# Patient Record
Sex: MALE | Race: WHITE | Employment: OTHER | URBAN - METROPOLITAN AREA
[De-identification: names, ages, dates, MRNs, and addresses within clinical notes are randomized per-mention and may not be internally consistent; named-entity substitution may affect disease eponyms.]

---

## 2017-01-12 PROBLEM — T14.8XXA BLOOD BLISTER: Status: ACTIVE | Noted: 2017-01-12

## 2017-01-18 PROBLEM — G62.9 NEUROPATHY: Status: ACTIVE | Noted: 2017-01-18

## 2017-03-07 PROBLEM — I10 HYPERTENSION COMPLICATING DIABETES (HCC): Status: ACTIVE | Noted: 2017-03-07

## 2017-03-07 PROBLEM — I15.2 HYPERTENSION COMPLICATING DIABETES (HCC): Status: ACTIVE | Noted: 2017-03-07

## 2017-03-07 PROBLEM — E78.5 HYPERLIPIDEMIA ASSOCIATED WITH TYPE 2 DIABETES MELLITUS  (HCC): Status: ACTIVE | Noted: 2017-03-07

## 2017-03-07 PROBLEM — I15.2 HYPERTENSION COMPLICATING DIABETES  (HCC): Status: ACTIVE | Noted: 2017-03-07

## 2017-03-07 PROBLEM — E11.69 HYPERLIPIDEMIA ASSOCIATED WITH TYPE 2 DIABETES MELLITUS (HCC): Status: ACTIVE | Noted: 2017-03-07

## 2017-03-07 PROBLEM — E11.59 HYPERTENSION COMPLICATING DIABETES  (HCC): Status: ACTIVE | Noted: 2017-03-07

## 2017-03-07 PROBLEM — E78.5 HYPERLIPIDEMIA ASSOCIATED WITH TYPE 2 DIABETES MELLITUS: Status: ACTIVE | Noted: 2017-03-07

## 2017-03-07 PROBLEM — E11.59 HYPERTENSION COMPLICATING DIABETES (HCC): Status: ACTIVE | Noted: 2017-03-07

## 2017-03-07 PROBLEM — E78.5 HYPERLIPIDEMIA ASSOCIATED WITH TYPE 2 DIABETES MELLITUS (HCC): Status: ACTIVE | Noted: 2017-03-07

## 2017-03-07 PROBLEM — E11.69 HYPERLIPIDEMIA ASSOCIATED WITH TYPE 2 DIABETES MELLITUS: Status: ACTIVE | Noted: 2017-03-07

## 2017-03-07 PROBLEM — E11.59 HYPERTENSION COMPLICATING DIABETES: Status: ACTIVE | Noted: 2017-03-07

## 2017-03-07 PROBLEM — I15.2 HYPERTENSION COMPLICATING DIABETES: Status: ACTIVE | Noted: 2017-03-07

## 2017-03-07 PROBLEM — E11.69 HYPERLIPIDEMIA ASSOCIATED WITH TYPE 2 DIABETES MELLITUS  (HCC): Status: ACTIVE | Noted: 2017-03-07

## 2017-03-07 PROBLEM — E11.9 HYPERTENSION COMPLICATING DIABETES (HCC): Status: ACTIVE | Noted: 2017-03-07

## 2017-03-07 PROCEDURE — 85025 COMPLETE CBC W/AUTO DIFF WBC: CPT | Performed by: INTERNAL MEDICINE

## 2017-03-07 PROCEDURE — 36415 COLL VENOUS BLD VENIPUNCTURE: CPT | Performed by: INTERNAL MEDICINE

## 2017-03-07 PROCEDURE — 83550 IRON BINDING TEST: CPT | Performed by: INTERNAL MEDICINE

## 2017-03-07 PROCEDURE — 84443 ASSAY THYROID STIM HORMONE: CPT | Performed by: INTERNAL MEDICINE

## 2017-03-07 PROCEDURE — 82607 VITAMIN B-12: CPT | Performed by: INTERNAL MEDICINE

## 2017-03-07 PROCEDURE — 80053 COMPREHEN METABOLIC PANEL: CPT | Performed by: INTERNAL MEDICINE

## 2017-03-07 PROCEDURE — 82728 ASSAY OF FERRITIN: CPT | Performed by: INTERNAL MEDICINE

## 2017-03-07 PROCEDURE — 83540 ASSAY OF IRON: CPT | Performed by: INTERNAL MEDICINE

## 2017-03-07 PROCEDURE — 82306 VITAMIN D 25 HYDROXY: CPT | Performed by: INTERNAL MEDICINE

## 2017-03-29 PROBLEM — E11.69 HYPERLIPIDEMIA ASSOCIATED WITH TYPE 2 DIABETES MELLITUS  (HCC): Status: RESOLVED | Noted: 2017-03-07 | Resolved: 2017-03-29

## 2017-03-29 PROBLEM — E78.5 HYPERLIPIDEMIA LDL GOAL <100: Status: ACTIVE | Noted: 2017-03-29

## 2017-03-29 PROBLEM — E78.5 HYPERLIPIDEMIA ASSOCIATED WITH TYPE 2 DIABETES MELLITUS (HCC): Status: RESOLVED | Noted: 2017-03-07 | Resolved: 2017-03-29

## 2017-03-29 PROBLEM — E78.5 HYPERLIPIDEMIA ASSOCIATED WITH TYPE 2 DIABETES MELLITUS: Status: RESOLVED | Noted: 2017-03-07 | Resolved: 2017-03-29

## 2017-03-29 PROBLEM — IMO0002 UNCONTROLLED TYPE 1 DIABETES MELLITUS WITH NEPHROPATHY: Status: ACTIVE | Noted: 2017-03-29

## 2017-03-29 PROBLEM — E11.59 HYPERTENSION COMPLICATING DIABETES: Status: RESOLVED | Noted: 2017-03-07 | Resolved: 2017-03-29

## 2017-03-29 PROBLEM — E11.69 HYPERLIPIDEMIA ASSOCIATED WITH TYPE 2 DIABETES MELLITUS (HCC): Status: RESOLVED | Noted: 2017-03-07 | Resolved: 2017-03-29

## 2017-03-29 PROBLEM — E11.9 HYPERTENSION COMPLICATING DIABETES (HCC): Status: RESOLVED | Noted: 2017-03-07 | Resolved: 2017-03-29

## 2017-03-29 PROBLEM — IMO0002: Status: ACTIVE | Noted: 2017-03-29

## 2017-03-29 PROBLEM — I10 HYPERTENSION, ESSENTIAL, BENIGN: Status: ACTIVE | Noted: 2017-03-29

## 2017-03-29 PROBLEM — E11.59 HYPERTENSION COMPLICATING DIABETES (HCC): Status: RESOLVED | Noted: 2017-03-07 | Resolved: 2017-03-29

## 2017-03-29 PROBLEM — I15.2 HYPERTENSION COMPLICATING DIABETES (HCC): Status: RESOLVED | Noted: 2017-03-07 | Resolved: 2017-03-29

## 2017-03-29 PROBLEM — E10.65: Status: ACTIVE | Noted: 2017-03-29

## 2017-03-29 PROBLEM — E10.3599: Status: ACTIVE | Noted: 2017-03-29

## 2017-03-29 PROBLEM — E78.5 HYPERLIPIDEMIA ASSOCIATED WITH TYPE 2 DIABETES MELLITUS  (HCC): Status: RESOLVED | Noted: 2017-03-07 | Resolved: 2017-03-29

## 2017-03-29 PROBLEM — E11.69 HYPERLIPIDEMIA ASSOCIATED WITH TYPE 2 DIABETES MELLITUS: Status: RESOLVED | Noted: 2017-03-07 | Resolved: 2017-03-29

## 2017-03-29 PROBLEM — I10 HYPERTENSION COMPLICATING DIABETES (HCC): Status: RESOLVED | Noted: 2017-03-07 | Resolved: 2017-03-29

## 2017-03-29 PROBLEM — E11.59 HYPERTENSION COMPLICATING DIABETES  (HCC): Status: RESOLVED | Noted: 2017-03-07 | Resolved: 2017-03-29

## 2017-03-29 PROBLEM — E10.29 UNCONTROLLED TYPE 1 DIABETES MELLITUS WITH NEPHROPATHY (HCC): Status: ACTIVE | Noted: 2017-03-29

## 2017-03-29 PROBLEM — E10.65 UNCONTROLLED TYPE 1 DIABETES MELLITUS WITH NEPHROPATHY (HCC): Status: ACTIVE | Noted: 2017-03-29

## 2017-03-29 PROBLEM — I15.2 HYPERTENSION COMPLICATING DIABETES: Status: RESOLVED | Noted: 2017-03-07 | Resolved: 2017-03-29

## 2017-03-29 PROBLEM — I15.2 HYPERTENSION COMPLICATING DIABETES  (HCC): Status: RESOLVED | Noted: 2017-03-07 | Resolved: 2017-03-29

## 2017-04-03 RX ORDER — FOLIC ACID/VIT B COMPLEX AND C 0.8 MG
TABLET ORAL
Qty: 90 TABLET | Refills: 1 | Status: SHIPPED | OUTPATIENT
Start: 2017-04-03 | End: 2017-10-07

## 2017-04-18 ENCOUNTER — LAB ENCOUNTER (OUTPATIENT)
Dept: LAB | Facility: HOSPITAL | Age: 67
End: 2017-04-18
Attending: INTERNAL MEDICINE
Payer: MEDICARE

## 2017-04-18 DIAGNOSIS — J43.9 PULMONARY EMPHYSEMA, UNSPECIFIED EMPHYSEMA TYPE (HCC): ICD-10-CM

## 2017-04-18 PROCEDURE — 85018 HEMOGLOBIN: CPT

## 2017-04-18 PROCEDURE — 82803 BLOOD GASES ANY COMBINATION: CPT

## 2017-04-18 PROCEDURE — 36600 WITHDRAWAL OF ARTERIAL BLOOD: CPT

## 2017-04-18 PROCEDURE — 83050 HGB METHEMOGLOBIN QUAN: CPT

## 2017-04-18 PROCEDURE — 36415 COLL VENOUS BLD VENIPUNCTURE: CPT

## 2017-04-18 PROCEDURE — 82375 ASSAY CARBOXYHB QUANT: CPT

## 2017-04-19 NOTE — PROGRESS NOTES
Quick Note:    Telephone Information:  Home Phone [phone]  Work Phone Not on file. Mobile [phone] Spoke with patient and let him know results/comments.  Patient verbalized understanding  Home Phone [phone]      ______

## 2017-04-19 NOTE — PROGRESS NOTES
Quick Note:    Let him know this showed only a slightly elevated carbon dioxide related to his emphysema, nothing to do  ______

## 2017-07-14 PROBLEM — E10.649 HYPOGLYCEMIA UNAWARENESS IN TYPE 1 DIABETES MELLITUS (HCC): Status: ACTIVE | Noted: 2017-07-14

## 2017-09-26 PROBLEM — I10 HYPERTENSION, ESSENTIAL, BENIGN: Status: RESOLVED | Noted: 2017-03-29 | Resolved: 2017-09-26

## 2017-09-26 PROBLEM — T14.8XXA BLOOD BLISTER: Status: RESOLVED | Noted: 2017-01-12 | Resolved: 2017-09-26

## 2017-09-26 PROBLEM — E11.59 HYPERTENSION ASSOCIATED WITH DIABETES (HCC): Status: ACTIVE | Noted: 2017-03-07

## 2017-09-26 PROBLEM — I15.2 HYPERTENSION ASSOCIATED WITH DIABETES (HCC): Status: ACTIVE | Noted: 2017-03-07

## 2017-09-26 PROBLEM — I15.2 HYPERTENSION ASSOCIATED WITH DIABETES  (HCC): Status: ACTIVE | Noted: 2017-03-07

## 2017-09-26 PROBLEM — E11.59 HYPERTENSION ASSOCIATED WITH DIABETES: Status: ACTIVE | Noted: 2017-03-07

## 2017-09-26 PROBLEM — I15.2 HYPERTENSION ASSOCIATED WITH DIABETES: Status: ACTIVE | Noted: 2017-03-07

## 2017-09-26 PROBLEM — E11.59 HYPERTENSION ASSOCIATED WITH DIABETES  (HCC): Status: ACTIVE | Noted: 2017-03-07

## 2017-09-26 PROBLEM — E78.5 HYPERLIPIDEMIA LDL GOAL <100: Status: RESOLVED | Noted: 2017-03-29 | Resolved: 2017-09-26

## 2017-10-09 RX ORDER — FOLIC ACID/VIT B COMPLEX AND C 0.8 MG
TABLET ORAL
Qty: 90 TABLET | Refills: 0 | Status: SHIPPED | OUTPATIENT
Start: 2017-10-09 | End: 2018-01-02

## 2017-11-04 PROBLEM — E10.65 UNCONTROLLED TYPE 1 DIABETES MELLITUS WITH NEPHROPATHY (HCC): Status: RESOLVED | Noted: 2017-03-29 | Resolved: 2017-11-04

## 2017-11-04 PROBLEM — I15.2 HYPERTENSION ASSOCIATED WITH DIABETES  (HCC): Status: RESOLVED | Noted: 2017-03-07 | Resolved: 2017-11-04

## 2017-11-04 PROBLEM — IMO0002 UNCONTROLLED TYPE 1 DIABETES MELLITUS WITH NEPHROPATHY: Status: RESOLVED | Noted: 2017-03-29 | Resolved: 2017-11-04

## 2017-11-04 PROBLEM — I15.2 HYPERTENSION ASSOCIATED WITH DIABETES (HCC): Status: RESOLVED | Noted: 2017-03-07 | Resolved: 2017-11-04

## 2017-11-04 PROBLEM — E11.59 HYPERTENSION ASSOCIATED WITH DIABETES (HCC): Status: RESOLVED | Noted: 2017-03-07 | Resolved: 2017-11-04

## 2017-11-04 PROBLEM — E10.65: Status: RESOLVED | Noted: 2017-03-29 | Resolved: 2017-11-04

## 2017-11-04 PROBLEM — E11.59 HYPERTENSION ASSOCIATED WITH DIABETES: Status: RESOLVED | Noted: 2017-03-07 | Resolved: 2017-11-04

## 2017-11-04 PROBLEM — IMO0002: Status: RESOLVED | Noted: 2017-03-29 | Resolved: 2017-11-04

## 2017-11-04 PROBLEM — E11.59 HYPERTENSION ASSOCIATED WITH DIABETES  (HCC): Status: RESOLVED | Noted: 2017-03-07 | Resolved: 2017-11-04

## 2017-11-04 PROBLEM — E10.3599: Status: RESOLVED | Noted: 2017-03-29 | Resolved: 2017-11-04

## 2017-11-04 PROBLEM — I15.2 HYPERTENSION ASSOCIATED WITH DIABETES: Status: RESOLVED | Noted: 2017-03-07 | Resolved: 2017-11-04

## 2017-11-04 PROBLEM — E10.29 UNCONTROLLED TYPE 1 DIABETES MELLITUS WITH NEPHROPATHY (HCC): Status: RESOLVED | Noted: 2017-03-29 | Resolved: 2017-11-04

## 2017-11-14 ENCOUNTER — OFFICE VISIT (OUTPATIENT)
Dept: NEPHROLOGY | Facility: CLINIC | Age: 67
End: 2017-11-14

## 2017-11-14 VITALS — SYSTOLIC BLOOD PRESSURE: 120 MMHG | DIASTOLIC BLOOD PRESSURE: 64 MMHG | WEIGHT: 199 LBS | BODY MASS INDEX: 33 KG/M2

## 2017-11-14 DIAGNOSIS — N18.4 CKD (CHRONIC KIDNEY DISEASE) STAGE 4, GFR 15-29 ML/MIN (HCC): Primary | ICD-10-CM

## 2017-11-14 DIAGNOSIS — E10.8 TYPE 1 DIABETES MELLITUS WITH COMPLICATION (HCC): ICD-10-CM

## 2017-11-14 DIAGNOSIS — R60.9 EDEMA, UNSPECIFIED TYPE: ICD-10-CM

## 2017-11-14 DIAGNOSIS — I10 ESSENTIAL HYPERTENSION: ICD-10-CM

## 2017-11-14 PROBLEM — S90.822A BLISTER OF LEFT FOOT, INITIAL ENCOUNTER: Status: ACTIVE | Noted: 2017-11-14

## 2017-11-14 PROCEDURE — 99214 OFFICE O/P EST MOD 30 MIN: CPT | Performed by: INTERNAL MEDICINE

## 2017-11-14 NOTE — PROGRESS NOTES
Nephrology Progress Note      ASSESSMENT/PLAN:        1) CKD 3- relatively stable creatinine less than 2 mg/dL is due to long-standing type 1 diabetes since age 14 with sub-nephrotic range proteinuria; has been doing well due to reasonable BP and DM manage age 14    Type  1 DM   • Diabetic retinopathy     laser surgery   • Esophageal reflux    • HEMORRHOIDS    • HIGH BLOOD PRESSURE    • Hospitalization or health care facility admission within last 6 months     this was in 2013   • Lucian Gilbert Molly De La Cruz MD;  Location: [address] [place] [address]                MANAGEMENT  6/8/2016: INJECTION, W/WO CONTRAST, DX/THERAPEUTIC SUBST* N/A      Comment: Procedure: LUMBAR EPIDURAL;  Surgeon: Maximilian Stiles MD;  Location: [address] [place] [address] [facility]                MANAGEMENT  5/25/2016: PATIENT DOCUMENTED NOT TO HAVE EXPERIENCED ANY* N/A      Comment: Procedure: LUMBAR EPIDURAL;  Surgeon: Vinod Jeffery MD;  Location: [address] [place] [address]  6/8/2 Diabetes Brother      Type 2 DM   • Hypertension Brother    • Lipids Brother    • Thyroid Disorder Neg       Social History: Smoking status: Former Smoker                                                              Packs/day: 3.50      Years: 20.00 pump use - max 50 Units per day Disp: 5 vial Rfl: 3   GABAPENTIN 300 MG Oral Cap TAKE 1 CAPSULE BY MOUTH TWO TIMES A DAY AND 3 CAPSULES BY MOUTH EVERY NIGHT AT BEDTIME Disp: 450 capsule Rfl: 1   amoxicillin 500 MG Oral Cap Dentist  Disp:  Rfl:    Glucose B

## 2017-11-27 PROBLEM — H90.3 SENSORY HEARING LOSS, BILATERAL: Status: ACTIVE | Noted: 2017-11-27

## 2017-11-27 PROBLEM — H93.13 BILATERAL TINNITUS: Status: ACTIVE | Noted: 2017-11-27

## 2018-01-04 RX ORDER — B-COMPLEX W/ C & FOLIC ACID TAB 1 MG 1 MG
TAB ORAL
Qty: 90 TABLET | Refills: 1 | Status: SHIPPED | OUTPATIENT
Start: 2018-01-04 | End: 2018-07-08

## 2018-01-13 PROCEDURE — 82570 ASSAY OF URINE CREATININE: CPT | Performed by: INTERNAL MEDICINE

## 2018-01-13 PROCEDURE — 82043 UR ALBUMIN QUANTITATIVE: CPT | Performed by: INTERNAL MEDICINE

## 2018-03-22 PROBLEM — F33.1 MODERATE EPISODE OF RECURRENT MAJOR DEPRESSIVE DISORDER (HCC): Status: ACTIVE | Noted: 2018-03-22

## 2018-03-31 PROCEDURE — 81001 URINALYSIS AUTO W/SCOPE: CPT | Performed by: INTERNAL MEDICINE

## 2018-04-13 PROCEDURE — 86618 LYME DISEASE ANTIBODY: CPT | Performed by: OTHER

## 2018-04-13 PROCEDURE — 82746 ASSAY OF FOLIC ACID SERUM: CPT | Performed by: OTHER

## 2018-04-13 PROCEDURE — 83921 ORGANIC ACID SINGLE QUANT: CPT | Performed by: OTHER

## 2018-04-13 PROCEDURE — 84165 PROTEIN E-PHORESIS SERUM: CPT | Performed by: OTHER

## 2018-04-13 PROCEDURE — 82607 VITAMIN B-12: CPT | Performed by: OTHER

## 2018-04-13 PROCEDURE — 86334 IMMUNOFIX E-PHORESIS SERUM: CPT | Performed by: OTHER

## 2018-04-13 PROCEDURE — 83883 ASSAY NEPHELOMETRY NOT SPEC: CPT | Performed by: OTHER

## 2018-04-13 PROCEDURE — 84425 ASSAY OF VITAMIN B-1: CPT | Performed by: OTHER

## 2018-06-12 ENCOUNTER — HOSPITAL ENCOUNTER (OUTPATIENT)
Dept: GENERAL RADIOLOGY | Facility: HOSPITAL | Age: 68
Discharge: HOME | End: 2018-06-12
Attending: ORTHOPAEDIC SURGERY
Payer: MEDICARE

## 2018-06-12 ENCOUNTER — APPOINTMENT (OUTPATIENT)
Dept: LAB | Facility: HOSPITAL | Age: 68
End: 2018-06-12
Payer: MEDICARE

## 2018-06-12 ENCOUNTER — LABORATORY ENCOUNTER (OUTPATIENT)
Dept: LAB | Facility: HOSPITAL | Age: 68
End: 2018-06-12
Attending: ORTHOPAEDIC SURGERY
Payer: MEDICARE

## 2018-06-12 DIAGNOSIS — M48.00 SPINAL STENOSIS: ICD-10-CM

## 2018-06-12 DIAGNOSIS — Z01.818 PRE-OP EXAM: Primary | ICD-10-CM

## 2018-06-12 DIAGNOSIS — Z01.818 PRE-OP EXAM: ICD-10-CM

## 2018-06-12 PROCEDURE — 87081 CULTURE SCREEN ONLY: CPT

## 2018-06-12 PROCEDURE — 81001 URINALYSIS AUTO W/SCOPE: CPT

## 2018-06-12 PROCEDURE — 93010 ELECTROCARDIOGRAM REPORT: CPT | Performed by: INTERNAL MEDICINE

## 2018-06-12 PROCEDURE — 71046 X-RAY EXAM CHEST 2 VIEWS: CPT | Performed by: ORTHOPAEDIC SURGERY

## 2018-06-12 PROCEDURE — 85025 COMPLETE CBC W/AUTO DIFF WBC: CPT

## 2018-06-12 PROCEDURE — 80053 COMPREHEN METABOLIC PANEL: CPT

## 2018-06-12 PROCEDURE — 93005 ELECTROCARDIOGRAM TRACING: CPT

## 2018-06-26 ENCOUNTER — LAB ENCOUNTER (OUTPATIENT)
Dept: LAB | Facility: HOSPITAL | Age: 68
End: 2018-06-26
Attending: ORTHOPAEDIC SURGERY
Payer: MEDICARE

## 2018-06-26 DIAGNOSIS — Z01.818 PRE-OP EXAM: ICD-10-CM

## 2018-06-26 PROCEDURE — 36415 COLL VENOUS BLD VENIPUNCTURE: CPT

## 2018-06-26 PROCEDURE — 85610 PROTHROMBIN TIME: CPT

## 2018-06-26 PROCEDURE — 85730 THROMBOPLASTIN TIME PARTIAL: CPT

## 2018-06-29 PROCEDURE — 36415 COLL VENOUS BLD VENIPUNCTURE: CPT | Performed by: INTERNAL MEDICINE

## 2018-06-29 PROCEDURE — 85613 RUSSELL VIPER VENOM DILUTED: CPT | Performed by: INTERNAL MEDICINE

## 2018-06-29 PROCEDURE — 85280 CLOT FACTOR XII HAGEMAN: CPT | Performed by: INTERNAL MEDICINE

## 2018-06-29 PROCEDURE — 85245 CLOT FACTOR VIII VW RISTOCTN: CPT | Performed by: INTERNAL MEDICINE

## 2018-06-29 PROCEDURE — 85250 CLOT FACTOR IX PTC/CHRSTMAS: CPT | Performed by: INTERNAL MEDICINE

## 2018-06-29 PROCEDURE — 85705 THROMBOPLASTIN INHIBITION: CPT | Performed by: INTERNAL MEDICINE

## 2018-06-29 PROCEDURE — 85240 CLOT FACTOR VIII AHG 1 STAGE: CPT | Performed by: INTERNAL MEDICINE

## 2018-06-29 PROCEDURE — 85247 CLOT FACTOR VIII MULTIMETRIC: CPT | Performed by: INTERNAL MEDICINE

## 2018-06-29 PROCEDURE — 85246 CLOT FACTOR VIII VW ANTIGEN: CPT | Performed by: INTERNAL MEDICINE

## 2018-06-29 PROCEDURE — 85732 THROMBOPLASTIN TIME PARTIAL: CPT | Performed by: INTERNAL MEDICINE

## 2018-06-29 PROCEDURE — 85610 PROTHROMBIN TIME: CPT | Performed by: INTERNAL MEDICINE

## 2018-07-06 PROBLEM — R79.1 ELEVATED PARTIAL THROMBOPLASTIN TIME (PTT): Status: ACTIVE | Noted: 2018-07-06

## 2018-07-06 PROCEDURE — 36415 COLL VENOUS BLD VENIPUNCTURE: CPT | Performed by: PHYSICIAN ASSISTANT

## 2018-07-06 PROCEDURE — 85280 CLOT FACTOR XII HAGEMAN: CPT | Performed by: PHYSICIAN ASSISTANT

## 2018-07-09 RX ORDER — ASCORBIC ACID, THIAMINE, RIBOFLAVIN, NIACINAMIDE, PYRIDOXINE, FOLIC ACID, COBALAMIN, BIOTIN, PANTOTHENIC ACID 100; 1.5; 1.7; 20; 10; 1; 6; 300; 1 MG/1; MG/1; MG/1; MG/1; MG/1; MG/1; UG/1; UG/1; MG/1
TABLET, COATED ORAL
Qty: 90 TABLET | Refills: 0 | Status: ON HOLD | OUTPATIENT
Start: 2018-07-09 | End: 2018-09-10

## 2018-08-31 PROCEDURE — 81001 URINALYSIS AUTO W/SCOPE: CPT | Performed by: INTERNAL MEDICINE

## 2018-08-31 PROCEDURE — 87081 CULTURE SCREEN ONLY: CPT | Performed by: INTERNAL MEDICINE

## 2018-09-07 ENCOUNTER — ANESTHESIA EVENT (OUTPATIENT)
Dept: SURGERY | Facility: HOSPITAL | Age: 68
End: 2018-09-07

## 2018-09-10 ENCOUNTER — APPOINTMENT (OUTPATIENT)
Dept: GENERAL RADIOLOGY | Facility: HOSPITAL | Age: 68
DRG: 472 | End: 2018-09-10
Attending: ORTHOPAEDIC SURGERY
Payer: MEDICARE

## 2018-09-10 ENCOUNTER — HOSPITAL ENCOUNTER (INPATIENT)
Facility: HOSPITAL | Age: 68
LOS: 2 days | Discharge: HOME HEALTH | DRG: 472 | End: 2018-09-12
Attending: ORTHOPAEDIC SURGERY | Admitting: ORTHOPAEDIC SURGERY
Payer: MEDICARE

## 2018-09-10 ENCOUNTER — ANESTHESIA (OUTPATIENT)
Dept: SURGERY | Facility: HOSPITAL | Age: 68
End: 2018-09-10

## 2018-09-10 DIAGNOSIS — G95.9 CERVICAL MYELOPATHY (HCC): ICD-10-CM

## 2018-09-10 DIAGNOSIS — M50.20 HNP (HERNIATED NUCLEUS PULPOSUS), CERVICAL: ICD-10-CM

## 2018-09-10 DIAGNOSIS — Z98.1 S/P CERVICAL SPINAL FUSION: ICD-10-CM

## 2018-09-10 PROBLEM — E10.8: Chronic | Status: ACTIVE | Noted: 2018-09-10

## 2018-09-10 PROBLEM — Z96.41 INSULIN PUMP IN PLACE: Chronic | Status: ACTIVE | Noted: 2018-09-10

## 2018-09-10 LAB
ERYTHROCYTE [DISTWIDTH] IN BLOOD BY AUTOMATED COUNT: 13 % (ref 11.5–16)
EST. AVERAGE GLUCOSE BLD GHB EST-MCNC: 151 MG/DL (ref 68–126)
GLUCOSE BLD-MCNC: 112 MG/DL (ref 65–99)
GLUCOSE BLD-MCNC: 116 MG/DL (ref 65–99)
GLUCOSE BLD-MCNC: 123 MG/DL (ref 65–99)
GLUCOSE BLD-MCNC: 298 MG/DL (ref 65–99)
HBA1C MFR BLD HPLC: 6.9 % (ref ?–5.7)
HCT VFR BLD AUTO: 37.1 % (ref 37–53)
HGB BLD-MCNC: 12.2 G/DL (ref 13–17)
MCH RBC QN AUTO: 29.7 PG (ref 27–33.2)
MCHC RBC AUTO-ENTMCNC: 32.9 G/DL (ref 31–37)
MCV RBC AUTO: 90.3 FL (ref 80–99)
PLATELET # BLD AUTO: 132 10(3)UL (ref 150–450)
RBC # BLD AUTO: 4.11 X10(6)UL (ref 3.8–5.8)
RED CELL DISTRIBUTION WIDTH-SD: 42.2 FL (ref 35.1–46.3)
WBC # BLD AUTO: 9.9 X10(3) UL (ref 4–13)

## 2018-09-10 PROCEDURE — 5A09357 ASSISTANCE WITH RESPIRATORY VENTILATION, LESS THAN 24 CONSECUTIVE HOURS, CONTINUOUS POSITIVE AIRWAY PRESSURE: ICD-10-PCS | Performed by: ORTHOPAEDIC SURGERY

## 2018-09-10 PROCEDURE — 76001 XR FLUOROSCOPE EXAM >1 HR EXTENSIVE (CPT=76001): CPT | Performed by: ORTHOPAEDIC SURGERY

## 2018-09-10 PROCEDURE — 99233 SBSQ HOSP IP/OBS HIGH 50: CPT | Performed by: CLINICAL NURSE SPECIALIST

## 2018-09-10 PROCEDURE — 0RG20A0 FUSION OF 2 OR MORE CERVICAL VERTEBRAL JOINTS WITH INTERBODY FUSION DEVICE, ANTERIOR APPROACH, ANTERIOR COLUMN, OPEN APPROACH: ICD-10-PCS | Performed by: ORTHOPAEDIC SURGERY

## 2018-09-10 PROCEDURE — 0RG20K0 FUSION OF 2 OR MORE CERVICAL VERTEBRAL JOINTS WITH NONAUTOLOGOUS TISSUE SUBSTITUTE, ANTERIOR APPROACH, ANTERIOR COLUMN, OPEN APPROACH: ICD-10-PCS | Performed by: ORTHOPAEDIC SURGERY

## 2018-09-10 PROCEDURE — 0RT30ZZ RESECTION OF CERVICAL VERTEBRAL DISC, OPEN APPROACH: ICD-10-PCS | Performed by: ORTHOPAEDIC SURGERY

## 2018-09-10 PROCEDURE — 00NW0ZZ RELEASE CERVICAL SPINAL CORD, OPEN APPROACH: ICD-10-PCS | Performed by: ORTHOPAEDIC SURGERY

## 2018-09-10 PROCEDURE — 4A11X4G MONITORING OF PERIPHERAL NERVOUS ELECTRICAL ACTIVITY, INTRAOPERATIVE, EXTERNAL APPROACH: ICD-10-PCS | Performed by: ORTHOPAEDIC SURGERY

## 2018-09-10 PROCEDURE — 00BT0ZZ EXCISION OF SPINAL MENINGES, OPEN APPROACH: ICD-10-PCS | Performed by: ORTHOPAEDIC SURGERY

## 2018-09-10 PROCEDURE — 0RP104Z REMOVAL OF INTERNAL FIXATION DEVICE FROM CERVICAL VERTEBRAL JOINT, OPEN APPROACH: ICD-10-PCS | Performed by: ORTHOPAEDIC SURGERY

## 2018-09-10 DEVICE — ARCHON SCRW 4.0X15 SLF-TP VAR: Type: IMPLANTABLE DEVICE | Site: NECK | Status: FUNCTIONAL

## 2018-09-10 DEVICE — ARCHON SCRW 4.5X15 SLF-TP VAR: Type: IMPLANTABLE DEVICE | Site: NECK | Status: FUNCTIONAL

## 2018-09-10 DEVICE — OSTEOCEL PRO BONE MATRIX SM: Type: IMPLANTABLE DEVICE | Site: NECK | Status: FUNCTIONAL

## 2018-09-10 DEVICE — IMPLANTABLE DEVICE: Type: IMPLANTABLE DEVICE | Site: NECK | Status: FUNCTIONAL

## 2018-09-10 DEVICE — COROENT ACR MRKR 7X17X14 10DEG: Type: IMPLANTABLE DEVICE | Site: NECK | Status: FUNCTIONAL

## 2018-09-10 RX ORDER — GABAPENTIN 300 MG/1
600 CAPSULE ORAL NIGHTLY
Status: DISCONTINUED | OUTPATIENT
Start: 2018-09-10 | End: 2018-09-12

## 2018-09-10 RX ORDER — METOCLOPRAMIDE HYDROCHLORIDE 5 MG/ML
5 INJECTION INTRAMUSCULAR; INTRAVENOUS EVERY 6 HOURS PRN
Status: DISCONTINUED | OUTPATIENT
Start: 2018-09-10 | End: 2018-09-12

## 2018-09-10 RX ORDER — AMLODIPINE BESYLATE 10 MG/1
10 TABLET ORAL DAILY
COMMUNITY
End: 2019-05-20

## 2018-09-10 RX ORDER — DOCUSATE SODIUM 100 MG/1
100 CAPSULE, LIQUID FILLED ORAL 2 TIMES DAILY
Status: DISCONTINUED | OUTPATIENT
Start: 2018-09-10 | End: 2018-09-12

## 2018-09-10 RX ORDER — HYDROCODONE BITARTRATE AND ACETAMINOPHEN 5; 325 MG/1; MG/1
2 TABLET ORAL AS NEEDED
Status: DISCONTINUED | OUTPATIENT
Start: 2018-09-10 | End: 2018-09-10 | Stop reason: HOSPADM

## 2018-09-10 RX ORDER — MORPHINE SULFATE 4 MG/ML
2 INJECTION, SOLUTION INTRAMUSCULAR; INTRAVENOUS EVERY 5 MIN PRN
Status: DISCONTINUED | OUTPATIENT
Start: 2018-09-10 | End: 2018-09-10 | Stop reason: HOSPADM

## 2018-09-10 RX ORDER — GABAPENTIN 300 MG/1
300 CAPSULE ORAL 2 TIMES DAILY
COMMUNITY
End: 2018-12-05

## 2018-09-10 RX ORDER — HYDROCODONE BITARTRATE AND ACETAMINOPHEN 10; 325 MG/1; MG/1
1 TABLET ORAL EVERY 4 HOURS PRN
Status: DISCONTINUED | OUTPATIENT
Start: 2018-09-10 | End: 2018-09-12

## 2018-09-10 RX ORDER — SODIUM CHLORIDE, SODIUM LACTATE, POTASSIUM CHLORIDE, CALCIUM CHLORIDE 600; 310; 30; 20 MG/100ML; MG/100ML; MG/100ML; MG/100ML
INJECTION, SOLUTION INTRAVENOUS CONTINUOUS
Status: DISCONTINUED | OUTPATIENT
Start: 2018-09-10 | End: 2018-09-10 | Stop reason: HOSPADM

## 2018-09-10 RX ORDER — HYDROCODONE BITARTRATE AND ACETAMINOPHEN 10; 325 MG/1; MG/1
2 TABLET ORAL EVERY 4 HOURS PRN
Status: DISCONTINUED | OUTPATIENT
Start: 2018-09-10 | End: 2018-09-12

## 2018-09-10 RX ORDER — NALBUPHINE HCL 10 MG/ML
2.5 AMPUL (ML) INJECTION EVERY 4 HOURS PRN
Status: DISCONTINUED | OUTPATIENT
Start: 2018-09-10 | End: 2018-09-12

## 2018-09-10 RX ORDER — FUROSEMIDE 40 MG/1
20 TABLET ORAL NIGHTLY
COMMUNITY
End: 2018-12-31

## 2018-09-10 RX ORDER — TAMSULOSIN HYDROCHLORIDE 0.4 MG/1
0.4 CAPSULE ORAL DAILY
COMMUNITY
End: 2018-12-05

## 2018-09-10 RX ORDER — DEXAMETHASONE SODIUM PHOSPHATE 10 MG/ML
10 INJECTION, SOLUTION INTRAMUSCULAR; INTRAVENOUS ONCE
Status: COMPLETED | OUTPATIENT
Start: 2018-09-10 | End: 2018-09-10

## 2018-09-10 RX ORDER — DEXTROSE MONOHYDRATE 25 G/50ML
50 INJECTION, SOLUTION INTRAVENOUS
Status: DISCONTINUED | OUTPATIENT
Start: 2018-09-10 | End: 2018-09-12

## 2018-09-10 RX ORDER — CELECOXIB 200 MG/1
200 CAPSULE ORAL ONCE
Status: COMPLETED | OUTPATIENT
Start: 2018-09-10 | End: 2018-09-10

## 2018-09-10 RX ORDER — DIAZEPAM 5 MG/1
5 TABLET ORAL EVERY 6 HOURS PRN
Status: DISCONTINUED | OUTPATIENT
Start: 2018-09-10 | End: 2018-09-12

## 2018-09-10 RX ORDER — FUROSEMIDE 40 MG/1
TABLET ORAL
COMMUNITY
End: 2019-12-05

## 2018-09-10 RX ORDER — GABAPENTIN 600 MG/1
600 TABLET ORAL ONCE
Status: COMPLETED | OUTPATIENT
Start: 2018-09-10 | End: 2018-09-10

## 2018-09-10 RX ORDER — DULOXETIN HYDROCHLORIDE 60 MG/1
120 CAPSULE, DELAYED RELEASE ORAL DAILY
COMMUNITY
End: 2019-05-20

## 2018-09-10 RX ORDER — DIPHENHYDRAMINE HYDROCHLORIDE 50 MG/ML
25 INJECTION INTRAMUSCULAR; INTRAVENOUS EVERY 4 HOURS PRN
Status: DISCONTINUED | OUTPATIENT
Start: 2018-09-10 | End: 2018-09-12

## 2018-09-10 RX ORDER — GABAPENTIN 300 MG/1
900 CAPSULE ORAL NIGHTLY
COMMUNITY
End: 2018-12-05

## 2018-09-10 RX ORDER — AMLODIPINE BESYLATE 5 MG/1
10 TABLET ORAL DAILY
Status: DISCONTINUED | OUTPATIENT
Start: 2018-09-11 | End: 2018-09-12

## 2018-09-10 RX ORDER — ACETAMINOPHEN 325 MG/1
650 TABLET ORAL EVERY 4 HOURS PRN
Status: DISCONTINUED | OUTPATIENT
Start: 2018-09-10 | End: 2018-09-12

## 2018-09-10 RX ORDER — CEFAZOLIN SODIUM/WATER 2 G/20 ML
2 SYRINGE (ML) INTRAVENOUS EVERY 8 HOURS
Status: COMPLETED | OUTPATIENT
Start: 2018-09-10 | End: 2018-09-11

## 2018-09-10 RX ORDER — SENNOSIDES 8.6 MG
17.2 TABLET ORAL NIGHTLY
Status: DISCONTINUED | OUTPATIENT
Start: 2018-09-10 | End: 2018-09-12

## 2018-09-10 RX ORDER — NALOXONE HYDROCHLORIDE 0.4 MG/ML
0.08 INJECTION, SOLUTION INTRAMUSCULAR; INTRAVENOUS; SUBCUTANEOUS
Status: DISCONTINUED | OUTPATIENT
Start: 2018-09-10 | End: 2018-09-12

## 2018-09-10 RX ORDER — ACETAMINOPHEN 500 MG
1000 TABLET ORAL ONCE
Status: DISCONTINUED | OUTPATIENT
Start: 2018-09-10 | End: 2018-09-10 | Stop reason: HOSPADM

## 2018-09-10 RX ORDER — LEVOTHYROXINE SODIUM 112 UG/1
112 TABLET ORAL
Status: DISCONTINUED | OUTPATIENT
Start: 2018-09-11 | End: 2018-09-11

## 2018-09-10 RX ORDER — ROSUVASTATIN CALCIUM 5 MG/1
10 TABLET, COATED ORAL DAILY
Status: DISCONTINUED | OUTPATIENT
Start: 2018-09-11 | End: 2018-09-12

## 2018-09-10 RX ORDER — DULOXETIN HYDROCHLORIDE 30 MG/1
120 CAPSULE, DELAYED RELEASE ORAL DAILY
Status: DISCONTINUED | OUTPATIENT
Start: 2018-09-11 | End: 2018-09-12

## 2018-09-10 RX ORDER — DIPHENHYDRAMINE HYDROCHLORIDE 50 MG/ML
12.5 INJECTION INTRAMUSCULAR; INTRAVENOUS EVERY 4 HOURS PRN
Status: DISCONTINUED | OUTPATIENT
Start: 2018-09-10 | End: 2018-09-12

## 2018-09-10 RX ORDER — FUROSEMIDE 40 MG/1
40 TABLET ORAL
Status: DISCONTINUED | OUTPATIENT
Start: 2018-09-11 | End: 2018-09-12

## 2018-09-10 RX ORDER — ALFUZOSIN HYDROCHLORIDE 10 MG/1
10 TABLET, EXTENDED RELEASE ORAL
Status: DISCONTINUED | OUTPATIENT
Start: 2018-09-11 | End: 2018-09-12

## 2018-09-10 RX ORDER — ONDANSETRON 2 MG/ML
4 INJECTION INTRAMUSCULAR; INTRAVENOUS EVERY 4 HOURS PRN
Status: ACTIVE | OUTPATIENT
Start: 2018-09-10 | End: 2018-09-11

## 2018-09-10 RX ORDER — POLYETHYLENE GLYCOL 3350 17 G/17G
17 POWDER, FOR SOLUTION ORAL DAILY PRN
Status: DISCONTINUED | OUTPATIENT
Start: 2018-09-10 | End: 2018-09-12

## 2018-09-10 RX ORDER — ONDANSETRON 2 MG/ML
4 INJECTION INTRAMUSCULAR; INTRAVENOUS AS NEEDED
Status: DISCONTINUED | OUTPATIENT
Start: 2018-09-10 | End: 2018-09-10 | Stop reason: HOSPADM

## 2018-09-10 RX ORDER — CEFAZOLIN SODIUM/WATER 2 G/20 ML
2 SYRINGE (ML) INTRAVENOUS ONCE
Status: COMPLETED | OUTPATIENT
Start: 2018-09-10 | End: 2018-09-10

## 2018-09-10 RX ORDER — HYDROCODONE BITARTRATE AND ACETAMINOPHEN 5; 325 MG/1; MG/1
1 TABLET ORAL AS NEEDED
Status: DISCONTINUED | OUTPATIENT
Start: 2018-09-10 | End: 2018-09-10 | Stop reason: HOSPADM

## 2018-09-10 RX ORDER — CYCLOBENZAPRINE HCL 5 MG
5 TABLET ORAL 3 TIMES DAILY PRN
Status: DISCONTINUED | OUTPATIENT
Start: 2018-09-10 | End: 2018-09-12

## 2018-09-10 RX ORDER — SODIUM PHOSPHATE, DIBASIC AND SODIUM PHOSPHATE, MONOBASIC 7; 19 G/133ML; G/133ML
1 ENEMA RECTAL ONCE AS NEEDED
Status: DISCONTINUED | OUTPATIENT
Start: 2018-09-10 | End: 2018-09-12

## 2018-09-10 RX ORDER — NALOXONE HYDROCHLORIDE 0.4 MG/ML
80 INJECTION, SOLUTION INTRAMUSCULAR; INTRAVENOUS; SUBCUTANEOUS AS NEEDED
Status: DISCONTINUED | OUTPATIENT
Start: 2018-09-10 | End: 2018-09-10 | Stop reason: HOSPADM

## 2018-09-10 RX ORDER — EZETIMIBE 10 MG/1
10 TABLET ORAL DAILY
Status: DISCONTINUED | OUTPATIENT
Start: 2018-09-11 | End: 2018-09-12

## 2018-09-10 RX ORDER — ONDANSETRON 2 MG/ML
4 INJECTION INTRAMUSCULAR; INTRAVENOUS EVERY 6 HOURS PRN
Status: DISCONTINUED | OUTPATIENT
Start: 2018-09-11 | End: 2018-09-12

## 2018-09-10 RX ORDER — DIPHENHYDRAMINE HCL 25 MG
25 CAPSULE ORAL EVERY 4 HOURS PRN
Status: DISCONTINUED | OUTPATIENT
Start: 2018-09-10 | End: 2018-09-12

## 2018-09-10 RX ORDER — ONDANSETRON 2 MG/ML
4 INJECTION INTRAMUSCULAR; INTRAVENOUS ONCE
Status: COMPLETED | OUTPATIENT
Start: 2018-09-10 | End: 2018-09-10

## 2018-09-10 RX ORDER — DEXTROSE MONOHYDRATE 25 G/50ML
50 INJECTION, SOLUTION INTRAVENOUS
Status: DISCONTINUED | OUTPATIENT
Start: 2018-09-10 | End: 2018-09-10 | Stop reason: HOSPADM

## 2018-09-10 RX ORDER — BISACODYL 10 MG
10 SUPPOSITORY, RECTAL RECTAL
Status: DISCONTINUED | OUTPATIENT
Start: 2018-09-10 | End: 2018-09-12

## 2018-09-10 RX ORDER — SODIUM CHLORIDE 9 MG/ML
INJECTION, SOLUTION INTRAVENOUS CONTINUOUS
Status: DISCONTINUED | OUTPATIENT
Start: 2018-09-10 | End: 2018-09-12

## 2018-09-10 RX ORDER — GABAPENTIN 300 MG/1
300 CAPSULE ORAL 3 TIMES DAILY
Status: DISCONTINUED | OUTPATIENT
Start: 2018-09-10 | End: 2018-09-12

## 2018-09-10 RX ORDER — BACITRACIN 50000 [USP'U]/1
INJECTION, POWDER, LYOPHILIZED, FOR SOLUTION INTRAMUSCULAR AS NEEDED
Status: DISCONTINUED | OUTPATIENT
Start: 2018-09-10 | End: 2018-09-10 | Stop reason: HOSPADM

## 2018-09-10 RX ORDER — SODIUM CHLORIDE, SODIUM LACTATE, POTASSIUM CHLORIDE, CALCIUM CHLORIDE 600; 310; 30; 20 MG/100ML; MG/100ML; MG/100ML; MG/100ML
INJECTION, SOLUTION INTRAVENOUS CONTINUOUS
Status: DISCONTINUED | OUTPATIENT
Start: 2018-09-10 | End: 2018-09-11

## 2018-09-10 NOTE — H&P
Progress Notes     Expand All Collapse All    Tay [facility]. is a 75year old male who presents for a pre-operative physical exam. Patient is to have c3-c7 anterior cervical fusion, to be done by Dr. Loco Styles at [facility] on 9/10/18. Quyen STALLWORTH before breakfast. Disp: 90 tablet Rfl: 1   AMLODIPINE BESYLATE 10 MG Oral Tab TAKE ONE TABLET BY MOUTH ONE TIME DAILY  Disp: 90 tablet Rfl: 3   Nortriptyline HCl 75 MG Oral Cap Take 1 capsule (75 mg total) by mouth nightly.  Disp: 90 capsule Rfl: 3   ezetim HYPOTHYROIDISM     • IMPOTENCE     • Kidney disease       ARF-2013   • Liver disease       2013 - pt states all resolved after liver and kidneys shut down after issue with pneumonia    • Neuropathy     • OSTEOARTHRITIS     • Osteoarthrosis, unspecified whe DX/THERAPEUTIC SUBST* N/A      Comment: Procedure: LUMBAR EPIDURAL;  Surgeon: Chan Solorzano MD;  Location: [facility]                MANAGEMENT  2/14/13: KNEE REPLACEMENT SURGERY      Comment: Left TKR by Dr. Mando Pham  5/4/2016:  Gina Lara Comment: Procedure: LUMBAR EPIDURAL;  Surgeon: Edith Palencia MD;  Location: [address] [place] [address]                MANAGEMENT  6/8/2016: PATIENT DOCUMENTED NOT TO HAVE EXPERIENCED ANY* N/A      Comment: Procedure: LUMBAR EPIDURAL;  Surgeon:   Disorder Neg         Social History:   Smoking status: Former Smoker                                                              Packs/day: 3.50      Years: 20.00        Types: Cigarettes     Quit date: 1/1/1986  Smokeless tobacco: Never Used 7\" (1.702 m)   Wt 202 lb (91.6 kg)   BMI 31.64 kg/m²   GENERAL: well developed, well nourished,in no apparent distress  SKIN: no rashes,no suspicious lesions  HEENT: atraumatic, normocephalic,ears and throat are clear  EYES:PERRLA, EOMI, normal optic disk performed. Risks and benefits discussed in detail. Risks including but not limited to bleeding infection, spinal leaks, neurologic injury, paralysis and worsening of symptoms. No guarantees made.  If fusion recommended risks of pseaudarthosis, esdras

## 2018-09-10 NOTE — BRIEF OP NOTE
Pre-Operative Diagnosis: Cervical myelopathy (HCC) [G95.9]  S/P cervical spinal fusion [Z98.1]  HNP (herniated nucleus pulposus), cervical [M50.20]     Post-Operative Diagnosis: Cervical myelopathy (HCC) [G95.9]S/P cervical spinal fusion [F95. 1]HNP (hernia

## 2018-09-10 NOTE — PROGRESS NOTES
Pharmacy Note: Renal dose adjustment for Metoclopramide (Reglan)  Roel Sánchez. has been prescribed Metoclopramide (Reglan) 10 mg every 6 hours as needed.     CrCl estimated at 26.7 ml/min (based on labs from 8/31/18, Scr =2.48)    His calculated c

## 2018-09-10 NOTE — CONSULTS
[facility]  CNS Consult Note    New Donna.  Patient Status:  Inpatient    1950 MRN [id number]   [facility] 3SW-A Attending Rashad Monique MD   Hosp Day # 0 PCP Sylvia Tian MD     Reason for Consult:     Sonya Allison Mayo Flores. is a 75year old male with type 1 diabetes admitted 9/10/2018 for anterior cervical discectomy and fusion.       Assessment/Recommendations:    Consulted by Dr. Marizol Murrieta for insulin pump interrogation, counseling and coordination of car PCP  · Endocrinologist    A total of 37 minutes were spent with the patient, 100% was spent counseling and coordinating care for insulin pump management, infusion site care, insulin pump interrogation, blood glucose monitoring, insulin administration, foll

## 2018-09-10 NOTE — PROGRESS NOTES
S:   Denies difficulty breathing or swallowing  He denies numbness or extremity pain    Inspection: Awake Alert  No acute distress. Blood pressure 157/70, pulse 56, temperature 98.4 °F (36.9 °C), temperature source Oral, resp.  rate 12, height 5' 7\" (1

## 2018-09-10 NOTE — ANESTHESIA PREPROCEDURE EVALUATION
PRE-OP EVALUATION    Patient Name: Catarina Parkinson.    Pre-op Diagnosis: Cervical myelopathy ([facility] [address] [G95.9]  S/P cervical spinal fusion [Z98.1]  HNP (herniated nucleus pulposus), cervical [M50.20]    Procedure(s):  Cervical 3- Cervical 4, Cervical 4- Levothyroxine Sodium 112 MCG Oral Tab Take 1 tablet (112 mcg total) by mouth every morning before breakfast. Disp: 90 tablet Rfl: 1   AMLODIPINE BESYLATE 10 MG Oral Tab TAKE ONE TABLET BY MOUTH ONE TIME DAILY  Disp: 90 tablet Rfl: 3   Nortriptyline HCl 7 depression  (+) anxiety                            Past Surgical History:  2009: BACK SURGERY      Comment:  laminectomy  L1 -4  2/09  Dr. Kev Carlos  8-8-13: BACK SURGERY      Comment:  C4-C6 ACDF   9/8/16: BACK SURGERY      Comment:  L2-S1 Revisio MCHC 33.3 06/12/2018    RDW 12.8 06/12/2018    .0 06/12/2018     Lab Results   Component Value Date     (H) 08/31/2018    K 3.5 (L) 08/31/2018     08/31/2018    CO2 34.3 (H) 08/31/2018    BUN 30 (H) 08/31/2018    CREATSERUM 2.48 (H) 08/3

## 2018-09-10 NOTE — CONSULTS
General Medicine Consult      Reason for consult: post-op medical management     Consulted by: Dr. Adela Tomlinson    PCP: Robbin Brown MD      History of Present Illness:   Patient is a 75year old male with PMH sig for PRANEETH (on CPAP), DM type 1, hypothyroidis Unspecified disorder of thyroid    • Unspecified essential hypertension    • Unspecified sleep apnea    • Visual impairment     wears glasses        PSH:  Past Surgical History:  2009: BACK SURGERY      Comment:  laminectomy  L1 -4  2/09  Dr. Elham Bloand nightly. Disp:  Rfl:    gabapentin 300 MG Oral Cap Take 300 mg by mouth 2 (two) times daily. Disp:  Rfl:    gabapentin 300 MG Oral Cap Take 900 mg by mouth nightly. Disp:  Rfl:    metoprolol Tartrate 25 MG Oral Tab Take 25 mg by mouth 2 (two) times daily. C, sodium chloride 0.9%, HYDROmorphone (DILAUDID) PCA bolus from bag, PEG 3350, magnesium hydroxide, bisacodyl, FLEET ENEMA, [START ON 9/11/2018] ondansetron HCl, ondansetron HCl, Metoclopramide HCl, diphenhydrAMINE **OR** DiphenhydrAMINE HCl, Naloxone HCl normocephalic, MMM, no oropharyngeal lesions, aspen collar in place  Lungs: CTA, good effort  CV: nl S1/S2  GI: soft/NT/ND +BS  Ext: nonedematous b/l LE, no clubbing or cyanosis  Neuro: moving all extremities  Psych: normal mood, normal affect  Skin: no ra monitor for acute blood loss anemia. Outpatient records or previous hospital records reviewed.      Shruti Morel  Internal Medicine  [place] Hospitalist

## 2018-09-10 NOTE — ANESTHESIA POSTPROCEDURE EVALUATION
[facility]  Patient Status:  Surgery Admit   Age/Gender 75year old male MRN [id number]   [facility] SURGERY Attending Sal Lovell MD   Hosp Day # 0 PCP Gloria Paula MD       Anesthesia Post-op Note

## 2018-09-11 LAB
ANION GAP SERPL CALC-SCNC: 5 MMOL/L (ref 0–18)
BUN BLD-MCNC: 27 MG/DL (ref 8–20)
BUN/CREAT SERPL: 12.4 (ref 10–20)
CALCIUM BLD-MCNC: 8.2 MG/DL (ref 8.3–10.3)
CHLORIDE SERPL-SCNC: 105 MMOL/L (ref 101–111)
CO2 SERPL-SCNC: 31 MMOL/L (ref 22–32)
CREAT BLD-MCNC: 2.18 MG/DL (ref 0.7–1.3)
ERYTHROCYTE [DISTWIDTH] IN BLOOD BY AUTOMATED COUNT: 12.7 % (ref 11.5–16)
GLUCOSE BLD-MCNC: 124 MG/DL (ref 65–99)
GLUCOSE BLD-MCNC: 125 MG/DL (ref 65–99)
GLUCOSE BLD-MCNC: 129 MG/DL (ref 70–99)
GLUCOSE BLD-MCNC: 138 MG/DL (ref 65–99)
GLUCOSE BLD-MCNC: 161 MG/DL (ref 65–99)
GLUCOSE BLD-MCNC: 168 MG/DL (ref 65–99)
GLUCOSE BLD-MCNC: 274 MG/DL (ref 65–99)
HCT VFR BLD AUTO: 37.2 % (ref 37–53)
HGB BLD-MCNC: 12 G/DL (ref 13–17)
MCH RBC QN AUTO: 29.5 PG (ref 27–33.2)
MCHC RBC AUTO-ENTMCNC: 32.3 G/DL (ref 31–37)
MCV RBC AUTO: 91.4 FL (ref 80–99)
OSMOLALITY SERPL CALC.SUM OF ELEC: 299 MOSM/KG (ref 275–295)
PLATELET # BLD AUTO: 161 10(3)UL (ref 150–450)
POTASSIUM SERPL-SCNC: 3.5 MMOL/L (ref 3.6–5.1)
RBC # BLD AUTO: 4.07 X10(6)UL (ref 3.8–5.8)
RED CELL DISTRIBUTION WIDTH-SD: 42.5 FL (ref 35.1–46.3)
SODIUM SERPL-SCNC: 141 MMOL/L (ref 136–144)
T4 FREE SERPL-MCNC: 1.2 NG/DL (ref 0.9–1.8)
TSI SER-ACNC: 0.26 MIU/ML (ref 0.35–5.5)
WBC # BLD AUTO: 9.7 X10(3) UL (ref 4–13)

## 2018-09-11 RX ORDER — LISINOPRIL 20 MG/1
20 TABLET ORAL DAILY
Status: DISCONTINUED | OUTPATIENT
Start: 2018-09-11 | End: 2018-09-12

## 2018-09-11 RX ORDER — LEVOTHYROXINE SODIUM 0.1 MG/1
100 TABLET ORAL
Status: DISCONTINUED | OUTPATIENT
Start: 2018-09-12 | End: 2018-09-12

## 2018-09-11 NOTE — PROGRESS NOTES
Patient and   attended discharge spine education/snack class. Printed discharge education sheet provided and reviewed. Teach back done. Questions solicited and answered. Tolerated activity well.

## 2018-09-11 NOTE — PLAN OF CARE
Blood pressure elevated 179/78 ,denies any chest pain ,no headache ,paged DR. Rowe ,no order's yet will continue to monitor blood pressure endorsed to oncoming RN

## 2018-09-11 NOTE — OCCUPATIONAL THERAPY NOTE
OCCUPATIONAL THERAPY QUICK EVALUATION - INPATIENT    Room Number: 382/382-A  Evaluation Date: 9/11/2018     Type of Evaluation: Quick Eval  Presenting Problem: Myelopathy s/p revision C3-C7 discectomy fusion 9/10/18    Physician Order: Leda Jensen Consult to GUILHERME Godinez in 2013-pt had pneumonia, sepsis, dialysis, peg tube, vent, now resolved.    • Polyneuropathy in diabetes(357.2)    • SLEEP APNEA     Uses his CPAP machine consistently   • Sleep apnea, obstructive SPLIT NIGHT 5-12-17    AHI 54 SaO2 lizeth 74 % CPAP 12 HME// level(1-step in)  Lives With: Spouse(works full time)    Toilet and Equipment: Standard height toilet;Comfort height toilet;Grab bar  Shower/Tub and Equipment: Walk-in shower;Grab bar; Shower chair  Other Equipment: (has 2 walker)    Occupation/Status: reti Scale): 44.27  CMS Modifier (G-Code): CJ    FUNCTIONAL TRANSFER ASSESSMENT  Supine to Sit : Modified independent  Sit to Stand: Modified independent    Skilled Therapy Provided: Patient educated on OT role, goals, recommendations, safety and precautions an performance deficits impacting engagement in ADL/IADL  LOW  1 - 3 performance deficits    Client Assessment/Performance Deficits  LOW - No comorbidities nor modifications of tasks    Clinical Decision Making  LOW - Analysis of occupational profile, problem

## 2018-09-11 NOTE — PROGRESS NOTES
POD #1 spine newsletter and swallowing precautions provided to patient. Reviewed indications, side effects of pain medication/narcotics and constipation prevention.  Stressed importance of increased fluids/roughage in diet, continued use stool softeners christina

## 2018-09-11 NOTE — PROGRESS NOTES
[facility] Hospitalist Progress Note     [facility]      SUBJECTIVE:  No acute events overnight  Pain better controlled today  No SOB or chest pain    OBJECTIVE:  Temp:  [98 °F (36.7 °C)-98.5 °F (36.9 °C)] 98.5 °F (36.9 °C)  Pulse:  [59-72] 70  Resp:  [16- cage is noted. Dictated by: Belgica Edwards MD on 9/10/2018 at 13:40     Approved by: Belgica Edwards MD               Meds:     No current outpatient medications on file.       Current Facility-Administered Medications:  [START ON 9/12/2018] Levo 0.9%  NaCl infusion  Intravenous Continuous   acetaminophen (TYLENOL) tab 650 mg 650 mg Oral Q4H PRN   Or      HYDROcodone-acetaminophen (NORCO)  MG per tab 1 tablet 1 tablet Oral Q4H PRN   Or      HYDROcodone-acetaminophen (NORCO)  MG per ta

## 2018-09-11 NOTE — PLAN OF CARE
Pt received from PACU with neck brace, Marcelino drain, ontiveros, PCA pump infusing. Pt very sleepy. Neuro intact, passed dysphagia screen. Has insulin pump. Consult to endocrinology, and hospitalist notified. Caitlyn HENAO In to see re pump. Up in chair.  Wife at [employer]

## 2018-09-11 NOTE — PLAN OF CARE
PAIN - ADULT    • Verbalizes/displays adequate comfort level or patient's stated pain goal Progressing        SAFETY ADULT - FALL    • Free from fall injury Progressing        Patient verbalized having pain level of 3 this morning on assessment, informed R

## 2018-09-11 NOTE — CONSULTS
[facility]  Report of Consultation    Black Nunez.  Patient Status:  Inpatient    1950 MRN [id number]   [facility] 3SW-A Attending Isa Urias MD   Hosp Day # 1 PCP Jean Marie Cruz MD     Reason for Consultation: retinopathy     laser surgery   • Esophageal reflux    • Hearing impairment     hearing aids   • HEMORRHOIDS    • HIGH BLOOD PRESSURE    • History of blood transfusion 2013   • Hospitalization or health care facility admission within last 6 months     this repair fracture left hand 5th digit  No date: OTHER SURGICAL HISTORY      Comment:  Surgery left heel ligament repair  No date: OTHER SURGICAL HISTORY      Comment:  Laser surgery for bilateral retinopathy  No date: OTHER SURGICAL HISTORY      Comment:  Brand mg, Intravenous, Q30 Min PRN  •  Senna (SENOKOT) tab TABS 17.2 mg, 17.2 mg, Oral, Nightly  •  docusate sodium (COLACE) cap 100 mg, 100 mg, Oral, BID  •  PEG 3350 (MIRALAX) powder packet 17 g, 17 g, Oral, Daily PRN  •  magnesium hydroxide (MILK OF MAGNESIA) AC  •  metoprolol Tartrate (LOPRESSOR) tab 25 mg, 25 mg, Oral, BID  •  Nortriptyline HCl (PAMELOR) cap 75 mg, 75 mg, Oral, Nightly  •  Rosuvastatin Calcium (CRESTOR) tab 10 mg, 10 mg, Oral, Daily  •  Alfuzosin HCl ER (UROXATRAL) 24 hr tab 10 mg, 10 mg, Ora Active Problem List:     Obstructive sleep apnea     Restless legs syndrome (RLS)     Secondary hyperparathyroidism (HCC)     Peripheral autonomic neuropathy in disorders classified elsewhere(337.1)     S/P arthroscopic knee surgery     S/P total knee arth give his insulin doses via pump - confirmed yesterday that he is able to do this and has enough insulin   - continue to monitor with pre-meal and bedtime blood sugars    2. Hyperlipidemia - on statin, zetia  3.  Hypothyroid - on LT4 112  - TSH 0.258 9/11/18

## 2018-09-11 NOTE — PROGRESS NOTES
Patient unable to urinate post catheter removal.  Complaint of lower abdomen pressure. Bladder scan done with 680 ml result. Straight cath done with 800 ml clear yellow urine. Will continue to monitor urine output, patient due to void.

## 2018-09-11 NOTE — PROGRESS NOTES
S:   Denies difficulty breathing or swallowing  He denies numbness or extremity pain. He has \"stiffness\" in his hands    Inspection: Awake Alert  No acute distress.      Blood pressure (!) 162/74, pulse 59, temperature 98.2 °F (36.8 °C), temperature sourc

## 2018-09-11 NOTE — RESPIRATORY THERAPY NOTE
PRANEETH - Equipment Use Daily Summary:  · Set Mode :AFLEX  · Usage in hours: 8.38  · 90% Pressure (EPAP) level: 11.5  · 90% Insp Pressure (IPAP):   · AHI: 4.7  · Supplemental Oxygen:   · Comments:

## 2018-09-11 NOTE — PHYSICAL THERAPY NOTE
PHYSICAL THERAPY QUICK EVALUATION - INPATIENT    Room Number: 382/382-A  Evaluation Date: 9/11/2018  Presenting Problem: S/p Revision ACDF C3-C4,C4-C5,C5-C6,C6-C7 on 09/10/18  Physician Order: PT Eval and Treat    Problem List  Active Problems:    S/P ce History:  2009: BACK SURGERY      Comment:  laminectomy  L1 -4  2/09  Dr. Kierra Lovell  8-8-13: BACK SURGERY      Comment:  C4-C6 ACDF   9/8/16: BACK SURGERY      Comment:  L2-S1 Revision Decomp possible uninstrumented fusion                9/8/16  do own.    Patrica Barksdale  \"I can do this all,I am not on time table. \" Patient was participatory & motivated,Wife was present.     OBJECTIVE  Precautions: Cervical brace;Spine;Drain(s)(Insulin pump,Aspen collar)  Fall Risk: High fall risk    WEIGHT BEARING RESTR RW)  Stoop/Curb Assistance: Supervision  Comment : Above score is based on FIM definations    Skilled Therapy Provided: Evaluation completed,Patient was educated & instructed in post surgical precautions & proper body mechanics,Issued handouts for same. Jimbo Levy

## 2018-09-12 VITALS
HEART RATE: 61 BPM | DIASTOLIC BLOOD PRESSURE: 69 MMHG | TEMPERATURE: 99 F | RESPIRATION RATE: 18 BRPM | SYSTOLIC BLOOD PRESSURE: 139 MMHG | BODY MASS INDEX: 31.94 KG/M2 | WEIGHT: 203.5 LBS | OXYGEN SATURATION: 93 % | HEIGHT: 67 IN

## 2018-09-12 LAB
ANION GAP SERPL CALC-SCNC: 8 MMOL/L (ref 0–18)
BUN BLD-MCNC: 32 MG/DL (ref 8–20)
BUN/CREAT SERPL: 13.6 (ref 10–20)
CALCIUM BLD-MCNC: 8.8 MG/DL (ref 8.3–10.3)
CHLORIDE SERPL-SCNC: 105 MMOL/L (ref 101–111)
CO2 SERPL-SCNC: 28 MMOL/L (ref 22–32)
CREAT BLD-MCNC: 2.36 MG/DL (ref 0.7–1.3)
GLUCOSE BLD-MCNC: 111 MG/DL (ref 70–99)
GLUCOSE BLD-MCNC: 148 MG/DL (ref 65–99)
GLUCOSE BLD-MCNC: 149 MG/DL (ref 65–99)
OSMOLALITY SERPL CALC.SUM OF ELEC: 300 MOSM/KG (ref 275–295)
POTASSIUM SERPL-SCNC: 3.6 MMOL/L (ref 3.6–5.1)
SODIUM SERPL-SCNC: 141 MMOL/L (ref 136–144)

## 2018-09-12 RX ORDER — HYDROCODONE BITARTRATE AND ACETAMINOPHEN 10; 325 MG/1; MG/1
1 TABLET ORAL EVERY 4 HOURS PRN
Qty: 60 TABLET | Refills: 0 | Status: SHIPPED | OUTPATIENT
Start: 2018-09-12 | End: 2018-09-21

## 2018-09-12 RX ORDER — POLYETHYLENE GLYCOL 3350 17 G/17G
17 POWDER, FOR SOLUTION ORAL DAILY PRN
Qty: 10 EACH | Refills: 0 | Status: SHIPPED | COMMUNITY
Start: 2018-09-12 | End: 2020-09-11

## 2018-09-12 RX ORDER — PSEUDOEPHEDRINE HCL 30 MG
TABLET ORAL
Qty: 60 CAPSULE | Refills: 0 | Status: SHIPPED | COMMUNITY
Start: 2018-09-12 | End: 2019-01-15

## 2018-09-12 NOTE — CM/SW NOTE
09/12/18 1300   Discharge disposition   Expected discharge disposition Home-Health   Name of Facillity/Home Care/Hospice Residential   Discharge transportation Private car

## 2018-09-12 NOTE — PLAN OF CARE
PAIN - ADULT    • Verbalizes/displays adequate comfort level or patient's stated pain goal Progressing        SAFETY ADULT - FALL    • Free from fall injury Progressing        Pt ambulates with SBA and walker.   Pain minimal.  Takes Norco prn with adequate

## 2018-09-12 NOTE — CM/SW NOTE
09/12/18 1300   CM/SW Screening   Referral Source Other  (PT, [facility])   [address] staff; Chart review;Nursing rounds   Patient's Mental Status Alert;Oriented   Patient's [address]   Patient lives with Spouse   Informed by PT that h

## 2018-09-12 NOTE — PROGRESS NOTES
S:   Denies difficulty breathing or swallowing  He denies numbness and no extremity pain. He feels his hand stiffness has improved    Inspection: Awake Alert  No acute distress.      Blood pressure 148/67, pulse 57, temperature 98.5 °F (36.9 °C), temperatu

## 2018-09-12 NOTE — HOME CARE LIAISON
MET WITH PTNT TO DISCUSS HOME HEALTH SERVICES AND COVERAGE CRITERIA. PTNT AGREEABLE TO Eugenio Benson. PTNT GIVEN RESIDENTIAL BROCHURE. RESIDENTIAL WITH PROVIDE PT ON DISCHARGE.     Thank you for this referral,   Sergio Sharpe

## 2018-09-12 NOTE — PROGRESS NOTES
[facility] hospitalist daily note  Patient was seen/examined on 9./12/18    S; no chest pain, no SOB, no bd pain, no  Nausea/emesis  He is passing gas and able to urinate    Medications in Epic    PE    09/12/18  1156   BP: 139/69   Pulse: 61   Resp: 18   Temp: 9

## 2018-09-12 NOTE — RESPIRATORY THERAPY NOTE
PRANEETH - Equipment Use Daily Summary:                  . Set Mode: AUTO CPAP WITH C-FLEX                . Usage in hours: 7:29                . 90% Pressure (EPAP) level: 13.5                . 90% Insp. Pressure (IPAP): Bruce Van  AHI: 7.6

## 2018-09-12 NOTE — PLAN OF CARE
Impaired Functional Mobility    • Achieve highest/safest level of mobility/gait Adequate for Discharge        PAIN - ADULT    • Verbalizes/displays adequate comfort level or patient's stated pain goal Adequate for Discharge        Patient/Family Goals    • No

## 2018-09-12 NOTE — PROGRESS NOTES
[facility]  Progress Note    New Donna.  Patient Status:  Inpatient    1950 MRN [id number]   [facility] 3SW-A Attending Fan Urias MD   [facility] Day # 2 PCP Molly Duvla MD       SUBJECTIVE:  No acute events ov Intravenous Q15 Min PRN   Or      glucose (DEX4) oral liquid 30 g 30 g Oral Q15 Min PRN   Or      Glucose-Vitamin C (DEX-4) 4-0.006 g chewable tab 8 tablet 8 tablet Oral Q15 Min PRN   HYDROmorphone (DILAUDID) PCA bolus from bag 0.4 mg Intravenous Q30 Min P tab 25 mg 25 mg Oral BID   Nortriptyline HCl (PAMELOR) cap 75 mg 75 mg Oral Nightly   Rosuvastatin Calcium (CRESTOR) tab 10 mg 10 mg Oral Daily   Alfuzosin HCl ER (UROXATRAL) 24 hr tab 10 mg 10 mg Oral Daily with breakfast   INSULIN VIA INSULIN PUMP  Subcu

## 2018-09-14 ENCOUNTER — LAB REQUISITION (OUTPATIENT)
Dept: LAB | Facility: HOSPITAL | Age: 68
End: 2018-09-14
Payer: MEDICARE

## 2018-09-14 DIAGNOSIS — R69 ILLNESS: ICD-10-CM

## 2018-09-14 LAB
ANION GAP SERPL CALC-SCNC: 10 MMOL/L (ref 0–18)
BASOPHILS # BLD AUTO: 0.03 X10(3) UL (ref 0–0.1)
BASOPHILS NFR BLD AUTO: 0.4 %
BUN BLD-MCNC: 30 MG/DL (ref 8–20)
BUN/CREAT SERPL: 14.9 (ref 10–20)
CALCIUM BLD-MCNC: 8.6 MG/DL (ref 8.3–10.3)
CHLORIDE SERPL-SCNC: 99 MMOL/L (ref 101–111)
CO2 SERPL-SCNC: 30 MMOL/L (ref 22–32)
CREAT BLD-MCNC: 2.02 MG/DL (ref 0.7–1.3)
EOSINOPHIL # BLD AUTO: 0.36 X10(3) UL (ref 0–0.3)
EOSINOPHIL NFR BLD AUTO: 4.5 %
ERYTHROCYTE [DISTWIDTH] IN BLOOD BY AUTOMATED COUNT: 13.1 % (ref 11.5–16)
GLUCOSE BLD-MCNC: 127 MG/DL (ref 70–99)
HCT VFR BLD AUTO: 37.6 % (ref 37–53)
HGB BLD-MCNC: 12 G/DL (ref 13–17)
IMMATURE GRANULOCYTE COUNT: 0.03 X10(3) UL (ref 0–1)
IMMATURE GRANULOCYTE RATIO %: 0.4 %
LYMPHOCYTES # BLD AUTO: 1.26 X10(3) UL (ref 0.9–4)
LYMPHOCYTES NFR BLD AUTO: 15.9 %
MCH RBC QN AUTO: 29.9 PG (ref 27–33.2)
MCHC RBC AUTO-ENTMCNC: 31.9 G/DL (ref 31–37)
MCV RBC AUTO: 93.5 FL (ref 80–99)
MONOCYTES # BLD AUTO: 0.57 X10(3) UL (ref 0.1–1)
MONOCYTES NFR BLD AUTO: 7.2 %
NEUTROPHIL ABS PRELIM: 5.68 X10 (3) UL (ref 1.3–6.7)
NEUTROPHILS # BLD AUTO: 5.68 X10(3) UL (ref 1.3–6.7)
NEUTROPHILS NFR BLD AUTO: 71.6 %
OSMOLALITY SERPL CALC.SUM OF ELEC: 296 MOSM/KG (ref 275–295)
PLATELET # BLD AUTO: 145 10(3)UL (ref 150–450)
POTASSIUM SERPL-SCNC: 3.1 MMOL/L (ref 3.6–5.1)
RBC # BLD AUTO: 4.02 X10(6)UL (ref 3.8–5.8)
RED CELL DISTRIBUTION WIDTH-SD: 45 FL (ref 35.1–46.3)
SODIUM SERPL-SCNC: 139 MMOL/L (ref 136–144)
WBC # BLD AUTO: 7.9 X10(3) UL (ref 4–13)

## 2018-09-14 PROCEDURE — 85025 COMPLETE CBC W/AUTO DIFF WBC: CPT | Performed by: INTERNAL MEDICINE

## 2018-09-14 PROCEDURE — 80048 BASIC METABOLIC PNL TOTAL CA: CPT | Performed by: INTERNAL MEDICINE

## 2018-09-26 NOTE — OPERATIVE REPORT
[facility]    PATIENT'S NAME: Loni Gamboa   ATTENDING PHYSICIAN: Elke Bowles M.D. OPERATING PHYSICIAN: Elke Bowles M.D.    PATIENT ACCOUNT#:   [de-identified]    LOCATION:  [facility]  MEDICAL RECORD #:   [id number]       DATE OF BARRY in the [place] headholder in neutral position. Fluoroscopy was wheeled in to keri an oblique incision at the medial border of the right sternocleidomastoid muscle. The neck was sterilely prepped and draped. An incision was made with a #15 blade.   Bov cystic mass adherent to the dura was identified and removed with sharp curettes, Kerrisons, and a pituitary. It was sent to Pathology for analysis. The foramen were cleared bilaterally. A thorough decompressive discectomy was undertaken.   Undercutting t Curettes and pituitaries were used to remove a highly degenerative disc. Distraction was undertaken. We undercut and performed undercutting technique after removing uncovertebral joints and hypertrophy with Midas Darrion bur.   Undercutting curettes and 1 and

## 2018-10-24 NOTE — DISCHARGE SUMMARY
[facility]  Discharge Summary    Nils Cramer.  Patient Status:  Inpatient    1950 MRN [id number]   [facility] 3SW-A Attending No att. providers found   Hosp Day # 2 PCP Jorge Luis Meza MD     Date of Admission: 9/10/ cervical spinal fusion     Controlled type 1 diabetes mellitus with complication, with long-term current use of insulin (HCC)     Insulin pump in place      Hospital course:   The patient was admitted on above date to undergo elective surgical intervention CHANGED    AmLODIPine Besylate 10 MG Oral Tab  Take 10 mg by mouth daily. , Historical    DULoxetine HCl 60 MG Oral Cap DR Particles  Take 120 mg by mouth daily. , Historical    !! furosemide 40 MG Oral Tab  Take 40 mg by mouth every morning., Historical Oral Tab  Take 1 tablet (4 mg total) by mouth every 8 (eight) hours as needed., Normal, Disp-30 tablet, R-1    !! HYDROcodone-acetaminophen (NORCO)  MG Oral Tab  Take 1 tablet every 4 hours or 2 tablets every 8 hours PRN for post op pain.  Not to exce

## 2018-12-31 ENCOUNTER — OFFICE VISIT (OUTPATIENT)
Dept: NEPHROLOGY | Facility: CLINIC | Age: 68
End: 2018-12-31
Payer: MEDICARE

## 2018-12-31 VITALS — WEIGHT: 198 LBS | DIASTOLIC BLOOD PRESSURE: 78 MMHG | SYSTOLIC BLOOD PRESSURE: 142 MMHG | BODY MASS INDEX: 31 KG/M2

## 2018-12-31 DIAGNOSIS — R80.9 PROTEINURIA, UNSPECIFIED TYPE: ICD-10-CM

## 2018-12-31 DIAGNOSIS — I10 ESSENTIAL HYPERTENSION: ICD-10-CM

## 2018-12-31 DIAGNOSIS — N18.30 CKD (CHRONIC KIDNEY DISEASE) STAGE 3, GFR 30-59 ML/MIN (HCC): Primary | ICD-10-CM

## 2018-12-31 PROCEDURE — 99214 OFFICE O/P EST MOD 30 MIN: CPT | Performed by: INTERNAL MEDICINE

## 2018-12-31 NOTE — PROGRESS NOTES
Nephrology Progress Note      ASSESSMENT/PLAN:        1) CKD 3/4- relatively stable creatinine approx 2 mg/dL is due to long-standing type 1 diabetes since age 14 with sub-nephrotic range proteinuria; has been doing well due to reasonable BP and DM managem Esophageal reflux    • Hearing impairment     hearing aids   • HEMORRHOIDS    • HIGH BLOOD PRESSURE    • History of blood transfusion 2013   • Hospitalization or health care facility admission within last 6 months     this was in 2013   • [address] POSSIBLE POLYPECTOMY [address] N/A 2/20/2009    Performed by Romana Pagoda, MD at [address] [facility]   • ESOPHAGOGASTRODUODENOSCOPY (EGD) N/A 4/14/2016    Performed by Kimo Stewart MD at [facility] ENDOSCOPY   • ESOPHAGOGASTRODUODENOSCOPY, POSSIBLE BIOPS contents; Alex Gómez      Family History   Problem Relation Age of Onset   • Hypertension Father    • Lipids Father    • Cancer Father    • Hypertension Mother    • Psychiatric Maternal Grandmother    • Diabetes Paternal Grandfather         Type 2 DM   • Heart Levothyroxine Sodium 112 MCG Oral Tab Take 1 tablet (112 mcg total) by mouth every morning before breakfast. Disp: 90 tablet Rfl: 1   docusate sodium 100 MG Oral Cap This is colace, a stool softener. Over-the-counter medication.  Take twice daily while yo rub  Lungs: Clear without wheezes, rales, rhonchi. Abdomen: Soft, non-tender. + bowel sounds, no palpable organomegaly  Extremities: Without clubbing, cyanosis or edema.   Neurologic: Alert and oriented, normal affect, cranial nerves grossly intact, movi

## 2019-01-15 ENCOUNTER — APPOINTMENT (OUTPATIENT)
Dept: GENERAL RADIOLOGY | Age: 69
End: 2019-01-15
Attending: EMERGENCY MEDICINE
Payer: MEDICARE

## 2019-01-15 ENCOUNTER — HOSPITAL ENCOUNTER (EMERGENCY)
Age: 69
Discharge: HOME | End: 2019-01-16
Attending: EMERGENCY MEDICINE
Payer: MEDICARE

## 2019-01-15 DIAGNOSIS — S62.102A LEFT WRIST FRACTURE, CLOSED, INITIAL ENCOUNTER: Primary | ICD-10-CM

## 2019-01-15 PROCEDURE — 99284 EMERGENCY DEPT VISIT MOD MDM: CPT

## 2019-01-15 PROCEDURE — 90471 IMMUNIZATION ADMIN: CPT

## 2019-01-15 PROCEDURE — 29125 APPL SHORT ARM SPLINT STATIC: CPT

## 2019-01-15 PROCEDURE — 73110 X-RAY EXAM OF WRIST: CPT | Performed by: EMERGENCY MEDICINE

## 2019-01-15 RX ORDER — HYDROCODONE BITARTRATE AND ACETAMINOPHEN 5; 325 MG/1; MG/1
1-2 TABLET ORAL EVERY 4 HOURS PRN
Qty: 10 TABLET | Refills: 0 | Status: SHIPPED | OUTPATIENT
Start: 2019-01-15 | End: 2019-01-22

## 2019-01-15 RX ORDER — HYDROCODONE BITARTRATE AND ACETAMINOPHEN 5; 325 MG/1; MG/1
1 TABLET ORAL ONCE
Status: COMPLETED | OUTPATIENT
Start: 2019-01-15 | End: 2019-01-15

## 2019-01-15 RX ORDER — ACETAMINOPHEN 500 MG
500 TABLET ORAL ONCE
Status: COMPLETED | OUTPATIENT
Start: 2019-01-15 | End: 2019-01-15

## 2019-01-16 VITALS
WEIGHT: 195 LBS | HEART RATE: 62 BPM | TEMPERATURE: 98 F | HEIGHT: 66 IN | BODY MASS INDEX: 31.34 KG/M2 | SYSTOLIC BLOOD PRESSURE: 184 MMHG | RESPIRATION RATE: 18 BRPM | OXYGEN SATURATION: 98 % | DIASTOLIC BLOOD PRESSURE: 82 MMHG

## 2019-01-16 NOTE — ED PROVIDER NOTES
Patient Seen in: [facility] In [place]    History   Patient presents with:  Upper Extremity Injury (musculoskeletal)  Fall (musculoskeletal, neurologic)    Stated Complaint: Left wrist injury; Pt reported falling on driveway, [place] LOC [address] [address] [place] [address] [address] follows   • Unspecified disorder of thyroid    • Unspecified essential hypertension    • Unspecified sleep apnea    • Visual impairment     wears glasses       Past Surgical History:   Procedure Laterality Date   • ANTERIOR CERVICAL FUSION HISTORY      CTS release bilateral 2000   • OTHER SURGICAL HISTORY      Surgical repair fracture left hand 5th digit   • OTHER SURGICAL HISTORY      Surgery left heel ligament repair   • OTHER SURGICAL HISTORY      Laser surgery for bilateral retinopathy Physical Exam    GENERAL: Awake, alert oriented x3, nontoxic appearing. SKIN: Normal, warm, and dry. HEENT: Atraumatic. Pupils equally round and reactive to light. Conjuctiva clear.   Oropharynx is clear and moist.   Lungs: Clear to auscultation

## 2019-01-21 PROBLEM — S52.572A OTHER CLOSED INTRA-ARTICULAR FRACTURE OF DISTAL END OF LEFT RADIUS, INITIAL ENCOUNTER: Status: ACTIVE | Noted: 2019-01-21

## 2019-01-21 PROBLEM — S52.571A OTHER CLOSED INTRA-ARTICULAR FRACTURE OF DISTAL END OF RIGHT RADIUS, INITIAL ENCOUNTER: Status: ACTIVE | Noted: 2019-01-21

## 2019-01-21 RX ORDER — ACETAMINOPHEN 500 MG
1000 TABLET ORAL ONCE
Status: CANCELLED | OUTPATIENT
Start: 2019-01-21 | End: 2019-01-21

## 2019-01-21 RX ORDER — SODIUM CHLORIDE, SODIUM LACTATE, POTASSIUM CHLORIDE, CALCIUM CHLORIDE 600; 310; 30; 20 MG/100ML; MG/100ML; MG/100ML; MG/100ML
INJECTION, SOLUTION INTRAVENOUS CONTINUOUS
Status: CANCELLED | OUTPATIENT
Start: 2019-01-21

## 2019-01-22 ENCOUNTER — APPOINTMENT (OUTPATIENT)
Dept: LAB | Facility: HOSPITAL | Age: 69
End: 2019-01-22
Payer: MEDICARE

## 2019-01-22 DIAGNOSIS — S52.92XA FRACTURE OF LEFT RADIUS: ICD-10-CM

## 2019-01-22 LAB
ANION GAP SERPL CALC-SCNC: 8 MMOL/L (ref 0–18)
BUN BLD-MCNC: 26 MG/DL (ref 8–20)
BUN/CREAT SERPL: 11.2 (ref 10–20)
CALCIUM BLD-MCNC: 9.2 MG/DL (ref 8.3–10.3)
CHLORIDE SERPL-SCNC: 102 MMOL/L (ref 101–111)
CO2 SERPL-SCNC: 30 MMOL/L (ref 22–32)
CREAT BLD-MCNC: 2.32 MG/DL (ref 0.7–1.3)
GLUCOSE BLD-MCNC: 178 MG/DL (ref 70–99)
OSMOLALITY SERPL CALC.SUM OF ELEC: 299 MOSM/KG (ref 275–295)
POTASSIUM SERPL-SCNC: 3.7 MMOL/L (ref 3.6–5.1)
SODIUM SERPL-SCNC: 140 MMOL/L (ref 136–144)

## 2019-01-22 PROCEDURE — 93005 ELECTROCARDIOGRAM TRACING: CPT

## 2019-01-22 PROCEDURE — 36415 COLL VENOUS BLD VENIPUNCTURE: CPT

## 2019-01-22 PROCEDURE — 80048 BASIC METABOLIC PNL TOTAL CA: CPT

## 2019-01-22 PROCEDURE — 93010 ELECTROCARDIOGRAM REPORT: CPT | Performed by: INTERNAL MEDICINE

## 2019-01-23 LAB
ATRIAL RATE: 70 BPM
P AXIS: 46 DEGREES
P-R INTERVAL: 192 MS
Q-T INTERVAL: 418 MS
QRS DURATION: 96 MS
QTC CALCULATION (BEZET): 451 MS
R AXIS: -25 DEGREES
T AXIS: 39 DEGREES
VENTRICULAR RATE: 70 BPM

## 2019-01-24 ENCOUNTER — ANESTHESIA EVENT (OUTPATIENT)
Dept: SURGERY | Facility: HOSPITAL | Age: 69
End: 2019-01-24

## 2019-01-26 ENCOUNTER — ANESTHESIA (OUTPATIENT)
Dept: SURGERY | Facility: HOSPITAL | Age: 69
End: 2019-01-26

## 2019-01-26 ENCOUNTER — APPOINTMENT (OUTPATIENT)
Dept: GENERAL RADIOLOGY | Facility: HOSPITAL | Age: 69
End: 2019-01-26
Attending: ORTHOPAEDIC SURGERY
Payer: MEDICARE

## 2019-01-26 ENCOUNTER — HOSPITAL ENCOUNTER (OUTPATIENT)
Facility: HOSPITAL | Age: 69
Setting detail: HOSPITAL OUTPATIENT SURGERY
Discharge: HOME | End: 2019-01-26
Attending: ORTHOPAEDIC SURGERY | Admitting: ORTHOPAEDIC SURGERY
Payer: MEDICARE

## 2019-01-26 VITALS
TEMPERATURE: 99 F | OXYGEN SATURATION: 100 % | HEART RATE: 69 BPM | HEIGHT: 67 IN | SYSTOLIC BLOOD PRESSURE: 163 MMHG | WEIGHT: 197.31 LBS | RESPIRATION RATE: 14 BRPM | BODY MASS INDEX: 30.97 KG/M2 | DIASTOLIC BLOOD PRESSURE: 81 MMHG

## 2019-01-26 DIAGNOSIS — S52.92XA FRACTURE OF LEFT RADIUS: Primary | ICD-10-CM

## 2019-01-26 DIAGNOSIS — S52.572A OTHER CLOSED INTRA-ARTICULAR FRACTURE OF DISTAL END OF LEFT RADIUS, INITIAL ENCOUNTER: ICD-10-CM

## 2019-01-26 LAB
GLUCOSE BLD-MCNC: 112 MG/DL (ref 65–99)
GLUCOSE BLD-MCNC: 160 MG/DL (ref 65–99)

## 2019-01-26 PROCEDURE — 0PSJ04Z REPOSITION LEFT RADIUS WITH INTERNAL FIXATION DEVICE, OPEN APPROACH: ICD-10-PCS | Performed by: ORTHOPAEDIC SURGERY

## 2019-01-26 PROCEDURE — 76000 FLUOROSCOPY <1 HR PHYS/QHP: CPT | Performed by: ORTHOPAEDIC SURGERY

## 2019-01-26 PROCEDURE — 76942 ECHO GUIDE FOR BIOPSY: CPT | Performed by: ORTHOPAEDIC SURGERY

## 2019-01-26 PROCEDURE — 82962 GLUCOSE BLOOD TEST: CPT

## 2019-01-26 DEVICE — 2.3MM X 20MM LOCKING CORTICAL PEG
Type: IMPLANTABLE DEVICE | Site: WRIST | Status: FUNCTIONAL
Brand: ACUMED

## 2019-01-26 DEVICE — 2.3MM X 18MM LOCKING CORTICAL PEG
Type: IMPLANTABLE DEVICE | Site: WRIST | Status: FUNCTIONAL
Brand: ACUMED

## 2019-01-26 DEVICE — 3.5MM X 14.0MM LOCKING CORTICAL SCREW
Type: IMPLANTABLE DEVICE | Site: WRIST | Status: FUNCTIONAL
Brand: ACUMED

## 2019-01-26 DEVICE — 2.3MM X 22MM LOCKING CORTICAL PEG
Type: IMPLANTABLE DEVICE | Site: WRIST | Status: FUNCTIONAL
Brand: ACUMED

## 2019-01-26 DEVICE — 3.5MM X 14.0MM CORTICAL SCREW
Type: IMPLANTABLE DEVICE | Site: WRIST | Status: FUNCTIONAL
Brand: ACUMED

## 2019-01-26 DEVICE — 3.5MM X 12.0MM LOCKING CORTICAL SCREW
Type: IMPLANTABLE DEVICE | Site: WRIST | Status: FUNCTIONAL
Brand: ACUMED

## 2019-01-26 RX ORDER — ACETAMINOPHEN 500 MG
1000 TABLET ORAL ONCE
Status: DISCONTINUED | OUTPATIENT
Start: 2019-01-26 | End: 2019-01-26

## 2019-01-26 RX ORDER — NALOXONE HYDROCHLORIDE 0.4 MG/ML
80 INJECTION, SOLUTION INTRAMUSCULAR; INTRAVENOUS; SUBCUTANEOUS AS NEEDED
Status: DISCONTINUED | OUTPATIENT
Start: 2019-01-26 | End: 2019-01-26

## 2019-01-26 RX ORDER — CEFAZOLIN SODIUM/WATER 2 G/20 ML
SYRINGE (ML) INTRAVENOUS
Status: DISCONTINUED
Start: 2019-01-26 | End: 2019-01-26

## 2019-01-26 RX ORDER — ONDANSETRON 2 MG/ML
4 INJECTION INTRAMUSCULAR; INTRAVENOUS AS NEEDED
Status: DISCONTINUED | OUTPATIENT
Start: 2019-01-26 | End: 2019-01-26

## 2019-01-26 RX ORDER — METOCLOPRAMIDE HYDROCHLORIDE 5 MG/ML
10 INJECTION INTRAMUSCULAR; INTRAVENOUS AS NEEDED
Status: DISCONTINUED | OUTPATIENT
Start: 2019-01-26 | End: 2019-01-26

## 2019-01-26 RX ORDER — ACETAMINOPHEN 500 MG
1000 TABLET ORAL ONCE
COMMUNITY
End: 2019-02-12

## 2019-01-26 RX ORDER — HYDROMORPHONE HYDROCHLORIDE 1 MG/ML
0.4 INJECTION, SOLUTION INTRAMUSCULAR; INTRAVENOUS; SUBCUTANEOUS EVERY 5 MIN PRN
Status: DISCONTINUED | OUTPATIENT
Start: 2019-01-26 | End: 2019-01-26

## 2019-01-26 RX ORDER — HYDROCODONE BITARTRATE AND ACETAMINOPHEN 5; 325 MG/1; MG/1
2 TABLET ORAL AS NEEDED
Status: COMPLETED | OUTPATIENT
Start: 2019-01-26 | End: 2019-01-26

## 2019-01-26 RX ORDER — HYDROCODONE BITARTRATE AND ACETAMINOPHEN 5; 325 MG/1; MG/1
1 TABLET ORAL AS NEEDED
Status: COMPLETED | OUTPATIENT
Start: 2019-01-26 | End: 2019-01-26

## 2019-01-26 RX ORDER — CEFAZOLIN SODIUM/WATER 2 G/20 ML
2 SYRINGE (ML) INTRAVENOUS ONCE
Status: COMPLETED | OUTPATIENT
Start: 2019-01-26 | End: 2019-01-26

## 2019-01-26 RX ORDER — HYDROCODONE BITARTRATE AND ACETAMINOPHEN 5; 325 MG/1; MG/1
1 TABLET ORAL EVERY 6 HOURS PRN
Qty: 15 TABLET | Refills: 0 | Status: SHIPPED | OUTPATIENT
Start: 2019-01-26 | End: 2019-02-12

## 2019-01-26 RX ORDER — DEXTROSE MONOHYDRATE 25 G/50ML
50 INJECTION, SOLUTION INTRAVENOUS
Status: DISCONTINUED | OUTPATIENT
Start: 2019-01-26 | End: 2019-01-26 | Stop reason: HOSPADM

## 2019-01-26 RX ORDER — SODIUM CHLORIDE, SODIUM LACTATE, POTASSIUM CHLORIDE, CALCIUM CHLORIDE 600; 310; 30; 20 MG/100ML; MG/100ML; MG/100ML; MG/100ML
INJECTION, SOLUTION INTRAVENOUS CONTINUOUS
Status: DISCONTINUED | OUTPATIENT
Start: 2019-01-26 | End: 2019-01-26

## 2019-01-26 RX ORDER — DEXTROSE MONOHYDRATE 25 G/50ML
50 INJECTION, SOLUTION INTRAVENOUS
Status: DISCONTINUED | OUTPATIENT
Start: 2019-01-26 | End: 2019-01-26

## 2019-01-26 NOTE — ANESTHESIA PREPROCEDURE EVALUATION
PRE-OP EVALUATION    Patient Name: Loki Peralta    Pre-op Diagnosis: Other closed intra-articular fracture of distal end of left radius, initial encounter [Z22.044R]    Procedure(s):  OPEN REDUCTION INTERNAL FIXATION LEFT DISTAL RADIUS    Surgeon(s) PTA Insulin via Pump Inject into the skin continuous. humalog insulin 19.85 units per day Disp:  Rfl:    Cholecalciferol (VITAMIN D3) 2000 UNITS Oral Tab Take 1 capsule by mouth daily.    Disp:  Rfl:        Allergies: No Known Allergies      Anesthesia Ev POSSIBLE POLYPECTOMY 90262 N/A 2/14/2014    Performed by Radha Cade MD at [address] [place] [address] N/A 9/10/2018    Performed by Jeison Sanchez MD at [facility] MAIN OR   • KNEE REPLACEMENT SURGERY  2/14/13    Left TKR by tobacco: Never Used      Tobacco comment: Quit 25 years ago    Alcohol use: Yes      Alcohol/week: 1.2 - 1.8 oz      Types: 2 - 3 Cans of beer per week      Drug use: No     Available pre-op labs reviewed.      Lab Results   Component Value Date     0

## 2019-01-26 NOTE — H&P
Office Visit     1/16/2019  [address]   [place], [place]   Physician Assistant   Other closed intra-articular fracture of distal end of right radius, initial encounter +1 more   Dx   Wrist Pain     Reason for Visit QUINAPRIL HCL 20 MG Oral Tab TAKE ONE TABLET BY MOUTH TWICE DAILY  Disp: 180 tablet Rfl: 0   TAMSULOSIN HCL 0.4 MG Oral Cap Take 1 capsule by mouth once daily Disp: 90 capsule Rfl: 0   Levothyroxine Sodium 112 MCG Oral Tab Take 1 tablet (112 mcg total) by • Hearing impairment       hearing aids   • HEMORRHOIDS     • HIGH BLOOD PRESSURE     • History of blood transfusion 2013   • Hospitalization or health care facility admission within last 6 months       this was in 2013   • Bryan Root GENERAL: oriented to time/place and person. normocehpalic  well developed, well nourished, and in no apparent distress  SKIN: no rashes,no suspicious lesions  MOUTH, NOSE: nasal mucosa pink w/o discharge. Oropharynx is pink with no exudate or erythema.  No 2) The risks, benefits, and alternatives of surgical versus non-surgical intervention were discussed in detail and include but are not limited to infection, bleeding, neurovascular injury, need for further surgery, development of deep vein thrombosis, recu

## 2019-01-26 NOTE — BRIEF OP NOTE
Pre-Operative Diagnosis: Other closed intra-articular fracture of distal end of left radius, initial encounter [S52.421C]     Post-Operative Diagnosis: Other closed intra-articular fracture of distal end of left radius, initial encounter [S52.190S]      Pr

## 2019-01-26 NOTE — OPERATIVE REPORT
[facility]    PATIENT'S NAME: Nori West   ATTENDING PHYSICIAN: Codey Veliz M.D. OPERATING PHYSICIAN: Codey Veliz M.D.    PATIENT ACCOUNT#:   [de-identified]    [facility]  MEDICAL RECORD #:   [id number]       DA Tourniquet was placed about the left biceps. The left upper extremity was prepped and draped in standard surgical fashion. A surgical time-out was taken in which the proper patient, surgical site, and procedure were verified.   The limb was exsanguinated fluoroscopic images were taken to ensure anatomic reduction as well as good placement of the hardware. The brachioradialis was repaired with 2-0 Vicryl suture. The pronator quadratus was repaired back to its origin with 2-0 Vicryl suture as well.   Skin f

## 2019-01-26 NOTE — ANESTHESIA POSTPROCEDURE EVALUATION
[facility] Patient Status:  Hospital Outpatient Surgery   Age/Gender 70year old male MRN [id number]   Location [facility] Attending Ashutosh Waters MD   Hosp Day # 0 PCP Robbin Brown MD

## 2019-05-20 PROBLEM — E10.8: Chronic | Status: RESOLVED | Noted: 2018-09-10 | Resolved: 2019-05-20

## 2019-05-20 PROBLEM — S90.822A BLISTER OF LEFT FOOT, INITIAL ENCOUNTER: Status: RESOLVED | Noted: 2017-11-14 | Resolved: 2019-05-20

## 2019-05-20 PROBLEM — R79.1 ELEVATED PARTIAL THROMBOPLASTIN TIME (PTT): Status: RESOLVED | Noted: 2018-07-06 | Resolved: 2019-05-20

## 2019-07-15 PROCEDURE — 83921 ORGANIC ACID SINGLE QUANT: CPT | Performed by: OTHER

## 2019-07-15 PROCEDURE — 80335 ANTIDEPRESSANT TRICYCLIC 1/2: CPT | Performed by: OTHER

## 2019-07-15 PROCEDURE — 83520 IMMUNOASSAY QUANT NOS NONAB: CPT | Performed by: OTHER

## 2019-08-16 ENCOUNTER — TELEPHONE (OUTPATIENT)
Dept: NEPHROLOGY | Facility: CLINIC | Age: 69
End: 2019-08-16

## 2019-08-16 NOTE — TELEPHONE ENCOUNTER
Patient has a colonoscopy scheduled for 8/23. He wants to know if it is okay for him to take the oral solution given his renal function.

## 2019-08-23 PROCEDURE — 88305 TISSUE EXAM BY PATHOLOGIST: CPT | Performed by: INTERNAL MEDICINE

## 2019-09-06 PROBLEM — M79.18 MYOFASCIAL PAIN: Status: ACTIVE | Noted: 2019-09-06

## 2019-09-06 PROBLEM — M62.838 MUSCLE SPASM: Status: ACTIVE | Noted: 2019-09-06

## 2019-11-01 ENCOUNTER — HOSPITAL (OUTPATIENT)
Dept: OTHER | Age: 69
End: 2019-11-01

## 2019-11-01 ENCOUNTER — HOSPITAL (OUTPATIENT)
Dept: OTHER | Age: 69
End: 2019-11-01
Attending: FAMILY MEDICINE

## 2019-11-12 DIAGNOSIS — N18.4 CHRONIC KIDNEY DISEASE, STAGE IV (SEVERE) (HCC): Primary | ICD-10-CM

## 2019-11-18 ENCOUNTER — OFFICE VISIT (OUTPATIENT)
Dept: NEPHROLOGY | Facility: CLINIC | Age: 69
End: 2019-11-18
Payer: MEDICARE

## 2019-11-18 VITALS — WEIGHT: 199 LBS | DIASTOLIC BLOOD PRESSURE: 68 MMHG | BODY MASS INDEX: 31 KG/M2 | SYSTOLIC BLOOD PRESSURE: 122 MMHG

## 2019-11-18 DIAGNOSIS — N18.4 CKD (CHRONIC KIDNEY DISEASE) STAGE 4, GFR 15-29 ML/MIN (HCC): Primary | ICD-10-CM

## 2019-11-18 DIAGNOSIS — E10.21 DIABETIC NEPHROPATHY ASSOCIATED WITH TYPE 1 DIABETES MELLITUS (HCC): ICD-10-CM

## 2019-11-18 PROCEDURE — 99214 OFFICE O/P EST MOD 30 MIN: CPT | Performed by: INTERNAL MEDICINE

## 2019-11-18 NOTE — PROGRESS NOTES
Nephrology Progress Note      ASSESSMENT/PLAN:        1) CKD 4- relatively stable creatinine approx 2 mg/dL is due to long-standing type 1 diabetes since age 14 with sub-nephrotic range proteinuria; has been doing well due to reasonable BP and DM managemen reflux     occassion   • Hearing impairment     hearing aids   • HEMORRHOIDS    • HIGH BLOOD PRESSURE    • History of blood transfusion 2013   • Hospitalization or health care facility admission within last 6 months     this was in 2013   • [address] POSSIBLE POLYPECTOMY [address]7780 N/A 8/23/2019    Performed by Hannah Joseph MD at [facility]   • COLONOSCOPY, POSSIBLE BIOPSY, POSSIBLE POLYPECTOMY 06949 N/A 2/20/2009    Performed by Ivy Ngo MD at [address] [place] [address] [facility] MAIN OR   • TRACHEOSTOMY N/A 11/26/2013    Performed by Octavio Morrison MD at [address] [place] [address]   • UPPER GI ENDOSCOPY,DIAGNOSIS  4/14/16    retained gastric contents; Edward   • WRIST OPEN REDUCTION INTERNAL FIXATION Left 1/26/2019    Performed by Haskell Phalen Oral Tab Take 40mg am and 20mg pm      • Rosuvastatin Calcium (CRESTOR) 20 MG Oral Tab Take 0.5 tablets (10 mg total) by mouth daily. 45 tablet 0   • diazepam (VALIUM) 5 MG Oral Tab Take 2 tabs with sips water 1 hr prior to MRI.   May take additional tablet (VITAMIN D3) 2000 UNITS Oral Tab Take 1 capsule by mouth daily. Allergies:     Wellbutrin [Bupropi*    RASH    ROS:     Denies fever/chills  Denies wt loss/gain  Denies HA or visual changes  Denies CP or palpitations  Denies SOB/cough/hemoptysis

## 2019-11-29 PROBLEM — E11.610 CHARCOT FOOT DUE TO DIABETES MELLITUS (HCC): Status: ACTIVE | Noted: 2019-11-29

## 2019-12-01 ENCOUNTER — HOSPITAL (OUTPATIENT)
Dept: OTHER | Age: 69
End: 2019-12-01
Attending: FAMILY MEDICINE

## 2019-12-10 PROBLEM — G89.29 OTHER CHRONIC PAIN: Status: ACTIVE | Noted: 2019-12-10

## 2019-12-12 ENCOUNTER — APPOINTMENT (OUTPATIENT)
Dept: MRI IMAGING | Age: 69
End: 2019-12-12
Attending: EMERGENCY MEDICINE
Payer: MEDICARE

## 2019-12-12 ENCOUNTER — APPOINTMENT (OUTPATIENT)
Dept: GENERAL RADIOLOGY | Age: 69
End: 2019-12-12
Attending: EMERGENCY MEDICINE
Payer: MEDICARE

## 2019-12-12 ENCOUNTER — HOSPITAL ENCOUNTER (EMERGENCY)
Age: 69
Discharge: HOME | End: 2019-12-13
Attending: EMERGENCY MEDICINE
Payer: MEDICARE

## 2019-12-12 ENCOUNTER — APPOINTMENT (OUTPATIENT)
Dept: CT IMAGING | Age: 69
End: 2019-12-12
Attending: EMERGENCY MEDICINE
Payer: MEDICARE

## 2019-12-12 VITALS
DIASTOLIC BLOOD PRESSURE: 82 MMHG | HEART RATE: 58 BPM | BODY MASS INDEX: 31 KG/M2 | WEIGHT: 199 LBS | RESPIRATION RATE: 14 BRPM | OXYGEN SATURATION: 98 % | SYSTOLIC BLOOD PRESSURE: 201 MMHG | TEMPERATURE: 97 F

## 2019-12-12 DIAGNOSIS — R26.81 UNSTEADY GAIT: Primary | ICD-10-CM

## 2019-12-12 DIAGNOSIS — H70.91 MASTOIDITIS OF RIGHT SIDE: ICD-10-CM

## 2019-12-12 DIAGNOSIS — E16.2 HYPOGLYCEMIA: ICD-10-CM

## 2019-12-12 DIAGNOSIS — R42 DIZZINESS: ICD-10-CM

## 2019-12-12 PROCEDURE — 70450 CT HEAD/BRAIN W/O DYE: CPT | Performed by: EMERGENCY MEDICINE

## 2019-12-12 PROCEDURE — 93005 ELECTROCARDIOGRAM TRACING: CPT

## 2019-12-12 PROCEDURE — 96374 THER/PROPH/DIAG INJ IV PUSH: CPT

## 2019-12-12 PROCEDURE — 85730 THROMBOPLASTIN TIME PARTIAL: CPT | Performed by: EMERGENCY MEDICINE

## 2019-12-12 PROCEDURE — 82962 GLUCOSE BLOOD TEST: CPT

## 2019-12-12 PROCEDURE — 80053 COMPREHEN METABOLIC PANEL: CPT | Performed by: EMERGENCY MEDICINE

## 2019-12-12 PROCEDURE — 99285 EMERGENCY DEPT VISIT HI MDM: CPT

## 2019-12-12 PROCEDURE — 71045 X-RAY EXAM CHEST 1 VIEW: CPT | Performed by: EMERGENCY MEDICINE

## 2019-12-12 PROCEDURE — 70551 MRI BRAIN STEM W/O DYE: CPT | Performed by: EMERGENCY MEDICINE

## 2019-12-12 PROCEDURE — 85025 COMPLETE CBC W/AUTO DIFF WBC: CPT | Performed by: EMERGENCY MEDICINE

## 2019-12-12 PROCEDURE — 84484 ASSAY OF TROPONIN QUANT: CPT | Performed by: EMERGENCY MEDICINE

## 2019-12-12 PROCEDURE — 93010 ELECTROCARDIOGRAM REPORT: CPT

## 2019-12-12 PROCEDURE — 85610 PROTHROMBIN TIME: CPT | Performed by: EMERGENCY MEDICINE

## 2019-12-12 RX ORDER — DEXTROSE MONOHYDRATE 25 G/50ML
INJECTION, SOLUTION INTRAVENOUS
Status: COMPLETED
Start: 2019-12-12 | End: 2019-12-12

## 2019-12-12 RX ORDER — MECLIZINE HYDROCHLORIDE 25 MG/1
25 TABLET ORAL 3 TIMES DAILY PRN
Qty: 20 TABLET | Refills: 0 | Status: SHIPPED | OUTPATIENT
Start: 2019-12-12 | End: 2020-02-13

## 2019-12-12 RX ORDER — MECLIZINE HYDROCHLORIDE 25 MG/1
25 TABLET ORAL ONCE
Status: COMPLETED | OUTPATIENT
Start: 2019-12-12 | End: 2019-12-12

## 2019-12-12 RX ORDER — DEXTROSE MONOHYDRATE 25 G/50ML
50 INJECTION, SOLUTION INTRAVENOUS ONCE
Status: COMPLETED | OUTPATIENT
Start: 2019-12-12 | End: 2019-12-12

## 2019-12-12 RX ORDER — DIAZEPAM 5 MG/1
5 TABLET ORAL ONCE
Status: COMPLETED | OUTPATIENT
Start: 2019-12-12 | End: 2019-12-12

## 2019-12-13 PROCEDURE — 82962 GLUCOSE BLOOD TEST: CPT

## 2019-12-13 NOTE — ED PROVIDER NOTES
Patient Seen in: THE [facility] Emergency Department In [place]      History   Patient presents with:  Dizziness    Stated Complaint: Pt states that he woke up dizzy this morning and is having trouble walking    HPI    Patient presents with dizziness.   The pa pneumonia, sepsis, dialysis, peg tube, vent, now resolved.    • Polyneuropathy in diabetes(357.2)    • SLEEP APNEA     Uses his CPAP machine consistently   • Sleep apnea, obstructive SPLIT NIGHT 5-12-17    AHI 54 SaO2 lizeth 74 % CPAP 12 HME//    • Type I (j LUMBAR EPIDURAL N/A 6/8/2016    Performed by Griselda Alvarado MD at [address] [facility] 5/25/2016    Performed by Griselda Alvarado MD at [id number] [place] [address] N/A 5/4/2016    Performed by Sinan Andersen - 3 Cans of beer per week    Drug use: No             Review of Systems    Positive for stated complaint: Pt states that he woke up dizzy this morning and is having trouble walking  Other systems are as noted in HPI.   Constitutional and vital signs reviewe TROPONIN I - Normal   PROTHROMBIN TIME (PT) - Normal   CBC WITH DIFFERENTIAL WITH PLATELET    Narrative: The following orders were created for panel order CBC WITH DIFFERENTIAL WITH PLATELET.   Procedure                               Abnormality again noted in the left maxillary sinus. No air-fluid levels. Mild mucosal thickening seen in the bilateral ethmoid sinuses.  MASTOIDS:          There has been some interval improvement in the bilateral mastoid sinus opacification with near complete resol 21: 15     Approved by:  Chemo Garcia DO on 12/12/2019 at 21:17          Medications   Meclizine HCl (ANTIVERT) tab 25 mg (25 mg Oral Given 12/12/19 2208)   diazepam (VALIUM) tab 5 mg (5 mg Oral Given 12/12/19 2218)     The patient was given meclizine and s

## 2019-12-13 NOTE — ED PROVIDER NOTES
Ct Brain Or Head (18004)    Result Date: 12/12/2019  PROCEDURE:  CT BRAIN OR HEAD (47698)  COMPARISON:  SHARIF CT, CT BRAIN OR HEAD (12657), 12/02/2013, 22:27.   INDICATIONS:  head pain or injury  TECHNIQUE:  Noncontrast CT scanning is performed through t improved compared to the previous CT from 12/2/2013. Recurrent acute mastoiditis is not excluded. 4. Mild stable chronic sinusitis.     Dictated by: Roselee Canavan, MD on 12/12/2019 at 21:50     Approved by: Roselee Canavan, MD on 12/12/2019 at 21:55 off.  Patient reports he did not eat dinner tonight. His pump automatically shut off his blood sugar drops too low and alert him. He was given an amp of D50 here sugar 1-2 43. Recommend he full meal when he gets home and recheck his blood sugar.   Should

## 2019-12-13 NOTE — ED INITIAL ASSESSMENT (HPI)
Pt states that he started to feel dizzy today. Pt denies any CP or EREN. Pt denies any headache, n/v. Pt's wife states that she felt dizzy yesterday and it went away, and there son has been feeling dizzy lately as well.

## 2019-12-13 NOTE — ED NOTES
Continuous glucose monitor and insulin pump removed by patient at the instruction of MRI. Wife sent home to get supplies so that PT is able to reconnect upon returning from MRI. Estimated time off pump, 45 minutes. Ed physician aware.

## 2019-12-19 PROBLEM — H70.11 CHRONIC MASTOIDITIS OF RIGHT SIDE: Status: ACTIVE | Noted: 2019-12-19

## 2019-12-19 PROBLEM — R42 DIZZINESS: Status: ACTIVE | Noted: 2019-12-19

## 2020-01-01 ENCOUNTER — HOSPITAL (OUTPATIENT)
Dept: OTHER | Age: 70
End: 2020-01-01
Attending: FAMILY MEDICINE

## 2020-01-27 PROBLEM — G95.9 CERVICAL MYELOPATHY (HCC): Status: ACTIVE | Noted: 2020-01-27

## 2020-01-27 PROBLEM — J43.9 PULMONARY EMPHYSEMA, UNSPECIFIED EMPHYSEMA TYPE (HCC): Status: ACTIVE | Noted: 2020-01-27

## 2020-02-06 PROBLEM — R26.2 DIFFICULTY WALKING: Status: ACTIVE | Noted: 2020-02-06

## 2020-02-21 PROBLEM — Z96.89 S/P INSERTION OF SPINAL CORD STIMULATOR: Status: ACTIVE | Noted: 2020-02-21

## 2020-07-14 PROBLEM — M62.838 MUSCLE SPASM: Status: RESOLVED | Noted: 2019-09-06 | Resolved: 2020-07-14

## 2020-07-14 PROBLEM — S52.572A OTHER CLOSED INTRA-ARTICULAR FRACTURE OF DISTAL END OF LEFT RADIUS, INITIAL ENCOUNTER: Status: RESOLVED | Noted: 2019-01-21 | Resolved: 2020-07-14

## 2020-07-14 PROBLEM — Z96.41 INSULIN PUMP IN PLACE: Chronic | Status: RESOLVED | Noted: 2018-09-10 | Resolved: 2020-07-14

## 2020-07-14 PROBLEM — S52.571A OTHER CLOSED INTRA-ARTICULAR FRACTURE OF DISTAL END OF RIGHT RADIUS, INITIAL ENCOUNTER: Status: RESOLVED | Noted: 2019-01-21 | Resolved: 2020-07-14

## 2020-09-08 ENCOUNTER — TELEPHONE (OUTPATIENT)
Dept: NEPHROLOGY | Facility: CLINIC | Age: 70
End: 2020-09-08

## 2020-09-08 NOTE — TELEPHONE ENCOUNTER
Patient calling stating that he was asked to call  by his cardiologist.     Patient states his b/p has been elevated 202/107.  He is asymptomatic and was not aware but is having surgery on Thursday for Left Foot Drop and has been having his blood pre

## 2020-09-11 ENCOUNTER — APPOINTMENT (OUTPATIENT)
Dept: GENERAL RADIOLOGY | Facility: HOSPITAL | Age: 70
End: 2020-09-11
Attending: HOSPITALIST
Payer: MEDICARE

## 2020-09-11 ENCOUNTER — HOSPITAL ENCOUNTER (OUTPATIENT)
Facility: HOSPITAL | Age: 70
Setting detail: OBSERVATION
Discharge: HOME | End: 2020-09-14
Attending: EMERGENCY MEDICINE | Admitting: HOSPITALIST
Payer: MEDICARE

## 2020-09-11 DIAGNOSIS — Z86.39 HISTORY OF HYPOGLYCEMIA: ICD-10-CM

## 2020-09-11 DIAGNOSIS — R73.9 HYPERGLYCEMIA: Primary | ICD-10-CM

## 2020-09-11 LAB
ALBUMIN SERPL-MCNC: 3 G/DL (ref 3.4–5)
ALBUMIN/GLOB SERPL: 0.8 {RATIO} (ref 1–2)
ALP LIVER SERPL-CCNC: 112 U/L (ref 45–117)
ALT SERPL-CCNC: 32 U/L (ref 16–61)
ANION GAP SERPL CALC-SCNC: 4 MMOL/L (ref 0–18)
AST SERPL-CCNC: 32 U/L (ref 15–37)
ATRIAL RATE: 77 BPM
BASOPHILS # BLD AUTO: 0.03 X10(3) UL (ref 0–0.2)
BASOPHILS NFR BLD AUTO: 0.3 %
BILIRUB SERPL-MCNC: 0.4 MG/DL (ref 0.1–2)
BILIRUB UR QL STRIP.AUTO: NEGATIVE
BUN BLD-MCNC: 47 MG/DL (ref 7–18)
BUN/CREAT SERPL: 11.3 (ref 10–20)
CALCIUM BLD-MCNC: 8.8 MG/DL (ref 8.5–10.1)
CHLORIDE SERPL-SCNC: 103 MMOL/L (ref 98–112)
CLARITY UR REFRACT.AUTO: CLEAR
CO2 SERPL-SCNC: 31 MMOL/L (ref 21–32)
COLOR UR AUTO: YELLOW
CREAT BLD-MCNC: 4.17 MG/DL (ref 0.7–1.3)
CREAT UR-SCNC: 72.6 MG/DL
DEPRECATED RDW RBC AUTO: 44.6 FL (ref 35.1–46.3)
EOSINOPHIL # BLD AUTO: 0.03 X10(3) UL (ref 0–0.7)
EOSINOPHIL NFR BLD AUTO: 0.3 %
ERYTHROCYTE [DISTWIDTH] IN BLOOD BY AUTOMATED COUNT: 13.2 % (ref 11–15)
GLOBULIN PLAS-MCNC: 3.9 G/DL (ref 2.8–4.4)
GLUCOSE BLD-MCNC: 128 MG/DL (ref 70–99)
GLUCOSE BLD-MCNC: 147 MG/DL (ref 70–99)
GLUCOSE BLD-MCNC: 179 MG/DL (ref 70–99)
GLUCOSE BLD-MCNC: 192 MG/DL (ref 70–99)
GLUCOSE BLD-MCNC: 215 MG/DL (ref 70–99)
GLUCOSE BLD-MCNC: 246 MG/DL (ref 70–99)
GLUCOSE BLD-MCNC: 248 MG/DL (ref 70–99)
GLUCOSE BLD-MCNC: 278 MG/DL (ref 70–99)
GLUCOSE UR STRIP.AUTO-MCNC: 50 MG/DL
HCT VFR BLD AUTO: 38.3 % (ref 39–53)
HGB BLD-MCNC: 12 G/DL (ref 13–17.5)
IMM GRANULOCYTES # BLD AUTO: 0.05 X10(3) UL (ref 0–1)
IMM GRANULOCYTES NFR BLD: 0.4 %
KETONES UR STRIP.AUTO-MCNC: NEGATIVE MG/DL
LEUKOCYTE ESTERASE UR QL STRIP.AUTO: NEGATIVE
LYMPHOCYTES # BLD AUTO: 0.85 X10(3) UL (ref 1–4)
LYMPHOCYTES NFR BLD AUTO: 7.3 %
M PROTEIN MFR SERPL ELPH: 6.9 G/DL (ref 6.4–8.2)
MCH RBC QN AUTO: 28.8 PG (ref 26–34)
MCHC RBC AUTO-ENTMCNC: 31.3 G/DL (ref 31–37)
MCV RBC AUTO: 91.8 FL (ref 80–100)
MONOCYTES # BLD AUTO: 0.59 X10(3) UL (ref 0.1–1)
MONOCYTES NFR BLD AUTO: 5.1 %
NEUTROPHILS # BLD AUTO: 10.08 X10 (3) UL (ref 1.5–7.7)
NEUTROPHILS # BLD AUTO: 10.08 X10(3) UL (ref 1.5–7.7)
NEUTROPHILS NFR BLD AUTO: 86.6 %
NITRITE UR QL STRIP.AUTO: NEGATIVE
OSMOLALITY SERPL CALC.SUM OF ELEC: 307 MOSM/KG (ref 275–295)
P AXIS: 53 DEGREES
P-R INTERVAL: 224 MS
PH UR STRIP.AUTO: 5 [PH] (ref 4.5–8)
PLATELET # BLD AUTO: 147 10(3)UL (ref 150–450)
POTASSIUM SERPL-SCNC: 3.8 MMOL/L (ref 3.5–5.1)
PROT UR STRIP.AUTO-MCNC: 100 MG/DL
Q-T INTERVAL: 416 MS
QRS DURATION: 110 MS
QTC CALCULATION (BEZET): 470 MS
R AXIS: -7 DEGREES
RBC # BLD AUTO: 4.17 X10(6)UL (ref 3.8–5.8)
RBC UR QL AUTO: NEGATIVE
SODIUM SERPL-SCNC: 138 MMOL/L (ref 136–145)
SODIUM SERPL-SCNC: 21 MMOL/L
SP GR UR STRIP.AUTO: 1.01 (ref 1–1.03)
T AXIS: 17 DEGREES
URATE SERPL-MCNC: 7.9 MG/DL (ref 3.5–7.2)
UROBILINOGEN UR STRIP.AUTO-MCNC: <2 MG/DL
VENTRICULAR RATE: 77 BPM
WBC # BLD AUTO: 11.6 X10(3) UL (ref 4–11)

## 2020-09-11 PROCEDURE — 71045 X-RAY EXAM CHEST 1 VIEW: CPT | Performed by: HOSPITALIST

## 2020-09-11 PROCEDURE — 99214 OFFICE O/P EST MOD 30 MIN: CPT | Performed by: INTERNAL MEDICINE

## 2020-09-11 RX ORDER — ACETAMINOPHEN 325 MG/1
650 TABLET ORAL EVERY 6 HOURS PRN
Status: DISCONTINUED | OUTPATIENT
Start: 2020-09-11 | End: 2020-09-14

## 2020-09-11 RX ORDER — HEPARIN SODIUM 5000 [USP'U]/ML
5000 INJECTION, SOLUTION INTRAVENOUS; SUBCUTANEOUS EVERY 8 HOURS SCHEDULED
Status: DISCONTINUED | OUTPATIENT
Start: 2020-09-11 | End: 2020-09-14

## 2020-09-11 RX ORDER — SODIUM CHLORIDE 9 MG/ML
INJECTION, SOLUTION INTRAVENOUS CONTINUOUS
Status: DISCONTINUED | OUTPATIENT
Start: 2020-09-11 | End: 2020-09-12

## 2020-09-11 RX ORDER — ASPIRIN 81 MG/1
81 TABLET ORAL DAILY
Status: DISCONTINUED | OUTPATIENT
Start: 2020-09-11 | End: 2020-09-14

## 2020-09-11 RX ORDER — LEVOTHYROXINE SODIUM 112 UG/1
112 TABLET ORAL
Status: DISCONTINUED | OUTPATIENT
Start: 2020-09-11 | End: 2020-09-14

## 2020-09-11 RX ORDER — ARIPIPRAZOLE 2 MG/1
2 TABLET ORAL DAILY
Status: DISCONTINUED | OUTPATIENT
Start: 2020-09-12 | End: 2020-09-14

## 2020-09-11 RX ORDER — NIFEDIPINE 60 MG/1
60 TABLET, EXTENDED RELEASE ORAL DAILY
Status: DISCONTINUED | OUTPATIENT
Start: 2020-09-12 | End: 2020-09-12

## 2020-09-11 RX ORDER — ROSUVASTATIN CALCIUM 20 MG/1
20 TABLET, COATED ORAL NIGHTLY
Status: DISCONTINUED | OUTPATIENT
Start: 2020-09-11 | End: 2020-09-14

## 2020-09-11 RX ORDER — EZETIMIBE 10 MG/1
10 TABLET ORAL DAILY
Status: DISCONTINUED | OUTPATIENT
Start: 2020-09-11 | End: 2020-09-14

## 2020-09-11 RX ORDER — DULOXETIN HYDROCHLORIDE 30 MG/1
120 CAPSULE, DELAYED RELEASE ORAL NIGHTLY
Status: DISCONTINUED | OUTPATIENT
Start: 2020-09-11 | End: 2020-09-14

## 2020-09-11 RX ORDER — DEXTROSE MONOHYDRATE 25 G/50ML
50 INJECTION, SOLUTION INTRAVENOUS
Status: DISCONTINUED | OUTPATIENT
Start: 2020-09-11 | End: 2020-09-14

## 2020-09-11 RX ORDER — INSULIN ASPART 100 [IU]/ML
0.15 INJECTION, SOLUTION INTRAVENOUS; SUBCUTANEOUS ONCE
Status: COMPLETED | OUTPATIENT
Start: 2020-09-11 | End: 2020-09-11

## 2020-09-11 NOTE — CONSULTS
[facility]  Report of Consultation    [address] Patient Status:  Emergency    1950 MRN [id number]   Location [address] Attending Zeenat Tarango MD   Hosp Day # 0 PCP Robbin Brown MD     Reason for every 15 gms of carbs                                                  Lunch: 1 Units for every 15 gms of carbs                                           Dinner: 1 Units for every 14 gms of carbs      Correction factor: 1 Units for every 60 mg/dl above a t Past Surgical History:   Procedure Laterality Date   • ANTERIOR CERVICAL FUSION BG & INST 2 LEVEL N/A 8/8/2013    Performed by Jaxson Curtis MD at [facility]   • ANTERIOR CERVICAL FUSION BG & INST 4 LEVEL N/A 9/10/2018    Performed by Jaxson Curtis, OTHER SURGICAL HISTORY      Surgical repair fracture left hand 5th digit   • OTHER SURGICAL HISTORY      Surgery left heel ligament repair   • OTHER SURGICAL HISTORY      Laser surgery for bilateral retinopathy   • OTHER SURGICAL HISTORY      Surgery to le he does not use drugs. Allergies: Wellbutrin [Bupropi*    RASH    Medications:  No current facility-administered medications for this encounter.      Physical Exam:  Wt Readings from Last 6 Encounters:  09/11/20 : 200 lb (90.7 kg)  08/25/20 : 203 lb ( weakness/fatigue. Uses an insulin pump at home. - now off his insulin pump.    -START on Levemir 18 units daily  -START NL: 1:15 CR 1:40>140  -accuchecks QID    Proliferative diabetic retinopathy - stable, optimize glycemic control  Left diabetic foot ulce

## 2020-09-11 NOTE — ED NOTES
Pt's wife, Penelope Broussard, provided orange band to be designated as care partner. Updated on wait for inpatient bed. Patient is resting at this time w/ no distress noted.  Will continue to monitor

## 2020-09-11 NOTE — CONSULTS
[facility]  Report of Consultation    [address] Patient Status:  Observation    1950 MRN [id number]   [facility] 5NW-A Attending Rey, [facility] [address] Day # 0 PCP Shani He MD     Reason for [facility] consistently   • Sleep apnea, obstructive SPLIT NIGHT 5-12-17    AHI 54 SaO2 lizeth 74 % CPAP 12 HME//    • Type I (juvenile type) diabetes mellitus without mention of complication, not stated as uncontrolled     since [address] follows   • Unspecifie Megan Pringle MD at [facility] 5/4/2016    Performed by Megan Pringle MD at [facility] 4 LEVEL N/A 9/8/2016    Performed by Vimal Mario MD at [place] [address] Brother    • Diabetes Brother         Type 2 DM   • Hypertension Brother    • Lipids Brother    • Thyroid Disorder Neg       reports that he quit smoking about 34 years ago. His smoking use included cigarettes. He has a 70.00 pack-year smoking history.  He of 12 systems reviewed and otherwise unremarkable. Physical Exam:  Vital signs: Blood pressure 123/51, pulse 60, temperature 98.3 °F (36.8 °C), temperature source Oral, resp. rate 16, height 66\", weight 205 lb (93 kg), SpO2 97 %.   General: No acute dis 2.82 (H)   12/12/2019 2.66 (H)   01/22/2019 2.32 (H)   08/31/2018 2.48 (H)         Imaging:  Reviewed    Impression:  1. ZEUS/CKD- baseline Cr ~ 2.5-3; related to DM/HTN; sub-nephrotic range proteinuria  ZEUS related to pre-renal etiology based on history  -

## 2020-09-11 NOTE — ED PROVIDER NOTES
Patient Seen in: [facility] Emergency Department      History   Patient presents with:  Syncope    Stated Complaint: syncopal - post op foot surgery 9/10/2020    HPI    Patient is a 79-year-old male with multiple past medical problems including insu resting the stretcher, but is very tired. He denies any lightheadedness or near syncopal symptoms currently. No focal numbness or weakness. He denies any pain. No chest pain. No headache. No shortness of breath. No abdominal pain.   No fevers or chil INST 4 LEVEL N/A 9/10/2018    Performed by Favian Person MD at [facility] MAIN OR   • [address]  2009    laminectomy  L1 -4  2/09  Dr. Coty Bradshaw   • [address]  8-8-13    C4-C6 ACDF    • BACK SURGERY  9/8/16    L2-S1 Revision Decomp possible uninstr retinopathy   • OTHER SURGICAL HISTORY      Surgery to left eye for \"weak muscle. \" 2007   • OTHER SURGICAL HISTORY      bilat knee repair 9/23/10   • OTHER SURGICAL HISTORY  11/2013    lung resection to remove abcess   • PERCUTANEOUS ENDOSCOPIC GASTROSTO Temporal   SpO2 95 %   O2 Device None (Room air)       Current:/72   Pulse 65   Temp 98.6 °F (37 °C) (Temporal)   Resp 18   Ht 167.6 cm (5' 6\")   Wt 90.7 kg   SpO2 100%   BMI 32.28 kg/m²         Physical Exam    General: Pleasant elderly male who is POC Glucose 215 (*)     All other components within normal limits   POCT GLUCOSE - Abnormal; Notable for the following components:    POC Glucose 246 (*)     All other components within normal limits   CBC W/ DIFFERENTIAL - Abnormal; Notable for the fol symptoms are due to his erratic glucose, and him having difficulty controlling his glucose likely from coming out of his sedation, possibly overcorrection. I believe he would benefit from admission for observation for glucose control.     I discussed the p

## 2020-09-11 NOTE — ED INITIAL ASSESSMENT (HPI)
A/O x 4. Patient presents with syncopal event and diarrhea. Had foot surgery yesterday. Per spouse, at about 2pm yesterday, patient was shaky getting out of the car and fell. Wife helped him to the ground.  Ambulance was called at that time, Venessa Camargo was

## 2020-09-11 NOTE — H&P
[facility] Hospitalist History and Physical      CC: Weakness, poorly controlled BS    PCP: David Cosby MD    History of Present Illness: Patient is a 78year old male with PMH sig for DM on insulin pump, CKD 4, HTN here due to feeling poorly since his fo [place] follows   • Unspecified disorder of thyroid    • Unspecified essential hypertension    • Unspecified sleep apnea    • Visual impairment     wears glasses        Medications:  Outpatient Meds:  No current facility-administered medications on file dinah mouth daily.   , Disp: , Rfl:           Current Meds:  Scheduled:   • insulin detemir  18 Units Subcutaneous Daily   • Insulin Aspart Pen  1-68 Units Subcutaneous TID CC   • Insulin Aspart Pen  1-10 Units Subcutaneous TID AC and HS   • [START ON 9/12/2020] statin.  No digit cyanosis  Psych: AAO x 3, with appropriate affect  Neuro: no new focal neuro deficits noted on my exam      Data Review:    Labs:   Lab Results   Component Value Date    WBC 11.6 09/11/2020    HGB 12.0 09/11/2020    HCT 38.3 09/11/2020

## 2020-09-12 LAB
ANION GAP SERPL CALC-SCNC: 5 MMOL/L (ref 0–18)
BASOPHILS # BLD AUTO: 0.03 X10(3) UL (ref 0–0.2)
BASOPHILS NFR BLD AUTO: 0.4 %
BUN BLD-MCNC: 43 MG/DL (ref 7–18)
BUN/CREAT SERPL: 12.8 (ref 10–20)
CALCIUM BLD-MCNC: 8.6 MG/DL (ref 8.5–10.1)
CHLORIDE SERPL-SCNC: 106 MMOL/L (ref 98–112)
CO2 SERPL-SCNC: 30 MMOL/L (ref 21–32)
CREAT BLD-MCNC: 3.37 MG/DL (ref 0.7–1.3)
DEPRECATED RDW RBC AUTO: 46.3 FL (ref 35.1–46.3)
EOSINOPHIL # BLD AUTO: 0.35 X10(3) UL (ref 0–0.7)
EOSINOPHIL NFR BLD AUTO: 4.7 %
ERYTHROCYTE [DISTWIDTH] IN BLOOD BY AUTOMATED COUNT: 13.4 % (ref 11–15)
GLUCOSE BLD-MCNC: 137 MG/DL (ref 70–99)
GLUCOSE BLD-MCNC: 139 MG/DL (ref 70–99)
GLUCOSE BLD-MCNC: 152 MG/DL (ref 70–99)
GLUCOSE BLD-MCNC: 204 MG/DL (ref 70–99)
GLUCOSE BLD-MCNC: 281 MG/DL (ref 70–99)
GLUCOSE BLD-MCNC: 285 MG/DL (ref 70–99)
GLUCOSE BLD-MCNC: 307 MG/DL (ref 70–99)
HAV IGM SER QL: 2.4 MG/DL (ref 1.6–2.6)
HCT VFR BLD AUTO: 36.9 % (ref 39–53)
HGB BLD-MCNC: 11.6 G/DL (ref 13–17.5)
IMM GRANULOCYTES # BLD AUTO: 0.04 X10(3) UL (ref 0–1)
IMM GRANULOCYTES NFR BLD: 0.5 %
LYMPHOCYTES # BLD AUTO: 1.12 X10(3) UL (ref 1–4)
LYMPHOCYTES NFR BLD AUTO: 15.1 %
MCH RBC QN AUTO: 29.1 PG (ref 26–34)
MCHC RBC AUTO-ENTMCNC: 31.4 G/DL (ref 31–37)
MCV RBC AUTO: 92.5 FL (ref 80–100)
MONOCYTES # BLD AUTO: 0.48 X10(3) UL (ref 0.1–1)
MONOCYTES NFR BLD AUTO: 6.5 %
NEUTROPHILS # BLD AUTO: 5.42 X10 (3) UL (ref 1.5–7.7)
NEUTROPHILS # BLD AUTO: 5.42 X10(3) UL (ref 1.5–7.7)
NEUTROPHILS NFR BLD AUTO: 72.8 %
OSMOLALITY SERPL CALC.SUM OF ELEC: 305 MOSM/KG (ref 275–295)
PLATELET # BLD AUTO: 115 10(3)UL (ref 150–450)
POTASSIUM SERPL-SCNC: 3.4 MMOL/L (ref 3.5–5.1)
RBC # BLD AUTO: 3.99 X10(6)UL (ref 3.8–5.8)
SARS-COV-2 RNA RESP QL NAA+PROBE: NOT DETECTED
SODIUM SERPL-SCNC: 141 MMOL/L (ref 136–145)
WBC # BLD AUTO: 7.4 X10(3) UL (ref 4–11)

## 2020-09-12 PROCEDURE — 99213 OFFICE O/P EST LOW 20 MIN: CPT | Performed by: INTERNAL MEDICINE

## 2020-09-12 RX ORDER — TAMSULOSIN HYDROCHLORIDE 0.4 MG/1
0.4 CAPSULE ORAL DAILY
Status: DISCONTINUED | OUTPATIENT
Start: 2020-09-12 | End: 2020-09-13

## 2020-09-12 RX ORDER — LISINOPRIL 20 MG/1
20 TABLET ORAL 2 TIMES DAILY
Status: DISCONTINUED | OUTPATIENT
Start: 2020-09-12 | End: 2020-09-14

## 2020-09-12 RX ORDER — FUROSEMIDE 40 MG/1
40 TABLET ORAL DAILY
Status: DISCONTINUED | OUTPATIENT
Start: 2020-09-12 | End: 2020-09-14

## 2020-09-12 RX ORDER — HYDRALAZINE HYDROCHLORIDE 50 MG/1
50 TABLET, FILM COATED ORAL EVERY 8 HOURS SCHEDULED
Status: DISCONTINUED | OUTPATIENT
Start: 2020-09-12 | End: 2020-09-12

## 2020-09-12 RX ORDER — MAGNESIUM OXIDE 400 MG (241.3 MG MAGNESIUM) TABLET
1 TABLET NIGHTLY
Status: DISCONTINUED | OUTPATIENT
Start: 2020-09-12 | End: 2020-09-14

## 2020-09-12 RX ORDER — HYDRALAZINE HYDROCHLORIDE 20 MG/ML
10 INJECTION INTRAMUSCULAR; INTRAVENOUS EVERY 4 HOURS PRN
Status: DISCONTINUED | OUTPATIENT
Start: 2020-09-12 | End: 2020-09-14

## 2020-09-12 RX ORDER — HYDRALAZINE HYDROCHLORIDE 20 MG/ML
10 INJECTION INTRAMUSCULAR; INTRAVENOUS EVERY 6 HOURS PRN
Status: DISCONTINUED | OUTPATIENT
Start: 2020-09-12 | End: 2020-09-12 | Stop reason: DRUGHIGH

## 2020-09-12 NOTE — PROGRESS NOTES
[facility] Hospitalist Progress Note                                                                   [address]  12/27/1950    CC: FU weakness    Interval History:  - Feels much better, w 09/12/20  0557   RBC 4.17 3.99   HGB 12.0* 11.6*   HCT 38.3* 36.9*   MCV 91.8 92.5   MCH 28.8 29.1   MCHC 31.3 31.4   RDW 13.2 13.4   NEPRELIM 10.08* 5.42   WBC 11.6* 7.4   .0* 115.0*       Recent Labs   Lab 09/11/20  0613 09/12/20  0557   *

## 2020-09-12 NOTE — PROGRESS NOTES
Patient with ongoing urinary retention. Required straight cath again this afternoon. Already on flomax. RYANNE RN if requires another catheterization would place ontiveros. BP also remained high this AM- patient had one dose of hydralazine.  Better this afternoo

## 2020-09-12 NOTE — PROGRESS NOTES
[facility]  Progress Note    [address] Patient Status:  Observation    1950 MRN [id number]   [facility] 5NW-A Attending Rey, [facility] [address] Day # 0 PCP Aida Gilbert MD     Feels well; no complains  [facility] Violet Melara is a 65 year old female presenting with yearly pap..    Medication verified, no changes  Denies known Latex allergy or symptoms of Latex sensitivity  Patient would like communication of their results via:     Cell Phone:   Telephone Information:   Mobile [phone]   Mobile [phone]     Okay to leave a message containing results? Yes     °C), temperature source Oral, resp. rate 18, height 66\", weight 205 lb (93 kg), SpO2 93 %. General: No acute distress. Alert and oriented x 3. HEENT: Moist mucous membranes. EOM-I. PERRL  Neck: No lymphadenopathy. No JVD. No carotid bruits.   Respirator

## 2020-09-12 NOTE — PROGRESS NOTES
[facility]  Progress Note    [address] Patient Status:  Observation    1950 MRN [id number]   [facility] 5NW-A Attending Clois Party   Hosp Day # 0 PCP Alessandro Cadena MD       SUBJECTIVE:  No acute activity:        Days per week: Not on file        Minutes per session: Not on file      Stress: Not on file    Relationships      Social connections:        Talks on phone: Not on file        Gets together: Not on file        Attends Mandae[employer] service: No 09/12/2020    HGB 11.6 09/12/2020    HCT 36.9 09/12/2020    .0 09/12/2020    CREATSERUM 3.37 09/12/2020    BUN 43 09/12/2020     09/12/2020    K 3.4 09/12/2020     09/12/2020    CO2 30.0 09/12/2020     09/12/2020    CA 8.6 09/12/2 mg, 650 mg, Oral, Q6H PRN  metoprolol Tartrate (LOPRESSOR) tab 25 mg, 25 mg, Oral, BID  NIFEdipine ER osmotic release (PROCARDIA XL) 24 hr tab 60 mg, 60 mg, Oral, Daily        Assessment and Plan:   78year old male here for     Uncontrolled type 1 diabete

## 2020-09-12 NOTE — PROGRESS NOTES
09/12/20 6032   Clinical Encounter Type   Visited With Patient and family together  (Wife, Juan Cian, at bedside)   Routine Visit Introduction   Continue Visiting No   Patient's Supportive Strategies/Resources  provided emotional support   [employer]

## 2020-09-12 NOTE — PLAN OF CARE
Assumed care of patient at 1100  A/Ox4, RA, NSR/SB on tele  Patient admitted for hypoglycemia, when arriving to the unit patient was slurring his words and his BUE were shaking. BG showed 147. Patient started on IV fluids.   Endocrine and Nephrology are fol

## 2020-09-13 LAB
ANION GAP SERPL CALC-SCNC: 3 MMOL/L (ref 0–18)
BUN BLD-MCNC: 47 MG/DL (ref 7–18)
BUN/CREAT SERPL: 13.1 (ref 10–20)
CALCIUM BLD-MCNC: 8.8 MG/DL (ref 8.5–10.1)
CHLORIDE SERPL-SCNC: 103 MMOL/L (ref 98–112)
CO2 SERPL-SCNC: 30 MMOL/L (ref 21–32)
CREAT BLD-MCNC: 3.6 MG/DL (ref 0.7–1.3)
GLUCOSE BLD-MCNC: 132 MG/DL (ref 70–99)
GLUCOSE BLD-MCNC: 147 MG/DL (ref 70–99)
GLUCOSE BLD-MCNC: 203 MG/DL (ref 70–99)
GLUCOSE BLD-MCNC: 228 MG/DL (ref 70–99)
GLUCOSE BLD-MCNC: 263 MG/DL (ref 70–99)
GLUCOSE BLD-MCNC: 332 MG/DL (ref 70–99)
GLUCOSE BLD-MCNC: 335 MG/DL (ref 70–99)
OSMOLALITY SERPL CALC.SUM OF ELEC: 307 MOSM/KG (ref 275–295)
PLATELET # BLD AUTO: 130 10(3)UL (ref 150–450)
POTASSIUM SERPL-SCNC: 3.3 MMOL/L (ref 3.5–5.1)
SODIUM SERPL-SCNC: 136 MMOL/L (ref 136–145)

## 2020-09-13 PROCEDURE — 99213 OFFICE O/P EST LOW 20 MIN: CPT | Performed by: INTERNAL MEDICINE

## 2020-09-13 RX ORDER — GABAPENTIN 300 MG/1
300 CAPSULE ORAL DAILY
Status: COMPLETED | OUTPATIENT
Start: 2020-09-14 | End: 2020-09-14

## 2020-09-13 RX ORDER — GABAPENTIN 300 MG/1
300 CAPSULE ORAL 3 TIMES DAILY
COMMUNITY
End: 2020-12-16

## 2020-09-13 RX ORDER — GABAPENTIN 300 MG/1
300 CAPSULE ORAL 3 TIMES DAILY
Status: DISCONTINUED | OUTPATIENT
Start: 2020-09-13 | End: 2020-09-13 | Stop reason: SDUPTHER

## 2020-09-13 RX ORDER — TAMSULOSIN HYDROCHLORIDE 0.4 MG/1
0.8 CAPSULE ORAL DAILY
Status: DISCONTINUED | OUTPATIENT
Start: 2020-09-14 | End: 2020-09-13

## 2020-09-13 RX ORDER — TAMSULOSIN HYDROCHLORIDE 0.4 MG/1
0.8 CAPSULE ORAL DAILY
Status: DISCONTINUED | OUTPATIENT
Start: 2020-09-14 | End: 2020-09-14

## 2020-09-13 RX ORDER — GABAPENTIN 400 MG/1
1200 CAPSULE ORAL NIGHTLY
Status: DISCONTINUED | OUTPATIENT
Start: 2020-09-13 | End: 2020-09-14

## 2020-09-13 NOTE — PLAN OF CARE
Pt is A/O x4, delayed speech. Tremors to bilateral upper extremities. Glasses. Bilateral hearing aids. Maintaining o2 saturations >95% on room air. . PRANEETH/CPAP. IS-1500. Tele- NSR. Heparin given. Scds to right leg only. Home Bp meds resumed.  Elevated Bp,

## 2020-09-13 NOTE — CONSULTS
[facility]  Report of Consultation    [address] Patient Status:  Observation    1950 MRN [id number]   [facility] 5NW-A Attending Elli Kulkarni, DO   Hosp Day # 0 PCP Robbin Brown MD     Reason for Consultation:  U essential hypertension    • Unspecified sleep apnea    • Visual impairment     wears glasses     Past Surgical History:   Procedure Laterality Date   • ANTERIOR CERVICAL FUSION BG & INST 2 LEVEL N/A 8/8/2013    Performed by Eddy Busby MD at [address] [place] [address] nerve release at elbow; 2000   • OTHER SURGICAL HISTORY      CTS release bilateral 2000   • OTHER SURGICAL HISTORY      Surgical repair fracture left hand 5th digit   • OTHER SURGICAL HISTORY      Surgery left heel ligament repair   • OTHER SURGICAL HISTOR current alcohol use of about 2.0 - 3.0 standard drinks of alcohol per week. He reports that he does not use drugs. Allergies:     Wellbutrin [Bupropi*    RASH    Medications:    Current Facility-Administered Medications:   •  Insulin Aspart Pen (NOVOLOG) items are noted in HPI.     Physical Exam:  GENERAL APPEARANCE: a well-developed, well-nourished, in no apparent distress  ABDOMEN: soft, good BS's, no masses/HSM, no tenderness  CVA: no CVA tenderness  INGUINAL CANALS: no hernias  PENILE MEATUS: open and i diabetes mellitus (HCC)     Bilateral tinnitus     Sensory hearing loss, bilateral     Moderate episode of recurrent major depressive disorder (HCC)     S/P cervical spinal fusion     Controlled type 1 diabetes mellitus with complication, with long-term cu

## 2020-09-13 NOTE — OCCUPATIONAL THERAPY NOTE
OCCUPATIONAL THERAPY QUICK EVALUATION - INPATIENT    Room Number: 489/739-S  Evaluation Date: 9/13/2020     Type of Evaluation: Quick Eval  Presenting Problem: hyperglycemia, weakness    Physician Order: IP Consult to Occupational Therapy  Reason for KEN MERRITT [facility] had pneumonia, sepsis, dialysis, peg tube, vent, now resolved.    • Polyneuropathy in diabetes(357.2)    • SLEEP APNEA     Uses his CPAP machine consistently   • Sleep apnea, obstructive SPLIT NIGHT 5-12-17    AHI 54 SaO2 lizeth 74 % CPAP 12 HME//    • Type by Dr. Renetta Rodriguez   • [address] N/A 6/8/2016    Performed by Carlo Griffith MD at [address] [place] [address] 5/25/2016    Performed by Carlo Griffith MD at [address] [place] [address] N/A 5/4/2016 height toilet                Drives: No       Prior Level of Function: lives with his wife. Independent with ADL. His wife assists with household tasks. No AD in the house, cane in community. OBJECTIVE  Precautions:  Other (Comment)(recent L foot yolanda within reach; All patient questions and concerns addressed    ASSESSMENT     Patient is a 78year old male admitted on 9/11/2020 from home on 9/11 with hyperglycemia and urinary retention. At home, hypoglycemia. Terrial Shan when getting out of the car.   Recent L

## 2020-09-13 NOTE — RESPIRATORY THERAPY NOTE
PRANEETH : EQUIPMENT USE: DAILY SUMMARY                                            SET MODE: CPAP WITH CFLEX                                          USAGE IN HOURS:9:17                                          90% EXP.

## 2020-09-13 NOTE — PROGRESS NOTES
[facility]  Progress Note    [address] Patient Status:  Observation    1950 MRN [id number]   [facility] 5NW-A Attending Janeth Camargo   Hosp Day # 0 PCP Molly Duval MD       SUBJECTIVE:  No acute Days per week: Not on file        Minutes per session: Not on file      Stress: Not on file    Relationships      Social connections:        Talks on phone: Not on file        Gets together: Not on file        Attends [employer] service: Not on file 09/13/2020    PGLU 332 09/13/2020       Lab Results   Component Value Date    A1C 6.4 (A) 07/14/2020       Recent Labs     09/11/20  0606 09/11/20  3548 09/11/20  0829 09/11/20  1112 09/11/20  1313 09/11/20  1728 09/11/20  2043 09/12/20  0021 09/12/20  074 tab 650 mg, 650 mg, Oral, Q6H PRN  metoprolol Tartrate (LOPRESSOR) tab 25 mg, 25 mg, Oral, BID        Assessment and Plan:   78year old male here for     Uncontrolled type 1 diabetes complication by hyperglycemia with long term insulin use - A1c 7.0%.  Sin

## 2020-09-13 NOTE — PROGRESS NOTES
[facility]  Progress Note    Via Taras Meyers Patient Status:  Observation    1950 MRN [id number]   [facility] 5NW-A Attending [facility]   Hosp Day # 0 PCP Jean Marie Cruz MD     Feels well; no complains  Fo Exam:  Vital signs: Blood pressure 152/61, pulse 64, temperature 98.8 °F (37.1 °C), temperature source Oral, resp. rate 16, height 66\", weight 208 lb 11.2 oz (94.7 kg), SpO2 93 %. General: No acute distress. Alert and oriented x 3.   HEENT: Moist mucous m

## 2020-09-13 NOTE — PLAN OF CARE
Pt is A/O x4, delayed speech. Tremors to bilateral upper extremities. Glasses. Bilateral hearing aids. Maintaining o2 saturations >95% on room air. . PRANEETH/CPAP. Tele- NSR. Heparin given. Scds to right leg only. No BM this shift. No c/o pain.  Jazmine bui highest/safest level of mobility/gait  Description  Interventions:  - Assess patient's functional ability and stability  - Promote increasing activity/tolerance for mobility and gait  - Educate and engage patient/family in tolerated activity level and prec

## 2020-09-13 NOTE — PHYSICAL THERAPY NOTE
PHYSICAL THERAPY EVALUATION - INPATIENT     Room Number: 320/219-H  Evaluation Date: 9/13/2020  Type of Evaluation: Initial  Physician Order: PT Eval and Treat    Presenting Problem: hypoglycemia/weakness  Reason for Therapy: Mobility Dysfunction and diabetes(357.2)    • SLEEP APNEA     Uses his CPAP machine consistently   • Sleep apnea, obstructive SPLIT NIGHT 5-12-17    AHI 54 SaO2 lizeth 74 % CPAP 12 HME//    • Type I (juvenile type) diabetes mellitus without mention of complication, not stated as un [facility]   • LUMBAR EPIDURAL N/A 5/25/2016    Performed by Glendy Villalobos MD at [address] [facility] 5/4/2016    Performed by Glendy Villalobos MD at [address] [facility] mobilities with use of cane    SUBJECTIVE  Not at my best yet    Patient self-stated goal is to get stronger    OBJECTIVE     Fall Risk: Standard fall risk    WEIGHT BEARING RESTRICTION  Weight Bearing Restriction: None      PAIN ASSESSMENT  Ratin modification for balance and stability. Pt was able to 250' with SBA with steady gt and encourage to walk several [facility]  Left on the recliner and addressed all issues and concern       Exercise/Education Provided:  Functional activi assist device: cane - straight at assistance level: modified independent     Goal #4 Ind in HEP for B LE while seated and supine   Goal #5    Goal #6    Goal Comments: Goals established on 9/13/2020

## 2020-09-13 NOTE — PLAN OF CARE
Problem: Hypoglycemia  Data: Ax04. On telemetry NSR. , on room air. Cpap overnight. Denied pain. Afebrile. No n/v/d. Lucero catheter secured and draining. Diabetic , carb-controlled diet. Accu checks. Saline lock.  [facility]  Patient requested sleep medwilliam

## 2020-09-14 VITALS
WEIGHT: 213.31 LBS | TEMPERATURE: 99 F | DIASTOLIC BLOOD PRESSURE: 56 MMHG | OXYGEN SATURATION: 100 % | HEART RATE: 60 BPM | HEIGHT: 66 IN | BODY MASS INDEX: 34.28 KG/M2 | SYSTOLIC BLOOD PRESSURE: 119 MMHG | RESPIRATION RATE: 20 BRPM

## 2020-09-14 LAB
ANION GAP SERPL CALC-SCNC: 4 MMOL/L (ref 0–18)
BUN BLD-MCNC: 49 MG/DL (ref 7–18)
BUN/CREAT SERPL: 13.5 (ref 10–20)
CALCIUM BLD-MCNC: 9 MG/DL (ref 8.5–10.1)
CHLORIDE SERPL-SCNC: 106 MMOL/L (ref 98–112)
CO2 SERPL-SCNC: 30 MMOL/L (ref 21–32)
CREAT BLD-MCNC: 3.62 MG/DL (ref 0.7–1.3)
GLUCOSE BLD-MCNC: 133 MG/DL (ref 70–99)
GLUCOSE BLD-MCNC: 192 MG/DL (ref 70–99)
GLUCOSE BLD-MCNC: 254 MG/DL (ref 70–99)
HAV IGM SER QL: 2.5 MG/DL (ref 1.6–2.6)
OSMOLALITY SERPL CALC.SUM OF ELEC: 305 MOSM/KG (ref 275–295)
POTASSIUM SERPL-SCNC: 3.4 MMOL/L (ref 3.5–5.1)
SODIUM SERPL-SCNC: 140 MMOL/L (ref 136–145)

## 2020-09-14 PROCEDURE — 99213 OFFICE O/P EST LOW 20 MIN: CPT | Performed by: INTERNAL MEDICINE

## 2020-09-14 RX ORDER — POTASSIUM CHLORIDE 20 MEQ/1
40 TABLET, EXTENDED RELEASE ORAL ONCE
Status: COMPLETED | OUTPATIENT
Start: 2020-09-14 | End: 2020-09-14

## 2020-09-14 RX ORDER — FUROSEMIDE 40 MG/1
40 TABLET ORAL DAILY
Qty: 90 TABLET | Refills: 3 | Status: SHIPPED | OUTPATIENT
Start: 2020-09-15 | End: 2021-04-06

## 2020-09-14 RX ORDER — TAMSULOSIN HYDROCHLORIDE 0.4 MG/1
0.8 CAPSULE ORAL DAILY
Qty: 60 CAPSULE | Refills: 5 | Status: SHIPPED | OUTPATIENT
Start: 2020-09-15 | End: 2020-10-26

## 2020-09-14 NOTE — PROGRESS NOTES
[facility]  Progress Note    Via Taras Meyers Patient Status:  Observation    1950 MRN [id number]   [facility] 5NW-A Attending Nicolasa Pop   Hosp Day # 0 PCP Yudith De Luna MD       SUBJECTIVE:  No acute Physical activity:        Days per week: Not on file        Minutes per session: Not on file      Stress: Not on file    Relationships      Social connections:        Talks on phone: Not on file        Gets together: Not on file        Attends Caodaism se Value Date    CREATSERUM 3.60 09/13/2020    BUN 47 09/13/2020     09/13/2020    K 3.3 09/13/2020     09/13/2020    CO2 30.0 09/13/2020     09/13/2020    CA 8.8 09/13/2020    PGLU 132 09/13/2020       Lab Results   Component Value Date Nightly  Rosuvastatin Calcium (CRESTOR) tab 20 mg, 20 mg, Oral, Nightly  Heparin Sodium (Porcine) 5000 UNIT/ML injection 5,000 Units, 5,000 Units, Subcutaneous, Q8H RIKKI  acetaminophen (TYLENOL) tab 650 mg, 650 mg, Oral, Q6H PRN  metoprolol Tartrate (Beverlyn Finical

## 2020-09-14 NOTE — RESPIRATORY THERAPY NOTE
PRANEETH Equipment Usage Summary :            Set Mode :CPAP W FLEX          Usage in Hours:7;35          90% Pressure (EPAP) : 12           AHI : 3.8

## 2020-09-14 NOTE — PROGRESS NOTES
[facility]  Nephrology Progress Note    Via Taras Meyers Attending:  Castro Jimenez,        Assessment and Plan:    1) CKD 3/4- baseline Cr approx 2 mg/dl due to longstanding DM / HTN- previous eval for other etiologies unrevealing- no further w/u. 100 UNIT/ML flexpen 1-10 Units, 1-10 Units, Subcutaneous, TID AC and HS  insulin detemir (LEVEMIR) 100 UNIT/ML flextouch 23 Units, 23 Units, Subcutaneous, Daily  tamsulosin HCl (FLOMAX) cap 0.8 mg, 0.8 mg, Oral, Daily  gabapentin (NEURONTIN) cap 1,200 mg,

## 2020-09-14 NOTE — CM/SW NOTE
HOME SITUATION  Type of Home: House   Home Layout: One level  Stairs to Enter : 2  Stairs to Bedroom: 0     Lives With: Spouse  Drives: No  Patient Owned Equipment: Cane     Prior Level of Treasure:  Mod I in his mobilities with use of cane    PT recomm

## 2020-09-14 NOTE — PROGRESS NOTES
[facility]  Urology Progress Note    Dolly Boyer Patient Status:  Observation    1950 MRN [id number]   [facility] 5NW-A Attending Kim Alberto, [address] [facility] Day # 0 PCP Terrence Quach MD     Subjective:  Lucas Solis

## 2020-09-14 NOTE — DISCHARGE SUMMARY
General Medicine Discharge Summary     Patient ID:  Joy Boyer  78year old  12/27/1950    Admit date: 9/11/2020    Discharge date and time: 9/14/20    Attending Physician: DO Martín Varela ACEI/diuretic held due to ZEUS on admit  - Renal OK with restarting home meds today  - Monitor this morning on current meds. Titrate further as needed     # Urinary retention: required straight cath overnight.  Has not urinated yet this AM.  - UA looks OK  - List    START taking these medications    tamsulosin (FLOMAX) cap  Take 2 capsules (0.8 mg total) by mouth daily. CONTINUE these medications which have CHANGED    furosemide 40 MG Oral Tab  Take 1 tablet (40 mg total) by mouth daily.       CONTINUE the

## 2020-09-14 NOTE — CM/SW NOTE
09/14/20 1200   Discharge disposition   Expected discharge disposition Home-Health   Name of Facillity/Home Care/Hospice Residential     Accepted by [facility].     Imtiaz Lal RN,   Phone [phone]

## 2020-09-14 NOTE — PROGRESS NOTES
[facility] Hospitalist Progress Note     PCP: Kiya Escobar MD    SUBJECTIVE:  No CP, SOB, or N/V.    OBJECTIVE:  Temp:  [97.7 °F (36.5 °C)-99.1 °F (37.3 °C)] 97.7 °F (36.5 °C)  Pulse:  [54-81] 64  Resp:  [16-18] 16  BP: (118-167)/(50-64) 167/64    Intake mg Oral Daily   • furosemide  40 mg Oral Daily   • lisinopril  20 mg Oral BID   • melatonin  1 mg Oral Nightly   • Insulin Aspart Pen  1-68 Units Subcutaneous TID CC   • ARIPiprazole  2 mg Oral Daily   • aspirin  81 mg Oral Daily   • DULoxetine HCl  120 mg subQ heparin    Dispo - anticipate can dc later today after voiding trial is complete. Questions/concerns were discussed with patient and/or family by bedside.     Total Time spent with patient and coordinating care:  25 minutes    Thank You,  Giancarlo Ferreira

## 2020-09-14 NOTE — PROGRESS NOTES
NURSING DISCHARGE NOTE    Discharged Home via Wheelchair. Accompanied by Family member  Belongings Taken by patient/family     Pt d/c home with Carlie Fang via wheel chair accompanied by wife. Given paperwork and prescriptions were called in.  All questions an

## 2020-09-14 NOTE — PLAN OF CARE
Problem: Diabetes/Glucose Control  Goal: Glucose maintained within prescribed range  Description  INTERVENTIONS:  - Monitor Blood Glucose as ordered  - Assess for signs and symptoms of hyperglycemia and hypoglycemia  - Administer ordered medications to m sitting position while performing ADLs such as feeding, grooming, and bathing  - Educate and encourage patient/family in tolerated functional activity level and precautions during self-care     Outcome: Progressing    A0X4. Glasses, hearing aids x2.  Israel Cordero

## 2020-09-14 NOTE — PROGRESS NOTES
[facility] Hospitalist Progress Note     PCP: Molly Duval MD    SUBJECTIVE:  No CP, SOB, or N/V.  + urinary retention.     OBJECTIVE:  Temp:  [97.7 °F (36.5 °C)-99.1 °F (37.3 °C)] 97.7 °F (36.5 °C)  Pulse:  [54-81] 64  Resp:  [16-18] 16  BP: (118-167)/(5 Nightly   • NIFEdipine ER  90 mg Oral Daily   • furosemide  40 mg Oral Daily   • lisinopril  20 mg Oral BID   • melatonin  1 mg Oral Nightly   • Insulin Aspart Pen  1-68 Units Subcutaneous TID CC   • ARIPiprazole  2 mg Oral Daily   • aspirin  81 mg Oral Da and/or family by bedside.     Total Time spent with patient and coordinating care:  25 minutes    Thank You,  Jeannine Lim DO  [facility] Hospitalist

## 2020-09-14 NOTE — PROGRESS NOTES
09/14/20 1415 09/14/20 1426   Urine Output/Assessment   Urine  --  625 mL   Bladder Scan Volume (mL) 850 mL 130 mL       Bladder scanned patient 5 hours post ontiveros removal. Bladder scan before voiding was >850. Pt voided in urinal 625.  Post void residua

## 2020-09-14 NOTE — PLAN OF CARE
Pt is aox4, VSS, afebrile. CPAP NOC. Tele NSR. SCD's. Heparin. Removed ontiveros this AM for voiding trial. PVR was 130 5 hours post cath removal. Up SB with cane. QID accucheck. Saline locked. Carb controlled diet tolerating well.  Pt ok to d/c per consults an

## 2020-09-14 NOTE — HOME CARE LIAISON
MET WITH PTNT AND OFFERED CHOICE  OF AGENCIES. PTNT AGREEABLE TO [facility]. MET WITH PTNT TO DISCUSS HOME HEALTH SERVICES AND COVERAGE CRITERIA. PTNT AGREEABLE TO [facility]. PTNT GIVEN RESIDENTIAL BROCHURE.  RESIDENTIAL WITH PROVIDE SN/PT ON DISC

## 2020-09-15 ENCOUNTER — TELEPHONE (OUTPATIENT)
Dept: NEPHROLOGY | Facility: CLINIC | Age: 70
End: 2020-09-15

## 2020-09-15 NOTE — TELEPHONE ENCOUNTER
's [facility] d/c instructions say to follow up with you in two weeks. He has an appt 11-19-20. Do you want to see him sooner?

## 2020-09-16 NOTE — TELEPHONE ENCOUNTER
Refugio Dickson will come in on Thursday am at 11:00. Katelyn Banerjee has no office that morning. Do you want to add more patients?

## 2020-09-24 ENCOUNTER — OFFICE VISIT (OUTPATIENT)
Dept: NEPHROLOGY | Facility: CLINIC | Age: 70
End: 2020-09-24
Payer: MEDICARE

## 2020-09-24 ENCOUNTER — TELEPHONE (OUTPATIENT)
Dept: NEPHROLOGY | Facility: CLINIC | Age: 70
End: 2020-09-24

## 2020-09-24 VITALS — SYSTOLIC BLOOD PRESSURE: 124 MMHG | DIASTOLIC BLOOD PRESSURE: 72 MMHG | WEIGHT: 196 LBS | BODY MASS INDEX: 36 KG/M2

## 2020-09-24 DIAGNOSIS — R80.9 PROTEINURIA, UNSPECIFIED TYPE: ICD-10-CM

## 2020-09-24 DIAGNOSIS — N18.4 CKD (CHRONIC KIDNEY DISEASE) STAGE 4, GFR 15-29 ML/MIN (HCC): ICD-10-CM

## 2020-09-24 DIAGNOSIS — I10 ESSENTIAL HYPERTENSION: ICD-10-CM

## 2020-09-24 DIAGNOSIS — N17.9 AKI (ACUTE KIDNEY INJURY) (HCC): Primary | ICD-10-CM

## 2020-09-24 PROCEDURE — 99215 OFFICE O/P EST HI 40 MIN: CPT | Performed by: INTERNAL MEDICINE

## 2020-09-24 PROCEDURE — 1111F DSCHRG MED/CURRENT MED MERGE: CPT | Performed by: INTERNAL MEDICINE

## 2020-09-24 NOTE — TELEPHONE ENCOUNTER
Patient calling per 's request    Stating the following-    143/79 with patient owned cuff (left arm)  148/72 with urologist cuff (right arm)    152/79 with patient owned cuff(right arm)  150/74 with urologist cuff(left arm)    KENNA

## 2020-09-24 NOTE — PROGRESS NOTES
Nephrology Progress Note      ASSESSMENT/PLAN:        1) CKD 4- serum creatinine approx 2.5 mg/dL is due to long-standing type 1 diabetes since age 14 with sub-nephrotic range proteinuria; has been doing well due to reasonable BP and DM management.   Previo DM   • Diabetic retinopathy     laser surgery   • Esophageal reflux     occassion   • Hearing impairment     hearing aids   • HEMORRHOIDS    • HIGH BLOOD PRESSURE    • History of blood transfusion 2013   • Hospitalization or health care facility admission COLONOSCOPY  2/2009    normal   • COLONOSCOPY, POSSIBLE BIOPSY, POSSIBLE POLYPECTOMY [address]1005 N/A 8/23/2019    Performed by Jenny Nunes MD at [address] [place] [address], POSSIBLE BIOPSY, POSSIBLE POLYPECTOMY [address] N/A 2/20/2009    Performed by Esau Barnhart TRIAL EPIDURAL LEAD N/A 1/9/2020    Performed by Faisal Frias MD at [facility] [place] Left 11/6/2013    Performed by Sonal Jimenes MD at [facility] Left 2013   • TRACHEOSTOMY N/A 11/30/2013 mg total) by mouth daily.  30 tablet 0   • METOPROLOL TARTRATE 25 MG Oral Tab TAKE ONE TABLET BY MOUTH TWICE DAILY  180 tablet 3   • QUINAPRIL HCL 20 MG Oral Tab TAKE ONE TABLET BY MOUTH TWICE PER DAY  180 tablet 3   • omega-3 fatty acids 1000 MG Oral Cap T kg)    General: Alert and oriented in no apparent distress. HEENT: No scleral icterus, MMM  Neck: Supple, no KALPANA or thyromegaly  Cardiac: Regular rate and rhythm, S1, S2 normal, no murmur or rub  Lungs: Clear without wheezes, rales, rhonchi.     Abdomen: S

## 2020-09-29 RX ORDER — NIFEDIPINE 60 MG/1
60 TABLET, FILM COATED, EXTENDED RELEASE ORAL 2 TIMES DAILY
Qty: 60 TABLET | Refills: 11 | Status: SHIPPED | OUTPATIENT
Start: 2020-09-29 | End: 2020-10-30 | Stop reason: DRUGHIGH

## 2020-10-14 ENCOUNTER — TELEPHONE (OUTPATIENT)
Dept: NEPHROLOGY | Facility: CLINIC | Age: 70
End: 2020-10-14

## 2020-10-14 RX ORDER — NIFEDIPINE 90 MG/1
90 TABLET, FILM COATED, EXTENDED RELEASE ORAL 2 TIMES DAILY
Qty: 60 TABLET | Refills: 11 | Status: SHIPPED | OUTPATIENT
Start: 2020-10-14 | End: 2021-08-30

## 2020-10-22 ENCOUNTER — VIRTUAL PHONE E/M (OUTPATIENT)
Dept: NEPHROLOGY | Facility: CLINIC | Age: 70
End: 2020-10-22
Payer: MEDICARE

## 2020-10-22 ENCOUNTER — TELEPHONE (OUTPATIENT)
Dept: NEPHROLOGY | Facility: CLINIC | Age: 70
End: 2020-10-22

## 2020-10-22 DIAGNOSIS — I15.2 HYPERTENSION DUE TO ENDOCRINE DISORDER: Primary | ICD-10-CM

## 2020-10-22 PROCEDURE — 99213 OFFICE O/P EST LOW 20 MIN: CPT | Performed by: INTERNAL MEDICINE

## 2020-10-22 RX ORDER — IBUPROFEN 600 MG/1
TABLET ORAL
COMMUNITY
Start: 2020-10-07 | End: 2021-11-29

## 2020-10-22 NOTE — TELEPHONE ENCOUNTER
D/w pt- will d/w results of brian / renin albs with pt-    Latoya Sotelo / Miley Max- please put pt on schedule today at 430 for televisit- vera acosta

## 2020-10-23 NOTE — PROGRESS NOTES
Virtual Telephone Check-In    [facility] verbally consents to a Virtual/Telephone Check-In visit on 10/22/20. Patient has been referred to the [facility] website at [email or web address] to review the yearly Consent to Treat document.     Patient bertae

## 2020-10-26 ENCOUNTER — LAB ENCOUNTER (OUTPATIENT)
Dept: LAB | Age: 70
End: 2020-10-26
Attending: INTERNAL MEDICINE
Payer: MEDICARE

## 2020-10-26 DIAGNOSIS — I15.2 HYPERTENSION DUE TO ENDOCRINE DISORDER: ICD-10-CM

## 2020-10-26 PROCEDURE — 83835 ASSAY OF METANEPHRINES: CPT

## 2020-10-29 ENCOUNTER — TELEPHONE (OUTPATIENT)
Dept: NEPHROLOGY | Facility: CLINIC | Age: 70
End: 2020-10-29

## 2020-11-11 ENCOUNTER — TELEPHONE (OUTPATIENT)
Dept: NEPHROLOGY | Facility: CLINIC | Age: 70
End: 2020-11-11

## 2020-11-11 NOTE — TELEPHONE ENCOUNTER
D/W pt- increase lasix to 40 mg bid and call next with update for BP / edema- 1st wk off abilify completely- vera acosta

## 2020-12-08 ENCOUNTER — PATIENT MESSAGE (OUTPATIENT)
Dept: NEPHROLOGY | Facility: CLINIC | Age: 70
End: 2020-12-08

## 2020-12-09 RX ORDER — HYDRALAZINE HYDROCHLORIDE 25 MG/1
25 TABLET, FILM COATED ORAL 3 TIMES DAILY
Qty: 60 TABLET | Refills: 11 | Status: SHIPPED | OUTPATIENT
Start: 2020-12-09 | End: 2021-02-11 | Stop reason: ALTCHOICE

## 2020-12-09 NOTE — TELEPHONE ENCOUNTER
From: Evan Stacy  To: Eula Mathew MD  Sent: 12/8/2020 6:06 PM CST  Subject: Other    Hi Dr. Caren Sanchez,    Although the dosage of Nifedipine has been increased and I am taking it twice a day, my blood pressure is still elevated.  Please advise what can

## 2021-01-13 ENCOUNTER — TELEPHONE (OUTPATIENT)
Dept: NEPHROLOGY | Facility: CLINIC | Age: 71
End: 2021-01-13

## 2021-01-13 RX ORDER — HYDRALAZINE HYDROCHLORIDE 50 MG/1
50 TABLET, FILM COATED ORAL 3 TIMES DAILY
Qty: 90 TABLET | Refills: 11 | Status: SHIPPED | OUTPATIENT
Start: 2021-01-13 | End: 2021-02-11 | Stop reason: ALTCHOICE

## 2021-01-26 DIAGNOSIS — Z23 NEED FOR VACCINATION: ICD-10-CM

## 2021-01-31 ENCOUNTER — IMMUNIZATION (OUTPATIENT)
Dept: LAB | Age: 71
End: 2021-01-31
Attending: HOSPITALIST
Payer: MEDICARE

## 2021-01-31 DIAGNOSIS — Z23 NEED FOR VACCINATION: Primary | ICD-10-CM

## 2021-01-31 PROCEDURE — 0001A SARSCOV2 VAC 30MCG/0.3ML IM: CPT

## 2021-02-09 ENCOUNTER — TELEPHONE (OUTPATIENT)
Dept: NEPHROLOGY | Facility: CLINIC | Age: 71
End: 2021-02-09

## 2021-02-09 RX ORDER — HYDRALAZINE HYDROCHLORIDE 100 MG/1
100 TABLET, FILM COATED ORAL 2 TIMES DAILY
Qty: 60 TABLET | Refills: 11 | Status: SHIPPED | OUTPATIENT
Start: 2021-02-09 | End: 2021-02-11

## 2021-02-11 ENCOUNTER — TELEPHONE (OUTPATIENT)
Dept: NEPHROLOGY | Facility: CLINIC | Age: 71
End: 2021-02-11

## 2021-02-11 RX ORDER — HYDRALAZINE HYDROCHLORIDE 100 MG/1
100 TABLET, FILM COATED ORAL 3 TIMES DAILY
Qty: 90 TABLET | Refills: 1 | Status: SHIPPED | OUTPATIENT
Start: 2021-02-11 | End: 2021-02-11

## 2021-02-11 RX ORDER — HYDRALAZINE HYDROCHLORIDE 100 MG/1
100 TABLET, FILM COATED ORAL 3 TIMES DAILY
Qty: 270 TABLET | Refills: 1 | Status: SHIPPED | OUTPATIENT
Start: 2021-02-11 | End: 2021-04-06

## 2021-02-21 ENCOUNTER — IMMUNIZATION (OUTPATIENT)
Dept: LAB | Age: 71
End: 2021-02-21
Attending: HOSPITALIST
Payer: MEDICARE

## 2021-02-21 DIAGNOSIS — Z23 NEED FOR VACCINATION: Primary | ICD-10-CM

## 2021-02-21 PROCEDURE — 0002A SARSCOV2 VAC 30MCG/0.3ML IM: CPT

## 2021-02-22 ENCOUNTER — TELEPHONE (OUTPATIENT)
Dept: NEPHROLOGY | Facility: CLINIC | Age: 71
End: 2021-02-22

## 2021-02-22 NOTE — TELEPHONE ENCOUNTER
I spoke with pt this afternoon, since incr hydralazine he has had issues with diarrhea, muscle pain and pruritus. His blood pressure has been excellent, however. I advised that he decrease the hydralazine back to 50 mg 3 times daily.   He will track his b

## 2021-02-22 NOTE — TELEPHONE ENCOUNTER
Pt calling regarding his medication for hydrALAZINE HCl 100 MG Oral Tab. Pt states his dosage was recently doubled and feels like he is having a reaction.  PT states it is making him feel itchy, causing him to have bad diarrhea, and is making it difficult f

## 2021-03-03 ENCOUNTER — TELEPHONE (OUTPATIENT)
Dept: NEPHROLOGY | Facility: CLINIC | Age: 71
End: 2021-03-03

## 2021-03-03 NOTE — TELEPHONE ENCOUNTER
Jessie Candelaria with [facility] agency called stating the pt has had some elevated BP readings the last few days. 03/01/21 - 175/93 03/02/21 - 173/86    She states the pt and his wife adv they are following his sodium closely.  Jessie Candelaria is also inquiring if you would like any

## 2021-03-04 ENCOUNTER — MED REC SCAN ONLY (OUTPATIENT)
Dept: NEPHROLOGY | Facility: CLINIC | Age: 71
End: 2021-03-04

## 2021-03-09 ENCOUNTER — LAB REQUISITION (OUTPATIENT)
Dept: LAB | Facility: HOSPITAL | Age: 71
End: 2021-03-09
Payer: MEDICARE

## 2021-03-09 DIAGNOSIS — I12.9 HYPERTENSIVE CHRONIC KIDNEY DISEASE WITH STAGE 1 THROUGH STAGE 4 CHRONIC KIDNEY DISEASE, OR UNSPECIFIED CHRONIC KIDNEY DISEASE: ICD-10-CM

## 2021-03-09 DIAGNOSIS — D63.1 ANEMIA IN CHRONIC KIDNEY DISEASE (CODE): ICD-10-CM

## 2021-03-09 PROBLEM — M47.816 ARTHRITIS OF FACET JOINT OF LUMBAR SPINE: Status: ACTIVE | Noted: 2021-03-09

## 2021-03-09 LAB
ANION GAP SERPL CALC-SCNC: 4 MMOL/L (ref 0–18)
BUN BLD-MCNC: 44 MG/DL (ref 7–18)
BUN/CREAT SERPL: 11.1 (ref 10–20)
CALCIUM BLD-MCNC: 9.2 MG/DL (ref 8.5–10.1)
CHLORIDE SERPL-SCNC: 106 MMOL/L (ref 98–112)
CO2 SERPL-SCNC: 32 MMOL/L (ref 21–32)
CREAT BLD-MCNC: 3.96 MG/DL
GLUCOSE BLD-MCNC: 97 MG/DL (ref 70–99)
OSMOLALITY SERPL CALC.SUM OF ELEC: 305 MOSM/KG (ref 275–295)
POTASSIUM SERPL-SCNC: 4 MMOL/L (ref 3.5–5.1)
SODIUM SERPL-SCNC: 142 MMOL/L (ref 136–145)
VIT B12 SERPL-MCNC: 1173 PG/ML (ref 193–986)

## 2021-03-09 PROCEDURE — 80048 BASIC METABOLIC PNL TOTAL CA: CPT | Performed by: INTERNAL MEDICINE

## 2021-03-09 PROCEDURE — 82607 VITAMIN B-12: CPT | Performed by: INTERNAL MEDICINE

## 2021-03-22 ENCOUNTER — OFFICE VISIT (OUTPATIENT)
Dept: NEPHROLOGY | Facility: CLINIC | Age: 71
End: 2021-03-22
Payer: MEDICARE

## 2021-03-22 VITALS — BODY MASS INDEX: 30 KG/M2 | SYSTOLIC BLOOD PRESSURE: 110 MMHG | WEIGHT: 184.81 LBS | DIASTOLIC BLOOD PRESSURE: 50 MMHG

## 2021-03-22 DIAGNOSIS — I10 ESSENTIAL HYPERTENSION: ICD-10-CM

## 2021-03-22 DIAGNOSIS — N18.4 CKD (CHRONIC KIDNEY DISEASE) STAGE 4, GFR 15-29 ML/MIN (HCC): Primary | ICD-10-CM

## 2021-03-22 DIAGNOSIS — E10.21 DIABETIC NEPHROPATHY ASSOCIATED WITH TYPE 1 DIABETES MELLITUS (HCC): ICD-10-CM

## 2021-03-22 PROCEDURE — 99215 OFFICE O/P EST HI 40 MIN: CPT | Performed by: INTERNAL MEDICINE

## 2021-03-22 NOTE — PROGRESS NOTES
Nephrology Progress Note      ASSESSMENT/PLAN:      1) CKD 4- serum creatinine approaching 4 is due to long-standing type 1 diabetes since age 14 with sub-nephrotic range proteinuria; has been doing well due to reasonable BP and DM management.   Previous ev prostatic hyperplasia)    • DEPRESSION    • Depression    • DIABETES dx at age 14    Type  1 DM   • Diabetic retinopathy     laser surgery   • Esophageal reflux     occassion   • Hearing impairment     hearing aids   • HEMORRHOIDS    • HIGH BLOOD PRESSURE at [address] [facility] ENDOSCOPY   • CATARACT Bilateral done in 2004    IOL's   • COLONOSCOPY      2006   • COLONOSCOPY  2/2009    normal   • COLONOSCOPY, POSSIBLE BIOPSY, POSSIBLE POLYPECTOMY [address] N/A 8/23/2019    Performed by Kate Paulino MD at [address] [place] [address]   • SURGICAL HISTORY  10/26/2020    Cystoscopy (Dr. Graham Carrillo)   • PERCUTANEOUS ENDOSCOPIC GASTROSTOMY PLACEMENT/JEJUNOSTOMY TUBE PLACEMENT N/A 11/26/2013    Performed by Slim Gar MD at [address] [place] [address] N/A 2/17/2020    Performed by Kalpesh Castillo Tablet 24 Hr Take 1 tablet (30 mg total) by mouth daily.  90 tablet 3   • GABAPENTIN 300 MG Oral Cap TAKE ONE CAPSULE BY MOUTH TWICE A DAY AND THEN THREE CAPSULES AT BEDTIME 450 capsule 0   • LEVOTHYROXINE SODIUM 112 MCG Oral Tab Take 1 tablet (112 mcg tota mouth daily. • Cholecalciferol (VITAMIN D3) 2000 UNITS Oral Tab Take 1 capsule by mouth daily. • cyanocobalamin 1000 MCG/ML Injection Solution Inject 1 mL (1,000 mcg total) into the muscle every 30 (thirty) days.  (Patient not taking: Reported on

## 2021-04-08 ENCOUNTER — TELEPHONE (OUTPATIENT)
Dept: NEPHROLOGY | Facility: CLINIC | Age: 71
End: 2021-04-08

## 2021-04-08 NOTE — TELEPHONE ENCOUNTER
Nurse Lee Lawson from [facility] called in to report that patient had a pulse of 57 today. Patient also reported \"itching\" which may be a side effect of taking Hydralazine  MG. Lee Lawson would like to know if it would be ok for patient to take Vitamin B6 which may assist with side effects from Hydralazine. Please call back [phone].

## 2021-04-27 PROBLEM — R42 DIZZINESS: Status: RESOLVED | Noted: 2019-12-19 | Resolved: 2021-04-27

## 2021-04-29 ENCOUNTER — PATIENT MESSAGE (OUTPATIENT)
Dept: NEPHROLOGY | Facility: CLINIC | Age: 71
End: 2021-04-29

## 2021-04-30 NOTE — TELEPHONE ENCOUNTER
To Whom It May Concern-    I am writing on behalf of Mr. Billie Mcginnis. As his nephrologist, have helped care for him over the last several years.   He has multiple medical issues including a 60-year history of type 1 diabetes, stage IV kidney disease, refr

## 2021-04-30 NOTE — TELEPHONE ENCOUNTER
From: Zhanna Watkins  To: Dinorah Dawn MD  Sent: 4/29/2021 2:28 PM CDT  Subject: Other    Hi Dr. Maged Sena,    My wife is still working.  Because of Covid, she has been working remotely for the past year; however, they are now bringing everyone back into t

## 2021-05-02 ENCOUNTER — PATIENT MESSAGE (OUTPATIENT)
Dept: NEPHROLOGY | Facility: CLINIC | Age: 71
End: 2021-05-02

## 2021-05-02 DIAGNOSIS — E10.21 DIABETIC NEPHROPATHY ASSOCIATED WITH TYPE 1 DIABETES MELLITUS (HCC): ICD-10-CM

## 2021-05-02 DIAGNOSIS — N18.4 CKD (CHRONIC KIDNEY DISEASE) STAGE 4, GFR 15-29 ML/MIN (HCC): Primary | ICD-10-CM

## 2021-05-05 NOTE — TELEPHONE ENCOUNTER
From: Elza Lizarraga  To: Rosendo Dumont MD  Sent: 5/2/2021 12:02 PM CDT  Subject: Prescription Question    Hi Dr. Bosch ,    I have another question. I have been having severe heartburn and nausea for the last couple weeks.  Only in the evening or at nig

## 2021-05-10 ENCOUNTER — LAB ENCOUNTER (OUTPATIENT)
Dept: LAB | Age: 71
End: 2021-05-10
Attending: INTERNAL MEDICINE
Payer: MEDICARE

## 2021-05-10 ENCOUNTER — TELEPHONE (OUTPATIENT)
Dept: NEPHROLOGY | Facility: CLINIC | Age: 71
End: 2021-05-10

## 2021-05-10 DIAGNOSIS — N18.4 CKD (CHRONIC KIDNEY DISEASE) STAGE 4, GFR 15-29 ML/MIN (HCC): ICD-10-CM

## 2021-05-10 DIAGNOSIS — E10.21 DIABETIC NEPHROPATHY ASSOCIATED WITH TYPE 1 DIABETES MELLITUS (HCC): ICD-10-CM

## 2021-05-10 PROCEDURE — 84100 ASSAY OF PHOSPHORUS: CPT

## 2021-05-10 PROCEDURE — 80048 BASIC METABOLIC PNL TOTAL CA: CPT

## 2021-05-10 PROCEDURE — 36415 COLL VENOUS BLD VENIPUNCTURE: CPT

## 2021-05-10 NOTE — TELEPHONE ENCOUNTER
Left V for pt- progressive diabetic nephropathy- repeat labs in July before office appt- roel acosta

## 2021-06-03 ENCOUNTER — LAB ENCOUNTER (OUTPATIENT)
Dept: LAB | Age: 71
End: 2021-06-03
Attending: INTERNAL MEDICINE
Payer: MEDICARE

## 2021-06-03 DIAGNOSIS — N18.30 STAGE 3 CHRONIC KIDNEY DISEASE, UNSPECIFIED WHETHER STAGE 3A OR 3B CKD (HCC): ICD-10-CM

## 2021-06-03 DIAGNOSIS — N18.30 STAGE 3 CHRONIC KIDNEY DISEASE, UNSPECIFIED WHETHER STAGE 3A OR 3B CKD (HCC): Primary | ICD-10-CM

## 2021-06-03 PROCEDURE — 80048 BASIC METABOLIC PNL TOTAL CA: CPT

## 2021-06-03 PROCEDURE — 36415 COLL VENOUS BLD VENIPUNCTURE: CPT

## 2021-06-07 ENCOUNTER — OFFICE VISIT (OUTPATIENT)
Dept: NEPHROLOGY | Facility: CLINIC | Age: 71
End: 2021-06-07
Payer: MEDICARE

## 2021-06-07 VITALS — WEIGHT: 181 LBS | BODY MASS INDEX: 29 KG/M2 | DIASTOLIC BLOOD PRESSURE: 50 MMHG | SYSTOLIC BLOOD PRESSURE: 102 MMHG

## 2021-06-07 DIAGNOSIS — I10 ESSENTIAL HYPERTENSION: ICD-10-CM

## 2021-06-07 DIAGNOSIS — E11.21 DIABETIC NEPHROPATHY ASSOCIATED WITH TYPE 2 DIABETES MELLITUS (HCC): ICD-10-CM

## 2021-06-07 DIAGNOSIS — N18.4 CKD (CHRONIC KIDNEY DISEASE) STAGE 4, GFR 15-29 ML/MIN (HCC): Primary | ICD-10-CM

## 2021-06-07 PROCEDURE — 99215 OFFICE O/P EST HI 40 MIN: CPT | Performed by: INTERNAL MEDICINE

## 2021-06-07 NOTE — PROGRESS NOTES
Nephrology Progress Note      ASSESSMENT/PLAN:      1) CKD 4- serum creatinine > 4 is due to long-standing type 1 diabetes since age 14 with sub-nephrotic range proteinuria; has been doing well due to reasonable BP and DM management.   Previous evaluation f within last 6 months     this was in 2013   • HYPERLIPIDEMIA    • HYPERTENSION    • HYPOTHYROIDISM    • IMPOTENCE    • Kidney disease     ARF-2013   • Liver disease     2013 - pt states all resolved after liver and kidneys shut down after issue with pneumo Reynaldo Montgomery MD;  Location: [facility] [address] PAIN MANAGEMENT   • INJECTION, W/WO CONTRAST, DX/THERAPEUTIC SUBSTANCE, EPIDURAL/SUBARACHNOID; LUMBAR/SACRAL N/A 6/8/2016    Procedure: LUMBAR EPIDURAL;  Surgeon: Carlo Griffith MD;  Location: [id number] [place] [address] [place] FOR PAIN MANAGEMENT   • PATIENT DOCUMENTED NOT TO HAVE EXPERIENCED ANY OF THE FOLLOWING EVENTS N/A 6/8/2016    Procedure: LUMBAR EPIDURAL;  Surgeon: Aicha Beard MD;  Location: [address] [place] [address]   • PATIENT [address] Types: Cigarettes        Quit date: 1986        Years since quittin.4      Smokeless tobacco: Never Used      Tobacco comment: Quit 25 years ago    Vaping Use      Vaping Use: Never used    Alcohol use:  Yes      Alcohol/week: 2.0 - 3.0 standard dr Tab Take 1 tablet (112 mcg total) by mouth every morning before breakfast.    90 tablet 1   • Glucose Blood (CONTOUR NEXT TEST) In Vitro Strip To test once daily. Type 1 dm, insulin dependant.  100 each 1   • EZETIMIBE 10 MG Oral Tab TAKE ONE TABLET BY MOUT all extremities  Skin: Warm and dry, no rashes      Rojelio Sena MD  6/7/2021  452 PM

## 2021-06-24 RX ORDER — HYDRALAZINE HYDROCHLORIDE 100 MG/1
50 TABLET, FILM COATED ORAL 3 TIMES DAILY
Qty: 135 TABLET | Refills: 1 | Status: SHIPPED | OUTPATIENT
Start: 2021-06-24

## 2021-07-23 PROCEDURE — 88305 TISSUE EXAM BY PATHOLOGIST: CPT

## 2021-07-24 ENCOUNTER — LAB ENCOUNTER (OUTPATIENT)
Dept: LAB | Age: 71
End: 2021-07-24
Attending: INTERNAL MEDICINE
Payer: MEDICARE

## 2021-07-24 DIAGNOSIS — R13.19 ESOPHAGEAL DYSPHAGIA: Primary | ICD-10-CM

## 2021-07-24 DIAGNOSIS — K63.5 COLON POLYPS: ICD-10-CM

## 2021-07-24 PROCEDURE — 88108 CYTOPATH CONCENTRATE TECH: CPT

## 2021-07-27 LAB — NON GYNE INTERPRETATION: NEGATIVE

## 2021-07-28 ENCOUNTER — TELEPHONE (OUTPATIENT)
Dept: NEPHROLOGY | Facility: CLINIC | Age: 71
End: 2021-07-28

## 2021-07-28 NOTE — TELEPHONE ENCOUNTER
D/W pt- fluconazole should not affect BP or current meds- asked pt to call GI to discuss- vera acosta

## 2021-07-29 ENCOUNTER — APPOINTMENT (OUTPATIENT)
Dept: CT IMAGING | Age: 71
DRG: 683 | End: 2021-07-29
Attending: STUDENT IN AN ORGANIZED HEALTH CARE EDUCATION/TRAINING PROGRAM
Payer: MEDICARE

## 2021-07-29 ENCOUNTER — HOSPITAL ENCOUNTER (INPATIENT)
Facility: HOSPITAL | Age: 71
LOS: 2 days | Discharge: HOME | DRG: 683 | End: 2021-07-31
Attending: STUDENT IN AN ORGANIZED HEALTH CARE EDUCATION/TRAINING PROGRAM | Admitting: INTERNAL MEDICINE
Payer: MEDICARE

## 2021-07-29 DIAGNOSIS — R47.81 SLURRED SPEECH: ICD-10-CM

## 2021-07-29 DIAGNOSIS — D64.9 ANEMIA, UNSPECIFIED TYPE: ICD-10-CM

## 2021-07-29 DIAGNOSIS — N17.9 ACUTE RENAL FAILURE SUPERIMPOSED ON CHRONIC KIDNEY DISEASE, UNSPECIFIED CKD STAGE, UNSPECIFIED ACUTE RENAL FAILURE TYPE (HCC): Primary | ICD-10-CM

## 2021-07-29 DIAGNOSIS — N18.9 ACUTE RENAL FAILURE SUPERIMPOSED ON CHRONIC KIDNEY DISEASE, UNSPECIFIED CKD STAGE, UNSPECIFIED ACUTE RENAL FAILURE TYPE (HCC): Primary | ICD-10-CM

## 2021-07-29 DIAGNOSIS — R53.1 WEAKNESS: ICD-10-CM

## 2021-07-29 LAB
ALBUMIN SERPL-MCNC: 3.2 G/DL (ref 3.4–5)
ALBUMIN/GLOB SERPL: 0.9 {RATIO} (ref 1–2)
ALP LIVER SERPL-CCNC: 57 U/L
ALT SERPL-CCNC: 36 U/L
ANION GAP SERPL CALC-SCNC: 6 MMOL/L (ref 0–18)
AST SERPL-CCNC: 40 U/L (ref 15–37)
ATRIAL RATE: 62 BPM
BASOPHILS # BLD AUTO: 0.05 X10(3) UL (ref 0–0.2)
BASOPHILS NFR BLD AUTO: 0.8 %
BILIRUB SERPL-MCNC: 0.4 MG/DL (ref 0.1–2)
BUN BLD-MCNC: 57 MG/DL (ref 7–18)
CALCIUM BLD-MCNC: 8.6 MG/DL (ref 8.5–10.1)
CHLORIDE SERPL-SCNC: 102 MMOL/L (ref 98–112)
CO2 SERPL-SCNC: 30 MMOL/L (ref 21–32)
CREAT BLD-MCNC: 6.12 MG/DL
EOSINOPHIL # BLD AUTO: 0.31 X10(3) UL (ref 0–0.7)
EOSINOPHIL NFR BLD AUTO: 4.8 %
ERYTHROCYTE [DISTWIDTH] IN BLOOD BY AUTOMATED COUNT: 13.6 %
GLOBULIN PLAS-MCNC: 3.5 G/DL (ref 2.8–4.4)
GLUCOSE BLD-MCNC: 148 MG/DL (ref 70–99)
GLUCOSE BLD-MCNC: 155 MG/DL (ref 70–99)
GLUCOSE BLD-MCNC: 204 MG/DL (ref 70–99)
HCT VFR BLD AUTO: 33.4 %
HGB BLD-MCNC: 10.4 G/DL
IMM GRANULOCYTES # BLD AUTO: 0.01 X10(3) UL (ref 0–1)
IMM GRANULOCYTES NFR BLD: 0.2 %
LYMPHOCYTES # BLD AUTO: 1.63 X10(3) UL (ref 1–4)
LYMPHOCYTES NFR BLD AUTO: 25.2 %
M PROTEIN MFR SERPL ELPH: 6.7 G/DL (ref 6.4–8.2)
MCH RBC QN AUTO: 29.3 PG (ref 26–34)
MCHC RBC AUTO-ENTMCNC: 31.1 G/DL (ref 31–37)
MCV RBC AUTO: 94.1 FL
MONOCYTES # BLD AUTO: 0.67 X10(3) UL (ref 0.1–1)
MONOCYTES NFR BLD AUTO: 10.4 %
NEUTROPHILS # BLD AUTO: 3.8 X10 (3) UL (ref 1.5–7.7)
NEUTROPHILS # BLD AUTO: 3.8 X10(3) UL (ref 1.5–7.7)
NEUTROPHILS NFR BLD AUTO: 58.6 %
OSMOLALITY SERPL CALC.SUM OF ELEC: 305 MOSM/KG (ref 275–295)
P AXIS: 21 DEGREES
P-R INTERVAL: 204 MS
PLATELET # BLD AUTO: 167 10(3)UL (ref 150–450)
POTASSIUM SERPL-SCNC: 4.4 MMOL/L (ref 3.5–5.1)
Q-T INTERVAL: 418 MS
QRS DURATION: 106 MS
QTC CALCULATION (BEZET): 424 MS
R AXIS: 31 DEGREES
RBC # BLD AUTO: 3.55 X10(6)UL
SARS-COV-2 RNA RESP QL NAA+PROBE: NOT DETECTED
SODIUM SERPL-SCNC: 138 MMOL/L (ref 136–145)
T AXIS: 48 DEGREES
TROPONIN I SERPL-MCNC: <0.045 NG/ML (ref ?–0.04)
VENTRICULAR RATE: 62 BPM
WBC # BLD AUTO: 6.5 X10(3) UL (ref 4–11)

## 2021-07-29 PROCEDURE — 70450 CT HEAD/BRAIN W/O DYE: CPT | Performed by: STUDENT IN AN ORGANIZED HEALTH CARE EDUCATION/TRAINING PROGRAM

## 2021-07-29 PROCEDURE — 5A09357 ASSISTANCE WITH RESPIRATORY VENTILATION, LESS THAN 24 CONSECUTIVE HOURS, CONTINUOUS POSITIVE AIRWAY PRESSURE: ICD-10-PCS | Performed by: HOSPITALIST

## 2021-07-29 RX ORDER — ACETAMINOPHEN 325 MG/1
650 TABLET ORAL EVERY 6 HOURS PRN
Status: DISCONTINUED | OUTPATIENT
Start: 2021-07-29 | End: 2021-07-31

## 2021-07-29 RX ORDER — SODIUM CHLORIDE 9 MG/ML
INJECTION, SOLUTION INTRAVENOUS CONTINUOUS
Status: DISCONTINUED | OUTPATIENT
Start: 2021-07-29 | End: 2021-07-29

## 2021-07-29 RX ORDER — HEPARIN SODIUM 5000 [USP'U]/ML
5000 INJECTION, SOLUTION INTRAVENOUS; SUBCUTANEOUS EVERY 8 HOURS SCHEDULED
Status: DISCONTINUED | OUTPATIENT
Start: 2021-07-29 | End: 2021-07-31

## 2021-07-29 RX ORDER — ROSUVASTATIN CALCIUM 20 MG/1
20 TABLET, COATED ORAL DAILY
Status: DISCONTINUED | OUTPATIENT
Start: 2021-07-30 | End: 2021-07-31

## 2021-07-29 RX ORDER — LEVOTHYROXINE SODIUM 0.05 MG/1
100 TABLET ORAL
Status: DISCONTINUED | OUTPATIENT
Start: 2021-07-30 | End: 2021-07-31

## 2021-07-29 RX ORDER — SODIUM CHLORIDE 9 MG/ML
INJECTION, SOLUTION INTRAVENOUS CONTINUOUS
Status: DISCONTINUED | OUTPATIENT
Start: 2021-07-29 | End: 2021-07-30

## 2021-07-29 RX ORDER — DULOXETIN HYDROCHLORIDE 60 MG/1
120 CAPSULE, DELAYED RELEASE ORAL NIGHTLY
Status: DISCONTINUED | OUTPATIENT
Start: 2021-07-29 | End: 2021-07-31

## 2021-07-29 RX ORDER — EZETIMIBE 10 MG/1
10 TABLET ORAL DAILY
Status: DISCONTINUED | OUTPATIENT
Start: 2021-07-30 | End: 2021-07-31

## 2021-07-29 RX ORDER — ASPIRIN 81 MG/1
81 TABLET ORAL DAILY
Status: DISCONTINUED | OUTPATIENT
Start: 2021-07-30 | End: 2021-07-31

## 2021-07-29 RX ORDER — 0.9 % SODIUM CHLORIDE 0.9 %
INTRAVENOUS SOLUTION INTRAVENOUS ONCE
Status: DISCONTINUED | OUTPATIENT
Start: 2021-07-29 | End: 2021-07-29

## 2021-07-29 RX ORDER — DEXTROSE MONOHYDRATE 25 G/50ML
50 INJECTION, SOLUTION INTRAVENOUS
Status: DISCONTINUED | OUTPATIENT
Start: 2021-07-29 | End: 2021-07-30

## 2021-07-29 RX ORDER — FAMOTIDINE 20 MG/1
20 TABLET ORAL DAILY
Status: DISCONTINUED | OUTPATIENT
Start: 2021-07-30 | End: 2021-07-31

## 2021-07-29 NOTE — ED INITIAL ASSESSMENT (HPI)
Hands and arms shaking and diff. Speaking for the past two wks, dropping items. Symptoms worse today. Spoke to pcp today and was advised come to ed.

## 2021-07-29 NOTE — ED PROVIDER NOTES
Patient Seen in: [facility] Emergency Department In [place]      History   Patient presents with:  Neurologic Problem    Stated Complaint: shaking hands and arms dropping things, diffs.  speaking for the past two wks    HPI/Subjective:   HPI    Patient is in 2013-pt had pneumonia, sepsis, dialysis, peg tube, vent, now resolved.    • Polyneuropathy in diabetes(357.2)    • SLEEP APNEA     Uses his CPAP machine consistently   • Sleep apnea, obstructive SPLIT NIGHT 5-12-17    AHI 54 SaO2 lizeth 74 % CPAP 12 HME// [facility]   • M-SEDAJ BY  PHYS [address] [place] [facility]  N SVC 5+ YR N/A 5/25/2016    Procedure: LUMBAR EPIDURAL;  Surgeon: Nolan Sandhu MD;  Location: [facility] FOR PAIN MANAGEMENT   • M-SEDAJ BY  PHYS [address] [place] [facility]  N SVC 5+ YR N/A 6/8/2016    Procedure: LUM ANTIBIOTIC SURGICAL SITE INFECTION PROPHYLAXIS.  N/A 5/4/2016    Procedure: LUMBAR EPIDURAL;  Surgeon: Andry Butler MD;  Location: [facility] FOR PAIN MANAGEMENT   • PATIENT [address] FOR IV ANTIBIOTIC SURGICAL SITE INFECTION PROPHYLAXI kg   SpO2 95%   BMI 28.08 kg/m²         Physical Exam    Constitutional: The patient is oriented to person, place, and time, appears well-developed and well-nourished. Mild slurring of the speech is appreciated but patient's comments are easily understood. components within normal limits   TROPONIN I - Normal   CBC WITH DIFFERENTIAL WITH PLATELET    Narrative: The following orders were created for panel order CBC With Differential With Platelet.   Procedure                               Abnormality encounter diagnosis)  Weakness  Slurred speech  Anemia, unspecified type     Disposition:  Admit  7/29/2021  2:16 pm    Follow-up:  No follow-up provider specified.         Medications Prescribed:  Current Discharge Medication List

## 2021-07-30 PROBLEM — E10.8 TYPE 1 DIABETES MELLITUS WITH COMPLICATION, WITH LONG TERM CURRENT USE OF INSULIN PUMP (HCC): Chronic | Status: ACTIVE | Noted: 2021-07-30

## 2021-07-30 PROBLEM — Z96.41 TYPE 1 DIABETES MELLITUS WITH COMPLICATION, WITH LONG TERM CURRENT USE OF INSULIN PUMP (HCC): Chronic | Status: ACTIVE | Noted: 2021-07-30

## 2021-07-30 PROBLEM — E10.8 TYPE 1 DIABETES MELLITUS WITH COMPLICATION, WITH LONG TERM CURRENT USE OF INSULIN PUMP: Chronic | Status: ACTIVE | Noted: 2021-07-30

## 2021-07-30 PROBLEM — E10.8 TYPE 1 DIABETES MELLITUS WITH COMPLICATION, WITH LONG TERM CURRENT USE OF INSULIN PUMP  (HCC): Chronic | Status: ACTIVE | Noted: 2021-07-30

## 2021-07-30 PROBLEM — Z96.41 TYPE 1 DIABETES MELLITUS WITH COMPLICATION, WITH LONG TERM CURRENT USE OF INSULIN PUMP: Chronic | Status: ACTIVE | Noted: 2021-07-30

## 2021-07-30 PROBLEM — Z96.41 TYPE 1 DIABETES MELLITUS WITH COMPLICATION, WITH LONG TERM CURRENT USE OF INSULIN PUMP  (HCC): Chronic | Status: ACTIVE | Noted: 2021-07-30

## 2021-07-30 LAB
ALBUMIN SERPL-MCNC: 3.1 G/DL (ref 3.4–5)
ANION GAP SERPL CALC-SCNC: 6 MMOL/L (ref 0–18)
BASOPHILS # BLD AUTO: 0.04 X10(3) UL (ref 0–0.2)
BASOPHILS NFR BLD AUTO: 0.5 %
BILIRUB UR QL STRIP.AUTO: NEGATIVE
BUN BLD-MCNC: 53 MG/DL (ref 7–18)
CALCIUM BLD-MCNC: 8.4 MG/DL (ref 8.5–10.1)
CHLORIDE SERPL-SCNC: 108 MMOL/L (ref 98–112)
CLARITY UR REFRACT.AUTO: CLEAR
CO2 SERPL-SCNC: 28 MMOL/L (ref 21–32)
COLOR UR AUTO: YELLOW
CREAT BLD-MCNC: 5.41 MG/DL
EOSINOPHIL # BLD AUTO: 0.32 X10(3) UL (ref 0–0.7)
EOSINOPHIL NFR BLD AUTO: 3.8 %
ERYTHROCYTE [DISTWIDTH] IN BLOOD BY AUTOMATED COUNT: 13.6 %
GLUCOSE BLD-MCNC: 109 MG/DL (ref 70–99)
GLUCOSE BLD-MCNC: 125 MG/DL (ref 70–99)
GLUCOSE BLD-MCNC: 132 MG/DL (ref 70–99)
GLUCOSE BLD-MCNC: 147 MG/DL (ref 70–99)
GLUCOSE BLD-MCNC: 162 MG/DL (ref 70–99)
GLUCOSE UR STRIP.AUTO-MCNC: NEGATIVE MG/DL
HCT VFR BLD AUTO: 35.1 %
HGB BLD-MCNC: 11.2 G/DL
IMM GRANULOCYTES # BLD AUTO: 0.02 X10(3) UL (ref 0–1)
IMM GRANULOCYTES NFR BLD: 0.2 %
KETONES UR STRIP.AUTO-MCNC: NEGATIVE MG/DL
LEUKOCYTE ESTERASE UR QL STRIP.AUTO: NEGATIVE
LYMPHOCYTES # BLD AUTO: 1.12 X10(3) UL (ref 1–4)
LYMPHOCYTES NFR BLD AUTO: 13.5 %
MCH RBC QN AUTO: 29.8 PG (ref 26–34)
MCHC RBC AUTO-ENTMCNC: 31.9 G/DL (ref 31–37)
MCV RBC AUTO: 93.4 FL
MONOCYTES # BLD AUTO: 0.63 X10(3) UL (ref 0.1–1)
MONOCYTES NFR BLD AUTO: 7.6 %
NEUTROPHILS # BLD AUTO: 6.19 X10 (3) UL (ref 1.5–7.7)
NEUTROPHILS # BLD AUTO: 6.19 X10(3) UL (ref 1.5–7.7)
NEUTROPHILS NFR BLD AUTO: 74.4 %
NITRITE UR QL STRIP.AUTO: NEGATIVE
OSMOLALITY SERPL CALC.SUM OF ELEC: 312 MOSM/KG (ref 275–295)
PH UR STRIP.AUTO: 6 [PH] (ref 5–8)
PHOSPHATE SERPL-MCNC: 4.7 MG/DL (ref 2.5–4.9)
PLATELET # BLD AUTO: 143 10(3)UL (ref 150–450)
POTASSIUM SERPL-SCNC: 3.8 MMOL/L (ref 3.5–5.1)
PROT UR STRIP.AUTO-MCNC: 100 MG/DL
RBC # BLD AUTO: 3.76 X10(6)UL
RBC UR QL AUTO: NEGATIVE
SODIUM SERPL-SCNC: 142 MMOL/L (ref 136–145)
SP GR UR STRIP.AUTO: 1.01 (ref 1–1.03)
UROBILINOGEN UR STRIP.AUTO-MCNC: <2 MG/DL
WBC # BLD AUTO: 8.3 X10(3) UL (ref 4–11)

## 2021-07-30 PROCEDURE — 99232 SBSQ HOSP IP/OBS MODERATE 35: CPT | Performed by: CLINICAL NURSE SPECIALIST

## 2021-07-30 PROCEDURE — 99223 1ST HOSP IP/OBS HIGH 75: CPT | Performed by: OTHER

## 2021-07-30 PROCEDURE — 99223 1ST HOSP IP/OBS HIGH 75: CPT | Performed by: INTERNAL MEDICINE

## 2021-07-30 RX ORDER — DEXTROSE MONOHYDRATE 25 G/50ML
50 INJECTION, SOLUTION INTRAVENOUS
Status: DISCONTINUED | OUTPATIENT
Start: 2021-07-30 | End: 2021-07-31

## 2021-07-30 RX ORDER — HYDRALAZINE HYDROCHLORIDE 50 MG/1
50 TABLET, FILM COATED ORAL EVERY 8 HOURS SCHEDULED
Status: DISCONTINUED | OUTPATIENT
Start: 2021-07-30 | End: 2021-07-31

## 2021-07-30 RX ORDER — ISOSORBIDE MONONITRATE 30 MG/1
30 TABLET, EXTENDED RELEASE ORAL DAILY
Status: DISCONTINUED | OUTPATIENT
Start: 2021-07-31 | End: 2021-07-31

## 2021-07-30 RX ORDER — DOCUSATE SODIUM 100 MG/1
100 CAPSULE, LIQUID FILLED ORAL 2 TIMES DAILY
Status: DISCONTINUED | OUTPATIENT
Start: 2021-07-30 | End: 2021-07-31

## 2021-07-30 NOTE — CONSULTS
[facility]  CNS Consult Note    Sydnie King Patient Status:  Inpatient    1950 MRN [id number]   [facility] 3NE-A Attending Renae Jo MD   [facility] Day # 1 PCP Grant Armstrong MD     Reason for Consult:     Insulin Pu hypoglycemia     ZEUS (acute kidney injury) ([facility]     Stage 4 chronic kidney disease ([facility]     Essential hypertension     Hypoglycemia     Urinary retention     Anemia in stage 4 chronic kidney disease (HCC)     Arthritis of facet joint of lumbar spine ([facility] settings without incident. Insulin Pump Settings:     Total Daily Dose:  40.88 units - 7 day average    Basal: 12am=0.95units, 3:30am=0.85 units, 0800am=0.95units 1000=0.55units, 1100am= 0.5units, 1300pm=0.35units, 1500pm=0.75units, 1700=0.7 units, 180

## 2021-07-30 NOTE — PROGRESS NOTES
NURSING ADMISSION NOTE      Patient admitted via Ambulance  Oriented to room. Safety precautions initiated. Bed in low position. Call light in reach.     Notified hospoitlist and neph about the patient arriving and the new consult just waiting on a respo

## 2021-07-30 NOTE — CONSULTS
.  Christiano 1827   Neurology; INITIAL Consultation VISIT  2021, 2:15 PM     [address] Patient Status:  Inpatient    1950 MRN [id number]   [facility] 3NE-A Attending Marcy May MD     PCP [facility] CORINNE HAWKINS • OSTEOARTHRITIS    • Osteoarthrosis, unspecified whether generalized or localized, unspecified site    • OSTEOPOROSIS    • Other and unspecified hyperlipidemia    • OTHER DISEASES     in 2013-pt had pneumonia, sepsis, dialysis, peg tube, vent, now resol [facility]   • KNEE REPLACEMENT SURGERY  2/14/13    Left TKR by Dr. Cynthia Neves   • M-SEDAJ BY  PHYS [id number] .S. [facility] 59  N [facility] 5+ YR N/A 5/4/2016    Procedure: LUMBAR EPIDURAL;  Surgeon: Glendy Villalobos MD;  Location: [address] [place] [address]   • YOANA- PREOPERATIVE ORDER FOR IV ANTIBIOTIC SURGICAL SITE INFECTION PROPHYLAXIS.   2/14/2014    Procedure: ;  Surgeon: Elzbieta Montes MD;  Location: [address] [place] [address]   • PATIENT [facility] IV ANTIBIOTIC SURGICAL SITE INFECTION PROPHY first day and then 1 tab daily for 20 days  Levothyroxine Sodium 100 MCG Oral Tab, Take 1 tablet (100 mcg total) by mouth every morning before breakfast.  FAMOTIDINE 20 MG Oral Tab, TAKE ONE TABLET BY MOUTH TWICE PER DAY   hydrALAZINE HCl 100 MG Oral Tab, Vitro Strip, To test once daily. Type 1 dm, insulin dependant.   Syringe/Needle, Disp, (BD LUER-JACLYN SYRINGE) 25G X 1\" 3 ML Does not apply Misc, USE AS DIRECTED FOR B12 INJECTION  GLUCAGON EMERGENCY 1 MG Injection Kit,    omega-3 fatty acids 1000 MG Oral Ca Test:       DIAGNOSTIC DATA:   Recent Results (from the past 24 hour(s))   Rapid SARS-CoV-2 by PCR    Collection Time: 07/29/21  2:35 PM    Specimen: Nares;  Other   Result Value Ref Range    Rapid SARS-CoV-2 by PCR Not Detected Not Detected   POCT Glucose 35.1 (L) 39.0 - 53.0 %    .0 (L) 150.0 - 450.0 10(3)uL    MCV 93.4 80.0 - 100.0 fL    MCH 29.8 26.0 - 34.0 pg    MCHC 31.9 31.0 - 37.0 g/dL    RDW 13.6 %    Neutrophil Absolute Prelim 6.19 1.50 - 7.70 x10 (3) uL    Neutrophil Absolute 6.19 1.50 - 7.

## 2021-07-30 NOTE — CONSULTS
[facility]  Report of Consultation    [address] Patient Status:  Inpatient    1950 MRN [id number]   [facility] 3NE-A Attending Rosa Barajas MD   [facility] Day # 1 PCP Clara Encarnacion MD       Assessment / Plan:    1) A • BPH (benign prostatic hyperplasia)    • DEPRESSION    • Depression    • DIABETES dx at age 14    Type  1 DM   • Diabetic retinopathy     laser surgery   • Esophageal reflux     occassion   • Hearing impairment     hearing aids   • HEMORRHOIDS    • HIGH INJECTION, W/WO CONTRAST, DX/THERAPEUTIC SUBSTANCE, EPIDURAL/SUBARACHNOID; LUMBAR/SACRAL N/A 5/4/2016    Procedure: LUMBAR EPIDURAL;  Surgeon: Padmini Moore MD;  Location: [facility]   • INJECTION, W/WO CONTRAST, DX/THERAPE[employer]IC [employer]   • PATIENT DOCUMENTED NOT TO HAVE EXPERIENCED ANY OF THE FOLLOWING EVENTS N/A 5/4/2016    Procedure: LUMBAR EPIDURAL;  Surgeon: Javier Liriano MD;  Location: [facility]   • PATIENT DOCUMENTED NOT TO HAVE EXPERIENCED ANY OF THE FOL • Heart Disorder Brother         CAD with MI at age 48 - passed away   • Obesity Brother    • Diabetes Brother         Type 2 DM   • Hypertension Brother    • Lipids Brother    • Thyroid Disorder Neg      Denies family history of kidney disease.     repor lb 6.1 oz (79.1 kg)   SpO2 92%   BMI 28.15 kg/m²   Temp (24hrs), Av.8 °F (36.6 °C), Min:97.3 °F (36.3 °C), Max:98.2 °F (36.8 °C)     No intake or output data in the 24 hours ending 21 0915  Wt Readings from Last 3 Encounters:  21 : 174 lb 6

## 2021-07-30 NOTE — PROGRESS NOTES
[facility] Pharmacy Note:  Renal Dose Adjustment    Zenaida Perez has been prescribed famotidine (PEPCID) 20 mg orally every 12 hours. Estimated Creatinine Clearance: 10 mL/min (A) (based on SCr of 6.12 mg/dL (H)).     Calculated creatinine clearance is <

## 2021-07-30 NOTE — CONSULTS
GASTROENTEROLOGY CONSULTATION  Steffany Ryan MD    Department of Gastroenterology  [address] [facility] Patient Status:  Inpatient    1950 MRN [id number]   [facility] 3NE-A Attending Carolyn Hugo MD   [facility] Day sepsis, dialysis, peg tube, vent, now resolved.    • Polyneuropathy in diabetes(357.2)    • SLEEP APNEA     Uses his CPAP machine consistently   • Sleep apnea, obstructive SPLIT NIGHT 5-12-17    AHI 54 SaO2 lizeth 74 % CPAP 12 HME//    • Type I (juvenile typ M-SEDAJ BY  PHYS [facility]+ YR N/A 5/25/2016    Procedure: LUMBAR EPIDURAL;  Surgeon: Glendy Villalobos MD;  Location: [address] [place] [address] M-SEDAJ BY  PHYS [id number] [facility] 59  N [facility] 5+ YR N/A 6/8/2016    Procedure: LUMBAR EPIDURAL;  Surgeon: Esau Valle PROPHYLAXIS. N/A 5/4/2016    Procedure: LUMBAR EPIDURAL;  Surgeon: Cong Puga MD;  Location: [facility]   • PATIENT [address] FOR IV ANTIBIOTIC SURGICAL SITE INFECTION PROPHYLAXIS.  N/A 5/25/2016    Procedure: Krystal Blackwell chewable tab 4 tablet, 4 tablet, Oral, Q15 Min PRN **OR** dextrose 50 % injection 50 mL, 50 mL, Intravenous, Q15 Min PRN **OR** glucose (DEX4) oral liquid 30 g, 30 g, Oral, Q15 Min PRN **OR** glucose-vitamin C (DEX-4) chewable tab 8 tablet, 8 tablet, Oral, anesthesia, food allergy. Eyes: Denies blurred/double vision, eye disease, glasses or contacts, glaucoma. ENT: Denies nose or gums bleeding, mouth sores, bad breath or bad taste in mouth, hearing loss.   Physical Exam:    Blood pressure 146/57, pulse 78,

## 2021-07-30 NOTE — PLAN OF CARE
Patient alert and oriented x4. On Ra, . NSR on tele. HR 70s. VSS. Denies pain.  CGM. Insulin pump in place. Order set in place. Contract signed. 3.47units per pt for breakfast.  IVF dc'd. GI consulted. Neuro to see. Constipated. BID colace.

## 2021-07-30 NOTE — PROGRESS NOTES
Nephrology Note    Pt well known to me- spoke to him yesterday about hypotension after starting fluconazole for candida esophagitis. Cr now 6.1.  Will start IVF and hold BP meds and eval in AM.    Rojelio Carter  7/29/2021  826 PM

## 2021-07-30 NOTE — PLAN OF CARE
Assumed care at [address] [place] [address]. Pt is A&Ox4, presented with intermittent BUE weakness and difficulty speaking over the past two weeks. Consulted neuro, neuro to see tomorrow morning.  Bun and creatinine elevated, per nephrology fluid order of NS infusing at 83 ml/hr

## 2021-07-30 NOTE — H&P
[facility]    History and Physical     [address] Patient Status:  Inpatient    1950 MRN [id number]   [facility] 3NE-A Attending Joey Tamez MD   [facility] Day # 1 PCP Chito Balbuena MD     Chief Complaint: Brionna Pert unspecified site    • OSTEOPOROSIS    • Other and unspecified hyperlipidemia    • OTHER DISEASES     in 2013-pt had pneumonia, sepsis, dialysis, peg tube, vent, now resolved.    • Polyneuropathy in diabetes(357.2)    • SLEEP APNEA     Uses his CPAP machine BY  PHYS [facility] 5+ YR N/A 5/4/2016    Procedure: LUMBAR EPIDURAL;  Surgeon: Anastacio Ramos MD;  Location: [address] [place] [address] BY [facility] [address] [facility] 59  N [facility] 5+ YR N/A 5/25/2016    Procedure: LUMBAR EPIDURAL;  Surgeon: Anastacio Ramos, Surgeon: Kobi Sawyer MD;  Location: [address] [place] [address]   • PATIENT [facility] IV ANTIBIOTIC SURGICAL SITE INFECTION PROPHYLAXIS.  N/A 5/4/2016    Procedure: LUMBAR EPIDURAL;  Surgeon: Jose Daniel Ruiz MD;  Location: [address] [place] [address] RASH    Medications:  No current facility-administered medications on file prior to encounter. fluconazole 100 MG Oral Tab, Take 1 tablet (100 mg total) by mouth daily.  2 tabs first day and then 1 tab daily for 20 days, Disp: 22 tablet, Rfl: 0  Levothyrox Cholecalciferol (VITAMIN D3) 2000 UNITS Oral Tab, Take 1 capsule by mouth daily.   , Disp: , Rfl:   HARIKA-ADDIE Oral Tab, TAKE ONE TABLET BY MOUTH ONE TIME DAILY , Disp: 90 tablet, Rfl: 0  CYANOCOBALAMIN 1000 MCG/ML Injection Solution, INJECT 1 ML INTO THE and patient with minimal chronic tremor  Musculoskeletal: Moves all extremities. Extremities: No significant edema or tenderness of the LE  Integument: No rashes or lesions. Psychiatric: Appropriate mood and affect.       Diagnostic Data:      Labs:  Rec creat    PRANEETH  -cpap    Hypothyroidism  -levothyroxine    Anxiety  Depression  -duloxetine    HLD  -rosuvastatin   -zetia    Type 1 DM  -continue insulin pump    Candida Esophagitis  -pt with interaction on fluconazole causing hypotension  -GI consulted for

## 2021-07-31 VITALS
TEMPERATURE: 98 F | WEIGHT: 174.81 LBS | DIASTOLIC BLOOD PRESSURE: 89 MMHG | RESPIRATION RATE: 18 BRPM | SYSTOLIC BLOOD PRESSURE: 170 MMHG | BODY MASS INDEX: 28.09 KG/M2 | HEIGHT: 66 IN | OXYGEN SATURATION: 98 % | HEART RATE: 64 BPM

## 2021-07-31 LAB
ALBUMIN SERPL-MCNC: 3 G/DL (ref 3.4–5)
ALBUMIN SERPL-MCNC: 3 G/DL (ref 3.4–5)
ALBUMIN/GLOB SERPL: 0.8 {RATIO} (ref 1–2)
ALP LIVER SERPL-CCNC: 64 U/L
ALT SERPL-CCNC: 33 U/L
ANION GAP SERPL CALC-SCNC: 4 MMOL/L (ref 0–18)
ANION GAP SERPL CALC-SCNC: 4 MMOL/L (ref 0–18)
AST SERPL-CCNC: 31 U/L (ref 15–37)
BASOPHILS # BLD AUTO: 0.03 X10(3) UL (ref 0–0.2)
BASOPHILS NFR BLD AUTO: 0.5 %
BILIRUB SERPL-MCNC: 0.4 MG/DL (ref 0.1–2)
BUN BLD-MCNC: 51 MG/DL (ref 7–18)
BUN BLD-MCNC: 51 MG/DL (ref 7–18)
CALCIUM BLD-MCNC: 8.7 MG/DL (ref 8.5–10.1)
CALCIUM BLD-MCNC: 8.7 MG/DL (ref 8.5–10.1)
CHLORIDE SERPL-SCNC: 109 MMOL/L (ref 98–112)
CHLORIDE SERPL-SCNC: 109 MMOL/L (ref 98–112)
CO2 SERPL-SCNC: 29 MMOL/L (ref 21–32)
CO2 SERPL-SCNC: 29 MMOL/L (ref 21–32)
CREAT BLD-MCNC: 4.9 MG/DL
CREAT BLD-MCNC: 4.9 MG/DL
EOSINOPHIL # BLD AUTO: 0.33 X10(3) UL (ref 0–0.7)
EOSINOPHIL NFR BLD AUTO: 5.4 %
ERYTHROCYTE [DISTWIDTH] IN BLOOD BY AUTOMATED COUNT: 13.3 %
GLOBULIN PLAS-MCNC: 3.6 G/DL (ref 2.8–4.4)
GLUCOSE BLD-MCNC: 108 MG/DL (ref 70–99)
GLUCOSE BLD-MCNC: 129 MG/DL (ref 70–99)
GLUCOSE BLD-MCNC: 142 MG/DL (ref 70–99)
GLUCOSE BLD-MCNC: 142 MG/DL (ref 70–99)
GLUCOSE BLD-MCNC: 151 MG/DL (ref 70–99)
HAV IGM SER QL: 2.7 MG/DL (ref 1.6–2.6)
HCT VFR BLD AUTO: 35.6 %
HGB BLD-MCNC: 11 G/DL
IMM GRANULOCYTES # BLD AUTO: 0.01 X10(3) UL (ref 0–1)
IMM GRANULOCYTES NFR BLD: 0.2 %
LYMPHOCYTES # BLD AUTO: 1.02 X10(3) UL (ref 1–4)
LYMPHOCYTES NFR BLD AUTO: 16.6 %
M PROTEIN MFR SERPL ELPH: 6.6 G/DL (ref 6.4–8.2)
MCH RBC QN AUTO: 28.5 PG (ref 26–34)
MCHC RBC AUTO-ENTMCNC: 30.9 G/DL (ref 31–37)
MCV RBC AUTO: 92.2 FL
MONOCYTES # BLD AUTO: 0.45 X10(3) UL (ref 0.1–1)
MONOCYTES NFR BLD AUTO: 7.3 %
NEUTROPHILS # BLD AUTO: 4.31 X10 (3) UL (ref 1.5–7.7)
NEUTROPHILS # BLD AUTO: 4.31 X10(3) UL (ref 1.5–7.7)
NEUTROPHILS NFR BLD AUTO: 70 %
OSMOLALITY SERPL CALC.SUM OF ELEC: 310 MOSM/KG (ref 275–295)
OSMOLALITY SERPL CALC.SUM OF ELEC: 310 MOSM/KG (ref 275–295)
PHOSPHATE SERPL-MCNC: 4.8 MG/DL (ref 2.5–4.9)
PLATELET # BLD AUTO: 145 10(3)UL (ref 150–450)
POTASSIUM SERPL-SCNC: 3.8 MMOL/L (ref 3.5–5.1)
POTASSIUM SERPL-SCNC: 3.8 MMOL/L (ref 3.5–5.1)
RBC # BLD AUTO: 3.86 X10(6)UL
SODIUM SERPL-SCNC: 142 MMOL/L (ref 136–145)
SODIUM SERPL-SCNC: 142 MMOL/L (ref 136–145)
WBC # BLD AUTO: 6.2 X10(3) UL (ref 4–11)

## 2021-07-31 PROCEDURE — 99233 SBSQ HOSP IP/OBS HIGH 50: CPT | Performed by: OTHER

## 2021-07-31 PROCEDURE — 99232 SBSQ HOSP IP/OBS MODERATE 35: CPT | Performed by: INTERNAL MEDICINE

## 2021-07-31 RX ORDER — HYDRALAZINE HYDROCHLORIDE 20 MG/ML
10 INJECTION INTRAMUSCULAR; INTRAVENOUS EVERY 6 HOURS PRN
Status: DISCONTINUED | OUTPATIENT
Start: 2021-07-31 | End: 2021-07-31

## 2021-07-31 NOTE — PLAN OF CARE
Pt is aox4. States no pain. Insulin pump. Accuchecks completed, no insulin required. 2100 reading was 132, pt states this is low for him so provided him with francisca puga. BP at 2000 191/80 so gave scheduled hydralazine early.  Hour later BP down to sodium.  Initiate appropriate interventions as ordered  Outcome: Progressing

## 2021-07-31 NOTE — PLAN OF CARE
Assumed pt care at 0730. Daily weight. Insulin pump. Carb control diet, 1800. Hard of hearing, bilateral hearing aides. Pt drowsy and irritated, able to arouse. On RA. PRANEETH, CPAP at night. NSR tele. SBP elevated, meds given per MAR. Refused SCD.  Up with wal INTERVENTIONS  - Assess for and report changes in neurological status  - Initiate measures to prevent increased intracranial pressure  - Maintain blood pressure and fluid volume within ordered parameters to optimize cerebral perfusion and minimize risk of

## 2021-07-31 NOTE — PLAN OF CARE
NURSING DISCHARGE NOTE    Discharged Home via Wheelchair. Accompanied by Support staff  Belongings Taken by patient/family. Pt given discharge paperwork. Verbalized understanding. All questions answered. Taken by transport to [facility] entrance.

## 2021-07-31 NOTE — PHYSICAL THERAPY NOTE
PHYSICAL THERAPY QUICK EVALUATION - INPATIENT    Room Number: 2948/7520-R  Evaluation Date: 7/31/2021  Presenting Problem: Hypotension,dysphagia  Physician Order: PT Eval and Treat     Admitted with Dysphagia with Candida esophagitis diagnosed last week. in 2013-pt had pneumonia, sepsis, dialysis, peg tube, vent, now resolved.    • Polyneuropathy in diabetes(357.2)    • SLEEP APNEA     Uses his CPAP machine consistently   • Sleep apnea, obstructive SPLIT NIGHT 5-12-17    AHI 54 SaO2 lizeth 74 % CPAP 12 HME// [facility]   • M-SEDAJ BY [employer] PHYS [id number] .S. [facility] 59  N S[facility] 5+ YR N/A 5/25/2016    Procedure: LUMBAR EPIDURAL;  Surgeon: Sophie Morgan MD;  Location: [facility] [address]   • M-SEDAJ BY [employer] PHYS [id number] .[facility] 59  N SVC 5+ YR N/A 6/8/2016 FOR IV ANTIBIOTIC SURGICAL SITE INFECTION PROPHYLAXIS.  N/A 5/4/2016    Procedure: LUMBAR EPIDURAL;  Surgeon: Judy Wisdom MD;  Location: [facility]   • PATIENT [address] FOR IV ANTIBIOTIC SURGICAL SITE INFECTION PRO Turning over in bed (including adjusting bedclothes, sheets and blankets)?: None   -   Sitting down on and standing up from a chair with arms (e.g., wheelchair, bedside commode, etc.): None   -   Moving from lying on back to sitting on the side of the bed? evaluation complexity is considered low. PT Discharge Recommendations: Home    PLAN  Patient has been evaluated and presents with no skilled Physical Therapy needs at this time. Patient discharged from Physical Therapy services.   Please re-order if a new

## 2021-07-31 NOTE — PROGRESS NOTES
[facility]  Progress Note    [address] Patient Status:  Inpatient    1950 MRN [id number]   [facility] 3NE-A Attending Caleb Baca MD   [facility] Day # 2 PCP Myra Cooper MD       Feels well; no complains  Denies an kg/m²   Temp (24hrs), Av °F (36.7 °C), Min:97.8 °F (36.6 °C), Max:98.3 °F (36.8 °C)       Intake/Output Summary (Last 24 hours) at 2021 1446  Last data filed at 2021 1003  Gross per 24 hour   Intake 380 ml   Output —   Net 380 ml     Wt Readi

## 2021-07-31 NOTE — PROGRESS NOTES
[address]  7/31/2021   1:28 PM    Patient seen and examined. Problems:   Followed for myoclonic jerking that has resolved    Developments the past 24 hours:  Had BP elevation and was briefly confused  BP lowering done and 11*   CA 8.6 8.4* 8.7  8.7   ALB 3.2* 3.1* 3.0*  3.0*    142 142  142   K 4.4 3.8 3.8  3.8    108 109  109   CO2 30.0 28.0 29.0  29.0   ALKPHO 57  --  64   AST 40*  --  31   ALT 36  --  33   BILT 0.4  --  0.4   TP 6.7  --  6.6         Discussio

## 2021-07-31 NOTE — PROGRESS NOTES
[facility] Hospitalist Progress Note                                                                   [address]  12/27/1950    SUBJECTIVE: No chest pain, palpitations, shortness of joe QID   • Isosorbide Mononitrate ER  30 mg Oral Daily   • Heparin Sodium (Porcine)  5,000 Units Subcutaneous Q8H [address]   • aspirin  81 mg Oral Daily   • DULoxetine  120 mg Oral Nightly   • ezetimibe  10 mg Oral Daily   • famoTIDine  20 mg Oral Daily   • Levoth 1 DM  -continue insulin pump     Candida Esophagitis  -pt with interaction on fluconazole causing hypotension  -fluconazole currently stopped per renal rec   -GI consulted for rec on another treatment plan if possible--> trial of nystatin s/s 500,000 units

## 2021-08-03 ENCOUNTER — TELEPHONE (OUTPATIENT)
Dept: NEPHROLOGY | Facility: CLINIC | Age: 71
End: 2021-08-03

## 2021-08-03 NOTE — TELEPHONE ENCOUNTER
Pt called inquiring when you would like him to do his labs next? He states he was supposed to have labs done this week per his normal monthly schedule but due to the hospitalization he had them done last on 07/31/21.     C/b#   Telephone Information:   Vic

## 2021-08-04 NOTE — TELEPHONE ENCOUNTER
D/W pt- recheck labs later this week; BPs fine now 130s per pt off fluconazole; GI decided to use nystatin x 14 days for candida esophagitis- vera acosta

## 2021-08-05 PROBLEM — D69.6 PLATELETS DECREASED (HCC): Status: ACTIVE | Noted: 2021-08-05

## 2021-08-05 PROBLEM — D69.6 PLATELETS DECREASED: Status: ACTIVE | Noted: 2021-08-05

## 2021-08-05 NOTE — DISCHARGE SUMMARY
[address] Patient Status:  Inpatient    1950 MRN [id number]   [facility] 3NE-A Attending No att. providers found   Hosp Day # 2 PCP Pricilla Rowe MD     Date of Admission:  [id number] UNIT/ML Susp  Commonly known as: MYCOSTATIN      Take 5 mL (500,000 Units total) by mouth 4 (four) times daily for 13 days.    Stop taking on: August 13, 2021  Quantity: 260 mL  Refills: 0        CONTINUE taking these medications      Instructions Pr hypoglycemia   Quantity: 1 each  Refills: 1     HumaLOG 100 UNIT/ML Soln  Generic drug: Insulin Lispro      INJECT 50 UNITS PER PUMP ONCE A DAY. E10.9, type 1 DM on insulin pump.  LOV 09/18/2020   Quantity: 40 mL  Refills: 0     hydrALAZINE HCl 100 MG Tabs [facility] [address] [place] [address] [phone], [phone] [address], [address] [facility]    Phone: [phone]   · nystatin [id number] UNIT/ML Susp         Follow-up appointment:   Kiara Moran, [address]  [address] [facility] and comes out at the entrance to [employer]. 8/31/2021  3:00 PM  FOLLOW UP [16] 20 min Gastroenterology - [address], [place] Hannah Joseph MD    Patient Instructions:      For the safety of our patients, discharge today      Slurred Speech  Grasp difficulties   -resolved on admission  -CT brain neg for acute pathology   -neurology --> TME, no further eval as resolved  -likely secondary to low blood pressure and now that blood pressure has improved the keren

## 2021-08-11 ENCOUNTER — LAB ENCOUNTER (OUTPATIENT)
Dept: LAB | Age: 71
End: 2021-08-11
Attending: INTERNAL MEDICINE
Payer: MEDICARE

## 2021-08-11 DIAGNOSIS — N18.4 CKD (CHRONIC KIDNEY DISEASE) STAGE 4, GFR 15-29 ML/MIN (HCC): ICD-10-CM

## 2021-08-11 DIAGNOSIS — E11.21 DIABETIC NEPHROPATHY ASSOCIATED WITH TYPE 2 DIABETES MELLITUS (HCC): ICD-10-CM

## 2021-08-11 DIAGNOSIS — I10 ESSENTIAL HYPERTENSION: ICD-10-CM

## 2021-08-11 LAB
ANION GAP SERPL CALC-SCNC: 4 MMOL/L (ref 0–18)
BASOPHILS # BLD AUTO: 0.08 X10(3) UL (ref 0–0.2)
BASOPHILS NFR BLD AUTO: 1.1 %
BUN BLD-MCNC: 57 MG/DL (ref 7–18)
CALCIUM BLD-MCNC: 8.8 MG/DL (ref 8.5–10.1)
CHLORIDE SERPL-SCNC: 103 MMOL/L (ref 98–112)
CO2 SERPL-SCNC: 30 MMOL/L (ref 21–32)
CREAT BLD-MCNC: 5.35 MG/DL
EOSINOPHIL # BLD AUTO: 0.32 X10(3) UL (ref 0–0.7)
EOSINOPHIL NFR BLD AUTO: 4.5 %
ERYTHROCYTE [DISTWIDTH] IN BLOOD BY AUTOMATED COUNT: 13.3 %
GLUCOSE BLD-MCNC: 162 MG/DL (ref 70–99)
HCT VFR BLD AUTO: 34.4 %
HGB BLD-MCNC: 10.7 G/DL
IMM GRANULOCYTES # BLD AUTO: 0.03 X10(3) UL (ref 0–1)
IMM GRANULOCYTES NFR BLD: 0.4 %
LYMPHOCYTES # BLD AUTO: 1.56 X10(3) UL (ref 1–4)
LYMPHOCYTES NFR BLD AUTO: 22.1 %
MCH RBC QN AUTO: 28.8 PG (ref 26–34)
MCHC RBC AUTO-ENTMCNC: 31.1 G/DL (ref 31–37)
MCV RBC AUTO: 92.5 FL
MONOCYTES # BLD AUTO: 0.55 X10(3) UL (ref 0.1–1)
MONOCYTES NFR BLD AUTO: 7.8 %
NEUTROPHILS # BLD AUTO: 4.53 X10 (3) UL (ref 1.5–7.7)
NEUTROPHILS # BLD AUTO: 4.53 X10(3) UL (ref 1.5–7.7)
NEUTROPHILS NFR BLD AUTO: 64.1 %
OSMOLALITY SERPL CALC.SUM OF ELEC: 303 MOSM/KG (ref 275–295)
PATIENT FASTING Y/N/NP: NO
PHOSPHATE SERPL-MCNC: 5.5 MG/DL (ref 2.5–4.9)
PLATELET # BLD AUTO: 205 10(3)UL (ref 150–450)
POTASSIUM SERPL-SCNC: 4.4 MMOL/L (ref 3.5–5.1)
RBC # BLD AUTO: 3.72 X10(6)UL
SODIUM SERPL-SCNC: 137 MMOL/L (ref 136–145)
WBC # BLD AUTO: 7.1 X10(3) UL (ref 4–11)

## 2021-08-11 PROCEDURE — 85025 COMPLETE CBC W/AUTO DIFF WBC: CPT

## 2021-08-11 PROCEDURE — 84100 ASSAY OF PHOSPHORUS: CPT

## 2021-08-11 PROCEDURE — 80048 BASIC METABOLIC PNL TOTAL CA: CPT

## 2021-08-11 PROCEDURE — 36415 COLL VENOUS BLD VENIPUNCTURE: CPT

## 2021-08-12 ENCOUNTER — TELEPHONE (OUTPATIENT)
Dept: NEPHROLOGY | Facility: CLINIC | Age: 71
End: 2021-08-12

## 2021-08-12 DIAGNOSIS — R80.9 PROTEINURIA, UNSPECIFIED TYPE: ICD-10-CM

## 2021-08-12 DIAGNOSIS — N18.4 CKD (CHRONIC KIDNEY DISEASE) STAGE 4, GFR 15-29 ML/MIN (HCC): Primary | ICD-10-CM

## 2021-08-12 DIAGNOSIS — D63.1 ANEMIA DUE TO STAGE 4 CHRONIC KIDNEY DISEASE (HCC): ICD-10-CM

## 2021-08-12 DIAGNOSIS — N18.4 ANEMIA DUE TO STAGE 4 CHRONIC KIDNEY DISEASE (HCC): ICD-10-CM

## 2021-08-18 ENCOUNTER — TELEPHONE (OUTPATIENT)
Dept: NEPHROLOGY | Facility: CLINIC | Age: 71
End: 2021-08-18

## 2021-08-18 RX ORDER — FUROSEMIDE 40 MG/1
40 TABLET ORAL 2 TIMES DAILY
Qty: 180 TABLET | Refills: 3 | Status: SHIPPED | OUTPATIENT
Start: 2021-08-18

## 2021-08-30 RX ORDER — NIFEDIPINE 90 MG/1
90 TABLET, FILM COATED, EXTENDED RELEASE ORAL 2 TIMES DAILY
Qty: 180 TABLET | Refills: 1 | Status: SHIPPED | OUTPATIENT
Start: 2021-08-30

## 2021-09-07 ENCOUNTER — TELEPHONE (OUTPATIENT)
Dept: NEPHROLOGY | Facility: CLINIC | Age: 71
End: 2021-09-07

## 2021-09-07 NOTE — TELEPHONE ENCOUNTER
MA spoke with Wayne Botello RN from [employer] regarding patients lab work. SHANKAR Lyman called to confirm if labs needed to be done this week. Per , pt is to have labs done 1 week before scheduled appointment which is 12/13/21.  SHANKAR Lyman also men

## 2021-09-14 ENCOUNTER — OFFICE VISIT (OUTPATIENT)
Dept: NEUROLOGY | Facility: CLINIC | Age: 71
End: 2021-09-14
Payer: MEDICARE

## 2021-09-14 VITALS — SYSTOLIC BLOOD PRESSURE: 122 MMHG | RESPIRATION RATE: 16 BRPM | DIASTOLIC BLOOD PRESSURE: 76 MMHG | HEART RATE: 70 BPM

## 2021-09-14 DIAGNOSIS — R25.2 JERKING MOVEMENTS OF EXTREMITIES: Primary | ICD-10-CM

## 2021-09-14 PROCEDURE — 99215 OFFICE O/P EST HI 40 MIN: CPT | Performed by: OTHER

## 2021-09-14 RX ORDER — DIVALPROEX SODIUM 250 MG/1
500 TABLET, EXTENDED RELEASE ORAL DAILY
Qty: 60 TABLET | Refills: 3 | Status: SHIPPED | OUTPATIENT
Start: 2021-09-14 | End: 2021-09-27

## 2021-09-21 ENCOUNTER — PATIENT MESSAGE (OUTPATIENT)
Dept: NEUROLOGY | Facility: CLINIC | Age: 71
End: 2021-09-21

## 2021-09-27 NOTE — TELEPHONE ENCOUNTER
From: Judith Boyer  To: Lisa Garza MD  Sent: 9/21/2021 10:18 AM CDT  Subject: [employer]    Dr. Matthew Kim,    Thank you for responding. I didn’t feel as bad yesterday after taking the medication so will give it a few more days to see how it works out.

## 2021-09-27 NOTE — TELEPHONE ENCOUNTER
To: MAHOGANY AGUDELO NURSE      From: Caroline Parada      Created: 9/26/2021 9:37 PM          Dr. Iraida Herzog,     I stopped taking Divalproex several days ago because of feeling worse. Today is the first day that I feel better. I am not stuttering.   My hands/a

## 2021-10-01 ENCOUNTER — PATIENT MESSAGE (OUTPATIENT)
Dept: NEUROLOGY | Facility: CLINIC | Age: 71
End: 2021-10-01

## 2021-10-01 NOTE — TELEPHONE ENCOUNTER
From: Inderjit Shah  Sent: 10/1/2021 1:12 PM CDT  To: Reyes Soliman Nurse  Subject: Argentina Neal, can the doctor prescribe something for me since I can not have the open MRI because of my Nevro pain stimulator implant in my back.  I am very   [place]

## 2021-10-04 RX ORDER — DIAZEPAM 5 MG/1
5 TABLET ORAL DAILY PRN
Qty: 2 TABLET | Refills: 0 | Status: SHIPPED
Start: 2021-10-04 | End: 2021-11-10

## 2021-10-05 NOTE — TELEPHONE ENCOUNTER
patient indicates that pharmacy never received script. It looks as thought faxed failed. Can this medication be refaxed or called in to pharmacy?

## 2021-10-11 ENCOUNTER — HOSPITAL ENCOUNTER (OUTPATIENT)
Dept: MRI IMAGING | Facility: HOSPITAL | Age: 71
Discharge: HOME | End: 2021-10-11
Attending: Other
Payer: MEDICARE

## 2021-10-11 DIAGNOSIS — R25.2 JERKING MOVEMENTS OF EXTREMITIES: ICD-10-CM

## 2021-10-11 PROCEDURE — 70551 MRI BRAIN STEM W/O DYE: CPT | Performed by: OTHER

## 2021-10-18 ENCOUNTER — NURSE ONLY (OUTPATIENT)
Dept: ELECTROPHYSIOLOGY | Facility: HOSPITAL | Age: 71
End: 2021-10-18
Attending: Other
Payer: MEDICARE

## 2021-10-18 DIAGNOSIS — R25.2 JERKING MOVEMENTS OF EXTREMITIES: ICD-10-CM

## 2021-10-18 PROCEDURE — 95816 EEG AWAKE AND DROWSY: CPT | Performed by: OTHER

## 2021-10-18 NOTE — PROCEDURES
Date of Procedure: 10/18/2021    Procedure: EEG (ELECTROENCEPHALOGRAM)     DX:JERKING MVMT OF EXTREMITIES  HX: A 70YR OLD MALE PRESENTS WITH C/O TWITCHING/JERKING OF BOTH HANDS PREDOMINATELY ON R HAND.  PER PT WIFE WHEN TWITCHING/JERKING OCCURS PT STUTTERS, 80.7

## 2021-10-29 PROCEDURE — 88342 IMHCHEM/IMCYTCHM 1ST ANTB: CPT | Performed by: INTERNAL MEDICINE

## 2021-10-29 PROCEDURE — 88312 SPECIAL STAINS GROUP 1: CPT | Performed by: INTERNAL MEDICINE

## 2021-10-29 PROCEDURE — 88321 CONSLTJ&REPRT SLD PREP ELSWR: CPT | Performed by: INTERNAL MEDICINE

## 2021-10-29 PROCEDURE — 88305 TISSUE EXAM BY PATHOLOGIST: CPT | Performed by: INTERNAL MEDICINE

## 2021-11-10 ENCOUNTER — OFFICE VISIT (OUTPATIENT)
Dept: NEUROLOGY | Facility: CLINIC | Age: 71
End: 2021-11-10
Payer: MEDICARE

## 2021-11-10 VITALS
SYSTOLIC BLOOD PRESSURE: 90 MMHG | HEIGHT: 66 IN | DIASTOLIC BLOOD PRESSURE: 40 MMHG | WEIGHT: 175 LBS | RESPIRATION RATE: 18 BRPM | BODY MASS INDEX: 28.13 KG/M2 | HEART RATE: 58 BPM

## 2021-11-10 DIAGNOSIS — R25.2 JERKING MOVEMENTS OF EXTREMITIES: Primary | ICD-10-CM

## 2021-11-10 PROCEDURE — 99215 OFFICE O/P EST HI 40 MIN: CPT | Performed by: OTHER

## 2021-11-10 RX ORDER — CLONAZEPAM 0.25 MG/1
0.25 TABLET, ORALLY DISINTEGRATING ORAL 2 TIMES DAILY PRN
Qty: 20 TABLET | Refills: 0 | Status: SHIPPED | OUTPATIENT
Start: 2021-11-10

## 2021-11-10 NOTE — PROGRESS NOTES
LOV 9/14/21 Jerking movements of extremities f/u- Patient states he has good days & bad days with the jerking of extremities. Patient states the stuttering is less severe.

## 2021-11-10 NOTE — PROGRESS NOTES
[facility] Neurology Outpatient Progress Note  Date of service: 11/10/2021    Patient here for a follow-up visit for speech difficulties, dropping items and jerking in arms, his memory is poor. Wife is in the room with pt.   Pt states arms jerking most bothersome tablet, Rfl: 1  •  NORTRIPTYLINE HCL 75 MG Oral Cap, Take 1 capsule (75 mg total) by mouth nightly. , Disp: 90 capsule, Rfl: 0  •  furosemide 40 MG Oral Tab, Take 1 tablet (40 mg total) by mouth 2 (two) times daily. , Disp: 180 tablet, Rfl: 3  •  DULOXETINE DIZZINESS  Wellbutrin [Bupropi*    RASH  Fluconazole             OTHER (SEE COMMENTS)    Comment:Kidney labs abnormal  Past Medical History:   Diagnosis Date   • ANXIETY    • BACK PAIN    • Back problem    • Blastomycosis 1984   • BPH (benign prostatic hyp possible uninstrumented fusion 9/8/16   • BACK SURGERY  09/10/2018    Rev C3-C7 ACDF    • CATARACT Bilateral done in 2004    IOL's   • COLONOSCOPY      2006   • COLONOSCOPY  2/2009    normal   • COLONOSCOPY N/A 8/23/2019    adenoma- repeat 1 yr (2 dya prep repair   • OTHER SURGICAL HISTORY      Laser surgery for bilateral retinopathy   • OTHER SURGICAL HISTORY      Surgery to left eye for \"weak muscle. \" 2007   • OTHER SURGICAL HISTORY      bilat knee repair 9/23/10   • OTHER SURGICAL HISTORY  11/2013    nilay 2013   • UPPER GI ENDOSCOPY,DIAGNOSIS  4/14/16    retained gastric contents; Edward   • UPPER GI ENDOSCOPY,REMOV F.B.  N/A 2/14/2014    Procedure: ESOPHAGOGASTRODUODENOSCOPY, POSSIBLE BIOPSY, POSSIBLE POLYPECTOMY 59884;  Surgeon: Jay Wallace MD;  Acacia entrapment neuropathy; severe     Plan:  EEG, MRI brain reviewed with pt  No depakote   Clonazepam trial prn ordered, discussed in detail re; benefits/side effects, limit use if possible  Ok to cont neurontin  Nephro, PCP follow up  Fall precaution advised

## 2021-12-02 PROBLEM — E11.8 DIABETIC COMPLICATION (HCC): Status: ACTIVE | Noted: 2021-12-02

## 2021-12-09 ENCOUNTER — LAB REQUISITION (OUTPATIENT)
Dept: LAB | Facility: HOSPITAL | Age: 71
End: 2021-12-09
Payer: MEDICARE

## 2021-12-09 DIAGNOSIS — N18.4 CHRONIC KIDNEY DISEASE, STAGE 4 (SEVERE) (HCC): ICD-10-CM

## 2021-12-09 PROCEDURE — 80048 BASIC METABOLIC PNL TOTAL CA: CPT | Performed by: INTERNAL MEDICINE

## 2021-12-09 PROCEDURE — 85025 COMPLETE CBC W/AUTO DIFF WBC: CPT | Performed by: INTERNAL MEDICINE

## 2021-12-10 ENCOUNTER — TELEPHONE (OUTPATIENT)
Dept: NEUROLOGY | Facility: CLINIC | Age: 71
End: 2021-12-10

## 2021-12-10 NOTE — TELEPHONE ENCOUNTER
rec'd req for DME from [facility] dated 12/9/21; faxed to [address] for provider signature

## 2021-12-13 ENCOUNTER — OFFICE VISIT (OUTPATIENT)
Dept: NEPHROLOGY | Facility: CLINIC | Age: 71
End: 2021-12-13
Payer: MEDICARE

## 2021-12-13 DIAGNOSIS — E10.21 DIABETIC NEPHROPATHY ASSOCIATED WITH TYPE 1 DIABETES MELLITUS (HCC): ICD-10-CM

## 2021-12-13 DIAGNOSIS — D63.1 ANEMIA DUE TO STAGE 4 CHRONIC KIDNEY DISEASE (HCC): ICD-10-CM

## 2021-12-13 DIAGNOSIS — N18.4 CKD (CHRONIC KIDNEY DISEASE) STAGE 4, GFR 15-29 ML/MIN (HCC): Primary | ICD-10-CM

## 2021-12-13 DIAGNOSIS — N18.4 ANEMIA DUE TO STAGE 4 CHRONIC KIDNEY DISEASE (HCC): ICD-10-CM

## 2021-12-13 DIAGNOSIS — I10 PRIMARY HYPERTENSION: ICD-10-CM

## 2021-12-13 PROCEDURE — 99215 OFFICE O/P EST HI 40 MIN: CPT | Performed by: INTERNAL MEDICINE

## 2021-12-13 NOTE — PROGRESS NOTES
Nephrology Progress Note      ASSESSMENT/PLAN:      1) CKD 4- serum creatinine approx 4.5 mg/dl  is due to long-standing type 1 diabetes since age 14 with sub-nephrotic range proteinuria; has been doing well due to reasonable BP and DM management.   Previou occassion   • Hearing impairment     hearing aids   • HEMORRHOIDS    • HIGH BLOOD PRESSURE    • High cholesterol    • History of blood transfusion 2013   • Hospitalization or health care facility admission within last 6 months     this was in 2013   • HYPE INJECTION, W/WO CONTRAST, DX/THERAPEUTIC SUBSTANCE, EPIDURAL/SUBARACHNOID; LUMBAR/SACRAL N/A 5/4/2016    Procedure: LUMBAR EPIDURAL;  Surgeon: Nickie Gonzalez MD;  [facility]: [facility]   • INJECTION, W/WO CONTRAST, DX/THERAPE[employer]IC [employer]   • PATIENT DOCUMENTED NOT TO HAVE EXPERIENCED ANY OF THE FOLLOWING EVENTS N/A 5/4/2016    Procedure: LUMBAR EPIDURAL;  Surgeon: Kenneth Tovar MD;  Location: [facility]   • PATIENT DOCUMENTED NOT TO HAVE EXPERIENCED ANY OF THE FOL • Heart Disorder Brother         CAD with MI at age 48 - passed away   • Obesity Brother    • Diabetes Brother         Type 2 DM   • Hypertension Brother    • Lipids Brother    • Thyroid Disorder Neg       Social History: Social History    Tobacco Use TABLET BY MOUTH ONE TIME DAILY  90 tablet 1   • furosemide 40 MG Oral Tab Take 1 tablet (40 mg total) by mouth 2 (two) times daily. 180 tablet 3   • DULOXETINE 60 MG Oral Cap DR Particles Take 2 capsules (120 mg total) by mouth nightly.   180 capsule 3   • gross hematuria  Denies LE edema  Denies skin rashes/myalgias/arthralgias      PHYSICAL EXAM:   There were no vitals taken for this visit.   Wt Readings from Last 3 Encounters:  11/15/21 : 176 lb 8 oz (80.1 kg)  11/10/21 : 175 lb (79.4 kg)  10/22/21 : 175 l

## 2021-12-28 PROBLEM — N17.9 ACUTE RENAL FAILURE SUPERIMPOSED ON CHRONIC KIDNEY DISEASE, UNSPECIFIED CKD STAGE, UNSPECIFIED ACUTE RENAL FAILURE TYPE (HCC): Status: RESOLVED | Noted: 2021-07-29 | Resolved: 2021-12-28

## 2021-12-28 PROBLEM — Z86.39 HISTORY OF HYPOGLYCEMIA: Status: RESOLVED | Noted: 2020-09-11 | Resolved: 2021-12-28

## 2021-12-28 PROBLEM — N17.9 ACUTE RENAL FAILURE SUPERIMPOSED ON CHRONIC KIDNEY DISEASE: Status: RESOLVED | Noted: 2021-07-29 | Resolved: 2021-12-28

## 2021-12-28 PROBLEM — R53.1 WEAKNESS: Status: RESOLVED | Noted: 2021-07-29 | Resolved: 2021-12-28

## 2021-12-28 PROBLEM — D64.9 ANEMIA: Status: RESOLVED | Noted: 2021-07-29 | Resolved: 2021-12-28

## 2021-12-28 PROBLEM — N18.9 ACUTE RENAL FAILURE SUPERIMPOSED ON CHRONIC KIDNEY DISEASE, UNSPECIFIED CKD STAGE, UNSPECIFIED ACUTE RENAL FAILURE TYPE (HCC): Status: RESOLVED | Noted: 2021-07-29 | Resolved: 2021-12-28

## 2021-12-28 PROBLEM — H93.13 BILATERAL TINNITUS: Status: RESOLVED | Noted: 2017-11-27 | Resolved: 2021-12-28

## 2021-12-28 PROBLEM — N18.9 ACUTE RENAL FAILURE SUPERIMPOSED ON CHRONIC KIDNEY DISEASE (HCC): Status: RESOLVED | Noted: 2021-07-29 | Resolved: 2021-12-28

## 2021-12-28 PROBLEM — N18.9 ACUTE RENAL FAILURE SUPERIMPOSED ON CHRONIC KIDNEY DISEASE  (HCC): Status: RESOLVED | Noted: 2021-07-29 | Resolved: 2021-12-28

## 2021-12-28 PROBLEM — N17.9 ACUTE RENAL FAILURE SUPERIMPOSED ON CHRONIC KIDNEY DISEASE (HCC): Status: RESOLVED | Noted: 2021-07-29 | Resolved: 2021-12-28

## 2021-12-28 PROBLEM — D64.9 ANEMIA, UNSPECIFIED TYPE: Status: RESOLVED | Noted: 2021-07-29 | Resolved: 2021-12-28

## 2021-12-28 PROBLEM — N17.9 ACUTE RENAL FAILURE SUPERIMPOSED ON CHRONIC KIDNEY DISEASE, UNSPECIFIED CKD STAGE, UNSPECIFIED ACUTE RENAL FAILURE TYPE: Status: RESOLVED | Noted: 2021-07-29 | Resolved: 2021-12-28

## 2021-12-28 PROBLEM — N18.9 ACUTE RENAL FAILURE SUPERIMPOSED ON CHRONIC KIDNEY DISEASE, UNSPECIFIED CKD STAGE, UNSPECIFIED ACUTE RENAL FAILURE TYPE: Status: RESOLVED | Noted: 2021-07-29 | Resolved: 2021-12-28

## 2021-12-28 PROBLEM — R47.81 SLURRED SPEECH: Status: RESOLVED | Noted: 2021-07-29 | Resolved: 2021-12-28

## 2021-12-28 PROBLEM — N18.9 ACUTE RENAL FAILURE SUPERIMPOSED ON CHRONIC KIDNEY DISEASE: Status: RESOLVED | Noted: 2021-07-29 | Resolved: 2021-12-28

## 2021-12-28 PROBLEM — N17.9 ACUTE RENAL FAILURE SUPERIMPOSED ON CHRONIC KIDNEY DISEASE  (HCC): Status: RESOLVED | Noted: 2021-07-29 | Resolved: 2021-12-28

## 2022-01-10 ENCOUNTER — LAB REQUISITION (OUTPATIENT)
Dept: LAB | Age: 72
End: 2022-01-10
Payer: MEDICARE

## 2022-01-10 DIAGNOSIS — I12.9 HYPERTENSIVE CHRONIC KIDNEY DISEASE WITH STAGE 1 THROUGH STAGE 4 CHRONIC KIDNEY DISEASE, OR UNSPECIFIED CHRONIC KIDNEY DISEASE: ICD-10-CM

## 2022-01-10 LAB
ALBUMIN SERPL-MCNC: 3.1 G/DL (ref 3.4–5)
ALBUMIN/GLOB SERPL: 1 {RATIO} (ref 1–2)
ALP LIVER SERPL-CCNC: 98 U/L
ALT SERPL-CCNC: 84 U/L
ANION GAP SERPL CALC-SCNC: 8 MMOL/L (ref 0–18)
AST SERPL-CCNC: 64 U/L (ref 15–37)
BILIRUB SERPL-MCNC: 0.4 MG/DL (ref 0.1–2)
BUN BLD-MCNC: 51 MG/DL (ref 7–18)
CALCIUM BLD-MCNC: 8.7 MG/DL (ref 8.5–10.1)
CHLORIDE SERPL-SCNC: 105 MMOL/L (ref 98–112)
CHOLEST SERPL-MCNC: 136 MG/DL (ref ?–200)
CO2 SERPL-SCNC: 30 MMOL/L (ref 21–32)
CREAT BLD-MCNC: 4.61 MG/DL
ERYTHROCYTE [DISTWIDTH] IN BLOOD BY AUTOMATED COUNT: 12.6 %
GLOBULIN PLAS-MCNC: 3.1 G/DL (ref 2.8–4.4)
GLUCOSE BLD-MCNC: 141 MG/DL (ref 70–99)
HCT VFR BLD AUTO: 35.6 %
HDLC SERPL-MCNC: 59 MG/DL (ref 40–59)
HGB BLD-MCNC: 10.6 G/DL
LDLC SERPL CALC-MCNC: 62 MG/DL (ref ?–100)
MCH RBC QN AUTO: 29.2 PG (ref 26–34)
MCHC RBC AUTO-ENTMCNC: 29.8 G/DL (ref 31–37)
MCV RBC AUTO: 98.1 FL
NONHDLC SERPL-MCNC: 77 MG/DL (ref ?–130)
OSMOLALITY SERPL CALC.SUM OF ELEC: 312 MOSM/KG (ref 275–295)
PLATELET # BLD AUTO: 179 10(3)UL (ref 150–450)
POTASSIUM SERPL-SCNC: 3.7 MMOL/L (ref 3.5–5.1)
PROT SERPL-MCNC: 6.2 G/DL (ref 6.4–8.2)
RBC # BLD AUTO: 3.63 X10(6)UL
SODIUM SERPL-SCNC: 143 MMOL/L (ref 136–145)
TRIGL SERPL-MCNC: 79 MG/DL (ref 30–149)
VLDLC SERPL CALC-MCNC: 12 MG/DL (ref 0–30)
WBC # BLD AUTO: 6.2 X10(3) UL (ref 4–11)

## 2022-01-10 PROCEDURE — 80053 COMPREHEN METABOLIC PANEL: CPT | Performed by: INTERNAL MEDICINE

## 2022-01-10 PROCEDURE — 80061 LIPID PANEL: CPT | Performed by: INTERNAL MEDICINE

## 2022-01-10 PROCEDURE — 85027 COMPLETE CBC AUTOMATED: CPT | Performed by: INTERNAL MEDICINE

## 2022-01-14 ENCOUNTER — TELEPHONE (OUTPATIENT)
Dept: NEPHROLOGY | Facility: CLINIC | Age: 72
End: 2022-01-14

## 2022-02-28 RX ORDER — NIFEDIPINE 90 MG/1
90 TABLET, FILM COATED, EXTENDED RELEASE ORAL 2 TIMES DAILY
Qty: 180 TABLET | Refills: 1 | Status: SHIPPED | OUTPATIENT
Start: 2022-02-28

## 2022-03-31 ENCOUNTER — LAB REQUISITION (OUTPATIENT)
Dept: LAB | Age: 72
End: 2022-03-31
Payer: MEDICARE

## 2022-03-31 LAB
ANION GAP SERPL CALC-SCNC: 6 MMOL/L (ref 0–18)
BASOPHILS # BLD AUTO: 0.07 X10(3) UL (ref 0–0.2)
BASOPHILS NFR BLD AUTO: 1.2 %
BUN BLD-MCNC: 54 MG/DL (ref 7–18)
CALCIUM BLD-MCNC: 8.6 MG/DL (ref 8.5–10.1)
CHLORIDE SERPL-SCNC: 105 MMOL/L (ref 98–112)
CO2 SERPL-SCNC: 30 MMOL/L (ref 21–32)
CREAT BLD-MCNC: 5.05 MG/DL
EOSINOPHIL # BLD AUTO: 0.35 X10(3) UL (ref 0–0.7)
EOSINOPHIL NFR BLD AUTO: 5.9 %
ERYTHROCYTE [DISTWIDTH] IN BLOOD BY AUTOMATED COUNT: 13.9 %
GLUCOSE BLD-MCNC: 111 MG/DL (ref 70–99)
HCT VFR BLD AUTO: 32.3 %
HGB BLD-MCNC: 9.5 G/DL
IMM GRANULOCYTES # BLD AUTO: 0.04 X10(3) UL (ref 0–1)
IMM GRANULOCYTES NFR BLD: 0.7 %
LYMPHOCYTES # BLD AUTO: 1.15 X10(3) UL (ref 1–4)
LYMPHOCYTES NFR BLD AUTO: 19.4 %
MCH RBC QN AUTO: 28.7 PG (ref 26–34)
MCHC RBC AUTO-ENTMCNC: 29.4 G/DL (ref 31–37)
MCV RBC AUTO: 97.6 FL
MONOCYTES # BLD AUTO: 0.58 X10(3) UL (ref 0.1–1)
MONOCYTES NFR BLD AUTO: 9.8 %
NEUTROPHILS # BLD AUTO: 3.73 X10 (3) UL (ref 1.5–7.7)
NEUTROPHILS # BLD AUTO: 3.73 X10(3) UL (ref 1.5–7.7)
NEUTROPHILS NFR BLD AUTO: 63 %
OSMOLALITY SERPL CALC.SUM OF ELEC: 307 MOSM/KG (ref 275–295)
PLATELET # BLD AUTO: 163 10(3)UL (ref 150–450)
PLATELET # BLD AUTO: 163 10(3)UL (ref 150–450)
POTASSIUM SERPL-SCNC: 3.7 MMOL/L (ref 3.5–5.1)
RBC # BLD AUTO: 3.31 X10(6)UL
SODIUM SERPL-SCNC: 141 MMOL/L (ref 136–145)
WBC # BLD AUTO: 5.9 X10(3) UL (ref 4–11)

## 2022-03-31 PROCEDURE — 85025 COMPLETE CBC W/AUTO DIFF WBC: CPT | Performed by: INTERNAL MEDICINE

## 2022-03-31 PROCEDURE — 80048 BASIC METABOLIC PNL TOTAL CA: CPT | Performed by: INTERNAL MEDICINE

## 2022-03-31 PROCEDURE — 85049 AUTOMATED PLATELET COUNT: CPT | Performed by: INTERNAL MEDICINE

## 2022-04-25 ENCOUNTER — OFFICE VISIT (OUTPATIENT)
Dept: NEPHROLOGY | Facility: CLINIC | Age: 72
End: 2022-04-25
Payer: MEDICARE

## 2022-04-25 VITALS — SYSTOLIC BLOOD PRESSURE: 80 MMHG | WEIGHT: 173 LBS | BODY MASS INDEX: 27 KG/M2 | DIASTOLIC BLOOD PRESSURE: 34 MMHG

## 2022-04-25 DIAGNOSIS — I10 PRIMARY HYPERTENSION: ICD-10-CM

## 2022-04-25 DIAGNOSIS — N18.5 ANEMIA DUE TO STAGE 5 CHRONIC KIDNEY DISEASE, NOT ON CHRONIC DIALYSIS (HCC): ICD-10-CM

## 2022-04-25 DIAGNOSIS — N18.5 CKD (CHRONIC KIDNEY DISEASE) STAGE 5, GFR LESS THAN 15 ML/MIN (HCC): Primary | ICD-10-CM

## 2022-04-25 DIAGNOSIS — E11.21 DIABETIC NEPHROPATHY ASSOCIATED WITH TYPE 2 DIABETES MELLITUS (HCC): ICD-10-CM

## 2022-04-25 DIAGNOSIS — D63.1 ANEMIA DUE TO STAGE 5 CHRONIC KIDNEY DISEASE, NOT ON CHRONIC DIALYSIS (HCC): ICD-10-CM

## 2022-05-12 ENCOUNTER — LAB REQUISITION (OUTPATIENT)
Dept: LAB | Age: 72
End: 2022-05-12
Payer: MEDICARE

## 2022-05-12 ENCOUNTER — TELEPHONE (OUTPATIENT)
Dept: NEPHROLOGY | Facility: CLINIC | Age: 72
End: 2022-05-12

## 2022-05-12 LAB
HBV SURFACE AB SER QL: NONREACTIVE
HBV SURFACE AB SERPL IA-ACNC: <3.1 MIU/ML
HBV SURFACE AG SER-ACNC: <0.1 [IU]/L
HBV SURFACE AG SERPL QL IA: NONREACTIVE

## 2022-05-12 PROCEDURE — 86706 HEP B SURFACE ANTIBODY: CPT | Performed by: INTERNAL MEDICINE

## 2022-05-12 PROCEDURE — 87340 HEPATITIS B SURFACE AG IA: CPT | Performed by: INTERNAL MEDICINE

## 2022-05-12 NOTE — TELEPHONE ENCOUNTER
Hong Arredondo from [employer] patient access stated that they need the following paperwork in order to start treating patient.      - Negative covid test results   - Hepatitis B lab work results   - Access port information     Hong Arredondo is also looking for chest xray or PPD testing within 1 year     Hong Arredondo [phone] ext [phone]    Fax - [phone] attn Hong Arredondo

## 2022-06-01 ENCOUNTER — TELEPHONE (OUTPATIENT)
Dept: NEPHROLOGY | Facility: CLINIC | Age: 72
End: 2022-06-01

## 2022-06-01 DIAGNOSIS — D63.1 ANEMIA DUE TO STAGE 5 CHRONIC KIDNEY DISEASE, NOT ON CHRONIC DIALYSIS (HCC): ICD-10-CM

## 2022-06-01 DIAGNOSIS — N18.5 ANEMIA DUE TO STAGE 5 CHRONIC KIDNEY DISEASE, NOT ON CHRONIC DIALYSIS (HCC): ICD-10-CM

## 2022-06-01 DIAGNOSIS — N18.5 CKD (CHRONIC KIDNEY DISEASE) STAGE 5, GFR LESS THAN 15 ML/MIN (HCC): Primary | ICD-10-CM

## 2022-06-02 NOTE — TELEPHONE ENCOUNTER
D/w pt- he would like to stop HD for now after 3 week trial; will have DAC remove permcath and dc HD- to check labs weekly for now- vera acosta

## 2022-06-09 ENCOUNTER — LAB REQUISITION (OUTPATIENT)
Dept: LAB | Facility: HOSPITAL | Age: 72
End: 2022-06-09
Payer: MEDICARE

## 2022-06-09 DIAGNOSIS — I12.9 HYPERTENSIVE CHRONIC KIDNEY DISEASE WITH STAGE 1 THROUGH STAGE 4 CHRONIC KIDNEY DISEASE, OR UNSPECIFIED CHRONIC KIDNEY DISEASE: ICD-10-CM

## 2022-06-09 DIAGNOSIS — N18.4 CHRONIC KIDNEY DISEASE, STAGE 4 (SEVERE) (HCC): ICD-10-CM

## 2022-06-09 LAB
ANION GAP SERPL CALC-SCNC: 7 MMOL/L (ref 0–18)
BASOPHILS # BLD AUTO: 0.06 X10(3) UL (ref 0–0.2)
BASOPHILS NFR BLD AUTO: 1.3 %
BUN BLD-MCNC: 59 MG/DL (ref 7–18)
CALCIUM BLD-MCNC: 8.9 MG/DL (ref 8.5–10.1)
CHLORIDE SERPL-SCNC: 101 MMOL/L (ref 98–112)
CO2 SERPL-SCNC: 31 MMOL/L (ref 21–32)
CREAT BLD-MCNC: 5.76 MG/DL
EOSINOPHIL # BLD AUTO: 0.29 X10(3) UL (ref 0–0.7)
EOSINOPHIL NFR BLD AUTO: 6.3 %
ERYTHROCYTE [DISTWIDTH] IN BLOOD BY AUTOMATED COUNT: 14.3 %
GLUCOSE BLD-MCNC: 102 MG/DL (ref 70–99)
HCT VFR BLD AUTO: 37.1 %
HGB BLD-MCNC: 11.2 G/DL
IMM GRANULOCYTES # BLD AUTO: 0.01 X10(3) UL (ref 0–1)
IMM GRANULOCYTES NFR BLD: 0.2 %
LYMPHOCYTES # BLD AUTO: 1 X10(3) UL (ref 1–4)
LYMPHOCYTES NFR BLD AUTO: 21.8 %
MCH RBC QN AUTO: 29.6 PG (ref 26–34)
MCHC RBC AUTO-ENTMCNC: 30.2 G/DL (ref 31–37)
MCV RBC AUTO: 97.9 FL
MONOCYTES # BLD AUTO: 0.39 X10(3) UL (ref 0.1–1)
MONOCYTES NFR BLD AUTO: 8.5 %
NEUTROPHILS # BLD AUTO: 2.84 X10 (3) UL (ref 1.5–7.7)
NEUTROPHILS # BLD AUTO: 2.84 X10(3) UL (ref 1.5–7.7)
NEUTROPHILS NFR BLD AUTO: 61.9 %
OSMOLALITY SERPL CALC.SUM OF ELEC: 305 MOSM/KG (ref 275–295)
PLATELET # BLD AUTO: 163 10(3)UL (ref 150–450)
POTASSIUM SERPL-SCNC: 4.3 MMOL/L (ref 3.5–5.1)
RBC # BLD AUTO: 3.79 X10(6)UL
SODIUM SERPL-SCNC: 139 MMOL/L (ref 136–145)
WBC # BLD AUTO: 4.6 X10(3) UL (ref 4–11)

## 2022-06-09 PROCEDURE — 80048 BASIC METABOLIC PNL TOTAL CA: CPT | Performed by: INTERNAL MEDICINE

## 2022-06-09 PROCEDURE — 85025 COMPLETE CBC W/AUTO DIFF WBC: CPT | Performed by: INTERNAL MEDICINE

## 2022-06-14 ENCOUNTER — LAB REQUISITION (OUTPATIENT)
Dept: LAB | Facility: HOSPITAL | Age: 72
End: 2022-06-14
Payer: MEDICARE

## 2022-06-14 DIAGNOSIS — N18.4 CHRONIC KIDNEY DISEASE, STAGE 4 (SEVERE) (HCC): ICD-10-CM

## 2022-06-14 LAB
ANION GAP SERPL CALC-SCNC: 10 MMOL/L (ref 0–18)
BASOPHILS # BLD AUTO: 0.05 X10(3) UL (ref 0–0.2)
BASOPHILS NFR BLD AUTO: 1 %
BUN BLD-MCNC: 61 MG/DL (ref 7–18)
CALCIUM BLD-MCNC: 8.7 MG/DL (ref 8.5–10.1)
CHLORIDE SERPL-SCNC: 104 MMOL/L (ref 98–112)
CO2 SERPL-SCNC: 26 MMOL/L (ref 21–32)
CREAT BLD-MCNC: 5.65 MG/DL
EOSINOPHIL # BLD AUTO: 0.27 X10(3) UL (ref 0–0.7)
EOSINOPHIL NFR BLD AUTO: 5.3 %
ERYTHROCYTE [DISTWIDTH] IN BLOOD BY AUTOMATED COUNT: 13.9 %
GLUCOSE BLD-MCNC: 117 MG/DL (ref 70–99)
HCT VFR BLD AUTO: 34 %
HGB BLD-MCNC: 10.4 G/DL
IMM GRANULOCYTES # BLD AUTO: 0.01 X10(3) UL (ref 0–1)
IMM GRANULOCYTES NFR BLD: 0.2 %
LYMPHOCYTES # BLD AUTO: 1.03 X10(3) UL (ref 1–4)
LYMPHOCYTES NFR BLD AUTO: 20.2 %
MCH RBC QN AUTO: 29.5 PG (ref 26–34)
MCHC RBC AUTO-ENTMCNC: 30.6 G/DL (ref 31–37)
MCV RBC AUTO: 96.3 FL
MONOCYTES # BLD AUTO: 0.47 X10(3) UL (ref 0.1–1)
MONOCYTES NFR BLD AUTO: 9.2 %
NEUTROPHILS # BLD AUTO: 3.27 X10 (3) UL (ref 1.5–7.7)
NEUTROPHILS # BLD AUTO: 3.27 X10(3) UL (ref 1.5–7.7)
NEUTROPHILS NFR BLD AUTO: 64.1 %
OSMOLALITY SERPL CALC.SUM OF ELEC: 308 MOSM/KG (ref 275–295)
PLATELET # BLD AUTO: 153 10(3)UL (ref 150–450)
POTASSIUM SERPL-SCNC: 3.7 MMOL/L (ref 3.5–5.1)
RBC # BLD AUTO: 3.53 X10(6)UL
SODIUM SERPL-SCNC: 140 MMOL/L (ref 136–145)
WBC # BLD AUTO: 5.1 X10(3) UL (ref 4–11)

## 2022-06-14 PROCEDURE — 80048 BASIC METABOLIC PNL TOTAL CA: CPT | Performed by: INTERNAL MEDICINE

## 2022-06-14 PROCEDURE — 85025 COMPLETE CBC W/AUTO DIFF WBC: CPT | Performed by: INTERNAL MEDICINE

## 2022-06-15 ENCOUNTER — TELEPHONE (OUTPATIENT)
Dept: NEPHROLOGY | Facility: CLINIC | Age: 72
End: 2022-06-15

## 2022-06-20 ENCOUNTER — OFFICE VISIT (OUTPATIENT)
Dept: NEPHROLOGY | Facility: CLINIC | Age: 72
End: 2022-06-20
Payer: MEDICARE

## 2022-06-20 VITALS — DIASTOLIC BLOOD PRESSURE: 60 MMHG | BODY MASS INDEX: 29 KG/M2 | SYSTOLIC BLOOD PRESSURE: 122 MMHG | WEIGHT: 182 LBS

## 2022-06-20 DIAGNOSIS — I10 PRIMARY HYPERTENSION: ICD-10-CM

## 2022-06-20 DIAGNOSIS — N18.5 ANEMIA DUE TO STAGE 5 CHRONIC KIDNEY DISEASE, NOT ON CHRONIC DIALYSIS (HCC): ICD-10-CM

## 2022-06-20 DIAGNOSIS — E10.21 DIABETIC NEPHROPATHY ASSOCIATED WITH TYPE 1 DIABETES MELLITUS (HCC): ICD-10-CM

## 2022-06-20 DIAGNOSIS — D63.1 ANEMIA DUE TO STAGE 5 CHRONIC KIDNEY DISEASE, NOT ON CHRONIC DIALYSIS (HCC): ICD-10-CM

## 2022-06-20 DIAGNOSIS — N18.5 CKD (CHRONIC KIDNEY DISEASE) STAGE 5, GFR LESS THAN 15 ML/MIN (HCC): Primary | ICD-10-CM

## 2022-06-20 RX ORDER — FAMOTIDINE 20 MG/1
20 TABLET, FILM COATED ORAL NIGHTLY PRN
COMMUNITY

## 2022-06-20 RX ORDER — GABAPENTIN 300 MG/1
300 CAPSULE ORAL 2 TIMES DAILY
COMMUNITY

## 2022-08-01 ENCOUNTER — LAB ENCOUNTER (OUTPATIENT)
Dept: LAB | Age: 72
End: 2022-08-01
Attending: INTERNAL MEDICINE
Payer: MEDICARE

## 2022-08-01 DIAGNOSIS — N18.5 CKD (CHRONIC KIDNEY DISEASE) STAGE 5, GFR LESS THAN 15 ML/MIN (HCC): ICD-10-CM

## 2022-08-01 DIAGNOSIS — N18.5 ANEMIA DUE TO STAGE 5 CHRONIC KIDNEY DISEASE, NOT ON CHRONIC DIALYSIS (HCC): ICD-10-CM

## 2022-08-01 DIAGNOSIS — D63.1 ANEMIA DUE TO STAGE 5 CHRONIC KIDNEY DISEASE, NOT ON CHRONIC DIALYSIS (HCC): ICD-10-CM

## 2022-08-01 LAB
ANION GAP SERPL CALC-SCNC: 10 MMOL/L (ref 0–18)
BASOPHILS # BLD AUTO: 0.05 X10(3) UL (ref 0–0.2)
BASOPHILS NFR BLD AUTO: 0.8 %
BUN BLD-MCNC: 71 MG/DL (ref 7–18)
CALCIUM BLD-MCNC: 8.9 MG/DL (ref 8.5–10.1)
CHLORIDE SERPL-SCNC: 102 MMOL/L (ref 98–112)
CO2 SERPL-SCNC: 28 MMOL/L (ref 21–32)
CREAT BLD-MCNC: 6.04 MG/DL
EOSINOPHIL # BLD AUTO: 0.26 X10(3) UL (ref 0–0.7)
EOSINOPHIL NFR BLD AUTO: 4.3 %
ERYTHROCYTE [DISTWIDTH] IN BLOOD BY AUTOMATED COUNT: 13.8 %
FASTING STATUS PATIENT QL REPORTED: NO
GLUCOSE BLD-MCNC: 198 MG/DL (ref 70–99)
HCT VFR BLD AUTO: 38.2 %
HGB BLD-MCNC: 11.7 G/DL
IMM GRANULOCYTES # BLD AUTO: 0 X10(3) UL (ref 0–1)
IMM GRANULOCYTES NFR BLD: 0 %
LYMPHOCYTES # BLD AUTO: 0.95 X10(3) UL (ref 1–4)
LYMPHOCYTES NFR BLD AUTO: 15.7 %
MCH RBC QN AUTO: 29.3 PG (ref 26–34)
MCHC RBC AUTO-ENTMCNC: 30.6 G/DL (ref 31–37)
MCV RBC AUTO: 95.7 FL
MONOCYTES # BLD AUTO: 0.5 X10(3) UL (ref 0.1–1)
MONOCYTES NFR BLD AUTO: 8.3 %
NEUTROPHILS # BLD AUTO: 4.29 X10 (3) UL (ref 1.5–7.7)
NEUTROPHILS # BLD AUTO: 4.29 X10(3) UL (ref 1.5–7.7)
NEUTROPHILS NFR BLD AUTO: 70.9 %
OSMOLALITY SERPL CALC.SUM OF ELEC: 316 MOSM/KG (ref 275–295)
PLATELET # BLD AUTO: 134 10(3)UL (ref 150–450)
POTASSIUM SERPL-SCNC: 3.5 MMOL/L (ref 3.5–5.1)
RBC # BLD AUTO: 3.99 X10(6)UL
SODIUM SERPL-SCNC: 140 MMOL/L (ref 136–145)
WBC # BLD AUTO: 6.1 X10(3) UL (ref 4–11)

## 2022-08-01 PROCEDURE — 80048 BASIC METABOLIC PNL TOTAL CA: CPT

## 2022-08-01 PROCEDURE — 36415 COLL VENOUS BLD VENIPUNCTURE: CPT

## 2022-08-01 PROCEDURE — 85025 COMPLETE CBC W/AUTO DIFF WBC: CPT

## 2022-08-26 RX ORDER — NIFEDIPINE 90 MG/1
90 TABLET, FILM COATED, EXTENDED RELEASE ORAL 2 TIMES DAILY
Qty: 180 TABLET | Refills: 1 | Status: SHIPPED | OUTPATIENT
Start: 2022-08-26

## 2022-08-26 RX ORDER — FUROSEMIDE 40 MG/1
40 TABLET ORAL 2 TIMES DAILY
Qty: 180 TABLET | Refills: 1 | Status: SHIPPED | OUTPATIENT
Start: 2022-08-26

## 2022-09-05 ENCOUNTER — PATIENT MESSAGE (OUTPATIENT)
Dept: NEPHROLOGY | Facility: CLINIC | Age: 72
End: 2022-09-05

## 2022-09-05 DIAGNOSIS — N18.5 ANEMIA DUE TO STAGE 5 CHRONIC KIDNEY DISEASE, NOT ON CHRONIC DIALYSIS (HCC): ICD-10-CM

## 2022-09-05 DIAGNOSIS — D63.1 ANEMIA DUE TO STAGE 5 CHRONIC KIDNEY DISEASE, NOT ON CHRONIC DIALYSIS (HCC): ICD-10-CM

## 2022-09-05 DIAGNOSIS — N18.5 CKD (CHRONIC KIDNEY DISEASE) STAGE 5, GFR LESS THAN 15 ML/MIN (HCC): Primary | ICD-10-CM

## 2022-09-06 ENCOUNTER — APPOINTMENT (OUTPATIENT)
Dept: CT IMAGING | Facility: HOSPITAL | Age: 72
End: 2022-09-06
Attending: EMERGENCY MEDICINE

## 2022-09-06 ENCOUNTER — APPOINTMENT (OUTPATIENT)
Dept: GENERAL RADIOLOGY | Facility: HOSPITAL | Age: 72
End: 2022-09-06

## 2022-09-06 ENCOUNTER — APPOINTMENT (OUTPATIENT)
Dept: CT IMAGING | Facility: HOSPITAL | Age: 72
DRG: 673 | End: 2022-09-06
Attending: EMERGENCY MEDICINE

## 2022-09-06 ENCOUNTER — APPOINTMENT (OUTPATIENT)
Dept: GENERAL RADIOLOGY | Facility: HOSPITAL | Age: 72
DRG: 673 | End: 2022-09-06

## 2022-09-06 ENCOUNTER — HOSPITAL ENCOUNTER (INPATIENT)
Facility: HOSPITAL | Age: 72
LOS: 2 days | Discharge: NURSING, REHAB OR LONG-TERM CARE | DRG: 673 | End: 2022-09-09
Attending: EMERGENCY MEDICINE | Admitting: INTERNAL MEDICINE

## 2022-09-06 ENCOUNTER — HOSPITAL ENCOUNTER (INPATIENT)
Facility: HOSPITAL | Age: 72
LOS: 2 days | Discharge: NURSING, REHAB OR LONG-TERM CARE | End: 2022-09-09
Attending: EMERGENCY MEDICINE | Admitting: INTERNAL MEDICINE

## 2022-09-06 DIAGNOSIS — N17.9 ACUTE RENAL FAILURE, UNSPECIFIED ACUTE RENAL FAILURE TYPE (HCC): Primary | ICD-10-CM

## 2022-09-06 DIAGNOSIS — D64.9 ANEMIA, UNSPECIFIED TYPE: ICD-10-CM

## 2022-09-06 DIAGNOSIS — E87.1 HYPONATREMIA: ICD-10-CM

## 2022-09-06 LAB
ALBUMIN SERPL-MCNC: 2.9 G/DL (ref 3.4–5)
ALBUMIN/GLOB SERPL: 0.8 {RATIO} (ref 1–2)
ALP LIVER SERPL-CCNC: 202 U/L
ALT SERPL-CCNC: 96 U/L
ANION GAP SERPL CALC-SCNC: 9 MMOL/L (ref 0–18)
AST SERPL-CCNC: 86 U/L (ref 15–37)
BASOPHILS # BLD AUTO: 0.05 X10(3) UL (ref 0–0.2)
BASOPHILS NFR BLD AUTO: 1 %
BILIRUB SERPL-MCNC: 0.5 MG/DL (ref 0.1–2)
BUN BLD-MCNC: 87 MG/DL (ref 7–18)
CALCIUM BLD-MCNC: 8.4 MG/DL (ref 8.5–10.1)
CHLORIDE SERPL-SCNC: 102 MMOL/L (ref 98–112)
CO2 SERPL-SCNC: 24 MMOL/L (ref 21–32)
CREAT BLD-MCNC: 7.35 MG/DL
EOSINOPHIL # BLD AUTO: 0.12 X10(3) UL (ref 0–0.7)
EOSINOPHIL NFR BLD AUTO: 2.3 %
ERYTHROCYTE [DISTWIDTH] IN BLOOD BY AUTOMATED COUNT: 15.1 %
GFR SERPLBLD BASED ON 1.73 SQ M-ARVRAT: 7 ML/MIN/1.73M2 (ref 60–?)
GLOBULIN PLAS-MCNC: 3.8 G/DL (ref 2.8–4.4)
GLUCOSE BLD-MCNC: 123 MG/DL (ref 70–99)
GLUCOSE BLD-MCNC: 167 MG/DL (ref 70–99)
HCT VFR BLD AUTO: 30.2 %
HGB BLD-MCNC: 9.4 G/DL
IMM GRANULOCYTES # BLD AUTO: 0.02 X10(3) UL (ref 0–1)
IMM GRANULOCYTES NFR BLD: 0.4 %
LYMPHOCYTES # BLD AUTO: 1.15 X10(3) UL (ref 1–4)
LYMPHOCYTES NFR BLD AUTO: 22.5 %
MCH RBC QN AUTO: 28.8 PG (ref 26–34)
MCHC RBC AUTO-ENTMCNC: 31.1 G/DL (ref 31–37)
MCV RBC AUTO: 92.6 FL
MONOCYTES # BLD AUTO: 0.61 X10(3) UL (ref 0.1–1)
MONOCYTES NFR BLD AUTO: 11.9 %
NEUTROPHILS # BLD AUTO: 3.17 X10 (3) UL (ref 1.5–7.7)
NEUTROPHILS # BLD AUTO: 3.17 X10(3) UL (ref 1.5–7.7)
NEUTROPHILS NFR BLD AUTO: 61.9 %
OSMOLALITY SERPL CALC.SUM OF ELEC: 310 MOSM/KG (ref 275–295)
PLATELET # BLD AUTO: 173 10(3)UL (ref 150–450)
POTASSIUM SERPL-SCNC: 4 MMOL/L (ref 3.5–5.1)
PROT SERPL-MCNC: 6.7 G/DL (ref 6.4–8.2)
RBC # BLD AUTO: 3.26 X10(6)UL
SARS-COV-2 RNA RESP QL NAA+PROBE: NOT DETECTED
SODIUM SERPL-SCNC: 135 MMOL/L (ref 136–145)
TROPONIN I HIGH SENSITIVITY: 6 NG/L
WBC # BLD AUTO: 5.1 X10(3) UL (ref 4–11)

## 2022-09-06 PROCEDURE — 70450 CT HEAD/BRAIN W/O DYE: CPT | Performed by: EMERGENCY MEDICINE

## 2022-09-06 PROCEDURE — 71045 X-RAY EXAM CHEST 1 VIEW: CPT

## 2022-09-06 NOTE — ED INITIAL ASSESSMENT (HPI)
Wife states pt has new weakness, non steady gait. Pt had syncopal event yesterday and struck his floor. Unknown if LOC. Wife states pt is currently being worked up for kidney failure. Wife states swelling from leg to abd.

## 2022-09-07 ENCOUNTER — APPOINTMENT (OUTPATIENT)
Dept: INTERVENTIONAL RADIOLOGY/VASCULAR | Facility: HOSPITAL | Age: 72
End: 2022-09-07
Attending: INTERNAL MEDICINE

## 2022-09-07 ENCOUNTER — APPOINTMENT (OUTPATIENT)
Dept: INTERVENTIONAL RADIOLOGY/VASCULAR | Facility: HOSPITAL | Age: 72
DRG: 673 | End: 2022-09-07
Attending: INTERNAL MEDICINE

## 2022-09-07 PROBLEM — D64.9 ANEMIA, UNSPECIFIED TYPE: Status: ACTIVE | Noted: 2022-09-07

## 2022-09-07 PROBLEM — N17.9 ACUTE RENAL FAILURE, UNSPECIFIED ACUTE RENAL FAILURE TYPE (HCC): Status: ACTIVE | Noted: 2022-09-07

## 2022-09-07 PROBLEM — E87.1 HYPONATREMIA: Status: ACTIVE | Noted: 2022-09-07

## 2022-09-07 PROBLEM — N17.9 ACUTE RENAL FAILURE, UNSPECIFIED ACUTE RENAL FAILURE TYPE: Status: ACTIVE | Noted: 2022-09-07

## 2022-09-07 LAB
ATRIAL RATE: 63 BPM
DEPRECATED HBV CORE AB SER IA-ACNC: 142.3 NG/ML
EST. AVERAGE GLUCOSE BLD GHB EST-MCNC: 163 MG/DL (ref 68–126)
GLUCOSE BLD-MCNC: 117 MG/DL (ref 70–99)
GLUCOSE BLD-MCNC: 122 MG/DL (ref 70–99)
GLUCOSE BLD-MCNC: 127 MG/DL (ref 70–99)
GLUCOSE BLD-MCNC: 162 MG/DL (ref 70–99)
HBA1C MFR BLD: 7.3 % (ref ?–5.7)
HBV CORE AB SERPL QL IA: NONREACTIVE
HBV SURFACE AB SER QL: NONREACTIVE
HBV SURFACE AB SERPL IA-ACNC: <3.1 MIU/ML
HBV SURFACE AG SER-ACNC: <0.1 [IU]/L
HBV SURFACE AG SER-ACNC: <0.1 [IU]/L
HBV SURFACE AG SERPL QL IA: NONREACTIVE
HBV SURFACE AG SERPL QL IA: NONREACTIVE
INR BLD: 1.17 (ref 0.85–1.16)
IRON SATN MFR SERPL: 10 %
IRON SERPL-MCNC: 20 UG/DL
P AXIS: 38 DEGREES
P-R INTERVAL: 214 MS
PHOSPHATE SERPL-MCNC: 5.2 MG/DL (ref 2.5–4.9)
PROTHROMBIN TIME: 14.9 SECONDS (ref 11.6–14.8)
PTH-INTACT SERPL-MCNC: 475.1 PG/ML (ref 18.5–88)
Q-T INTERVAL: 432 MS
QRS DURATION: 106 MS
QTC CALCULATION (BEZET): 442 MS
R AXIS: 12 DEGREES
T AXIS: 32 DEGREES
TIBC SERPL-MCNC: 197 UG/DL (ref 240–450)
TRANSFERRIN SERPL-MCNC: 132 MG/DL (ref 200–360)
VENTRICULAR RATE: 63 BPM

## 2022-09-07 PROCEDURE — 99223 1ST HOSP IP/OBS HIGH 75: CPT | Performed by: INTERNAL MEDICINE

## 2022-09-07 PROCEDURE — 5A09457 ASSISTANCE WITH RESPIRATORY VENTILATION, 24-96 CONSECUTIVE HOURS, CONTINUOUS POSITIVE AIRWAY PRESSURE: ICD-10-PCS | Performed by: INTERNAL MEDICINE

## 2022-09-07 PROCEDURE — 90935 HEMODIALYSIS ONE EVALUATION: CPT | Performed by: INTERNAL MEDICINE

## 2022-09-07 PROCEDURE — 02H633Z INSERTION OF INFUSION DEVICE INTO RIGHT ATRIUM, PERCUTANEOUS APPROACH: ICD-10-PCS | Performed by: RADIOLOGY

## 2022-09-07 PROCEDURE — 0JH63XZ INSERTION OF TUNNELED VASCULAR ACCESS DEVICE INTO CHEST SUBCUTANEOUS TISSUE AND FASCIA, PERCUTANEOUS APPROACH: ICD-10-PCS | Performed by: RADIOLOGY

## 2022-09-07 PROCEDURE — B518ZZA FLUOROSCOPY OF SUPERIOR VENA CAVA, GUIDANCE: ICD-10-PCS | Performed by: RADIOLOGY

## 2022-09-07 RX ORDER — HEPARIN SODIUM 5000 [USP'U]/ML
INJECTION, SOLUTION INTRAVENOUS; SUBCUTANEOUS
Status: COMPLETED
Start: 2022-09-07 | End: 2022-09-07

## 2022-09-07 RX ORDER — PANTOPRAZOLE SODIUM 20 MG/1
20 TABLET, DELAYED RELEASE ORAL
Refills: 3 | Status: DISCONTINUED | OUTPATIENT
Start: 2022-09-07 | End: 2022-09-09

## 2022-09-07 RX ORDER — EZETIMIBE 10 MG/1
10 TABLET ORAL DAILY
Status: DISCONTINUED | OUTPATIENT
Start: 2022-09-07 | End: 2022-09-09

## 2022-09-07 RX ORDER — CEFAZOLIN SODIUM/WATER 2 G/20 ML
SYRINGE (ML) INTRAVENOUS
Status: COMPLETED
Start: 2022-09-07 | End: 2022-09-07

## 2022-09-07 RX ORDER — LIDOCAINE HYDROCHLORIDE AND EPINEPHRINE 10; 10 MG/ML; UG/ML
INJECTION, SOLUTION INFILTRATION; PERINEURAL
Status: COMPLETED
Start: 2022-09-07 | End: 2022-09-07

## 2022-09-07 RX ORDER — LIDOCAINE HYDROCHLORIDE 10 MG/ML
INJECTION, SOLUTION INFILTRATION; PERINEURAL
Status: COMPLETED
Start: 2022-09-07 | End: 2022-09-07

## 2022-09-07 RX ORDER — FAMOTIDINE 10 MG
10 TABLET ORAL NIGHTLY PRN
Status: DISCONTINUED | OUTPATIENT
Start: 2022-09-07 | End: 2022-09-09

## 2022-09-07 RX ORDER — MIDAZOLAM HYDROCHLORIDE 1 MG/ML
INJECTION INTRAMUSCULAR; INTRAVENOUS
Status: COMPLETED
Start: 2022-09-07 | End: 2022-09-07

## 2022-09-07 RX ORDER — HYDRALAZINE HYDROCHLORIDE 50 MG/1
50 TABLET, FILM COATED ORAL 3 TIMES DAILY
Status: DISCONTINUED | OUTPATIENT
Start: 2022-09-07 | End: 2022-09-07

## 2022-09-07 RX ORDER — GABAPENTIN 300 MG/1
300 CAPSULE ORAL 2 TIMES DAILY
Status: DISCONTINUED | OUTPATIENT
Start: 2022-09-07 | End: 2022-09-09

## 2022-09-07 RX ORDER — DEXTROSE MONOHYDRATE 25 G/50ML
50 INJECTION, SOLUTION INTRAVENOUS
Status: DISCONTINUED | OUTPATIENT
Start: 2022-09-07 | End: 2022-09-09

## 2022-09-07 RX ORDER — INSULIN ASPART 100 [IU]/ML
INJECTION, SOLUTION INTRAVENOUS; SUBCUTANEOUS
Status: DISCONTINUED | OUTPATIENT
Start: 2022-09-08 | End: 2022-09-08

## 2022-09-07 RX ORDER — DULOXETIN HYDROCHLORIDE 30 MG/1
120 CAPSULE, DELAYED RELEASE ORAL NIGHTLY
Status: DISCONTINUED | OUTPATIENT
Start: 2022-09-07 | End: 2022-09-09

## 2022-09-07 RX ORDER — NIFEDIPINE 30 MG/1
90 TABLET, EXTENDED RELEASE ORAL 2 TIMES DAILY
Status: DISCONTINUED | OUTPATIENT
Start: 2022-09-07 | End: 2022-09-09

## 2022-09-07 RX ORDER — LEVOTHYROXINE SODIUM 0.1 MG/1
100 TABLET ORAL
Status: DISCONTINUED | OUTPATIENT
Start: 2022-09-07 | End: 2022-09-09

## 2022-09-07 RX ORDER — ISOSORBIDE MONONITRATE 30 MG/1
30 TABLET, EXTENDED RELEASE ORAL DAILY
Status: DISCONTINUED | OUTPATIENT
Start: 2022-09-07 | End: 2022-09-09

## 2022-09-07 RX ORDER — NICOTINE POLACRILEX 4 MG
15 LOZENGE BUCCAL
Status: DISCONTINUED | OUTPATIENT
Start: 2022-09-07 | End: 2022-09-09

## 2022-09-07 RX ORDER — ASPIRIN 81 MG/1
81 TABLET, CHEWABLE ORAL DAILY
Status: DISCONTINUED | OUTPATIENT
Start: 2022-09-07 | End: 2022-09-09

## 2022-09-07 RX ORDER — NICOTINE POLACRILEX 4 MG
30 LOZENGE BUCCAL
Status: DISCONTINUED | OUTPATIENT
Start: 2022-09-07 | End: 2022-09-09

## 2022-09-07 NOTE — CM/SW NOTE
09/07/22 1000   CM/ Referral Data   Referral Source Physician   Reason for Referral Discharge planning   Patient [address]   Patient lives with Spouse/Significant other   Patient Status Prior to Admission   Independent with ADLs and Mobility Yes     CM met with 67 y/o male admitted for ESRD to discuss discharge planning needs. Pt states that he lives in the [address] [place] [address] Road with his wife. He is independent with ADLs, but does not drive. He does not currently use any assistive devices, but he does have a RW and cane available at home. He reports falling at home due to his knees buckling, so he began to use a RW. PT's evaluation is currently pending. Also discussed outpatient HD with pt. He states Dr. Giovani Bravo explained the need for outpatient HD with him and that he had previously trialed outpt HD at the [facility] in [place] for three weeks. He understands that he will be having a permacath placed today and will begin HD outpatient at discharge. He prefers to have treatment at the [facility] in [facility] MWF in the afternoon (from [address]). He is not able to drive himself and his wife works during the day, so this time will work better for their schedules. Referral sent to [facility] via fax ([phone]). 1200: PT evaluated pt and recommends [facility] at discharge. Spoke with pt at bedside and wife, Marlo Dumont, via phone. Discussed PT's recommendations for [facility]. Explained that some [facility] facilities offer on-site dialysis which allows him to better participate in therapy. Pt's wife states that she believes he was at [facility] in [facility] in 2014 when he was on dialysis and they liked the facility. Referrals placed in Aidin for [facility] facilities with on-site dialysis. Pt's wife expressed that she will be by this afternoon to discuss options further. Pt/wife agreeable with plan. 1430: Spoke to [place] in admissions at [facility].  Abhay Guevara confirmed that she received all documentation besides hepatitis B surface antigen. Hepatitis B surface antigen results faxed to [facility] admissions. Reviewed clinical information via phone with Lico Castro. Confirmed pt's preferred clinic is the [facility] location. Lico Castro to contact the [facility] clinic to determine scheduling needs. Updated Lico Castro that pt may discharge to [facility] prior to going home.      DEBBY CastanedaN, RN-BC    [id number]

## 2022-09-07 NOTE — CM/SW NOTE
CM contacted pt's wife, Afshin Huerta, via phone to discuss Choice list of [facility] facilities. Afshin Huerta stated that she will be coming in to visit the patient soon and will discuss the options with him at that time. She asked if she has the option not to send the pt to [facility] if they choose he can safely manage at home. CM explained that PT is recommending [facility], but it is ultimately their choice. Pt/wife to discuss and choose [facility] facility or to discharge home with [facility] services and outpt HD. Afshin Huerta expressed that she is also interested in ordering him a wheelchair, as he is not able to walk long distances when they are out in the community. DME referral placed in Aidin. CM/SW to remain available for discharge planning.     DEBBY MejiaN, RN-BC    [id number]

## 2022-09-07 NOTE — ED QUICK NOTES
Orders for admission, patient is aware of plan and ready to go upstairs. Any questions, please call ED RN Tamy Chance at extension [phone]. Patient Covid vaccination status: Fully vaccinated     COVID Test Ordered in ED: Rapid SARS-CoV-2 by PCR    COVID Suspicion at Admission: N/A    Running Infusions:  None    Mental Status/LOC at time of transport: ALERT X 3    Other pertinent information: UNSTEADY ON AMBULATION.    CIWA score: N/A   NIH score:  N/A

## 2022-09-07 NOTE — PLAN OF CARE
Patient A&O x4, no c/o pain, lungs clear diminished. Patient c/o increased trouble walking and being able to make it from his chair to the bathroom like he normally could. Patient stated that he also had a fall that caused bruising to and around his left eye, and that is when his wife made him come to ER. Patient's BUN and cre labs have been trending up over his past lab results. Patient has tremors in both arms and bad arthritis in both hands making then appear contracted. He has edema in both legs that he states is a recent occurrence. Patient is PRANEETH and on Cipap, RT has been in to see patient and set him up.

## 2022-09-07 NOTE — PROGRESS NOTES
[facility] Pharmacy Note:  Renal Dose Adjustment    [address] has been prescribed famotidine (PEPCID) 20 mg orally every 24 hours. Estimated Creatinine Clearance: 8.2 mL/min (A) (based on SCr of 7.35 mg/dL (H)). Calculated creatinine clearance is < 50 ml/min, therefore the dose of famotidine (Pepcid) has been changed to 10 mg orally every 24 hours per P&T approved protocol. Pharmacy will continue to follow, and if renal function improves, will resume the original order.     Thank you,  Eloisa Mosher, [facility]  9/7/2022 2:38 AM

## 2022-09-07 NOTE — ED QUICK NOTES
Assumed care for this pt, per wife increasing weakness to both legs with notab;le tremors, & losing strength. Aidee Mitts yesterday due to weakness. Increasing EREN with BLE edema.

## 2022-09-08 ENCOUNTER — TELEPHONE (OUTPATIENT)
Dept: NEPHROLOGY | Facility: CLINIC | Age: 72
End: 2022-09-08

## 2022-09-08 PROBLEM — I10 PRIMARY HYPERTENSION: Status: ACTIVE | Noted: 2017-03-29

## 2022-09-08 LAB
ANION GAP SERPL CALC-SCNC: 9 MMOL/L (ref 0–18)
BUN BLD-MCNC: 51 MG/DL (ref 7–18)
CALCIUM BLD-MCNC: 9 MG/DL (ref 8.5–10.1)
CHLORIDE SERPL-SCNC: 103 MMOL/L (ref 98–112)
CO2 SERPL-SCNC: 26 MMOL/L (ref 21–32)
CREAT BLD-MCNC: 4.64 MG/DL
GFR SERPLBLD BASED ON 1.73 SQ M-ARVRAT: 13 ML/MIN/1.73M2 (ref 60–?)
GLUCOSE BLD-MCNC: 117 MG/DL (ref 70–99)
GLUCOSE BLD-MCNC: 132 MG/DL (ref 70–99)
GLUCOSE BLD-MCNC: 146 MG/DL (ref 70–99)
GLUCOSE BLD-MCNC: 170 MG/DL (ref 70–99)
GLUCOSE BLD-MCNC: 176 MG/DL (ref 70–99)
GLUCOSE BLD-MCNC: 190 MG/DL (ref 70–99)
OSMOLALITY SERPL CALC.SUM OF ELEC: 304 MOSM/KG (ref 275–295)
POTASSIUM SERPL-SCNC: 4.1 MMOL/L (ref 3.5–5.1)
SODIUM SERPL-SCNC: 138 MMOL/L (ref 136–145)

## 2022-09-08 PROCEDURE — 5A1D70Z PERFORMANCE OF URINARY FILTRATION, INTERMITTENT, LESS THAN 6 HOURS PER DAY: ICD-10-PCS | Performed by: INTERNAL MEDICINE

## 2022-09-08 PROCEDURE — 99233 SBSQ HOSP IP/OBS HIGH 50: CPT | Performed by: INTERNAL MEDICINE

## 2022-09-08 RX ORDER — LISINOPRIL 20 MG/1
20 TABLET ORAL DAILY
Status: DISCONTINUED | OUTPATIENT
Start: 2022-09-08 | End: 2022-09-09

## 2022-09-08 RX ORDER — HEPARIN SODIUM 1000 [USP'U]/ML
500 INJECTION, SOLUTION INTRAVENOUS; SUBCUTANEOUS
Status: DISCONTINUED | OUTPATIENT
Start: 2022-09-08 | End: 2022-09-09

## 2022-09-08 RX ORDER — FUROSEMIDE 40 MG/1
40 TABLET ORAL DAILY
Qty: 90 TABLET | Refills: 1 | Status: SHIPPED | OUTPATIENT
Start: 2022-09-08

## 2022-09-08 RX ORDER — ACETAMINOPHEN 500 MG
500 TABLET ORAL EVERY 6 HOURS PRN
Status: DISCONTINUED | OUTPATIENT
Start: 2022-09-08 | End: 2022-09-09

## 2022-09-08 NOTE — PLAN OF CARE
Patient alert and oriented x4. On room air. Using Cpap at night. Renal diet tolerated. Pt has a CGM and insulin pump. MD aware and contract signed and placed in chart. Received Dialysis, 1L removed. +1 BLE edema noted. Tremors in arms. Pt resting in bed with call light within reach.        Problem: Diabetes/Glucose Control  Goal: Glucose maintained within prescribed range  Description: INTERVENTIONS:  - Monitor Blood Glucose as ordered  - Assess for signs and symptoms of hyperglycemia and hypoglycemia  - Administer ordered medications to maintain glucose within target range  - Assess barriers to adequate nutritional intake and initiate nutrition consult as needed  - Instruct patient on self management of diabetes  Outcome: Progressing

## 2022-09-08 NOTE — PLAN OF CARE
Problem: Diabetes/Glucose Control  Goal: Glucose maintained within prescribed range  Description: INTERVENTIONS:  - Monitor Blood Glucose as ordered  - Assess for signs and symptoms of hyperglycemia and hypoglycemia  - Administer ordered medications to maintain glucose within target range  - Assess barriers to adequate nutritional intake and initiate nutrition consult as needed  - Instruct patient on self management of diabetes  Outcome: Progressing     Problem: Patient/Family Goals  Goal: Patient/Family Long Term Goal  Description: Patient's Long Term Goal:     Interventions:  -   - See additional Care Plan goals for specific interventions  Outcome: Progressing  Goal: Patient/Family Short Term Goal  Description: Patient's Short Term Goal:     Interventions:   -   - See additional Care Plan goals for specific interventions  Outcome: Progressing     Problem: METABOLIC/FLUID AND ELECTROLYTES - ADULT  Goal: Glucose maintained within prescribed range  Description: INTERVENTIONS:  - Monitor Blood Glucose as ordered  - Assess for signs and symptoms of hyperglycemia and hypoglycemia  - Administer ordered medications to maintain glucose within target range  - Assess barriers to adequate nutritional intake and initiate nutrition consult as needed  - Instruct patient on self management of diabetes  Outcome: Progressing  Goal: Electrolytes maintained within normal limits  Description: INTERVENTIONS:  - Monitor labs and rhythm and assess patient for signs and symptoms of electrolyte imbalances  - Administer electrolyte replacement as ordered  - Monitor response to electrolyte replacements, including rhythm and repeat lab results as appropriate  - Fluid restriction as ordered  - Instruct patient on fluid and nutrition restrictions as appropriate  Outcome: Progressing  Goal: Hemodynamic stability and optimal renal function maintained  Description: INTERVENTIONS:  - Monitor labs and assess for signs and symptoms of volume excess or deficit  - Monitor intake, output and patient weight  - Monitor urine specific gravity, serum osmolarity and serum sodium as indicated or ordered  - Monitor response to interventions for patient's volume status, including labs, urine output, blood pressure (other measures as available)  - Encourage oral intake as appropriate  - Instruct patient on fluid and nutrition restrictions as appropriate  Outcome: Progressing     Problem: SKIN/TISSUE INTEGRITY - ADULT  Goal: Skin integrity remains intact  Description: INTERVENTIONS  - Assess and document risk factors for pressure ulcer development  - Assess and document skin integrity  - Monitor for areas of redness and/or skin breakdown  - Initiate interventions, skin care algorithm/standards of care as needed  Outcome: Progressing  Goal: Incision(s), wounds(s) or drain site(s) healing without S/S of infection  Description: INTERVENTIONS:  - Assess and document risk factors for pressure ulcer development  - Assess and document skin integrity  - Assess and document dressing/incision, wound bed, drain sites and surrounding tissue  - Implement wound care per orders  - Initiate isolation precautions as appropriate  - Initiate Pressure Ulcer prevention bundle as indicated  Outcome: Progressing     Problem: MUSCULOSKELETAL - ADULT  Goal: Return mobility to safest level of function  Description: INTERVENTIONS:  - Assess patient stability and activity tolerance for standing, transferring and ambulating w/ or w/o assistive devices  - Assist with transfers and ambulation using safe patient handling equipment as needed  - Ensure adequate protection for wounds/incisions during mobilization  - Obtain PT/OT consults as needed  - Advance activity as appropriate  - Communicate ordered activity level and limitations with patient/family  Outcome: Progressing

## 2022-09-08 NOTE — PROGRESS NOTES
Pt c/o headache. Temp 100. 1. rt upper chest permacath site bleeding, dressing changed at 0600 and at 1010. Dr Niurka Cordero pagerashaad. Order received to give tylenol and call IR re bleeding    1229:Dr Matthew Segura , Radiologist called and updated on bleeding Permacath. He said to call IR team charge RN    1230:Charge RN called and she said to apply pressure for 15 minutes. And then call her back. Pressure applied to site by my preceptor, Elyssa Armenta. New dressing applied. 1440: RT Permacath  Starting to saturate dressing. 1445: IR charge RN, Jaclyn Rosales, called back to notify of new bleeding. She will talk to the IR radiologist. Kaushik Hwang to use site for HD  1450: HD staff here. 1500: HD started  1645: IR team here to address bleeding Permacath. Dr Fox See at bedside checking permacath site. .   1750: Dr Flo Keene perfect serve messaged re Epogen. Not ordered for today. updated on DC planning  1900: Spouse at [facility], updated on DC plans for tomorrow. All questions answered.

## 2022-09-08 NOTE — CM/SW NOTE
CM received new consult order for outpatient HD setup 2 times/week. Requested clinic by physician is  [facility] in [facility]. Referral sent via Aidin. PT is recommending [facility] and pt/wife chose [facility]. Possible discharge today per nephrology - [facility] notified. Miri Lieberman ([phone]) at [address] confirmed that pt has been accepted for HD, too.    1020: Discussed discharge planning with pt's wife, Concepcion Hamilton ([phone]), via phone. Concepcion Hamilton confirmed that they would like to send pt to [facility] for [facility]. Pt/wife made aware of transportation option via [address] and the costs associated. Updated on tentative plan for outpatient HD twice weekly through [address] [facility] per Dr. Darren Rosenthal pending approval. Updated wife on status regarding wheelchair referral. Will continue to update Concepcion Hamilton on plan throughout the day to determine safe discharge plan. RN updated. 1040: Attempting to contact [facility] [facility] admissions ([phone]) to setup outpatient HD, but unable to reach anyone/leave a voicemail. Paged Dr. Darren Rosenthal to discuss outpatient HD and to update on plan for pt to discharge to [facility] 12.     1150: Spoke to Shonna Fuentes ([phone]) at [facility] regarding inability to contact admissions. Shonna Fuentes provided another phone number that was also utilized without success. He was able to reach admissions and they stated that they are having \"phone issues. \" He was not given an alternative phone number and suggested calling back in one hour and to contact him if still unable to reach admissions. Received call from Nadir Quinteros 56 in [address] [facility] admissions requesting HD flowsheet from yesterday's treatment be faxed if available - sent as requested. Updated representative that pt may be discharging to [facility] prior to going home and needing outpt HD. She stated that she will review clnical information and contact CM/SW when she is able to provide schedule.     1400: Received call from Nadir Quinteros 56 in admissions at [facility] ([phone]) stating that she received all of the necessary clinical documentation for pt. CM/SW to update Terese when definitive discharge plan is known so that she can follow up with [facility]. Updated Juanita Sheridan at [facility] regarding plan for pt to likely not be medically ready for discharge today given fever and late HD treatment. Juanita Sheridan will plan for pt to be discharged tomorrow. CM/SW to remain available for discharge planning.     DEBBY MoseleyN, RN-BC    [id number]

## 2022-09-08 NOTE — TELEPHONE ENCOUNTER
[facility] called asking for diagnosis and wheel chair verbage. They are in need of patient's weight and height. They faxed over a form to be completed and faxed back to them as well. Placed on your desk.

## 2022-09-08 NOTE — TELEPHONE ENCOUNTER
Patient's wife said that patient is to go to [address] [facility]. She said that [facility] called them to set up admission. Wife would like to know if [facility] will be calling her.

## 2022-09-08 NOTE — PLAN OF CARE
Problem: Diabetes/Glucose Control  Goal: Glucose maintained within prescribed range  Description: INTERVENTIONS:  - Monitor Blood Glucose as ordered  - Assess for signs and symptoms of hyperglycemia and hypoglycemia  - Administer ordered medications to maintain glucose within target range  - Assess barriers to adequate nutritional intake and initiate nutrition consult as needed  - Instruct patient on self management of diabetes  Outcome: Progressing   VSS afebrile. Denies nausea or emesis. Denies pain. Patient tolerated Permacath insertion per IR. Patient started back on Renal diet. Dialysis to start tonight per orders. Up to bathroom with assist x 2 and walker. .  Patient had large BM. Assisted back to chair. Tolerated it well.

## 2022-09-09 ENCOUNTER — APPOINTMENT (OUTPATIENT)
Dept: GENERAL RADIOLOGY | Facility: HOSPITAL | Age: 72
DRG: 673 | End: 2022-09-09
Attending: INTERNAL MEDICINE

## 2022-09-09 ENCOUNTER — APPOINTMENT (OUTPATIENT)
Dept: GENERAL RADIOLOGY | Facility: HOSPITAL | Age: 72
End: 2022-09-09
Attending: INTERNAL MEDICINE

## 2022-09-09 VITALS
SYSTOLIC BLOOD PRESSURE: 120 MMHG | BODY MASS INDEX: 38.57 KG/M2 | HEIGHT: 66 IN | RESPIRATION RATE: 18 BRPM | DIASTOLIC BLOOD PRESSURE: 67 MMHG | WEIGHT: 240 LBS | HEART RATE: 70 BPM | TEMPERATURE: 98 F | OXYGEN SATURATION: 94 %

## 2022-09-09 LAB
GLUCOSE BLD-MCNC: 119 MG/DL (ref 70–99)
GLUCOSE BLD-MCNC: 135 MG/DL (ref 70–99)
GLUCOSE BLD-MCNC: 158 MG/DL (ref 70–99)
GLUCOSE BLD-MCNC: 41 MG/DL (ref 70–99)
GLUCOSE BLD-MCNC: 41 MG/DL (ref 70–99)
GLUCOSE BLD-MCNC: 75 MG/DL (ref 70–99)
GLUCOSE BLD-MCNC: 99 MG/DL (ref 70–99)

## 2022-09-09 PROCEDURE — 73610 X-RAY EXAM OF ANKLE: CPT | Performed by: INTERNAL MEDICINE

## 2022-09-09 PROCEDURE — 99232 SBSQ HOSP IP/OBS MODERATE 35: CPT | Performed by: INTERNAL MEDICINE

## 2022-09-09 NOTE — CM/SW NOTE
CM notified by RN regarding pt's medical clearance by nephrology for discharge today. Spoke to [facility] at [facility] ([phone]) who confirmed bed availability for pt today early afternoon, as well as acceptance for HD. Pt's wife, Gregorio Arango, is planning to transport pt to [facility] and will be at the hospital around 1300. [facility] confirmed pt can be admitted to facility at 1300. Hospitalist updated regarding plans in place for discharge and will be rounding on pt soon to organize discharge. Pt/wife agreeable with discharge plan. RN updated. Nicolasasidney Mallorie ([phone]) at [address] [place] [address] confirmed that he is processing the wheelchair order. Updated wife, Gregorio Arango, regarding this. Chandu Christopher at [facility] updated that pt will be discharging to [facility] today. She will coordinate with facility to arrange outpt HD at time of discharge from [place] [address]. Contact information for facility provided and she stated that they will follow up with [facility] on Monday. RN to call [facility] to give report.   Gissel Irene: [address] [facility] admissions: [facility], BSN, RN-BC    [id number]

## 2022-09-09 NOTE — PLAN OF CARE
Pt hypoglycemic this morning, asymptomatic-  pt blood sugar 41. AMP D50 given per protocol. Dr. Gillian Lozano aware. Will continue to monitor. Problem: Patient/Family Goals  Goal: Patient/Family Long Term Goal  Description: Patient's Long Term Goal: DC home or [facility]    Interventions:  -as ordered  - See additional Care Plan goals for specific interventions  Outcome: Progressing  Goal: Patient/Family Short Term Goal  Description: Patient's Short Term Goal: hemodynamically stable    Interventions:   - monitor labs, vs, implement orders as prescribed.   - See additional Care Plan goals for specific interventions  Outcome: Progressing     Problem: Diabetes/Glucose Control  Goal: Glucose maintained within prescribed range  Description: INTERVENTIONS:  - Monitor Blood Glucose as ordered  - Assess for signs and symptoms of hyperglycemia and hypoglycemia  - Administer ordered medications to maintain glucose within target range  - Assess barriers to adequate nutritional intake and initiate nutrition consult as needed  - Instruct patient on self management of diabetes  Outcome: Progressing     Problem: METABOLIC/FLUID AND ELECTROLYTES - ADULT  Goal: Glucose maintained within prescribed range  Description: INTERVENTIONS:  - Monitor Blood Glucose as ordered  - Assess for signs and symptoms of hyperglycemia and hypoglycemia  - Administer ordered medications to maintain glucose within target range  - Assess barriers to adequate nutritional intake and initiate nutrition consult as needed  - Instruct patient on self management of diabetes  Outcome: Progressing  Goal: Electrolytes maintained within normal limits  Description: INTERVENTIONS:  - Monitor labs and rhythm and assess patient for signs and symptoms of electrolyte imbalances  - Administer electrolyte replacement as ordered  - Monitor response to electrolyte replacements, including rhythm and repeat lab results as appropriate  - Fluid restriction as ordered  - Instruct patient on fluid and nutrition restrictions as appropriate  Outcome: Progressing  Goal: Hemodynamic stability and optimal renal function maintained  Description: INTERVENTIONS:  - Monitor labs and assess for signs and symptoms of volume excess or deficit  - Monitor intake, output and patient weight  - Monitor urine specific gravity, serum osmolarity and serum sodium as indicated or ordered  - Monitor response to interventions for patient's volume status, including labs, urine output, blood pressure (other measures as available)  - Encourage oral intake as appropriate  - Instruct patient on fluid and nutrition restrictions as appropriate  Outcome: Progressing     Problem: SKIN/TISSUE INTEGRITY - ADULT  Goal: Skin integrity remains intact  Description: INTERVENTIONS  - Assess and document risk factors for pressure ulcer development  - Assess and document skin integrity  - Monitor for areas of redness and/or skin breakdown  - Initiate interventions, skin care algorithm/standards of care as needed  Outcome: Progressing  Goal: Incision(s), wounds(s) or drain site(s) healing without S/S of infection  Description: INTERVENTIONS:  - Assess and document risk factors for pressure ulcer development  - Assess and document skin integrity  - Assess and document dressing/incision, wound bed, drain sites and surrounding tissue  - Implement wound care per orders  - Initiate isolation precautions as appropriate  - Initiate Pressure Ulcer prevention bundle as indicated  Outcome: Progressing     Problem: MUSCULOSKELETAL - ADULT  Goal: Return mobility to safest level of function  Description: INTERVENTIONS:  - Assess patient stability and activity tolerance for standing, transferring and ambulating w/ or w/o assistive devices  - Assist with transfers and ambulation using safe patient handling equipment as needed  - Ensure adequate protection for wounds/incisions during mobilization  - Obtain PT/OT consults as needed  - Advance activity as appropriate  - Communicate ordered activity level and limitations with patient/family  Outcome: Progressing

## 2022-09-09 NOTE — PLAN OF CARE
Problem: Diabetes/Glucose Control  Goal: Glucose maintained within prescribed range  Description: INTERVENTIONS:  - Monitor Blood Glucose as ordered  - Assess for signs and symptoms of hyperglycemia and hypoglycemia  - Administer ordered medications to maintain glucose within target range  - Assess barriers to adequate nutritional intake and initiate nutrition consult as needed  - Instruct patient on self management of diabetes  Outcome: Progressing     Problem: Patient/Family Goals  Goal: Patient/Family Long Term Goal  Description: Patient's Long Term Goal: DC home or [facility]    Interventions:  -as ordered  - See additional Care Plan goals for specific interventions  Outcome: Progressing  Goal: Patient/Family Short Term Goal  Description: Patient's Short Term Goal: hemodynamically stable    Interventions:   - monitor labs, vs, implement orders as prescribed.   - See additional Care Plan goals for specific interventions  Outcome: Progressing     Problem: METABOLIC/FLUID AND ELECTROLYTES - ADULT  Goal: Glucose maintained within prescribed range  Description: INTERVENTIONS:  - Monitor Blood Glucose as ordered  - Assess for signs and symptoms of hyperglycemia and hypoglycemia  - Administer ordered medications to maintain glucose within target range  - Assess barriers to adequate nutritional intake and initiate nutrition consult as needed  - Instruct patient on self management of diabetes  Outcome: Progressing  Goal: Electrolytes maintained within normal limits  Description: INTERVENTIONS:  - Monitor labs and rhythm and assess patient for signs and symptoms of electrolyte imbalances  - Administer electrolyte replacement as ordered  - Monitor response to electrolyte replacements, including rhythm and repeat lab results as appropriate  - Fluid restriction as ordered  - Instruct patient on fluid and nutrition restrictions as appropriate  Outcome: Progressing  Goal: Hemodynamic stability and optimal renal function maintained  Description: INTERVENTIONS:  - Monitor labs and assess for signs and symptoms of volume excess or deficit  - Monitor intake, output and patient weight  - Monitor urine specific gravity, serum osmolarity and serum sodium as indicated or ordered  - Monitor response to interventions for patient's volume status, including labs, urine output, blood pressure (other measures as available)  - Encourage oral intake as appropriate  - Instruct patient on fluid and nutrition restrictions as appropriate  Outcome: Progressing     Problem: SKIN/TISSUE INTEGRITY - ADULT  Goal: Skin integrity remains intact  Description: INTERVENTIONS  - Assess and document risk factors for pressure ulcer development  - Assess and document skin integrity  - Monitor for areas of redness and/or skin breakdown  - Initiate interventions, skin care algorithm/standards of care as needed  Outcome: Progressing  Goal: Incision(s), wounds(s) or drain site(s) healing without S/S of infection  Description: INTERVENTIONS:  - Assess and document risk factors for pressure ulcer development  - Assess and document skin integrity  - Assess and document dressing/incision, wound bed, drain sites and surrounding tissue  - Implement wound care per orders  - Initiate isolation precautions as appropriate  - Initiate Pressure Ulcer prevention bundle as indicated  Outcome: Progressing     Problem: MUSCULOSKELETAL - ADULT  Goal: Return mobility to safest level of function  Description: INTERVENTIONS:  - Assess patient stability and activity tolerance for standing, transferring and ambulating w/ or w/o assistive devices  - Assist with transfers and ambulation using safe patient handling equipment as needed  - Ensure adequate protection for wounds/incisions during mobilization  - Obtain PT/OT consults as needed  - Advance activity as appropriate  - Communicate ordered activity level and limitations with patient/family  Outcome: Progressing

## 2022-09-09 NOTE — PROGRESS NOTES
Patient discharged to [facility]. Wife providing transport. Vitals stable. Pain controlled. Attempt to call in report with no success. Phone message left.

## 2022-09-09 NOTE — PHYSICAL THERAPY NOTE
PHYSICAL THERAPY TREATMENT NOTE - INPATIENT    Room Number: 330/330-A     Session: 1    Number of Visits to Meet Established Goals: 4    Presenting Problem: weakness, syncope   Co-Morbidities : CKD5, ESRD, HTN, anemia, DM1, PRANEETH     History related to current admission: Patient is a 66year old male admitted on 2022 from home for weakness, falling. Pt diagnosed with acute renal failure. Events since previous session:  : [id number] [facility] tunneled HD catheter placed    ASSESSMENT     Pt presents to therapy for weakness and falling. Pt demonstrated improved mobility and ambulation with no instances of knee buckling on this date. Pt dem improved awareness of surroundings and utilization of rolling walker. Pt continues to req CGA due to previous episodes of knee buckling. Pt will continue to benefit from skilled therapy in order to improve function and reach goals.      DISCHARGE RECOMMENDATIONS  PT Discharge Recommendations: Sub-acute rehabilitation     PLAN  PT Treatment Plan: Bed mobility;Strengthening;Stair training;Transfer training;Balance training;Gait training  Rehab Potential : Good  Frequency (Obs):  (2-3x/week)    CURRENT GOALS     Goal #1 Patient is able to demonstrate supine - sit EOB @ level: independent  IN PROGRESS   Goal #2 Patient is able to demonstrate transfers Sit to/from Stand at assistance level: modified independent  MET   Goal #3 Patient is able to ambulate 50 feet with assist device: least restrictive device at assistance level: modified independent  IN PROGRESS   Goal #4    Goal #5    Goal #6    Goal Comments: Goals established on 2022 Goal #2 met; Goals 1 and 3 in progress      SUBJECTIVE  \"im doing ok\"    OBJECTIVE  Precautions: Bed/chair alarm    WEIGHT BEARING RESTRICTION  Weight Bearing Restriction: None                PAIN ASSESSMENT   Ratin          BALANCE Static Sitting: Good  Dynamic Sitting: Fair +           Static Standing: Fair  Dynamic Standing: Fair -    ACTIVITY TOLERANCE                         O2 WALK         AM-PAC '6-Clicks' INPATIENT SHORT FORM - BASIC MOBILITY  How much difficulty does the patient currently have. .. Patient Difficulty: Turning over in bed (including adjusting bedclothes, sheets and blankets)?: None   Patient Difficulty: Sitting down on and standing up from a chair with arms (e.g., wheelchair, bedside commode, etc.): None   Patient Difficulty: Moving from lying on back to sitting on the side of the bed?: None   How much help from another person does the patient currently need. .. Help from Another: Moving to and from a bed to a chair (including a wheelchair)?: A Little   Help from Another: Need to walk in hospital room?: A Little   Help from Another: Climbing 3-5 steps with a railing?: A Lot       AM-PAC Score:  Raw Score: 20   Approx Degree of Impairment: 35.83%   Standardized Score (AM-PAC Scale): 47.67   CMS Modifier (G-Code): CJ    FUNCTIONAL ABILITY STATUS  Gait Assessment   Functional Mobility/Gait Assessment  Gait Assistance: Contact guard assist (for previous knee buckling)  Distance (ft): 125  Assistive Device: Rolling walker  Pattern:  (dec adelso)    Skilled Therapy Provided    Bed Mobility:  Rolling right: NT  Rolling left: NT    Supine<>Sit: mod I   Sit<>Supine: NT     Transfer Mobility:  Sit<>Stand: supervision   Stand<>Sit: supervision   Gait: CGA for previous knee buckling    Therapist's Comments: Pt demonstrated improved ambulation and mobility from previous session with no episode of knee buckling on this date. THERAPEUTIC EXERCISES  Lower Extremity Ankle pumps     Upper Extremity    Position Sitting     Repetitions   10   Sets   1     Patient End of Session: Up in chair;Needs met;Call light within reach; All patient questions and concerns addressed; Alarm set

## 2022-09-12 NOTE — CM/SW NOTE
CM followed up on pt's DME referral through E in [facility] for lightweight wheelchair. Graciela Mays and [facility] stated that there was missing documentation in the referral that cannot be added once pt has been discharged. Anahy Brothers ([phone]) at the [facility] contacted to follow up on referral and she verbalized that she will have therapy/SW at [facility] follow up on this order.     Update provided to pt's wife, Jeramie Grady ([phone])    DEBBY AlbertsN, RN-BC    [id number]

## 2022-09-12 NOTE — TELEPHONE ENCOUNTER
Patient would like to speak with you about where he is to go for dialysis. He is confused about who is calling him to set this up.

## 2022-10-15 ENCOUNTER — HOSPITAL ENCOUNTER (EMERGENCY)
Age: 72
Discharge: HOME | End: 2022-10-16
Attending: EMERGENCY MEDICINE
Payer: MEDICARE

## 2022-10-15 ENCOUNTER — APPOINTMENT (OUTPATIENT)
Dept: CT IMAGING | Age: 72
End: 2022-10-15
Attending: EMERGENCY MEDICINE
Payer: MEDICARE

## 2022-10-15 ENCOUNTER — APPOINTMENT (OUTPATIENT)
Dept: GENERAL RADIOLOGY | Age: 72
End: 2022-10-15
Attending: EMERGENCY MEDICINE
Payer: MEDICARE

## 2022-10-15 DIAGNOSIS — E86.0 DEHYDRATION: ICD-10-CM

## 2022-10-15 DIAGNOSIS — R94.31 ABNORMAL EKG: ICD-10-CM

## 2022-10-15 DIAGNOSIS — R41.82 ALTERED MENTAL STATUS, UNSPECIFIED ALTERED MENTAL STATUS TYPE: Primary | ICD-10-CM

## 2022-10-15 PROBLEM — E87.6 HYPOKALEMIA: Status: ACTIVE | Noted: 2022-10-15

## 2022-10-15 PROBLEM — D69.6 THROMBOCYTOPENIA (HCC): Status: ACTIVE | Noted: 2022-10-15

## 2022-10-15 PROBLEM — D69.6 THROMBOCYTOPENIA: Status: ACTIVE | Noted: 2022-10-15

## 2022-10-15 LAB
ALBUMIN SERPL-MCNC: 2.6 G/DL (ref 3.4–5)
ALBUMIN/GLOB SERPL: 0.6 {RATIO} (ref 1–2)
ALP LIVER SERPL-CCNC: 180 U/L
ALT SERPL-CCNC: 31 U/L
ANION GAP SERPL CALC-SCNC: 7 MMOL/L (ref 0–18)
AST SERPL-CCNC: 30 U/L (ref 15–37)
BASOPHILS # BLD AUTO: 0.01 X10(3) UL (ref 0–0.2)
BASOPHILS NFR BLD AUTO: 0.2 %
BILIRUB SERPL-MCNC: 0.8 MG/DL (ref 0.1–2)
BUN BLD-MCNC: 23 MG/DL (ref 7–18)
CALCIUM BLD-MCNC: 8.5 MG/DL (ref 8.5–10.1)
CHLORIDE SERPL-SCNC: 99 MMOL/L (ref 98–112)
CO2 SERPL-SCNC: 29 MMOL/L (ref 21–32)
CREAT BLD-MCNC: 3.3 MG/DL
EOSINOPHIL # BLD AUTO: 0.03 X10(3) UL (ref 0–0.7)
EOSINOPHIL NFR BLD AUTO: 0.6 %
ERYTHROCYTE [DISTWIDTH] IN BLOOD BY AUTOMATED COUNT: 16 %
GFR SERPLBLD BASED ON 1.73 SQ M-ARVRAT: 19 ML/MIN/1.73M2 (ref 60–?)
GLOBULIN PLAS-MCNC: 4.4 G/DL (ref 2.8–4.4)
GLUCOSE BLD-MCNC: 205 MG/DL (ref 70–99)
HCT VFR BLD AUTO: 35.8 %
HGB BLD-MCNC: 11.1 G/DL
IMM GRANULOCYTES # BLD AUTO: 0.01 X10(3) UL (ref 0–1)
IMM GRANULOCYTES NFR BLD: 0.2 %
LYMPHOCYTES # BLD AUTO: 0.39 X10(3) UL (ref 1–4)
LYMPHOCYTES NFR BLD AUTO: 7.9 %
MCH RBC QN AUTO: 29 PG (ref 26–34)
MCHC RBC AUTO-ENTMCNC: 31 G/DL (ref 31–37)
MCV RBC AUTO: 93.5 FL
MONOCYTES # BLD AUTO: 0.24 X10(3) UL (ref 0.1–1)
MONOCYTES NFR BLD AUTO: 4.9 %
NEUTROPHILS # BLD AUTO: 4.24 X10 (3) UL (ref 1.5–7.7)
NEUTROPHILS # BLD AUTO: 4.24 X10(3) UL (ref 1.5–7.7)
NEUTROPHILS NFR BLD AUTO: 86.2 %
OSMOLALITY SERPL CALC.SUM OF ELEC: 290 MOSM/KG (ref 275–295)
PLATELET # BLD AUTO: 132 10(3)UL (ref 150–450)
POCT INFLUENZA A: NEGATIVE
POCT INFLUENZA B: NEGATIVE
POTASSIUM SERPL-SCNC: 3.5 MMOL/L (ref 3.5–5.1)
PROT SERPL-MCNC: 7 G/DL (ref 6.4–8.2)
RBC # BLD AUTO: 3.83 X10(6)UL
SARS-COV-2 RNA RESP QL NAA+PROBE: NOT DETECTED
SODIUM SERPL-SCNC: 135 MMOL/L (ref 136–145)
TROPONIN I HIGH SENSITIVITY: 21 NG/L
WBC # BLD AUTO: 4.9 X10(3) UL (ref 4–11)

## 2022-10-15 PROCEDURE — 36415 COLL VENOUS BLD VENIPUNCTURE: CPT

## 2022-10-15 PROCEDURE — 71045 X-RAY EXAM CHEST 1 VIEW: CPT | Performed by: EMERGENCY MEDICINE

## 2022-10-15 PROCEDURE — 99285 EMERGENCY DEPT VISIT HI MDM: CPT

## 2022-10-15 PROCEDURE — 96361 HYDRATE IV INFUSION ADD-ON: CPT

## 2022-10-15 PROCEDURE — 80053 COMPREHEN METABOLIC PANEL: CPT | Performed by: EMERGENCY MEDICINE

## 2022-10-15 PROCEDURE — 87040 BLOOD CULTURE FOR BACTERIA: CPT | Performed by: EMERGENCY MEDICINE

## 2022-10-15 PROCEDURE — 93005 ELECTROCARDIOGRAM TRACING: CPT

## 2022-10-15 PROCEDURE — 70450 CT HEAD/BRAIN W/O DYE: CPT | Performed by: EMERGENCY MEDICINE

## 2022-10-15 PROCEDURE — 93010 ELECTROCARDIOGRAM REPORT: CPT

## 2022-10-15 PROCEDURE — 87502 INFLUENZA DNA AMP PROBE: CPT | Performed by: EMERGENCY MEDICINE

## 2022-10-15 PROCEDURE — 85025 COMPLETE CBC W/AUTO DIFF WBC: CPT | Performed by: EMERGENCY MEDICINE

## 2022-10-15 PROCEDURE — 96360 HYDRATION IV INFUSION INIT: CPT

## 2022-10-15 PROCEDURE — 84484 ASSAY OF TROPONIN QUANT: CPT | Performed by: EMERGENCY MEDICINE

## 2022-10-15 RX ORDER — ACETAMINOPHEN 500 MG
1000 TABLET ORAL ONCE
Status: COMPLETED | OUTPATIENT
Start: 2022-10-15 | End: 2022-10-15

## 2022-10-15 RX ORDER — SODIUM CHLORIDE 9 MG/ML
125 INJECTION, SOLUTION INTRAVENOUS CONTINUOUS
Status: DISCONTINUED | OUTPATIENT
Start: 2022-10-15 | End: 2022-10-16

## 2022-10-16 VITALS
RESPIRATION RATE: 16 BRPM | HEART RATE: 76 BPM | HEIGHT: 66 IN | BODY MASS INDEX: 29.57 KG/M2 | WEIGHT: 184 LBS | SYSTOLIC BLOOD PRESSURE: 135 MMHG | TEMPERATURE: 99 F | DIASTOLIC BLOOD PRESSURE: 60 MMHG | OXYGEN SATURATION: 96 %

## 2022-10-16 LAB — TROPONIN I HIGH SENSITIVITY: 21 NG/L

## 2022-10-16 NOTE — ED INITIAL ASSESSMENT (HPI)
Patient had dialysis today - went in walking and talker per patient norm - per wife patient with AMS since coming home and unable to walk per normal - patient a&ox2

## 2022-10-17 ENCOUNTER — OFFICE VISIT (OUTPATIENT)
Dept: NEPHROLOGY | Facility: CLINIC | Age: 72
End: 2022-10-17
Payer: MEDICARE

## 2022-10-17 VITALS — SYSTOLIC BLOOD PRESSURE: 116 MMHG | WEIGHT: 183.38 LBS | BODY MASS INDEX: 30 KG/M2 | DIASTOLIC BLOOD PRESSURE: 52 MMHG

## 2022-10-17 DIAGNOSIS — N18.5 CKD (CHRONIC KIDNEY DISEASE) STAGE 5, GFR LESS THAN 15 ML/MIN (HCC): Primary | ICD-10-CM

## 2022-10-17 DIAGNOSIS — I10 PRIMARY HYPERTENSION: ICD-10-CM

## 2022-10-17 DIAGNOSIS — E10.21 DIABETIC NEPHROPATHY ASSOCIATED WITH TYPE 1 DIABETES MELLITUS (HCC): ICD-10-CM

## 2022-10-17 LAB
ATRIAL RATE: 89 BPM
P AXIS: 29 DEGREES
P-R INTERVAL: 188 MS
Q-T INTERVAL: 362 MS
QRS DURATION: 98 MS
QTC CALCULATION (BEZET): 440 MS
R AXIS: 28 DEGREES
T AXIS: 253 DEGREES
VENTRICULAR RATE: 89 BPM

## 2022-10-20 ENCOUNTER — TELEPHONE (OUTPATIENT)
Dept: NEPHROLOGY | Facility: CLINIC | Age: 72
End: 2022-10-20

## 2022-10-20 NOTE — TELEPHONE ENCOUNTER
Patient stated that his nifedipine was reduced to 30 mg once a day by doctor at dialysis. He said last night and today his BP is high. 170/93 last night and 189/103 today.     Please advise

## 2022-10-31 ENCOUNTER — HOSPITAL ENCOUNTER (OUTPATIENT)
Dept: ULTRASOUND IMAGING | Facility: HOSPITAL | Age: 72
Discharge: HOME | End: 2022-10-31
Attending: SURGERY
Payer: MEDICARE

## 2022-10-31 DIAGNOSIS — T82.318A BREAKDOWN (MECHANICAL) OF OTHER VASCULAR GRAFTS, INITIAL ENCOUNTER (HCC): ICD-10-CM

## 2022-10-31 PROCEDURE — 93971 EXTREMITY STUDY: CPT | Performed by: SURGERY

## 2022-11-27 ENCOUNTER — PATIENT MESSAGE (OUTPATIENT)
Dept: NEPHROLOGY | Facility: CLINIC | Age: 72
End: 2022-11-27

## 2022-11-28 RX ORDER — NIFEDIPINE 60 MG/1
60 TABLET, FILM COATED, EXTENDED RELEASE ORAL DAILY
Qty: 360 TABLET | Refills: 3 | Status: SHIPPED | OUTPATIENT
Start: 2022-11-28

## 2022-11-28 RX ORDER — NIFEDIPINE 60 MG/1
60 TABLET, FILM COATED, EXTENDED RELEASE ORAL 2 TIMES DAILY
Qty: 360 TABLET | Refills: 3 | Status: SHIPPED | OUTPATIENT
Start: 2022-11-28

## 2022-11-29 ENCOUNTER — EKG ENCOUNTER (OUTPATIENT)
Dept: LAB | Age: 72
End: 2022-11-29
Attending: SURGERY
Payer: MEDICARE

## 2022-11-29 ENCOUNTER — HOSPITAL ENCOUNTER (EMERGENCY)
Age: 72
Discharge: STILL ADMITTED OR ADMINISTRATIVE | End: 2022-11-29
Payer: MEDICARE

## 2022-11-29 ENCOUNTER — LAB ENCOUNTER (OUTPATIENT)
Dept: LAB | Age: 72
End: 2022-11-29
Attending: SURGERY
Payer: MEDICARE

## 2022-11-29 DIAGNOSIS — Z01.812 ENCOUNTER FOR PREOPERATIVE SCREENING LABORATORY TESTING FOR COVID-19 VIRUS: ICD-10-CM

## 2022-11-29 DIAGNOSIS — Z01.818 PRE-OP TESTING: ICD-10-CM

## 2022-11-29 DIAGNOSIS — N18.6 ESRD (END STAGE RENAL DISEASE) (HCC): ICD-10-CM

## 2022-11-29 DIAGNOSIS — Z20.822 ENCOUNTER FOR PREOPERATIVE SCREENING LABORATORY TESTING FOR COVID-19 VIRUS: ICD-10-CM

## 2022-11-29 LAB
ALBUMIN SERPL-MCNC: 2.9 G/DL (ref 3.4–5)
ALBUMIN/GLOB SERPL: 0.7 {RATIO} (ref 1–2)
ALP LIVER SERPL-CCNC: 547 U/L
ALT SERPL-CCNC: 115 U/L
ANION GAP SERPL CALC-SCNC: 1 MMOL/L (ref 0–18)
APTT PPP: 40 SECONDS (ref 23.3–35.6)
AST SERPL-CCNC: 102 U/L (ref 15–37)
BASOPHILS # BLD AUTO: 0.07 X10(3) UL (ref 0–0.2)
BASOPHILS NFR BLD AUTO: 1.4 %
BILIRUB SERPL-MCNC: 0.6 MG/DL (ref 0.1–2)
BUN BLD-MCNC: 23 MG/DL (ref 7–18)
CALCIUM BLD-MCNC: 8.8 MG/DL (ref 8.5–10.1)
CHLORIDE SERPL-SCNC: 98 MMOL/L (ref 98–112)
CO2 SERPL-SCNC: 35 MMOL/L (ref 21–32)
CREAT BLD-MCNC: 3.17 MG/DL
EOSINOPHIL # BLD AUTO: 0.22 X10(3) UL (ref 0–0.7)
EOSINOPHIL NFR BLD AUTO: 4.4 %
ERYTHROCYTE [DISTWIDTH] IN BLOOD BY AUTOMATED COUNT: 16.7 %
GFR SERPLBLD BASED ON 1.73 SQ M-ARVRAT: 20 ML/MIN/1.73M2 (ref 60–?)
GLOBULIN PLAS-MCNC: 4.3 G/DL (ref 2.8–4.4)
GLUCOSE BLD-MCNC: 204 MG/DL (ref 70–99)
HCT VFR BLD AUTO: 39.2 %
HGB BLD-MCNC: 12.1 G/DL
IMM GRANULOCYTES # BLD AUTO: 0.01 X10(3) UL (ref 0–1)
IMM GRANULOCYTES NFR BLD: 0.2 %
INR BLD: 1.14 (ref 0.85–1.16)
LYMPHOCYTES # BLD AUTO: 0.97 X10(3) UL (ref 1–4)
LYMPHOCYTES NFR BLD AUTO: 19.2 %
MCH RBC QN AUTO: 28.7 PG (ref 26–34)
MCHC RBC AUTO-ENTMCNC: 30.9 G/DL (ref 31–37)
MCV RBC AUTO: 93.1 FL
MONOCYTES # BLD AUTO: 0.48 X10(3) UL (ref 0.1–1)
MONOCYTES NFR BLD AUTO: 9.5 %
NEUTROPHILS # BLD AUTO: 3.3 X10 (3) UL (ref 1.5–7.7)
NEUTROPHILS # BLD AUTO: 3.3 X10(3) UL (ref 1.5–7.7)
NEUTROPHILS NFR BLD AUTO: 65.3 %
OSMOLALITY SERPL CALC.SUM OF ELEC: 288 MOSM/KG (ref 275–295)
PLATELET # BLD AUTO: 114 10(3)UL (ref 150–450)
POTASSIUM SERPL-SCNC: 4 MMOL/L (ref 3.5–5.1)
PROT SERPL-MCNC: 7.2 G/DL (ref 6.4–8.2)
PROTHROMBIN TIME: 14.6 SECONDS (ref 11.6–14.8)
RBC # BLD AUTO: 4.21 X10(6)UL
SODIUM SERPL-SCNC: 134 MMOL/L (ref 136–145)
WBC # BLD AUTO: 5.1 X10(3) UL (ref 4–11)

## 2022-11-29 PROCEDURE — 85730 THROMBOPLASTIN TIME PARTIAL: CPT

## 2022-11-29 PROCEDURE — 93010 ELECTROCARDIOGRAM REPORT: CPT | Performed by: INTERNAL MEDICINE

## 2022-11-29 PROCEDURE — 80053 COMPREHEN METABOLIC PANEL: CPT

## 2022-11-29 PROCEDURE — 85025 COMPLETE CBC W/AUTO DIFF WBC: CPT

## 2022-11-29 PROCEDURE — 85610 PROTHROMBIN TIME: CPT

## 2022-11-29 PROCEDURE — 36415 COLL VENOUS BLD VENIPUNCTURE: CPT

## 2022-11-29 PROCEDURE — 93005 ELECTROCARDIOGRAM TRACING: CPT

## 2022-11-30 LAB
ATRIAL RATE: 68 BPM
P AXIS: 16 DEGREES
P-R INTERVAL: 206 MS
Q-T INTERVAL: 374 MS
QRS DURATION: 102 MS
QTC CALCULATION (BEZET): 397 MS
R AXIS: 36 DEGREES
SARS-COV-2 RNA RESP QL NAA+PROBE: NOT DETECTED
T AXIS: 259 DEGREES
VENTRICULAR RATE: 68 BPM

## 2022-12-01 NOTE — H&P
[address]    PATIENT'S NAME: Brian Liang   ATTENDING PHYSICIAN: Franc Underwood M.D. PATIENT ACCOUNT#:   [de-identified]    LOCATION:    MEDICAL RECORD #:   [id number]       YOB: 1950  ADMISSION DATE:       12/02/2022    HISTORY AND PHYSICAL EXAMINATION    HISTORY OF PRESENT ILLNESS:  This is a 69-year-old white male referred by Dr. Jayro Solano for creation of a fistula. The patient has a long history of diabetes for at least 60 years, also developed end-stage renal disease. He has a right jugular permacath. He is right-handed. He has a history of significant arthritis, Dupuytren contractures on both hands. Had injury to his left wrist and had a plate placed in the distant past here at [facility].  Currently, no hand ischemia. The patient undergoes dialysis on Tuesday and Saturday, probably will become Tuesday, Thursday, and Saturday. PAST SURGICAL HISTORY:  The patient has a history of back surgery x4. He has had left total knee replacement surgery, left ankle surgery, wrist surgery. MEDICATIONS:  Aspirin, Zetia, famotidine, Lasix, gabapentin, metoprolol tartrate, nifedipine, isosorbide, insulin, quinapril, Crestor. ALLERGIES:  Depakote with some dizziness, Wellbutrin, and fluconazole. FAMILY HISTORY:  Coronary disease. SOCIAL HISTORY:  No ETOH abuse, drug abuse, tobacco abuse. 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 PHYSICAL EXAMINATION:    GENERAL:  He is awake and alert. He is appropriate. No acute distress. VITAL SIGNS:  Blood pressure 146/70, heart rate 72, respirations 20. HEENT:  Pupils are equal and round. Sclerae are clear. Mouth without lesions. NECK:  No cervical bruit. No JVD. LUNGS:  Clear to auscultation. No rales or rhonchi. HEART:  Normal.  No murmur. ABDOMEN:  Soft. No tenderness. EXTREMITIES:  Femoral pulses are palpable. Popliteal and pedal pulses are slightly diminished.   He has a normal Miguelito test in the left upper extremity. No veins seen. Some veins at the wrist.  He has a puncture of the left antecubital fossa for a blood draw in the past.    He had vein mapping performed, and it shows a cephalic vein in the upper arm of 4 to 3 mm and a basilic vein between 3 to 4 mm. IMPRESSION:  Patient with end-stage renal disease, history of Dupuytren contracture of both upper extremities, history of left wrist surgery in the past.  I have recommended creation of a left arm fistula; will try to do an autogenous fistula. Risks and complications were explained to the patient including death, cardiac complications, bleeding, infection, arterial steal with hand loss, venous hypertension with swelling of the arm, failure of the fistula to mature, multisystem organ failure, sepsis. Options for prosthetic graft versus autogenous fistula were described. The options of continuing with a permacath versus peritoneal dialysis were also described. The patient understood and agreed. All questions answered.     Dictated By Althea Johnson M.D.  d: 12/01/2022 10:26:59  t: 12/01/2022 14:15:51  [facility] [id number]  [facility]/

## 2022-12-04 ENCOUNTER — ANESTHESIA EVENT (OUTPATIENT)
Dept: CARDIAC SURGERY | Facility: HOSPITAL | Age: 72
End: 2022-12-04
Payer: MEDICARE

## 2022-12-05 ENCOUNTER — HOSPITAL ENCOUNTER (OUTPATIENT)
Facility: HOSPITAL | Age: 72
Setting detail: HOSPITAL OUTPATIENT SURGERY
Discharge: HOME | End: 2022-12-05
Attending: SURGERY | Admitting: SURGERY
Payer: MEDICARE

## 2022-12-05 ENCOUNTER — ANESTHESIA (OUTPATIENT)
Dept: CARDIAC SURGERY | Facility: HOSPITAL | Age: 72
End: 2022-12-05
Payer: MEDICARE

## 2022-12-05 VITALS
SYSTOLIC BLOOD PRESSURE: 137 MMHG | TEMPERATURE: 97 F | OXYGEN SATURATION: 95 % | HEART RATE: 82 BPM | HEIGHT: 66 IN | BODY MASS INDEX: 28.13 KG/M2 | RESPIRATION RATE: 16 BRPM | WEIGHT: 175 LBS | DIASTOLIC BLOOD PRESSURE: 81 MMHG

## 2022-12-05 DIAGNOSIS — Z20.822 ENCOUNTER FOR PREOPERATIVE SCREENING LABORATORY TESTING FOR COVID-19 VIRUS: ICD-10-CM

## 2022-12-05 DIAGNOSIS — Z01.812 ENCOUNTER FOR PREOPERATIVE SCREENING LABORATORY TESTING FOR COVID-19 VIRUS: ICD-10-CM

## 2022-12-05 DIAGNOSIS — Z01.818 PRE-OP TESTING: ICD-10-CM

## 2022-12-05 DIAGNOSIS — N18.6 ESRD (END STAGE RENAL DISEASE) (HCC): Primary | ICD-10-CM

## 2022-12-05 LAB
ANION GAP SERPL CALC-SCNC: 4 MMOL/L (ref 0–18)
BUN BLD-MCNC: 40 MG/DL (ref 7–18)
CALCIUM BLD-MCNC: 8.7 MG/DL (ref 8.5–10.1)
CHLORIDE SERPL-SCNC: 101 MMOL/L (ref 98–112)
CO2 SERPL-SCNC: 30 MMOL/L (ref 21–32)
CREAT BLD-MCNC: 4.7 MG/DL
GFR SERPLBLD BASED ON 1.73 SQ M-ARVRAT: 12 ML/MIN/1.73M2 (ref 60–?)
GLUCOSE BLD-MCNC: 151 MG/DL (ref 70–99)
GLUCOSE BLD-MCNC: 175 MG/DL (ref 70–99)
GLUCOSE BLD-MCNC: 179 MG/DL (ref 70–99)
OSMOLALITY SERPL CALC.SUM OF ELEC: 294 MOSM/KG (ref 275–295)
POTASSIUM SERPL-SCNC: 4.3 MMOL/L (ref 3.5–5.1)
SARS-COV-2 RNA RESP QL NAA+PROBE: NOT DETECTED
SODIUM SERPL-SCNC: 135 MMOL/L (ref 136–145)

## 2022-12-05 PROCEDURE — 82962 GLUCOSE BLOOD TEST: CPT

## 2022-12-05 PROCEDURE — 03180ZF BYPASS LEFT BRACHIAL ARTERY TO LOWER ARM VEIN, OPEN APPROACH: ICD-10-PCS | Performed by: SURGERY

## 2022-12-05 PROCEDURE — 80048 BASIC METABOLIC PNL TOTAL CA: CPT | Performed by: SURGERY

## 2022-12-05 RX ORDER — HYDROCODONE BITARTRATE AND ACETAMINOPHEN 5; 325 MG/1; MG/1
1 TABLET ORAL ONCE AS NEEDED
Status: DISCONTINUED | OUTPATIENT
Start: 2022-12-05 | End: 2022-12-05

## 2022-12-05 RX ORDER — PROTAMINE SULFATE 10 MG/ML
INJECTION, SOLUTION INTRAVENOUS AS NEEDED
Status: DISCONTINUED | OUTPATIENT
Start: 2022-12-05 | End: 2022-12-05 | Stop reason: SURG

## 2022-12-05 RX ORDER — MIDAZOLAM HYDROCHLORIDE 1 MG/ML
1 INJECTION INTRAMUSCULAR; INTRAVENOUS EVERY 5 MIN PRN
Status: DISCONTINUED | OUTPATIENT
Start: 2022-12-05 | End: 2022-12-05

## 2022-12-05 RX ORDER — LABETALOL HYDROCHLORIDE 5 MG/ML
10 INJECTION, SOLUTION INTRAVENOUS EVERY 10 MIN PRN
Status: DISCONTINUED | OUTPATIENT
Start: 2022-12-05 | End: 2022-12-05

## 2022-12-05 RX ORDER — HYDROMORPHONE HYDROCHLORIDE 1 MG/ML
0.4 INJECTION, SOLUTION INTRAMUSCULAR; INTRAVENOUS; SUBCUTANEOUS EVERY 5 MIN PRN
Status: DISCONTINUED | OUTPATIENT
Start: 2022-12-05 | End: 2022-12-05

## 2022-12-05 RX ORDER — DIPHENHYDRAMINE HYDROCHLORIDE 50 MG/ML
12.5 INJECTION INTRAMUSCULAR; INTRAVENOUS AS NEEDED
Status: DISCONTINUED | OUTPATIENT
Start: 2022-12-05 | End: 2022-12-05

## 2022-12-05 RX ORDER — DEXTROSE MONOHYDRATE 25 G/50ML
50 INJECTION, SOLUTION INTRAVENOUS
Status: DISCONTINUED | OUTPATIENT
Start: 2022-12-05 | End: 2022-12-05

## 2022-12-05 RX ORDER — HEPARIN SODIUM 5000 [USP'U]/ML
INJECTION, SOLUTION INTRAVENOUS; SUBCUTANEOUS AS NEEDED
Status: DISCONTINUED | OUTPATIENT
Start: 2022-12-05 | End: 2022-12-05 | Stop reason: SURG

## 2022-12-05 RX ORDER — ONDANSETRON 2 MG/ML
INJECTION INTRAMUSCULAR; INTRAVENOUS AS NEEDED
Status: DISCONTINUED | OUTPATIENT
Start: 2022-12-05 | End: 2022-12-05 | Stop reason: SURG

## 2022-12-05 RX ORDER — MIDAZOLAM HYDROCHLORIDE 1 MG/ML
INJECTION INTRAMUSCULAR; INTRAVENOUS AS NEEDED
Status: DISCONTINUED | OUTPATIENT
Start: 2022-12-05 | End: 2022-12-05 | Stop reason: SURG

## 2022-12-05 RX ORDER — FAMOTIDINE 20 MG/1
20 TABLET, FILM COATED ORAL DAILY
Status: DISCONTINUED | OUTPATIENT
Start: 2022-12-05 | End: 2022-12-05

## 2022-12-05 RX ORDER — HYDROMORPHONE HYDROCHLORIDE 1 MG/ML
0.6 INJECTION, SOLUTION INTRAMUSCULAR; INTRAVENOUS; SUBCUTANEOUS EVERY 5 MIN PRN
Status: DISCONTINUED | OUTPATIENT
Start: 2022-12-05 | End: 2022-12-05

## 2022-12-05 RX ORDER — NALOXONE HYDROCHLORIDE 0.4 MG/ML
80 INJECTION, SOLUTION INTRAMUSCULAR; INTRAVENOUS; SUBCUTANEOUS AS NEEDED
Status: DISCONTINUED | OUTPATIENT
Start: 2022-12-05 | End: 2022-12-05

## 2022-12-05 RX ORDER — SODIUM CHLORIDE 9 MG/ML
INJECTION, SOLUTION INTRAVENOUS CONTINUOUS PRN
Status: DISCONTINUED | OUTPATIENT
Start: 2022-12-05 | End: 2022-12-05 | Stop reason: SURG

## 2022-12-05 RX ORDER — HYDROCODONE BITARTRATE AND ACETAMINOPHEN 5; 325 MG/1; MG/1
1 TABLET ORAL EVERY 6 HOURS PRN
Status: DISCONTINUED | OUTPATIENT
Start: 2022-12-05 | End: 2022-12-05

## 2022-12-05 RX ORDER — SODIUM CHLORIDE, SODIUM LACTATE, POTASSIUM CHLORIDE, CALCIUM CHLORIDE 600; 310; 30; 20 MG/100ML; MG/100ML; MG/100ML; MG/100ML
INJECTION, SOLUTION INTRAVENOUS CONTINUOUS
Status: DISCONTINUED | OUTPATIENT
Start: 2022-12-05 | End: 2022-12-05

## 2022-12-05 RX ORDER — ONDANSETRON 2 MG/ML
4 INJECTION INTRAMUSCULAR; INTRAVENOUS EVERY 6 HOURS PRN
Status: DISCONTINUED | OUTPATIENT
Start: 2022-12-05 | End: 2022-12-05

## 2022-12-05 RX ORDER — NICOTINE POLACRILEX 4 MG
15 LOZENGE BUCCAL
Status: DISCONTINUED | OUTPATIENT
Start: 2022-12-05 | End: 2022-12-05

## 2022-12-05 RX ORDER — INSULIN ASPART 100 [IU]/ML
INJECTION, SOLUTION INTRAVENOUS; SUBCUTANEOUS
Status: COMPLETED
Start: 2022-12-05 | End: 2022-12-05

## 2022-12-05 RX ORDER — EPHEDRINE SULFATE 50 MG/ML
INJECTION INTRAVENOUS AS NEEDED
Status: DISCONTINUED | OUTPATIENT
Start: 2022-12-05 | End: 2022-12-05 | Stop reason: SURG

## 2022-12-05 RX ORDER — ISOSORBIDE MONONITRATE 30 MG/1
30 TABLET, EXTENDED RELEASE ORAL DAILY
Status: DISCONTINUED | OUTPATIENT
Start: 2022-12-05 | End: 2022-12-05

## 2022-12-05 RX ORDER — METOCLOPRAMIDE HYDROCHLORIDE 5 MG/ML
5 INJECTION INTRAMUSCULAR; INTRAVENOUS EVERY 8 HOURS PRN
Status: DISCONTINUED | OUTPATIENT
Start: 2022-12-05 | End: 2022-12-05

## 2022-12-05 RX ORDER — ROSUVASTATIN CALCIUM 20 MG/1
20 TABLET, COATED ORAL DAILY
Status: DISCONTINUED | OUTPATIENT
Start: 2022-12-05 | End: 2022-12-05

## 2022-12-05 RX ORDER — HYDROMORPHONE HYDROCHLORIDE 1 MG/ML
0.2 INJECTION, SOLUTION INTRAMUSCULAR; INTRAVENOUS; SUBCUTANEOUS EVERY 5 MIN PRN
Status: DISCONTINUED | OUTPATIENT
Start: 2022-12-05 | End: 2022-12-05

## 2022-12-05 RX ORDER — INSULIN ASPART 100 [IU]/ML
INJECTION, SOLUTION INTRAVENOUS; SUBCUTANEOUS ONCE
Status: COMPLETED | OUTPATIENT
Start: 2022-12-05 | End: 2022-12-05

## 2022-12-05 RX ORDER — CEFAZOLIN SODIUM/WATER 2 G/20 ML
SYRINGE (ML) INTRAVENOUS AS NEEDED
Status: DISCONTINUED | OUTPATIENT
Start: 2022-12-05 | End: 2022-12-05 | Stop reason: SURG

## 2022-12-05 RX ORDER — NICOTINE POLACRILEX 4 MG
30 LOZENGE BUCCAL
Status: DISCONTINUED | OUTPATIENT
Start: 2022-12-05 | End: 2022-12-05

## 2022-12-05 RX ORDER — ACETAMINOPHEN 500 MG
1000 TABLET ORAL ONCE AS NEEDED
Status: DISCONTINUED | OUTPATIENT
Start: 2022-12-05 | End: 2022-12-05

## 2022-12-05 RX ORDER — EZETIMIBE 10 MG/1
10 TABLET ORAL DAILY
Status: DISCONTINUED | OUTPATIENT
Start: 2022-12-05 | End: 2022-12-05

## 2022-12-05 RX ORDER — BUPIVACAINE HYDROCHLORIDE 5 MG/ML
INJECTION, SOLUTION EPIDURAL; INTRACAUDAL AS NEEDED
Status: DISCONTINUED | OUTPATIENT
Start: 2022-12-05 | End: 2022-12-05 | Stop reason: HOSPADM

## 2022-12-05 RX ORDER — PANTOPRAZOLE SODIUM 20 MG/1
20 TABLET, DELAYED RELEASE ORAL
Status: DISCONTINUED | OUTPATIENT
Start: 2022-12-05 | End: 2022-12-05

## 2022-12-05 RX ORDER — HYDROCODONE BITARTRATE AND ACETAMINOPHEN 5; 325 MG/1; MG/1
2 TABLET ORAL ONCE AS NEEDED
Status: DISCONTINUED | OUTPATIENT
Start: 2022-12-05 | End: 2022-12-05

## 2022-12-05 RX ORDER — NIFEDIPINE 60 MG/1
60 TABLET, EXTENDED RELEASE ORAL DAILY
Status: DISCONTINUED | OUTPATIENT
Start: 2022-12-05 | End: 2022-12-05

## 2022-12-05 RX ORDER — LISINOPRIL 20 MG/1
20 TABLET ORAL DAILY
Status: DISCONTINUED | OUTPATIENT
Start: 2022-12-05 | End: 2022-12-05

## 2022-12-05 RX ADMIN — PROTAMINE SULFATE 20 MG: 10 INJECTION, SOLUTION INTRAVENOUS at 08:32:00

## 2022-12-05 RX ADMIN — CEFAZOLIN SODIUM/WATER 2 G: 2 G/20 ML SYRINGE (ML) INTRAVENOUS at 07:25:00

## 2022-12-05 RX ADMIN — SODIUM CHLORIDE: 9 INJECTION, SOLUTION INTRAVENOUS at 08:59:00

## 2022-12-05 RX ADMIN — SODIUM CHLORIDE: 9 INJECTION, SOLUTION INTRAVENOUS at 07:15:00

## 2022-12-05 RX ADMIN — EPHEDRINE SULFATE 10 MG: 50 INJECTION INTRAVENOUS at 07:46:00

## 2022-12-05 RX ADMIN — ONDANSETRON 4 MG: 2 INJECTION INTRAMUSCULAR; INTRAVENOUS at 07:51:00

## 2022-12-05 RX ADMIN — HEPARIN SODIUM 10000 UNITS: 5000 INJECTION, SOLUTION INTRAVENOUS; SUBCUTANEOUS at 07:49:00

## 2022-12-05 RX ADMIN — EPHEDRINE SULFATE 15 MG: 50 INJECTION INTRAVENOUS at 07:53:00

## 2022-12-05 RX ADMIN — PROTAMINE SULFATE 10 MG: 10 INJECTION, SOLUTION INTRAVENOUS at 08:41:00

## 2022-12-05 RX ADMIN — MIDAZOLAM HYDROCHLORIDE 2 MG: 1 INJECTION INTRAMUSCULAR; INTRAVENOUS at 07:17:00

## 2022-12-05 NOTE — OPERATIVE REPORT
[address]    PATIENT'S NAME: Abbey Hutson   ATTENDING PHYSICIAN: Althea Johnson M.D. OPERATING PHYSICIAN: Althea Johnson M.D. PATIENT ACCOUNT#:   [de-identified]    LOCATION:  [facility] 8 EDWP 10  MEDICAL RECORD #:   [id number]       YOB: 1950  ADMISSION DATE:       12/05/2022      OPERATION DATE:  12/05/2022    OPERATIVE REPORT      PREOPERATIVE DIAGNOSIS:  End-stage renal disease, right-handed. POSTOPERATIVE DIAGNOSIS:  End-stage renal disease, right-handed. PROCEDURE:  Creation of left brachial artery to cephalic vein autogenous fistula, upper arm. ASSISTANT:  ST. Mike    INDICATIONS:  This is a 67-year-old white male who has bilateral Dupuytren's contractures, left wrist surgery from an injury years ago, who appeared to have a normal Miguelito's test.  Recommended for creation of a fistula in the left arm. Duplex ultrasound showed a good vein in the upper arm, somewhat small in the forearm. He was recommended for an autogenous fistula, left upper arm, brachial artery to cephalic vein. Risks and complications, including death, cardiac complications, bleeding, infection, arterial steal with hand loss, venous hypertension, congestive heart failure, neurological problems of the arm, venous hypertension with swelling of the arm, future thrombosis, future infection, and then also failure to mature, were explained. Risks and complication of prosthetic graft versus autogenous fistula versus a Permcath versus peroneal dialysis also explained. The patient understood and agreed. All questions answered. OPERATIVE TECHNIQUE:  The patient was placed supine on the procedure table and underwent general anesthesia. He received preoperative antibiotics. The patient had the left arm mapped out prior to procedure, showing that there definitely was an adequate cephalic vein in the upper arm. The brachial artery also appeared to be adequate.   He had the left arm prepped and draped in the usual sterile technique. SCDs on both lower legs. After a time-out was done, the patient had incision over the cephalic vein distally at the antecubital fossa, curved slightly towards the antecubital fossa. The vein was mobilized, ligated at one branch with 4-0 Ethibond ties. The vein was eventually ligated distally with 3-0 Ethibond on a needle. Prior to this, the patient had received a bolus of heparin, was anticoagulated when the vein was ligated. The vein appeared to be of good quality. It was at least 4 mm and distended readily with a 3.5 mm dilator. Incision was made above the antecubital fossa on the medial aspect to isolate and expose the brachial artery. The artery was of good quality. A venous branch over the artery was ligated with 4-0 Ethibond ties, hemoclips, and divided. The vein was mobilized. Small clips were placed on 2 small branches, posterior wall, and the patient had the artery controlled proximally with a Mccoy clamp, distally with a profunda clamp. Arteriotomy was performed with an 11 blade, extended with Chew scissors to approximately 6 mm. The vein was then trimmed and brought underneath the subcutaneous tunnel and anastomosed with 6-0 Prolene suture to the artery. Prior to completion of this, all vessels were flushed and flow was established down the arm and through the fistula, with care being taken to prevent any embolization of air or debris. The patient had hemostasis obtained with 6-0 Prolene suture, Bovie, Gelfoam and thrombin, FloSeal, and protamine. Once hemostasis was obtained, the wounds were then closed with 2-0 Vicryl and 3-0 Vicryl subcuticular. The patient had 20 mL of 0.25% Marcaine for local anesthesia. Doppler flow appreciated in the radial artery, palmar arch, and ulnar artery at the end of the procedure. Vital signs were stable, approximately 526 to 814 systolic. He had a good thrill in the area of the fistula.   The vein was mobilized slightly at least distally to try to get it closer to the skin, although in the upper part of the arm near the deltopectoral groove it had dived slightly down. Once procedure was completed, the estimated blood loss was approximately 100 to 150 mL. Sponge and instrument counts were correct. No pathology specimen. Vital signs remained stable. FINAL DIAGNOSIS:  Creation of left brachial artery to cephalic vein fistula with mobilization of vein. Dictated By Kacey Calderon M.D.  d: 12/05/2022 08:54:53  t: 12/05/2022 16:18:02  [facility] [id number]  JJW/    cc: ANGEL Phan M.D. Merwyn Gallop, M.D. Miller Drown, M.D.

## 2022-12-05 NOTE — DISCHARGE INSTRUCTIONS
Keep wound clean/dry 10 days. No driving one weekl. No lifting more than 5 pounds left arm 4 weeks. Call office for temperature> 100. 5/ wound  drainage

## 2022-12-05 NOTE — ANESTHESIA POSTPROCEDURE EVALUATION
[facility] Patient Status:  Inpatient   Age/Gender 66year old male MRN [id number]   Location [address] [place] [address] Attending Najma Peña MD   Hosp Day # 0 PCP Antonieta Lyons MD       Anesthesia Post-op Note    CREATION OF LEFT ARM ARTERIOVENOUS FISTULA    Procedure Summary     Date: 22 Room / Location: [address] [place] [address] [address]    Anesthesia Start: Mo Mortimer Anesthesia Stop:     Procedure: [id number] OF LEFT ARM ARTERIOVENOUS FISTULA (Left) Diagnosis: (END STAGE RENAL DISEASE)    Surgeons: Najma Peña MD Anesthesiologist: Ewa Melgar MD    Anesthesia Type: general ASA Status: 3          Anesthesia Type: No value filed. Vitals Value Taken Time   /75 22 0937   Temp 97.1 22 0937   Pulse 82 22 0937   Resp 16 22 0937   SpO2 100 22       Patient Location: PACU    Anesthesia Type: general    Airway Patency: patent and extubated    Postop Pain Control: adequate    Mental Status: mildly sedated but able to meaningfully participate in the post-anesthesia evaluation    Nausea/Vomiting: none    Cardiopulmonary/Hydration status: stable euvolemic    Complications: no apparent anesthesia related complications    Postop vital signs: stable    Dental Exam: Unchanged from Preop    Patient to be discharged from PACU when criteria met.

## 2022-12-05 NOTE — H&P
[facility]    PATIENT'S NAME: Abbey Hutson   ATTENDING PHYSICIAN: Althea Johnson M.D. PATIENT ACCOUNT#:   [de-identified]    LOCATION:  [address]  MEDICAL RECORD #:   [id number]       YOB: 1950  ADMISSION DATE:       12/05/2022    HISTORY AND PHYSICAL EXAMINATION    HISTORY OF PRESENT ILLNESS:  A 78-year-old white male admitted to the hospital for creation of a left arm fistula. The patient was to be done last week, but due to emergencies in the operating room, he was postponed and rescheduled. His dialysis days are Tuesday, Thursday, and Saturday. PAST MEDICAL HISTORY:  He has a history of end-stage renal disease, has a right jugular permacath, long history of diabetes for at least 60 years. He has Dupuytren contractures bilaterally. He also had an injury of his left wrist and had an open reduction and internal fixation placed in the distant past here at [facility].  Currently, no hand ischemia. PAST SURGICAL HISTORY:  The patient has a history of back surgery x4, left total knee replacement surgery, left ankle surgery. MEDICATIONS:  The patient is on aspirin, Zetia, famotidine, Lasix, gabapentin, metoprolol tartrate, nifedipine, isosorbide, insulin, quinapril, Crestor. ALLERGIES:  He has allergies to Depakote with some dizziness, Wellbutrin, fluconazole. FAMILY HISTORY:  There is a family history of coronary disease. SOCIAL HISTORY:  He denies any ETOH abuse, drug abuse, or tobacco abuse. 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 PHYSICAL EXAMINATION:    GENERAL:  He is awake and alert. He is appropriate. No acute distress. VITAL SIGNS:  Blood pressure 146/70, heart rate 72, respirations 20. HEENT:  Pupils equal and round. Sclerae are clear. Mouth without lesions. NECK:  No cervical bruit. No JVD. LUNGS:  Clear to auscultation. No rales or rhonchi. HEART:  Normal.  No murmur. ABDOMEN:  Soft.   There is no tenderness or masses. EXTREMITIES:  Normal Miguelito test in the arms. He does have contractures at the wrists bilaterally. He is moving all 4 extremities. Femoral pulses are palpable. Popliteal and pedal are diminished bilaterally. Duplex ultrasound showed a vein mapping of 4 to 3 mm cephalic vein in the upper arm and basilic between 3 to 4 mm. IMPRESSION:  Patient with end-stage renal disease with Dupuytren contractures of both upper extremities, history of left wrist surgery, and need for a fistula. We will try to place an autogenous fistula if the vein is adequate; if not, then a prosthetic graft fistula. Risks and complications including death, myocardial infarction, bleeding, infection, limb loss, arterial steal with gangrene, tissue occlusion, failure to mature, future thrombosis, future infection, swelling of the arm, congestive heart failure, multisystem organ failure were all explained. The risks and complications of medical management alone, risks and complications of permacath, risks and complications of peritoneal dialysis were explained. Patient agreed. All questions answered. Case reviewed with Nephrology.     Dictated By Selvin Arredondo M.D.  d: 12/03/2022 14:35:06  t: 12/03/2022 16:53:35  Job [id number]  Z/

## 2022-12-05 NOTE — ANESTHESIA PROCEDURE NOTES
Airway  Date/Time: 12/5/2022 7:23 AM  Urgency: elective      General Information and Staff    Patient location during procedure: OR  Anesthesiologist: Camacho Ball MD  Performed: anesthesiologist     Indications and Patient Condition  Indications for airway management: anesthesia  Spontaneous ventilation: present  Sedation level: deep  Preoxygenated: yes  Patient position: sniffing  Mask difficulty assessment: 1 - vent by mask    Final Airway Details  Final airway type: supraglottic airway      Successful airway: classic  Size 4       Number of attempts at approach: 1  Number of other approaches attempted: 0

## 2022-12-05 NOTE — OPERATIVE REPORT
Pre-op Dx: ESRD. Post-op Dx: Same. Procedure: Creation left brachial- cephalic vein fistula.  Surgeon: Earle/ Marryt: Cesia Gonzales

## 2022-12-06 NOTE — DISCHARGE SUMMARY
[facility]    PATIENT'S NAME: Brenda Gómez   ATTENDING PHYSICIAN: Janene Gaspar M.D. PATIENT ACCOUNT#:   [de-identified]    LOCATION:  [place] [address]  MEDICAL RECORD #:   [id number]       YOB: 1950  ADMISSION DATE:       12/05/2022      DISCHARGE DATE:  12/05/2022    DISCHARGE SUMMARY      HISTORY AND HOSPITAL COURSE:  A 76-year-old white male admitted to the hospital for creation of a fistula, underwent a creation of left brachial artery cephalic-vein fistula. He has diagnosis of Dupuytren contracture bilaterally. After surgery was done, he had Doppler flow into the hand, the radial and ulnar arteries distally. He has a good fistula. The vein was good quality. Tolerated the procedure well. He was instructed on wound care, activity, and followup.     Dictated By Janene Gaspar M.D.  d: 12/05/2022 09:07:44  t: 12/06/2022 04:53:23  [facility] [id number]  VTR/

## 2022-12-07 ENCOUNTER — TELEPHONE (OUTPATIENT)
Dept: NEPHROLOGY | Facility: CLINIC | Age: 72
End: 2022-12-07

## 2022-12-10 ENCOUNTER — APPOINTMENT (OUTPATIENT)
Dept: ULTRASOUND IMAGING | Age: 72
End: 2022-12-10
Attending: EMERGENCY MEDICINE
Payer: MEDICARE

## 2022-12-10 ENCOUNTER — APPOINTMENT (OUTPATIENT)
Dept: CT IMAGING | Age: 72
End: 2022-12-10
Attending: EMERGENCY MEDICINE
Payer: MEDICARE

## 2022-12-10 ENCOUNTER — HOSPITAL ENCOUNTER (OUTPATIENT)
Facility: HOSPITAL | Age: 72
Setting detail: OBSERVATION
Discharge: HOME | End: 2022-12-11
Attending: EMERGENCY MEDICINE | Admitting: HOSPITALIST
Payer: MEDICARE

## 2022-12-10 DIAGNOSIS — R41.82 ALTERED MENTAL STATUS, UNSPECIFIED ALTERED MENTAL STATUS TYPE: Primary | ICD-10-CM

## 2022-12-10 LAB
ALBUMIN SERPL-MCNC: 3.2 G/DL (ref 3.4–5)
ALBUMIN/GLOB SERPL: 0.7 {RATIO} (ref 1–2)
ALP LIVER SERPL-CCNC: 631 U/L
ALT SERPL-CCNC: 107 U/L
AMMONIA PLAS-MCNC: 16 UMOL/L (ref 11–32)
ANION GAP SERPL CALC-SCNC: 8 MMOL/L (ref 0–18)
AST SERPL-CCNC: 235 U/L (ref 15–37)
BASOPHILS # BLD AUTO: 0.03 X10(3) UL (ref 0–0.2)
BASOPHILS NFR BLD AUTO: 0.3 %
BILIRUB SERPL-MCNC: 0.7 MG/DL (ref 0.1–2)
BUN BLD-MCNC: 35 MG/DL (ref 7–18)
CALCIUM BLD-MCNC: 8.9 MG/DL (ref 8.5–10.1)
CHLORIDE SERPL-SCNC: 96 MMOL/L (ref 98–112)
CO2 SERPL-SCNC: 30 MMOL/L (ref 21–32)
CREAT BLD-MCNC: 4.21 MG/DL
EOSINOPHIL # BLD AUTO: 0.01 X10(3) UL (ref 0–0.7)
EOSINOPHIL NFR BLD AUTO: 0.1 %
ERYTHROCYTE [DISTWIDTH] IN BLOOD BY AUTOMATED COUNT: 17.2 %
GFR SERPLBLD BASED ON 1.73 SQ M-ARVRAT: 14 ML/MIN/1.73M2 (ref 60–?)
GLOBULIN PLAS-MCNC: 4.3 G/DL (ref 2.8–4.4)
GLUCOSE BLD-MCNC: 117 MG/DL (ref 70–99)
GLUCOSE BLD-MCNC: 203 MG/DL (ref 70–99)
GLUCOSE BLD-MCNC: 87 MG/DL (ref 70–99)
HCT VFR BLD AUTO: 37.9 %
HGB BLD-MCNC: 12.3 G/DL
IMM GRANULOCYTES # BLD AUTO: 0.04 X10(3) UL (ref 0–1)
IMM GRANULOCYTES NFR BLD: 0.5 %
LYMPHOCYTES # BLD AUTO: 1.32 X10(3) UL (ref 1–4)
LYMPHOCYTES NFR BLD AUTO: 14.9 %
MCH RBC QN AUTO: 29 PG (ref 26–34)
MCHC RBC AUTO-ENTMCNC: 32.5 G/DL (ref 31–37)
MCV RBC AUTO: 89.4 FL
MONOCYTES # BLD AUTO: 0.53 X10(3) UL (ref 0.1–1)
MONOCYTES NFR BLD AUTO: 6 %
NEUTROPHILS # BLD AUTO: 6.93 X10 (3) UL (ref 1.5–7.7)
NEUTROPHILS # BLD AUTO: 6.93 X10(3) UL (ref 1.5–7.7)
NEUTROPHILS NFR BLD AUTO: 78.2 %
OSMOLALITY SERPL CALC.SUM OF ELEC: 292 MOSM/KG (ref 275–295)
PLATELET # BLD AUTO: 137 10(3)UL (ref 150–450)
POTASSIUM SERPL-SCNC: 3.5 MMOL/L (ref 3.5–5.1)
PROT SERPL-MCNC: 7.5 G/DL (ref 6.4–8.2)
RBC # BLD AUTO: 4.24 X10(6)UL
SARS-COV-2 RNA RESP QL NAA+PROBE: NOT DETECTED
SODIUM SERPL-SCNC: 134 MMOL/L (ref 136–145)
WBC # BLD AUTO: 8.9 X10(3) UL (ref 4–11)

## 2022-12-10 PROCEDURE — 70450 CT HEAD/BRAIN W/O DYE: CPT | Performed by: EMERGENCY MEDICINE

## 2022-12-10 PROCEDURE — 93971 EXTREMITY STUDY: CPT | Performed by: EMERGENCY MEDICINE

## 2022-12-10 RX ORDER — BISACODYL 10 MG
10 SUPPOSITORY, RECTAL RECTAL
Status: DISCONTINUED | OUTPATIENT
Start: 2022-12-10 | End: 2022-12-11

## 2022-12-10 RX ORDER — HYDROCODONE BITARTRATE AND ACETAMINOPHEN 5; 325 MG/1; MG/1
2 TABLET ORAL EVERY 4 HOURS PRN
Status: DISCONTINUED | OUTPATIENT
Start: 2022-12-10 | End: 2022-12-11

## 2022-12-10 RX ORDER — ACETAMINOPHEN 325 MG/1
650 TABLET ORAL EVERY 4 HOURS PRN
Status: DISCONTINUED | OUTPATIENT
Start: 2022-12-10 | End: 2022-12-11

## 2022-12-10 RX ORDER — NICOTINE POLACRILEX 4 MG
15 LOZENGE BUCCAL
Status: DISCONTINUED | OUTPATIENT
Start: 2022-12-10 | End: 2022-12-11

## 2022-12-10 RX ORDER — DIPHENHYDRAMINE HCL 25 MG
25 CAPSULE ORAL EVERY 8 HOURS PRN
Status: DISCONTINUED | OUTPATIENT
Start: 2022-12-10 | End: 2022-12-11

## 2022-12-10 RX ORDER — METOCLOPRAMIDE HYDROCHLORIDE 5 MG/ML
5 INJECTION INTRAMUSCULAR; INTRAVENOUS EVERY 8 HOURS PRN
Status: DISCONTINUED | OUTPATIENT
Start: 2022-12-10 | End: 2022-12-11

## 2022-12-10 RX ORDER — GABAPENTIN 300 MG/1
300 CAPSULE ORAL NIGHTLY
Status: DISCONTINUED | OUTPATIENT
Start: 2022-12-11 | End: 2022-12-11

## 2022-12-10 RX ORDER — DIPHENHYDRAMINE HCL 25 MG
25 TABLET ORAL EVERY 6 HOURS PRN
Status: ON HOLD | COMMUNITY

## 2022-12-10 RX ORDER — FUROSEMIDE 40 MG/1
40 TABLET ORAL DAILY
Status: DISCONTINUED | OUTPATIENT
Start: 2022-12-11 | End: 2022-12-11

## 2022-12-10 RX ORDER — METHYLPREDNISOLONE SODIUM SUCCINATE 125 MG/2ML
60 INJECTION, POWDER, LYOPHILIZED, FOR SOLUTION INTRAMUSCULAR; INTRAVENOUS ONCE
Status: COMPLETED | OUTPATIENT
Start: 2022-12-10 | End: 2022-12-10

## 2022-12-10 RX ORDER — DIPHENHYDRAMINE HYDROCHLORIDE 50 MG/ML
25 INJECTION INTRAMUSCULAR; INTRAVENOUS ONCE
Status: COMPLETED | OUTPATIENT
Start: 2022-12-10 | End: 2022-12-10

## 2022-12-10 RX ORDER — SENNOSIDES 8.6 MG
17.2 TABLET ORAL NIGHTLY PRN
Status: DISCONTINUED | OUTPATIENT
Start: 2022-12-10 | End: 2022-12-11

## 2022-12-10 RX ORDER — PANTOPRAZOLE SODIUM 40 MG/1
40 TABLET, DELAYED RELEASE ORAL
Status: DISCONTINUED | OUTPATIENT
Start: 2022-12-11 | End: 2022-12-11

## 2022-12-10 RX ORDER — ONDANSETRON 2 MG/ML
4 INJECTION INTRAMUSCULAR; INTRAVENOUS EVERY 6 HOURS PRN
Status: DISCONTINUED | OUTPATIENT
Start: 2022-12-10 | End: 2022-12-11

## 2022-12-10 RX ORDER — ISOSORBIDE MONONITRATE 30 MG/1
30 TABLET, EXTENDED RELEASE ORAL DAILY
Status: DISCONTINUED | OUTPATIENT
Start: 2022-12-11 | End: 2022-12-11

## 2022-12-10 RX ORDER — EZETIMIBE 10 MG/1
10 TABLET ORAL DAILY
Status: DISCONTINUED | OUTPATIENT
Start: 2022-12-11 | End: 2022-12-11

## 2022-12-10 RX ORDER — POLYETHYLENE GLYCOL 3350 17 G/17G
17 POWDER, FOR SOLUTION ORAL DAILY PRN
Status: DISCONTINUED | OUTPATIENT
Start: 2022-12-10 | End: 2022-12-11

## 2022-12-10 RX ORDER — NIFEDIPINE 30 MG/1
60 TABLET, EXTENDED RELEASE ORAL 2 TIMES DAILY
Status: DISCONTINUED | OUTPATIENT
Start: 2022-12-11 | End: 2022-12-11

## 2022-12-10 RX ORDER — DEXTROSE MONOHYDRATE 25 G/50ML
50 INJECTION, SOLUTION INTRAVENOUS
Status: DISCONTINUED | OUTPATIENT
Start: 2022-12-10 | End: 2022-12-11

## 2022-12-10 RX ORDER — HYDROCODONE BITARTRATE AND ACETAMINOPHEN 5; 325 MG/1; MG/1
1 TABLET ORAL EVERY 4 HOURS PRN
Status: DISCONTINUED | OUTPATIENT
Start: 2022-12-10 | End: 2022-12-11

## 2022-12-10 RX ORDER — LEVOTHYROXINE SODIUM 0.1 MG/1
100 TABLET ORAL
Status: DISCONTINUED | OUTPATIENT
Start: 2022-12-11 | End: 2022-12-11

## 2022-12-10 RX ORDER — LISINOPRIL 20 MG/1
20 TABLET ORAL DAILY
Status: DISCONTINUED | OUTPATIENT
Start: 2022-12-11 | End: 2022-12-11

## 2022-12-10 RX ORDER — ROSUVASTATIN CALCIUM 5 MG/1
20 TABLET, COATED ORAL DAILY
Status: DISCONTINUED | OUTPATIENT
Start: 2022-12-11 | End: 2022-12-11

## 2022-12-10 RX ORDER — DULOXETIN HYDROCHLORIDE 30 MG/1
120 CAPSULE, DELAYED RELEASE ORAL DAILY
Status: DISCONTINUED | OUTPATIENT
Start: 2022-12-11 | End: 2022-12-11

## 2022-12-10 RX ORDER — NICOTINE POLACRILEX 4 MG
30 LOZENGE BUCCAL
Status: DISCONTINUED | OUTPATIENT
Start: 2022-12-10 | End: 2022-12-11

## 2022-12-10 NOTE — ED INITIAL ASSESSMENT (HPI)
Had a AV fistula placed on Monday- after getting home, noticd the arm with discolored and bruised-- itching began on thurs- yesterday sent picture to surgeon and was started on medication but it is not better today

## 2022-12-10 NOTE — ED QUICK NOTES
Patient is restless and having jerking movements of extremities. Comfort measures provided (warm blanket, cart repositioned) Emotional support provided and MD notified.

## 2022-12-11 VITALS
HEART RATE: 74 BPM | TEMPERATURE: 99 F | WEIGHT: 175 LBS | BODY MASS INDEX: 28.13 KG/M2 | HEIGHT: 66 IN | OXYGEN SATURATION: 95 % | DIASTOLIC BLOOD PRESSURE: 54 MMHG | RESPIRATION RATE: 22 BRPM | SYSTOLIC BLOOD PRESSURE: 143 MMHG

## 2022-12-11 LAB
ALBUMIN SERPL-MCNC: 2.9 G/DL (ref 3.4–5)
ALBUMIN/GLOB SERPL: 0.7 {RATIO} (ref 1–2)
ALP LIVER SERPL-CCNC: 595 U/L
ALT SERPL-CCNC: 107 U/L
ANION GAP SERPL CALC-SCNC: 12 MMOL/L (ref 0–18)
AST SERPL-CCNC: 201 U/L (ref 15–37)
BASOPHILS # BLD AUTO: 0.01 X10(3) UL (ref 0–0.2)
BASOPHILS NFR BLD AUTO: 0.2 %
BILIRUB SERPL-MCNC: 0.9 MG/DL (ref 0.1–2)
BUN BLD-MCNC: 43 MG/DL (ref 7–18)
CALCIUM BLD-MCNC: 8.9 MG/DL (ref 8.5–10.1)
CHLORIDE SERPL-SCNC: 96 MMOL/L (ref 98–112)
CO2 SERPL-SCNC: 25 MMOL/L (ref 21–32)
CREAT BLD-MCNC: 4.39 MG/DL
EOSINOPHIL # BLD AUTO: 0 X10(3) UL (ref 0–0.7)
EOSINOPHIL NFR BLD AUTO: 0 %
ERYTHROCYTE [DISTWIDTH] IN BLOOD BY AUTOMATED COUNT: 17.6 %
EST. AVERAGE GLUCOSE BLD GHB EST-MCNC: 154 MG/DL (ref 68–126)
GFR SERPLBLD BASED ON 1.73 SQ M-ARVRAT: 14 ML/MIN/1.73M2 (ref 60–?)
GLOBULIN PLAS-MCNC: 3.9 G/DL (ref 2.8–4.4)
GLUCOSE BLD-MCNC: 247 MG/DL (ref 70–99)
GLUCOSE BLD-MCNC: 256 MG/DL (ref 70–99)
GLUCOSE BLD-MCNC: 257 MG/DL (ref 70–99)
GLUCOSE BLD-MCNC: 342 MG/DL (ref 70–99)
HBA1C MFR BLD: 7 % (ref ?–5.7)
HCT VFR BLD AUTO: 41.6 %
HGB BLD-MCNC: 12.9 G/DL
IMM GRANULOCYTES # BLD AUTO: 0.04 X10(3) UL (ref 0–1)
IMM GRANULOCYTES NFR BLD: 0.6 %
LYMPHOCYTES # BLD AUTO: 0.64 X10(3) UL (ref 1–4)
LYMPHOCYTES NFR BLD AUTO: 9.6 %
MAGNESIUM SERPL-MCNC: 2.6 MG/DL (ref 1.6–2.6)
MCH RBC QN AUTO: 28.7 PG (ref 26–34)
MCHC RBC AUTO-ENTMCNC: 31 G/DL (ref 31–37)
MCV RBC AUTO: 92.7 FL
MONOCYTES # BLD AUTO: 0.13 X10(3) UL (ref 0.1–1)
MONOCYTES NFR BLD AUTO: 2 %
NEUTROPHILS # BLD AUTO: 5.82 X10 (3) UL (ref 1.5–7.7)
NEUTROPHILS # BLD AUTO: 5.82 X10(3) UL (ref 1.5–7.7)
NEUTROPHILS NFR BLD AUTO: 87.6 %
OSMOLALITY SERPL CALC.SUM OF ELEC: 295 MOSM/KG (ref 275–295)
PLATELET # BLD AUTO: 120 10(3)UL (ref 150–450)
POTASSIUM SERPL-SCNC: 4.2 MMOL/L (ref 3.5–5.1)
PROT SERPL-MCNC: 6.8 G/DL (ref 6.4–8.2)
RBC # BLD AUTO: 4.49 X10(6)UL
SODIUM SERPL-SCNC: 133 MMOL/L (ref 136–145)
WBC # BLD AUTO: 6.6 X10(3) UL (ref 4–11)

## 2022-12-11 PROCEDURE — 99215 OFFICE O/P EST HI 40 MIN: CPT | Performed by: OTHER

## 2022-12-11 PROCEDURE — 99222 1ST HOSP IP/OBS MODERATE 55: CPT | Performed by: INTERNAL MEDICINE

## 2022-12-11 RX ORDER — OMEPRAZOLE 20 MG/1
20 CAPSULE, DELAYED RELEASE ORAL
Qty: 180 CAPSULE | Refills: 3 | Status: ON HOLD | OUTPATIENT
Start: 2022-12-11 | End: 2023-12-11

## 2022-12-11 NOTE — ED QUICK NOTES
Orders for admission, patient is aware of plan and ready to go upstairs.  Any questions, please call ED RN Mariza Magaña at extension [phone]    Patient Covid vaccination status: Fully vaccinated     COVID Test Ordered in ED: Rapid SARS-CoV-2 by PCR    COVID Suspicion at Admission: Low clinical suspicion for COVID    Running Infusions: SL    Mental Status/LOC at time of transport: Alert to self, restless    Other pertinent information:   CIWA score: N/A   NIH score:  N/A

## 2022-12-11 NOTE — PROGRESS NOTES
NURSING DISCHARGE NOTE    Discharged Home via Wheelchair. Accompanied by RN and spouse  Belongings taken by pt and family. Pt discharged at approx. 1600. AVS discussed w/ pt and spouse. All belongings sent w/ pt.

## 2022-12-11 NOTE — PROGRESS NOTES
NURSING ADMISSION NOTE      Patient admitted via Ambulance  Oriented to room. Safety precautions initiated. Bed in low position. Call light in reach. Patient is drowsy upon admission. Occasionally with jerking movements but it has been improving per his wife. Hard of hearing with bilateral hearing aids. From home with his wife. Admitted for AMS. History of DM, HTN, Jerking movement of extremities, anemia,PRANEETH, falls and ESRD. HD- Tu, Th, Sat. Patient has been increasing weakness, generalized itching and bruising LEFT arm after having AV fistula placement on 12/5/22. Nursing navigators completed with assistance of his wife. Left arm elevated. Fall precaution maintained. Frequent rounding made. 4867- No more jerking movement observed. Patient still very drowsy. Right ear hearing aid in.

## 2022-12-11 NOTE — CONSULTS
[facility]    PATIENT'S NAME: Mini Coronel   ATTENDING PHYSICIAN: Kaushal Bermeo. Merissa Dougherty MD   CONSULTING PHYSICIAN: Magen Damon M.D. PATIENT ACCOUNT#:   [de-identified]    LOCATION:  [address] [place] [address]  MEDICAL RECORD #:   [id number]       YOB: 1950  ADMISSION DATE:       12/10/2022      CONSULT DATE:  12/11/2022    REPORT OF CONSULTATION    HISTORY OF PRESENT ILLNESS:  A 71-year-old white male known to me for creation of a left brachial artery cephalic vein upper arm autogenous fistula, which was done on 12/05/2022. The patient called me about 2 to 3 days afterward showing some redness and a rash on his forearm, although the incisions themselves looked normal.  Gave him some Benadryl and prednisone. I talked to him a day or so after that, he said the rash was still there. I said if the rash was still there after be treated with 50 mg of prednisone and 2 or 3 doses of 50 mg of Benadryl, that he should go to the emergency room. While he was in the emergency room, the physician called me from the ER that the fistula is doing fine. No infection, widely open. No evidence for any infection. No rash, but he was admitted to the hospital because of mental status changes. May have been related to his medications. So I am just seeing him today for followup. The patient currently is sleeping. He has sleep apnea. He is seen with his RN, Ivette Banks. PAST MEDICAL HISTORY:  He has a history of end-stage renal disease, had a right jugular PermCath for a long time. He is a diabetic for at least 60 years. Bilateral Dupuytren contractures. Past history of an injury to his left wrist with open reduction and internal fixation. Currently, no cardiac issues. No cerebrovascular issues in terms of a stroke. PAST SURGICAL HISTORY:  He has had back surgery x4, left total knee replacement surgery, left ankle surgery.       MEDICATIONS:  He has been on Zetia, famotidine, Lasix, gabapentin, metoprolol tartrate, nifedipine, isosorbide, insulin, quinapril, Crestor. ALLERGIES:  He is allergic to Depakote, Wellbutrin, fluconazole. SOCIAL HISTORY:  No EtOH abuse, drug abuse, or tobacco abuse. 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 FAMILY HISTORY:  Coronary artery disease. PHYSICAL EXAMINATION:    VITAL SIGNS:  Currently he is afebrile. He is resting with his CPAP on, I did not wake him. His blood pressure has been anywhere between 276 to 440 systolic, heart rate of 55B to 70s. EXTREMITIES:  The patient's forearm rash is completely gone. Fistula appears to be patent. ASSESSMENT AND PLAN:  At this time, the patient admitted to the hospital with questionable mental status changes. Consulting with Neurology and Nephrology on consult. We will sign off at this point and treatment per the other physicians.     Dictated By Boubacar Justice M.D.  d: 12/11/2022 07:38:08  t: 12/11/2022 08:39:16  Job [id number]  [facility]/

## 2022-12-15 ENCOUNTER — APPOINTMENT (OUTPATIENT)
Dept: GENERAL RADIOLOGY | Facility: HOSPITAL | Age: 72
DRG: 314 | End: 2022-12-15
Attending: EMERGENCY MEDICINE
Payer: MEDICARE

## 2022-12-15 ENCOUNTER — APPOINTMENT (OUTPATIENT)
Dept: GENERAL RADIOLOGY | Facility: HOSPITAL | Age: 72
End: 2022-12-15
Attending: EMERGENCY MEDICINE
Payer: MEDICARE

## 2022-12-15 ENCOUNTER — HOSPITAL ENCOUNTER (INPATIENT)
Facility: HOSPITAL | Age: 72
LOS: 4 days | Discharge: HOME HEALTH | End: 2022-12-21
Attending: EMERGENCY MEDICINE | Admitting: INTERNAL MEDICINE
Payer: MEDICARE

## 2022-12-15 ENCOUNTER — APPOINTMENT (OUTPATIENT)
Dept: ULTRASOUND IMAGING | Facility: HOSPITAL | Age: 72
End: 2022-12-15
Attending: INTERNAL MEDICINE
Payer: MEDICARE

## 2022-12-15 ENCOUNTER — HOSPITAL ENCOUNTER (INPATIENT)
Facility: HOSPITAL | Age: 72
LOS: 4 days | Discharge: HOME HEALTH | DRG: 314 | End: 2022-12-21
Attending: EMERGENCY MEDICINE | Admitting: INTERNAL MEDICINE
Payer: MEDICARE

## 2022-12-15 ENCOUNTER — APPOINTMENT (OUTPATIENT)
Dept: ULTRASOUND IMAGING | Facility: HOSPITAL | Age: 72
DRG: 314 | End: 2022-12-15
Attending: INTERNAL MEDICINE
Payer: MEDICARE

## 2022-12-15 DIAGNOSIS — R53.83 OTHER FATIGUE: Primary | ICD-10-CM

## 2022-12-15 DIAGNOSIS — E78.2 MIXED HYPERLIPIDEMIA: ICD-10-CM

## 2022-12-15 DIAGNOSIS — I50.9 ACUTE ON CHRONIC CONGESTIVE HEART FAILURE, UNSPECIFIED HEART FAILURE TYPE (HCC): ICD-10-CM

## 2022-12-15 DIAGNOSIS — R33.9 URINARY RETENTION: ICD-10-CM

## 2022-12-15 DIAGNOSIS — N17.9 ACUTE RENAL FAILURE SUPERIMPOSED ON CHRONIC KIDNEY DISEASE, UNSPECIFIED CKD STAGE, UNSPECIFIED ACUTE RENAL FAILURE TYPE (HCC): ICD-10-CM

## 2022-12-15 DIAGNOSIS — L03.114 CELLULITIS OF LEFT UPPER EXTREMITY: ICD-10-CM

## 2022-12-15 DIAGNOSIS — L50.9 URTICARIA: ICD-10-CM

## 2022-12-15 DIAGNOSIS — N18.9 ACUTE RENAL FAILURE SUPERIMPOSED ON CHRONIC KIDNEY DISEASE, UNSPECIFIED CKD STAGE, UNSPECIFIED ACUTE RENAL FAILURE TYPE (HCC): ICD-10-CM

## 2022-12-15 LAB
ALBUMIN SERPL-MCNC: 3.2 G/DL (ref 3.4–5)
ALBUMIN/GLOB SERPL: 0.8 {RATIO} (ref 1–2)
ALP LIVER SERPL-CCNC: 604 U/L
ALT SERPL-CCNC: 164 U/L
ANION GAP SERPL CALC-SCNC: 11 MMOL/L (ref 0–18)
AST SERPL-CCNC: 196 U/L (ref 15–37)
ATRIAL RATE: 66 BPM
BASOPHILS # BLD AUTO: 0.05 X10(3) UL (ref 0–0.2)
BASOPHILS NFR BLD AUTO: 0.8 %
BILIRUB SERPL-MCNC: 0.8 MG/DL (ref 0.1–2)
BILIRUB UR QL STRIP.AUTO: NEGATIVE
BUN BLD-MCNC: 45 MG/DL (ref 7–18)
CALCIUM BLD-MCNC: 8.9 MG/DL (ref 8.5–10.1)
CHLORIDE SERPL-SCNC: 95 MMOL/L (ref 98–112)
CLARITY UR REFRACT.AUTO: CLEAR
CO2 SERPL-SCNC: 26 MMOL/L (ref 21–32)
COLOR UR AUTO: YELLOW
CREAT BLD-MCNC: 5.54 MG/DL
EOSINOPHIL # BLD AUTO: 0.33 X10(3) UL (ref 0–0.7)
EOSINOPHIL NFR BLD AUTO: 5.4 %
ERYTHROCYTE [DISTWIDTH] IN BLOOD BY AUTOMATED COUNT: 18.5 %
FLUAV + FLUBV RNA SPEC NAA+PROBE: NEGATIVE
FLUAV + FLUBV RNA SPEC NAA+PROBE: NEGATIVE
GFR SERPLBLD BASED ON 1.73 SQ M-ARVRAT: 10 ML/MIN/1.73M2 (ref 60–?)
GLOBULIN PLAS-MCNC: 4.1 G/DL (ref 2.8–4.4)
GLUCOSE BLD-MCNC: 164 MG/DL (ref 70–99)
GLUCOSE BLD-MCNC: 194 MG/DL (ref 70–99)
GLUCOSE BLD-MCNC: 195 MG/DL (ref 70–99)
GLUCOSE BLD-MCNC: 270 MG/DL (ref 70–99)
GLUCOSE UR STRIP.AUTO-MCNC: NEGATIVE MG/DL
HCT VFR BLD AUTO: 41 %
HGB BLD-MCNC: 12.9 G/DL
IMM GRANULOCYTES # BLD AUTO: 0.02 X10(3) UL (ref 0–1)
IMM GRANULOCYTES NFR BLD: 0.3 %
KETONES UR STRIP.AUTO-MCNC: NEGATIVE MG/DL
LACTATE SERPL-SCNC: 2 MMOL/L (ref 0.4–2)
LEUKOCYTE ESTERASE UR QL STRIP.AUTO: NEGATIVE
LYMPHOCYTES # BLD AUTO: 1.4 X10(3) UL (ref 1–4)
LYMPHOCYTES NFR BLD AUTO: 22.8 %
MCH RBC QN AUTO: 29.4 PG (ref 26–34)
MCHC RBC AUTO-ENTMCNC: 31.5 G/DL (ref 31–37)
MCV RBC AUTO: 93.4 FL
MONOCYTES # BLD AUTO: 0.56 X10(3) UL (ref 0.1–1)
MONOCYTES NFR BLD AUTO: 9.1 %
NEUTROPHILS # BLD AUTO: 3.79 X10 (3) UL (ref 1.5–7.7)
NEUTROPHILS # BLD AUTO: 3.79 X10(3) UL (ref 1.5–7.7)
NEUTROPHILS NFR BLD AUTO: 61.6 %
NITRITE UR QL STRIP.AUTO: NEGATIVE
NT-PROBNP SERPL-MCNC: 9089 PG/ML (ref ?–125)
OSMOLALITY SERPL CALC.SUM OF ELEC: 289 MOSM/KG (ref 275–295)
P AXIS: 9 DEGREES
P-R INTERVAL: 216 MS
PH UR STRIP.AUTO: 5.5 [PH] (ref 5–8)
PLATELET # BLD AUTO: 116 10(3)UL (ref 150–450)
POTASSIUM SERPL-SCNC: 4.5 MMOL/L (ref 3.5–5.1)
PROCALCITONIN SERPL-MCNC: 1.92 NG/ML (ref ?–0.16)
PROT SERPL-MCNC: 7.3 G/DL (ref 6.4–8.2)
Q-T INTERVAL: 570 MS
QRS DURATION: 112 MS
QTC CALCULATION (BEZET): 597 MS
R AXIS: 4 DEGREES
RBC # BLD AUTO: 4.39 X10(6)UL
RBC UR QL AUTO: NEGATIVE
RSV RNA SPEC NAA+PROBE: NEGATIVE
SARS-COV-2 RNA RESP QL NAA+PROBE: NOT DETECTED
SODIUM SERPL-SCNC: 132 MMOL/L (ref 136–145)
SP GR UR STRIP.AUTO: 1.01 (ref 1–1.03)
T AXIS: -16 DEGREES
TROPONIN I HIGH SENSITIVITY: 16 NG/L
UROBILINOGEN UR STRIP.AUTO-MCNC: 0.2 MG/DL
VENTRICULAR RATE: 66 BPM
WBC # BLD AUTO: 6.2 X10(3) UL (ref 4–11)

## 2022-12-15 PROCEDURE — 76700 US EXAM ABDOM COMPLETE: CPT | Performed by: INTERNAL MEDICINE

## 2022-12-15 PROCEDURE — 5A1D70Z PERFORMANCE OF URINARY FILTRATION, INTERMITTENT, LESS THAN 6 HOURS PER DAY: ICD-10-PCS | Performed by: INTERNAL MEDICINE

## 2022-12-15 PROCEDURE — 71045 X-RAY EXAM CHEST 1 VIEW: CPT | Performed by: EMERGENCY MEDICINE

## 2022-12-15 PROCEDURE — 99223 1ST HOSP IP/OBS HIGH 75: CPT | Performed by: INTERNAL MEDICINE

## 2022-12-15 RX ORDER — HEPARIN SODIUM 5000 [USP'U]/ML
5000 INJECTION, SOLUTION INTRAVENOUS; SUBCUTANEOUS EVERY 8 HOURS SCHEDULED
Status: DISCONTINUED | OUTPATIENT
Start: 2022-12-15 | End: 2022-12-21

## 2022-12-15 RX ORDER — LEVOTHYROXINE SODIUM 0.1 MG/1
100 TABLET ORAL
Status: DISCONTINUED | OUTPATIENT
Start: 2022-12-15 | End: 2022-12-21

## 2022-12-15 RX ORDER — ASPIRIN 81 MG/1
81 TABLET ORAL DAILY
Status: DISCONTINUED | OUTPATIENT
Start: 2022-12-16 | End: 2022-12-21

## 2022-12-15 RX ORDER — NICOTINE POLACRILEX 4 MG
30 LOZENGE BUCCAL
Status: DISCONTINUED | OUTPATIENT
Start: 2022-12-15 | End: 2022-12-21

## 2022-12-15 RX ORDER — HEPARIN SODIUM 1000 [USP'U]/ML
4000 INJECTION, SOLUTION INTRAVENOUS; SUBCUTANEOUS
Status: DISCONTINUED | OUTPATIENT
Start: 2022-12-15 | End: 2022-12-21

## 2022-12-15 RX ORDER — VANCOMYCIN 2 GRAM/500 ML IN 0.9 % SODIUM CHLORIDE INTRAVENOUS
25 ONCE
Status: COMPLETED | OUTPATIENT
Start: 2022-12-15 | End: 2022-12-15

## 2022-12-15 RX ORDER — ISOSORBIDE MONONITRATE 30 MG/1
30 TABLET, EXTENDED RELEASE ORAL DAILY
Status: DISCONTINUED | OUTPATIENT
Start: 2022-12-16 | End: 2022-12-21

## 2022-12-15 RX ORDER — PANTOPRAZOLE SODIUM 40 MG/1
40 TABLET, DELAYED RELEASE ORAL
Status: DISCONTINUED | OUTPATIENT
Start: 2022-12-16 | End: 2022-12-21

## 2022-12-15 RX ORDER — LOSARTAN POTASSIUM 25 MG/1
25 TABLET ORAL 2 TIMES DAILY
Status: DISCONTINUED | OUTPATIENT
Start: 2022-12-15 | End: 2022-12-21

## 2022-12-15 RX ORDER — EZETIMIBE 10 MG/1
10 TABLET ORAL DAILY
Status: DISCONTINUED | OUTPATIENT
Start: 2022-12-16 | End: 2022-12-21

## 2022-12-15 RX ORDER — DULOXETIN HYDROCHLORIDE 30 MG/1
120 CAPSULE, DELAYED RELEASE ORAL NIGHTLY
Status: DISCONTINUED | OUTPATIENT
Start: 2022-12-16 | End: 2022-12-16

## 2022-12-15 RX ORDER — NIFEDIPINE 30 MG/1
60 TABLET, EXTENDED RELEASE ORAL 2 TIMES DAILY
Status: DISCONTINUED | OUTPATIENT
Start: 2022-12-15 | End: 2022-12-21

## 2022-12-15 RX ORDER — FUROSEMIDE 40 MG/1
40 TABLET ORAL DAILY
Status: DISCONTINUED | OUTPATIENT
Start: 2022-12-16 | End: 2022-12-21

## 2022-12-15 RX ORDER — NICOTINE POLACRILEX 4 MG
15 LOZENGE BUCCAL
Status: DISCONTINUED | OUTPATIENT
Start: 2022-12-15 | End: 2022-12-21

## 2022-12-15 RX ORDER — DEXTROSE MONOHYDRATE 25 G/50ML
50 INJECTION, SOLUTION INTRAVENOUS
Status: DISCONTINUED | OUTPATIENT
Start: 2022-12-15 | End: 2022-12-21

## 2022-12-15 RX ORDER — GABAPENTIN 300 MG/1
300 CAPSULE ORAL NIGHTLY
Status: DISCONTINUED | OUTPATIENT
Start: 2022-12-15 | End: 2022-12-21

## 2022-12-15 RX ORDER — DULOXETIN HYDROCHLORIDE 30 MG/1
90 CAPSULE, DELAYED RELEASE ORAL NIGHTLY
Status: DISCONTINUED | OUTPATIENT
Start: 2022-12-15 | End: 2022-12-15

## 2022-12-15 RX ORDER — ACETAMINOPHEN 325 MG/1
650 TABLET ORAL EVERY 6 HOURS PRN
Status: DISCONTINUED | OUTPATIENT
Start: 2022-12-15 | End: 2022-12-21

## 2022-12-15 RX ORDER — LORAZEPAM 2 MG/ML
1 INJECTION INTRAMUSCULAR ONCE
Status: COMPLETED | OUTPATIENT
Start: 2022-12-15 | End: 2022-12-15

## 2022-12-15 RX ORDER — HEPARIN SODIUM 1000 [USP'U]/ML
10000 INJECTION, SOLUTION INTRAVENOUS; SUBCUTANEOUS
Status: DISCONTINUED | OUTPATIENT
Start: 2022-12-15 | End: 2022-12-15

## 2022-12-15 RX ORDER — ONDANSETRON 2 MG/ML
4 INJECTION INTRAMUSCULAR; INTRAVENOUS EVERY 6 HOURS PRN
Status: DISCONTINUED | OUTPATIENT
Start: 2022-12-15 | End: 2022-12-21

## 2022-12-15 NOTE — CONSULTS
[facility]    PATIENT'S NAME: Jimbo Castillo   ATTENDING PHYSICIAN: Catrachita Johnson DO   CONSULTING PHYSICIAN: Dax Mckee M.D. PATIENT ACCOUNT#:   [de-identified]    LOCATION:  [address] [place] [address]  MEDICAL RECORD #:   [id number]       YOB: 1950  ADMISSION DATE:       12/15/2022      CONSULT DATE:  12/15/2022    REPORT OF CONSULTATION    FOLLOWUP CONSULTATION     The patient underwent a left brachial artery-cephalic vein autogenous fistula about 10, 11 days ago. He did well with the surgery but 3, 4 days after surgery, he noted a rash on his forearm, the left arm. He gave me a picture on my cellphone and I put him on prednisone 500 mg tablet 1 time only and Benadryl 50 mg tablets q.8 h. x4. The patient had some more problems with mental status or with fatigue, went to the ER, where they evaluated him because he still said he also has some redness from the rash in his forearm. They thought it was improving, did not recommend anything further and at that time, I was told from the ER physician, the arm was doing fine. No evidence for infection, fistula functioning normally. Ultrasound showed everything was fine. The patient now was admitted to the hospital through the ER the other day due to fatigue. I came in to see him because the dialysis nurse noted there was some redness at the area of the incisions. He had 2 incisions just above the antecubital fossa and there is some mild erythema around this area. He still has autogenous fistula placed on 12/05/2022 which is patent and some mild erythema. However, I do not think the graft itself is infected. This is just a mild cellulitis and agree with antibiotics. I would recommend vancomycin and Infectious Disease consult. Discussed with Dr. Lovely Lieberman, the hospitalist watching him.   He is having jerking movements kind of like tardive dyskinesia with mental status changes, was not the way he was in the hospital.  I recommend a neurologist to come by and re-evaluate his neurological status. His vital signs are stable. He is afebrile. His white count is normal.  His fistula is functioning normally. He has a flow into his hand, but the mental status is completely different. Discussed with Dr. Joanie Jaeger who will get the arrangements done and will give a call to his wife.       Dictated By Chance Waters M.D.  d: 12/15/2022 16:58:08  t: 12/15/2022 17:43:31  Job [id number]  [facility]/    cc: Chance Waters M.D.

## 2022-12-15 NOTE — ED QUICK NOTES
Orders for admission, patient is aware of plan and ready to go upstairs. Any questions, please call ED RN Kailey Jacome at extension #[phone].      Patient Covid vaccination status: Fully vaccinated     COVID Test Ordered in ED: SARS-CoV-2/Flu A and B/RSV by PCR (GeneXpert)    COVID Suspicion at Admission: N/A    Running Infusions:  None    Mental Status/LOC at time of transport:A&Ox3    Other pertinent information:   CIWA score: N/A   NIH score:  N/A

## 2022-12-15 NOTE — ED INITIAL ASSESSMENT (HPI)
Weakness for past few days. Pt states worsened last night. Pt states unable to ambulate due to weakness. Pt has fistula to LUE and is to start dialysis 3 days a week (starting 12/15/22).

## 2022-12-15 NOTE — PLAN OF CARE
Admitted due to weakness, inability to ambulate. Progressively more confused as the day went on. Bed alarm in use. HD done. Neph, ID, vascular surgery consulted. Unable to reach neurology after 2 pages, next shift will continue to page for new consult. Vanco to start tonight. Med still not available from pharmacy at 1900. L limb alert due to fistula. Fistula is newer, appears reddened and feels warm. MDs aware. SLP saw and cleared pt. Pt's wife helping him to order dinner at shift change. A&Ox1, restless. Not attempting to get OOB.

## 2022-12-15 NOTE — CONSULTS
[facility] Dosing Service    Initial Pharmacokinetic Consult for Vancomycin Dosing     Say Urias is a 66year old patient who is being treated for cellulitis. Pharmacy has been asked to dose Vancomycin by Dr Tracie Chung     Weights:  Ideal body weight: 63.8 kg (140 lb 10.5 oz)  Adjusted ideal body weight: 70 kg (154 lb 6.6 oz)  Actual weight:  79.4 kg (175 lb 0.7 oz)    Dialysis Details:  Patient dialysis schedule is [facility]  Last dialysis was 12/15/2022    Based on the above:    1. This patient will receive a loading dose of Vancomycin  2000 mg IVPB (25 mg/kg, capped at 2000 mg) x 1 dose. This will be followed by 750 mg given after each dialysis session. 2.  Pharmacy will order a vancomycin random level prior to the 3rd dialysis session. Goal pre-dialysis level is 15-20 mcg/mL which is likely to achieve the goal AUC24 of 400 to 600 mg-h/L.    3. Pharmacy will follow and monitor renal function, toxicity and efficacy. We appreciate the opportunity to assist in the care of this patient.     Krystle Barbosa, PharmD  12/15/2022  5:51 PM  [address] [place] [address] Extension: [phone]

## 2022-12-16 ENCOUNTER — APPOINTMENT (OUTPATIENT)
Dept: CT IMAGING | Facility: HOSPITAL | Age: 72
End: 2022-12-16
Attending: NURSE PRACTITIONER
Payer: MEDICARE

## 2022-12-16 ENCOUNTER — APPOINTMENT (OUTPATIENT)
Dept: CT IMAGING | Facility: HOSPITAL | Age: 72
DRG: 314 | End: 2022-12-16
Attending: NURSE PRACTITIONER
Payer: MEDICARE

## 2022-12-16 ENCOUNTER — NURSE ONLY (OUTPATIENT)
Dept: ELECTROPHYSIOLOGY | Facility: HOSPITAL | Age: 72
End: 2022-12-16
Attending: NURSE PRACTITIONER
Payer: MEDICARE

## 2022-12-16 LAB
ALBUMIN SERPL-MCNC: 2.9 G/DL (ref 3.4–5)
ALBUMIN/GLOB SERPL: 0.7 {RATIO} (ref 1–2)
ALP LIVER SERPL-CCNC: 567 U/L
ALT SERPL-CCNC: 121 U/L
ANION GAP SERPL CALC-SCNC: 11 MMOL/L (ref 0–18)
AST SERPL-CCNC: 112 U/L (ref 15–37)
BASOPHILS # BLD AUTO: 0.05 X10(3) UL (ref 0–0.2)
BASOPHILS NFR BLD AUTO: 0.7 %
BILIRUB SERPL-MCNC: 0.8 MG/DL (ref 0.1–2)
BUN BLD-MCNC: 33 MG/DL (ref 7–18)
CALCIUM BLD-MCNC: 9 MG/DL (ref 8.5–10.1)
CHLORIDE SERPL-SCNC: 101 MMOL/L (ref 98–112)
CO2 SERPL-SCNC: 24 MMOL/L (ref 21–32)
CREAT BLD-MCNC: 4.23 MG/DL
EOSINOPHIL # BLD AUTO: 0.14 X10(3) UL (ref 0–0.7)
EOSINOPHIL NFR BLD AUTO: 1.9 %
ERYTHROCYTE [DISTWIDTH] IN BLOOD BY AUTOMATED COUNT: 18.4 %
GFR SERPLBLD BASED ON 1.73 SQ M-ARVRAT: 14 ML/MIN/1.73M2 (ref 60–?)
GLOBULIN PLAS-MCNC: 3.9 G/DL (ref 2.8–4.4)
GLUCOSE BLD-MCNC: 114 MG/DL (ref 70–99)
GLUCOSE BLD-MCNC: 130 MG/DL (ref 70–99)
GLUCOSE BLD-MCNC: 187 MG/DL (ref 70–99)
GLUCOSE BLD-MCNC: 194 MG/DL (ref 70–99)
GLUCOSE BLD-MCNC: 61 MG/DL (ref 70–99)
GLUCOSE BLD-MCNC: 64 MG/DL (ref 70–99)
GLUCOSE BLD-MCNC: 84 MG/DL (ref 70–99)
GLUCOSE BLD-MCNC: 89 MG/DL (ref 70–99)
HCT VFR BLD AUTO: 40.8 %
HGB BLD-MCNC: 12.7 G/DL
IMM GRANULOCYTES # BLD AUTO: 0.04 X10(3) UL (ref 0–1)
IMM GRANULOCYTES NFR BLD: 0.6 %
LYMPHOCYTES # BLD AUTO: 1.73 X10(3) UL (ref 1–4)
LYMPHOCYTES NFR BLD AUTO: 24 %
MCH RBC QN AUTO: 29.1 PG (ref 26–34)
MCHC RBC AUTO-ENTMCNC: 31.1 G/DL (ref 31–37)
MCV RBC AUTO: 93.6 FL
MONOCYTES # BLD AUTO: 0.67 X10(3) UL (ref 0.1–1)
MONOCYTES NFR BLD AUTO: 9.3 %
NEUTROPHILS # BLD AUTO: 4.58 X10 (3) UL (ref 1.5–7.7)
NEUTROPHILS # BLD AUTO: 4.58 X10(3) UL (ref 1.5–7.7)
NEUTROPHILS NFR BLD AUTO: 63.5 %
OSMOLALITY SERPL CALC.SUM OF ELEC: 295 MOSM/KG (ref 275–295)
PLATELET # BLD AUTO: 99 10(3)UL (ref 150–450)
POTASSIUM SERPL-SCNC: 3.8 MMOL/L (ref 3.5–5.1)
PROT SERPL-MCNC: 6.8 G/DL (ref 6.4–8.2)
RBC # BLD AUTO: 4.36 X10(6)UL
SODIUM SERPL-SCNC: 136 MMOL/L (ref 136–145)
T4 FREE SERPL-MCNC: 0.8 NG/DL (ref 0.8–1.7)
TSI SER-ACNC: 9.07 MIU/ML (ref 0.36–3.74)
VIT B12 SERPL-MCNC: >2000 PG/ML (ref 193–986)
WBC # BLD AUTO: 7.2 X10(3) UL (ref 4–11)

## 2022-12-16 PROCEDURE — 70450 CT HEAD/BRAIN W/O DYE: CPT | Performed by: NURSE PRACTITIONER

## 2022-12-16 PROCEDURE — 99215 OFFICE O/P EST HI 40 MIN: CPT | Performed by: OTHER

## 2022-12-16 PROCEDURE — 99231 SBSQ HOSP IP/OBS SF/LOW 25: CPT | Performed by: CLINICAL NURSE SPECIALIST

## 2022-12-16 PROCEDURE — 95819 EEG AWAKE AND ASLEEP: CPT | Performed by: OTHER

## 2022-12-16 PROCEDURE — 99233 SBSQ HOSP IP/OBS HIGH 50: CPT | Performed by: INTERNAL MEDICINE

## 2022-12-16 RX ORDER — DULOXETIN HYDROCHLORIDE 30 MG/1
60 CAPSULE, DELAYED RELEASE ORAL NIGHTLY
Status: DISCONTINUED | OUTPATIENT
Start: 2022-12-16 | End: 2022-12-21

## 2022-12-16 NOTE — PROCEDURES
[address] in [address]  [address]  [place], [address] [facility]  [phone]  Fax [phone]      Fawad Fregoso  1950    Date of testin2022    Ordering provider: Arlen Magdaleno    Reason for testing:  Confusional state           ELECTROENCEPHALOGRAPHY     Using 10-20 International System digital recording: referential and bipolar montages were used for a routine EEG that was performed in the awake and sleep state without sedation.       Background is slowed at 7-7.5 Hz while awake   The slowing is uniform throughout  Sleep is achieved    No activation procedures done    No focal slowing or paroxysmal activity to suggest susceptibility to seizures seen      IMPRESSION:   ABnormality is compatible with diffuse cerebral dysfunction consistent with encephalopathy         Rochelle Evangelista MD  Vascular & General Neurology  Director, Multiple Sclerosis Program  [place]  2022, Time completed 3:13 PM

## 2022-12-16 NOTE — DISCHARGE INSTRUCTIONS
HOLD ROSUVASTATIN until ok with Toya Zambrano MD -please follow up    Resume services with [facility]  [phone]    Hemodialysis every Tuesday, Thursday and Saturday. Vancomycin IV antibiotic to be given during Hemodialysis x 2 weeks    Continue Insulin pump as at home    Left arm wound- Paint with Betadine and cover with dry gauze daily.

## 2022-12-16 NOTE — CM/SW NOTE
Resume of care order placed for [facility] RN services. SW will continue to follow for plan of care changes and remain available for any additional DC needs or concerns.      Chilango Aly MSW, LSW  Discharge Planner   [phone]

## 2022-12-16 NOTE — PLAN OF CARE
Received pt alert and oriented X2. Pt confused at times. No c/o of pain. All meds given per [facility]. Pt/Ot coming back tomorrow to evaluate pt. Pt slept most of the day due to being restless at night. Pt seen by neurology and got EEG and CT scan done. Fall precautions in place, call light in reach. Problem: METABOLIC/FLUID AND ELECTROLYTES - ADULT  Goal: Glucose maintained within prescribed range  Description: INTERVENTIONS:  - Monitor Blood Glucose as ordered  - Assess for signs and symptoms of hyperglycemia and hypoglycemia  - Administer ordered medications to maintain glucose within target range  - Assess barriers to adequate nutritional intake and initiate nutrition consult as needed  - Instruct patient on self management of diabetes  Outcome: Progressing     Problem: NEUROLOGICAL - ADULT  Goal: Achieves stable or improved neurological status  Description: INTERVENTIONS  - Assess for and report changes in neurological status  - Initiate measures to prevent increased intracranial pressure  - Maintain blood pressure and fluid volume within ordered parameters to optimize cerebral perfusion and minimize risk of hemorrhage  - Monitor temperature, glucose, and sodium.  Initiate appropriate interventions as ordered  Outcome: Not Progressing

## 2022-12-16 NOTE — PHYSICAL THERAPY NOTE
PT orders received, chart reviewed. Per RN, pt not appropriate for therapy at this time due to confusion and myoclonus.   Will continue to follow as appropriate

## 2022-12-17 LAB
ALBUMIN SERPL-MCNC: 3.1 G/DL (ref 3.4–5)
ALBUMIN/GLOB SERPL: 0.8 {RATIO} (ref 1–2)
ALP LIVER SERPL-CCNC: 546 U/L
ALT SERPL-CCNC: 104 U/L
ANION GAP SERPL CALC-SCNC: 10 MMOL/L (ref 0–18)
AST SERPL-CCNC: 92 U/L (ref 15–37)
BASOPHILS # BLD AUTO: 0.03 X10(3) UL (ref 0–0.2)
BASOPHILS NFR BLD AUTO: 0.4 %
BILIRUB SERPL-MCNC: 0.8 MG/DL (ref 0.1–2)
BUN BLD-MCNC: 40 MG/DL (ref 7–18)
CALCIUM BLD-MCNC: 9.3 MG/DL (ref 8.5–10.1)
CHLORIDE SERPL-SCNC: 102 MMOL/L (ref 98–112)
CO2 SERPL-SCNC: 27 MMOL/L (ref 21–32)
CREAT BLD-MCNC: 5.04 MG/DL
EOSINOPHIL # BLD AUTO: 0.31 X10(3) UL (ref 0–0.7)
EOSINOPHIL NFR BLD AUTO: 3.7 %
ERYTHROCYTE [DISTWIDTH] IN BLOOD BY AUTOMATED COUNT: 18.8 %
GFR SERPLBLD BASED ON 1.73 SQ M-ARVRAT: 11 ML/MIN/1.73M2 (ref 60–?)
GLOBULIN PLAS-MCNC: 4 G/DL (ref 2.8–4.4)
GLUCOSE BLD-MCNC: 118 MG/DL (ref 70–99)
GLUCOSE BLD-MCNC: 146 MG/DL (ref 70–99)
GLUCOSE BLD-MCNC: 158 MG/DL (ref 70–99)
GLUCOSE BLD-MCNC: 202 MG/DL (ref 70–99)
GLUCOSE BLD-MCNC: 203 MG/DL (ref 70–99)
GLUCOSE BLD-MCNC: 209 MG/DL (ref 70–99)
GLUCOSE BLD-MCNC: 280 MG/DL (ref 70–99)
HCT VFR BLD AUTO: 41 %
HGB BLD-MCNC: 13.3 G/DL
IMM GRANULOCYTES # BLD AUTO: 0.02 X10(3) UL (ref 0–1)
IMM GRANULOCYTES NFR BLD: 0.2 %
LYMPHOCYTES # BLD AUTO: 1.38 X10(3) UL (ref 1–4)
LYMPHOCYTES NFR BLD AUTO: 16.5 %
MCH RBC QN AUTO: 29.8 PG (ref 26–34)
MCHC RBC AUTO-ENTMCNC: 32.4 G/DL (ref 31–37)
MCV RBC AUTO: 91.7 FL
MONOCYTES # BLD AUTO: 0.76 X10(3) UL (ref 0.1–1)
MONOCYTES NFR BLD AUTO: 9.1 %
NEUTROPHILS # BLD AUTO: 5.86 X10 (3) UL (ref 1.5–7.7)
NEUTROPHILS # BLD AUTO: 5.86 X10(3) UL (ref 1.5–7.7)
NEUTROPHILS NFR BLD AUTO: 70.1 %
OSMOLALITY SERPL CALC.SUM OF ELEC: 300 MOSM/KG (ref 275–295)
PLATELET # BLD AUTO: 109 10(3)UL (ref 150–450)
POTASSIUM SERPL-SCNC: 4 MMOL/L (ref 3.5–5.1)
PROT SERPL-MCNC: 7.1 G/DL (ref 6.4–8.2)
RBC # BLD AUTO: 4.47 X10(6)UL
SODIUM SERPL-SCNC: 139 MMOL/L (ref 136–145)
WBC # BLD AUTO: 8.4 X10(3) UL (ref 4–11)

## 2022-12-17 PROCEDURE — 99233 SBSQ HOSP IP/OBS HIGH 50: CPT | Performed by: OTHER

## 2022-12-17 PROCEDURE — 99233 SBSQ HOSP IP/OBS HIGH 50: CPT | Performed by: INTERNAL MEDICINE

## 2022-12-17 NOTE — PLAN OF CARE
Appears comfortable in bed. Ambulated w/ therapy w/ walker/Therapy recommends placement to [facility]. Patient much better today,alert & awake x 2. Ate breakfast & lunch w/ 90% consumption of meal.Compliant w/ meds & treatments,able to follow simple commands,remains fidgety. Fall precautions observed. Monitored closely & rounded every 2H. Spouse at bedside & MDs updated her of pan of care. Dialysis today as scheduled. No call back from [facility] w/ ETA for dialysis. Remains on Vanco IV w/ no adverse reactions noted.

## 2022-12-17 NOTE — CONSULTS
[address]    PATIENT'S NAME: Tip Oneill   ATTENDING PHYSICIAN: Candace Giraldo DO   CONSULTING PHYSICIAN: Tobin Burt M.D. PATIENT ACCOUNT#:   [de-identified]    LOCATION:  [address] [place] [address]  MEDICAL RECORD #:   [id number]       YOB: 1950  ADMISSION DATE:       12/15/2022      CONSULT DATE:  12/17/2022    REPORT OF CONSULTATION    FOLLOWUP CONSULTATION    A 65-year-old white male admitted to the hospital with mental status changes. Some mild erythema was noted in the area of the incision for autogenous fistula placed almost 2 weeks ago. ID was consulted. Patient placed on vancomycin at this time and will continue to observe. His white count is normal.  He is afebrile. Patient's mental status changes, notes were read from Dr. Juanita Arriola and continue with his recommendations. His liver enzymes are elevated, would have a GI consult if it is okay with Dr. Vicky Torrez and put in a consult for GI if he wants another physician, per his decision. Discussed with the patient's wife and at this time continue with present management.     Dictated By Tobin Burt M.D.  d: 12/17/2022 10:19:16  t: 12/17/2022 11:06:24  [facility] [id number]  [facility]/    cc: Tobin Burt M.D.

## 2022-12-18 ENCOUNTER — APPOINTMENT (OUTPATIENT)
Dept: CV DIAGNOSTICS | Facility: HOSPITAL | Age: 72
DRG: 314 | End: 2022-12-18
Attending: INTERNAL MEDICINE
Payer: MEDICARE

## 2022-12-18 ENCOUNTER — APPOINTMENT (OUTPATIENT)
Dept: CV DIAGNOSTICS | Facility: HOSPITAL | Age: 72
End: 2022-12-18
Attending: INTERNAL MEDICINE
Payer: MEDICARE

## 2022-12-18 LAB
CK SERPL-CCNC: 385 U/L
GGT SERPL-CCNC: 375 U/L
GLUCOSE BLD-MCNC: 110 MG/DL (ref 70–99)
GLUCOSE BLD-MCNC: 174 MG/DL (ref 70–99)
GLUCOSE BLD-MCNC: 242 MG/DL (ref 70–99)
GLUCOSE BLD-MCNC: 323 MG/DL (ref 70–99)
HAV AB SER QL IA: NONREACTIVE
HAV IGM SER QL: REACTIVE
HBV CORE IGM SER QL: NONREACTIVE
HBV SURFACE AG SERPL QL IA: NONREACTIVE
HCV AB SERPL QL IA: NONREACTIVE
HETEROPH AB SER QL: NEGATIVE

## 2022-12-18 PROCEDURE — 93306 TTE W/DOPPLER COMPLETE: CPT | Performed by: INTERNAL MEDICINE

## 2022-12-18 PROCEDURE — 99233 SBSQ HOSP IP/OBS HIGH 50: CPT | Performed by: INTERNAL MEDICINE

## 2022-12-18 RX ORDER — MINERAL OIL AND PETROLATUM 150; 830 MG/G; MG/G
OINTMENT OPHTHALMIC 3 TIMES DAILY
Status: DISCONTINUED | OUTPATIENT
Start: 2022-12-18 | End: 2022-12-18

## 2022-12-18 NOTE — PHYSICAL THERAPY NOTE
Order for PT acknowledged and completed. Spoke to RN regarding duplicate PT order, patient was seen for PT Evaluation yesterday and patient is scheduled to be seen on 12/19.

## 2022-12-19 ENCOUNTER — APPOINTMENT (OUTPATIENT)
Dept: ULTRASOUND IMAGING | Facility: HOSPITAL | Age: 72
End: 2022-12-19
Attending: INTERNAL MEDICINE
Payer: MEDICARE

## 2022-12-19 ENCOUNTER — APPOINTMENT (OUTPATIENT)
Dept: NUCLEAR MEDICINE | Facility: HOSPITAL | Age: 72
DRG: 314 | End: 2022-12-19
Attending: INTERNAL MEDICINE
Payer: MEDICARE

## 2022-12-19 ENCOUNTER — APPOINTMENT (OUTPATIENT)
Dept: NUCLEAR MEDICINE | Facility: HOSPITAL | Age: 72
End: 2022-12-19
Attending: INTERNAL MEDICINE
Payer: MEDICARE

## 2022-12-19 ENCOUNTER — APPOINTMENT (OUTPATIENT)
Dept: ULTRASOUND IMAGING | Facility: HOSPITAL | Age: 72
DRG: 314 | End: 2022-12-19
Attending: INTERNAL MEDICINE
Payer: MEDICARE

## 2022-12-19 LAB
ALBUMIN SERPL-MCNC: 3.1 G/DL (ref 3.4–5)
ALBUMIN/GLOB SERPL: 0.9 {RATIO} (ref 1–2)
ALP LIVER SERPL-CCNC: 531 U/L
ALT SERPL-CCNC: 111 U/L
ANION GAP SERPL CALC-SCNC: 10 MMOL/L (ref 0–18)
AST SERPL-CCNC: 121 U/L (ref 15–37)
BILIRUB SERPL-MCNC: 0.8 MG/DL (ref 0.1–2)
BUN BLD-MCNC: 30 MG/DL (ref 7–18)
CALCIUM BLD-MCNC: 8.9 MG/DL (ref 8.5–10.1)
CHLORIDE SERPL-SCNC: 105 MMOL/L (ref 98–112)
CO2 SERPL-SCNC: 27 MMOL/L (ref 21–32)
CREAT BLD-MCNC: 4.51 MG/DL
GFR SERPLBLD BASED ON 1.73 SQ M-ARVRAT: 13 ML/MIN/1.73M2 (ref 60–?)
GLOBULIN PLAS-MCNC: 3.5 G/DL (ref 2.8–4.4)
GLUCOSE BLD-MCNC: 125 MG/DL (ref 70–99)
GLUCOSE BLD-MCNC: 222 MG/DL (ref 70–99)
GLUCOSE BLD-MCNC: 256 MG/DL (ref 70–99)
GLUCOSE BLD-MCNC: 258 MG/DL (ref 70–99)
GLUCOSE BLD-MCNC: 264 MG/DL (ref 70–99)
GLUCOSE BLD-MCNC: 286 MG/DL (ref 70–99)
OSMOLALITY SERPL CALC.SUM OF ELEC: 309 MOSM/KG (ref 275–295)
POTASSIUM SERPL-SCNC: 4 MMOL/L (ref 3.5–5.1)
PROT SERPL-MCNC: 6.6 G/DL (ref 6.4–8.2)
SODIUM SERPL-SCNC: 142 MMOL/L (ref 136–145)
VITAMIN B1 (THIAMINE), WHOLE B: 233 NMOL/L

## 2022-12-19 PROCEDURE — 93975 VASCULAR STUDY: CPT | Performed by: INTERNAL MEDICINE

## 2022-12-19 PROCEDURE — 99233 SBSQ HOSP IP/OBS HIGH 50: CPT | Performed by: INTERNAL MEDICINE

## 2022-12-19 PROCEDURE — 78226 HEPATOBILIARY SYSTEM IMAGING: CPT | Performed by: INTERNAL MEDICINE

## 2022-12-19 NOTE — CM/SW NOTE
SW noted PT recommendation for [facility] placement. SW sent referral for [facility] via [facility] and will follow up with patient once list of accepting agencies is available. SW will continue to follow for plan of care changes and remain available for any additional DC needs or concerns.      Pete Ocampo MSW, LSW  Discharge Planner   [phone]

## 2022-12-20 LAB
ALBUMIN SERPL-MCNC: 2.8 G/DL (ref 3.4–5)
ALBUMIN/GLOB SERPL: 0.7 {RATIO} (ref 1–2)
ALP LIVER SERPL-CCNC: 434 U/L
ALT SERPL-CCNC: 74 U/L
ANION GAP SERPL CALC-SCNC: 8 MMOL/L (ref 0–18)
AST SERPL-CCNC: 59 U/L (ref 15–37)
BILIRUB SERPL-MCNC: 1 MG/DL (ref 0.1–2)
BUN BLD-MCNC: 38 MG/DL (ref 7–18)
CALCIUM BLD-MCNC: 9.3 MG/DL (ref 8.5–10.1)
CHLORIDE SERPL-SCNC: 104 MMOL/L (ref 98–112)
CO2 SERPL-SCNC: 28 MMOL/L (ref 21–32)
CREAT BLD-MCNC: 5 MG/DL
GFR SERPLBLD BASED ON 1.73 SQ M-ARVRAT: 12 ML/MIN/1.73M2 (ref 60–?)
GLOBULIN PLAS-MCNC: 4.2 G/DL (ref 2.8–4.4)
GLUCOSE BLD-MCNC: 207 MG/DL (ref 70–99)
GLUCOSE BLD-MCNC: 231 MG/DL (ref 70–99)
GLUCOSE BLD-MCNC: 60 MG/DL (ref 70–99)
GLUCOSE BLD-MCNC: 84 MG/DL (ref 70–99)
GLUCOSE BLD-MCNC: 86 MG/DL (ref 70–99)
GLUCOSE BLD-MCNC: 90 MG/DL (ref 70–99)
GLUCOSE BLD-MCNC: 96 MG/DL (ref 70–99)
OSMOLALITY SERPL CALC.SUM OF ELEC: 306 MOSM/KG (ref 275–295)
POTASSIUM SERPL-SCNC: 3.9 MMOL/L (ref 3.5–5.1)
PROT SERPL-MCNC: 7 G/DL (ref 6.4–8.2)
SODIUM SERPL-SCNC: 140 MMOL/L (ref 136–145)
VANCOMYCIN SERPL-MCNC: 13 UG/ML (ref ?–40)

## 2022-12-20 PROCEDURE — 99233 SBSQ HOSP IP/OBS HIGH 50: CPT | Performed by: INTERNAL MEDICINE

## 2022-12-20 RX ORDER — HYDROXYZINE HYDROCHLORIDE 25 MG/1
25 TABLET, FILM COATED ORAL ONCE
Status: COMPLETED | OUTPATIENT
Start: 2022-12-20 | End: 2022-12-20

## 2022-12-20 NOTE — PLAN OF CARE
2845-3401: A&Ox3-4 this evening, slightly off to time. Denies pain. No new pt concerns this evening. Bed alarm in use.

## 2022-12-20 NOTE — PLAN OF CARE
Received pt alert x3, confused at times. VSS. MRI cancelled per MD. Possible discharge tomorrow. Dialysis done this morning, 1 liter removed. All meds given per [facility]. IV abx infusing. Fall precautions in place, call light in reach. 1700: Pt blood sugar was 60, glucose protocol followed. MD notified. 1715: pt blood sugar 83, will continue to monitor and retest in 2 hours. 1800: Md visited patient, ordered for left fistula wound to be cleansed wit betadine and wrapped with dry gauze after.   1820: cleaned wound and wrapped, family at bedside    1900: BS at 80, will check again in 2 hours and continue to monitor

## 2022-12-20 NOTE — PROGRESS NOTES
[facility]  Diabetes Clinical Nurse Specialist Progress  Note    [address] Patient Status:  Inpatient    1950 MRN [id number]   [facility] 4NW-A Attending Reuben Goodman,    Hosp Day # 3 PCP Justa Chambers MD     Diagnosis:    Patient Active Problem List:     Restless legs syndrome (RLS)     Secondary hyperparathyroidism ([address]     S/P total knee arthroplasty     Ulcerated, foot, right, with fat layer exposed ([address]     PRANEETH on CPAP     Insulin pump status     Type 1 diabetes, uncontrolled, with neuropathy     Hypothyroidism (acquired)     Uncontrolled type 1 diabetes mellitus with hyperglycemia, with long-term current use of insulin ([address]     Onychomycosis     Lumbar stenosis with neurogenic claudication     DDD (degenerative disc disease), lumbar     Long-term insulin use ([address]     Uncontrolled type 1 diabetes mellitus with proliferative retinopathy without macular edema, with long-term current use of insulin     Uncontrolled type 1 diabetes mellitus with diabetic nephropathy, with long-term current use of insulin     Pulmonary hypertension ([address]     Uncontrolled type 1 diabetes mellitus with stage 3 chronic kidney disease     Neuropathy     Primary hypertension     Hyperlipidemia LDL goal <100     Hypoglycemia unawareness in type 1 diabetes mellitus ([address]     Sensory hearing loss, bilateral     Moderate episode of recurrent major depressive disorder ([address]     Controlled type 1 diabetes mellitus with complication, with long-term current use of insulin (HCC)     Myofascial pain     Charcot foot due to diabetes mellitus ([address]     Other chronic pain     Chronic mastoiditis of right side     Cervical myelopathy (HCC)     Pulmonary emphysema, unspecified emphysema type (HCC)     Difficulty walking     S/P insertion of spinal cord stimulator     Hyperglycemia     Stage 4 chronic kidney disease ([address]     Urinary retention     Anemia in stage 4 chronic kidney disease ([address] Arthritis of facet joint of lumbar spine     Type 1 diabetes mellitus with complication, with long term current use of insulin pump (HCC)     Esophagitis     Platelets decreased (HCC)     Jerking movements of extremities     Diabetic complication (HCC)     Acute renal failure (HCC)     Acute renal failure, unspecified acute renal failure type ([address]     Hyponatremia     Anemia, unspecified type     ESRD (end stage renal disease) (HCC)     Hypokalemia     Thrombocytopenia (HCC)     Altered mental status, unspecified altered mental status type     Rash     Diabetes mellitus due to underlying condition with chronic kidney disease on chronic dialysis, with long-term current use of insulin ([address]     Other fatigue     Acute renal failure superimposed on chronic kidney disease, unspecified CKD stage, unspecified acute renal failure type ([address]     Acute on chronic congestive heart failure, unspecified heart failure type ([facility])     Urticaria     Cellulitis of left upper extremity     Encephalopathy    Labs:    Recent Labs   Lab 12/19/22  1704 12/19/22  1740 12/19/22  2107 12/20/22  0512 12/20/22  1227   PGLU 286* 258* 222* 207* 96     Recent Labs   Lab 12/15/22  0655 12/16/22  0651 12/17/22  0606 12/18/22  1314 12/20/22  0642   CREATSERUM 5.54* 4.23* 5.04* 4.51* 5.00*     HGBA1C:    Lab Results   Component Value Date    A1C 7.0 (H) 12/10/2022    A1C 7.3 (H) 09/06/2022    A1C 6.2 (A) 01/28/2022     (H) 12/10/2022     Discussion:  Asked by Dr. Rosa Santillan to have insulin pump put back on patient. His wife was not in the room. There was a black bag with supplies (Humalog insulin, reservoirs, infusion sets) but no pump. Pt was not able to answer if he knew where the pump was or not, was unable to have a coherent conversation. Pt was scratching and slapping himself repeatedly, was agitated and could not sit still.  Notified Dr. Rosa Santillan that pump was not found and patient was not able to tell me if he knew where it was, that obviously the pump would not be put on. Raelyn Cranker also notified. I spent a total of 35 minutes with the patient, 100% of this encounter was spent counseling patient regarding insulin pump management. Assessment/Plan:  Dx: DM1   Insulin per hospitalist  Insulin by subcutaneous at this time, pt usually has a T-slim pump but apparently was not brought in by his wife.   Altered mental status/ encephalopathy  Myoclonus likely due to benadryl     Cristino Miller, MSN, APN, ACNS-BC, BC-ADM  Clinical Nurse Specialist  Diabetes Educator  12/20/2022  4:32 PM

## 2022-12-20 NOTE — PROGRESS NOTES
[facility] dosing service    Follow-up Pharmacokinetic Consult for Vancomycin Dosing     Kelechi Ruby is a 66year old patient who is being treated for cellulitis. Patient is on day 6 of Vancomycin and is currently receiving 750 mg IV after dialysis. Dialysis Details:  Patient dialysis schedule is [facility]  Last dialysis was 12/17    Levels:  random. 13 mcg/mL  (12/20 am labs)      Based on the above:    1. Continue Vancomycin at 750 mg IVPB based on pharmacokinetics and renal function. 2.  Pharmacy will recheck a vancomycin random level weekly. Goal pre-dialysis level is 15-20 mcg/mL which is likely to achieve the goal AUC24 of 400 to 600 mg-h/L.    3. Pharmacy will follow and monitor renal function, toxicity and efficacy. We appreciate the opportunity to assist in the care of this patient.     Luis Perales, Ellis  12/20/2022  10:19 AM  [address] [place] [address] Extension: [phone]

## 2022-12-21 VITALS
RESPIRATION RATE: 18 BRPM | DIASTOLIC BLOOD PRESSURE: 77 MMHG | OXYGEN SATURATION: 94 % | HEIGHT: 66 IN | HEART RATE: 71 BPM | TEMPERATURE: 98 F | WEIGHT: 167.5 LBS | BODY MASS INDEX: 26.92 KG/M2 | SYSTOLIC BLOOD PRESSURE: 106 MMHG

## 2022-12-21 PROBLEM — D63.1 ANEMIA IN ESRD (END-STAGE RENAL DISEASE)  (HCC): Status: ACTIVE | Noted: 2022-09-07

## 2022-12-21 PROBLEM — D63.1 ANEMIA IN ESRD (END-STAGE RENAL DISEASE): Status: ACTIVE | Noted: 2022-09-07

## 2022-12-21 PROBLEM — N18.6 ANEMIA IN ESRD (END-STAGE RENAL DISEASE): Status: ACTIVE | Noted: 2022-09-07

## 2022-12-21 PROBLEM — D63.1 ANEMIA IN ESRD (END-STAGE RENAL DISEASE) (HCC): Status: ACTIVE | Noted: 2022-09-07

## 2022-12-21 PROBLEM — N18.6 ANEMIA IN ESRD (END-STAGE RENAL DISEASE)  (HCC): Status: ACTIVE | Noted: 2022-09-07

## 2022-12-21 PROBLEM — N18.6 ANEMIA IN ESRD (END-STAGE RENAL DISEASE) (HCC): Status: ACTIVE | Noted: 2022-09-07

## 2022-12-21 LAB
BASOPHILS # BLD AUTO: 0.03 X10(3) UL (ref 0–0.2)
BASOPHILS NFR BLD AUTO: 0.4 %
EOSINOPHIL # BLD AUTO: 0.23 X10(3) UL (ref 0–0.7)
EOSINOPHIL NFR BLD AUTO: 3.3 %
ERYTHROCYTE [DISTWIDTH] IN BLOOD BY AUTOMATED COUNT: 19.4 %
GLUCOSE BLD-MCNC: 198 MG/DL (ref 70–99)
GLUCOSE BLD-MCNC: 305 MG/DL (ref 70–99)
HCT VFR BLD AUTO: 38.7 %
HGB BLD-MCNC: 12.3 G/DL
IMM GRANULOCYTES # BLD AUTO: 0.03 X10(3) UL (ref 0–1)
IMM GRANULOCYTES NFR BLD: 0.4 %
LYMPHOCYTES # BLD AUTO: 0.74 X10(3) UL (ref 1–4)
LYMPHOCYTES NFR BLD AUTO: 10.5 %
MCH RBC QN AUTO: 29.5 PG (ref 26–34)
MCHC RBC AUTO-ENTMCNC: 31.8 G/DL (ref 31–37)
MCV RBC AUTO: 92.8 FL
MONOCYTES # BLD AUTO: 0.75 X10(3) UL (ref 0.1–1)
MONOCYTES NFR BLD AUTO: 10.6 %
NEUTROPHILS # BLD AUTO: 5.27 X10 (3) UL (ref 1.5–7.7)
NEUTROPHILS # BLD AUTO: 5.27 X10(3) UL (ref 1.5–7.7)
NEUTROPHILS NFR BLD AUTO: 74.8 %
PLATELET # BLD AUTO: 91 10(3)UL (ref 150–450)
RBC # BLD AUTO: 4.17 X10(6)UL
WBC # BLD AUTO: 7.1 X10(3) UL (ref 4–11)

## 2022-12-21 PROCEDURE — 99231 SBSQ HOSP IP/OBS SF/LOW 25: CPT | Performed by: CLINICAL NURSE SPECIALIST

## 2022-12-21 PROCEDURE — 99232 SBSQ HOSP IP/OBS MODERATE 35: CPT | Performed by: INTERNAL MEDICINE

## 2022-12-21 RX ORDER — VANCOMYCIN/0.9 % SOD CHLORIDE 750 MG/250
750 PLASTIC BAG, INJECTION (ML) INTRAVENOUS
Qty: 7500 ML | Refills: 0 | Status: SHIPPED | OUTPATIENT
Start: 2022-12-21 | End: 2023-01-04

## 2022-12-21 RX ORDER — ROSUVASTATIN CALCIUM 20 MG/1
20 TABLET, COATED ORAL DAILY
Qty: 90 TABLET | Refills: 3 | Status: SHIPPED | COMMUNITY
Start: 2022-12-21

## 2022-12-21 RX ORDER — HEPARIN SODIUM 1000 [USP'U]/ML
1.5 INJECTION, SOLUTION INTRAVENOUS; SUBCUTANEOUS ONCE
Status: DISCONTINUED | OUTPATIENT
Start: 2022-12-21 | End: 2022-12-21

## 2022-12-21 NOTE — CM/SW NOTE
SALINAS met with patient who reported that he goes to dialysis at [address] [place] [address]. SALINAS called the clinic to verify. Clinic Information:   [facility]   Phone: [phone]  Fax: [phone]    SALINAS faxed updated flow sheets for patient as well as Vancomycin order to clinic. Nurse notified. Clinic confirmed they will be able to supply the IV ABX. SALINAS will continue to follow for plan of care changes and remain available for any additional DC needs or concerns.      Chilango Chimera MSW, LSW  Discharge Planner   [phone]

## 2022-12-21 NOTE — PLAN OF CARE
Patient alert and oriented, lungs clear, diminished on room air, abdomen soft, bowel sounds present, patient had BM today, denies any diarrhea, anuric, denies pain. 06:30-Bladder scan for 200cc urine.

## 2022-12-21 NOTE — PROGRESS NOTES
Still some erythema antecubital fossa. Eschar on incisions- will paint betadine/ dry gauze. Fistula patent. Neuro back to baseline.  Seen with son at bedside

## 2023-01-12 ENCOUNTER — TELEPHONE (OUTPATIENT)
Dept: NEPHROLOGY | Facility: CLINIC | Age: 73
End: 2023-01-12

## 2023-01-12 NOTE — TELEPHONE ENCOUNTER
[facility] asking to speak with Dr Mark Muñoz regarding the systemic itching pt is experiencing. Has has a reaction to benadryl per nurse and would like to discuss options.

## 2023-01-16 ENCOUNTER — OFFICE VISIT (OUTPATIENT)
Dept: NEPHROLOGY | Facility: CLINIC | Age: 73
End: 2023-01-16
Payer: MEDICARE

## 2023-01-16 VITALS — WEIGHT: 170.25 LBS | DIASTOLIC BLOOD PRESSURE: 60 MMHG | BODY MASS INDEX: 27 KG/M2 | SYSTOLIC BLOOD PRESSURE: 118 MMHG

## 2023-01-16 DIAGNOSIS — N18.9 ANEMIA DUE TO CHRONIC KIDNEY DISEASE, UNSPECIFIED CKD STAGE: ICD-10-CM

## 2023-01-16 DIAGNOSIS — N18.9 CHRONIC KIDNEY DISEASE, UNSPECIFIED CKD STAGE: ICD-10-CM

## 2023-01-16 DIAGNOSIS — I10 PRIMARY HYPERTENSION: Primary | ICD-10-CM

## 2023-01-16 DIAGNOSIS — D63.1 ANEMIA DUE TO CHRONIC KIDNEY DISEASE, UNSPECIFIED CKD STAGE: ICD-10-CM

## 2023-01-17 ENCOUNTER — LAB REQUISITION (OUTPATIENT)
Dept: LAB | Age: 73
End: 2023-01-17
Payer: MEDICARE

## 2023-01-17 DIAGNOSIS — B15.9 HEPATITIS A WITHOUT HEPATIC COMA: ICD-10-CM

## 2023-01-17 LAB
ALBUMIN SERPL-MCNC: 3.2 G/DL (ref 3.4–5)
ALP LIVER SERPL-CCNC: 327 U/L
ALT SERPL-CCNC: 35 U/L
AST SERPL-CCNC: 40 U/L (ref 15–37)
BILIRUB DIRECT SERPL-MCNC: 0.3 MG/DL (ref 0–0.2)
BILIRUB SERPL-MCNC: 0.7 MG/DL (ref 0.1–2)
PROT SERPL-MCNC: 6.8 G/DL (ref 6.4–8.2)

## 2023-01-17 PROCEDURE — 80076 HEPATIC FUNCTION PANEL: CPT | Performed by: INTERNAL MEDICINE

## 2023-01-25 ENCOUNTER — TELEPHONE (OUTPATIENT)
Dept: NEPHROLOGY | Facility: CLINIC | Age: 73
End: 2023-01-25

## 2023-01-26 NOTE — TELEPHONE ENCOUNTER
[facility] Nurse stated that today after dialysis BP was 153/82. when she saw him at home at 12:30 BP was 90/54. No dizziness or confusion.     She will notify PCP as well

## 2023-01-31 ENCOUNTER — HOSPITAL ENCOUNTER (OUTPATIENT)
Dept: ULTRASOUND IMAGING | Facility: HOSPITAL | Age: 73
Discharge: HOME | End: 2023-01-31
Attending: SURGERY
Payer: MEDICARE

## 2023-01-31 DIAGNOSIS — T82.848A: ICD-10-CM

## 2023-01-31 PROCEDURE — 93990 DOPPLER FLOW TESTING: CPT | Performed by: SURGERY

## 2023-02-01 ENCOUNTER — LAB ENCOUNTER (OUTPATIENT)
Dept: LAB | Facility: HOSPITAL | Age: 73
End: 2023-02-01
Attending: INTERNAL MEDICINE
Payer: MEDICARE

## 2023-02-19 ENCOUNTER — PATIENT MESSAGE (OUTPATIENT)
Dept: NEPHROLOGY | Facility: CLINIC | Age: 73
End: 2023-02-19

## 2023-02-21 RX ORDER — ESCITALOPRAM OXALATE 10 MG/1
10 TABLET ORAL DAILY
Qty: 30 TABLET | Refills: 11 | Status: SHIPPED | OUTPATIENT
Start: 2023-02-21

## 2023-02-21 NOTE — TELEPHONE ENCOUNTER
From: Myesha Philip  To: Chris Phillips MD  Sent: 2/19/2023 10:13 PM CST  Subject: Depression Medication    Dr. Addis Arroyo,    I had been taking Duloxetine for depression but had to stop taking it because of kidney failure/dialysis. I feel that I need to be taking some type of anti depressant or anxiety medication. Is there something that can be prescribed for me?     Thank you,    Claudette Arredondo

## 2023-02-22 ENCOUNTER — TELEPHONE (OUTPATIENT)
Dept: NEPHROLOGY | Facility: CLINIC | Age: 73
End: 2023-02-22

## 2023-02-27 ENCOUNTER — APPOINTMENT (OUTPATIENT)
Dept: CT IMAGING | Age: 73
End: 2023-02-27
Attending: EMERGENCY MEDICINE
Payer: MEDICARE

## 2023-02-27 ENCOUNTER — HOSPITAL ENCOUNTER (EMERGENCY)
Age: 73
Discharge: LEFT BEFORE DISCHARGE | End: 2023-02-27
Attending: EMERGENCY MEDICINE
Payer: MEDICARE

## 2023-02-27 VITALS
OXYGEN SATURATION: 97 % | RESPIRATION RATE: 18 BRPM | BODY MASS INDEX: 28.13 KG/M2 | WEIGHT: 175 LBS | HEIGHT: 66 IN | HEART RATE: 66 BPM | DIASTOLIC BLOOD PRESSURE: 57 MMHG | SYSTOLIC BLOOD PRESSURE: 103 MMHG | TEMPERATURE: 97 F

## 2023-02-27 DIAGNOSIS — R55 SYNCOPE AND COLLAPSE: Primary | ICD-10-CM

## 2023-02-27 DIAGNOSIS — N18.6 ESRD (END STAGE RENAL DISEASE) (HCC): ICD-10-CM

## 2023-02-27 DIAGNOSIS — E16.2 HYPOGLYCEMIA: ICD-10-CM

## 2023-02-27 PROBLEM — N17.9 ACUTE KIDNEY INJURY: Status: ACTIVE | Noted: 2023-02-27

## 2023-02-27 PROBLEM — N17.9 ACUTE KIDNEY INJURY (HCC): Status: ACTIVE | Noted: 2023-02-27

## 2023-02-27 PROBLEM — D64.9 ANEMIA: Status: ACTIVE | Noted: 2023-02-27

## 2023-02-27 LAB
ALBUMIN SERPL-MCNC: 3.3 G/DL (ref 3.4–5)
ALBUMIN/GLOB SERPL: 0.8 {RATIO} (ref 1–2)
ALP LIVER SERPL-CCNC: 202 U/L
ALT SERPL-CCNC: 34 U/L
ANION GAP SERPL CALC-SCNC: 11 MMOL/L (ref 0–18)
AST SERPL-CCNC: 34 U/L (ref 15–37)
BASOPHILS # BLD AUTO: 0.05 X10(3) UL (ref 0–0.2)
BASOPHILS NFR BLD AUTO: 0.8 %
BILIRUB SERPL-MCNC: 0.4 MG/DL (ref 0.1–2)
BUN BLD-MCNC: 70 MG/DL (ref 7–18)
CALCIUM BLD-MCNC: 9.5 MG/DL (ref 8.5–10.1)
CHLORIDE SERPL-SCNC: 97 MMOL/L (ref 98–112)
CO2 SERPL-SCNC: 26 MMOL/L (ref 21–32)
CREAT BLD-MCNC: 6.02 MG/DL
EOSINOPHIL # BLD AUTO: 0.1 X10(3) UL (ref 0–0.7)
EOSINOPHIL NFR BLD AUTO: 1.7 %
ERYTHROCYTE [DISTWIDTH] IN BLOOD BY AUTOMATED COUNT: 18.5 %
GFR SERPLBLD BASED ON 1.73 SQ M-ARVRAT: 9 ML/MIN/1.73M2 (ref 60–?)
GLOBULIN PLAS-MCNC: 3.9 G/DL (ref 2.8–4.4)
GLUCOSE BLD-MCNC: 123 MG/DL (ref 70–99)
GLUCOSE BLD-MCNC: 152 MG/DL (ref 70–99)
GLUCOSE BLD-MCNC: 48 MG/DL (ref 70–99)
GLUCOSE BLD-MCNC: 63 MG/DL (ref 70–99)
HCT VFR BLD AUTO: 35.9 %
HGB BLD-MCNC: 11.4 G/DL
IMM GRANULOCYTES # BLD AUTO: 0.02 X10(3) UL (ref 0–1)
IMM GRANULOCYTES NFR BLD: 0.3 %
INR BLD: 1.01 (ref 0.85–1.16)
LIPASE SERPL-CCNC: 27 U/L (ref 13–75)
LIPASE SERPL-CCNC: 92 U/L (ref 73–393)
LYMPHOCYTES # BLD AUTO: 1.26 X10(3) UL (ref 1–4)
LYMPHOCYTES NFR BLD AUTO: 21.1 %
MCH RBC QN AUTO: 30.6 PG (ref 26–34)
MCHC RBC AUTO-ENTMCNC: 31.8 G/DL (ref 31–37)
MCV RBC AUTO: 96.2 FL
MONOCYTES # BLD AUTO: 0.62 X10(3) UL (ref 0.1–1)
MONOCYTES NFR BLD AUTO: 10.4 %
NEUTROPHILS # BLD AUTO: 3.91 X10 (3) UL (ref 1.5–7.7)
NEUTROPHILS # BLD AUTO: 3.91 X10(3) UL (ref 1.5–7.7)
NEUTROPHILS NFR BLD AUTO: 65.7 %
OSMOLALITY SERPL CALC.SUM OF ELEC: 300 MOSM/KG (ref 275–295)
PLATELET # BLD AUTO: 130 10(3)UL (ref 150–450)
POTASSIUM SERPL-SCNC: 4.3 MMOL/L (ref 3.5–5.1)
PROT SERPL-MCNC: 7.2 G/DL (ref 6.4–8.2)
PROTHROMBIN TIME: 13.3 SECONDS (ref 11.6–14.8)
RBC # BLD AUTO: 3.73 X10(6)UL
SARS-COV-2 RNA RESP QL NAA+PROBE: NOT DETECTED
SODIUM SERPL-SCNC: 134 MMOL/L (ref 136–145)
WBC # BLD AUTO: 6 X10(3) UL (ref 4–11)

## 2023-02-27 PROCEDURE — 99285 EMERGENCY DEPT VISIT HI MDM: CPT

## 2023-02-27 PROCEDURE — 85610 PROTHROMBIN TIME: CPT | Performed by: EMERGENCY MEDICINE

## 2023-02-27 PROCEDURE — 85025 COMPLETE CBC W/AUTO DIFF WBC: CPT | Performed by: EMERGENCY MEDICINE

## 2023-02-27 PROCEDURE — 93010 ELECTROCARDIOGRAM REPORT: CPT

## 2023-02-27 PROCEDURE — 80053 COMPREHEN METABOLIC PANEL: CPT | Performed by: EMERGENCY MEDICINE

## 2023-02-27 PROCEDURE — 82962 GLUCOSE BLOOD TEST: CPT

## 2023-02-27 PROCEDURE — 70450 CT HEAD/BRAIN W/O DYE: CPT | Performed by: EMERGENCY MEDICINE

## 2023-02-27 PROCEDURE — 93005 ELECTROCARDIOGRAM TRACING: CPT

## 2023-02-27 PROCEDURE — 96374 THER/PROPH/DIAG INJ IV PUSH: CPT

## 2023-02-27 PROCEDURE — 83690 ASSAY OF LIPASE: CPT | Performed by: EMERGENCY MEDICINE

## 2023-02-27 RX ORDER — ROPINIROLE 0.5 MG/1
0.5 TABLET, FILM COATED ORAL NIGHTLY
COMMUNITY

## 2023-02-27 RX ORDER — DEXTROSE MONOHYDRATE 25 G/50ML
50 INJECTION, SOLUTION INTRAVENOUS ONCE
Status: COMPLETED | OUTPATIENT
Start: 2023-02-27 | End: 2023-02-27

## 2023-02-27 NOTE — ED QUICK NOTES
Orders for admission, patient is aware of plan and ready to go upstairs. Any questions, please call ED SHANKAR Ruiz at extension [phone]. Vaccinated? yes  Type of COVID test sent: rapid   COVID Suspicion level: Low      Titratable drug(s) infusing: none  Rate: n/a    LOC at time of transport:  A+Ox3    Other pertinent information: none    CIWA score= n/a  NIH score= n/a

## 2023-02-27 NOTE — ED INITIAL ASSESSMENT (HPI)
Antonietta Buckley at home 30 min ago, landed on ceramic floor. Sustained bruise/swelling to left hand.  No loc,

## 2023-02-28 ENCOUNTER — TELEPHONE (OUTPATIENT)
Dept: NEPHROLOGY | Facility: CLINIC | Age: 73
End: 2023-02-28

## 2023-02-28 LAB
ATRIAL RATE: 60 BPM
P AXIS: 13 DEGREES
P-R INTERVAL: 204 MS
Q-T INTERVAL: 414 MS
QRS DURATION: 98 MS
QTC CALCULATION (BEZET): 414 MS
R AXIS: 11 DEGREES
T AXIS: 15 DEGREES
VENTRICULAR RATE: 60 BPM

## 2023-02-28 NOTE — ED QUICK NOTES
Wife states she wants to take patient home and follow up with the patients nurse practitioner tomorrow. Advised patient and wife of the risks of leaving AMA. Patient understood and acknowledged the risks.

## 2023-03-14 ENCOUNTER — PATIENT MESSAGE (OUTPATIENT)
Dept: NEPHROLOGY | Facility: CLINIC | Age: 73
End: 2023-03-14

## 2023-03-22 RX ORDER — GABAPENTIN 300 MG/1
300 CAPSULE ORAL NIGHTLY
Qty: 90 CAPSULE | Refills: 3 | Status: SHIPPED | OUTPATIENT
Start: 2023-03-22

## 2023-04-17 ENCOUNTER — OFFICE VISIT (OUTPATIENT)
Dept: NEPHROLOGY | Facility: CLINIC | Age: 73
End: 2023-04-17
Payer: MEDICARE

## 2023-04-17 VITALS — BODY MASS INDEX: 29 KG/M2 | DIASTOLIC BLOOD PRESSURE: 56 MMHG | WEIGHT: 178 LBS | SYSTOLIC BLOOD PRESSURE: 130 MMHG

## 2023-04-17 DIAGNOSIS — D63.1 ANEMIA DUE TO CHRONIC KIDNEY DISEASE, UNSPECIFIED CKD STAGE: ICD-10-CM

## 2023-04-17 DIAGNOSIS — N18.9 ANEMIA DUE TO CHRONIC KIDNEY DISEASE, UNSPECIFIED CKD STAGE: ICD-10-CM

## 2023-04-17 DIAGNOSIS — L29.9 PRURITUS: ICD-10-CM

## 2023-04-17 DIAGNOSIS — G25.3 MYOCLONUS: ICD-10-CM

## 2023-04-17 DIAGNOSIS — I10 PRIMARY HYPERTENSION: ICD-10-CM

## 2023-04-17 DIAGNOSIS — N18.5 CKD (CHRONIC KIDNEY DISEASE) STAGE 5, GFR LESS THAN 15 ML/MIN (HCC): Primary | ICD-10-CM

## 2023-04-17 DIAGNOSIS — E83.39 HYPERPHOSPHATEMIA: ICD-10-CM

## 2023-04-17 RX ORDER — LOSARTAN POTASSIUM 50 MG/1
50 TABLET ORAL 2 TIMES DAILY
COMMUNITY
Start: 2023-03-16

## 2023-04-24 ENCOUNTER — TELEPHONE (OUTPATIENT)
Dept: NEPHROLOGY | Facility: CLINIC | Age: 73
End: 2023-04-24

## 2023-04-24 NOTE — TELEPHONE ENCOUNTER
Jackson Lagunas the pt's wife is concerned bc the pt is having a restless sleep. She wanted to know if there is something that can be prescribed to help him calm guera during the night. The pt's recently opened a wound on his foot from moving so much and removed the CPAP mask while sleeping.  Jackson Lagunas doesn't know who to jesenia to get help with this

## 2023-05-04 ENCOUNTER — HOSPITAL ENCOUNTER (EMERGENCY)
Age: 73
Discharge: HOME | End: 2023-05-05
Attending: EMERGENCY MEDICINE
Payer: MEDICARE

## 2023-05-04 DIAGNOSIS — L29.8 PRURITIC ERYTHEMATOUS RASH: ICD-10-CM

## 2023-05-04 DIAGNOSIS — Z99.2 END-STAGE RENAL DISEASE ON HEMODIALYSIS (HCC): Primary | ICD-10-CM

## 2023-05-04 DIAGNOSIS — N18.6 END-STAGE RENAL DISEASE ON HEMODIALYSIS (HCC): Primary | ICD-10-CM

## 2023-05-04 PROCEDURE — 99283 EMERGENCY DEPT VISIT LOW MDM: CPT

## 2023-05-04 PROCEDURE — 99285 EMERGENCY DEPT VISIT HI MDM: CPT

## 2023-05-04 PROCEDURE — 36415 COLL VENOUS BLD VENIPUNCTURE: CPT

## 2023-05-04 PROCEDURE — 99284 EMERGENCY DEPT VISIT MOD MDM: CPT

## 2023-05-05 VITALS
RESPIRATION RATE: 18 BRPM | OXYGEN SATURATION: 95 % | HEIGHT: 66 IN | SYSTOLIC BLOOD PRESSURE: 140 MMHG | HEART RATE: 79 BPM | WEIGHT: 179 LBS | TEMPERATURE: 98 F | BODY MASS INDEX: 28.77 KG/M2 | DIASTOLIC BLOOD PRESSURE: 77 MMHG

## 2023-05-05 LAB
ALBUMIN SERPL-MCNC: 3 G/DL (ref 3.4–5)
ALBUMIN/GLOB SERPL: 0.6 {RATIO} (ref 1–2)
ALP LIVER SERPL-CCNC: 420 U/L
ALT SERPL-CCNC: 57 U/L
ANION GAP SERPL CALC-SCNC: 5 MMOL/L (ref 0–18)
AST SERPL-CCNC: 82 U/L (ref 15–37)
BASOPHILS # BLD AUTO: 0.03 X10(3) UL (ref 0–0.2)
BASOPHILS NFR BLD AUTO: 0.6 %
BILIRUB SERPL-MCNC: 0.9 MG/DL (ref 0.1–2)
BUN BLD-MCNC: 40 MG/DL (ref 7–18)
CALCIUM BLD-MCNC: 9.4 MG/DL (ref 8.5–10.1)
CHLORIDE SERPL-SCNC: 95 MMOL/L (ref 98–112)
CO2 SERPL-SCNC: 31 MMOL/L (ref 21–32)
CREAT BLD-MCNC: 5 MG/DL
EOSINOPHIL # BLD AUTO: 0.11 X10(3) UL (ref 0–0.7)
EOSINOPHIL NFR BLD AUTO: 2.1 %
ERYTHROCYTE [DISTWIDTH] IN BLOOD BY AUTOMATED COUNT: 15.2 %
GFR SERPLBLD BASED ON 1.73 SQ M-ARVRAT: 12 ML/MIN/1.73M2 (ref 60–?)
GLOBULIN PLAS-MCNC: 5 G/DL (ref 2.8–4.4)
GLUCOSE BLD-MCNC: 73 MG/DL (ref 70–99)
HCT VFR BLD AUTO: 38.4 %
HGB BLD-MCNC: 12.1 G/DL
IMM GRANULOCYTES # BLD AUTO: 0.01 X10(3) UL (ref 0–1)
IMM GRANULOCYTES NFR BLD: 0.2 %
LACTATE SERPL-SCNC: 1.6 MMOL/L (ref 0.4–2)
LYMPHOCYTES # BLD AUTO: 1.16 X10(3) UL (ref 1–4)
LYMPHOCYTES NFR BLD AUTO: 22.4 %
MCH RBC QN AUTO: 31.6 PG (ref 26–34)
MCHC RBC AUTO-ENTMCNC: 31.5 G/DL (ref 31–37)
MCV RBC AUTO: 100.3 FL
MONOCYTES # BLD AUTO: 0.57 X10(3) UL (ref 0.1–1)
MONOCYTES NFR BLD AUTO: 11 %
NEUTROPHILS # BLD AUTO: 3.31 X10 (3) UL (ref 1.5–7.7)
NEUTROPHILS # BLD AUTO: 3.31 X10(3) UL (ref 1.5–7.7)
NEUTROPHILS NFR BLD AUTO: 63.7 %
OSMOLALITY SERPL CALC.SUM OF ELEC: 280 MOSM/KG (ref 275–295)
PHOSPHATE SERPL-MCNC: 4 MG/DL (ref 2.5–4.9)
PLATELET # BLD AUTO: 134 10(3)UL (ref 150–450)
POTASSIUM SERPL-SCNC: 4.6 MMOL/L (ref 3.5–5.1)
PROT SERPL-MCNC: 8 G/DL (ref 6.4–8.2)
RBC # BLD AUTO: 3.83 X10(6)UL
SODIUM SERPL-SCNC: 131 MMOL/L (ref 136–145)
WBC # BLD AUTO: 5.2 X10(3) UL (ref 4–11)

## 2023-05-05 PROCEDURE — 83605 ASSAY OF LACTIC ACID: CPT | Performed by: EMERGENCY MEDICINE

## 2023-05-05 PROCEDURE — 80053 COMPREHEN METABOLIC PANEL: CPT | Performed by: EMERGENCY MEDICINE

## 2023-05-05 PROCEDURE — 85025 COMPLETE CBC W/AUTO DIFF WBC: CPT | Performed by: EMERGENCY MEDICINE

## 2023-05-05 PROCEDURE — 87040 BLOOD CULTURE FOR BACTERIA: CPT | Performed by: EMERGENCY MEDICINE

## 2023-05-05 PROCEDURE — 84100 ASSAY OF PHOSPHORUS: CPT | Performed by: EMERGENCY MEDICINE

## 2023-05-05 NOTE — DISCHARGE INSTRUCTIONS
Follow-up with your primary and consider dermatology referral if symptoms persist.    Apply steroid cream and Aquaphor to affected area twice daily.

## 2023-05-05 NOTE — ED INITIAL ASSESSMENT (HPI)
Redness and swelling x 2 weeks to left hand. Patient put on abx about a week ago but no improvement. Denies fever.

## 2023-05-05 NOTE — ED QUICK NOTES
Per patient's wife, he was initially put on 875mg augmentin but did not take the last two doses as the max dose for dialysis patient's is 500mg. Per patient, swelling, redness, itching, and burning pain are spreading up his wrist/arm.

## 2023-05-08 ENCOUNTER — APPOINTMENT (OUTPATIENT)
Dept: ULTRASOUND IMAGING | Facility: HOSPITAL | Age: 73
End: 2023-05-08
Attending: EMERGENCY MEDICINE
Payer: MEDICARE

## 2023-05-08 ENCOUNTER — HOSPITAL ENCOUNTER (INPATIENT)
Facility: HOSPITAL | Age: 73
LOS: 4 days | Discharge: HOME HEALTH | End: 2023-05-12
Attending: EMERGENCY MEDICINE | Admitting: INTERNAL MEDICINE
Payer: MEDICARE

## 2023-05-08 DIAGNOSIS — L03.114 CELLULITIS OF LEFT UPPER EXTREMITY: Primary | ICD-10-CM

## 2023-05-08 LAB
ALBUMIN SERPL-MCNC: 3 G/DL (ref 3.4–5)
ALBUMIN/GLOB SERPL: 0.7 {RATIO} (ref 1–2)
ALP LIVER SERPL-CCNC: 421 U/L
ALT SERPL-CCNC: 53 U/L
ANION GAP SERPL CALC-SCNC: 6 MMOL/L (ref 0–18)
AST SERPL-CCNC: 82 U/L (ref 15–37)
BASOPHILS # BLD AUTO: 0.03 X10(3) UL (ref 0–0.2)
BASOPHILS NFR BLD AUTO: 0.5 %
BILIRUB SERPL-MCNC: 0.7 MG/DL (ref 0.1–2)
BUN BLD-MCNC: 67 MG/DL (ref 7–18)
CALCIUM BLD-MCNC: 9.5 MG/DL (ref 8.5–10.1)
CHLORIDE SERPL-SCNC: 98 MMOL/L (ref 98–112)
CO2 SERPL-SCNC: 29 MMOL/L (ref 21–32)
CREAT BLD-MCNC: 7 MG/DL
EOSINOPHIL # BLD AUTO: 0.08 X10(3) UL (ref 0–0.7)
EOSINOPHIL NFR BLD AUTO: 1.3 %
ERYTHROCYTE [DISTWIDTH] IN BLOOD BY AUTOMATED COUNT: 15.2 %
GFR SERPLBLD BASED ON 1.73 SQ M-ARVRAT: 8 ML/MIN/1.73M2 (ref 60–?)
GLOBULIN PLAS-MCNC: 4.2 G/DL (ref 2.8–4.4)
GLUCOSE BLD-MCNC: 154 MG/DL (ref 70–99)
GLUCOSE BLD-MCNC: 166 MG/DL (ref 70–99)
HCT VFR BLD AUTO: 37.4 %
HGB BLD-MCNC: 11.7 G/DL
IMM GRANULOCYTES # BLD AUTO: 0.02 X10(3) UL (ref 0–1)
IMM GRANULOCYTES NFR BLD: 0.3 %
LYMPHOCYTES # BLD AUTO: 0.97 X10(3) UL (ref 1–4)
LYMPHOCYTES NFR BLD AUTO: 15.4 %
MCH RBC QN AUTO: 31.9 PG (ref 26–34)
MCHC RBC AUTO-ENTMCNC: 31.3 G/DL (ref 31–37)
MCV RBC AUTO: 101.9 FL
MONOCYTES # BLD AUTO: 0.74 X10(3) UL (ref 0.1–1)
MONOCYTES NFR BLD AUTO: 11.8 %
NEUTROPHILS # BLD AUTO: 4.44 X10 (3) UL (ref 1.5–7.7)
NEUTROPHILS # BLD AUTO: 4.44 X10(3) UL (ref 1.5–7.7)
NEUTROPHILS NFR BLD AUTO: 70.7 %
OSMOLALITY SERPL CALC.SUM OF ELEC: 299 MOSM/KG (ref 275–295)
PLATELET # BLD AUTO: 142 10(3)UL (ref 150–450)
POTASSIUM SERPL-SCNC: 4.5 MMOL/L (ref 3.5–5.1)
PROT SERPL-MCNC: 7.2 G/DL (ref 6.4–8.2)
RBC # BLD AUTO: 3.67 X10(6)UL
SODIUM SERPL-SCNC: 133 MMOL/L (ref 136–145)
WBC # BLD AUTO: 6.3 X10(3) UL (ref 4–11)

## 2023-05-08 PROCEDURE — 93971 EXTREMITY STUDY: CPT | Performed by: EMERGENCY MEDICINE

## 2023-05-08 RX ORDER — NICOTINE POLACRILEX 4 MG
30 LOZENGE BUCCAL
Status: DISCONTINUED | OUTPATIENT
Start: 2023-05-08 | End: 2023-05-12

## 2023-05-08 RX ORDER — ISOSORBIDE MONONITRATE 30 MG/1
30 TABLET, EXTENDED RELEASE ORAL DAILY
Status: DISCONTINUED | OUTPATIENT
Start: 2023-05-09 | End: 2023-05-12

## 2023-05-08 RX ORDER — LEVOTHYROXINE SODIUM 0.1 MG/1
100 TABLET ORAL
Status: DISCONTINUED | OUTPATIENT
Start: 2023-05-09 | End: 2023-05-12

## 2023-05-08 RX ORDER — HEPARIN SODIUM 5000 [USP'U]/ML
5000 INJECTION, SOLUTION INTRAVENOUS; SUBCUTANEOUS EVERY 8 HOURS SCHEDULED
Status: DISCONTINUED | OUTPATIENT
Start: 2023-05-08 | End: 2023-05-12

## 2023-05-08 RX ORDER — EZETIMIBE 10 MG/1
10 TABLET ORAL NIGHTLY
Status: DISCONTINUED | OUTPATIENT
Start: 2023-05-08 | End: 2023-05-12

## 2023-05-08 RX ORDER — FAMOTIDINE 20 MG/1
20 TABLET, FILM COATED ORAL NIGHTLY PRN
Status: DISCONTINUED | OUTPATIENT
Start: 2023-05-08 | End: 2023-05-12

## 2023-05-08 RX ORDER — ROSUVASTATIN CALCIUM 20 MG/1
20 TABLET, COATED ORAL NIGHTLY
Status: DISCONTINUED | OUTPATIENT
Start: 2023-05-08 | End: 2023-05-11

## 2023-05-08 RX ORDER — GABAPENTIN 300 MG/1
300 CAPSULE ORAL NIGHTLY
Status: DISCONTINUED | OUTPATIENT
Start: 2023-05-08 | End: 2023-05-12

## 2023-05-08 RX ORDER — NICOTINE POLACRILEX 4 MG
15 LOZENGE BUCCAL
Status: DISCONTINUED | OUTPATIENT
Start: 2023-05-08 | End: 2023-05-12

## 2023-05-08 RX ORDER — ESCITALOPRAM OXALATE 10 MG/1
10 TABLET ORAL DAILY
Status: DISCONTINUED | OUTPATIENT
Start: 2023-05-09 | End: 2023-05-12

## 2023-05-08 RX ORDER — SWAB
10 SWAB, NON-MEDICATED MISCELLANEOUS DAILY
COMMUNITY

## 2023-05-08 RX ORDER — MULTIVIT WITH MINERALS/LUTEIN
1000 TABLET ORAL DAILY
COMMUNITY

## 2023-05-08 RX ORDER — NIFEDIPINE 30 MG/1
60 TABLET, EXTENDED RELEASE ORAL 2 TIMES DAILY
Status: DISCONTINUED | OUTPATIENT
Start: 2023-05-08 | End: 2023-05-12

## 2023-05-08 RX ORDER — DEXTROSE MONOHYDRATE 25 G/50ML
50 INJECTION, SOLUTION INTRAVENOUS
Status: DISCONTINUED | OUTPATIENT
Start: 2023-05-08 | End: 2023-05-12

## 2023-05-08 NOTE — ED INITIAL ASSESSMENT (HPI)
Patient reports c/o left hand swelling, itching, and redness x 3-4 weeks. Patient denies known injury. Patient denies pain.

## 2023-05-09 LAB
ANION GAP SERPL CALC-SCNC: 8 MMOL/L (ref 0–18)
BASOPHILS # BLD AUTO: 0.02 X10(3) UL (ref 0–0.2)
BASOPHILS NFR BLD AUTO: 0.3 %
BUN BLD-MCNC: 71 MG/DL (ref 7–18)
CALCIUM BLD-MCNC: 9.5 MG/DL (ref 8.5–10.1)
CHLORIDE SERPL-SCNC: 98 MMOL/L (ref 98–112)
CO2 SERPL-SCNC: 27 MMOL/L (ref 21–32)
CREAT BLD-MCNC: 7.09 MG/DL
EOSINOPHIL # BLD AUTO: 0.11 X10(3) UL (ref 0–0.7)
EOSINOPHIL NFR BLD AUTO: 1.9 %
ERYTHROCYTE [DISTWIDTH] IN BLOOD BY AUTOMATED COUNT: 15 %
GFR SERPLBLD BASED ON 1.73 SQ M-ARVRAT: 8 ML/MIN/1.73M2 (ref 60–?)
GLUCOSE BLD-MCNC: 116 MG/DL (ref 70–99)
GLUCOSE BLD-MCNC: 118 MG/DL (ref 70–99)
GLUCOSE BLD-MCNC: 148 MG/DL (ref 70–99)
GLUCOSE BLD-MCNC: 157 MG/DL (ref 70–99)
GLUCOSE BLD-MCNC: 164 MG/DL (ref 70–99)
GLUCOSE BLD-MCNC: 187 MG/DL (ref 70–99)
GLUCOSE BLD-MCNC: 279 MG/DL (ref 70–99)
GLUCOSE BLD-MCNC: 340 MG/DL (ref 70–99)
GLUCOSE BLD-MCNC: 61 MG/DL (ref 70–99)
HCT VFR BLD AUTO: 37.7 %
HGB BLD-MCNC: 11.5 G/DL
IMM GRANULOCYTES # BLD AUTO: 0.01 X10(3) UL (ref 0–1)
IMM GRANULOCYTES NFR BLD: 0.2 %
LYMPHOCYTES # BLD AUTO: 1.27 X10(3) UL (ref 1–4)
LYMPHOCYTES NFR BLD AUTO: 21.7 %
MAGNESIUM SERPL-MCNC: 3 MG/DL (ref 1.6–2.6)
MCH RBC QN AUTO: 30.8 PG (ref 26–34)
MCHC RBC AUTO-ENTMCNC: 30.5 G/DL (ref 31–37)
MCV RBC AUTO: 101.1 FL
MONOCYTES # BLD AUTO: 0.73 X10(3) UL (ref 0.1–1)
MONOCYTES NFR BLD AUTO: 12.5 %
NEUTROPHILS # BLD AUTO: 3.72 X10 (3) UL (ref 1.5–7.7)
NEUTROPHILS # BLD AUTO: 3.72 X10(3) UL (ref 1.5–7.7)
NEUTROPHILS NFR BLD AUTO: 63.4 %
OSMOLALITY SERPL CALC.SUM OF ELEC: 302 MOSM/KG (ref 275–295)
PLATELET # BLD AUTO: 130 10(3)UL (ref 150–450)
POTASSIUM SERPL-SCNC: 4.4 MMOL/L (ref 3.5–5.1)
RBC # BLD AUTO: 3.73 X10(6)UL
SODIUM SERPL-SCNC: 133 MMOL/L (ref 136–145)
WBC # BLD AUTO: 5.9 X10(3) UL (ref 4–11)

## 2023-05-09 PROCEDURE — 5A09357 ASSISTANCE WITH RESPIRATORY VENTILATION, LESS THAN 24 CONSECUTIVE HOURS, CONTINUOUS POSITIVE AIRWAY PRESSURE: ICD-10-PCS | Performed by: HOSPITALIST

## 2023-05-09 PROCEDURE — 99222 1ST HOSP IP/OBS MODERATE 55: CPT | Performed by: INTERNAL MEDICINE

## 2023-05-09 NOTE — PLAN OF CARE
Pt AOx4. Denies pain. Scab and slight red/pink noted to left hand, +1 pitting edema. VS remain stable on room air. Anuric, hx ESRD on HD. BM PTA. Tolerating diet, denies nausea. Insulin given per order. Up x1 w/ walker. SCDs bilaterally, ankle pumps encouraged. HD today. Pt to discharge to home when medically cleared. Pt and spouse updated on plan of care, all questions answered. Belongings within reach, instructed to call for assistance.

## 2023-05-09 NOTE — ED QUICK NOTES
Orders for admission, patient is aware of plan and ready to go upstairs.  Any questions, please call ED RN Manny Fuentes at extension [phone]    Patient Covid vaccination status: Fully vaccinated     COVID Test Ordered in ED: None    COVID Suspicion at Admission: N/A    Running Infusions:      Mental Status/LOC at time of transport: A/Ox3    Other pertinent information:   CIWA score: N/A   NIH score:  N/A

## 2023-05-09 NOTE — PROGRESS NOTES
ED Antibiotic Dose Adjustment by Pharmacy    ceftriaxone (ROCEPHIN) 500 mg x1 dose has been ordered. Pharmacy has adjusted the dose to ceftriaxone (ROCEPHIN) 1 g x1 per hospital protocol.     Piotr Moses PharmD  05/08/23, 9:28 PM

## 2023-05-10 LAB
ANION GAP SERPL CALC-SCNC: 8 MMOL/L (ref 0–18)
BASOPHILS # BLD AUTO: 0.07 X10(3) UL (ref 0–0.2)
BASOPHILS NFR BLD AUTO: 1.1 %
BUN BLD-MCNC: 85 MG/DL (ref 7–18)
CALCIUM BLD-MCNC: 9.1 MG/DL (ref 8.5–10.1)
CHLORIDE SERPL-SCNC: 98 MMOL/L (ref 98–112)
CO2 SERPL-SCNC: 25 MMOL/L (ref 21–32)
CREAT BLD-MCNC: 8.07 MG/DL
EOSINOPHIL # BLD AUTO: 0.13 X10(3) UL (ref 0–0.7)
EOSINOPHIL NFR BLD AUTO: 2 %
ERYTHROCYTE [DISTWIDTH] IN BLOOD BY AUTOMATED COUNT: 15.1 %
GFR SERPLBLD BASED ON 1.73 SQ M-ARVRAT: 6 ML/MIN/1.73M2 (ref 60–?)
GLUCOSE BLD-MCNC: 106 MG/DL (ref 70–99)
GLUCOSE BLD-MCNC: 106 MG/DL (ref 70–99)
GLUCOSE BLD-MCNC: 126 MG/DL (ref 70–99)
GLUCOSE BLD-MCNC: 137 MG/DL (ref 70–99)
GLUCOSE BLD-MCNC: 152 MG/DL (ref 70–99)
GLUCOSE BLD-MCNC: 228 MG/DL (ref 70–99)
GLUCOSE BLD-MCNC: 73 MG/DL (ref 70–99)
GLUCOSE BLD-MCNC: 78 MG/DL (ref 70–99)
GLUCOSE BLD-MCNC: 80 MG/DL (ref 70–99)
GLUCOSE BLD-MCNC: 90 MG/DL (ref 70–99)
HCT VFR BLD AUTO: 36.7 %
HGB BLD-MCNC: 11.4 G/DL
IMM GRANULOCYTES # BLD AUTO: 0.01 X10(3) UL (ref 0–1)
IMM GRANULOCYTES NFR BLD: 0.2 %
LYMPHOCYTES # BLD AUTO: 1.71 X10(3) UL (ref 1–4)
LYMPHOCYTES NFR BLD AUTO: 25.9 %
MCH RBC QN AUTO: 31.1 PG (ref 26–34)
MCHC RBC AUTO-ENTMCNC: 31.1 G/DL (ref 31–37)
MCV RBC AUTO: 100.3 FL
MONOCYTES # BLD AUTO: 0.87 X10(3) UL (ref 0.1–1)
MONOCYTES NFR BLD AUTO: 13.2 %
NEUTROPHILS # BLD AUTO: 3.8 X10 (3) UL (ref 1.5–7.7)
NEUTROPHILS # BLD AUTO: 3.8 X10(3) UL (ref 1.5–7.7)
NEUTROPHILS NFR BLD AUTO: 57.6 %
OSMOLALITY SERPL CALC.SUM OF ELEC: 297 MOSM/KG (ref 275–295)
PLATELET # BLD AUTO: 149 10(3)UL (ref 150–450)
POTASSIUM SERPL-SCNC: 4.6 MMOL/L (ref 3.5–5.1)
RBC # BLD AUTO: 3.66 X10(6)UL
SODIUM SERPL-SCNC: 131 MMOL/L (ref 136–145)
T4 FREE SERPL-MCNC: 0.7 NG/DL (ref 0.8–1.7)
TSI SER-ACNC: 9.82 MIU/ML (ref 0.36–3.74)
WBC # BLD AUTO: 6.6 X10(3) UL (ref 4–11)

## 2023-05-10 PROCEDURE — 99223 1ST HOSP IP/OBS HIGH 75: CPT | Performed by: OTHER

## 2023-05-10 PROCEDURE — 5A1D70Z PERFORMANCE OF URINARY FILTRATION, INTERMITTENT, LESS THAN 6 HOURS PER DAY: ICD-10-PCS | Performed by: INTERNAL MEDICINE

## 2023-05-10 RX ORDER — CLONAZEPAM 1 MG/1
1 TABLET ORAL ONCE
Status: COMPLETED | OUTPATIENT
Start: 2023-05-10 | End: 2023-05-10

## 2023-05-10 NOTE — PLAN OF CARE
ED admit for L hand cellulitis. Pt AxO4. Occasional tic and twitching of head and neck. VSS on RA. Hx of PRANEETH w/ CPAP. On tele-NSR. On metoprolol. Subcutaneous heparin/SCD. SL. See MAR for glucose control. Denies pain, nausea. Voids w/o issue. L hand bruising and scabbing from previous scratching. Non-pitting edema on left hand. Left arm precautions due to HD fistula. Possible HD tonight or tomorrow. On IV rocephin. Up w/ walker. Pt and family updated on POC. Safety measures placed. Care ongoing. 9435: Systolic BP on the 21'A. Patient asymptomatic. Paged hospitalist. Received orders for 250 mL bolus 0.9. Will recheck when bolus is done. 0107: Bolus finished. Rechecked /65.

## 2023-05-10 NOTE — PROGRESS NOTES
2048: Given 16 units Novolog dose @ 2024 based off of reading of 279 @ 1659. 10 units Levemir also given. Rechecked glucose level 157. Given two cups of orange juice. Paged hospitalist. No new orders. 2130: Rechecked blood sugar 148.    2230: Blood sugar 61. Given 15g Dex4 per protocol. Paged hospitalist. Will recheck in 15 minutes. 2245: Rechecked blood sugar 118. Given meal per protocol. Will continue to monitor glucose hourly.

## 2023-05-10 NOTE — PLAN OF CARE
Pt is Aox3, VSS on RA. Denies pain at rest, has some tics and uncontrolled gross motor movements, L hand with very mild redness, motor strength and  intact, dialysis scheduled for this afternoon, IV abx per orders, transition to PO at DC, call light and patient belongings within reach, instructed to call for any questions / concerns.

## 2023-05-11 ENCOUNTER — APPOINTMENT (OUTPATIENT)
Dept: CT IMAGING | Facility: HOSPITAL | Age: 73
End: 2023-05-11
Attending: INTERNAL MEDICINE
Payer: MEDICARE

## 2023-05-11 LAB
ALBUMIN SERPL-MCNC: 3.1 G/DL (ref 3.4–5)
ALBUMIN/GLOB SERPL: 0.8 {RATIO} (ref 1–2)
ALP LIVER SERPL-CCNC: 474 U/L
ALT SERPL-CCNC: 79 U/L
ANION GAP SERPL CALC-SCNC: 4 MMOL/L (ref 0–18)
ARTERIAL PATENCY WRIST A: POSITIVE
AST SERPL-CCNC: 127 U/L (ref 15–37)
BASE EXCESS BLDA CALC-SCNC: 5.4 MMOL/L (ref ?–2)
BASOPHILS # BLD AUTO: 0.06 X10(3) UL (ref 0–0.2)
BASOPHILS NFR BLD AUTO: 1 %
BILIRUB SERPL-MCNC: 0.6 MG/DL (ref 0.1–2)
BODY TEMPERATURE: 98.6 F
BUN BLD-MCNC: 55 MG/DL (ref 7–18)
CALCIUM BLD-MCNC: 8.8 MG/DL (ref 8.5–10.1)
CHLORIDE SERPL-SCNC: 100 MMOL/L (ref 98–112)
CO2 SERPL-SCNC: 29 MMOL/L (ref 21–32)
COHGB MFR BLD: 1.1 % SAT (ref 0–3)
CREAT BLD-MCNC: 6.18 MG/DL
EOSINOPHIL # BLD AUTO: 0.08 X10(3) UL (ref 0–0.7)
EOSINOPHIL NFR BLD AUTO: 1.4 %
ERYTHROCYTE [DISTWIDTH] IN BLOOD BY AUTOMATED COUNT: 15.1 %
FIO2: 21 %
GFR SERPLBLD BASED ON 1.73 SQ M-ARVRAT: 9 ML/MIN/1.73M2 (ref 60–?)
GLOBULIN PLAS-MCNC: 3.7 G/DL (ref 2.8–4.4)
GLUCOSE BLD-MCNC: 116 MG/DL (ref 70–99)
GLUCOSE BLD-MCNC: 120 MG/DL (ref 70–99)
GLUCOSE BLD-MCNC: 131 MG/DL (ref 70–99)
GLUCOSE BLD-MCNC: 138 MG/DL (ref 70–99)
GLUCOSE BLD-MCNC: 188 MG/DL (ref 70–99)
GLUCOSE BLD-MCNC: 191 MG/DL (ref 70–99)
GLUCOSE BLD-MCNC: 306 MG/DL (ref 70–99)
GLUCOSE BLD-MCNC: 36 MG/DL (ref 70–99)
GLUCOSE BLD-MCNC: 429 MG/DL (ref 70–99)
GLUCOSE BLD-MCNC: 87 MG/DL (ref 70–99)
GLUCOSE BLD-MCNC: 90 MG/DL (ref 70–99)
HCO3 BLDA-SCNC: 29 MEQ/L (ref 21–27)
HCT VFR BLD AUTO: 38.8 %
HGB BLD-MCNC: 12 G/DL
HGB BLD-MCNC: 12 G/DL
IMM GRANULOCYTES # BLD AUTO: 0.01 X10(3) UL (ref 0–1)
IMM GRANULOCYTES NFR BLD: 0.2 %
IPAP MAX: 20 CM H2O
IPAP MIN: 5 CM H2O
LACTATE BLD-SCNC: 1.2 MMOL/L (ref 0.5–2)
LYMPHOCYTES # BLD AUTO: 1.09 X10(3) UL (ref 1–4)
LYMPHOCYTES NFR BLD AUTO: 18.6 %
MCH RBC QN AUTO: 31.7 PG (ref 26–34)
MCHC RBC AUTO-ENTMCNC: 30.9 G/DL (ref 31–37)
MCV RBC AUTO: 102.6 FL
METHGB MFR BLD: 0 % SAT (ref 0.4–1.5)
MONOCYTES # BLD AUTO: 0.69 X10(3) UL (ref 0.1–1)
MONOCYTES NFR BLD AUTO: 11.8 %
NEUTROPHILS # BLD AUTO: 3.93 X10 (3) UL (ref 1.5–7.7)
NEUTROPHILS # BLD AUTO: 3.93 X10(3) UL (ref 1.5–7.7)
NEUTROPHILS NFR BLD AUTO: 67 %
OSMOLALITY SERPL CALC.SUM OF ELEC: 293 MOSM/KG (ref 275–295)
OXYHGB MFR BLDA: 92.4 % (ref 92–100)
PCO2 BLDA: 46 MM HG (ref 35–45)
PH BLDA: 7.43 [PH] (ref 7.35–7.45)
PLATELET # BLD AUTO: 152 10(3)UL (ref 150–450)
PO2 BLDA: 71 MM HG (ref 80–100)
POTASSIUM SERPL-SCNC: 4.3 MMOL/L (ref 3.5–5.1)
PROT SERPL-MCNC: 6.8 G/DL (ref 6.4–8.2)
RBC # BLD AUTO: 3.78 X10(6)UL
SODIUM SERPL-SCNC: 133 MMOL/L (ref 136–145)
WBC # BLD AUTO: 5.9 X10(3) UL (ref 4–11)

## 2023-05-11 PROCEDURE — 99232 SBSQ HOSP IP/OBS MODERATE 35: CPT | Performed by: NURSE PRACTITIONER

## 2023-05-11 PROCEDURE — 99291 CRITICAL CARE FIRST HOUR: CPT | Performed by: INTERNAL MEDICINE

## 2023-05-11 PROCEDURE — 70450 CT HEAD/BRAIN W/O DYE: CPT | Performed by: INTERNAL MEDICINE

## 2023-05-11 RX ORDER — CEPHALEXIN 500 MG/1
500 CAPSULE ORAL EVERY 12 HOURS SCHEDULED
Status: DISCONTINUED | OUTPATIENT
Start: 2023-05-11 | End: 2023-05-12

## 2023-05-11 RX ORDER — SEVELAMER CARBONATE 800 MG/1
800 TABLET, FILM COATED ORAL
COMMUNITY

## 2023-05-11 RX ORDER — ROSUVASTATIN CALCIUM 5 MG/1
10 TABLET, COATED ORAL NIGHTLY
Status: DISCONTINUED | OUTPATIENT
Start: 2023-05-11 | End: 2023-05-12

## 2023-05-11 NOTE — PROGRESS NOTES
Pt back from CT this AM and was sleeping. BS @ 0750 was 90. Recheck at 21  was 80. Pt not responding. O2 sat 100% on 2L per NC. VSS. CT results pending. Amp D50 given. BS recheck at 0815 was 188. Spoke with Dr Juan Armstrong. Dr Juan Armstrong at bedside. CT brain still pending. Call placed to CT to ask them to read scan ASAP. Pt did receive one time dose of klonopin 1 mg yesterday evening. Dr Juan Armstrong spoke to Neuro and DM to update. Medications adjusted. Will continue to monitor. Pt's spouse, Zoraida Knight, updated via telephone.

## 2023-05-11 NOTE — PLAN OF CARE
ED admit for L hand cellulitis. Pt AxO4. Occasional tic and twitching of head and neck. VSS on RA. Hx of PRANEETH w/ CPAP. On tele-NSR. On metoprolol. Subcutaneous heparin/SCD. SL. See MAR for glucose control. Denies pain, nausea. Decreased urine output. On hemodialysis. L hand bruising and scabbing from previous scratching. Non-pitting edema on left hand. Left arm precautions due to HD fistula. On IV rocephin. Up w/ walker. Poss dc w/ PO abx. Pt and family updated on POC. Safety measures placed. Care ongoing.

## 2023-05-11 NOTE — PLAN OF CARE
Labs all look fine (CBC, CMP, lipids, A1c, TSH). However, need to see him in 3 months to f/u on BP and weight loss. Strongly encourage that he continue with his plans for lifestyle changes (healthy diet, exercise, etc). Pt more awake this afternoon. On room air when awake. Wears cpap at night. Pt is [place] w/ bilateral H/A, however, Rt H/a does not work. Tele and  monitoring maintained. Scds to BLE. Ate all of lunch today. Blood sugars monitored and insulin given as ordered. Medications adjusted today. Pt normally has insulin pump. Will possibly be able to resume tomorrow per MD. Pump at pt's bedside. Last BM 5/10/23. Pt has diminished urine production d/t ESRD. Pt normally on HD on T, Th, Sat. Pt had HD yesterday, no next will be on Sat per Dr Laurel Hoffmann. Left A-V fistula intact. Physical therapy to eval pt today. Atbx changed to po today. Plan is to monitor today and poss dc tomorrow. Pt's spouse, Janeth Purvis, at bedside earlier today and updated with plan of care.

## 2023-05-11 NOTE — PROGRESS NOTES
[facility] Pharmacy Note:  Renal Dose Adjustment for Rosuvastatin (Crestor)    Valentian Armstrong has been prescribed Rosuvastatin (Crestor) 20 mg daily. Patient is on hemodialysis. The dose has been changed to 10 mg daily per P&T approved protocol.     Thank you,  Estrella Smith, PharmD  5/11/2023 11:39 AM

## 2023-05-11 NOTE — PHYSICAL THERAPY NOTE
Patient's chart reviewed. Per RN, pt is not appropriate for therapy at this time due to increased drowsiness and inability to arouse due to low blood sugar levels. Will continue to follow and see at a later date as schedule allows.

## 2023-05-11 NOTE — PLAN OF CARE
Patient down for CT scan via bed accompanied by RN , and back in room . Still drowsy and sleepy . will continue to monitor .

## 2023-05-12 VITALS
SYSTOLIC BLOOD PRESSURE: 104 MMHG | OXYGEN SATURATION: 97 % | BODY MASS INDEX: 29 KG/M2 | DIASTOLIC BLOOD PRESSURE: 59 MMHG | RESPIRATION RATE: 18 BRPM | TEMPERATURE: 97 F | HEART RATE: 70 BPM | WEIGHT: 181 LBS

## 2023-05-12 LAB
GLUCOSE BLD-MCNC: 168 MG/DL (ref 70–99)
GLUCOSE BLD-MCNC: 97 MG/DL (ref 70–99)

## 2023-05-12 PROCEDURE — 99232 SBSQ HOSP IP/OBS MODERATE 35: CPT | Performed by: INTERNAL MEDICINE

## 2023-05-12 RX ORDER — CEPHALEXIN 500 MG/1
500 CAPSULE ORAL EVERY 12 HOURS SCHEDULED
Qty: 8 CAPSULE | Refills: 0 | Status: SHIPPED | OUTPATIENT
Start: 2023-05-12 | End: 2023-05-16

## 2023-05-12 RX ORDER — NIFEDIPINE 60 MG/1
60 TABLET, FILM COATED, EXTENDED RELEASE ORAL DAILY
COMMUNITY

## 2023-05-12 NOTE — CM/SW NOTE
Department  notified of request for [facility], aidin referrals started. Assigned CM/SW to follow up with pt/family on further discharge planning.      Boston Baugh  [facility]

## 2023-05-12 NOTE — PROGRESS NOTES
AVS completed, IV dc'd , will dc home w/ [facility] once meds delivered.

## 2023-05-12 NOTE — CM/SW NOTE
05/12/23 1200   CM/SW Referral Data   Referral Source Social Work (self-referral)   Reason for Referral Discharge planning   Informant Patient;EMR;Clinical Staff Member   Patient Info   Patient's Current Mental Status at Time of Assessment Alert;Oriented   Patient Status Prior to Admission   Services in place prior to admission [address] [facility] [facility] [address]   Discharge Needs   Anticipated D/C needs Home health care   Choice of Post-Acute Provider   Informed patient of right to choose their preferred provider Yes   List of appropriate post-acute services provided to patient/family with quality data No - Declined list   Patient/family choice [facility]   Information given to Patient       HOME SITUATION  Type of Home: House   Home Layout: One level  Stairs to Enter : 0     Lives With: Spouse  Drives: No  Patient Owned Equipment: Rollator  Patient Regularly Uses: Glasses; Hearing aides     Prior Level of [place] per PT eval: Per pt- lives with spouse who works as . Ambulates with walker that has 4 wheels (pt couldn't recall if it was standard walker with 4 wheels or a rollator.) Pt has not been driving since 8115. Reports a couple falls in the past 6 months- getting out of car, tripped over something in the kitchen, tripped over the dog, said there was a few others but couldn't remember how. Pt could be poor historian as he was very lethargic and fatigued- could barely stay awake despite sitting up in bed. Patient is a 66 y/o man admitted for LUE cellulitis. Received fax from [facility] indicating pt is current with them for [facility] services. Called and confirmed pt has been receiving RN/PT/OT. Met with pt who confirmed he has Sherry for [facility] and would like to resume at DC. No other DC needs/concerns identified at this time. Anticipate DC later today. Contacted [facility] to request AIDIN referral with updates to [facility]. Updated RN.   / to remain available for support and/or discharge planning.      Aimee Parks, [facility]  Discharge Planner  [phone]

## 2023-05-12 NOTE — PHYSICAL THERAPY NOTE
PHYSICAL THERAPY TREATMENT NOTE - INPATIENT    Room Number: 353/353-A     Session: 1     Number of Visits to Meet Established Goals: 5    Presenting Problem: L hand cellulitis  Co-Morbidities : ESRD w/ hemodialysis, DM1, CHF, GERD, BPH, HTN, HLD, OA, Choreathetosis, RRT called last night due to low sugars  ASSESSMENT     Pt continues to progress toward functional goals and is motivated to participate in skilled therapy. Pt able to progress mobility c RW and supervision c no LOB noted. The AM-PAC '6-Clicks' Inpatient Basic Mobility Short Form was completed and this patient is demonstrating a 42% degree of impairment in mobility. Research supports that patients with this level of impairment may benefit from home c intermittent supervision . DISCHARGE RECOMMENDATIONS  PT Discharge Recommendations: Home; Intermittent Supervision     PLAN  PT Treatment Plan: Bed mobility; Body mechanics; Energy conservation;Patient education;Gait training;Family education; Neuromuscular re-educate;Strengthening;Stoop training;Transfer training;Balance training  Rehab Potential : Good  Frequency (Obs): 3-5x/week    CURRENT GOALS     Goal #1 Patient is able to demonstrate supine - sit EOB @ level: modified independent      Goal #2 Patient is able to demonstrate transfers Sit to/from Stand at assistance level: supervision      Goal #3 Patient is able to ambulate 150 feet with assist device: walker - rolling at assistance level: supervision      Goal #4     Goal #5     Goal #6     Goal Comments: Goals established on 2023 all goals progressing     SUBJECTIVE  \"my wife works from home at a law firm\"     OBJECTIVE  Precautions: Limb alert - left;Hard of hearing    WEIGHT BEARING RESTRICTION  Weight Bearing Restriction: None                PAIN ASSESSMENT   Ratin          BALANCE                                                                                                                       Static Sitting: Fair +  Dynamic Sitting: Fair +           Static Standing: Fair -  Dynamic Standing: Fair -    ACTIVITY TOLERANCE                         O2 WALK         AM-PAC '6-Clicks' INPATIENT SHORT FORM - BASIC MOBILITY  How much difficulty does the patient currently have. .. Patient Difficulty: Turning over in bed (including adjusting bedclothes, sheets and blankets)?: None   Patient Difficulty: Sitting down on and standing up from a chair with arms (e.g., wheelchair, bedside commode, etc.): None   Patient Difficulty: Moving from lying on back to sitting on the side of the bed?: A Little   How much help from another person does the patient currently need. .. Help from Another: Moving to and from a bed to a chair (including a wheelchair)?: A Little   Help from Another: Need to walk in hospital room?: A Little   Help from Another: Climbing 3-5 steps with a railing?: A Lot       AM-PAC Score:  Raw Score: 19   Approx Degree of Impairment: 41.77%   Standardized Score (AM-PAC Scale): 45.44   CMS Modifier (G-Code): CK    FUNCTIONAL ABILITY STATUS  Gait Assessment   Functional Mobility/Gait Assessment  Gait Assistance: Supervision  Distance (ft): 75x2  Assistive Device: Rolling walker  Pattern: Shuffle    Skilled Therapy Provided  Pt presents seated in BS chair, attempted BP however, not reading appropriately. Pt gait trained c RW and supervision, seated rest break between bouts of activity. Pt performed seated therex for BLE. Pt politely refusing car t/f training, states no concerns and his friend will pick him up from hosp at d/c .    Bed Mobility:  Rolling:    Supine<>Sit: pt states he had no difficulty with this activity this AM    Sit<>Supine: NT     Transfer Mobility:  Sit<>Stand: supervision    Stand<>Sit: supervision    Gait: supervision           THERAPEUTIC EXERCISES  Lower Extremity Alternating marching  Ankle pumps  Hip adduction squeezes  Knee extension  LAQ     Upper Extremity      Position Sitting     Repetitions 15-20   Sets   1     Patient End of Session: Up in chair;Needs met;Call light within reach;RN aware of session/findings; All patient questions and concerns addressed; Alarm set    PT Session Time: 30 minutes  Gait Training: 15 minutes  Therapeutic Activity: 15 minutes  Therapeutic Exercise:  minutes   Neuromuscular Re-education:  minutes

## 2023-05-12 NOTE — PLAN OF CARE
The patient is alert and orientated, on room air, sitting in bed watching TV. Denies pain at this time. Left hand is swollen with out pitting edema. Positive thrill and bruit to the left upper arm. Patient does not make urine related to dialysis. Bowel movement today. Plan of care and pain education discussed with patient. No further questions or concern at this time.

## 2023-05-12 NOTE — PLAN OF CARE
Pt is Aox3 this morning, denies pain, VSS on RA, blood glucose in appropriate range at 97 this morning, patient eating, tolerating diet, swelling to LUE improved, scattered bruising to BUE, very mild inflammation to L hand, dialysis tomorrow per routine, up stand by assist, call light and patient belongings within reach. Plan for discharge today.

## 2023-05-15 NOTE — CDS QUERY
Encephalopathy specificity   Wayland Erp    Dear Dr. Madina Sanderson,   Clinical information (provided below) indicates a diagnosis of Encephalopathy. For accurate ICD-10-CM code assignment   BASED ON YOUR CLINICAL JUDGMENT, PLEASE CLARIFY  THE DOCUMENTED DIAGNOSIS OF ENCEPHALOPATHY:    (    ) Acute Toxic Encephalopathy due to Klonopin  (    ) Acute Metabolic Encephalopathy due to hypoglycemia  (   ) Encephalopathy ruled out  (   ) Other, please specify:_________________    Documentation from the Medical Record:       Possible Risk Factors: Hypoglycemia, Klonopin use    Clinical Indicators:  ___5/11: Rapid Response Hospitalist Note: General: Lethargic, no acute distress Respiratory: Clear to auscultation bilaterally anteriorly Cardiovascular: RRR Abdomen: Soft, nondistended, +BS Neuro: Lethargic, eyes closed, not following commands ASESSMENT / PLAN:  #Hypoglycemia encephalopathy -BG was 36. Given D50 with improvement in blood glucose. Monitor mental status clinically, already starting to wake up more but not yet at baseline. Repeat accucheck in 15 minutes and if still low, will consider ABG + imaging. Discussed with nursing staff -Received 12 units aspart and 10 units levemir last night. Will change sliding scalre to 1:40>140 for now. RN to reach out to day team regarding levemir  prior to administration -Check CMP and CBC    ___ 5/10 Neurology Consult: Has seen neurology in outpatient with both [facility] and [facility] physicians to work this up as well. In the prior visits both inpatient and outpatient, patient's movements were suspected as likely due  to metabolic encephalopathy, excess benedryl use, itchiness, restless leg syndrome, and anxiety    ___5/11 Neurology Progress Note: Significant event: This morning RRT called for unresponsiveness. Blood sugar was  36 and D50 administered with improvement. ABG and CT head completed at that time and unremarkable.  The night before patient had klonopin 1 mg. Current complaints:  Patient still lethargic this morning but opens eyes to noxious stim. Assessment/Plan: -Patient lethargic 2/2 Klonopin and decreased renal function, patient sensitive to medications in the past, will hold off on further benzodiazepine use at this time. Patient may take prolonged time to return to baseline alertness due to sensitivity. 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 For questions regarding this query, please contact Clinical Documentation : Harry Joyner [phone] [email or web address]   Thank You.   THIS FORM IS A PERMANENT PART OF THE MEDICAL RECORD

## 2023-06-01 ENCOUNTER — APPOINTMENT (OUTPATIENT)
Dept: CT IMAGING | Facility: HOSPITAL | Age: 73
End: 2023-06-01
Attending: STUDENT IN AN ORGANIZED HEALTH CARE EDUCATION/TRAINING PROGRAM
Payer: MEDICARE

## 2023-06-01 ENCOUNTER — APPOINTMENT (OUTPATIENT)
Dept: GENERAL RADIOLOGY | Facility: HOSPITAL | Age: 73
End: 2023-06-01
Attending: STUDENT IN AN ORGANIZED HEALTH CARE EDUCATION/TRAINING PROGRAM
Payer: MEDICARE

## 2023-06-01 ENCOUNTER — HOSPITAL ENCOUNTER (OUTPATIENT)
Facility: HOSPITAL | Age: 73
Setting detail: OBSERVATION
LOS: 1 days | Discharge: HOME | End: 2023-06-02
Attending: STUDENT IN AN ORGANIZED HEALTH CARE EDUCATION/TRAINING PROGRAM | Admitting: INTERNAL MEDICINE
Payer: MEDICARE

## 2023-06-01 DIAGNOSIS — G93.40 ACUTE ENCEPHALOPATHY: Primary | ICD-10-CM

## 2023-06-01 LAB
ALBUMIN SERPL-MCNC: 3.2 G/DL (ref 3.4–5)
ALBUMIN/GLOB SERPL: 0.8 {RATIO} (ref 1–2)
ALP LIVER SERPL-CCNC: 246 U/L
ALT SERPL-CCNC: 35 U/L
AMMONIA PLAS-MCNC: 50 UMOL/L (ref 11–32)
ANION GAP SERPL CALC-SCNC: 8 MMOL/L (ref 0–18)
APTT PPP: 35.8 SECONDS (ref 23.3–35.6)
AST SERPL-CCNC: 46 U/L (ref 15–37)
ATRIAL RATE: 61 BPM
BASOPHILS # BLD AUTO: 0.05 X10(3) UL (ref 0–0.2)
BASOPHILS NFR BLD AUTO: 0.9 %
BILIRUB SERPL-MCNC: 0.8 MG/DL (ref 0.1–2)
BUN BLD-MCNC: 63 MG/DL (ref 7–18)
CALCIUM BLD-MCNC: 9.8 MG/DL (ref 8.5–10.1)
CHLORIDE SERPL-SCNC: 98 MMOL/L (ref 98–112)
CO2 SERPL-SCNC: 29 MMOL/L (ref 21–32)
CREAT BLD-MCNC: 6.24 MG/DL
EOSINOPHIL # BLD AUTO: 0.11 X10(3) UL (ref 0–0.7)
EOSINOPHIL NFR BLD AUTO: 2 %
ERYTHROCYTE [DISTWIDTH] IN BLOOD BY AUTOMATED COUNT: 17.3 %
EST. AVERAGE GLUCOSE BLD GHB EST-MCNC: 177 MG/DL (ref 68–126)
GFR SERPLBLD BASED ON 1.73 SQ M-ARVRAT: 9 ML/MIN/1.73M2 (ref 60–?)
GLOBULIN PLAS-MCNC: 4.2 G/DL (ref 2.8–4.4)
GLUCOSE BLD-MCNC: 122 MG/DL (ref 70–99)
GLUCOSE BLD-MCNC: 124 MG/DL (ref 70–99)
GLUCOSE BLD-MCNC: 178 MG/DL (ref 70–99)
GLUCOSE BLD-MCNC: 275 MG/DL (ref 70–99)
GLUCOSE BLD-MCNC: 397 MG/DL (ref 70–99)
GLUCOSE BLD-MCNC: 59 MG/DL (ref 70–99)
GLUCOSE BLD-MCNC: 64 MG/DL (ref 70–99)
GLUCOSE BLD-MCNC: 67 MG/DL (ref 70–99)
GLUCOSE BLD-MCNC: 80 MG/DL (ref 70–99)
HBA1C MFR BLD: 7.8 % (ref ?–5.7)
HCT VFR BLD AUTO: 39.6 %
HGB BLD-MCNC: 12.6 G/DL
IMM GRANULOCYTES # BLD AUTO: 0.01 X10(3) UL (ref 0–1)
IMM GRANULOCYTES NFR BLD: 0.2 %
INR BLD: 1.2 (ref 0.85–1.16)
LACTATE SERPL-SCNC: 1.9 MMOL/L (ref 0.4–2)
LACTATE SERPL-SCNC: 2.3 MMOL/L (ref 0.4–2)
LYMPHOCYTES # BLD AUTO: 1.14 X10(3) UL (ref 1–4)
LYMPHOCYTES NFR BLD AUTO: 20.7 %
MCH RBC QN AUTO: 31 PG (ref 26–34)
MCHC RBC AUTO-ENTMCNC: 31.8 G/DL (ref 31–37)
MCV RBC AUTO: 97.3 FL
MONOCYTES # BLD AUTO: 0.75 X10(3) UL (ref 0.1–1)
MONOCYTES NFR BLD AUTO: 13.6 %
NEUTROPHILS # BLD AUTO: 3.44 X10 (3) UL (ref 1.5–7.7)
NEUTROPHILS # BLD AUTO: 3.44 X10(3) UL (ref 1.5–7.7)
NEUTROPHILS NFR BLD AUTO: 62.6 %
OSMOLALITY SERPL CALC.SUM OF ELEC: 299 MOSM/KG (ref 275–295)
P AXIS: 20 DEGREES
P-R INTERVAL: 164 MS
PLATELET # BLD AUTO: 141 10(3)UL (ref 150–450)
POTASSIUM SERPL-SCNC: 3.9 MMOL/L (ref 3.5–5.1)
PROCALCITONIN SERPL-MCNC: 0.97 NG/ML (ref ?–0.16)
PROT SERPL-MCNC: 7.4 G/DL (ref 6.4–8.2)
PROTHROMBIN TIME: 15.2 SECONDS (ref 11.6–14.8)
Q-T INTERVAL: 400 MS
QRS DURATION: 84 MS
QTC CALCULATION (BEZET): 402 MS
R AXIS: 23 DEGREES
RBC # BLD AUTO: 4.07 X10(6)UL
SODIUM SERPL-SCNC: 135 MMOL/L (ref 136–145)
T AXIS: -32 DEGREES
VENTRICULAR RATE: 61 BPM
WBC # BLD AUTO: 5.5 X10(3) UL (ref 4–11)

## 2023-06-01 PROCEDURE — 99223 1ST HOSP IP/OBS HIGH 75: CPT | Performed by: INTERNAL MEDICINE

## 2023-06-01 PROCEDURE — 70450 CT HEAD/BRAIN W/O DYE: CPT | Performed by: STUDENT IN AN ORGANIZED HEALTH CARE EDUCATION/TRAINING PROGRAM

## 2023-06-01 PROCEDURE — 99232 SBSQ HOSP IP/OBS MODERATE 35: CPT | Performed by: CLINICAL NURSE SPECIALIST

## 2023-06-01 PROCEDURE — 71045 X-RAY EXAM CHEST 1 VIEW: CPT | Performed by: STUDENT IN AN ORGANIZED HEALTH CARE EDUCATION/TRAINING PROGRAM

## 2023-06-01 RX ORDER — SEVELAMER CARBONATE 800 MG/1
800 TABLET, FILM COATED ORAL
Status: DISCONTINUED | OUTPATIENT
Start: 2023-06-01 | End: 2023-06-01

## 2023-06-01 RX ORDER — ONDANSETRON 2 MG/ML
4 INJECTION INTRAMUSCULAR; INTRAVENOUS EVERY 6 HOURS PRN
Status: DISCONTINUED | OUTPATIENT
Start: 2023-06-01 | End: 2023-06-03

## 2023-06-01 RX ORDER — SENNOSIDES 8.6 MG
17.2 TABLET ORAL NIGHTLY PRN
Status: DISCONTINUED | OUTPATIENT
Start: 2023-06-01 | End: 2023-06-03

## 2023-06-01 RX ORDER — ROSUVASTATIN CALCIUM 20 MG/1
20 TABLET, COATED ORAL DAILY
Status: DISCONTINUED | OUTPATIENT
Start: 2023-06-01 | End: 2023-06-01

## 2023-06-01 RX ORDER — ACETAMINOPHEN 500 MG
500 TABLET ORAL EVERY 4 HOURS PRN
Status: DISCONTINUED | OUTPATIENT
Start: 2023-06-01 | End: 2023-06-03

## 2023-06-01 RX ORDER — ESCITALOPRAM OXALATE 10 MG/1
10 TABLET ORAL DAILY
Status: DISCONTINUED | OUTPATIENT
Start: 2023-06-01 | End: 2023-06-03

## 2023-06-01 RX ORDER — POLYETHYLENE GLYCOL 3350 17 G/17G
17 POWDER, FOR SOLUTION ORAL DAILY PRN
Status: DISCONTINUED | OUTPATIENT
Start: 2023-06-01 | End: 2023-06-03

## 2023-06-01 RX ORDER — DEXTROSE MONOHYDRATE 25 G/50ML
50 INJECTION, SOLUTION INTRAVENOUS
Status: DISCONTINUED | OUTPATIENT
Start: 2023-06-01 | End: 2023-06-03

## 2023-06-01 RX ORDER — LOSARTAN POTASSIUM 25 MG/1
25 TABLET ORAL 2 TIMES DAILY
Status: DISCONTINUED | OUTPATIENT
Start: 2023-06-01 | End: 2023-06-03

## 2023-06-01 RX ORDER — ISOSORBIDE MONONITRATE 30 MG/1
30 TABLET, EXTENDED RELEASE ORAL DAILY
Status: DISCONTINUED | OUTPATIENT
Start: 2023-06-01 | End: 2023-06-03

## 2023-06-01 RX ORDER — LEVOTHYROXINE SODIUM 0.12 MG/1
125 TABLET ORAL
Status: DISCONTINUED | OUTPATIENT
Start: 2023-06-01 | End: 2023-06-03

## 2023-06-01 RX ORDER — LOSARTAN POTASSIUM 25 MG/1
25 TABLET ORAL 2 TIMES DAILY
COMMUNITY

## 2023-06-01 RX ORDER — NIFEDIPINE 60 MG/1
60 TABLET, EXTENDED RELEASE ORAL DAILY
Status: DISCONTINUED | OUTPATIENT
Start: 2023-06-01 | End: 2023-06-03

## 2023-06-01 RX ORDER — DIPHENHYDRAMINE HYDROCHLORIDE 50 MG/ML
25 INJECTION INTRAMUSCULAR; INTRAVENOUS ONCE
Status: DISCONTINUED | OUTPATIENT
Start: 2023-06-01 | End: 2023-06-03

## 2023-06-01 RX ORDER — EZETIMIBE 10 MG/1
10 TABLET ORAL DAILY
Status: DISCONTINUED | OUTPATIENT
Start: 2023-06-01 | End: 2023-06-03

## 2023-06-01 RX ORDER — NICOTINE POLACRILEX 4 MG
15 LOZENGE BUCCAL
Status: DISCONTINUED | OUTPATIENT
Start: 2023-06-01 | End: 2023-06-03

## 2023-06-01 RX ORDER — BISACODYL 10 MG
10 SUPPOSITORY, RECTAL RECTAL
Status: DISCONTINUED | OUTPATIENT
Start: 2023-06-01 | End: 2023-06-03

## 2023-06-01 RX ORDER — METOCLOPRAMIDE HYDROCHLORIDE 5 MG/ML
5 INJECTION INTRAMUSCULAR; INTRAVENOUS EVERY 8 HOURS PRN
Status: DISCONTINUED | OUTPATIENT
Start: 2023-06-01 | End: 2023-06-03

## 2023-06-01 RX ORDER — LORAZEPAM 2 MG/ML
0.5 INJECTION INTRAMUSCULAR ONCE
Status: DISCONTINUED | OUTPATIENT
Start: 2023-06-01 | End: 2023-06-03

## 2023-06-01 RX ORDER — HEPARIN SODIUM 5000 [USP'U]/ML
5000 INJECTION, SOLUTION INTRAVENOUS; SUBCUTANEOUS EVERY 8 HOURS SCHEDULED
Status: DISCONTINUED | OUTPATIENT
Start: 2023-06-01 | End: 2023-06-03

## 2023-06-01 RX ORDER — GABAPENTIN 100 MG/1
100 CAPSULE ORAL NIGHTLY
Status: DISCONTINUED | OUTPATIENT
Start: 2023-06-01 | End: 2023-06-03

## 2023-06-01 RX ORDER — NICOTINE POLACRILEX 4 MG
30 LOZENGE BUCCAL
Status: DISCONTINUED | OUTPATIENT
Start: 2023-06-01 | End: 2023-06-03

## 2023-06-01 RX ORDER — DEXTROSE AND SODIUM CHLORIDE 5; .45 G/100ML; G/100ML
INJECTION, SOLUTION INTRAVENOUS ONCE
Status: COMPLETED | OUTPATIENT
Start: 2023-06-01 | End: 2023-06-01

## 2023-06-01 RX ORDER — SEVELAMER CARBONATE 800 MG/1
1600 TABLET, FILM COATED ORAL
Status: DISCONTINUED | OUTPATIENT
Start: 2023-06-01 | End: 2023-06-01

## 2023-06-01 RX ORDER — FAMOTIDINE 20 MG/1
20 TABLET, FILM COATED ORAL DAILY
Status: DISCONTINUED | OUTPATIENT
Start: 2023-06-01 | End: 2023-06-03

## 2023-06-01 RX ORDER — SEVELAMER CARBONATE 800 MG/1
800 TABLET, FILM COATED ORAL
Status: DISCONTINUED | OUTPATIENT
Start: 2023-06-02 | End: 2023-06-03

## 2023-06-01 NOTE — ED QUICK NOTES
Orders for admission, patient is aware of plan and ready to go upstairs. Any questions, please call ED RN Erwin Simmons  at extension [phone]. Vaccinated? yes  Type of COVID test sent:  COVID Suspicion level: Low/High  low    Titratable drug(s) infusing:  Rate:    LOC at time of transport:  aox2  Other pertinent information:  Restless, will respond appropriately, just continues to move around the bed.    CIWA score=  NIH score=

## 2023-06-01 NOTE — PLAN OF CARE
Patient Aox1. Intermittent periods of extreme drowsiness followed by ability to converse. Patient SB on tele monitor. Sating at 100% on room air. CGM to RUQ and insulin pump to LUQ. For clarification, nurse asked patient about medications and insulin pump settings. Patient stated that it is written on a piece of paper in his belongings. Upon searching room, patient only had shoes in room. Nurse attempted to call patients spouse but call was not answered. Patient requesting to keep insulin pump and CGM on. Consent forms signed and MD made aware. Orders to follow. Call light within reach with bed in lowest position and alarm activated.    Problem: Patient/Family Goals  Goal: Patient/Family Long Term Goal  Description: Patient's Long Term Goal: increased orientation    Interventions:  - renal to see  - See additional Care Plan goals for specific interventions  Outcome: Progressing  Goal: Patient/Family Short Term Goal  Description: Patient's Short Term Goal: no falls     Interventions:   - objects within reach  - See additional Care Plan goals for specific interventions  Outcome: Progressing

## 2023-06-01 NOTE — ED INITIAL ASSESSMENT (HPI)
\"Blacking out\" all day yesterday. Wife reports episodes started around 11am Wednesday. Wife reports pt was here for cellulitis 2 weeks ago. Wife denies pt having fevers. Wife reports pt was also recently diagnosed with hypothyroid, liver failure, and renal failure. Wife denies pt taking blood thinners. FAST negative. Moves all extremities. Unable to sit still. Able to follow commands. A&ox4.

## 2023-06-01 NOTE — PLAN OF CARE
A&OX1. No pain. Came in with AMS. Wife reports that he seemed \"blacked out\" yesterday. L arm fistual. HD t/th/st. Consulted MD july JUSTICE.      Problem: Patient/Family Goals  Goal: Patient/Family Long Term Goal  Description: Patient's Long Term Goal:     Interventions:  -   - See additional Care Plan goals for specific interventions  6/1/2023 0648 by Samir Lakhani RN  Outcome: Progressing  6/1/2023 0647 by Samir Lakhani RN  Outcome: Progressing  6/1/2023 0647 by Samir Lakhani RN  Outcome: Progressing  Goal: Patient/Family Short Term Goal  Description: Patient's Short Term Goal:     Interventions:   -  - See additional Care Plan goals for specific interventions  6/1/2023 0648 by Samir Lakhani RN  Outcome: Progressing  6/1/2023 0647 by Samir Lakhani RN  Outcome: Progressing  6/1/2023 0647 by Samir Lakhani RN  Outcome: Progressing

## 2023-06-01 NOTE — PROGRESS NOTES
06/01/23 1612   BiPAP/CPAP Monitored Parameters   Toleration Well  (Patient to put on self)     Pili Vergara  RT Tech

## 2023-06-02 ENCOUNTER — APPOINTMENT (OUTPATIENT)
Dept: WOUND CARE | Age: 73
End: 2023-06-02
Attending: FAMILY MEDICINE

## 2023-06-02 VITALS
OXYGEN SATURATION: 98 % | TEMPERATURE: 98 F | HEIGHT: 66 IN | RESPIRATION RATE: 16 BRPM | HEART RATE: 68 BPM | SYSTOLIC BLOOD PRESSURE: 125 MMHG | WEIGHT: 177.94 LBS | DIASTOLIC BLOOD PRESSURE: 59 MMHG | BODY MASS INDEX: 28.6 KG/M2

## 2023-06-02 LAB
ANION GAP SERPL CALC-SCNC: 6 MMOL/L (ref 0–18)
BASOPHILS # BLD AUTO: 0.07 X10(3) UL (ref 0–0.2)
BASOPHILS NFR BLD AUTO: 1.3 %
BUN BLD-MCNC: 71 MG/DL (ref 7–18)
CALCIUM BLD-MCNC: 9.1 MG/DL (ref 8.5–10.1)
CHLORIDE SERPL-SCNC: 100 MMOL/L (ref 98–112)
CO2 SERPL-SCNC: 30 MMOL/L (ref 21–32)
CREAT BLD-MCNC: 7.28 MG/DL
EOSINOPHIL # BLD AUTO: 0.06 X10(3) UL (ref 0–0.7)
EOSINOPHIL NFR BLD AUTO: 1.1 %
ERYTHROCYTE [DISTWIDTH] IN BLOOD BY AUTOMATED COUNT: 17.4 %
GFR SERPLBLD BASED ON 1.73 SQ M-ARVRAT: 7 ML/MIN/1.73M2 (ref 60–?)
GLUCOSE BLD-MCNC: 100 MG/DL (ref 70–99)
GLUCOSE BLD-MCNC: 114 MG/DL (ref 70–99)
GLUCOSE BLD-MCNC: 122 MG/DL (ref 70–99)
GLUCOSE BLD-MCNC: 127 MG/DL (ref 70–99)
GLUCOSE BLD-MCNC: 133 MG/DL (ref 70–99)
GLUCOSE BLD-MCNC: 221 MG/DL (ref 70–99)
GLUCOSE BLD-MCNC: 278 MG/DL (ref 70–99)
GLUCOSE BLD-MCNC: 43 MG/DL (ref 70–99)
GLUCOSE BLD-MCNC: 52 MG/DL (ref 70–99)
HCT VFR BLD AUTO: 38.7 %
HGB BLD-MCNC: 12.2 G/DL
IMM GRANULOCYTES # BLD AUTO: 0.02 X10(3) UL (ref 0–1)
IMM GRANULOCYTES NFR BLD: 0.4 %
LYMPHOCYTES # BLD AUTO: 1.04 X10(3) UL (ref 1–4)
LYMPHOCYTES NFR BLD AUTO: 18.9 %
MCH RBC QN AUTO: 31.2 PG (ref 26–34)
MCHC RBC AUTO-ENTMCNC: 31.5 G/DL (ref 31–37)
MCV RBC AUTO: 99 FL
MONOCYTES # BLD AUTO: 0.62 X10(3) UL (ref 0.1–1)
MONOCYTES NFR BLD AUTO: 11.3 %
NEUTROPHILS # BLD AUTO: 3.68 X10 (3) UL (ref 1.5–7.7)
NEUTROPHILS # BLD AUTO: 3.68 X10(3) UL (ref 1.5–7.7)
NEUTROPHILS NFR BLD AUTO: 67 %
OSMOLALITY SERPL CALC.SUM OF ELEC: 304 MOSM/KG (ref 275–295)
PLATELET # BLD AUTO: 140 10(3)UL (ref 150–450)
POTASSIUM SERPL-SCNC: 4.4 MMOL/L (ref 3.5–5.1)
RBC # BLD AUTO: 3.91 X10(6)UL
SODIUM SERPL-SCNC: 136 MMOL/L (ref 136–145)
WBC # BLD AUTO: 5.5 X10(3) UL (ref 4–11)

## 2023-06-02 PROCEDURE — 90935 HEMODIALYSIS ONE EVALUATION: CPT | Performed by: INTERNAL MEDICINE

## 2023-06-02 RX ORDER — INSULIN GLARGINE 300 U/ML
9 INJECTION, SOLUTION SUBCUTANEOUS DAILY
Qty: 6 EACH | Refills: 0 | Status: SHIPPED | OUTPATIENT
Start: 2023-06-02

## 2023-06-02 RX ORDER — INSULIN LISPRO 100 [IU]/ML
INJECTION, SOLUTION INTRAVENOUS; SUBCUTANEOUS
Qty: 8 ML | Refills: 0 | Status: SHIPPED | OUTPATIENT
Start: 2023-06-02

## 2023-06-02 RX ORDER — GABAPENTIN 100 MG/1
100 CAPSULE ORAL NIGHTLY
Qty: 30 CAPSULE | Refills: 0 | Status: SHIPPED | OUTPATIENT
Start: 2023-06-02

## 2023-06-02 NOTE — PLAN OF CARE
Assumed care at 0730  A/O x4  RA  Refuses CPAP at [facility]  NSR / SB on tele  Bps low this morning - SBP 90's-100's. Morning BP meds held. Denies pain  BC pending  PT/OT to see  Plan for dialysis today  1800 carb control diet with renal restrictions  Heparin for VTE  Refuses SCDs  Insulin pump at home with wife. CGM to RL abdomen. L hearing aid. Glasses. L AV fistula - limb precautions  R AC and R forearm PIV SL  All needs met. Pt and wife updated on POC. Will continue to monitor. Problem: SAFETY ADULT - FALL  Goal: Free from fall injury  Description: INTERVENTIONS:  - Assess pt frequently for physical needs  - Identify cognitive and physical deficits and behaviors that affect risk of falls.   - [place] fall precautions as indicated by assessment.  - Educate pt/family on patient safety including physical limitations  - Instruct pt to call for assistance with activity based on assessment  - Modify environment to reduce risk of injury  - Provide assistive devices as appropriate  - Consider OT/PT consult to assist with strengthening/mobility  - Encourage toileting schedule  Outcome: Progressing     Problem: PAIN - ADULT  Goal: Verbalizes/displays adequate comfort level or patient's stated pain goal  Description: INTERVENTIONS:  - Encourage pt to monitor pain and request assistance  - Assess pain using appropriate pain scale  - Administer analgesics based on type and severity of pain and evaluate response  - Implement non-pharmacological measures as appropriate and evaluate response  - Consider cultural and social influences on pain and pain management  - Manage/alleviate anxiety  - Utilize distraction and/or relaxation techniques  - Monitor for opioid side effects  - Notify MD/LIP if interventions unsuccessful or patient reports new pain  - Anticipate increased pain with activity and pre-medicate as appropriate  Outcome: Progressing     Problem: RISK FOR INFECTION - ADULT  Goal: Absence of fever/infection during anticipated neutropenic period  Description: INTERVENTIONS  - Monitor WBC  - Administer growth factors as ordered  - Implement neutropenic guidelines  Outcome: Progressing     Problem: DISCHARGE PLANNING  Goal: Discharge to home or other facility with appropriate resources  Description: INTERVENTIONS:  - Identify barriers to discharge w/pt and caregiver  - Include patient/family/discharge partner in discharge planning  - Arrange for needed discharge resources and transportation as appropriate  - Identify discharge learning needs (meds, wound care, etc)  - Arrange for interpreters to assist at discharge as needed  - Consider post-discharge preferences of patient/family/discharge partner  - Complete POLST form as appropriate  - Assess patient's ability to be responsible for managing their own health  - Refer to Case Management Department for coordinating discharge planning if the patient needs post-hospital services based on physician/LIP order or complex needs related to functional status, cognitive ability or social support system  Outcome: Progressing

## 2023-06-02 NOTE — PLAN OF CARE
Problem: SAFETY ADULT - FALL  Goal: Free from fall injury  Description: INTERVENTIONS:  - Assess pt frequently for physical needs  - Identify cognitive and physical deficits and behaviors that affect risk of falls.   - [place] fall precautions as indicated by assessment.  - Educate pt/family on patient safety including physical limitations  - Instruct pt to call for assistance with activity based on assessment  - Modify environment to reduce risk of injury  - Provide assistive devices as appropriate  - Consider OT/PT consult to assist with strengthening/mobility  - Encourage toileting schedule  Outcome: Progressing     Assume care @ 1930  Patient AOX3, RA, Tele-SR, CPAP @ night  Up with W/1A, no bm overnight  HD patient left AVF, HD (TTS), has CGM in place  Plan for HD treatment tomorrow  Gluc's QID with SS, [place], left ear hearing aid  Had low BS level X2, D50 IVP given, asymptomatic   Will continue to monitor

## 2023-06-02 NOTE — CM/SW NOTE
This is a Code 40. Patient failed inpatient criteria. Second level of review completed and supports observation. UR committee in agreement. Discussed with Dr. Holly Gil  who approves observation status. MOON given to the patient and order written.

## 2023-06-03 NOTE — PROGRESS NOTES
NURSING DISCHARGE NOTE    Discharged Home via Wheelchair. Accompanied by Family member  Belongings Taken by patient/family. PIVs removed. AVS printed and reviewed with patient. Wife and patient educated on insulin use. All questions answered.

## 2023-06-06 LAB — GLUCOSE BLD-MCNC: 152 MG/DL (ref 70–99)

## 2023-06-07 ENCOUNTER — TELEPHONE (OUTPATIENT)
Dept: NEPHROLOGY | Facility: CLINIC | Age: 73
End: 2023-06-07

## 2023-06-07 NOTE — TELEPHONE ENCOUNTER
Pt was put on Lactulose solution and wanted to know if it would interfere with any of the medications that you have prescribed him. Also, if he is experiencing diarrhea from the phosphorus blocker, what will this do to him ?

## 2023-06-13 ENCOUNTER — HOSPITAL ENCOUNTER (OUTPATIENT)
Dept: WOUND CARE | Age: 73
Discharge: STILL ADMITTED OR ADMINISTRATIVE | End: 2023-06-13
Attending: INTERNAL MEDICINE

## 2023-06-13 VITALS
RESPIRATION RATE: 15 BRPM | HEART RATE: 64 BPM | TEMPERATURE: 96.1 F | SYSTOLIC BLOOD PRESSURE: 137 MMHG | OXYGEN SATURATION: 94 % | DIASTOLIC BLOOD PRESSURE: 69 MMHG

## 2023-06-13 DIAGNOSIS — I25.10 CORONARY ARTERY DISEASE INVOLVING NATIVE CORONARY ARTERY OF NATIVE HEART, UNSPECIFIED WHETHER ANGINA PRESENT: ICD-10-CM

## 2023-06-13 DIAGNOSIS — E11.621 DIABETIC ULCER OF RIGHT GREAT TOE (CMD): ICD-10-CM

## 2023-06-13 DIAGNOSIS — F32.A DEPRESSION, UNSPECIFIED DEPRESSION TYPE: ICD-10-CM

## 2023-06-13 DIAGNOSIS — L97.529 DIABETIC ULCER OF LEFT GREAT TOE (CMD): ICD-10-CM

## 2023-06-13 DIAGNOSIS — L97.509 TYPE 2 DIABETES MELLITUS WITH FOOT ULCER, WITH LONG-TERM CURRENT USE OF INSULIN (CMD): ICD-10-CM

## 2023-06-13 DIAGNOSIS — E11.621 TYPE 2 DIABETES MELLITUS WITH FOOT ULCER, WITH LONG-TERM CURRENT USE OF INSULIN (CMD): ICD-10-CM

## 2023-06-13 DIAGNOSIS — L97.511 ULCER OF RIGHT FOOT, LIMITED TO BREAKDOWN OF SKIN (CMD): ICD-10-CM

## 2023-06-13 DIAGNOSIS — E03.9 ACQUIRED HYPOTHYROIDISM: ICD-10-CM

## 2023-06-13 DIAGNOSIS — L97.519 DIABETIC ULCER OF RIGHT GREAT TOE (CMD): ICD-10-CM

## 2023-06-13 DIAGNOSIS — E11.621 DIABETIC ULCER OF LEFT GREAT TOE (CMD): ICD-10-CM

## 2023-06-13 DIAGNOSIS — I10 ESSENTIAL HYPERTENSION, BENIGN: ICD-10-CM

## 2023-06-13 DIAGNOSIS — I73.9 PAD (PERIPHERAL ARTERY DISEASE) (CMD): Primary | ICD-10-CM

## 2023-06-13 DIAGNOSIS — E78.5 DYSLIPIDEMIA: ICD-10-CM

## 2023-06-13 DIAGNOSIS — E11.42 DM TYPE 2 WITH DIABETIC PERIPHERAL NEUROPATHY (CMD): ICD-10-CM

## 2023-06-13 DIAGNOSIS — Z79.4 TYPE 2 DIABETES MELLITUS WITH FOOT ULCER, WITH LONG-TERM CURRENT USE OF INSULIN (CMD): ICD-10-CM

## 2023-06-13 LAB — GLUCOSE BLDC GLUCOMTR-MCNC: 283 MG/DL (ref 70–99)

## 2023-06-13 PROCEDURE — 82962 GLUCOSE BLOOD TEST: CPT

## 2023-06-13 PROCEDURE — 99205 OFFICE O/P NEW HI 60 MIN: CPT

## 2023-06-13 RX ORDER — FAMOTIDINE 20 MG/1
TABLET, FILM COATED ORAL
COMMUNITY
Start: 2023-05-08

## 2023-06-13 RX ORDER — LACTULOSE 10 G/15ML
SOLUTION ORAL
COMMUNITY
Start: 2023-06-06

## 2023-06-13 RX ORDER — FOLIC ACID/VIT B COMPLEX AND C 0.8 MG
1 TABLET ORAL DAILY
COMMUNITY
Start: 2023-03-28

## 2023-06-13 RX ORDER — ETHYL CHLORIDE 100 %
AEROSOL, SPRAY (ML) TOPICAL
COMMUNITY
Start: 2023-05-29 | End: 2023-10-04 | Stop reason: ALTCHOICE

## 2023-06-13 RX ORDER — DEXAMETHASONE SODIUM PHOSPHATE 4 MG/ML
INJECTION, SOLUTION INTRAMUSCULAR; INTRAVENOUS
COMMUNITY
Start: 2023-06-05 | End: 2023-10-04 | Stop reason: ALTCHOICE

## 2023-06-13 RX ORDER — CETIRIZINE HYDROCHLORIDE 10 MG/1
1 TABLET ORAL DAILY
COMMUNITY
Start: 2023-03-16

## 2023-06-13 RX ORDER — SODIUM FLUORIDE 5 MG/G
GEL, DENTIFRICE DENTAL
COMMUNITY
Start: 2023-02-07

## 2023-06-13 RX ORDER — FUROSEMIDE 40 MG/1
1 TABLET ORAL DAILY
COMMUNITY
Start: 2023-02-27 | End: 2023-10-04 | Stop reason: ALTCHOICE

## 2023-06-13 RX ORDER — ESCITALOPRAM OXALATE 10 MG/1
10 TABLET ORAL DAILY
COMMUNITY
Start: 2023-02-21 | End: 2023-10-04 | Stop reason: ALTCHOICE

## 2023-06-13 RX ORDER — NIFEDIPINE 60 MG/1
60 TABLET, FILM COATED, EXTENDED RELEASE ORAL
COMMUNITY
Start: 2023-03-17 | End: 2023-10-04 | Stop reason: ALTCHOICE

## 2023-06-13 RX ORDER — FLURBIPROFEN SODIUM 0.3 MG/ML
SOLUTION/ DROPS OPHTHALMIC
COMMUNITY
Start: 2023-06-07

## 2023-06-13 RX ORDER — ISOSORBIDE MONONITRATE 30 MG/1
30 TABLET, EXTENDED RELEASE ORAL
COMMUNITY
Start: 2023-01-03

## 2023-06-13 RX ORDER — INSULIN GLARGINE 300 U/ML
9 INJECTION, SOLUTION SUBCUTANEOUS
COMMUNITY
Start: 2023-06-02 | End: 2023-10-04 | Stop reason: ALTCHOICE

## 2023-06-13 RX ORDER — OMEPRAZOLE 20 MG/1
CAPSULE, DELAYED RELEASE ORAL
COMMUNITY
Start: 2023-04-01 | End: 2023-10-04 | Stop reason: ALTCHOICE

## 2023-06-13 RX ORDER — EZETIMIBE 10 MG/1
10 TABLET ORAL DAILY
COMMUNITY
Start: 2023-03-22

## 2023-06-13 RX ORDER — CEPHALEXIN 500 MG/1
CAPSULE ORAL
COMMUNITY
Start: 2023-05-24 | End: 2023-08-23 | Stop reason: ALTCHOICE

## 2023-06-13 RX ORDER — LOSARTAN POTASSIUM 25 MG/1
TABLET ORAL
COMMUNITY
Start: 2023-06-12

## 2023-06-13 RX ORDER — INSULIN GLARGINE 300 U/ML
INJECTION, SOLUTION SUBCUTANEOUS
COMMUNITY
Start: 2023-06-04

## 2023-06-13 RX ORDER — GABAPENTIN 300 MG/1
100 CAPSULE ORAL NIGHTLY
COMMUNITY
Start: 2023-01-09 | End: 2023-10-04 | Stop reason: ALTCHOICE

## 2023-06-13 RX ORDER — LEVOTHYROXINE SODIUM 0.12 MG/1
125 TABLET ORAL
COMMUNITY
Start: 2023-05-19

## 2023-06-13 RX ORDER — SEVELAMER CARBONATE 800 MG/1
TABLET, FILM COATED ORAL
COMMUNITY
Start: 2023-06-12

## 2023-06-13 RX ORDER — ROSUVASTATIN CALCIUM 20 MG/1
20 TABLET, COATED ORAL DAILY
COMMUNITY
Start: 2023-04-10 | End: 2023-10-04 | Stop reason: ALTCHOICE

## 2023-06-13 RX ORDER — INSULIN LISPRO 100 [IU]/ML
INJECTION, SOLUTION INTRAVENOUS; SUBCUTANEOUS
COMMUNITY
Start: 2023-06-02

## 2023-06-13 RX ORDER — CHLORAL HYDRATE 500 MG
1000 CAPSULE ORAL DAILY
COMMUNITY

## 2023-06-19 ENCOUNTER — HOSPITAL ENCOUNTER (INPATIENT)
Facility: HOSPITAL | Age: 73
LOS: 2 days | Discharge: HOME | End: 2023-06-21
Attending: EMERGENCY MEDICINE | Admitting: INTERNAL MEDICINE
Payer: MEDICARE

## 2023-06-19 ENCOUNTER — APPOINTMENT (OUTPATIENT)
Dept: GENERAL RADIOLOGY | Facility: HOSPITAL | Age: 73
End: 2023-06-19
Attending: EMERGENCY MEDICINE
Payer: MEDICARE

## 2023-06-19 ENCOUNTER — HOSPITAL ENCOUNTER (INPATIENT)
Facility: HOSPITAL | Age: 73
LOS: 2 days | Discharge: HOME HEALTH | End: 2023-06-21
Attending: EMERGENCY MEDICINE | Admitting: INTERNAL MEDICINE
Payer: MEDICARE

## 2023-06-19 DIAGNOSIS — N18.6 ESRD (END STAGE RENAL DISEASE) (HCC): ICD-10-CM

## 2023-06-19 DIAGNOSIS — R41.82 ALTERED MENTAL STATUS, UNSPECIFIED ALTERED MENTAL STATUS TYPE: Primary | ICD-10-CM

## 2023-06-19 DIAGNOSIS — E16.2 HYPOGLYCEMIA: ICD-10-CM
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
## 2023-06-19 DIAGNOSIS — E72.20 HYPERAMMONEMIA (HCC): ICD-10-CM

## 2023-06-19 LAB
ALBUMIN SERPL-MCNC: 3.2 G/DL (ref 3.4–5)
ALBUMIN/GLOB SERPL: 0.8 {RATIO} (ref 1–2)
ALP LIVER SERPL-CCNC: 198 U/L
ALT SERPL-CCNC: 36 U/L
AMMONIA PLAS-MCNC: 35 UMOL/L (ref 11–32)
ANION GAP SERPL CALC-SCNC: 10 MMOL/L (ref 0–18)
AST SERPL-CCNC: 63 U/L (ref 15–37)
BASOPHILS # BLD AUTO: 0.07 X10(3) UL (ref 0–0.2)
BASOPHILS NFR BLD AUTO: 1 %
BILIRUB SERPL-MCNC: 0.9 MG/DL (ref 0.1–2)
BUN BLD-MCNC: 62 MG/DL (ref 7–18)
CALCIUM BLD-MCNC: 9.5 MG/DL (ref 8.5–10.1)
CHLORIDE SERPL-SCNC: 96 MMOL/L (ref 98–112)
CO2 SERPL-SCNC: 30 MMOL/L (ref 21–32)
CREAT BLD-MCNC: 6.29 MG/DL
EOSINOPHIL # BLD AUTO: 0.08 X10(3) UL (ref 0–0.7)
EOSINOPHIL NFR BLD AUTO: 1.2 %
ERYTHROCYTE [DISTWIDTH] IN BLOOD BY AUTOMATED COUNT: 18.1 %
GFR SERPLBLD BASED ON 1.73 SQ M-ARVRAT: 9 ML/MIN/1.73M2 (ref 60–?)
GLOBULIN PLAS-MCNC: 4.2 G/DL (ref 2.8–4.4)
GLUCOSE BLD-MCNC: 131 MG/DL (ref 70–99)
GLUCOSE BLD-MCNC: 251 MG/DL (ref 70–99)
GLUCOSE BLD-MCNC: 58 MG/DL (ref 70–99)
GLUCOSE BLD-MCNC: 76 MG/DL (ref 70–99)
GLUCOSE BLD-MCNC: 81 MG/DL (ref 70–99)
GLUCOSE BLD-MCNC: 92 MG/DL (ref 70–99)
GLUCOSE BLD-MCNC: 92 MG/DL (ref 70–99)
GLUCOSE BLD-MCNC: 97 MG/DL (ref 70–99)
GLUCOSE BLD-MCNC: 99 MG/DL (ref 70–99)
HCT VFR BLD AUTO: 38.1 %
HGB BLD-MCNC: 12.1 G/DL
IMM GRANULOCYTES # BLD AUTO: 0.01 X10(3) UL (ref 0–1)
IMM GRANULOCYTES NFR BLD: 0.1 %
LYMPHOCYTES # BLD AUTO: 1.08 X10(3) UL (ref 1–4)
LYMPHOCYTES NFR BLD AUTO: 16 %
MCH RBC QN AUTO: 31.3 PG (ref 26–34)
MCHC RBC AUTO-ENTMCNC: 31.8 G/DL (ref 31–37)
MCV RBC AUTO: 98.7 FL
MONOCYTES # BLD AUTO: 0.8 X10(3) UL (ref 0.1–1)
MONOCYTES NFR BLD AUTO: 11.8 %
NEUTROPHILS # BLD AUTO: 4.72 X10 (3) UL (ref 1.5–7.7)
NEUTROPHILS # BLD AUTO: 4.72 X10(3) UL (ref 1.5–7.7)
NEUTROPHILS NFR BLD AUTO: 69.9 %
OSMOLALITY SERPL CALC.SUM OF ELEC: 299 MOSM/KG (ref 275–295)
PLATELET # BLD AUTO: 157 10(3)UL (ref 150–450)
POTASSIUM SERPL-SCNC: 4.2 MMOL/L (ref 3.5–5.1)
PROT SERPL-MCNC: 7.4 G/DL (ref 6.4–8.2)
RBC # BLD AUTO: 3.86 X10(6)UL
SODIUM SERPL-SCNC: 136 MMOL/L (ref 136–145)
WBC # BLD AUTO: 6.8 X10(3) UL (ref 4–11)

## 2023-06-19 PROCEDURE — 73080 X-RAY EXAM OF ELBOW: CPT | Performed by: EMERGENCY MEDICINE

## 2023-06-19 PROCEDURE — 99223 1ST HOSP IP/OBS HIGH 75: CPT | Performed by: INTERNAL MEDICINE

## 2023-06-19 RX ORDER — NICOTINE POLACRILEX 4 MG
15 LOZENGE BUCCAL
Status: DISCONTINUED | OUTPATIENT
Start: 2023-06-19 | End: 2023-06-21

## 2023-06-19 RX ORDER — LIDOCAINE AND PRILOCAINE 25; 25 MG/G; MG/G
CREAM TOPICAL
COMMUNITY
Start: 2023-03-01

## 2023-06-19 RX ORDER — LACTULOSE 10 G/15ML
20 SOLUTION ORAL DAILY
Status: DISCONTINUED | OUTPATIENT
Start: 2023-06-19 | End: 2023-06-21

## 2023-06-19 RX ORDER — CEPHALEXIN 500 MG/1
500 CAPSULE ORAL 2 TIMES DAILY
COMMUNITY
Start: 2023-05-24 | End: 2023-06-21

## 2023-06-19 RX ORDER — NIFEDIPINE 60 MG/1
60 TABLET, EXTENDED RELEASE ORAL DAILY
Status: DISCONTINUED | OUTPATIENT
Start: 2023-06-19 | End: 2023-06-21

## 2023-06-19 RX ORDER — SODIUM FLUORIDE 6.1 MG/ML
GEL, DENTIFRICE DENTAL
COMMUNITY
Start: 2023-02-07

## 2023-06-19 RX ORDER — ETHYL CHLORIDE 100 %
AEROSOL, SPRAY (ML) TOPICAL
COMMUNITY
Start: 2023-05-29 | End: 2023-06-21

## 2023-06-19 RX ORDER — HEPARIN SODIUM 5000 [USP'U]/ML
5000 INJECTION, SOLUTION INTRAVENOUS; SUBCUTANEOUS EVERY 12 HOURS SCHEDULED
Status: DISCONTINUED | OUTPATIENT
Start: 2023-06-19 | End: 2023-06-21

## 2023-06-19 RX ORDER — DEXTROSE MONOHYDRATE 25 G/50ML
INJECTION, SOLUTION INTRAVENOUS
Status: COMPLETED
Start: 2023-06-19 | End: 2023-06-19

## 2023-06-19 RX ORDER — ISOSORBIDE MONONITRATE 30 MG/1
30 TABLET, EXTENDED RELEASE ORAL DAILY
Status: DISCONTINUED | OUTPATIENT
Start: 2023-06-19 | End: 2023-06-21

## 2023-06-19 RX ORDER — FLURBIPROFEN SODIUM 0.3 MG/ML
1 SOLUTION/ DROPS OPHTHALMIC DAILY
COMMUNITY
Start: 2023-06-07

## 2023-06-19 RX ORDER — INSULIN LISPRO 100 [IU]/ML
INJECTION, SOLUTION INTRAVENOUS; SUBCUTANEOUS
COMMUNITY
Start: 2023-02-25 | End: 2023-06-21

## 2023-06-19 RX ORDER — NICOTINE POLACRILEX 4 MG
30 LOZENGE BUCCAL
Status: DISCONTINUED | OUTPATIENT
Start: 2023-06-19 | End: 2023-06-21

## 2023-06-19 RX ORDER — LACTULOSE 10 G/15ML
30 SOLUTION ORAL DAILY
COMMUNITY
Start: 2023-06-06

## 2023-06-19 RX ORDER — DEXAMETHASONE SODIUM PHOSPHATE 4 MG/ML
INJECTION, SOLUTION INTRAMUSCULAR; INTRAVENOUS
COMMUNITY
Start: 2023-06-05

## 2023-06-19 RX ORDER — EZETIMIBE 10 MG/1
10 TABLET ORAL DAILY
Status: DISCONTINUED | OUTPATIENT
Start: 2023-06-19 | End: 2023-06-21

## 2023-06-19 RX ORDER — DEXTROSE MONOHYDRATE 25 G/50ML
50 INJECTION, SOLUTION INTRAVENOUS
Status: DISCONTINUED | OUTPATIENT
Start: 2023-06-19 | End: 2023-06-21

## 2023-06-19 RX ORDER — LOSARTAN POTASSIUM 25 MG/1
25 TABLET ORAL 2 TIMES DAILY
Status: DISCONTINUED | OUTPATIENT
Start: 2023-06-19 | End: 2023-06-21

## 2023-06-19 RX ORDER — ONDANSETRON 2 MG/ML
4 INJECTION INTRAMUSCULAR; INTRAVENOUS EVERY 6 HOURS PRN
Status: DISCONTINUED | OUTPATIENT
Start: 2023-06-19 | End: 2023-06-21

## 2023-06-19 RX ORDER — FUROSEMIDE 40 MG/1
40 TABLET ORAL DAILY
Status: DISCONTINUED | OUTPATIENT
Start: 2023-06-19 | End: 2023-06-21

## 2023-06-19 RX ORDER — FAMOTIDINE 20 MG/1
20 TABLET, FILM COATED ORAL NIGHTLY
Status: DISCONTINUED | OUTPATIENT
Start: 2023-06-19 | End: 2023-06-21

## 2023-06-19 RX ORDER — METOCLOPRAMIDE HYDROCHLORIDE 5 MG/ML
10 INJECTION INTRAMUSCULAR; INTRAVENOUS EVERY 8 HOURS PRN
Status: DISCONTINUED | OUTPATIENT
Start: 2023-06-19 | End: 2023-06-20

## 2023-06-19 RX ORDER — DEXTROSE MONOHYDRATE 25 G/50ML
50 INJECTION, SOLUTION INTRAVENOUS ONCE
Status: COMPLETED | OUTPATIENT
Start: 2023-06-19 | End: 2023-06-19

## 2023-06-19 NOTE — PROGRESS NOTES
NURSING ADMISSION NOTE    Assumed care of patient when transferred from ED. Patient lethargic with nonsensical speech. Admission Navigator completed with patient's wife. Consults notified of consult. [facility] notified of HD ordered for 6/20. By end of shift patient more alert. Safety and fall alarms in place. Patient admitted via [address] to room and floor  Safety precautions initiated. Bed in low position. Call light in reach.

## 2023-06-19 NOTE — ED QUICK NOTES
Patient wife at bedside. States patient has been altered since Thursday dialysis. Due to have balloon placed r/t fistula, nephrology believes it is not working properly.  Did not witness fall

## 2023-06-19 NOTE — ED QUICK NOTES
Patient increased agitation and restlessness. MD ok with ammonia order. Verbal order, drawn and sent now.

## 2023-06-19 NOTE — ED QUICK NOTES
Patient answering questions appropriately however rolling around in bed and continuing to blow out air hard while jerking around.

## 2023-06-19 NOTE — ED QUICK NOTES
Orders for admission, patient is aware of plan and ready to go upstairs. Any questions, please call ED RN [phone]    Patient Covid vaccination status: Fully vaccinated     COVID Test Ordered in ED: None    COVID Suspicion at Admission: N/A    Running Infusions:  None    Mental Status/LOC at time of transport: Alert to person. Altered and has been since Thursday per wife. Lives at home with wife     Other pertinent information: Turns all over in stretcher - wife states patient does this constantly does not hold still.  Karo Feldman MD did see patient in ER  CIWA score: N/A   NIH score:  N/A

## 2023-06-19 NOTE — ED INITIAL ASSESSMENT (HPI)
Patient in per EMS from home.  Fall out of kitchen chair this AM. When asked if mechanical fall of if he became dizzy and fell, he replies \"no clue\"   Dialysis patient  Patient had small skin tear EMS dressed   No thinners, states did not hit head  On arrival only c.o chronic sciatica pain

## 2023-06-20 LAB
ANION GAP SERPL CALC-SCNC: 12 MMOL/L (ref 0–18)
BASOPHILS # BLD AUTO: 0.08 X10(3) UL (ref 0–0.2)
BASOPHILS NFR BLD AUTO: 1.2 %
BUN BLD-MCNC: 74 MG/DL (ref 7–18)
CALCIUM BLD-MCNC: 9.2 MG/DL (ref 8.5–10.1)
CHLORIDE SERPL-SCNC: 98 MMOL/L (ref 98–112)
CO2 SERPL-SCNC: 25 MMOL/L (ref 21–32)
CREAT BLD-MCNC: 6.93 MG/DL
EOSINOPHIL # BLD AUTO: 0.06 X10(3) UL (ref 0–0.7)
EOSINOPHIL NFR BLD AUTO: 0.9 %
ERYTHROCYTE [DISTWIDTH] IN BLOOD BY AUTOMATED COUNT: 19 %
GFR SERPLBLD BASED ON 1.73 SQ M-ARVRAT: 8 ML/MIN/1.73M2 (ref 60–?)
GLUCOSE BLD-MCNC: 186 MG/DL (ref 70–99)
GLUCOSE BLD-MCNC: 193 MG/DL (ref 70–99)
GLUCOSE BLD-MCNC: 224 MG/DL (ref 70–99)
GLUCOSE BLD-MCNC: 236 MG/DL (ref 70–99)
GLUCOSE BLD-MCNC: 91 MG/DL (ref 70–99)
HCT VFR BLD AUTO: 40.3 %
HGB BLD-MCNC: 12.8 G/DL
IMM GRANULOCYTES # BLD AUTO: 0.03 X10(3) UL (ref 0–1)
IMM GRANULOCYTES NFR BLD: 0.5 %
LYMPHOCYTES # BLD AUTO: 0.81 X10(3) UL (ref 1–4)
LYMPHOCYTES NFR BLD AUTO: 12.4 %
MCH RBC QN AUTO: 31.8 PG (ref 26–34)
MCHC RBC AUTO-ENTMCNC: 31.8 G/DL (ref 31–37)
MCV RBC AUTO: 100 FL
MONOCYTES # BLD AUTO: 0.63 X10(3) UL (ref 0.1–1)
MONOCYTES NFR BLD AUTO: 9.7 %
NEUTROPHILS # BLD AUTO: 4.9 X10 (3) UL (ref 1.5–7.7)
NEUTROPHILS # BLD AUTO: 4.9 X10(3) UL (ref 1.5–7.7)
NEUTROPHILS NFR BLD AUTO: 75.3 %
OSMOLALITY SERPL CALC.SUM OF ELEC: 307 MOSM/KG (ref 275–295)
PLATELET # BLD AUTO: 145 10(3)UL (ref 150–450)
POTASSIUM SERPL-SCNC: 4.2 MMOL/L (ref 3.5–5.1)
RBC # BLD AUTO: 4.03 X10(6)UL
SODIUM SERPL-SCNC: 135 MMOL/L (ref 136–145)
WBC # BLD AUTO: 6.5 X10(3) UL (ref 4–11)

## 2023-06-20 PROCEDURE — 5A1D70Z PERFORMANCE OF URINARY FILTRATION, INTERMITTENT, LESS THAN 6 HOURS PER DAY: ICD-10-PCS | Performed by: INTERNAL MEDICINE

## 2023-06-20 PROCEDURE — 99223 1ST HOSP IP/OBS HIGH 75: CPT | Performed by: OTHER

## 2023-06-20 PROCEDURE — 99233 SBSQ HOSP IP/OBS HIGH 50: CPT | Performed by: INTERNAL MEDICINE

## 2023-06-20 RX ORDER — ACETAMINOPHEN 325 MG/1
650 TABLET ORAL EVERY 4 HOURS PRN
Status: DISCONTINUED | OUTPATIENT
Start: 2023-06-20 | End: 2023-06-21

## 2023-06-20 RX ORDER — LEVOTHYROXINE SODIUM 0.12 MG/1
125 TABLET ORAL
COMMUNITY

## 2023-06-20 RX ORDER — METOCLOPRAMIDE HYDROCHLORIDE 5 MG/ML
5 INJECTION INTRAMUSCULAR; INTRAVENOUS EVERY 8 HOURS PRN
Status: DISCONTINUED | OUTPATIENT
Start: 2023-06-20 | End: 2023-06-20

## 2023-06-20 NOTE — CM/SW NOTE
06/20/23 1100   CM/SW Referral Data   Referral Source Social Work (self-referral)   Reason for Referral Readmission; Discharge planning   Informant EMR;Clinical Staff Member   Patient [address]   Patient lives with Spouse/Significant other   Patient Status Prior to Admission   Services in place prior to admission [address] Provider Info Sherry       Pt discussed in rounds this AM- mentation better today. Scheduled for HD. [facility] to [facility] (Phone: [phone]) current with [facility] for [place] [address]-- referral sent. Awaiting to send orders. Attempted to call pt wife, phone was busy. Will try again later today. SW to remain available and follow up with wife on dc plans. 12:10- SW entered resumption orders for RN, PT, OT, SW to [place].     TOOTIE Oconnor  Discharge 2011 [facility]

## 2023-06-20 NOTE — PROGRESS NOTES
06/20/23 1208   Clinical Encounter Type   Visited With Patient   Taxonomy   Intended Effects Promote a sense of peace   Methods Offer support   Interventions Acknowledge current situation    responded to consult request for spiritual care visit. Patient is currently having dialysis.  attempted to talk with patient. Patient did not appear to respond appropriately for spiritual care visit.  checked in with nurse Mae to letting her know an visit was attempted.  remains available for support. Spiritual Care support can be requested via an Epic consult. YOANA Yanes. Div  Extension:[phone]

## 2023-06-20 NOTE — PLAN OF CARE
Assumed care at 1900. A & O x 4, restless. RA.  NSR on tele, pt pulls tele often throughout night. IVFs infusing per order. Up x 1-2 with walker. BM  X 1 overnight. HD planned for AM.   Call light within reach. Safety precautions in place.

## 2023-06-20 NOTE — CONSULTS
[facility] Pharmacy Note:  Renal Dose Adjustment for Metoclopramide (REGLAN)    Clara Rueda has been prescribed Metoclopramide (REGLAN) 10 mg every 8 hours as needed for nausea/vomiting,. CrCl cannot be calculated (Unknown ideal weight.). Patient is on dialysis. Calculated creatinine clearance is < 40 ml/min, therefore, the dose of Metoclopramide (REGLAN) has been changed to 5 mg every 8 hours as needed for nausea/vomiting per P&T approved protocol. Pharmacy will continue to follow, and if renal function improves, will resume the original order.        Thank you,  Maria De Jesus Bahena, PharmD  6/20/2023 8:40 AM

## 2023-06-21 ENCOUNTER — APPOINTMENT (OUTPATIENT)
Dept: WOUND CARE | Age: 73
End: 2023-06-21
Attending: INTERNAL MEDICINE

## 2023-06-21 VITALS
RESPIRATION RATE: 16 BRPM | OXYGEN SATURATION: 95 % | HEART RATE: 60 BPM | DIASTOLIC BLOOD PRESSURE: 65 MMHG | BODY MASS INDEX: 29 KG/M2 | WEIGHT: 177 LBS | TEMPERATURE: 98 F | SYSTOLIC BLOOD PRESSURE: 115 MMHG

## 2023-06-21 LAB
GLUCOSE BLD-MCNC: 130 MG/DL (ref 70–99)
GLUCOSE BLD-MCNC: 159 MG/DL (ref 70–99)
GLUCOSE BLD-MCNC: 223 MG/DL (ref 70–99)
GLUCOSE BLD-MCNC: 45 MG/DL (ref 70–99)
GLUCOSE BLD-MCNC: 46 MG/DL (ref 70–99)
GLUCOSE BLD-MCNC: 49 MG/DL (ref 70–99)

## 2023-06-21 PROCEDURE — 99233 SBSQ HOSP IP/OBS HIGH 50: CPT | Performed by: INTERNAL MEDICINE

## 2023-06-21 NOTE — PLAN OF CARE
Assumed care at 1900. A & O x 4, restless overnight. RA.  NSR on tele, pt pulls tele often throughout night. Up x 1 with walker. Hypoglycemic this AM, hypoglycemia protocol followed, MD aware  Call light within reach. Safety precautions in place. Patient and wife updated on plan of care.

## 2023-06-21 NOTE — PLAN OF CARE
Assumed care at 0730  A&Ox4, VSS, up with 1 and walker  Dialysis complete today- 1L removed   PT/OT eval complete   Tylenol given for headache   Patient and family updated on POC   All questions answered   Call light within reach

## 2023-06-21 NOTE — PLAN OF CARE
Assumed care at 0730  A&Ox4, VSS, up 1 and walker   No complaints of pain   Medications, prescriptions and AVS reviewed with patient and family member- verbalized understanding   All questions answered     NURSING DISCHARGE NOTE    Discharged Home via Wheelchair. Accompanied by RN  Belongings Taken by patient/family.

## 2023-06-22 ENCOUNTER — PATIENT MESSAGE (OUTPATIENT)
Dept: NEPHROLOGY | Facility: CLINIC | Age: 73
End: 2023-06-22

## 2023-06-23 RX ORDER — QUETIAPINE FUMARATE 25 MG/1
25 TABLET, FILM COATED ORAL NIGHTLY
Qty: 30 TABLET | Refills: 11 | Status: SHIPPED | OUTPATIENT
Start: 2023-06-23

## 2023-06-28 ENCOUNTER — HOSPITAL ENCOUNTER (OUTPATIENT)
Dept: WOUND CARE | Age: 73
Discharge: STILL ADMITTED OR ADMINISTRATIVE | End: 2023-06-28
Attending: INTERNAL MEDICINE

## 2023-06-28 VITALS — TEMPERATURE: 95.9 F

## 2023-06-28 DIAGNOSIS — E08.621 DIABETIC ULCER OF TOE OF RIGHT FOOT ASSOCIATED WITH DIABETES MELLITUS DUE TO UNDERLYING CONDITION, LIMITED TO BREAKDOWN OF SKIN (CMD): ICD-10-CM

## 2023-06-28 DIAGNOSIS — L97.529 DIABETIC ULCER OF LEFT GREAT TOE (CMD): ICD-10-CM

## 2023-06-28 DIAGNOSIS — E08.621 DIABETIC ULCER OF TOE OF LEFT FOOT ASSOCIATED WITH DIABETES MELLITUS DUE TO UNDERLYING CONDITION, LIMITED TO BREAKDOWN OF SKIN (CMD): ICD-10-CM

## 2023-06-28 DIAGNOSIS — L97.511 ULCER OF RIGHT FOOT, LIMITED TO BREAKDOWN OF SKIN (CMD): ICD-10-CM

## 2023-06-28 DIAGNOSIS — L97.511 DIABETIC ULCER OF TOE OF RIGHT FOOT ASSOCIATED WITH DIABETES MELLITUS DUE TO UNDERLYING CONDITION, LIMITED TO BREAKDOWN OF SKIN (CMD): ICD-10-CM

## 2023-06-28 DIAGNOSIS — L97.519 DIABETIC ULCER OF RIGHT GREAT TOE (CMD): ICD-10-CM

## 2023-06-28 DIAGNOSIS — I73.9 PAD (PERIPHERAL ARTERY DISEASE) (CMD): Primary | ICD-10-CM

## 2023-06-28 DIAGNOSIS — E11.621 DIABETIC ULCER OF RIGHT GREAT TOE (CMD): ICD-10-CM

## 2023-06-28 DIAGNOSIS — E11.621 DIABETIC ULCER OF LEFT GREAT TOE (CMD): ICD-10-CM

## 2023-06-28 DIAGNOSIS — E11.42 DM TYPE 2 WITH DIABETIC PERIPHERAL NEUROPATHY (CMD): ICD-10-CM

## 2023-06-28 DIAGNOSIS — L97.521 DIABETIC ULCER OF TOE OF LEFT FOOT ASSOCIATED WITH DIABETES MELLITUS DUE TO UNDERLYING CONDITION, LIMITED TO BREAKDOWN OF SKIN (CMD): ICD-10-CM

## 2023-06-28 PROCEDURE — 11042 DBRDMT SUBQ TIS 1ST 20SQCM/<: CPT

## 2023-07-12 ENCOUNTER — APPOINTMENT (OUTPATIENT)
Dept: WOUND CARE | Age: 73
End: 2023-07-12
Attending: INTERNAL MEDICINE

## 2023-07-17 ENCOUNTER — APPOINTMENT (OUTPATIENT)
Dept: ULTRASOUND IMAGING | Age: 73
End: 2023-07-17
Attending: INTERNAL MEDICINE

## 2023-07-19 ENCOUNTER — APPOINTMENT (OUTPATIENT)
Dept: WOUND CARE | Age: 73
End: 2023-07-19
Attending: INTERNAL MEDICINE

## 2023-07-27 ENCOUNTER — HOSPITAL ENCOUNTER (EMERGENCY)
Age: 73
Discharge: HOME | End: 2023-07-27
Attending: EMERGENCY MEDICINE

## 2023-07-27 ENCOUNTER — APPOINTMENT (OUTPATIENT)
Dept: CT IMAGING | Age: 73
End: 2023-07-27
Attending: EMERGENCY MEDICINE

## 2023-07-27 VITALS
TEMPERATURE: 98.7 F | SYSTOLIC BLOOD PRESSURE: 132 MMHG | WEIGHT: 179.45 LBS | RESPIRATION RATE: 18 BRPM | DIASTOLIC BLOOD PRESSURE: 76 MMHG | HEIGHT: 66 IN | OXYGEN SATURATION: 100 % | BODY MASS INDEX: 28.84 KG/M2 | HEART RATE: 77 BPM
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
## 2023-07-27 LAB
ALBUMIN SERPL-MCNC: 2.8 G/DL (ref 3.6–5.1)
ALBUMIN/GLOB SERPL: 0.7 {RATIO} (ref 1–2.4)
ALP SERPL-CCNC: 177 UNITS/L (ref 45–117)
ALT SERPL-CCNC: 38 UNITS/L
ANION GAP SERPL CALC-SCNC: 10 MMOL/L (ref 7–19)
AST SERPL-CCNC: 46 UNITS/L
BASOPHILS # BLD: 0.1 K/MCL (ref 0–0.3)
BASOPHILS NFR BLD: 1 %
BILIRUB SERPL-MCNC: 0.7 MG/DL (ref 0.2–1)
BUN SERPL-MCNC: 37 MG/DL (ref 6–20)
BUN/CREAT SERPL: 11 (ref 7–25)
CALCIUM SERPL-MCNC: 9 MG/DL (ref 8.4–10.2)
CHLORIDE SERPL-SCNC: 103 MMOL/L (ref 97–110)
CO2 SERPL-SCNC: 25 MMOL/L (ref 21–32)
CREAT SERPL-MCNC: 3.35 MG/DL (ref 0.67–1.17)
DEPRECATED RDW RBC: 75.7 FL (ref 39–50)
EOSINOPHIL # BLD: 0.1 K/MCL (ref 0–0.5)
EOSINOPHIL NFR BLD: 1 %
ERYTHROCYTE [DISTWIDTH] IN BLOOD: 20.4 % (ref 11–15)
FASTING DURATION TIME PATIENT: ABNORMAL H
GFR SERPLBLD BASED ON 1.73 SQ M-ARVRAT: 19 ML/MIN
GLOBULIN SER-MCNC: 4.2 G/DL (ref 2–4)
GLUCOSE SERPL-MCNC: 240 MG/DL (ref 70–99)
HCT VFR BLD CALC: 35.3 % (ref 39–51)
HGB BLD-MCNC: 11.3 G/DL (ref 13–17)
IMM GRANULOCYTES # BLD AUTO: 0.1 K/MCL (ref 0–0.2)
IMM GRANULOCYTES # BLD: 1 %
LYMPHOCYTES # BLD: 1 K/MCL (ref 1–4)
LYMPHOCYTES NFR BLD: 12 %
MCH RBC QN AUTO: 32.4 PG (ref 26–34)
MCHC RBC AUTO-ENTMCNC: 32 G/DL (ref 32–36.5)
MCV RBC AUTO: 101.1 FL (ref 78–100)
MONOCYTES # BLD: 0.6 K/MCL (ref 0.3–0.9)
MONOCYTES NFR BLD: 8 %
NEUTROPHILS # BLD: 6.4 K/MCL (ref 1.8–7.7)
NEUTROPHILS NFR BLD: 77 %
NRBC BLD MANUAL-RTO: 0 /100 WBC
PLATELET # BLD AUTO: 202 K/MCL (ref 140–450)
POTASSIUM SERPL-SCNC: 4.4 MMOL/L (ref 3.4–5.1)
PROT SERPL-MCNC: 7 G/DL (ref 6.4–8.2)
RAINBOW EXTRA TUBES HOLD SPECIMEN: NORMAL
RAINBOW EXTRA TUBES HOLD SPECIMEN: NORMAL
RBC # BLD: 3.49 MIL/MCL (ref 4.5–5.9)
SODIUM SERPL-SCNC: 134 MMOL/L (ref 135–145)
WBC # BLD: 8.2 K/MCL (ref 4.2–11)

## 2023-07-27 PROCEDURE — 99284 EMERGENCY DEPT VISIT MOD MDM: CPT

## 2023-07-27 PROCEDURE — 70450 CT HEAD/BRAIN W/O DYE: CPT

## 2023-07-27 PROCEDURE — G1004 CDSM NDSC: HCPCS

## 2023-07-27 PROCEDURE — 80053 COMPREHEN METABOLIC PANEL: CPT | Performed by: EMERGENCY MEDICINE

## 2023-07-27 PROCEDURE — 85025 COMPLETE CBC W/AUTO DIFF WBC: CPT | Performed by: EMERGENCY MEDICINE

## 2023-07-27 PROCEDURE — 36415 COLL VENOUS BLD VENIPUNCTURE: CPT

## 2023-07-28 ENCOUNTER — HOSPITAL ENCOUNTER (EMERGENCY)
Age: 73
Discharge: NURSING, REHAB OR LONG-TERM CARE | End: 2023-07-28
Attending: EMERGENCY MEDICINE

## 2023-07-28 VITALS
TEMPERATURE: 98.7 F | HEART RATE: 81 BPM | WEIGHT: 193.12 LBS | BODY MASS INDEX: 31.17 KG/M2 | OXYGEN SATURATION: 100 % | DIASTOLIC BLOOD PRESSURE: 72 MMHG | SYSTOLIC BLOOD PRESSURE: 141 MMHG | RESPIRATION RATE: 18 BRPM

## 2023-07-28 DIAGNOSIS — E16.2 HYPOGLYCEMIA: Primary | ICD-10-CM

## 2023-07-28 LAB
GLUCOSE BLDC GLUCOMTR-MCNC: 133 MG/DL (ref 70–99)
GLUCOSE BLDC GLUCOMTR-MCNC: 228 MG/DL (ref 70–99)
GLUCOSE BLDC GLUCOMTR-MCNC: 91 MG/DL (ref 70–99)
RAINBOW EXTRA TUBES HOLD SPECIMEN: NORMAL

## 2023-07-28 PROCEDURE — 82962 GLUCOSE BLOOD TEST: CPT

## 2023-07-28 PROCEDURE — 99283 EMERGENCY DEPT VISIT LOW MDM: CPT

## 2023-07-28 ASSESSMENT — PAIN SCALES - GENERAL: PAINLEVEL_OUTOF10: 0

## 2023-08-23 ENCOUNTER — OFFICE VISIT (OUTPATIENT)
Dept: NEPHROLOGY | Facility: CLINIC | Age: 73
End: 2023-08-23
Payer: MEDICARE

## 2023-08-23 ENCOUNTER — HOSPITAL ENCOUNTER (OUTPATIENT)
Dept: WOUND CARE | Age: 73
Discharge: STILL ADMITTED OR ADMINISTRATIVE | End: 2023-08-23
Attending: INTERNAL MEDICINE

## 2023-08-23 VITALS — WEIGHT: 172.13 LBS | DIASTOLIC BLOOD PRESSURE: 72 MMHG | SYSTOLIC BLOOD PRESSURE: 164 MMHG | BODY MASS INDEX: 28 KG/M2

## 2023-08-23 VITALS — TEMPERATURE: 98.1 F

## 2023-08-23 DIAGNOSIS — I10 PRIMARY HYPERTENSION: ICD-10-CM

## 2023-08-23 DIAGNOSIS — N18.6 ESRD (END STAGE RENAL DISEASE) (HCC): Primary | ICD-10-CM

## 2023-08-23 DIAGNOSIS — L97.511 ULCER OF RIGHT FOOT, LIMITED TO BREAKDOWN OF SKIN (CMD): ICD-10-CM

## 2023-08-23 DIAGNOSIS — L97.519 DIABETIC ULCER OF RIGHT GREAT TOE (CMD): ICD-10-CM

## 2023-08-23 DIAGNOSIS — L97.511 DIABETIC ULCER OF TOE OF RIGHT FOOT ASSOCIATED WITH DIABETES MELLITUS DUE TO UNDERLYING CONDITION, LIMITED TO BREAKDOWN OF SKIN (CMD): ICD-10-CM

## 2023-08-23 DIAGNOSIS — E08.621 DIABETIC ULCER OF TOE OF RIGHT FOOT ASSOCIATED WITH DIABETES MELLITUS DUE TO UNDERLYING CONDITION, LIMITED TO BREAKDOWN OF SKIN (CMD): ICD-10-CM

## 2023-08-23 DIAGNOSIS — L97.521 DIABETIC ULCER OF TOE OF LEFT FOOT ASSOCIATED WITH DIABETES MELLITUS DUE TO UNDERLYING CONDITION, LIMITED TO BREAKDOWN OF SKIN (CMD): ICD-10-CM

## 2023-08-23 DIAGNOSIS — I73.9 PAD (PERIPHERAL ARTERY DISEASE) (CMD): Primary | ICD-10-CM

## 2023-08-23 DIAGNOSIS — E11.621 DIABETIC ULCER OF RIGHT GREAT TOE (CMD): ICD-10-CM

## 2023-08-23 DIAGNOSIS — E11.621 DIABETIC ULCER OF LEFT GREAT TOE (CMD): ICD-10-CM

## 2023-08-23 DIAGNOSIS — L97.529 DIABETIC ULCER OF LEFT GREAT TOE (CMD): ICD-10-CM

## 2023-08-23 DIAGNOSIS — E11.42 DM TYPE 2 WITH DIABETIC PERIPHERAL NEUROPATHY (CMD): ICD-10-CM

## 2023-08-23 DIAGNOSIS — E08.621 DIABETIC ULCER OF TOE OF LEFT FOOT ASSOCIATED WITH DIABETES MELLITUS DUE TO UNDERLYING CONDITION, LIMITED TO BREAKDOWN OF SKIN (CMD): ICD-10-CM

## 2023-08-23 PROCEDURE — 99214 OFFICE O/P EST MOD 30 MIN: CPT | Performed by: INTERNAL MEDICINE

## 2023-08-23 PROCEDURE — 11042 DBRDMT SUBQ TIS 1ST 20SQCM/<: CPT

## 2023-08-23 PROCEDURE — 11045 DBRDMT SUBQ TISS EACH ADDL: CPT

## 2023-08-23 RX ORDER — MIRTAZAPINE 7.5 MG/1
7.5 TABLET, FILM COATED ORAL 2 TIMES DAILY
COMMUNITY

## 2023-08-23 ASSESSMENT — PAIN SCALES - GENERAL: PAINLEVEL_OUTOF10: 0

## 2023-08-23 NOTE — PROGRESS NOTES
Nephrology Progress Note      ASSESSMENT/PLAN:      1) ESRD- due to type 1 DM > 60 yrs with gradual progression recently reaching ESRD and beginning dialysis last month twice a week at [address] renal in [place]; is generally tolerating well but has had a couple of episodes of hypotension feeling poorly after dialysis likely due to excess ultrafiltration. Labs are otherwise excellent. PLAN- d/w pt and wife at length. 2) HTN- longstanding refractory HTN; BP's recently lower after starting dialysis as expected. BP recently lower- isosorbide / nifedipine dc'ed while hospitalized at     3) Anemia- due to CKD; continue EPO with HD. 4) DM 1- BS recently higher- A1C 7.2 -> 9.4; to see endo     5) Pruritus- did not tolerate gabapentin; no improvement with alternative (Nipro) dialyzer    6) Anxiety- intolerant of SSRIs, etc.    7) ? Movement disorder ?- to see Dr. Damaris Reyes 9/13       HPI:   Aisha Rolle is a 67year old male who presents for follow-up of Patient presents with:  Dialysis  Hypertension      Presents for follow-up of chronic kidney disease; is doing well without complaints over the past year without hospitalizations, major illnesses or procedures. Has experienced increasing fatigue and takes naps daily; chief complaint though is of chronic back pain due to spinal stenosis and has undergone 3 previous surgical procedures as well as multiple injections. Was told that he is no longer a surgical candidate and to follow-up with his pain specialist.  No other changes in medications other than altering his gabapentin. HISTORY:  Past Medical History:   Diagnosis Date    ANXIETY     BACK PAIN     Back problem     Blastomycosis 1984    BPH (benign prostatic hyperplasia)     DEPRESSION     Depression     DIABETES dx at age 14    Type  1 DM    Diabetic retinopathy     laser surgery    Disorder of kidney and ureter     Kidneys shut down but started working again. No dialysis.  Stage 3     Esophageal reflux     occassion    Hearing impairment     hearing aids    HEMORRHOIDS     HIGH BLOOD PRESSURE     High cholesterol     History of blood transfusion 2013    Hospitalization or health care facility admission within last 6 months     this was in 2013    [address]     Kidney disease     ARF-2013    Liver disease     2013 - pt states all resolved after liver and kidneys shut down after issue with pneumonia     Neuropathy     OSTEOARTHRITIS     Osteoarthrosis, unspecified whether generalized or localized, unspecified site     OSTEOPOROSIS     Other and unspecified hyperlipidemia     OTHER DISEASES     in 2013-pt had pneumonia, sepsis, dialysis, peg tube, vent, now resolved.     Polyneuropathy in diabetes(357.2)     SLEEP APNEA     Uses his CPAP machine consistently    Sleep apnea, obstructive SPLIT NIGHT 5-12-17    AHI 54 SaO2 lizeth 74 % CPAP 12 HME//     Type I (juvenile type) diabetes mellitus without mention of complication, not stated as uncontrolled     since [address] follows    Unspecified disorder of thyroid     Unspecified essential hypertension     Unspecified sleep apnea     Visual impairment     wears glasses      Past Surgical History:   Procedure Laterality Date    BACK SURGERY  2009    laminectomy  L1 -4  2/09  Dr. Mignon Andrews  8-8-13    C4-C6 ACDF     BACK SURGERY  9/8/16    L2-S1 Revision Decomp possible uninstrumented fusion 9/8/16    BACK SURGERY  09/10/2018    Rev C3-C7 ACDF     CATARACT Bilateral done in 2004    IOL's    COLONOSCOPY      2006    COLONOSCOPY  2/2009    normal    COLONOSCOPY N/A 8/23/2019    adenoma- repeat 1 yr (2 dya prep)    COLONOSCOPY,BIOPSY  2/20/09    Performed by Rony Morgan at [address] [place] [address]    EGD N/A 7/23/2021    EGD & COLONOSCOPY Surgeon: [place] [facility], +vik, rpt EGD 3 months, poor prep rpt colon 3 years    ENDOSCOPY, BOWEL POUCH, BIOPSY      INJECTION, W/WO CONTRAST, DX/THERAPEUTIC SUBSTANCE, EPIDURAL/SUBARACHNOID; LUMBAR/SACRAL N/A 5/4/2016    Procedure: LUMBAR EPIDURAL;  Surgeon: Stephany Cuevas MD;  Location: [facility]    INJECTION, W/WO CONTRAST, DX/THERAPEUTIC SUBSTANCE, EPIDURAL/SUBARACHNOID; LUMBAR/SACRAL N/A 5/25/2016    Procedure: LUMBAR EPIDURAL;  Surgeon: Stephany Cuevas MD;  Location: [facility]    INJECTION, W/WO CONTRAST, DX/THERAPEUTIC SUBSTANCE, EPIDURAL/SUBARACHNOID; LUMBAR/SACRAL N/A 6/8/2016    Procedure: LUMBAR EPIDURAL;  Surgeon: Stephany Cuevas MD;  Location: [address] [place] [address] Road REPLACEMENT SURGERY  2/14/13    Left TKR by Dr. Angela Abdalla [place]  [place] [address] [address]  N SVC 5+ YR N/A 5/4/2016    Procedure: LUMBAR EPIDURAL;  Surgeon: Stephany Cuevas MD;  Location: [facility] [place]  [place] [address] [address]  N SVC 5+ YR N/A 5/25/2016    Procedure: LUMBAR EPIDURAL;  Surgeon: Stephany Cuevas MD;  Location: [facility] [place]  [place] [address] [address]  N SVC 5+ YR N/A 6/8/2016    Procedure: LUMBAR EPIDURAL;  Surgeon: Stephany Cuevas MD;  Location: [facility]    OTHER SURGICAL HISTORY      Bilateral median nerve release at elbow; 2000    OTHER SURGICAL HISTORY      CTS release bilateral 2000    OTHER SURGICAL HISTORY      Surgical repair fracture left hand 5th digit    OTHER SURGICAL HISTORY      Surgery left heel ligament repair    OTHER SURGICAL HISTORY      Laser surgery for bilateral retinopathy    OTHER SURGICAL HISTORY      Surgery to left eye for \"weak muscle. \" 2007    OTHER SURGICAL HISTORY      bilat knee repair 9/23/10    OTHER SURGICAL HISTORY  11/2013    lung resection to remove abcess    OTHER SURGICAL HISTORY  10/26/2020    Cystoscopy (Dr. Vincent Da Silva)    [place] 2 OF THE FOLLOWING EVENTS  2/14/2014    Procedure: ;  Surgeon: Margarita Siddiqui MD;  Location: [place] [address] EXPERIENCED ANY OF THE FOLLOWING EVENTS N/A 5/4/2016    Procedure: LUMBAR EPIDURAL;  Surgeon: Brigida Barrett MD;  Location: [address] [place] [address]    PATIENT DOCUMENTED NOT TO HAVE EXPERIENCED ANY OF THE FOLLOWING EVENTS N/A 5/25/2016    Procedure: LUMBAR EPIDURAL;  Surgeon: Brigida Barrett MD;  Location: [facility]    PATIENT DOCUMENTED NOT TO HAVE EXPERIENCED ANY OF THE FOLLOWING EVENTS N/A 6/8/2016    Procedure: LUMBAR EPIDURAL;  Surgeon: Brigida Barrett MD;  Location: [facility]    PATIENT [address] [place] [address] FOR IV ANTIBIOTIC SURGICAL SITE INFECTION PROPHYLAXIS. 2/14/2014    Procedure: ;  Surgeon: Lizzie Rodas MD;  Location: [address] [place] [address]    PATIENT [facility] IV ANTIBIOTIC SURGICAL SITE INFECTION PROPHYLAXIS. N/A 5/4/2016    Procedure: LUMBAR EPIDURAL;  Surgeon: Brigida Barrett MD;  Location: [facility]    PATIENT [address] [place] [address] FOR IV ANTIBIOTIC SURGICAL SITE INFECTION PROPHYLAXIS. N/A 5/25/2016    Procedure: LUMBAR EPIDURAL;  Surgeon: Brigida Barrett MD;  Location: [facility]    PATIENT [address] [place] [address] FOR IV ANTIBIOTIC SURGICAL SITE INFECTION PROPHYLAXIS. N/A 6/8/2016    Procedure: LUMBAR EPIDURAL;  Surgeon: Brigida Barrett MD;  Location: [address] REPLACEMENT Left 2013    UPPER GI ENDOSCOPY,DIAGNOSIS  4/14/16    retained gastric contents; [place]    UPPER GI ENDOSCOPY,REMOV F.B.  N/A 2/14/2014    Procedure: ESOPHAGOGASTRODUODENOSCOPY, POSSIBLE BIOPSY, POSSIBLE POLYPECTOMY [address];  Surgeon: Lizzie Rodas MD;  Location: [address] [place] [address]      Family History   Problem Relation Age of Onset    Hypertension Father     Lipids Father     Cancer Father     Hypertension Mother     Psychiatric Maternal Grandmother     Diabetes Paternal Grandfather         Type 2 DM    Heart Disorder Brother         CAD with MI at age 48 - passed away    Obesity Brother     Diabetes Brother         Type 2 DM    Hypertension Brother     Lipids Brother     Thyroid Disorder Neg       Social History:   Social History     Socioeconomic History    Marital status:     Number of children: 1   Occupational History    Occupation: Retired--Printer   Tobacco Use    Smoking status: Former     Packs/day: 3.50     Years: 20.00     Pack years: 70.00     Types: Cigarettes     Quit date: 1986     Years since quittin.6    Smokeless tobacco: Never    Tobacco comments:     Quit 25 years ago   Vaping Use    Vaping Use: Never used   Substance and Sexual Activity    Alcohol use: Not Currently     Comment: very rarely    Drug use: No    Sexual activity: Yes     Partners: Female   Other Topics Concern     Service No    Blood Transfusions No    Caffeine Concern Yes     Comment: coffee 2 cups per day    Sleep Concern No    Exercise Yes     Comment: walking    Seat Belt Yes        Medications (Active prior to today's visit):  Current Outpatient Medications   Medication Sig Dispense Refill    renal vitamin Oral Tab Take 1 tablet by mouth daily. 90 tablet 1    QUEtiapine 25 MG Oral Tab Take 1 tablet (25 mg total) by mouth nightly. 30 tablet 11    levothyroxine 125 MCG Oral Tab Take 1 tablet (125 mcg total) by mouth before breakfast.      ULTICARE MINI PEN NEEDLES 31G X 6 MM Does not apply Misc 1 each daily. SURE COMFORT INSULIN SYRINGE 29G X 1/2\" 0.3 ML Does not apply Misc Use with meals in case of pump failure. 3 times daily      lactulose 10 GM/15ML Oral Solution Take 30 mL (20 g total) by mouth daily. lidocaine-prilocaine 2.5-2.5 % External Cream Apply topically. PREVIDENT 5000 DRY MOUTH 1.1 % Dental Gel APPLY TWO TO THREE TIMES DAILY AS NEEDED      Insulin Glargine, 1 Unit Dial, (TOUJEO SOLOSTAR) 300 UNIT/ML Subcutaneous Solution Pen-injector Inject 9 Units into the skin daily.  6 each 0    Insulin Lispro, 1 Unit Dial, (HUMALOG KWIKPEN) 100 UNIT/ML Subcutaneous Solution Pen-injector Test blood glucose 3 times daily before meals  Inject 1 unit of insulin for every 75 points blood glucose is greater than 140 mg/dL Inject insulin into the skin prior to meals Inject 1 unit of insulin for every 15 grams of carbohydrates eaten  for breakfast and lunch, inject 1 unitof insulin for ever 12 grams of carbs for dinner. Inject within 10 minutes before or after a meal, 8 mL 0    losartan 25 MG Oral Tab Take 1 tablet (25 mg total) by mouth 2 (two) times daily. NIFEdipine ER 60 MG Oral Tablet 24 Hr Take 1 tablet (60 mg total) by mouth daily. Sevelamer 800 MG Oral Tab Take 1 tablet (800 mg total) by mouth 3 (three) times daily before meals. and 1 before snacks      Vitamin D, Cholecalciferol, 10 MCG (400 UNIT) Oral Cap Take 10 mcg by mouth daily. Patient spouse states 2000 units - will clarify      vitamin E 1000 UNITS Oral Cap Take 1 capsule (1,000 Units total) by mouth daily. furosemide 40 MG Oral Tab Take 1 tablet (40 mg total) by mouth daily. 90 tablet 1    famotidine 20 MG Oral Tab Take 1 tablet (20 mg total) by mouth 2 (two) times daily. Glucose Blood (CONTOUR NEXT TEST) In Vitro Strip Check BG 3 times daily, Type1 diabetes,insulin dependent on insulin pump. Patient needs to calibrate CGM and verify BG if <70 300 strip 0    ISOSORBIDE MONONITRATE ER 30 MG Oral Tablet 24 Hr Take 1 tablet (30 mg total) by mouth daily. 90 tablet 2    GLUCAGON EMERGENCY 1 MG Injection Kit Inject 1 mg into the skin once as needed. 1 kit 1    METOPROLOL TARTRATE 25 MG Oral Tab TAKE ONE TABLET BY MOUTH TWICE PER  tablet 3    EZETIMIBE 10 MG Oral Tab TAKE ONE TABLET BY MOUTH ONE TIME DAILY 90 tablet 3    omega-3 fatty acids 1000 MG Oral Cap Take 1,000 mg by mouth daily.          Allergies:    Adhesive Tape           ITCHING  Depakote [Valproic *    DIZZINESS  Wellbutrin [Bupropi*    RASH  Antihistamine & Evens*    OTHER (SEE COMMENTS) Comment:Altered mental status  Fluconazole             OTHER (SEE COMMENTS)    Comment:Kidney labs abnormal  Latex                   RASH    ROS:     Denies fever/chills  Denies wt loss/gain  Denies HA or visual changes  Denies CP or palpitations  Denies SOB/cough/hemoptysis  Denies abd or flank pain  Denies N/V/D  Denies change in urinary habits or gross hematuria  Denies LE edema  Denies skin rashes/myalgias/arthralgias      PHYSICAL EXAM:   There were no vitals taken for this visit. Wt Readings from Last 3 Encounters:  06/20/23 : 177 lb (80.3 kg)  06/01/23 : 177 lb 14.6 oz (80.7 kg)  05/08/23 : 181 lb (82.1 kg)    General: Alert and oriented in no apparent distress. HEENT: No scleral icterus, MMM  Neck: Supple, no KALPANA or thyromegaly  Cardiac: Regular rate and rhythm, S1, S2 normal, no murmur or rub  Lungs: Clear without wheezes, rales, rhonchi. Abdomen: Soft, non-tender. + bowel sounds, no palpable organomegaly  Extremities: Without clubbing, cyanosis or edema.   Neurologic: Alert and oriented, normal affect, cranial nerves grossly intact, moving all extremities  Skin: Warm and dry, no rashes      Rojelio Baird MD  8/23/2023  1140 AM

## 2023-09-06 ENCOUNTER — HOSPITAL ENCOUNTER (OUTPATIENT)
Dept: WOUND CARE | Age: 73
Discharge: STILL ADMITTED OR ADMINISTRATIVE | End: 2023-09-06
Attending: INTERNAL MEDICINE

## 2023-09-06 VITALS — TEMPERATURE: 96.6 F

## 2023-09-06 DIAGNOSIS — L97.521 DIABETIC ULCER OF TOE OF LEFT FOOT ASSOCIATED WITH DIABETES MELLITUS DUE TO UNDERLYING CONDITION, LIMITED TO BREAKDOWN OF SKIN (CMD): ICD-10-CM

## 2023-09-06 DIAGNOSIS — E11.621 DIABETIC ULCER OF LEFT GREAT TOE (CMD): ICD-10-CM

## 2023-09-06 DIAGNOSIS — L97.511 ULCER OF RIGHT FOOT, LIMITED TO BREAKDOWN OF SKIN (CMD): ICD-10-CM

## 2023-09-06 DIAGNOSIS — E08.621 DIABETIC ULCER OF TOE OF LEFT FOOT ASSOCIATED WITH DIABETES MELLITUS DUE TO UNDERLYING CONDITION, LIMITED TO BREAKDOWN OF SKIN (CMD): ICD-10-CM

## 2023-09-06 DIAGNOSIS — E11.621 DIABETIC ULCER OF RIGHT GREAT TOE (CMD): ICD-10-CM

## 2023-09-06 DIAGNOSIS — I73.9 PAD (PERIPHERAL ARTERY DISEASE) (CMD): Primary | ICD-10-CM

## 2023-09-06 DIAGNOSIS — L97.519 DIABETIC ULCER OF RIGHT GREAT TOE (CMD): ICD-10-CM

## 2023-09-06 DIAGNOSIS — L97.529 DIABETIC ULCER OF LEFT GREAT TOE (CMD): ICD-10-CM

## 2023-09-06 PROCEDURE — 11042 DBRDMT SUBQ TIS 1ST 20SQCM/<: CPT

## 2023-09-06 PROCEDURE — 11045 DBRDMT SUBQ TISS EACH ADDL: CPT

## 2023-09-06 ASSESSMENT — PAIN SCALES - GENERAL: PAINLEVEL_OUTOF10: 0

## 2023-09-11 ENCOUNTER — HOSPITAL ENCOUNTER (OUTPATIENT)
Dept: ULTRASOUND IMAGING | Age: 73
Discharge: HOME | End: 2023-09-11
Attending: INTERNAL MEDICINE

## 2023-09-11 DIAGNOSIS — I73.9 PAD (PERIPHERAL ARTERY DISEASE) (CMD): ICD-10-CM

## 2023-09-11 PROCEDURE — 93925 LOWER EXTREMITY STUDY: CPT

## 2023-09-12 NOTE — PROGRESS NOTES
[facility] Neurology Outpatient Progress Note  Date of service: 9/14/2021    Patient here for a follow-up visit for speech difficulties, dropping items and jerking in arms, his memory is poor. Wife is in the room with pt.   Pt states arms jerking most bothersome, (100 mcg total) by mouth every morning before breakfast., Disp: 90 tablet, Rfl: 1  •  FAMOTIDINE 20 MG Oral Tab, TAKE ONE TABLET BY MOUTH TWICE PER DAY , Disp: 180 tablet, Rfl: 0  •  hydrALAZINE HCl 100 MG Oral Tab, Take 0.5 tablets (50 mg total) by mouth 13    Type  1 DM   • Diabetic retinopathy     laser surgery   • Esophageal reflux     occassion   • Hearing impairment     hearing aids   • HEMORRHOIDS    • HIGH BLOOD PRESSURE    • History of blood transfusion 2013   • Hospitalization or health care facil Statement Selected rpt colon 3 years   • INJECTION, W/WO CONTRAST, DX/THERAPEUTIC SUBSTANCE, EPIDURAL/SUBARACHNOID; LUMBAR/SACRAL N/A 5/4/2016    Procedure: LUMBAR EPIDURAL;  Surgeon: Caitlyn Irving MD;  Location: [address] [place] [address]   • INJECTION, W/WO CONTRAST, [address] [place] [address]   • PATIENT DOCUMENTED NOT TO HAVE EXPERIENCED ANY OF THE FOLLOWING EVENTS N/A 5/4/2016    Procedure: LUMBAR EPIDURAL;  Surgeon: Bharti Lugo MD;  Location: [address] [place] [address]   • PATIENT DOCUMENTED NOT TO HAVE EXPER Years since quittin.7      Smokeless tobacco: Never Used      Tobacco comment: Quit 25 years ago    Alcohol use: Yes      Comment: will have 1 serving per month    Family History   Problem Relation Age of Onset   • Hypertension Father    • Lipids [place] spent counseling patient regarding all studies' results, assessment, treatment options and care plan.     Sergio Reese MD  Neurology  [facility]  9/14/2021, 2:31 PM  CC: Aida Gilbert MD

## 2023-09-13 ENCOUNTER — OFFICE VISIT (OUTPATIENT)
Dept: NEUROLOGY | Facility: CLINIC | Age: 73
End: 2023-09-13
Payer: MEDICARE

## 2023-09-13 VITALS — RESPIRATION RATE: 16 BRPM | DIASTOLIC BLOOD PRESSURE: 68 MMHG | SYSTOLIC BLOOD PRESSURE: 110 MMHG | HEART RATE: 70 BPM

## 2023-09-13 DIAGNOSIS — E03.8 OTHER SPECIFIED HYPOTHYROIDISM: ICD-10-CM

## 2023-09-13 DIAGNOSIS — G25.71 AKATHISIA: Primary | ICD-10-CM

## 2023-09-13 DIAGNOSIS — Z99.2 ESRD ON HEMODIALYSIS (HCC): ICD-10-CM

## 2023-09-13 DIAGNOSIS — K74.69 OTHER CIRRHOSIS OF LIVER (HCC): ICD-10-CM

## 2023-09-13 DIAGNOSIS — N18.6 ESRD ON HEMODIALYSIS (HCC): ICD-10-CM

## 2023-09-13 PROCEDURE — 99215 OFFICE O/P EST HI 40 MIN: CPT | Performed by: OTHER

## 2023-09-13 RX ORDER — MIRTAZAPINE 7.5 MG/1
TABLET, FILM COATED ORAL
Qty: 30 TABLET | Refills: 1 | Status: SHIPPED | OUTPATIENT
Start: 2023-09-13

## 2023-09-13 RX ORDER — LOSARTAN POTASSIUM 25 MG/1
TABLET ORAL 2 TIMES DAILY
COMMUNITY

## 2023-09-13 RX ORDER — LEVOTHYROXINE SODIUM 0.15 MG/1
150 TABLET ORAL DAILY
COMMUNITY
Start: 2023-09-06

## 2023-09-13 RX ORDER — ASPIRIN 81 MG/1
81 TABLET ORAL DAILY
COMMUNITY

## 2023-09-13 RX ORDER — INSULIN LISPRO 100 [IU]/ML
INJECTION, SOLUTION INTRAVENOUS; SUBCUTANEOUS
COMMUNITY
Start: 2023-09-05

## 2023-09-13 RX ORDER — CEPHALEXIN 500 MG/1
500 CAPSULE ORAL 2 TIMES DAILY
COMMUNITY
Start: 2023-09-06

## 2023-09-13 RX ORDER — HYDROCODONE BITARTRATE AND ACETAMINOPHEN 5; 325 MG/1; MG/1
1 TABLET ORAL AS NEEDED
COMMUNITY
Start: 2023-08-28

## 2023-09-20 ENCOUNTER — HOSPITAL ENCOUNTER (OUTPATIENT)
Dept: WOUND CARE | Age: 73
Discharge: STILL ADMITTED OR ADMINISTRATIVE | End: 2023-09-20
Attending: INTERNAL MEDICINE

## 2023-09-20 VITALS — TEMPERATURE: 97 F

## 2023-09-20 DIAGNOSIS — L97.529 DIABETIC ULCER OF LEFT GREAT TOE (CMD): ICD-10-CM

## 2023-09-20 DIAGNOSIS — I73.9 PAD (PERIPHERAL ARTERY DISEASE) (CMD): Primary | ICD-10-CM

## 2023-09-20 DIAGNOSIS — E11.621 DIABETIC ULCER OF LEFT GREAT TOE (CMD): ICD-10-CM

## 2023-09-20 DIAGNOSIS — L97.511 ULCER OF RIGHT FOOT, LIMITED TO BREAKDOWN OF SKIN (CMD): ICD-10-CM

## 2023-09-20 PROCEDURE — 11045 DBRDMT SUBQ TISS EACH ADDL: CPT

## 2023-09-20 PROCEDURE — 11042 DBRDMT SUBQ TIS 1ST 20SQCM/<: CPT

## 2023-09-20 ASSESSMENT — PAIN SCALES - GENERAL: PAINLEVEL_OUTOF10: 0

## 2023-10-04 ENCOUNTER — HOSPITAL ENCOUNTER (OUTPATIENT)
Dept: WOUND CARE | Age: 73
Discharge: STILL ADMITTED OR ADMINISTRATIVE | End: 2023-10-04
Attending: INTERNAL MEDICINE

## 2023-10-04 VITALS — TEMPERATURE: 96.4 F

## 2023-10-04 DIAGNOSIS — L97.529 DIABETIC ULCER OF LEFT GREAT TOE (CMD): ICD-10-CM

## 2023-10-04 DIAGNOSIS — I73.9 PAD (PERIPHERAL ARTERY DISEASE) (CMD): Primary | ICD-10-CM

## 2023-10-04 DIAGNOSIS — E11.621 DIABETIC ULCER OF RIGHT GREAT TOE (CMD): ICD-10-CM

## 2023-10-04 DIAGNOSIS — L97.511 ULCER OF RIGHT FOOT, LIMITED TO BREAKDOWN OF SKIN (CMD): ICD-10-CM

## 2023-10-04 DIAGNOSIS — E11.621 DIABETIC ULCER OF LEFT GREAT TOE (CMD): ICD-10-CM

## 2023-10-04 DIAGNOSIS — L97.519 DIABETIC ULCER OF RIGHT GREAT TOE (CMD): ICD-10-CM

## 2023-10-04 PROCEDURE — 11042 DBRDMT SUBQ TIS 1ST 20SQCM/<: CPT

## 2023-10-04 PROCEDURE — 11045 DBRDMT SUBQ TISS EACH ADDL: CPT

## 2023-10-04 RX ORDER — LOSARTAN POTASSIUM 25 MG/1
TABLET ORAL
COMMUNITY

## 2023-10-04 RX ORDER — LEVOTHYROXINE SODIUM 0.15 MG/1
150 TABLET ORAL DAILY
COMMUNITY
Start: 2023-09-06

## 2023-10-04 RX ORDER — FAMOTIDINE 20 MG/1
1 TABLET, FILM COATED ORAL
COMMUNITY
Start: 2023-07-05

## 2023-10-04 ASSESSMENT — PAIN SCALES - GENERAL: PAINLEVEL_OUTOF10: 0

## 2023-10-10 NOTE — ED NOTES
MD at bedside. Nasal Turnover Hinge Flap Text: The defect edges were debeveled with a #15 scalpel blade.  Given the size, depth, location of the defect and the defect being full thickness a nasal turnover hinge flap was deemed most appropriate.  Using a sterile surgical marker, an appropriate hinge flap was drawn incorporating the defect. The area thus outlined was incised with a #15 scalpel blade. The flap was designed to recreate the nasal mucosal lining and the alar rim. The skin margins were undermined to an appropriate distance in all directions utilizing iris scissors.

## 2023-10-12 ENCOUNTER — HOSPITAL ENCOUNTER (OUTPATIENT)
Dept: ULTRASOUND IMAGING | Facility: HOSPITAL | Age: 73
Discharge: HOME | End: 2023-10-12
Attending: SURGERY
Payer: MEDICARE

## 2023-10-12 DIAGNOSIS — I77.89 ARTERIAL STEAL SYNDROME (HCC): ICD-10-CM

## 2023-10-12 PROCEDURE — 93990 DOPPLER FLOW TESTING: CPT | Performed by: SURGERY

## 2023-10-14 NOTE — PAT NURSING NOTE
Addendum: 10/19/23    Rescheduled to 10/20; npo after 2200; TOA 0730; morning of surgery only take ASA and Metoprolol with sips water; continue to hold vitamins, supplements, NSAIDs, and Losartan. PAT nursing note: denies taking blood thinners or ACEs; no PPM or ICD; has a spinal cord stimulator; uses insulin pump; verbalized understanding of all instructions      Pre-Surgical Testing:     -You are scheduled for a COVID-19 test, non-fasting blood work and an EKG on Monday, 10/16/23 starting at [facility] at the [facility], located at the corner of [address] [place] [address]. -If the COVID-19 test is refused or is positive, the surgery will be cancelled. Visitor Instructions    -Visitors are welcome to accompany you the day of surgery and may visit with you during your hospital stay. Visiting hours are 7:00 am-8:00 pm.        Pre-Op Instructions    Scheduled Surgery: You are scheduled for Vascular Surgery with Dr. Miguel Gutierrez      Date of Procedure: Thursday, 10/19/23      Diet Instructions:     -Do not eat or drink after midnight. This includes water, gum, candy and food. Medication Instructions:     CONTINUE to take all of your PRESCRIBED medications EXCEPT:    -The morning of surgery only take your Aspirin and Metoprolol with a sip of water. Medications to Stop:     -The last dose of Losartan will be 10/18 morning dose.    -Stop all vitamins, supplements, Fish Oil and NSAID's (ex. Ibuprofen, Excederin, Aleve, Diclofenac, and any gels having steroids) starting today 10/14. Diabetic Instructions:     -If you have an Insulin pump, wear it the day of your procedure and Anesthesia will resume care. Skin Prep: Shower with Dial Soap the morning of your surgery (or any antibacterial soap). Sleep Apnea:  If you have sleep apnea, please bring your mask and tubing        Time of Arrival: 1230.     -This is going to be a tentative time of arrival for surgery.   -We will call you the buisness day prior to your surgery in the late afternoon to reconfirm your arrival.   -Please check your voicemail for a message. Admit/Discharge Status: You will be admitted to the hospital after surgery. Discharge Teaching: Your nurse will give you specific instructions before discharge. Any questions, please call the surgeon's office. Additional Instructions: Bring insurance card(s) and picture ID. Leave all valuables at home, including jewelry. Wear comfortable clothing. No contacts, wear glasses. Bring brace/immobilizer/crutches/walker. Bring C-Pap tubing and mask. You'll receive arrival time 1 business day prior to scheduled surgery    Park in the [place] parking garage at . [address] in at the [employer] reception desk. Our  will be there to check you in for your procedure.  parking is available starting at 6 am      If you are scheduled to arrive early for 5:30 am, the [employer] reception desk does not open till 5:30 am. It may be dark, but you are in the correct location. We are open M-F from 8am- 5pm. We are closed on holidays and weekends. We can be reached at [phone]. Thank you,nsulin pump-will continue; verbalized understanding of all instructions.

## 2023-10-16 ENCOUNTER — LAB ENCOUNTER (OUTPATIENT)
Dept: LAB | Age: 73
End: 2023-10-16
Attending: SURGERY
Payer: MEDICARE

## 2023-10-16 ENCOUNTER — EKG ENCOUNTER (OUTPATIENT)
Dept: LAB | Age: 73
End: 2023-10-16
Attending: SURGERY
Payer: MEDICARE

## 2023-10-16 DIAGNOSIS — T82.898A: ICD-10-CM

## 2023-10-16 DIAGNOSIS — Z01.818 PRE-OP TESTING: ICD-10-CM

## 2023-10-16 DIAGNOSIS — Z91.89 AT HIGH RISK FOR BLEEDING: ICD-10-CM

## 2023-10-16 LAB
ALBUMIN SERPL-MCNC: 3.3 G/DL (ref 3.4–5)
ALBUMIN/GLOB SERPL: 0.8 {RATIO} (ref 1–2)
ALP LIVER SERPL-CCNC: 196 U/L
ALT SERPL-CCNC: 26 U/L
ANION GAP SERPL CALC-SCNC: 6 MMOL/L (ref 0–18)
ANTIBODY SCREEN: NEGATIVE
APTT PPP: 31.4 SECONDS (ref 23.3–35.6)
AST SERPL-CCNC: 34 U/L (ref 15–37)
ATRIAL RATE: 60 BPM
BASOPHILS # BLD AUTO: 0.05 X10(3) UL (ref 0–0.2)
BASOPHILS NFR BLD AUTO: 0.8 %
BILIRUB SERPL-MCNC: 0.9 MG/DL (ref 0.1–2)
BUN BLD-MCNC: 43 MG/DL (ref 7–18)
CALCIUM BLD-MCNC: 9.5 MG/DL (ref 8.5–10.1)
CHLORIDE SERPL-SCNC: 103 MMOL/L (ref 98–112)
CO2 SERPL-SCNC: 28 MMOL/L (ref 21–32)
CREAT BLD-MCNC: 5.11 MG/DL
EGFRCR SERPLBLD CKD-EPI 2021: 11 ML/MIN/1.73M2 (ref 60–?)
EOSINOPHIL # BLD AUTO: 0.09 X10(3) UL (ref 0–0.7)
EOSINOPHIL NFR BLD AUTO: 1.5 %
ERYTHROCYTE [DISTWIDTH] IN BLOOD BY AUTOMATED COUNT: 16.7 %
FASTING STATUS PATIENT QL REPORTED: YES
GLOBULIN PLAS-MCNC: 4.2 G/DL (ref 2.8–4.4)
GLUCOSE BLD-MCNC: 120 MG/DL (ref 70–99)
HCT VFR BLD AUTO: 40.9 %
HGB BLD-MCNC: 12.6 G/DL
IMM GRANULOCYTES # BLD AUTO: 0.01 X10(3) UL (ref 0–1)
IMM GRANULOCYTES NFR BLD: 0.2 %
INR BLD: 1.15 (ref 0.85–1.16)
LYMPHOCYTES # BLD AUTO: 0.71 X10(3) UL (ref 1–4)
LYMPHOCYTES NFR BLD AUTO: 11.7 %
MCH RBC QN AUTO: 33.2 PG (ref 26–34)
MCHC RBC AUTO-ENTMCNC: 30.8 G/DL (ref 31–37)
MCV RBC AUTO: 107.6 FL
MONOCYTES # BLD AUTO: 0.67 X10(3) UL (ref 0.1–1)
MONOCYTES NFR BLD AUTO: 11 %
NEUTROPHILS # BLD AUTO: 4.54 X10 (3) UL (ref 1.5–7.7)
NEUTROPHILS # BLD AUTO: 4.54 X10(3) UL (ref 1.5–7.7)
NEUTROPHILS NFR BLD AUTO: 74.8 %
OSMOLALITY SERPL CALC.SUM OF ELEC: 296 MOSM/KG (ref 275–295)
P AXIS: -1 DEGREES
P-R INTERVAL: 184 MS
PLATELET # BLD AUTO: 120 10(3)UL (ref 150–450)
POTASSIUM SERPL-SCNC: 4.2 MMOL/L (ref 3.5–5.1)
PROT SERPL-MCNC: 7.5 G/DL (ref 6.4–8.2)
PROTHROMBIN TIME: 14.8 SECONDS (ref 11.6–14.8)
Q-T INTERVAL: 432 MS
QRS DURATION: 74 MS
QTC CALCULATION (BEZET): 432 MS
R AXIS: 7 DEGREES
RBC # BLD AUTO: 3.8 X10(6)UL
RH BLOOD TYPE: POSITIVE
SODIUM SERPL-SCNC: 137 MMOL/L (ref 136–145)
T AXIS: -9 DEGREES
VENTRICULAR RATE: 60 BPM
WBC # BLD AUTO: 6.1 X10(3) UL (ref 4–11)

## 2023-10-16 PROCEDURE — 85610 PROTHROMBIN TIME: CPT

## 2023-10-16 PROCEDURE — 93005 ELECTROCARDIOGRAM TRACING: CPT

## 2023-10-16 PROCEDURE — 86850 RBC ANTIBODY SCREEN: CPT

## 2023-10-16 PROCEDURE — 80053 COMPREHEN METABOLIC PANEL: CPT

## 2023-10-16 PROCEDURE — 93010 ELECTROCARDIOGRAM REPORT: CPT | Performed by: INTERNAL MEDICINE

## 2023-10-16 PROCEDURE — 85025 COMPLETE CBC W/AUTO DIFF WBC: CPT

## 2023-10-16 PROCEDURE — 86900 BLOOD TYPING SEROLOGIC ABO: CPT

## 2023-10-16 PROCEDURE — 87635 SARS-COV-2 COVID-19 AMP PRB: CPT

## 2023-10-16 PROCEDURE — 86901 BLOOD TYPING SEROLOGIC RH(D): CPT

## 2023-10-16 PROCEDURE — 85730 THROMBOPLASTIN TIME PARTIAL: CPT

## 2023-10-16 PROCEDURE — 36415 COLL VENOUS BLD VENIPUNCTURE: CPT

## 2023-10-17 LAB — SARS-COV-2 RNA RESP QL NAA+PROBE: NOT DETECTED

## 2023-10-18 ENCOUNTER — HOSPITAL ENCOUNTER (OUTPATIENT)
Dept: WOUND CARE | Age: 73
Discharge: STILL ADMITTED OR ADMINISTRATIVE | End: 2023-10-18
Attending: INTERNAL MEDICINE

## 2023-10-18 VITALS — TEMPERATURE: 97.3 F

## 2023-10-18 DIAGNOSIS — L97.511 ULCER OF RIGHT FOOT, LIMITED TO BREAKDOWN OF SKIN (CMD): ICD-10-CM

## 2023-10-18 DIAGNOSIS — L97.521 DIABETIC ULCER OF TOE OF LEFT FOOT ASSOCIATED WITH DIABETES MELLITUS DUE TO UNDERLYING CONDITION, LIMITED TO BREAKDOWN OF SKIN (CMD): ICD-10-CM

## 2023-10-18 DIAGNOSIS — E08.621 DIABETIC ULCER OF TOE OF LEFT FOOT ASSOCIATED WITH DIABETES MELLITUS DUE TO UNDERLYING CONDITION, LIMITED TO BREAKDOWN OF SKIN (CMD): ICD-10-CM

## 2023-10-18 DIAGNOSIS — I73.9 PAD (PERIPHERAL ARTERY DISEASE) (CMD): Primary | ICD-10-CM

## 2023-10-18 DIAGNOSIS — L97.529 DIABETIC ULCER OF LEFT GREAT TOE (CMD): ICD-10-CM

## 2023-10-18 DIAGNOSIS — E11.621 DIABETIC ULCER OF LEFT GREAT TOE (CMD): ICD-10-CM

## 2023-10-18 DIAGNOSIS — L97.519 DIABETIC ULCER OF RIGHT GREAT TOE (CMD): ICD-10-CM

## 2023-10-18 DIAGNOSIS — E11.621 DIABETIC ULCER OF RIGHT GREAT TOE (CMD): ICD-10-CM

## 2023-10-18 PROCEDURE — 11045 DBRDMT SUBQ TISS EACH ADDL: CPT

## 2023-10-18 PROCEDURE — 11042 DBRDMT SUBQ TIS 1ST 20SQCM/<: CPT

## 2023-10-18 ASSESSMENT — PAIN SCALES - GENERAL: PAINLEVEL_OUTOF10: 0

## 2023-10-18 NOTE — H&P
[facility]    PATIENT'S NAME: Guillermo Romo   ATTENDING PHYSICIAN: Kaya Phillips M.D. PATIENT ACCOUNT#:   [de-identified]    LOCATION:    MEDICAL RECORD #:   [id number]       YOB: 1950  ADMISSION DATE:       10/20/2023    HISTORY AND PHYSICAL EXAMINATION    HISTORY OF PRESENT ILLNESS:  This is a 76-year-old white male known to me for a history of a left brachial artery-cephalic vein autogenous fistula that was placed within the last year. The patient complains of pain in his hand. He has a small ulceration on the left first finger and he has some swelling in his arm. He had studies showing that the fistula is open, no evidence for outflow stenosis, but dampened flow going down the arm with very diminished flow in the left radial artery and most of the  flow coming from the ulnar artery. He currently has no chest pain. He has no shortness of breath. He currently has no CVA, TIA, visual field defect, or aphasia. The patient's initial surgery was done December 5, 2022, with a brachial artery-cephalic vein autogenous fistula. He was seen by me in the office last week. The fistula was still patent. He had cellulitis in his arm about a month before this, but he also complained of concern of ischemia in his hand. He undergoes dialysis on a Tuesday, Thursday, Saturday basis. Being followed by Dr. Addis Aldridge as his main doctor as well as by Dr. Caitlyn Gonsalez. PAST MEDICAL HISTORY:  He has a diagnosis of bilateral Dupuytren contractures. He also has significant arthritis of the upper extremities and lower extremities. He is an insulin-dependent diabetic for at least 40 years. PAST SURGICAL HISTORY:  He has had previous left knee surgery by Dr. Demetrio Brooks. He has had 4 back surgeries in the past, lumbar and thoracic area. He has had left total knee replacement surgery, left ankle surgery, wrist surgery.       MEDICATIONS:  Cephalexin, levothyroxine, insulin, Zetia, famotidine, metoprolol tartrate, losartan, aspirin. ALLERGIES:  He is allergic to Depakote, it causes dizziness; Wellbutrin; fluconazole; and Lasix can cause a rash. FAMILY HISTORY:  Coronary artery disease. SOCIAL HISTORY:  He denies any EtOH abuse, drug abuse, or tobacco abuse. He quit smoking years ago. He is . He has been seen in the office with his wife. He has 1 son. He is retired. He used to be a printer. REVIEW OF SYSTEMS:  No hematuria, dysuria, hemoptysis, cough, sputum production. No weight loss. No fever or chills. No hematemesis. No bright red blood per rectum. PHYSICAL EXAMINATION:    GENERAL:  He is awake and alert. He is appropriate, in no acute distress. VITAL SIGNS:  Blood pressure 146/70, heart rate 72, respirations 20. HEENT:  Pupils equal, round. Sclerae clear. Mouth no lesions. NECK:  No cervical bruit. No JVD. LUNGS:  Clear to auscultation. No rales or rhonchi. HEART:  Normal.  No murmur. ABDOMEN:  Soft. No tenderness or masses. EXTREMITIES:  Femoral pulses are palpable. Popliteal and pedal pulses are diminished. He has diminished Doppler flow in the posterior tibial artery, dorsal pedal artery in the past.  He had an open sore on the right first toe in the past.  Currently no gross infection. He has had recent Doppler studies at [facility], which I do not have. His duplex ultrasound which was ordered shows that the fistula is open, but there is very dampened flow going to the radial artery, ulnar artery. 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   I have recommended a DRIL procedure with ligation of brachial artery just beyond the anastomosis and doing a vein bypass if possible with a vein taken from the leg for proximal brachial artery to distal brachial artery bypass to try to improve the flow going down the radial artery, ulnar artery. We will try to preserve the fistula if at all possible. The swelling in the hand, cannot determine for sure if this is from a steal syndrome or if this is possibly from an outflow obstruction, but in this case, as I talk with Dr. Jose A Baird, will at least address the arterial steal and deal with the swelling in the arm in the future. Discussed the risks and complications including death, myocardial infarction, bleeding, infection, hand loss, persistent ischemia, future ischemia, wound infection, leg swelling because of vein harvest, and the possibility of a prosthetic graft being used. All questions were answered for the patient, who understands and agrees, as well as his wife.     Dictated By Bobby Sweeney M.D.  d: 10/18/2023 13:33:34  t: 10/18/2023 13:57:45  Lottie Jones [id number]  [facility]/

## 2023-10-19 ENCOUNTER — ANESTHESIA (OUTPATIENT)
Dept: CARDIAC SURGERY | Facility: HOSPITAL | Age: 73
DRG: 252 | End: 2023-10-19
Payer: MEDICARE

## 2023-10-19 ENCOUNTER — ANESTHESIA EVENT (OUTPATIENT)
Dept: CARDIAC SURGERY | Facility: HOSPITAL | Age: 73
DRG: 252 | End: 2023-10-19
Payer: MEDICARE

## 2023-10-19 NOTE — PROGRESS NOTES
DUE TO EMERGENCY AT [facility] WE NEED TO CANCEL SURGERY FOR TODAY 10/19/23. RESUME MEDICATIONS AND DIET. DR. Governor Rose OFFICE WILL BE IN CONTACT WITH YOU TO RESCHEDULE. SPOKE WITH WIFE VERBALIZED UNDERSTANDING.

## 2023-10-20 ENCOUNTER — HOSPITAL ENCOUNTER (INPATIENT)
Facility: HOSPITAL | Age: 73
LOS: 1 days | Discharge: HOME | DRG: 252 | End: 2023-10-21
Attending: SURGERY | Admitting: SURGERY

## 2023-10-20 DIAGNOSIS — Z91.89 AT HIGH RISK FOR BLEEDING: ICD-10-CM

## 2023-10-20 DIAGNOSIS — Z01.818 PRE-OP TESTING: ICD-10-CM

## 2023-10-20 DIAGNOSIS — T82.898A: Primary | ICD-10-CM

## 2023-10-20 LAB
APTT PPP: 37.7 SECONDS (ref 23.3–35.6)
BASE EXCESS BLD CALC-SCNC: 8 MMOL/L
BASE EXCESS BLDA CALC-SCNC: 6 MMOL/L (ref ?–30)
CA-I BLD-SCNC: 1.15 MMOL/L (ref 1.12–1.32)
CA-I BLDA-SCNC: 1.15 MMOL/L (ref 1.12–1.32)
CO2 BLD-SCNC: 33 MMOL/L (ref 22–32)
CO2 BLDA-SCNC: 32 MMOL/L (ref 22–32)
EST. AVERAGE GLUCOSE BLD GHB EST-MCNC: 148 MG/DL (ref 68–126)
GLUCOSE BLD-MCNC: 112 MG/DL (ref 70–99)
GLUCOSE BLD-MCNC: 192 MG/DL (ref 70–99)
GLUCOSE BLD-MCNC: 49 MG/DL (ref 70–99)
GLUCOSE BLD-MCNC: 52 MG/DL (ref 70–99)
GLUCOSE BLD-MCNC: 54 MG/DL (ref 70–99)
GLUCOSE BLD-MCNC: 56 MG/DL (ref 70–99)
GLUCOSE BLD-MCNC: 92 MG/DL (ref 70–99)
GLUCOSE BLD-MCNC: 92 MG/DL (ref 70–99)
GLUCOSE BLD-MCNC: 98 MG/DL (ref 70–99)
GLUCOSE BLDA-MCNC: 133 MG/DL (ref 70–99)
HBA1C MFR BLD: 6.8 % (ref ?–5.7)
HCO3 BLD-SCNC: 32.1 MEQ/L
HCO3 BLDA-SCNC: 30.7 MEQ/L (ref 22–26)
HCT VFR BLD CALC: 39 %
HCT VFR BLDA CALC: 40 %
INR BLD: 1.38 (ref 0.85–1.16)
ISTAT ACTIVATED CLOTTING TIME: 221 SECONDS (ref 74–137)
ISTAT ACTIVATED CLOTTING TIME: 281 SECONDS (ref 74–137)
PCO2 BLD: 43.9 MMHG
PCO2 BLDA: 50.3 MMHG (ref 35–45)
PH BLD: 7.47 [PH]
PH BLDA: 7.39 [PH] (ref 7.35–7.45)
PO2 BLD: 418 MMHG
PO2 BLDA: 236 MMHG (ref 80–105)
POTASSIUM BLD-SCNC: 3.8 MMOL/L (ref 3.6–5.1)
PROTHROMBIN TIME: 17 SECONDS (ref 11.6–14.8)
SAO2 % BLD: 100 %
SAO2 % BLDA: 100 % (ref 92–100)
SARS-COV-2 RNA RESP QL NAA+PROBE: NOT DETECTED
SODIUM BLD-SCNC: 138 MMOL/L (ref 136–145)
SODIUM BLDA-SCNC: 136 MMOL/L (ref 136–145)
SODIUM BLDA-SCNC: 4 MMOL/L (ref 3.6–5.1)

## 2023-10-20 PROCEDURE — 5A09357 ASSISTANCE WITH RESPIRATORY VENTILATION, LESS THAN 24 CONSECUTIVE HOURS, CONTINUOUS POSITIVE AIRWAY PRESSURE: ICD-10-PCS | Performed by: SURGERY

## 2023-10-20 PROCEDURE — 03L70ZZ OCCLUSION OF RIGHT BRACHIAL ARTERY, OPEN APPROACH: ICD-10-PCS | Performed by: SURGERY

## 2023-10-20 PROCEDURE — 0317093 BYPASS RIGHT BRACHIAL ARTERY TO RIGHT LOWER ARM ARTERY WITH AUTOLOGOUS VENOUS TISSUE, OPEN APPROACH: ICD-10-PCS | Performed by: SURGERY

## 2023-10-20 PROCEDURE — 06BP0ZZ EXCISION OF RIGHT SAPHENOUS VEIN, OPEN APPROACH: ICD-10-PCS | Performed by: SURGERY

## 2023-10-20 RX ORDER — NICOTINE POLACRILEX 4 MG
30 LOZENGE BUCCAL
Status: DISCONTINUED | OUTPATIENT
Start: 2023-10-20 | End: 2023-10-20 | Stop reason: HOSPADM

## 2023-10-20 RX ORDER — LACTULOSE 10 G/15ML
20 SOLUTION ORAL NIGHTLY
Status: DISCONTINUED | OUTPATIENT
Start: 2023-10-20 | End: 2023-10-21

## 2023-10-20 RX ORDER — DEXTROSE MONOHYDRATE 25 G/50ML
50 INJECTION, SOLUTION INTRAVENOUS
Status: DISCONTINUED | OUTPATIENT
Start: 2023-10-20 | End: 2023-10-21

## 2023-10-20 RX ORDER — SEVELAMER CARBONATE 800 MG/1
1600 TABLET, FILM COATED ORAL
Status: DISCONTINUED | OUTPATIENT
Start: 2023-10-20 | End: 2023-10-21

## 2023-10-20 RX ORDER — BUPIVACAINE HYDROCHLORIDE 5 MG/ML
INJECTION, SOLUTION EPIDURAL; INTRACAUDAL AS NEEDED
Status: DISCONTINUED | OUTPATIENT
Start: 2023-10-20 | End: 2023-10-20 | Stop reason: HOSPADM

## 2023-10-20 RX ORDER — LIDOCAINE HYDROCHLORIDE 10 MG/ML
INJECTION, SOLUTION EPIDURAL; INFILTRATION; INTRACAUDAL; PERINEURAL AS NEEDED
Status: DISCONTINUED | OUTPATIENT
Start: 2023-10-20 | End: 2023-10-20 | Stop reason: SURG

## 2023-10-20 RX ORDER — CEFAZOLIN SODIUM/WATER 2 G/20 ML
SYRINGE (ML) INTRAVENOUS AS NEEDED
Status: DISCONTINUED | OUTPATIENT
Start: 2023-10-20 | End: 2023-10-20 | Stop reason: SURG

## 2023-10-20 RX ORDER — LABETALOL HYDROCHLORIDE 5 MG/ML
5 INJECTION, SOLUTION INTRAVENOUS EVERY 5 MIN PRN
Status: DISCONTINUED | OUTPATIENT
Start: 2023-10-20 | End: 2023-10-20 | Stop reason: HOSPADM

## 2023-10-20 RX ORDER — BISACODYL 10 MG
10 SUPPOSITORY, RECTAL RECTAL
Status: DISCONTINUED | OUTPATIENT
Start: 2023-10-20 | End: 2023-10-21

## 2023-10-20 RX ORDER — MEPERIDINE HYDROCHLORIDE 25 MG/ML
12.5 INJECTION INTRAMUSCULAR; INTRAVENOUS; SUBCUTANEOUS AS NEEDED
Status: DISCONTINUED | OUTPATIENT
Start: 2023-10-20 | End: 2023-10-20 | Stop reason: HOSPADM

## 2023-10-20 RX ORDER — DEXTROSE MONOHYDRATE 25 G/50ML
INJECTION, SOLUTION INTRAVENOUS AS NEEDED
Status: DISCONTINUED | OUTPATIENT
Start: 2023-10-20 | End: 2023-10-20 | Stop reason: SURG

## 2023-10-20 RX ORDER — HYDROCODONE BITARTRATE AND ACETAMINOPHEN 5; 325 MG/1; MG/1
2 TABLET ORAL EVERY 4 HOURS PRN
Status: DISCONTINUED | OUTPATIENT
Start: 2023-10-20 | End: 2023-10-21

## 2023-10-20 RX ORDER — EPHEDRINE SULFATE 50 MG/ML
5 INJECTION INTRAVENOUS EVERY 10 MIN PRN
OUTPATIENT
Start: 2023-10-20 | End: 2023-10-20

## 2023-10-20 RX ORDER — HEPARIN SODIUM 1000 [USP'U]/ML
INJECTION, SOLUTION INTRAVENOUS; SUBCUTANEOUS AS NEEDED
Status: DISCONTINUED | OUTPATIENT
Start: 2023-10-20 | End: 2023-10-20 | Stop reason: SURG

## 2023-10-20 RX ORDER — GLYCOPYRROLATE 0.2 MG/ML
INJECTION, SOLUTION INTRAMUSCULAR; INTRAVENOUS AS NEEDED
Status: DISCONTINUED | OUTPATIENT
Start: 2023-10-20 | End: 2023-10-20 | Stop reason: SURG

## 2023-10-20 RX ORDER — LIDOCAINE HYDROCHLORIDE 10 MG/ML
INJECTION, SOLUTION INFILTRATION; PERINEURAL AS NEEDED
Status: DISCONTINUED | OUTPATIENT
Start: 2023-10-20 | End: 2023-10-20 | Stop reason: HOSPADM

## 2023-10-20 RX ORDER — ASCORBIC ACID, THIAMINE, RIBOFLAVIN, NIACINAMIDE, PYRIDOXINE, FOLIC ACID, COBALAMIN, BIOTIN, PANTOTHENIC ACID 100; 1.5; 1.7; 20; 10; 1; 6; 300; 1 MG/1; MG/1; MG/1; MG/1; MG/1; MG/1; UG/1; UG/1; MG/1
1 TABLET, COATED ORAL NIGHTLY
Status: DISCONTINUED | OUTPATIENT
Start: 2023-10-20 | End: 2023-10-21

## 2023-10-20 RX ORDER — DEXTROSE MONOHYDRATE 25 G/50ML
50 INJECTION, SOLUTION INTRAVENOUS
Status: DISCONTINUED | OUTPATIENT
Start: 2023-10-20 | End: 2023-10-20 | Stop reason: HOSPADM

## 2023-10-20 RX ORDER — MULTIVIT WITH MINERALS/LUTEIN
1000 TABLET ORAL DAILY
Status: DISCONTINUED | OUTPATIENT
Start: 2023-10-20 | End: 2023-10-21

## 2023-10-20 RX ORDER — NALOXONE HYDROCHLORIDE 0.4 MG/ML
80 INJECTION, SOLUTION INTRAMUSCULAR; INTRAVENOUS; SUBCUTANEOUS AS NEEDED
Status: DISCONTINUED | OUTPATIENT
Start: 2023-10-20 | End: 2023-10-20 | Stop reason: HOSPADM

## 2023-10-20 RX ORDER — HYDROMORPHONE HYDROCHLORIDE 1 MG/ML
0.2 INJECTION, SOLUTION INTRAMUSCULAR; INTRAVENOUS; SUBCUTANEOUS EVERY 5 MIN PRN
Status: DISCONTINUED | OUTPATIENT
Start: 2023-10-20 | End: 2023-10-20 | Stop reason: HOSPADM

## 2023-10-20 RX ORDER — ONDANSETRON 2 MG/ML
4 INJECTION INTRAMUSCULAR; INTRAVENOUS EVERY 6 HOURS PRN
Status: DISCONTINUED | OUTPATIENT
Start: 2023-10-20 | End: 2023-10-20 | Stop reason: HOSPADM

## 2023-10-20 RX ORDER — NICOTINE POLACRILEX 4 MG
15 LOZENGE BUCCAL
Status: DISCONTINUED | OUTPATIENT
Start: 2023-10-20 | End: 2023-10-21

## 2023-10-20 RX ORDER — SODIUM CHLORIDE 9 MG/ML
INJECTION, SOLUTION INTRAVENOUS CONTINUOUS PRN
Status: DISCONTINUED | OUTPATIENT
Start: 2023-10-20 | End: 2023-10-20 | Stop reason: SURG

## 2023-10-20 RX ORDER — LOSARTAN POTASSIUM 25 MG/1
25 TABLET ORAL 2 TIMES DAILY
Status: DISCONTINUED | OUTPATIENT
Start: 2023-10-20 | End: 2023-10-21

## 2023-10-20 RX ORDER — METOCLOPRAMIDE HYDROCHLORIDE 5 MG/ML
INJECTION INTRAMUSCULAR; INTRAVENOUS AS NEEDED
Status: DISCONTINUED | OUTPATIENT
Start: 2023-10-20 | End: 2023-10-20 | Stop reason: SURG

## 2023-10-20 RX ORDER — POLYETHYLENE GLYCOL 3350 17 G/17G
17 POWDER, FOR SOLUTION ORAL DAILY PRN
Status: DISCONTINUED | OUTPATIENT
Start: 2023-10-20 | End: 2023-10-21

## 2023-10-20 RX ORDER — LEVOTHYROXINE SODIUM 0.15 MG/1
150 TABLET ORAL
Status: DISCONTINUED | OUTPATIENT
Start: 2023-10-20 | End: 2023-10-21

## 2023-10-20 RX ORDER — EZETIMIBE 10 MG/1
10 TABLET ORAL EVERY MORNING
Status: DISCONTINUED | OUTPATIENT
Start: 2023-10-20 | End: 2023-10-21

## 2023-10-20 RX ORDER — HYDROCODONE BITARTRATE AND ACETAMINOPHEN 5; 325 MG/1; MG/1
1 TABLET ORAL AS NEEDED
Status: DISCONTINUED | OUTPATIENT
Start: 2023-10-20 | End: 2023-10-20

## 2023-10-20 RX ORDER — HEPARIN SODIUM 5000 [USP'U]/ML
INJECTION, SOLUTION INTRAVENOUS; SUBCUTANEOUS AS NEEDED
Status: DISCONTINUED | OUTPATIENT
Start: 2023-10-20 | End: 2023-10-20 | Stop reason: HOSPADM

## 2023-10-20 RX ORDER — VERAPAMIL HYDROCHLORIDE 2.5 MG/ML
INJECTION, SOLUTION INTRAVENOUS AS NEEDED
Status: DISCONTINUED | OUTPATIENT
Start: 2023-10-20 | End: 2023-10-20 | Stop reason: HOSPADM

## 2023-10-20 RX ORDER — HYDROMORPHONE HYDROCHLORIDE 1 MG/ML
0.6 INJECTION, SOLUTION INTRAMUSCULAR; INTRAVENOUS; SUBCUTANEOUS EVERY 5 MIN PRN
Status: DISCONTINUED | OUTPATIENT
Start: 2023-10-20 | End: 2023-10-20 | Stop reason: HOSPADM

## 2023-10-20 RX ORDER — NICOTINE POLACRILEX 4 MG
30 LOZENGE BUCCAL
Status: DISCONTINUED | OUTPATIENT
Start: 2023-10-20 | End: 2023-10-21

## 2023-10-20 RX ORDER — SODIUM CHLORIDE 9 MG/ML
INJECTION, SOLUTION INTRAVENOUS CONTINUOUS
Status: DISCONTINUED | OUTPATIENT
Start: 2023-10-20 | End: 2023-10-20 | Stop reason: HOSPADM

## 2023-10-20 RX ORDER — DIPHENHYDRAMINE HYDROCHLORIDE 50 MG/ML
12.5 INJECTION INTRAMUSCULAR; INTRAVENOUS AS NEEDED
Status: DISCONTINUED | OUTPATIENT
Start: 2023-10-20 | End: 2023-10-20 | Stop reason: HOSPADM

## 2023-10-20 RX ORDER — AMOXICILLIN 500 MG
1200 CAPSULE ORAL DAILY
COMMUNITY

## 2023-10-20 RX ORDER — NICOTINE POLACRILEX 4 MG
15 LOZENGE BUCCAL
Status: DISCONTINUED | OUTPATIENT
Start: 2023-10-20 | End: 2023-10-20 | Stop reason: HOSPADM

## 2023-10-20 RX ORDER — CHOLECALCIFEROL (VITAMIN D3) 125 MCG
2000 CAPSULE ORAL DAILY
COMMUNITY

## 2023-10-20 RX ORDER — ONDANSETRON 2 MG/ML
INJECTION INTRAMUSCULAR; INTRAVENOUS AS NEEDED
Status: DISCONTINUED | OUTPATIENT
Start: 2023-10-20 | End: 2023-10-20 | Stop reason: SURG

## 2023-10-20 RX ORDER — METOCLOPRAMIDE HYDROCHLORIDE 5 MG/ML
5 INJECTION INTRAMUSCULAR; INTRAVENOUS EVERY 8 HOURS PRN
Status: DISCONTINUED | OUTPATIENT
Start: 2023-10-20 | End: 2023-10-20 | Stop reason: HOSPADM

## 2023-10-20 RX ORDER — ROCURONIUM BROMIDE 10 MG/ML
INJECTION, SOLUTION INTRAVENOUS AS NEEDED
Status: DISCONTINUED | OUTPATIENT
Start: 2023-10-20 | End: 2023-10-20 | Stop reason: SURG

## 2023-10-20 RX ORDER — PROTAMINE SULFATE 10 MG/ML
INJECTION, SOLUTION INTRAVENOUS AS NEEDED
Status: DISCONTINUED | OUTPATIENT
Start: 2023-10-20 | End: 2023-10-20 | Stop reason: SURG

## 2023-10-20 RX ORDER — SENNOSIDES 8.6 MG
17.2 TABLET ORAL NIGHTLY PRN
Status: DISCONTINUED | OUTPATIENT
Start: 2023-10-20 | End: 2023-10-21

## 2023-10-20 RX ORDER — ONDANSETRON 2 MG/ML
4 INJECTION INTRAMUSCULAR; INTRAVENOUS EVERY 6 HOURS PRN
Status: DISCONTINUED | OUTPATIENT
Start: 2023-10-20 | End: 2023-10-21

## 2023-10-20 RX ORDER — VITAMIN E 268 MG
1000 CAPSULE ORAL DAILY
COMMUNITY

## 2023-10-20 RX ORDER — HYDROCODONE BITARTRATE AND ACETAMINOPHEN 5; 325 MG/1; MG/1
1 TABLET ORAL EVERY 4 HOURS PRN
Status: DISCONTINUED | OUTPATIENT
Start: 2023-10-20 | End: 2023-10-21

## 2023-10-20 RX ORDER — HYDROMORPHONE HYDROCHLORIDE 1 MG/ML
0.4 INJECTION, SOLUTION INTRAMUSCULAR; INTRAVENOUS; SUBCUTANEOUS EVERY 5 MIN PRN
Status: DISCONTINUED | OUTPATIENT
Start: 2023-10-20 | End: 2023-10-20 | Stop reason: HOSPADM

## 2023-10-20 RX ORDER — PANTOPRAZOLE SODIUM 40 MG/1
40 TABLET, DELAYED RELEASE ORAL
Status: DISCONTINUED | OUTPATIENT
Start: 2023-10-21 | End: 2023-10-21

## 2023-10-20 RX ORDER — CEFAZOLIN SODIUM/WATER 2 G/20 ML
2 SYRINGE (ML) INTRAVENOUS EVERY 8 HOURS
Qty: 40 ML | Refills: 0 | Status: DISCONTINUED | OUTPATIENT
Start: 2023-10-20 | End: 2023-10-20

## 2023-10-20 RX ORDER — ACETAMINOPHEN 325 MG/1
650 TABLET ORAL EVERY 4 HOURS PRN
Status: DISCONTINUED | OUTPATIENT
Start: 2023-10-20 | End: 2023-10-21

## 2023-10-20 RX ADMIN — PROTAMINE SULFATE 10 MG: 10 INJECTION, SOLUTION INTRAVENOUS at 13:32:00

## 2023-10-20 RX ADMIN — ONDANSETRON 4 MG: 2 INJECTION INTRAMUSCULAR; INTRAVENOUS at 13:45:00

## 2023-10-20 RX ADMIN — METOCLOPRAMIDE HYDROCHLORIDE 10 MG: 5 INJECTION INTRAMUSCULAR; INTRAVENOUS at 10:13:00

## 2023-10-20 RX ADMIN — PROTAMINE SULFATE 10 MG: 10 INJECTION, SOLUTION INTRAVENOUS at 13:30:00

## 2023-10-20 RX ADMIN — PROTAMINE SULFATE 10 MG: 10 INJECTION, SOLUTION INTRAVENOUS at 13:31:00

## 2023-10-20 RX ADMIN — HEPARIN SODIUM 5000 UNITS: 1000 INJECTION, SOLUTION INTRAVENOUS; SUBCUTANEOUS at 11:37:00

## 2023-10-20 RX ADMIN — SODIUM CHLORIDE: 9 INJECTION, SOLUTION INTRAVENOUS at 13:11:00

## 2023-10-20 RX ADMIN — CEFAZOLIN SODIUM/WATER 2 G: 2 G/20 ML SYRINGE (ML) INTRAVENOUS at 10:34:00

## 2023-10-20 RX ADMIN — DEXTROSE MONOHYDRATE 20 ML: 25 INJECTION, SOLUTION INTRAVENOUS at 11:08:00

## 2023-10-20 RX ADMIN — ROCURONIUM BROMIDE 50 MG: 10 INJECTION, SOLUTION INTRAVENOUS at 10:21:00

## 2023-10-20 RX ADMIN — GLYCOPYRROLATE 0.3 MG: 0.2 INJECTION, SOLUTION INTRAMUSCULAR; INTRAVENOUS at 10:58:00

## 2023-10-20 RX ADMIN — PROTAMINE SULFATE 10 MG: 10 INJECTION, SOLUTION INTRAVENOUS at 13:36:00

## 2023-10-20 RX ADMIN — ROCURONIUM BROMIDE 20 MG: 10 INJECTION, SOLUTION INTRAVENOUS at 12:46:00

## 2023-10-20 RX ADMIN — HEPARIN SODIUM 5000 UNITS: 1000 INJECTION, SOLUTION INTRAVENOUS; SUBCUTANEOUS at 12:08:00

## 2023-10-20 RX ADMIN — LIDOCAINE HYDROCHLORIDE 100 MG: 10 INJECTION, SOLUTION EPIDURAL; INFILTRATION; INTRACAUDAL; PERINEURAL at 10:21:00

## 2023-10-20 RX ADMIN — SODIUM CHLORIDE: 9 INJECTION, SOLUTION INTRAVENOUS at 12:08:00

## 2023-10-20 RX ADMIN — SODIUM CHLORIDE: 9 INJECTION, SOLUTION INTRAVENOUS at 10:05:00

## 2023-10-20 RX ADMIN — SODIUM CHLORIDE: 9 INJECTION, SOLUTION INTRAVENOUS at 12:02:00

## 2023-10-20 RX ADMIN — DEXTROSE MONOHYDRATE 20 ML: 25 INJECTION, SOLUTION INTRAVENOUS at 10:14:00

## 2023-10-20 RX ADMIN — GLYCOPYRROLATE 0.3 MG: 0.2 INJECTION, SOLUTION INTRAMUSCULAR; INTRAVENOUS at 11:37:00

## 2023-10-20 NOTE — ANESTHESIA PROCEDURE NOTES
Arterial Line    Date/Time: 10/20/2023 10:28 AM    Performed by: Joel Roche MD  Authorized by: Joel Roche MD    General Information and Staff    Procedure Start:  10/20/2023 10:28 AM  Procedure End:  10/20/2023 10:28 AM  Anesthesiologist:  Joel Roche MD  Performed By:  Anesthesiologist  Patient Location:  OR  Indication: continuous blood pressure monitoring and blood sampling needed    Site Identification: surface landmarks    Preanesthetic Checklist: 2 patient identifiers, IV checked, risks and benefits discussed, monitors and equipment checked, pre-op evaluation, timeout performed, anesthesia consent and sterile technique used    Procedure Details    Catheter Size:  20 G  Catheter Length:  1 and 3/4 inch  Catheter Type:  Arrow  Seldinger Technique?: Yes    Laterality:  Right  Site:  Radial artery  Site Prep: chlorhexidine    Line Secured:  Tape and Tegaderm    Assessment    Events: patient tolerated procedure well with no complications      Medications  10/20/2023 10:28 AM      Additional Comments

## 2023-10-20 NOTE — ANESTHESIA PROCEDURE NOTES
Airway  Date/Time: 10/20/2023 10:21 AM  Urgency: elective    Airway not difficult    General Information and Staff    Patient location during procedure: OR  Anesthesiologist: Alan Mccoy MD  Performed: anesthesiologist   Performed by: Alan Mccoy MD  Authorized by: Alan Mccoy MD      Indications and Patient Condition  Indications for airway management: anesthesia  Spontaneous Ventilation: absent  Sedation level: deep  Preoxygenated: yes  Patient position: sniffing  Mask difficulty assessment: 0 - not attempted    Final Airway Details  Final airway type: endotracheal airway      Successful airway: ETT  Cuffed: yes   Successful intubation technique: Video laryngoscopy  Facilitating devices/methods: intubating stylet  Endotracheal tube insertion site: oral  Blade: GlideScope  Blade size: #3  ETT size (mm): 7.5    Cormack-Lehane Classification: grade I - full view of glottis  Placement verified by: capnometry   Cuff volume (mL): 11  Measured from: lips  ETT to lips (cm): 23  Number of attempts at approach: 1  Number of other approaches attempted: 0

## 2023-10-20 NOTE — OPERATIVE REPORT
Pre-op Dx: Arterial steal left brachial-cephalic fistula. Post-op Dx: Same. Procedure: DRIL- right GSV- left proximal brachial- distal brachial ISSV/ interval ligation. Surgeon: Shantal Butler. Asst: Jd Garcia.  EBL- 100-150cc

## 2023-10-20 NOTE — PROGRESS NOTES
[facility] Pharmacy Note:  Renal Adjustment for cefazolin (ANCEF)    Deangelo Griffiths is a 67year old patient who has been prescribed cefazolin (ANCEF) 2000 mg every 8 hrs x 2 doses post-op. The estimated creatinine clearance is 11.6 mL/min (A) (based on SCr of 5.11 mg/dL (H)). Patient is also receiving hemodialysis on Tuesday/Thursday/Saturday. The dose has been adjusted to cefazolin (ANCEF) 1000 mg every 24 hrs x 1 dose post-op per hospital renal dose adjustment protocol for treatment of surgical prophylaxis. Pharmacy will follow and adjust dose as warranted for additional renal function changes.     Thank you,    Jana Guevara, PharmD  10/20/2023  4:23 PM

## 2023-10-20 NOTE — PLAN OF CARE
1545 - Received report from [facility], [address] [place] [address] care at approximately [phone], admitted from PACU s/p left AV fistula revascularization. Pt with wounds x2 to LUE, and wound to RLE. Proximal LUE wound and RLE wound with small amount of old drainage to dressings, dressed with gauze and tegaderm. Distal LUE wound with small drainage, new drainage to gauze and tegaderm dressing. Pressure held. Pt alert, oriented x4. Admission navigator and medication reconciliation reviewed and completed with patient. Glasses, bilateral hearing aids, upper and lower dentures. Received patient on 2L nasal cannula, oxygen saturations adequate on 2L. Pt with hx of PRANEETH, CPAP mask and tubing from home. Respiratory notified of need for CPAP NOC. Tele: NSR, sinus bradycardia. HD patient, dialysis called for tomorrow. Diminished urine production. Left upper extremity AV fistula with no complications, thrill, and bruit. Open to air. CGM to LLQ abdomen, site clean, dry, intact. Insulin pump to RLQ abdomen, site clean, dry, intact. CGM and insulin pump contracts reviewed and completed with patient, signed and placed on chart. Hospitalist consult called for medical management, insulin pump and CGM management. QID. Denies pain. Wounds to left great toe, left 2nd toe, left foot bunion, and right great toe. Wound photographs placed in chart, see flowsheets for photos and assessments. Plan of care: Dialysis tomorrow morning, discharge tomorrow. Pt and family updated on plan of care. Questions answered.

## 2023-10-20 NOTE — PROGRESS NOTES
Notes reviewed. Pt TTHS HD at [facility] [address] [facility]. after AVF revision today.   HD ordered for tomorrow      Levar Shah MD  10/20/2023  3:42 PM

## 2023-10-20 NOTE — PLAN OF CARE
NURSING ADMISSION NOTE      Patient admitted via Cart from PACU at approximately 1615. Oriented to room. Safety precautions initiated. Bed in low position. Call light in reach.

## 2023-10-21 VITALS
OXYGEN SATURATION: 100 % | TEMPERATURE: 99 F | RESPIRATION RATE: 20 BRPM | SYSTOLIC BLOOD PRESSURE: 96 MMHG | WEIGHT: 181.44 LBS | HEART RATE: 65 BPM | DIASTOLIC BLOOD PRESSURE: 45 MMHG | HEIGHT: 66 IN | BODY MASS INDEX: 29.16 KG/M2

## 2023-10-21 PROBLEM — N18.6 ESRD ON HEMODIALYSIS (HCC): Status: ACTIVE | Noted: 2023-06-19

## 2023-10-21 PROBLEM — Z99.2 ESRD ON HEMODIALYSIS (HCC): Status: ACTIVE | Noted: 2023-06-19

## 2023-10-21 LAB
ANION GAP SERPL CALC-SCNC: 7 MMOL/L (ref 0–18)
BLOOD TYPE BARCODE: 6200
BUN BLD-MCNC: 43 MG/DL (ref 7–18)
CALCIUM BLD-MCNC: 8.9 MG/DL (ref 8.5–10.1)
CHLORIDE SERPL-SCNC: 103 MMOL/L (ref 98–112)
CO2 SERPL-SCNC: 28 MMOL/L (ref 21–32)
CREAT BLD-MCNC: 5.32 MG/DL
EGFRCR SERPLBLD CKD-EPI 2021: 11 ML/MIN/1.73M2 (ref 60–?)
ERYTHROCYTE [DISTWIDTH] IN BLOOD BY AUTOMATED COUNT: 16.5 %
GLUCOSE BLD-MCNC: 112 MG/DL (ref 70–99)
GLUCOSE BLD-MCNC: 115 MG/DL (ref 70–99)
GLUCOSE BLD-MCNC: 152 MG/DL (ref 70–99)
GLUCOSE BLD-MCNC: 161 MG/DL (ref 70–99)
GLUCOSE BLD-MCNC: 201 MG/DL (ref 70–99)
GLUCOSE BLD-MCNC: 231 MG/DL (ref 70–99)
GLUCOSE BLD-MCNC: 68 MG/DL (ref 70–99)
GLUCOSE BLD-MCNC: 72 MG/DL (ref 70–99)
GLUCOSE BLD-MCNC: 77 MG/DL (ref 70–99)
GLUCOSE BLD-MCNC: 82 MG/DL (ref 70–99)
GLUCOSE BLD-MCNC: 82 MG/DL (ref 70–99)
HCT VFR BLD AUTO: 37.6 %
HGB BLD-MCNC: 11.9 G/DL
MCH RBC QN AUTO: 33 PG (ref 26–34)
MCHC RBC AUTO-ENTMCNC: 31.6 G/DL (ref 31–37)
MCV RBC AUTO: 104.2 FL
OSMOLALITY SERPL CALC.SUM OF ELEC: 304 MOSM/KG (ref 275–295)
PLATELET # BLD AUTO: 116 10(3)UL (ref 150–450)
POTASSIUM SERPL-SCNC: 5.5 MMOL/L (ref 3.5–5.1)
RBC # BLD AUTO: 3.61 X10(6)UL
SODIUM SERPL-SCNC: 138 MMOL/L (ref 136–145)
UNIT VOLUME: 350 ML
WBC # BLD AUTO: 5 X10(3) UL (ref 4–11)

## 2023-10-21 PROCEDURE — 5A1D70Z PERFORMANCE OF URINARY FILTRATION, INTERMITTENT, LESS THAN 6 HOURS PER DAY: ICD-10-PCS | Performed by: INTERNAL MEDICINE

## 2023-10-21 PROCEDURE — 99222 1ST HOSP IP/OBS MODERATE 55: CPT | Performed by: INTERNAL MEDICINE

## 2023-10-21 RX ORDER — INSULIN GLARGINE 300 U/ML
6 INJECTION, SOLUTION SUBCUTANEOUS DAILY
Status: SHIPPED | COMMUNITY
Start: 2023-10-21

## 2023-10-21 RX ORDER — ALBUMIN (HUMAN) 12.5 G/50ML
25 SOLUTION INTRAVENOUS
Status: DISCONTINUED | OUTPATIENT
Start: 2023-10-21 | End: 2023-10-21

## 2023-10-21 NOTE — PROGRESS NOTES
10/20/23 2125   BiPAP   $ RT Standby Charge (per 15 min) 1   $ PRANEETH set up charge Yes   Device Dream Station   BiPAP / CPAP CE# C3   BiPAP bacteria filter Yes   BIPAP plugged into main power? Yes   Mode AutoPAP   Interface Patient provided mask   Control Settings   Max P 20   Min P 5   O2 Flow Rate 2 lpm   SIPAP   Resp 21   BiPAP/CPAP Monitored Parameters   Pulse 69   SpO2 (!) 87 %   PRANEETH Protocol Yes   Toleration Well     Patient placed on NIV with above settings and tolerating well.     Tamie Alberts

## 2023-10-21 NOTE — PLAN OF CARE
Assumed care of patient at [address]. Patient is alert and orientated x4. Currently 2L O2 nasal cannula. CPAP at night. Continuous pulse ox maintained. NSR on tele. Continent of bowel and bladder. No complaints of pain. Up 1 assist and a walker. Call light within reach. Fall precautions in place. Hypoglycemia protocol followed. ~2145: Notified on call hosp of pt's low blood sugars. Per on call hosp, okay for pt to keep insulin pump on.      Plan of care:  Monitor blood sugar  Dialysis in AM     Problem: Diabetes/Glucose Control  Goal: Glucose maintained within prescribed range  Description: INTERVENTIONS:  - Monitor Blood Glucose as ordered  - Assess for signs and symptoms of hyperglycemia and hypoglycemia  - Administer ordered medications to maintain glucose within target range  - Assess barriers to adequate nutritional intake and initiate nutrition consult as needed  - Instruct patient on self management of diabetes  Outcome: Progressing

## 2023-10-21 NOTE — PLAN OF CARE
Assumed care at 0730. Pt alert, oriented x4. Received patient on 2L nasal cannula, weaned to room air, oxygen saturations adequate on room air. Lung sounds diminished bilaterally. Tele: NSR. Hemodialysis patient, dialysis today. Left AV fistula with thrill and bruit. Dressings proximal and distal to fistula, gauze and tegaderm clean, dry, intact. Right lower extremity dressing to RLE wound changed, clean, dry, intact. Wounds to bilateral feet, see flowsheets for images and assessment. Diminished urine production with dialysis. Premedicate with norco for pain during dialysis. Plan of care: possible discharge today after dialysis. Pt updated on plan of care, questions answered. 1158 - blood glucose 68. Hypoglycemia protocol. See MAR and results. MD notified. Insulin pump disconnected by patient. 1245 - Pt okay to discharge per hospitalist.   1500 - RLE wound cleansed with betadine, dressing changed. Dressings to LUE changed.      Problem: Patient/Family Goals  Goal: Patient/Family Long Term Goal  Description: Patient's Long Term Goal: discharge home    Interventions:  - medications as ordered  - follow MD orders  - See additional Care Plan goals for specific interventions  Outcome: Progressing  Goal: Patient/Family Short Term Goal  Description: Patient's Short Term Goal: dialysis tomorrow    Interventions:   - call dialysis  - follow MD orders  - See additional Care Plan goals for specific interventions  Outcome: Progressing     Problem: Diabetes/Glucose Control  Goal: Glucose maintained within prescribed range  Description: INTERVENTIONS:  - Monitor Blood Glucose as ordered  - Assess for signs and symptoms of hyperglycemia and hypoglycemia  - Administer ordered medications to maintain glucose within target range  - Assess barriers to adequate nutritional intake and initiate nutrition consult as needed  - Instruct patient on self management of diabetes  Outcome: Progressing

## 2023-10-21 NOTE — DISCHARGE INSTRUCTIONS
Paint incisions with betadine q day- thigh/ left arm/ cover dry gauze. No driving. No lifting more than 5 pounds 6 weeks.  Call office wound drainage/ temperature> 100.5    Bolus ratios: Bkfst: 1 Units for every 15 gms of carbs   Lunch: 1 Units for every 15 gms of carbs   Dinner: 1 Units for every 12 gms of carbs     Correction factor: 1 Units for every 75 mg/dl above a target glucose of 140 mg/dl

## 2023-10-21 NOTE — PROGRESS NOTES
No complaints. Bypass patent. Improved flow left hand. Currently on dialysis.  Given post-op wound instructions/ home if OK with other MDs

## 2023-10-23 LAB — ISTAT ACTIVATED CLOTTING TIME: 221 SECONDS (ref 74–137)

## 2023-10-23 NOTE — DISCHARGE SUMMARY
[facility]    PATIENT'S NAME: Kimo Bo   ATTENDING PHYSICIAN: Basil Sellers M.D. PATIENT ACCOUNT#:   [de-identified]    LOCATION:  [address] [place] [address]  MEDICAL RECORD #:   [id number]       YOB: 1950  ADMISSION DATE:       10/20/2023      DISCHARGE DATE:  10/21/2023    DISCHARGE SUMMARY    HISTORY AND HOSPITAL COURSE:  Patient underwent a proximal brachial artery to distal brachial artery bypass with interval ligation of the distal brachial artery just beyond the fistula of a left brachial artery-cephalic vein fistula. He is doing well. He has improved flow in his hand. Doppler flow appreciated down the radial artery at the wrist as well as palmar artery and improved flow in the ulnar artery. He has no ischemic hand pain as he had before. His ulcerations appear to be improving. He still has some swelling in the hand, but I told him this operation was not for that. His fistula is functioning, and he is currently having dialysis. The wounds appear to be clean and dry. DISCHARGE INSTRUCTIONS:  I instructed him on wound care, activity, and followup. All questions answered. Discharge home today if it is okay with the other doctors.     Dictated By Basil Sellers M.D.  d: 10/21/2023 11:48:57  t: 10/21/2023 12:06:45  Job [id number]  [facility]/

## 2023-10-23 NOTE — OPERATIVE REPORT
[address]    PATIENT'S NAME: Carolin Banerjee   ATTENDING PHYSICIAN: Lalita Ellis M.D. OPERATING PHYSICIAN: Lalita Ellis M.D. PATIENT ACCOUNT#:   [de-identified]    LOCATION:  [address] [place] [address]  MEDICAL RECORD #:   [id number]       YOB: 1950  ADMISSION DATE:       10/20/2023      OPERATION DATE:  10/20/2023    OPERATIVE REPORT    PREOPERATIVE DIAGNOSIS:  Arterial steal, left brachial artery-cephalic vein upper arm fistula, with previous Dupuytren contracture surgery. POSTOPERATIVE DIAGNOSIS:  Arterial steal, left brachial artery-cephalic vein upper arm fistula, with previous Dupuytren contracture surgery. PROCEDURE:  1.   Lynndyl of right great saphenous vein. 2.   Right mid brachial artery to distal brachial artery in situ right great saphenous vein bypass. 3.   Interval ligation of the brachial artery just distal to the fistula in a distal revascularization and interval ligation procedure. ASSISTANT:  MARIANA Dominique    INDICATIONS:  This is a 65-year-old male who had a fistula placed in his left arm back in 2022. The fistula has done well. He had 1 episode of some cellulitis, but it is all autogenous and he is healing, but he is complaining of severe pain in his hand and ulceration developing in the index finger. He had no Doppler flow in his radial artery distally and not seen on any on ultrasound. Doppler flow was present in the ulnar artery but somewhat weak. The fistula is working well. There is a question of some narrowing at the anastomosis, but it did not appear to be significant. I recommended a DRIL procedure with the risks and complications of death, myocardial infarction, bleeding, infection, hand loss, thrombosis, future hand loss, future thrombosis, nerve injury, bleeding, and infection. The option of taking down the fistula was also explained. The patient and wife understood and agreed. All questions answered.   Case was run through  Emma.    OPERATIVE TECHNIQUE:  Patient placed supine on procedure table in the hybrid room, underwent general anesthesia. Arterial lines and IV started by Anesthesia. The patient received preoperative antibiotics. He had the right groin prepped and draped in the usual sterile technique, and his saphenous vein appeared to be adequate in that leg. He had an ulcer of his left foot. The left leg was prepped and draped in the usual sterile technique circumferentially. Ultrasound was done prior to this that might have palpated a normal brachial artery proximal to everything by at least 7 to 10 cm and distal to this. A time-out was done. The patient then had an incision in the brachial artery midportion, dissecting out the brachial artery above. The fistula appeared to be of good quality, and then it was dissected out below and also appeared to be of good quality. The patient had a subcutaneous subfascial tunnel created. The patient then had an incision in the right thigh and dissection brought down to expose the saphenous vein. It appeared to be of good quality and eventually distended between 3 to 3.5 mm. The vein was harvested proximally with 3-0 Ethibond on a needle, distally with 3-0 Ethibond on a needle. The branches were all ligated with 4-0 Ethibond ties, hemoclips, and divided. The vein was placed in verapamil solution with heparin. The patient then had the wounds closed in the thigh wound with multiple layers of 2-0 Vicryl, and at the end of the procedure, it was closed with skin staples. The patient then had the brachial artery in the midportion of the upper arm controlled with a profunda clamp proximally and distally. Arteriotomy was performed with a #15 blade and extended with the Chew scissors to approximately 1 cm. The vein was trimmed and anastomosed to this with 6-0 Prolene suture.   Prior to completion of this, all vessels were flushed, and flow was established down the arm with care being taken to prevent any embolization of air or debris. The patient had 7-0 Prolene sutures used for hemostasis. With the blood pressure elevated, the vein then had the valvotome used to lyse the valves, and it distended very nicely at least 3.5 to 4 mm. Care was taken not to twist the graft as it was brought down through the tunnel down to the brachial artery just at the antecubital fossa. The artery was controlled proximally with a Mccoy clamp, distally with a profunda clamp. Arteriotomy was performed and extended with the Chew scissors to approximately 8 to 10 mm. The vein was trimmed and anastomosed to this with 6-0 Prolene suture. Prior to completion of this, all vessels were flushed, irrigated, and flow was established down the arm with care being taken to prevent any embolization of air or debris. Hemostasis was obtained with 7-0 Prolene suture. With the Mccoy clamp still on, 5-0 Prolene sutures x4 were placed above this area to ligate the artery distal to the fistula but above the bypass. This seemed to worked well. There were 2 separate areas that were controlled. The patient had a clamp on the brachial artery proximal as well as the graft too. At the completion of this, flow was established down the arm with care being taken to prevent any embolization of air or debris, as well as through the fistula. At the end of the procedure, there was excellent flow through the fistula. Now, Doppler flow was finally heard in the area of the radial artery at the wrist and much improved in the ulnar artery going into the palmar arch. The patient then had hemostasis obtained with protamine, Gelfoam and thrombin, FloSeal, hemoclips, and Bovie coagulation. They were closed in multiple layers with 2-0 Vicryl, and the skin was closed with staples in the arm, and a similar procedure with staples in the thigh. About 30 mL of 0.25% Marcaine plain was used for anesthesia.   Blood loss approximately 100 to 150 mL. Sponge and instrument counts were correct. FINAL DIAGNOSIS:  Arterial steal of the left brachial artery-cephalic vein autogenous fistula, treated with interval ligation with a proximal brachial artery to distal brachial artery in situ vein bypass graft with right great saphenous vein. Dictated By Smith Currie M.D.  d: 10/20/2023 14:06:28  t: 10/20/2023 16:59:33  [facility] [id number]  VXY/    cc: ANGEL Hdez M.D.

## 2023-11-01 ENCOUNTER — HOSPITAL ENCOUNTER (OUTPATIENT)
Dept: WOUND CARE | Age: 73
Discharge: STILL ADMITTED OR ADMINISTRATIVE | End: 2023-11-01
Attending: INTERNAL MEDICINE

## 2023-11-01 VITALS — TEMPERATURE: 96.8 F

## 2023-11-01 DIAGNOSIS — I73.9 PAD (PERIPHERAL ARTERY DISEASE) (CMD): Primary | ICD-10-CM

## 2023-11-01 DIAGNOSIS — E11.621 DIABETIC ULCER OF LEFT GREAT TOE (CMD): ICD-10-CM

## 2023-11-01 DIAGNOSIS — L97.519 DIABETIC ULCER OF RIGHT GREAT TOE (CMD): ICD-10-CM

## 2023-11-01 DIAGNOSIS — E08.621 DIABETIC ULCER OF TOE OF LEFT FOOT ASSOCIATED WITH DIABETES MELLITUS DUE TO UNDERLYING CONDITION, LIMITED TO BREAKDOWN OF SKIN (CMD): ICD-10-CM

## 2023-11-01 DIAGNOSIS — L97.511 ULCER OF RIGHT FOOT, LIMITED TO BREAKDOWN OF SKIN (CMD): ICD-10-CM

## 2023-11-01 DIAGNOSIS — E11.621 DIABETIC ULCER OF RIGHT GREAT TOE (CMD): ICD-10-CM

## 2023-11-01 DIAGNOSIS — L97.521 DIABETIC ULCER OF TOE OF LEFT FOOT ASSOCIATED WITH DIABETES MELLITUS DUE TO UNDERLYING CONDITION, LIMITED TO BREAKDOWN OF SKIN (CMD): ICD-10-CM

## 2023-11-01 DIAGNOSIS — L97.529 DIABETIC ULCER OF LEFT GREAT TOE (CMD): ICD-10-CM

## 2023-11-01 PROCEDURE — 11042 DBRDMT SUBQ TIS 1ST 20SQCM/<: CPT

## 2023-11-01 PROCEDURE — 11045 DBRDMT SUBQ TISS EACH ADDL: CPT

## 2023-11-01 ASSESSMENT — PAIN SCALES - GENERAL: PAINLEVEL_OUTOF10: 0

## 2023-11-27 ENCOUNTER — OFFICE VISIT (OUTPATIENT)
Dept: NEPHROLOGY | Facility: CLINIC | Age: 73
End: 2023-11-27
Payer: MEDICARE

## 2023-11-27 VITALS — BODY MASS INDEX: 28 KG/M2 | SYSTOLIC BLOOD PRESSURE: 108 MMHG | WEIGHT: 171.5 LBS | DIASTOLIC BLOOD PRESSURE: 60 MMHG

## 2023-11-27 DIAGNOSIS — I10 PRIMARY HYPERTENSION: ICD-10-CM

## 2023-11-27 DIAGNOSIS — N18.6 ESRD (END STAGE RENAL DISEASE) (HCC): Primary | ICD-10-CM

## 2023-11-27 DIAGNOSIS — E10.21 DIABETIC NEPHROPATHY ASSOCIATED WITH TYPE 1 DIABETES MELLITUS (HCC): ICD-10-CM

## 2023-12-06 ENCOUNTER — HOSPITAL ENCOUNTER (OUTPATIENT)
Dept: WOUND CARE | Age: 73
Discharge: STILL ADMITTED OR ADMINISTRATIVE | End: 2023-12-06
Attending: INTERNAL MEDICINE

## 2023-12-06 VITALS — TEMPERATURE: 96.8 F

## 2023-12-06 DIAGNOSIS — L97.511 DIABETIC ULCER OF TOE OF RIGHT FOOT ASSOCIATED WITH DIABETES MELLITUS DUE TO UNDERLYING CONDITION, LIMITED TO BREAKDOWN OF SKIN (CMD): ICD-10-CM

## 2023-12-06 DIAGNOSIS — E08.621 DIABETIC ULCER OF TOE OF RIGHT FOOT ASSOCIATED WITH DIABETES MELLITUS DUE TO UNDERLYING CONDITION, LIMITED TO BREAKDOWN OF SKIN (CMD): ICD-10-CM

## 2023-12-06 DIAGNOSIS — I73.9 PAD (PERIPHERAL ARTERY DISEASE) (CMD): ICD-10-CM

## 2023-12-06 DIAGNOSIS — L97.519 DIABETIC ULCER OF RIGHT GREAT TOE (CMD): ICD-10-CM

## 2023-12-06 DIAGNOSIS — E08.621 DIABETIC ULCER OF TOE OF LEFT FOOT ASSOCIATED WITH DIABETES MELLITUS DUE TO UNDERLYING CONDITION, LIMITED TO BREAKDOWN OF SKIN (CMD): ICD-10-CM

## 2023-12-06 DIAGNOSIS — E11.621 DIABETIC ULCER OF LEFT GREAT TOE (CMD): Primary | ICD-10-CM

## 2023-12-06 DIAGNOSIS — E11.621 DIABETIC ULCER OF RIGHT GREAT TOE (CMD): ICD-10-CM

## 2023-12-06 DIAGNOSIS — L97.521 DIABETIC ULCER OF TOE OF LEFT FOOT ASSOCIATED WITH DIABETES MELLITUS DUE TO UNDERLYING CONDITION, LIMITED TO BREAKDOWN OF SKIN (CMD): ICD-10-CM

## 2023-12-06 DIAGNOSIS — L97.529 DIABETIC ULCER OF LEFT GREAT TOE (CMD): Primary | ICD-10-CM

## 2023-12-06 PROCEDURE — 11045 DBRDMT SUBQ TISS EACH ADDL: CPT

## 2023-12-06 PROCEDURE — 11042 DBRDMT SUBQ TIS 1ST 20SQCM/<: CPT

## 2023-12-06 ASSESSMENT — PAIN SCALES - GENERAL: PAINLEVEL_OUTOF10: 0

## 2023-12-18 ENCOUNTER — HOSPITAL ENCOUNTER (OUTPATIENT)
Dept: ULTRASOUND IMAGING | Facility: HOSPITAL | Age: 73
Discharge: HOME | End: 2023-12-18
Attending: SURGERY
Payer: MEDICARE

## 2023-12-18 DIAGNOSIS — T82.848A PAIN FROM ARTERIOVENOUS FISTULA (HCC): ICD-10-CM

## 2023-12-18 DIAGNOSIS — I77.89 ARTERIAL STEAL SYNDROME (HCC): ICD-10-CM

## 2023-12-18 PROCEDURE — 93990 DOPPLER FLOW TESTING: CPT | Performed by: SURGERY

## 2023-12-20 ENCOUNTER — HOSPITAL ENCOUNTER (OUTPATIENT)
Dept: WOUND CARE | Age: 73
Discharge: STILL ADMITTED OR ADMINISTRATIVE | End: 2023-12-20
Attending: INTERNAL MEDICINE

## 2023-12-20 VITALS — TEMPERATURE: 97.9 F

## 2023-12-20 DIAGNOSIS — L97.529 DIABETIC ULCER OF LEFT GREAT TOE (CMD): ICD-10-CM

## 2023-12-20 DIAGNOSIS — L97.511 ULCER OF RIGHT FOOT, LIMITED TO BREAKDOWN OF SKIN (CMD): ICD-10-CM

## 2023-12-20 DIAGNOSIS — E11.621 DIABETIC ULCER OF LEFT GREAT TOE (CMD): ICD-10-CM

## 2023-12-20 DIAGNOSIS — I73.9 PAD (PERIPHERAL ARTERY DISEASE) (CMD): Primary | ICD-10-CM

## 2023-12-20 PROCEDURE — 11042 DBRDMT SUBQ TIS 1ST 20SQCM/<: CPT

## 2023-12-20 PROCEDURE — 11045 DBRDMT SUBQ TISS EACH ADDL: CPT

## 2023-12-20 ASSESSMENT — PAIN SCALES - GENERAL: PAINLEVEL_OUTOF10: 0

## 2024-01-03 ENCOUNTER — APPOINTMENT (OUTPATIENT)
Dept: WOUND CARE | Age: 74
End: 2024-01-03
Attending: INTERNAL MEDICINE

## 2024-01-08 ENCOUNTER — HOSPITAL ENCOUNTER (OUTPATIENT)
Dept: ULTRASOUND IMAGING | Age: 74
Discharge: HOME | End: 2024-01-08
Attending: STUDENT IN AN ORGANIZED HEALTH CARE EDUCATION/TRAINING PROGRAM
Payer: MEDICARE

## 2024-01-08 DIAGNOSIS — D48.5 NEOPLASM OF UNCERTAIN BEHAVIOR OF SKIN: ICD-10-CM

## 2024-01-08 PROCEDURE — 76536 US EXAM OF HEAD AND NECK: CPT | Performed by: STUDENT IN AN ORGANIZED HEALTH CARE EDUCATION/TRAINING PROGRAM

## 2024-01-10 ENCOUNTER — HOSPITAL ENCOUNTER (OUTPATIENT)
Dept: WOUND CARE | Age: 74
Discharge: STILL ADMITTED OR ADMINISTRATIVE | End: 2024-01-10
Attending: PODIATRIST | Admitting: PODIATRIST

## 2024-01-10 VITALS — TEMPERATURE: 95.4 F

## 2024-01-10 DIAGNOSIS — L97.511 ULCER OF RIGHT FOOT, LIMITED TO BREAKDOWN OF SKIN (CMD): ICD-10-CM

## 2024-01-10 DIAGNOSIS — L97.529 DIABETIC ULCER OF LEFT GREAT TOE (CMD): ICD-10-CM

## 2024-01-10 DIAGNOSIS — L97.521 DIABETIC ULCER OF TOE OF LEFT FOOT ASSOCIATED WITH DIABETES MELLITUS DUE TO UNDERLYING CONDITION, LIMITED TO BREAKDOWN OF SKIN (CMD): ICD-10-CM

## 2024-01-10 DIAGNOSIS — E08.621 DIABETIC ULCER OF TOE OF LEFT FOOT ASSOCIATED WITH DIABETES MELLITUS DUE TO UNDERLYING CONDITION, LIMITED TO BREAKDOWN OF SKIN (CMD): ICD-10-CM

## 2024-01-10 DIAGNOSIS — I73.9 PAD (PERIPHERAL ARTERY DISEASE) (CMD): Primary | ICD-10-CM

## 2024-01-10 DIAGNOSIS — E11.621 DIABETIC ULCER OF LEFT GREAT TOE (CMD): ICD-10-CM

## 2024-01-10 PROCEDURE — 11045 DBRDMT SUBQ TISS EACH ADDL: CPT

## 2024-01-10 PROCEDURE — 11042 DBRDMT SUBQ TIS 1ST 20SQCM/<: CPT

## 2024-01-10 RX ORDER — SERTRALINE HYDROCHLORIDE 25 MG/1
25 TABLET, FILM COATED ORAL DAILY
COMMUNITY

## 2024-01-10 ASSESSMENT — PAIN SCALES - GENERAL: PAINLEVEL_OUTOF10: 0

## 2024-01-24 ENCOUNTER — APPOINTMENT (OUTPATIENT)
Dept: WOUND CARE | Age: 74
End: 2024-01-24
Attending: PODIATRIST

## 2024-01-27 ENCOUNTER — HOSPITAL ENCOUNTER (INPATIENT)
Facility: HOSPITAL | Age: 74
LOS: 3 days | Discharge: HOME HEALTH | DRG: 570 | End: 2024-01-31
Attending: EMERGENCY MEDICINE | Admitting: INTERNAL MEDICINE
Payer: MEDICARE

## 2024-01-27 ENCOUNTER — APPOINTMENT (OUTPATIENT)
Dept: CT IMAGING | Age: 74
End: 2024-01-27
Attending: EMERGENCY MEDICINE
Payer: MEDICARE

## 2024-01-27 ENCOUNTER — HOSPITAL ENCOUNTER (INPATIENT)
Facility: HOSPITAL | Age: 74
LOS: 3 days | Discharge: HOME HEALTH | End: 2024-01-31
Attending: EMERGENCY MEDICINE | Admitting: INTERNAL MEDICINE
Payer: MEDICARE

## 2024-01-27 ENCOUNTER — APPOINTMENT (OUTPATIENT)
Dept: CT IMAGING | Age: 74
DRG: 570 | End: 2024-01-27
Attending: EMERGENCY MEDICINE
Payer: MEDICARE

## 2024-01-27 DIAGNOSIS — L02.214 GROIN ABSCESS: Primary | ICD-10-CM

## 2024-01-27 DIAGNOSIS — E10.65 TYPE 1 DIABETES MELLITUS WITH HYPERGLYCEMIA (HCC): ICD-10-CM

## 2024-01-27 DIAGNOSIS — L02.91 ABSCESS: ICD-10-CM

## 2024-01-27 DIAGNOSIS — N18.6 ESRD (END STAGE RENAL DISEASE) (HCC): ICD-10-CM

## 2024-01-27 LAB
ALBUMIN SERPL-MCNC: 3 G/DL (ref 3.4–5)
ALBUMIN/GLOB SERPL: 0.7 {RATIO} (ref 1–2)
ALP LIVER SERPL-CCNC: 177 U/L
ANION GAP SERPL CALC-SCNC: 7 MMOL/L (ref 0–18)
AST SERPL-CCNC: 37 U/L (ref 15–37)
BASOPHILS # BLD AUTO: 0.04 X10(3) UL (ref 0–0.2)
BASOPHILS NFR BLD AUTO: 0.4 %
BILIRUB SERPL-MCNC: 1.6 MG/DL (ref 0.1–2)
BUN BLD-MCNC: 20 MG/DL (ref 9–23)
CALCIUM BLD-MCNC: 9 MG/DL (ref 8.5–10.1)
CHLORIDE SERPL-SCNC: 99 MMOL/L (ref 98–112)
CO2 SERPL-SCNC: 31 MMOL/L (ref 21–32)
CREAT BLD-MCNC: 3.4 MG/DL
EGFRCR SERPLBLD CKD-EPI 2021: 18 ML/MIN/1.73M2 (ref 60–?)
EOSINOPHIL # BLD AUTO: 0.16 X10(3) UL (ref 0–0.7)
EOSINOPHIL NFR BLD AUTO: 1.5 %
ERYTHROCYTE [DISTWIDTH] IN BLOOD BY AUTOMATED COUNT: 16.6 %
GLOBULIN PLAS-MCNC: 4.5 G/DL (ref 2.8–4.4)
GLUCOSE BLD-MCNC: 210 MG/DL (ref 70–99)
HCT VFR BLD AUTO: 39.4 %
HGB BLD-MCNC: 12.4 G/DL
IMM GRANULOCYTES # BLD AUTO: 0.05 X10(3) UL (ref 0–1)
IMM GRANULOCYTES NFR BLD: 0.5 %
LACTATE SERPL-SCNC: 1.6 MMOL/L (ref 0.4–2)
LYMPHOCYTES # BLD AUTO: 0.51 X10(3) UL (ref 1–4)
LYMPHOCYTES NFR BLD AUTO: 4.8 %
MCH RBC QN AUTO: 32.4 PG (ref 26–34)
MCHC RBC AUTO-ENTMCNC: 31.5 G/DL (ref 31–37)
MCV RBC AUTO: 102.9 FL
MONOCYTES # BLD AUTO: 0.67 X10(3) UL (ref 0.1–1)
MONOCYTES NFR BLD AUTO: 6.2 %
NEUTROPHILS # BLD AUTO: 9.3 X10 (3) UL (ref 1.5–7.7)
NEUTROPHILS # BLD AUTO: 9.3 X10(3) UL (ref 1.5–7.7)
NEUTROPHILS NFR BLD AUTO: 86.6 %
OSMOLALITY SERPL CALC.SUM OF ELEC: 293 MOSM/KG (ref 275–295)
PLATELET # BLD AUTO: 158 10(3)UL (ref 150–450)
POTASSIUM SERPL-SCNC: 4.3 MMOL/L (ref 3.5–5.1)
PROT SERPL-MCNC: 7.5 G/DL (ref 6.4–8.2)
RBC # BLD AUTO: 3.83 X10(6)UL
SODIUM SERPL-SCNC: 137 MMOL/L (ref 136–145)
WBC # BLD AUTO: 10.7 X10(3) UL (ref 4–11)

## 2024-01-27 PROCEDURE — 73701 CT LOWER EXTREMITY W/DYE: CPT | Performed by: EMERGENCY MEDICINE

## 2024-01-27 RX ORDER — SODIUM CHLORIDE 9 MG/ML
INJECTION, SOLUTION INTRAVENOUS CONTINUOUS
Status: CANCELLED | OUTPATIENT
Start: 2024-01-27 | End: 2024-01-27

## 2024-01-27 RX ORDER — IOHEXOL 350 MG/ML
100 INJECTION, SOLUTION INTRAVENOUS
Status: COMPLETED | OUTPATIENT
Start: 2024-01-27 | End: 2024-01-27

## 2024-01-27 RX ORDER — ONDANSETRON 2 MG/ML
4 INJECTION INTRAMUSCULAR; INTRAVENOUS EVERY 4 HOURS PRN
Status: CANCELLED | OUTPATIENT
Start: 2024-01-27 | End: 2024-01-27

## 2024-01-27 RX ORDER — HYDROMORPHONE HYDROCHLORIDE 1 MG/ML
0.5 INJECTION, SOLUTION INTRAMUSCULAR; INTRAVENOUS; SUBCUTANEOUS EVERY 30 MIN PRN
Status: CANCELLED | OUTPATIENT
Start: 2024-01-27 | End: 2024-01-27

## 2024-01-28 ENCOUNTER — ANESTHESIA (OUTPATIENT)
Dept: SURGERY | Facility: HOSPITAL | Age: 74
End: 2024-01-28
Payer: MEDICARE

## 2024-01-28 ENCOUNTER — ANESTHESIA EVENT (OUTPATIENT)
Dept: SURGERY | Facility: HOSPITAL | Age: 74
End: 2024-01-28
Payer: MEDICARE

## 2024-01-28 PROBLEM — N18.6 ESRD (END STAGE RENAL DISEASE) (HCC): Status: ACTIVE | Noted: 2024-01-28

## 2024-01-28 PROBLEM — E10.65 TYPE 1 DIABETES MELLITUS WITH HYPERGLYCEMIA (HCC): Status: ACTIVE | Noted: 2024-01-28

## 2024-01-28 LAB
GLUCOSE BLD-MCNC: 113 MG/DL (ref 70–99)
GLUCOSE BLD-MCNC: 117 MG/DL (ref 70–99)
GLUCOSE BLD-MCNC: 128 MG/DL (ref 70–99)
GLUCOSE BLD-MCNC: 132 MG/DL (ref 70–99)
GLUCOSE BLD-MCNC: 238 MG/DL (ref 70–99)
GLUCOSE BLD-MCNC: 93 MG/DL (ref 70–99)

## 2024-01-28 PROCEDURE — 0JBL0ZZ EXCISION OF RIGHT UPPER LEG SUBCUTANEOUS TISSUE AND FASCIA, OPEN APPROACH: ICD-10-PCS | Performed by: SURGERY

## 2024-01-28 RX ORDER — ACETAMINOPHEN 500 MG
1000 TABLET ORAL ONCE AS NEEDED
Status: DISCONTINUED | OUTPATIENT
Start: 2024-01-28 | End: 2024-01-28 | Stop reason: HOSPADM

## 2024-01-28 RX ORDER — SODIUM CHLORIDE, SODIUM LACTATE, POTASSIUM CHLORIDE, CALCIUM CHLORIDE 600; 310; 30; 20 MG/100ML; MG/100ML; MG/100ML; MG/100ML
INJECTION, SOLUTION INTRAVENOUS CONTINUOUS
Status: DISCONTINUED | OUTPATIENT
Start: 2024-01-28 | End: 2024-01-28 | Stop reason: HOSPADM

## 2024-01-28 RX ORDER — BISACODYL 10 MG
10 SUPPOSITORY, RECTAL RECTAL
Status: DISCONTINUED | OUTPATIENT
Start: 2024-01-28 | End: 2024-01-31

## 2024-01-28 RX ORDER — HYDROCODONE BITARTRATE AND ACETAMINOPHEN 5; 325 MG/1; MG/1
1 TABLET ORAL ONCE AS NEEDED
Status: DISCONTINUED | OUTPATIENT
Start: 2024-01-28 | End: 2024-01-28 | Stop reason: HOSPADM

## 2024-01-28 RX ORDER — ONDANSETRON 2 MG/ML
4 INJECTION INTRAMUSCULAR; INTRAVENOUS EVERY 6 HOURS PRN
Status: DISCONTINUED | OUTPATIENT
Start: 2024-01-28 | End: 2024-01-28 | Stop reason: HOSPADM

## 2024-01-28 RX ORDER — HYDROMORPHONE HYDROCHLORIDE 1 MG/ML
0.4 INJECTION, SOLUTION INTRAMUSCULAR; INTRAVENOUS; SUBCUTANEOUS EVERY 5 MIN PRN
Status: DISCONTINUED | OUTPATIENT
Start: 2024-01-28 | End: 2024-01-28 | Stop reason: HOSPADM

## 2024-01-28 RX ORDER — LABETALOL HYDROCHLORIDE 5 MG/ML
5 INJECTION, SOLUTION INTRAVENOUS EVERY 5 MIN PRN
Status: DISCONTINUED | OUTPATIENT
Start: 2024-01-28 | End: 2024-01-28 | Stop reason: HOSPADM

## 2024-01-28 RX ORDER — METOPROLOL TARTRATE 1 MG/ML
2.5 INJECTION, SOLUTION INTRAVENOUS ONCE
Status: DISCONTINUED | OUTPATIENT
Start: 2024-01-28 | End: 2024-01-28 | Stop reason: HOSPADM

## 2024-01-28 RX ORDER — HYDROMORPHONE HYDROCHLORIDE 1 MG/ML
0.6 INJECTION, SOLUTION INTRAMUSCULAR; INTRAVENOUS; SUBCUTANEOUS EVERY 5 MIN PRN
Status: DISCONTINUED | OUTPATIENT
Start: 2024-01-28 | End: 2024-01-28 | Stop reason: HOSPADM

## 2024-01-28 RX ORDER — SENNOSIDES 8.6 MG
17.2 TABLET ORAL NIGHTLY PRN
Status: DISCONTINUED | OUTPATIENT
Start: 2024-01-28 | End: 2024-01-31

## 2024-01-28 RX ORDER — ONDANSETRON 2 MG/ML
4 INJECTION INTRAMUSCULAR; INTRAVENOUS EVERY 6 HOURS PRN
Status: DISCONTINUED | OUTPATIENT
Start: 2024-01-28 | End: 2024-01-31

## 2024-01-28 RX ORDER — DEXTROSE MONOHYDRATE 25 G/50ML
50 INJECTION, SOLUTION INTRAVENOUS
Status: DISCONTINUED | OUTPATIENT
Start: 2024-01-28 | End: 2024-01-31

## 2024-01-28 RX ORDER — ONDANSETRON 2 MG/ML
INJECTION INTRAMUSCULAR; INTRAVENOUS AS NEEDED
Status: DISCONTINUED | OUTPATIENT
Start: 2024-01-28 | End: 2024-01-28 | Stop reason: SURG

## 2024-01-28 RX ORDER — DEXAMETHASONE SODIUM PHOSPHATE 4 MG/ML
VIAL (ML) INJECTION AS NEEDED
Status: DISCONTINUED | OUTPATIENT
Start: 2024-01-28 | End: 2024-01-28 | Stop reason: SURG

## 2024-01-28 RX ORDER — BUPIVACAINE HYDROCHLORIDE 5 MG/ML
INJECTION, SOLUTION EPIDURAL; INTRACAUDAL AS NEEDED
Status: DISCONTINUED | OUTPATIENT
Start: 2024-01-28 | End: 2024-01-28 | Stop reason: HOSPADM

## 2024-01-28 RX ORDER — SERTRALINE HYDROCHLORIDE 25 MG/1
25 TABLET, FILM COATED ORAL DAILY
COMMUNITY

## 2024-01-28 RX ORDER — SEVELAMER CARBONATE 800 MG/1
1600 TABLET, FILM COATED ORAL
Status: DISCONTINUED | OUTPATIENT
Start: 2024-01-28 | End: 2024-01-31

## 2024-01-28 RX ORDER — ACETAMINOPHEN 500 MG
500 TABLET ORAL EVERY 4 HOURS PRN
Status: DISCONTINUED | OUTPATIENT
Start: 2024-01-28 | End: 2024-01-31

## 2024-01-28 RX ORDER — METOCLOPRAMIDE HYDROCHLORIDE 5 MG/ML
5 INJECTION INTRAMUSCULAR; INTRAVENOUS EVERY 8 HOURS PRN
Status: DISCONTINUED | OUTPATIENT
Start: 2024-01-28 | End: 2024-01-31

## 2024-01-28 RX ORDER — LEVOTHYROXINE SODIUM 0.15 MG/1
150 TABLET ORAL
Status: DISCONTINUED | OUTPATIENT
Start: 2024-01-28 | End: 2024-01-31

## 2024-01-28 RX ORDER — SERTRALINE HYDROCHLORIDE 20 MG/ML
20 SOLUTION ORAL DAILY
Status: DISCONTINUED | OUTPATIENT
Start: 2024-01-28 | End: 2024-01-28

## 2024-01-28 RX ORDER — HEPARIN SODIUM 5000 [USP'U]/ML
5000 INJECTION, SOLUTION INTRAVENOUS; SUBCUTANEOUS EVERY 8 HOURS SCHEDULED
Status: DISCONTINUED | OUTPATIENT
Start: 2024-01-28 | End: 2024-01-31

## 2024-01-28 RX ORDER — HYDROCODONE BITARTRATE AND ACETAMINOPHEN 5; 325 MG/1; MG/1
2 TABLET ORAL ONCE AS NEEDED
Status: DISCONTINUED | OUTPATIENT
Start: 2024-01-28 | End: 2024-01-28 | Stop reason: HOSPADM

## 2024-01-28 RX ORDER — NICOTINE POLACRILEX 4 MG
15 LOZENGE BUCCAL
Status: DISCONTINUED | OUTPATIENT
Start: 2024-01-28 | End: 2024-01-31

## 2024-01-28 RX ORDER — SERTRALINE HYDROCHLORIDE 25 MG/1
25 TABLET, FILM COATED ORAL DAILY
Status: DISCONTINUED | OUTPATIENT
Start: 2024-01-28 | End: 2024-01-31

## 2024-01-28 RX ORDER — SODIUM CHLORIDE, SODIUM LACTATE, POTASSIUM CHLORIDE, CALCIUM CHLORIDE 600; 310; 30; 20 MG/100ML; MG/100ML; MG/100ML; MG/100ML
INJECTION, SOLUTION INTRAVENOUS CONTINUOUS PRN
Status: DISCONTINUED | OUTPATIENT
Start: 2024-01-28 | End: 2024-01-28 | Stop reason: SURG

## 2024-01-28 RX ORDER — LACTULOSE 10 G/15ML
20 SOLUTION ORAL EVERY EVENING
Status: DISCONTINUED | OUTPATIENT
Start: 2024-01-28 | End: 2024-01-31

## 2024-01-28 RX ORDER — FAMOTIDINE 20 MG/1
20 TABLET, FILM COATED ORAL DAILY
Status: DISCONTINUED | OUTPATIENT
Start: 2024-01-28 | End: 2024-01-31

## 2024-01-28 RX ORDER — MELATONIN
3 NIGHTLY PRN
Status: DISCONTINUED | OUTPATIENT
Start: 2024-01-28 | End: 2024-01-31

## 2024-01-28 RX ORDER — EZETIMIBE 10 MG/1
10 TABLET ORAL DAILY
Status: DISCONTINUED | OUTPATIENT
Start: 2024-01-28 | End: 2024-01-31

## 2024-01-28 RX ORDER — LIDOCAINE HYDROCHLORIDE 10 MG/ML
INJECTION, SOLUTION EPIDURAL; INFILTRATION; INTRACAUDAL; PERINEURAL AS NEEDED
Status: DISCONTINUED | OUTPATIENT
Start: 2024-01-28 | End: 2024-01-28 | Stop reason: SURG

## 2024-01-28 RX ORDER — NICOTINE POLACRILEX 4 MG
30 LOZENGE BUCCAL
Status: DISCONTINUED | OUTPATIENT
Start: 2024-01-28 | End: 2024-01-31

## 2024-01-28 RX ORDER — HYDROMORPHONE HYDROCHLORIDE 1 MG/ML
0.2 INJECTION, SOLUTION INTRAMUSCULAR; INTRAVENOUS; SUBCUTANEOUS EVERY 5 MIN PRN
Status: DISCONTINUED | OUTPATIENT
Start: 2024-01-28 | End: 2024-01-28 | Stop reason: HOSPADM

## 2024-01-28 RX ORDER — POLYETHYLENE GLYCOL 3350 17 G/17G
17 POWDER, FOR SOLUTION ORAL DAILY PRN
Status: DISCONTINUED | OUTPATIENT
Start: 2024-01-28 | End: 2024-01-31

## 2024-01-28 RX ORDER — LIDOCAINE HYDROCHLORIDE AND EPINEPHRINE 10; 10 MG/ML; UG/ML
INJECTION, SOLUTION INFILTRATION; PERINEURAL AS NEEDED
Status: DISCONTINUED | OUTPATIENT
Start: 2024-01-28 | End: 2024-01-28 | Stop reason: HOSPADM

## 2024-01-28 RX ORDER — NALOXONE HYDROCHLORIDE 0.4 MG/ML
80 INJECTION, SOLUTION INTRAMUSCULAR; INTRAVENOUS; SUBCUTANEOUS AS NEEDED
Status: DISCONTINUED | OUTPATIENT
Start: 2024-01-28 | End: 2024-01-28 | Stop reason: HOSPADM

## 2024-01-28 RX ORDER — SERTRALINE HYDROCHLORIDE 20 MG/ML
20 SOLUTION ORAL DAILY
Status: ON HOLD | COMMUNITY
End: 2024-01-28

## 2024-01-28 RX ORDER — EPHEDRINE SULFATE 50 MG/ML
INJECTION INTRAVENOUS AS NEEDED
Status: DISCONTINUED | OUTPATIENT
Start: 2024-01-28 | End: 2024-01-28 | Stop reason: SURG

## 2024-01-28 RX ADMIN — ONDANSETRON 4 MG: 2 INJECTION INTRAMUSCULAR; INTRAVENOUS at 14:20:00

## 2024-01-28 RX ADMIN — EPHEDRINE SULFATE 10 MG: 50 INJECTION INTRAVENOUS at 14:20:00

## 2024-01-28 RX ADMIN — SODIUM CHLORIDE, SODIUM LACTATE, POTASSIUM CHLORIDE, CALCIUM CHLORIDE: 600; 310; 30; 20 INJECTION, SOLUTION INTRAVENOUS at 14:23:00

## 2024-01-28 RX ADMIN — SODIUM CHLORIDE, SODIUM LACTATE, POTASSIUM CHLORIDE, CALCIUM CHLORIDE: 600; 310; 30; 20 INJECTION, SOLUTION INTRAVENOUS at 14:03:00

## 2024-01-28 RX ADMIN — LIDOCAINE HYDROCHLORIDE 50 MG: 10 INJECTION, SOLUTION EPIDURAL; INFILTRATION; INTRACAUDAL; PERINEURAL at 14:12:00

## 2024-01-28 RX ADMIN — DEXAMETHASONE SODIUM PHOSPHATE 4 MG: 4 MG/ML VIAL (ML) INJECTION at 14:20:00

## 2024-01-28 NOTE — ED PROVIDER NOTES
Patient Seen in: [facility]      History     Chief Complaint   Patient presents with    Abscess     Stated Complaint: open draining wound to groin    Subjective:   HPI    Patient is a 73-year-old male presents with an abscess and swelling to his right groin region.  Says he is only noticed that over the last couple days.  Says he saw his primary care doctor and was not diagnosed with it earlier in the week.  It is quite swollen and indurated, see picture.  History of diabetes.  He is on an insulin pump.  History of renal insufficiency.  History of dialysis.  He had his dialysis today.    Objective:   Past Medical History:   Diagnosis Date    Anemia     anxiety     back pain     Back problem     Blastomycosis 1984    BPH (benign prostatic hyperplasia)     Depression     Diabetic retinopathy     laser surgery    End stage renal disease (HCC)     Esophageal reflux     occassion    Hearing impairment     hearing aids    HEMORRHOIDS     High cholesterol     History of blood transfusion 2013    History of renal dialysis     fistula on left arm    Hyperkalemia     hyperlipidemia     hypothyroidism     IMPOTENCE     Liver disease     2013 - pt states all resolved after liver and kidneys shut down after issue with pneumonia     Mood disturbance     Neuropathy     osteoarthritis     Osteoarthrosis, unspecified whether generalized or localized, unspecified site     osteoporosis     Polyneuropathy in diabetes(357.2)     Sepsis (HCC)     Sleep apnea, obstructive SPLIT NIGHT 5-12-17    AHI 54 SaO2 lizeth 74 % CPAP 12 HME//     Type I (juvenile type) diabetes mellitus without mention of complication, not stated as uncontrolled     since 1963- DR Mao follows    Unspecified essential hypertension     Visual impairment     wears glasses              Past Surgical History:   Procedure Laterality Date    BACK SURGERY  2009    laminectomy  L1 -4  2/09  [facility]    BACK SURGERY  8-8-13     C4-C6 ACDF     BACK SURGERY  9/8/16    L2-S1 Revision Decomp possible uninstrumented fusion 9/8/16    BACK SURGERY  09/10/2018    Rev C3-C7 ACDF     CATARACT Bilateral done in 2004    IOL's    COLONOSCOPY      2006    COLONOSCOPY  2/2009    normal    COLONOSCOPY N/A 8/23/2019    adenoma- repeat 1 yr (2 dya prep)    COLONOSCOPY,BIOPSY  2/20/09    Performed by ANAM RIVERS at [facility]    EGD N/A 7/23/2021    EGD & COLONOSCOPY Surgeon: Kimberly, +vik, rpt EGD 3 months, poor prep rpt colon 3 years    ENDOSCOPY, BOWEL POUCH, BIOPSY      INJECTION, W/WO CONTRAST, DX/THERAPEUTIC SUBSTANCE, EPIDURAL/SUBARACHNOID; LUMBAR/SACRAL N/A 5/4/2016    Procedure: LUMBAR EPIDURAL;  Surgeon: Jayden Norton MD;  Location: [facility]    INJECTION, W/WO CONTRAST, DX/THERAPEUTIC SUBSTANCE, EPIDURAL/SUBARACHNOID; LUMBAR/SACRAL N/A 5/25/2016    Procedure: LUMBAR EPIDURAL;  Surgeon: Jayden Norton MD;  Location: [facility]    INJECTION, W/WO CONTRAST, DX/THERAPEUTIC SUBSTANCE, EPIDURAL/SUBARACHNOID; LUMBAR/SACRAL N/A 6/8/2016    Procedure: LUMBAR EPIDURAL;  Surgeon: Jayden Norton MD;  Location: [facility]    KNEE REPLACEMENT SURGERY  2/14/13    Left TKR by Dr. Lombardi    M-SEDAJ BY  PHYS PERFRMG SVC 5+ YR N/A 5/4/2016    Procedure: LUMBAR EPIDURAL;  Surgeon: Jayden Norton MD;  Location: [facility]    M-SEDAJ BY  PHYS PERFRMG SVC 5+ YR N/A 5/25/2016    Procedure: LUMBAR EPIDURAL;  Surgeon: Jayden Norton MD;  Location: [facility]    M-SEDAJ BY  PHYS PERFRMG SVC 5+ YR N/A 6/8/2016    Procedure: LUMBAR EPIDURAL;  Surgeon: Jayden Norton MD;  Location: [facility]    OTHER SURGICAL HISTORY      Bilateral median nerve release at elbow; 2000    OTHER SURGICAL HISTORY      CTS release bilateral 2000    OTHER SURGICAL HISTORY      Surgical repair fracture left hand 5th digit    OTHER SURGICAL  HISTORY      Surgery left heel ligament repair    OTHER SURGICAL HISTORY      Laser surgery for bilateral retinopathy    OTHER SURGICAL HISTORY      Surgery to left eye for \"weak muscle.\" 2007    OTHER SURGICAL HISTORY      bilat knee repair 9/23/10    OTHER SURGICAL HISTORY  11/2013    lung resection to remove abcess    OTHER SURGICAL HISTORY  10/26/2020    Cystoscopy (Dr. Ribeiro)    PATIENT DOCUMENTED NOT TO HAVE EXPERIENCED ANY OF THE FOLLOWING EVENTS  2/14/2014    Procedure: ;  Surgeon: Kin Hobbs MD;  Location: [facility]    PATIENT DOCUMENTED NOT TO HAVE EXPERIENCED ANY OF THE FOLLOWING EVENTS N/A 5/4/2016    Procedure: LUMBAR EPIDURAL;  Surgeon: Jayden Norton MD;  Location: [facility]    PATIENT DOCUMENTED NOT TO HAVE EXPERIENCED ANY OF THE FOLLOWING EVENTS N/A 5/25/2016    Procedure: LUMBAR EPIDURAL;  Surgeon: Jayden Norton MD;  Location: [facility]    PATIENT DOCUMENTED NOT TO HAVE EXPERIENCED ANY OF THE FOLLOWING EVENTS N/A 6/8/2016    Procedure: LUMBAR EPIDURAL;  Surgeon: Jayden Norton MD;  Location: [facility]    PATIENT WITHOUGH PREOPERATIVE ORDER FOR IV ANTIBIOTIC SURGICAL SITE INFECTION PROPHYLAXIS.  2/14/2014    Procedure: ;  Surgeon: Kin Hobbs MD;  Location: [facility]    PATIENT WITHOUGH PREOPERATIVE ORDER FOR IV ANTIBIOTIC SURGICAL SITE INFECTION PROPHYLAXIS. N/A 5/4/2016    Procedure: LUMBAR EPIDURAL;  Surgeon: Jayden Norton MD;  Location: [facility]    PATIENT WITHOUGH PREOPERATIVE ORDER FOR IV ANTIBIOTIC SURGICAL SITE INFECTION PROPHYLAXIS. N/A 5/25/2016    Procedure: LUMBAR EPIDURAL;  Surgeon: Jayden Norton MD;  Location: [facility]    PATIENT WITHOUGH PREOPERATIVE ORDER FOR IV ANTIBIOTIC SURGICAL SITE INFECTION PROPHYLAXIS. N/A 6/8/2016    Procedure: LUMBAR EPIDURAL;  Surgeon: Jayden Norton MD;  Location: [facility]    TOTAL  KNEE REPLACEMENT Left     UPPER GI ENDOSCOPY,DIAGNOSIS  16    retained gastric contents; Edward    UPPER GI ENDOSCOPY,REMOV F.B. N/A 2014    Procedure: ESOPHAGOGASTRODUODENOSCOPY, POSSIBLE BIOPSY, POSSIBLE POLYPECTOMY 20701;  Surgeon: Kin Hobbs MD;  [facility]                Social History     Socioeconomic History    Marital status:     Number of children: 1   Occupational History    Occupation: Retired--Printer   Tobacco Use    Smoking status: Former     Packs/day: 3.50     Years: 20.00     Additional pack years: 0.00     Total pack years: 70.00     Types: Cigarettes     Quit date: 1986     Years since quittin.0    Smokeless tobacco: Never    Tobacco comments:     Quit 25 years ago   Vaping Use    Vaping Use: Never used   Substance and Sexual Activity    Alcohol use: Not Currently     Comment: very rarely    Drug use: No    Sexual activity: Yes     Partners: Female   Other Topics Concern     Service No    Blood Transfusions No    Caffeine Concern No     Comment: coffee 1 cup per day    Sleep Concern No    Exercise Yes     Comment: PT/OT 1x per week    Seat Belt Yes     Social Determinants of Health     Food Insecurity: No Food Insecurity (10/20/2023)    Food Insecurity     Food Insecurity: Never true   Transportation Needs: No Transportation Needs (10/20/2023)    Transportation Needs     Lack of Transportation: No   Housing Stability: Low Risk  (10/20/2023)    Housing Stability     Housing Instability: No              Review of Systems    Positive for stated complaint: open draining wound to groin  Other systems are as noted in HPI.  Constitutional and vital signs reviewed.      All other systems reviewed and negative except as noted above.    Physical Exam     ED Triage Vitals [24]   /59   Pulse 68   Resp 16   Temp 98.4 °F (36.9 °C)   Temp src Oral   SpO2 94 %   O2 Device None (Room air)       Current:/65   Pulse 67    Temp 98.4 °F (36.9 °C) (Oral)   Resp 18   Wt 77.1 kg   SpO2 97%   BMI 27.44 kg/m²         Physical Exam    General:  chronically ill-appearing 73-year-old male HEENT: Normal cephalic atraumatic.  Nonicteric sclera.  Moist mucous membranes.  No meningismus.  No adenopathy  Lungs: No tachypnea.  Lungs clear to auscultation bilaterally without rales/rhonchi.  Equal breath sounds bilaterally  Cardiac: No tachycardia.  No murmurs.  Regular rate and rhythm.  Abdomen: Soft and nontender throughout.  No rebound or guarding  Extremities: Draining significantly indurated erythema and swelling in the right inguinal groin region.  Plan material is draining there is self draining abscess.  Quite firm.  Skin: No rashes, no pallor  Neuro: Awake oriented ×3.  Nonfocal.  Good strength throughout    ED Course     Labs Reviewed   COMP METABOLIC PANEL (14) - Abnormal; Notable for the following components:       Result Value    Glucose 210 (*)     Creatinine 3.40 (*)     eGFR-Cr 18 (*)     Alkaline Phosphatase 177 (*)     Albumin 3.0 (*)     Globulin  4.5 (*)     A/G Ratio 0.7 (*)     All other components within normal limits   CBC W/ DIFFERENTIAL - Abnormal; Notable for the following components:    HGB 12.4 (*)     .9 (*)     Neutrophil Absolute Prelim 9.30 (*)     Neutrophil Absolute 9.30 (*)     Lymphocyte Absolute 0.51 (*)     All other components within normal limits   LACTIC ACID, PLASMA - Normal   CBC WITH DIFFERENTIAL WITH PLATELET    Narrative:     The following orders were created for panel order CBC With Differential With Platelet.  Procedure                               Abnormality         Status                     ---------                               -----------         ------                     CBC W/ DIFFERENTIAL[[id number]]          Abnormal            Final result                 Please view results for these tests on the individual orders.   BLOOD CULTURE   BLOOD CULTURE   AEROBIC BACTERIAL CULTURE              CT of the right femur with IV contrast: Pending       Glucose 210.  Creatinine 3.40 (on dialysis.)  White count 10.7.  Lactate is negative.  MDM      Patient is a 73-year-old gentleman presents with a right inguinal abscess.  Patient says over the last couple days.  Spontaneously draining though there is large areas of induration and tenderness.  Discussed with patient, suggest admission for IV antibiotics.  May need formal debridement in the OR.  Discussed with [facility].  Will send for CT scan for further evaluation.  Both wound and blood was cultured.  Was started on IV Unasyn.  Discussed with the [facility] hospitalist.  Patient will be admitted for IV antibiotics.  N.p.o. after midnight.      Patient is on an insulin pump                             Medical Decision Making      Disposition and Plan     Clinical Impression:  1. Groin abscess    2. ESRD (end stage renal disease) ([facility])    3. Type 1 diabetes mellitus with hyperglycemia ([facility])         Disposition:  There is no disposition on file for this visit.  There is no disposition time on file for this visit.    Follow-up:  No follow-up provider specified.        Medications Prescribed:  Current Discharge Medication List

## 2024-01-28 NOTE — PLAN OF CARE
Patient A&Ox4, RA, up w/ 1 assist and walker. Receiving IV abx. Denies pain at this time. Packing and dressing to R groin intact w/ scant drainage. Diminished urine output d/t hx of dialysis. Plan of care discussed w/ patient and family at bedside.    1330: Patient transported to OR. Accompanied by spouse.    1537: Patient returned to floor.    Problem: Patient/Family Goals  Goal: Patient/Family Long Term Goal  Description: Patient's Long Term Goal: Discharge home    Interventions:  - IV abx  - NPO for OR   - prn pain meds  - See additional Care Plan goals for specific interventions  1/28/2024 0950 by Aure Joiner RN  Outcome: Progressing  1/28/2024 0949 by Aure Joiner RN  Outcome: Progressing     Problem: SKIN/TISSUE INTEGRITY - ADULT  Goal: Skin integrity remains intact  Description: INTERVENTIONS  - Assess and document risk factors for pressure ulcer development  - Assess and document skin integrity  - Monitor for areas of redness and/or skin breakdown  - Initiate interventions, skin care algorithm/standards of care as needed  1/28/2024 0950 by Aure Joiner RN  Outcome: Progressing  1/28/2024 0949 by Aure Joiner RN  Outcome: Progressing  Goal: Incision(s), wounds(s) or drain site(s) healing without S/S of infection  Description: INTERVENTIONS:  - Assess and document risk factors for pressure ulcer development  - Assess and document skin integrity  - Assess and document dressing/incision, wound bed, drain sites and surrounding tissue  - Implement wound care per orders  - Initiate isolation precautions as appropriate  - Initiate Pressure Ulcer prevention bundle as indicated  1/28/2024 0950 by Arue Joiner RN  Outcome: Progressing  1/28/2024 0949 by Aure Joiner RN  Outcome: Progressing

## 2024-01-28 NOTE — H&P
[facility]    History and Physical     Gamaliel Boyer Patient Status:  Inpatient    1950 MRN [id number]   Location [facility] 3NW-A Attending Mickey Ovalle MD   Hosp Day # 0 PCP Nima Sorensen MD     Chief Complaint:   Chief Complaint   Patient presents with    Abscess         History of Present Illness: Gamaliel Boyer is a 73 year old male with DM2, ESRD who presented to the ED for evaluation of swelling and tenderness of the right groin x 2 days.  He denies any fevers/chills, nausea or any other associated symptoms.    On arrival to the ED he was afebrile, hemodynamically stable saturating 94% on room air.  His groin was noted to be significantly tender and indurated on exam with a fluctuant mass draining purulent fluid.  Labs were unremarkable including normal WBC and lactate.  He was given empiric IV antibiotics and general surgery was consulted.    Overnight there were no acute events and he remained afebrile and hemodynamically stable. This morning he denies any new or worsening symptoms.     Past Medical History:  Past Medical History:   Diagnosis Date    Anemia     anxiety     back pain     Back problem     Blastomycosis     BPH (benign prostatic hyperplasia)     Depression     Diabetic retinopathy     laser surgery    End stage renal disease (HCC)     Esophageal reflux     occassion    Hearing impairment     hearing aids    HEMORRHOIDS     High blood pressure     High cholesterol     History of blood transfusion     History of renal dialysis     fistula on left arm    Hyperkalemia     hyperlipidemia     hypothyroidism     IMPOTENCE     Liver disease     2013 - pt states all resolved after liver and kidneys shut down after issue with pneumonia     Mood disturbance     Neuropathy     osteoarthritis     Osteoarthrosis, unspecified whether generalized or localized, unspecified site     osteoporosis     Polyneuropathy in diabetes(357.2)     Sepsis (HCC)     Sleep apnea,  obstructive SPLIT NIGHT 5-12-17    AHI 54 SaO2 lizeth 74 % CPAP 12 HME//     Type I (juvenile type) diabetes mellitus without mention of complication, not stated as uncontrolled     since 1963- DR Mao follows    Unspecified essential hypertension     Visual impairment     wears glasses        Past Surgical History:   Past Surgical History:   Procedure Laterality Date    BACK SURGERY  2009    laminectomy  L1 -4  2/09  Dr. Eitan Guerrero [facility]    BACK SURGERY  8-8-13    C4-C6 ACDF     BACK SURGERY  9/8/16    L2-S1 Revision Decomp possible uninstrumented fusion 9/8/16    BACK SURGERY  09/10/2018    Rev C3-C7 ACDF     CATARACT Bilateral done in 2004    IOL's    COLONOSCOPY      2006    COLONOSCOPY  2/2009    normal    COLONOSCOPY N/A 8/23/2019    adenoma- repeat 1 yr (2 dya prep)    COLONOSCOPY,BIOPSY  2/20/09    [place] [facility] [place] [facility]    EGD N/A 7/23/2021    EGD & COLONOSCOPY Surgeon: Kimberly, +vik, rpt EGD 3 months, poor prep rpt colon 3 years    ENDOSCOPY, BOWEL POUCH, BIOPSY      INJECTION, W/WO CONTRAST, DX/THERAPEUTIC SUBSTANCE, EPIDURAL/SUBARACHNOID; LUMBAR/SACRAL N/A 5/4/2016    Procedure: LUMBAR EPIDURAL;  Surgeon: Jayden Norton MD;  Location: [facility] FOR PAIN MANAGEMENT    INJECTION, W/WO CONTRAST, DX/THERAPEUTIC SUBSTANCE, EPIDURAL/SUBARACHNOID; LUMBAR/SACRAL N/A 5/25/2016    Procedure: LUMBAR EPIDURAL;  Surgeon: Jayden Norton MD;  Location: [facility] FOR PAIN MANAGEMENT    INJECTION, W/WO CONTRAST, DX/THERAPEUTIC SUBSTANCE, EPIDURAL/SUBARACHNOID; LUMBAR/SACRAL N/A 6/8/2016    Procedure: LUMBAR EPIDURAL;  Surgeon: Jayden Norton MD;  Location: [facility] PAIN MANAG[facility]    KNEE REPLACEMENT SURGERY  2/14/13    Left TKR by Dr. Lombardi    M-SEDAJ BY  PHYS PERFRMG SVC 5+ YR N/A 5/4/2016    Procedure: LUMBAR EPIDURAL;  Surgeon: Jayden Norton MD;  Location: [facility] FOR PAIN MANAGEMENT    M-SEDAJ BY  PHYS PERFRMG SVC 5+ YR N/A 5/25/2016    Procedure:  LUMBAR EPIDURAL;  Surgeon: Jayden Norton MD;  Location: [facility] FOR PAIN MANAGEMENT    [facility] N/A 6/8/2016    Procedure: LUMBAR EPIDURAL;  Surgeon: Jayden Norton MD;  Location: [facility] FOR PAIN MANAGEMENT    OTHER SURGICAL HISTORY      Bilateral median nerve release at elbow; 2000    OTHER SURGICAL HISTORY      CTS release bilateral 2000    OTHER SURGICAL HISTORY      Surgical repair fracture left hand 5th digit    OTHER SURGICAL HISTORY      Surgery left heel ligament repair    OTHER SURGICAL HISTORY      Laser surgery for bilateral retinopathy    OTHER SURGICAL HISTORY      Surgery to left eye for \"weak muscle.\" 2007    OTHER SURGICAL HISTORY      bilat knee repair 9/23/10    OTHER SURGICAL HISTORY  11/2013    lung resection to remove abcess    OTHER SURGICAL HISTORY  10/26/2020    Cystoscopy (Dr. Ribeiro)    PATIENT DOCUMENTED NOT TO HAVE EXPERIENCED ANY OF THE FOLLOWING EVENTS  2/14/2014    Procedure: ;  Surgeon: Kin Hobbs MD;  Location: [facility]    PATIENT DOCUMENTED NOT TO HAVE EXPERIENCED ANY OF THE FOLLOWING EVENTS N/A 5/4/2016    Procedure: LUMBAR EPIDURAL;  Surgeon: Jayden Norton MD;  Location: [facility] FOR PAIN MANAGEMENT    PATIENT DOCUMENTED NOT TO HAVE EXPERIENCED ANY OF THE FOLLOWING EVENTS N/A 5/25/2016    Procedure: LUMBAR EPIDURAL;  Surgeon: Jayden Norton MD;  Location: [facility] FOR PAIN MANAGEMENT    PATIENT DOCUMENTED NOT TO HAVE EXPERIENCED ANY OF THE FOLLOWING EVENTS N/A 6/8/2016    Procedure: LUMBAR EPIDURAL;  Surgeon: Jayden Norton MD;  Location: [facility] FOR PAIN MANAGEMENT    PATIENT WITHOUGH PREOPERATIVE ORDER FOR IV ANTIBIOTIC SURGICAL SITE INFECTION PROPHYLAXIS.  2/14/2014    Procedure: ;  Surgeon: Kin Hobbs MD;  Location: [facility]    PATIENT WITHOUGH PREOPERATIVE ORDER FOR IV ANTIBIOTIC SURGICAL SITE INFECTION PROPHYLAXIS. N/A 5/4/2016    Procedure: LUMBAR EPIDURAL;  Surgeon: Jayden Norton  MD;  Location: [facility] FOR PAIN MANAGEMENT    PATIENT WITHOUGH PREOPERATIVE ORDER FOR IV ANTIBIOTIC SURGICAL SITE INFECTION PROPHYLAXIS. N/A 5/25/2016    Procedure: LUMBAR EPIDURAL;  Surgeon: Jayden Norton MD;  Location: [facility] PAIN MANAGEMENT    PATIENT WITHOUGH PREOPERATIVE ORDER FOR IV ANTIBIOTIC SURGICAL SITE INFECTION PROPHYLAXIS. N/A 6/8/2016    Procedure: LUMBAR EPIDURAL;  Surgeon: Jayden Norton MD;  Location: [facility] FOR PAIN MANAGEMENT    TOTAL KNEE REPLACEMENT Left 2013    UPPER GI ENDOSCOPY,DIAGNOSIS  4/14/16    retained gastric contents; Edward    UPPER GI ENDOSCOPY,REMOV F.B. N/A 2/14/2014    Procedure: ESOPHAGOGASTRODUODENOSCOPY, POSSIBLE BIOPSY, POSSIBLE POLYPECTOMY 88366;  Surgeon: Kin Hobbs MD;  Location: [facility] [place] [facility]       Social History:  reports that he quit smoking about 38 years ago. His smoking use included cigarettes. He has a 70 pack-year smoking history. He has never used smokeless tobacco. He reports that he does not currently use alcohol. He reports that he does not use drugs.    Family History:   Family History   Problem Relation Age of Onset    Hypertension Father     Lipids Father     Cancer Father     Hypertension Mother     Psychiatric Maternal Grandmother     Diabetes Paternal Grandfather         Type 2 DM    Heart Disorder Brother         CAD with MI at age 53 - passed away    Obesity Brother     Diabetes Brother         Type 2 DM    Hypertension Brother     Lipids Brother     Thyroid Disorder Neg        Allergies:   Allergies   Allergen Reactions    Antihistamine & Nasal Deconges [Fexofenadine-Pseudoephedrine] OTHER (SEE COMMENTS)     Altered mental status    Adhesive Tape ITCHING    Benadryl [Diphenhydramine] RESTLESSNESS    Depakote [Valproic Acid] DIZZINESS    Fluconazole OTHER (SEE COMMENTS)     Kidney labs abnormal    Mirtazapine RESTLESSNESS    Quetiapine RESTLESSNESS    Wellbutrin [Bupropion] RASH    Latex RASH       Medications:    No  current facility-administered medications on file prior to encounter.     Current Outpatient Medications on File Prior to Encounter   Medication Sig Dispense Refill    sertraline 25 MG Oral Tab Take 1 tablet (25 mg total) by mouth daily.      triamcinolone 0.1 % External Cream Apply 1 Application topically 2 (two) times daily. Apply to itchy areas on the body twice daily as needed; do not apply to face or groin 453.6 g 2    Insulin Glargine, 1 Unit Dial, (TOUJEO SOLOSTAR) 300 UNIT/ML Subcutaneous Solution Pen-injector Inject 6 Units into the skin daily.      cholecalciferol 50 MCG (2000 UT) Oral Tab Take 1 tablet (2,000 Units total) by mouth daily.      vitamin E 400 UNITS Oral Cap Take 1,000 Units by mouth daily.      Omega-3 Fatty Acids (FISH OIL) 1200 MG Oral Cap Take 1,200 mg by mouth daily.      HYDROcodone-acetaminophen 5-325 MG Oral Tab Take 1 tablet by mouth as needed. Before dialysis      insulin lispro 100 UNIT/ML Injection Solution INJECT 50 UNITS BY INSULIN PUMP ROUTE DAILY. USE VIA INSULIN PUMP. MDD 50 UNITS      metoprolol tartrate 25 MG Oral Tab Take 1 tablet (25 mg total) by mouth 2 (two) times daily.      losartan 25 MG Oral Tab Take 1 tablet (25 mg total) by mouth 2 (two) times daily.      aspirin 81 MG Oral Tab EC Take 1 tablet (81 mg total) by mouth daily.      levothyroxine 150 MCG Oral Tab Take 1 tablet (150 mcg total) by mouth daily.      renal vitamin Oral Tab Take 1 tablet by mouth daily. 90 tablet 1    ULTICARE MINI PEN NEEDLES 31G X 6 MM Does not apply Misc 1 each daily.      SURE COMFORT INSULIN SYRINGE 29G X 1/2\" 0.3 ML Does not apply Misc       lactulose 10 GM/15ML Oral Solution Take 30 mL (20 g total) by mouth every evening.      Sevelamer 800 MG Oral Tab Take 2 tablets (1,600 mg total) by mouth 3 (three) times daily before meals. and 1 before snacks      famotidine 20 MG Oral Tab Take 1 tablet (20 mg total) by mouth 2 (two) times daily.      Glucose Blood (CONTOUR NEXT TEST) In Vitro  Strip Check BG 3 times daily, Type1 diabetes,insulin dependent on insulin pump. Patient needs to calibrate CGM and verify BG if <70 300 strip 0    GLUCAGON EMERGENCY 1 MG Injection Kit Inject 1 mg into the skin once as needed. 1 kit 1    EZETIMIBE 10 MG Oral Tab TAKE ONE TABLET BY MOUTH ONE TIME DAILY (Patient taking differently: Take 1 tablet (10 mg total) by mouth every morning.) 90 tablet 3       Review of Systems:   A comprehensive 14 point review of systems was completed.    Pertinent positives and negatives noted in the HPI.    Physical Exam:    BP 96/46 (BP Location: Right arm)   Pulse 66   Temp 98 °F (36.7 °C) (Oral)   Resp 17   Wt 157 lb 13.6 oz (71.6 kg)   SpO2 95%   BMI 25.48 kg/m²     General: No acute distress. Comfortable, nontoxic   HEENT: Normocephalic atraumatic. Moist mucous membranes; Sclera anicteric.  Neck: Supple, no JVD  Respiratory: Clear to auscultation bilaterally. Reg resp rate & effort, no wheezes/crackles   Cardiovascular: S1, S2. Regular rate and rhythm. No murmurs appreciable   Chest and Back: No tenderness or deformity.  Abdomen: Soft, nonperitoneal   Neurologic: No focal neurological deficits. CNII-XII grossly intact.  Musculoskeletal: Moves all extremities.  Extremities: No edema or cyanosis. +LUE AVF   Skin: R groin dressing c/d/I; surrounding area with some tenderness/induration, no worsening erythema when compared to photos from admission   Psychiatric: Appropriate mood and affect.      Diagnostic Data:      Labs:  Recent Labs   Lab 01/27/24 2117   WBC 10.7   HGB 12.4*   .9*   .0       Recent Labs   Lab 01/27/24 2117   *   BUN 20   CREATSERUM 3.40*   CA 9.0   ALB 3.0*      K 4.3   CL 99   CO2 31.0   ALKPHO 177*   AST 37   BILT 1.6   TP 7.5       Estimated Creatinine Clearance: 17.2 mL/min (A) (based on SCr of 3.4 mg/dL (H)).    No results for input(s): \"PTP\", \"INR\" in the last 168 hours.    No results for input(s): \"TROP\", \"CK\" in the last 168  hours.    Imaging: Imaging data reviewed in Epic.      ASSESSMENT / PLAN:     Gamaliel Boyer Is a a 73 year old male with DM2, ERSD on HD who presents with right groin abscess    Problem List / Diagnoses    Right Groin Abscess  DM2  HTN/HL  ESRD on HD  GERD  Hypothyroidism  MDD    Plan    Right Groin Abscess  -- general surgery consulted, plan discussed, rec I&D later today  -- NPO, mIVF  -- cont empiric unasyn   -- f/u wound, blood cultures from 1/27  -- check preop EKG, otherwise no red flag cardiovascular symptoms & METs > 4. If EKG normal no further preoperative cardiovascular workup indicated   -- RCRI = 1, class II risk (6.0%) for MACE    DM2  -- accuchecks, ssi-low   -- hold oral antihyperglycemics    HTN/HL  -- resume home zetia, metoprolol    ESRD on HD  -- last HD 1/27  -- consult nephrology tomorrow for ESRD if patient is to remain hospitalized until 1/30   -- resume homoe renvela  -- renally dose meds    GERD  -- resume home famotidine    Hypothyroidism  -- resume home synthroid    MDD  -- resume home sertraline     DVT Ppx: DVT Mechanical Prophylaxis:   SCDs,    DVT Pharmacologic Prophylaxis   Medication    heparin (Porcine) 5000 UNIT/ML injection 5,000 Units              Code Status: Full Code    Plan of care discussed with patient and/or family at bedside.    NASRIN Ovalle MD  [facility]   [phone]

## 2024-01-28 NOTE — CONSULTS
Gamaliel Boyer is a 73 year old male  Chief Complaint   Patient presents with    Abscess       HPI: 74 y/o male admitted with large abscess right groin  Patient has long complicated pmhx documented on his medical record  Previous vein harvest right groin for fistula          Past Medical History:   Diagnosis Date    Anemia     anxiety     back pain     Back problem     Blastomycosis 1984    BPH (benign prostatic hyperplasia)     Depression     Diabetic retinopathy     laser surgery    End stage renal disease (HCC)     Esophageal reflux     occassion    Hearing impairment     hearing aids    HEMORRHOIDS     High blood pressure     High cholesterol     History of blood transfusion 2013    History of renal dialysis     fistula on left arm    Hyperkalemia     hyperlipidemia     hypothyroidism     IMPOTENCE     Liver disease     2013 - pt states all resolved after liver and kidneys shut down after issue with pneumonia     Mood disturbance     Neuropathy     osteoarthritis     Osteoarthrosis, unspecified whether generalized or localized, unspecified site     osteoporosis     Polyneuropathy in diabetes(357.2)     Sepsis (HCC)     Sleep apnea, obstructive SPLIT NIGHT 5-12-17    AHI 54 SaO2 lizeth 74 % CPAP 12 HME//     Type I (juvenile type) diabetes mellitus without mention of complication, not stated as uncontrolled     since 1963- DR Mao follows    Unspecified essential hypertension     Visual impairment     wears glasses     Past Surgical History:   Procedure Laterality Date    BACK SURGERY  2009    laminectomy  L1 -4  2/09  Dr. Eitan Guerrero [facility]    BACK SURGERY  8-8-13    C4-C6 ACDF     BACK SURGERY  9/8/16    L2-S1 Revision Decomp possible uninstrumented fusion 9/8/16    BACK SURGERY  09/10/2018    Rev C3-C7 ACDF     CATARACT Bilateral done in 2004    IOL's    COLONOSCOPY      2006    COLONOSCOPY  2/2009    normal    COLONOSCOPY N/A 8/23/2019    adenoma- repeat 1 yr (2 dya prep)    COLONOSCOPY,BIOPSY  2/20/09     [place] [facility] [place]    EGD N/A 7/23/2021    EGD & COLONOSCOPY Surgeon: Kimberly, +vik, rpt EGD 3 months, poor prep rpt colon 3 years    ENDOSCOPY, BOWEL POUCH, BIOPSY      INJECTION, W/WO CONTRAST, DX/THERAPEUTIC SUBSTANCE, EPIDURAL/SUBARACHNOID; LUMBAR/SACRAL N/A 5/4/2016    Procedure: LUMBAR EPIDURAL;  Surgeon: Jayden Norton MD;  Location: [facility] FOR PAIN MANAGEMENT    INJECTION, W/WO CONTRAST, DX/THERAPEUTIC SUBSTANCE, EPIDURAL/SUBARACHNOID; LUMBAR/SACRAL N/A 5/25/2016    Procedure: LUMBAR EPIDURAL;  Surgeon: Jayden Norton MD;  Location: [facility] FOR PAIN MANAGEMENT    INJECTION, W/WO CONTRAST, DX/THERAPEUTIC SUBSTANCE, EPIDURAL/SUBARACHNOID; LUMBAR/SACRAL N/A 6/8/2016    Procedure: LUMBAR EPIDURAL;  Surgeon: Jayden Norton MD;  Location: [facility] FOR PAIN MANAGEMENT    KNEE REPLACEMENT SURGERY  2/14/13    Left TKR by Dr. Lombardi    M-SEDAJ BY  PHYS PERFRMG SVC 5+ YR N/A 5/4/2016    Procedure: LUMBAR EPIDURAL;  Surgeon: Jayden Norton MD;  Location: [facility] FOR PAIN MANAGEMENT    M-SEDAJ BY  PHYS PERFRMG SVC 5+ YR N/A 5/25/2016    Procedure: LUMBAR EPIDURAL;  Surgeon: Jayden Norton MD;  Location: [facility] FOR PAIN MANAGEMENT    M-SEDAJ BY  PHYS PERFRMG SVC 5+ YR N/A 6/8/2016    Procedure: LUMBAR EPIDURAL;  Surgeon: Jayden Norton MD;  Location: [facility] PAIN MANAGEMENT    OTHER SURGICAL HISTORY      Bilateral median nerve release at elbow; 2000    OTHER SURGICAL HISTORY      CTS release bilateral 2000    OTHER SURGICAL HISTORY      Surgical repair fracture left hand 5th digit    OTHER SURGICAL HISTORY      Surgery left heel ligament repair    OTHER SURGICAL HISTORY      Laser surgery for bilateral retinopathy    OTHER SURGICAL HISTORY      Surgery to left eye for \"weak muscle.\" 2007    OTHER SURGICAL HISTORY      bilat knee repair 9/23/10    OTHER SURGICAL HISTORY  11/2013    lung resection to remove abcess    OTHER SURGICAL HISTORY   10/26/2020    Cystoscopy (Dr. Ribeiro)    PATIENT DOCUMENTED NOT TO HAVE EXPERIENCED ANY OF THE FOLLOWING EVENTS  2/14/2014    Procedure: ;  Surgeon: Kin Hobbs MD;  Location: [facility]    PATIENT DOCUMENTED NOT TO HAVE EXPERIENCED ANY OF THE FOLLOWING EVENTS N/A 5/4/2016    Procedure: LUMBAR EPIDURAL;  Surgeon: Jayden Norton MD;  Location: [facility] FOR PAIN MANAGEMENT    PATIENT DOCUMENTED NOT TO HAVE EXPERIENCED ANY OF THE FOLLOWING EVENTS N/A 5/25/2016    Procedure: LUMBAR EPIDURAL;  Surgeon: Jayden Norton MD;  Location: [facility] FOR PAIN MANAGEMENT    PATIENT DOCUMENTED NOT TO HAVE EXPERIENCED ANY OF THE FOLLOWING EVENTS N/A 6/8/2016    Procedure: LUMBAR EPIDURAL;  Surgeon: Jayden Norton MD;  Location: [facility] FOR PAIN MANAGEMENT    PATIENT WITHOUGH PREOPERATIVE ORDER FOR IV ANTIBIOTIC SURGICAL SITE INFECTION PROPHYLAXIS.  2/14/2014    Procedure: ;  Surgeon: Kin Hobbs MD;  Location: [facility]    PATIENT WITHOUGH PREOPERATIVE ORDER FOR IV ANTIBIOTIC SURGICAL SITE INFECTION PROPHYLAXIS. N/A 5/4/2016    Procedure: LUMBAR EPIDURAL;  Surgeon: Jayden Norton MD;  Location: [facility] FOR PAIN MANAGEMENT    PATIENT WITHOUGH PREOPERATIVE ORDER FOR IV ANTIBIOTIC SURGICAL SITE INFECTION PROPHYLAXIS. N/A 5/25/2016    Procedure: LUMBAR EPIDURAL;  Surgeon: Jayden Norton MD;  Location: [facility] FOR PAIN MANAGEMENT    PATIENT WITHOUGH PREOPERATIVE ORDER FOR IV ANTIBIOTIC SURGICAL SITE INFECTION PROPHYLAXIS. N/A 6/8/2016    Procedure: LUMBAR EPIDURAL;  Surgeon: Jayden Norton MD;  Location: [facility] PAIN MANAGEMENT    TOTAL KNEE REPLACEMENT Left 2013    UPPER GI ENDOSCOPY,DIAGNOSIS  4/14/16    retained gastric contents; Cesar    UPPER GI ENDOSCOPY,REMOV F.B. N/A 2/14/2014    Procedure: ESOPHAGOGASTRODUODENOSCOPY, POSSIBLE BIOPSY, POSSIBLE POLYPECTOMY 79518;  Surgeon: Kin Hobbs MD;  Location: [facility]     Family History   Problem Relation  Age of Onset    Hypertension Father     Lipids Father     Cancer Father     Hypertension Mother     Psychiatric Maternal Grandmother     Diabetes Paternal Grandfather         Type 2 DM    Heart Disorder Brother         CAD with MI at age 53 - passed away    Obesity Brother     Diabetes Brother         Type 2 DM    Hypertension Brother     Lipids Brother     Thyroid Disorder Neg      Social History     Socioeconomic History    Marital status:     Number of children: 1   Occupational History    Occupation: Retired--Printer   Tobacco Use    Smoking status: Former     Packs/day: 3.50     Years: 20.00     Additional pack years: 0.00     Total pack years: 70.00     Types: Cigarettes     Quit date: 1986     Years since quittin.0    Smokeless tobacco: Never    Tobacco comments:     Quit 25 years ago   Vaping Use    Vaping Use: Never used   Substance and Sexual Activity    Alcohol use: Not Currently     Comment: very rarely    Drug use: No    Sexual activity: Yes     Partners: Female   Other Topics Concern     Service No    Blood Transfusions No    Caffeine Concern No     Comment: coffee 1 cup per day    Sleep Concern No    Exercise Yes     Comment: PT/OT 1x per week    Seat Belt Yes     Social Determinants of Health     Food Insecurity: No Food Insecurity (2024)    Food Insecurity     Food Insecurity: Never true   Transportation Needs: No Transportation Needs (2024)    Transportation Needs     Lack of Transportation: No   Housing Stability: Low Risk  (2024)    Housing Stability     Housing Instability: No           EXAM: elder male  appears fragile  Very large necrotic abscess right groin  overlying skin necrosis with drainage      IMAGING: cat scan report and images reviewed and discussed with patient      IMPRESSION: large necrotic abscess right groin      PLAN: this require surgical drainage and debridement   Patient has penile pump that maybe secondarily infected  This may require  more than one surgery to control  I reviewed the post op wound care

## 2024-01-28 NOTE — PLAN OF CARE
NURSING ADMISSION NOTE      Patient admitted via Cart  Oriented to room 303.  Safety precautions initiated.  Bed in low position.  Call light in reach.

## 2024-01-28 NOTE — PROGRESS NOTES
[facility] Pharmacy Note:  Renal Dose Adjustment    Gamaliel Boyer has been prescribed famotidine (PEPCID) 20 mg orally every 12 hours.    Estimated Creatinine Clearance: 17.2 mL/min (A) (based on SCr of 3.4 mg/dL (H)).    Calculated creatinine clearance is < 50 ml/min, therefore the dose of famotidine (Pepcid) has been changed to 20 mg orally every 24 hours per P&T approved protocol. Pharmacy will continue to follow, and if renal function improves, will resume the original order.    Thank you,  Jodie Vásquez, PharmD  1/28/2024 2:48 AM

## 2024-01-28 NOTE — ANESTHESIA PROCEDURE NOTES
Airway  Date/Time: 1/28/2024 2:13 PM  Urgency: elective      General Information and Staff    Patient location during procedure: OR  Anesthesiologist: Delores Katz MD  Performed: anesthesiologist   Performed by: Delores Katz MD  Authorized by: Delores Katz MD      Indications and Patient Condition  Indications for airway management: anesthesia  Spontaneous ventilation: present  Sedation level: deep  Preoxygenated: yes  Patient position: sniffing      Final Airway Details  Final airway type: supraglottic airway      Successful airway: classic  Size 3       Number of attempts at approach: 1

## 2024-01-28 NOTE — PROGRESS NOTES
[facility] Pharmacy Note:  Renal Adjustment for ampicillin/sulbactam (UNASYN)    Gamaliel Boyer is a 73 year old patient who has been prescribed ampicillin/sulbactam (UNASYN) 1.5 gm every 6 hrs.  The estimated creatinine clearance is 17.2 mL/min (A) (based on SCr of 3.4 mg/dL (H)). The dose has been adjusted to ampicillin/sulbactam (UNASYN) 3 gm every 12 hrs per hospital renal dose adjustment protocol for treatment of groin abscess. Pharmacy will follow and adjust dose as warranted for additional renal function changes.    Thank you,    Jodie Vásquez, PharmD  1/28/2024  2:53 AM

## 2024-01-28 NOTE — OPERATIVE REPORT
[facility]    PATIENT'S NAME: XIOMARA SINGH   ATTENDING PHYSICIAN: NAZARIO Ovalle MD   OPERATING PHYSICIAN: Wili Santizo M.D.   PATIENT ACCOUNT#:   [id number]    LOCATION:  PACU  PACU 1 [facility]  MEDICAL RECORD #:   [id number]       YOB: 1950  ADMISSION DATE:       01/27/2024      OPERATION DATE:  01/28/2024    OPERATIVE REPORT      PREOPERATIVE DIAGNOSIS:  Large necrotic abscess, right groin..  POSTOPERATIVE DIAGNOSIS:  Large necrotic abscess, right groin.  PROCEDURE:  Incision, drainage, and debridement of skin and necrotic soft tissue, right groin.    ANESTHESIA:  General.    OPERATIVE TECHNIQUE:  Patient was brought in the operating room, placed on operating table in supine position.  Inhalational anesthesia provided by the attending anesthesiologist.  The right groin was prepped and draped in usual sterile fashion.  Lidocaine 1% and Marcaine 0.5% used as a local anesthetic.  Patient had a previous vein harvest in this area.  I could see the scar which was essentially the center portion of this wound.  I opened up the old incision, and pus and necrotic tissue was removed and sent to Microbiology for evaluation.  I then excised the necrotic skin and necrotic soft tissue in this area, and this was sent to Pathology.  After this was complete, I probed the wound.  The wound was moderately deep, at least 5 cm in depth, probably 10 cm in length and 6 cm in width.  I irrigated with Irrisept.  Hemostasis was obtained with the electrocautery.  When this was all complete, then I packed the wound open with Nu Gauze and applied a sterile bandage.  Patient was taken to the recovery room in satisfactory condition.    Dictated By Wili Santizo M.D.  d: 01/28/2024 14:39:33  t: 01/28/2024 15:10:48  Job [id number]  [facility]/

## 2024-01-28 NOTE — ANESTHESIA POSTPROCEDURE EVALUATION
[facility]    Gamaliel Boyer Patient Status:  Inpatient   Age/Gender 73 year old male MRN [id number]   Location [facility] 3NW-A Attending Mickey Ovalle MD   Hosp Day # 0 PCP Nima Sorensen MD       Anesthesia Post-op Note    RIGHT GROIN ABSCESS IRRIGATION & DEBRIDEMENT    Procedure Summary       Date: 01/28/24 Room / Location:  MAIN OR 03 / [facility] MAIN OR    Anesthesia Start: 1403 Anesthesia Stop: 1450    Procedure: RIGHT GROIN ABSCESS IRRIGATION & DEBRIDEMENT (Right: Groin) Diagnosis:       Abscess      (Abscess [L02.91])    Surgeons: Wili Santizo MD Anesthesiologist: Delores Katz MD    Anesthesia Type: general ASA Status: 4            Anesthesia Type: general    Vitals Value Taken Time   BP 97/49 01/28/24 1542   Temp 97.6 °F (36.4 °C) 01/28/24 1517   Pulse 72 01/28/24 1545   Resp 18 01/28/24 1545   SpO2 93 % 01/28/24 1544   Vitals shown include unfiled device data.    Patient Location: PACU    Anesthesia Type: general    Airway Patency: patent    Postop Pain Control: adequate    Mental Status: preanesthetic baseline    Nausea/Vomiting: none    Cardiopulmonary/Hydration status: stable euvolemic    Complications: no apparent anesthesia related complications    Postop vital signs: stable    Dental Exam: Unchanged from Preop    Patient to be discharged from PACU when criteria met.

## 2024-01-28 NOTE — BRIEF OP NOTE
Pre-Operative Diagnosis: Abscess [L02.91]     Post-Operative Diagnosis: Abscess [L02.91]      Procedure Performed:   RIGHT GROIN ABSCESS IRRIGATION & DEBRIDEMENT    Surgeon(s) and Role:     * Wili Santizo MD - Primary    Assistant(s):        Surgical Findings: large abscess and skin necrosis right groin     Specimen: pus necrotic skin and subcutaneous tissue     Estimated Blood Loss: Blood Output: 5 mL (1/28/2024  2:34 PM)        Wili Santizo MD  1/28/2024  2:35 PM

## 2024-01-28 NOTE — PROGRESS NOTES
Pt is alert and oriented x4.  VSS.  Maintaining o2 sats on 2L sleep, pt is PRANEETH.  Unable to tolerate hospital provided mask, pt states will ask wife to bring own from home.  NSR on tele.  Pt produces small amount of urine.  No c/o pain at this time.  Glucose monitored, use of pt's personal CGM and insulin pump explained in detain.  Agreement sheet given to pt.  Open wound to lateral Lt great toe noted, covered w/ ameli.  Safety precautions in place.  All questions and concerns addressed at this time.

## 2024-01-28 NOTE — PROGRESS NOTES
01/28/24 1245   Advance Directives for Healthcare   Does the patient have:  Power of  for Healthcare   Copy of Power of  in the chart? Yes   Healthcare Agent Appointed Yes   Healthcare Agent's Name Ansley Boyer   Healthcare Agent's Phone Number [phone]   Advance Directives Counseling Needed? Initiation of advance directives     Spiritual Care support can be requested via an [facility] consult.

## 2024-01-28 NOTE — ANESTHESIA PREPROCEDURE EVALUATION
PRE-OP EVALUATION    Patient Name: Gamaliel Boyer    Admit Diagnosis: Groin abscess [L02.214]  ESRD (end stage renal disease) (HCC) [N18.6]  Type 1 diabetes mellitus with hyperglycemia (HCC) [E10.65]    Pre-op Diagnosis: Abscess [L02.91]    RIGHT GROIN ABSCESS IRRIGATION & DEBRIDEMENT    Anesthesia Procedure: RIGHT GROIN ABSCESS IRRIGATION & DEBRIDEMENT (Right)    Surgeon(s) and Role:     * Wili Santizo MD - Primary    Pre-op vitals reviewed.  Temp: 97.9 °F (36.6 °C)  Pulse: 65  Resp: 18  BP: 115/65  SpO2: 100 %  Body mass index is 25.48 kg/m².    Current medications reviewed.  Hospital Medications:  • ezetimibe (Zetia) tab 10 mg  10 mg Oral Daily   • famotidine (Pepcid) tab 20 mg  20 mg Oral Daily   • lactulose (CHRONULAC) 10 GM/15ML solution 20 g  20 g Oral QPM   • levothyroxine (Synthroid) tab 150 mcg  150 mcg Oral Before breakfast   • metoprolol tartrate (Lopressor) tab 25 mg  25 mg Oral 2x Daily(Beta Blocker)   • sevelamer carbonate (Renvela) tab 1,600 mg  1,600 mg Oral TID CC   • heparin (Porcine) 5000 UNIT/ML injection 5,000 Units  5,000 Units Subcutaneous Q8H RIKKI   • acetaminophen (Tylenol Extra Strength) tab 500 mg  500 mg Oral Q4H PRN   • melatonin tab 3 mg  3 mg Oral Nightly PRN   • polyethylene glycol (PEG 3350) (Miralax) 17 g oral packet 17 g  17 g Oral Daily PRN   • sennosides (Senokot) tab 17.2 mg  17.2 mg Oral Nightly PRN   • bisacodyl (Dulcolax) 10 MG rectal suppository 10 mg  10 mg Rectal Daily PRN   • ondansetron (Zofran) 4 MG/2ML injection 4 mg  4 mg Intravenous Q6H PRN   • metoclopramide (Reglan) 5 mg/mL injection 5 mg  5 mg Intravenous Q8H PRN   • ampicillin-sulbactam (Unasyn) 3 g in sodium chloride 0.9% 100mL IVPB-ADD  3 g Intravenous q12h   • glucose (Dex4) 15 GM/59ML oral liquid 15 g  15 g Oral Q15 Min PRN    Or   • glucose (Glutose) 40% oral gel 15 g  15 g Oral Q15 Min PRN    Or   • glucose-vitamin C (Dex-4) chewable tab 4 tablet  4 tablet Oral Q15 Min PRN    Or   • dextrose 50%  injection 50 mL  50 mL Intravenous Q15 Min PRN    Or   • glucose (Dex4) 15 GM/59ML oral liquid 30 g  30 g Oral Q15 Min PRN    Or   • glucose (Glutose) 40% oral gel 30 g  30 g Oral Q15 Min PRN    Or   • glucose-vitamin C (Dex-4) chewable tab 8 tablet  8 tablet Oral Q15 Min PRN   • sertraline (Zoloft) tab 25 mg  25 mg Oral Daily   • insulin aspart (NovoLOG) 100 Units/mL FlexPen 1-5 Units  1-5 Units Subcutaneous TID AC and HS   • [COMPLETED] sodium chloride 0.9 % IV bolus 1,000 mL  1,000 mL Intravenous Once   • [COMPLETED] ampicillin-sulbactam (Unasyn) 3 g in sodium chloride 0.9% 100mL IVPB-ADD  3 g Intravenous Once   • [COMPLETED] iohexol (Omnipaque) 350 MG/ML injection via power injector 100 mL  100 mL Intravenous ONCE PRN       Outpatient Medications:     Medications Prior to Admission   Medication Sig Dispense Refill Last Dose   • sertraline 25 MG Oral Tab Take 1 tablet (25 mg total) by mouth daily.   1/27/2024   • triamcinolone 0.1 % External Cream Apply 1 Application topically 2 (two) times daily. Apply to itchy areas on the body twice daily as needed; do not apply to face or groin 453.6 g 2 1/27/2024   • Insulin Glargine, 1 Unit Dial, (TOUJEO SOLOSTAR) 300 UNIT/ML Subcutaneous Solution Pen-injector Inject 6 Units into the skin daily.   1/28/2024   • cholecalciferol 50 MCG (2000 UT) Oral Tab Take 1 tablet (2,000 Units total) by mouth daily.   1/27/2024   • vitamin E 400 UNITS Oral Cap Take 1,000 Units by mouth daily.   1/27/2024   • Omega-3 Fatty Acids (FISH OIL) 1200 MG Oral Cap Take 1,200 mg by mouth daily.   1/27/2024   • HYDROcodone-acetaminophen 5-325 MG Oral Tab Take 1 tablet by mouth as needed. Before dialysis   1/27/2024   • insulin lispro 100 UNIT/ML Injection Solution INJECT 50 UNITS BY INSULIN PUMP ROUTE DAILY. USE VIA INSULIN PUMP. MDD 50 UNITS   1/28/2024   • metoprolol tartrate 25 MG Oral Tab Take 1 tablet (25 mg total) by mouth 2 (two) times daily.   1/27/2024   • losartan 25 MG Oral Tab Take 1  tablet (25 mg total) by mouth 2 (two) times daily.   1/27/2024   • aspirin 81 MG Oral Tab EC Take 1 tablet (81 mg total) by mouth daily.   1/27/2024   • levothyroxine 150 MCG Oral Tab Take 1 tablet (150 mcg total) by mouth daily.   1/27/2024   • renal vitamin Oral Tab Take 1 tablet by mouth daily. 90 tablet 1 1/27/2024   • ULTICARE MINI PEN NEEDLES 31G X 6 MM Does not apply Misc 1 each daily.   1/28/2024   • SURE COMFORT INSULIN SYRINGE 29G X 1/2\" 0.3 ML Does not apply Misc    1/28/2024   • lactulose 10 GM/15ML Oral Solution Take 30 mL (20 g total) by mouth every evening.   1/27/2024   • Sevelamer 800 MG Oral Tab Take 2 tablets (1,600 mg total) by mouth 3 (three) times daily before meals. and 1 before snacks   1/27/2024   • famotidine 20 MG Oral Tab Take 1 tablet (20 mg total) by mouth 2 (two) times daily.   1/27/2024   • Glucose Blood (CONTOUR NEXT TEST) In Vitro Strip Check BG 3 times daily, Type1 diabetes,insulin dependent on insulin pump. Patient needs to calibrate CGM and verify BG if <70 300 strip 0 1/28/2024   • GLUCAGON EMERGENCY 1 MG Injection Kit Inject 1 mg into the skin once as needed. 1 kit 1 1/28/2024   • EZETIMIBE 10 MG Oral Tab TAKE ONE TABLET BY MOUTH ONE TIME DAILY (Patient taking differently: Take 1 tablet (10 mg total) by mouth every morning.) 90 tablet 3 1/28/2024       Allergies: Antihistamine & nasal deconges [fexofenadine-pseudoephedrine], Adhesive tape, Benadryl [diphenhydramine], Depakote [valproic acid], Fluconazole, Mirtazapine, Quetiapine, Wellbutrin [bupropion], and Latex      Anesthesia Evaluation    Patient summary reviewed.    Anesthetic Complications  (-) history of anesthetic complications         GI/Hepatic/Renal      (+) GERD       (+) chronic renal disease and ESRD and hemodialysis  (+) liver disease                 Cardiovascular                  (+) hypertension   (+) hyperlipidemia                  (+) CHF                Endo/Other  Comment: Secondary  hyperparathyroidism    (+) diabetes and poorly controlled, type 1, using insulin  (+) hypothyroidism            (+) thrombocytopenia    (+) arthritis       Pulmonary        (+) COPD            (+) sleep apnea and CPAP      Neuro/Psych  Comment: Choreiform movement  Restless leg    (+) depression           (+) neuromuscular disease             Groin abscess    Past Surgical History:   Procedure Laterality Date   • BACK SURGERY  2009    laminectomy  L1 -4  2/09  [facility]   • BACK SURGERY  8-8-13    C4-C6 ACDF    • BACK SURGERY  9/8/16    L2-S1 Revision Decomp possible uninstrumented fusion 9/8/16   • BACK SURGERY  09/10/2018    Rev C3-C7 ACDF    • CATARACT Bilateral done in 2004    IOL's   • COLONOSCOPY      2006   • COLONOSCOPY  2/2009    normal   • COLONOSCOPY N/A 8/23/2019    adenoma- repeat 1 yr (2 dya prep)   • COLONOSCOPY,BIOPSY  2/20/09    Performed by ANAM RIVERS at [facility] [place], [facility]   • EGD N/A 7/23/2021    EGD & COLONOSCOPY Surgeon: Kimberly, +vik, rpt EGD 3 months, poor prep rpt colon 3 years   • ENDOSCOPY, BOWEL POUCH, BIOPSY     • INJECTION, W/WO CONTRAST, DX/THERAPEUTIC SUBSTANCE, EPIDURAL/SUBARACHNOID; LUMBAR/SACRAL N/A 5/4/2016    Procedure: LUMBAR EPIDURAL;  Surgeon: Jayden Norton MD;  Location: [facility]   • INJECTION, W/WO CONTRAST, DX/THERAPEUTIC SUBSTANCE, EPIDURAL/SUBARACHNOID; LUMBAR/SACRAL N/A 5/25/2016    Procedure: LUMBAR EPIDURAL;  Surgeon: Jayden Norton MD;  Location: [facility]   • INJECTION, W/WO CONTRAST, DX/THERAPEUTIC SUBSTANCE, EPIDURAL/SUBARACHNOID; LUMBAR/SACRAL N/A 6/8/2016    Procedure: LUMBAR EPIDURAL;  Surgeon: Jayden Norton MD;  Location: [facility]   • KNEE REPLACEMENT SURGERY  2/14/13    Left TKR by Dr. Lombardi   • M-SEDAJ BY  RK ROSSI [facility] 5+ YR N/A 5/4/2016    Procedure: LUMBAR EPIDURAL;  Surgeon: Jayden Norton MD;  Location: [facility]   • M-SEDAJ BY   PHYS [facility] 5+ YR N/A 5/25/2016    Procedure: LUMBAR EPIDURAL;  Surgeon: Jayden Norton MD;  Location: [facility]   • M-SEDAJ BY  PHYS PERFRMG SV 5+ YR N/A 6/8/2016    Procedure: LUMBAR EPIDURAL;  Surgeon: Jayden Norton MD;  Location: [facility]   • OTHER SURGICAL HISTORY      Bilateral median nerve release at elbow; 2000   • OTHER SURGICAL HISTORY      CTS release bilateral 2000   • OTHER SURGICAL HISTORY      Surgical repair fracture left hand 5th digit   • OTHER SURGICAL HISTORY      Surgery left heel ligament repair   • OTHER SURGICAL HISTORY      Laser surgery for bilateral retinopathy   • OTHER SURGICAL HISTORY      Surgery to left eye for \"weak muscle.\" 2007   • OTHER SURGICAL HISTORY      bilat knee repair 9/23/10   • OTHER SURGICAL HISTORY  11/2013    lung resection to remove abcess   • OTHER SURGICAL HISTORY  10/26/2020    Cystoscopy (Dr. Ribeiro)   • PATIENT DOCUMENTED NOT TO HAVE EXPERIENCED ANY OF THE FOLLOWING EVENTS  2/14/2014    Procedure: ;  Surgeon: Kin Hobbs MD;  Location: [facility]   • PATIENT DOCUMENTED NOT TO HAVE EXPERIENCED ANY OF THE FOLLOWING EVENTS N/A 5/4/2016    Procedure: LUMBAR EPIDURAL;  Surgeon: Jayden Norton MD;  Location: [facility]   • PATIENT DOCUMENTED NOT TO HAVE EXPERIENCED ANY OF THE FOLLOWING EVENTS N/A 5/25/2016    Procedure: LUMBAR EPIDURAL;  Surgeon: Jayden Norton MD;  Location: [facility]   • PATIENT DOCUMENTED NOT TO HAVE EXPERIENCED ANY OF THE FOLLOWING EVENTS N/A 6/8/2016    Procedure: LUMBAR EPIDURAL;  Surgeon: Jayden Norton MD;  Location: [facility]   • PATIENT WITHOUGH PREOPERATIVE ORDER FOR IV ANTIBIOTIC SURGICAL SITE INFECTION PROPHYLAXIS.  2/14/2014    Procedure: ;  Surgeon: Kin Hobbs MD;  [facility]   • PATIENT WITHOUGH PREOPERATIVE ORDER FOR IV ANTIBIOTIC SURGICAL SITE INFECTION PROPHYLAXIS.  N/A 2016    Procedure: LUMBAR EPIDURAL;  Surgeon: Jayden Norton MD;  Location: [facility]   • PATIENT WITHOUGH PREOPERATIVE ORDER FOR IV ANTIBIOTIC SURGICAL SITE INFECTION PROPHYLAXIS. N/A 2016    Procedure: LUMBAR EPIDURAL;  Surgeon: Jayden Norton MD;  Location: [facility]   • PATIENT WITHOUGH PREOPERATIVE ORDER FOR IV ANTIBIOTIC SURGICAL SITE INFECTION PROPHYLAXIS. N/A 2016    Procedure: LUMBAR EPIDURAL;  Surgeon: Jayden Norton MD;  Location: [facility]   • TOTAL KNEE REPLACEMENT Left    • UPPER GI ENDOSCOPY,DIAGNOSIS  16    retained gastric contents; Edward   • UPPER GI ENDOSCOPY,REMOV F.B. N/A 2014    Procedure: ESOPHAGOGASTRODUODENOSCOPY, POSSIBLE BIOPSY, POSSIBLE POLYPECTOMY 67948;  Surgeon: Kin Hobbs MD;  Location: [facility]     Social History     Socioeconomic History   • Marital status:    • Number of children: 1   Occupational History   • Occupation: Retired--Printer   Tobacco Use   • Smoking status: Former     Packs/day: 3.50     Years: 20.00     Additional pack years: 0.00     Total pack years: 70.00     Types: Cigarettes     Quit date: 1986     Years since quittin.0   • Smokeless tobacco: Never   • Tobacco comments:     Quit 25 years ago   Vaping Use   • Vaping Use: Never used   Substance and Sexual Activity   • Alcohol use: Not Currently     Comment: very rarely   • Drug use: No   • Sexual activity: Yes     Partners: Female   Other Topics Concern   •  Service No   • Blood Transfusions No   • Caffeine Concern No     Comment: coffee 1 cup per day   • Sleep Concern No   • Exercise Yes     Comment: PT/OT 1x per week   • Seat Belt Yes     History   Drug Use No     Available pre-op labs reviewed.  Lab Results   Component Value Date    WBC 10.7 2024    RBC 3.83 2024    HGB 12.4 (L) 2024    HCT 39.4 2024    .9 (H) 2024    MCH 32.4  01/27/2024    MCHC 31.5 01/27/2024    RDW 16.6 01/27/2024    .0 01/27/2024     Lab Results   Component Value Date     01/27/2024    K 4.3 01/27/2024    CL 99 01/27/2024    CO2 31.0 01/27/2024    BUN 20 01/27/2024    CREATSERUM 3.40 (H) 01/27/2024     (H) 01/27/2024    CA 9.0 01/27/2024            Airway      Mallampati: II  Mouth opening: >3 FB  TM distance: > 6 cm  Neck ROM: full Cardiovascular    Cardiovascular exam normal.         Dental  Comment: Some diseased, compromised teeth           Pulmonary    Pulmonary exam normal.                 Other findings        ASA: 4   Plan: general  NPO status verified and patient meets guidelines.    Post-procedure pain management plan discussed with surgeon and patient.    Comment: Plan for general anesthetic with LMA.  Risks and benefits pertaining to the proposed anesthetic and postoperative plan for pain, nausea/vomiting were discussed with patient.  Risks of general anesthetic include but not are not limited to adverse reactions related to medications administered, dental damage, and sore throat postoperatively.  Questions were answered and consent was signed.   Plan/risks discussed with: patient            Present on Admission:  **None**

## 2024-01-29 LAB
ANION GAP SERPL CALC-SCNC: 11 MMOL/L (ref 0–18)
ATRIAL RATE: 63 BPM
BASOPHILS # BLD AUTO: 0.03 X10(3) UL (ref 0–0.2)
BASOPHILS NFR BLD AUTO: 0.3 %
BUN BLD-MCNC: 47 MG/DL (ref 9–23)
CALCIUM BLD-MCNC: 9.1 MG/DL (ref 8.5–10.1)
CHLORIDE SERPL-SCNC: 96 MMOL/L (ref 98–112)
CO2 SERPL-SCNC: 27 MMOL/L (ref 21–32)
CREAT BLD-MCNC: 4.98 MG/DL
EGFRCR SERPLBLD CKD-EPI 2021: 12 ML/MIN/1.73M2 (ref 60–?)
EOSINOPHIL # BLD AUTO: 0.01 X10(3) UL (ref 0–0.7)
EOSINOPHIL NFR BLD AUTO: 0.1 %
ERYTHROCYTE [DISTWIDTH] IN BLOOD BY AUTOMATED COUNT: 16.3 %
GLUCOSE BLD-MCNC: 160 MG/DL (ref 70–99)
GLUCOSE BLD-MCNC: 335 MG/DL (ref 70–99)
GLUCOSE BLD-MCNC: 341 MG/DL (ref 70–99)
GLUCOSE BLD-MCNC: 379 MG/DL (ref 70–99)
GLUCOSE BLD-MCNC: 381 MG/DL (ref 70–99)
GLUCOSE BLD-MCNC: 395 MG/DL (ref 70–99)
HCT VFR BLD AUTO: 38.8 %
HGB BLD-MCNC: 12.4 G/DL
IMM GRANULOCYTES # BLD AUTO: 0.06 X10(3) UL (ref 0–1)
IMM GRANULOCYTES NFR BLD: 0.6 %
LYMPHOCYTES # BLD AUTO: 0.53 X10(3) UL (ref 1–4)
LYMPHOCYTES NFR BLD AUTO: 5.6 %
MAGNESIUM SERPL-MCNC: 2.5 MG/DL (ref 1.6–2.6)
MCH RBC QN AUTO: 32.2 PG (ref 26–34)
MCHC RBC AUTO-ENTMCNC: 32 G/DL (ref 31–37)
MCV RBC AUTO: 100.8 FL
MONOCYTES # BLD AUTO: 0.37 X10(3) UL (ref 0.1–1)
MONOCYTES NFR BLD AUTO: 3.9 %
NEUTROPHILS # BLD AUTO: 8.42 X10 (3) UL (ref 1.5–7.7)
NEUTROPHILS # BLD AUTO: 8.42 X10(3) UL (ref 1.5–7.7)
NEUTROPHILS NFR BLD AUTO: 89.5 %
OSMOLALITY SERPL CALC.SUM OF ELEC: 307 MOSM/KG (ref 275–295)
P AXIS: -12 DEGREES
P-R INTERVAL: 182 MS
PLATELET # BLD AUTO: 195 10(3)UL (ref 150–450)
POTASSIUM SERPL-SCNC: 4.7 MMOL/L (ref 3.5–5.1)
Q-T INTERVAL: 396 MS
QRS DURATION: 74 MS
QTC CALCULATION (BEZET): 405 MS
R AXIS: 48 DEGREES
RBC # BLD AUTO: 3.85 X10(6)UL
SODIUM SERPL-SCNC: 134 MMOL/L (ref 136–145)
T AXIS: 13 DEGREES
VENTRICULAR RATE: 63 BPM
WBC # BLD AUTO: 9.4 X10(3) UL (ref 4–11)

## 2024-01-29 PROCEDURE — 99222 1ST HOSP IP/OBS MODERATE 55: CPT | Performed by: INTERNAL MEDICINE

## 2024-01-29 RX ORDER — ALBUMIN (HUMAN) 12.5 G/50ML
25 SOLUTION INTRAVENOUS
Status: ACTIVE | OUTPATIENT
Start: 2024-01-29 | End: 2024-01-31

## 2024-01-29 RX ORDER — IPRATROPIUM BROMIDE 21 UG/1
2 SPRAY, METERED NASAL EVERY 12 HOURS
Status: DISCONTINUED | OUTPATIENT
Start: 2024-01-29 | End: 2024-01-31

## 2024-01-29 NOTE — CONSULTS
[facility]  Report of Consultation    Gamaliel Boyer Patient Status:  Inpatient    1950 MRN [id number]   Location [facility] 3NW-A Attending Mickey Ovalle MD   Hosp Day # 1 PCP Nima Sorensen MD     Reason for Consultation:  ESRD on HD    History of Present Illness:  Gamaliel Boyer is a a(n) 73 year old man known to our service with mult med probs incl ESRD due to DM/HTN on HD TTHS at [facility] who presented with c/o swelling/pain R groin.  W/u c/w abscess and he underwent I&D in the OR yesterday.      History:  Past Medical History:   Diagnosis Date    Anemia     anxiety     back pain     Back problem     Blastomycosis     BPH (benign prostatic hyperplasia)     Depression     Diabetic retinopathy     laser surgery    End stage renal disease (HCC)     Esophageal reflux     occassion    Hearing impairment     hearing aids    HEMORRHOIDS     High blood pressure     High cholesterol     History of blood transfusion     History of renal dialysis     fistula on left arm    Hyperkalemia     hyperlipidemia     hypothyroidism     IMPOTENCE     Liver disease      - pt states all resolved after liver and kidneys shut down after issue with pneumonia     Mood disturbance     Neuropathy     osteoarthritis     Osteoarthrosis, unspecified whether generalized or localized, unspecified site     osteoporosis     Polyneuropathy in diabetes(357.2)     Sepsis (HCC)     Sleep apnea, obstructive SPLIT NIGHT 17    AHI 54 SaO2 lizeth 74 % CPAP 12 HME//     Type I (juvenile type) diabetes mellitus without mention of complication, not stated as uncontrolled     since - DR Mao follows    Unspecified essential hypertension     Visual impairment     wears glasses     Past Surgical History:   Procedure Laterality Date    BACK SURGERY      laminectomy  L1 -4    Dr[facility]    BACK SURGERY  13    C4-C6 ACDF     BACK SURGERY  16    L2-S1 Revision Decomp possible  uninstrumented fusion 9/8/16    BACK SURGERY  09/10/2018    Rev C3-C7 ACDF     CATARACT Bilateral done in 2004    IOL's    COLONOSCOPY      2006    COLONOSCOPY  2/2009    normal    COLONOSCOPY N/A 8/23/2019    adenoma- repeat 1 yr (2 dya prep)    COLONOSCOPY,BIOPSY  2/20/09    Performed by ANAM RIVERS at [facility] [place]    EGD N/A 7/23/2021    EGD & COLONOSCOPY Surgeon: Kimberly, +vik, rpt EGD 3 months, poor prep rpt colon 3 years    ENDOSCOPY, BOWEL POUCH, BIOPSY      INJECTION, W/WO CONTRAST, DX/THERAPEUTIC SUBSTANCE, EPIDURAL/SUBARACHNOID; LUMBAR/SACRAL N/A 5/4/2016    Procedure: LUMBAR EPIDURAL;  Surgeon: Jayden Norton MD;  Location: [facility]    INJECTION, W/WO CONTRAST, DX/THERAPEUTIC SUBSTANCE, EPIDURAL/SUBARACHNOID; LUMBAR/SACRAL N/A 5/25/2016    Procedure: LUMBAR EPIDURAL;  Surgeon: Jayden Norton MD;  Location: [facility]    INJECTION, W/WO CONTRAST, DX/THERAPEUTIC SUBSTANCE, EPIDURAL/SUBARACHNOID; LUMBAR/SACRAL N/A 6/8/2016    Procedure: LUMBAR EPIDURAL;  Surgeon: Jayden Norton MD;  Location: [facility]    KNEE REPLACEMENT SURGERY  2/14/13    Left TKR by Dr. Lombardi    M-SEDAJ BY  PHYS PERFRMG SVC 5+ YR N/A 5/4/2016    Procedure: LUMBAR EPIDURAL;  Surgeon: Jayden Norton MD;  Location: [facility]    M-SEDAJ BY  PHYS PERFRMG SVC 5+ YR N/A 5/25/2016    Procedure: LUMBAR EPIDURAL;  Surgeon: Jayden Norton MD;  Location: [facility]    M-SEDAJ BY  PHYS PERFRMG SVC 5+ YR N/A 6/8/2016    Procedure: LUMBAR EPIDURAL;  Surgeon: Jayden Norton MD;  Location: [facility]    OTHER SURGICAL HISTORY      Bilateral median nerve release at elbow; 2000    OTHER SURGICAL HISTORY      CTS release bilateral 2000    OTHER SURGICAL HISTORY      Surgical repair fracture left hand 5th digit    OTHER SURGICAL HISTORY      Surgery left heel ligament repair    OTHER SURGICAL HISTORY       Laser surgery for bilateral retinopathy    OTHER SURGICAL HISTORY      Surgery to left eye for \"weak muscle.\" 2007    OTHER SURGICAL HISTORY      bilat knee repair 9/23/10    OTHER SURGICAL HISTORY  11/2013    lung resection to remove abcess    OTHER SURGICAL HISTORY  10/26/2020    Cystoscopy (Dr. Ribeiro)    PATIENT DOCUMENTED NOT TO HAVE EXPERIENCED ANY OF THE FOLLOWING EVENTS  2/14/2014    Procedure: ;  Surgeon: Kin Hobbs MD;  Location: [facility]    PATIENT DOCUMENTED NOT TO HAVE EXPERIENCED ANY OF THE FOLLOWING EVENTS N/A 5/4/2016    Procedure: LUMBAR EPIDURAL;  Surgeon: Jayden Norton MD;  Location: [facility]    PATIENT DOCUMENTED NOT TO HAVE EXPERIENCED ANY OF THE FOLLOWING EVENTS N/A 5/25/2016    Procedure: LUMBAR EPIDURAL;  Surgeon: Jayden Norton MD;  Location: [facility]    PATIENT DOCUMENTED NOT TO HAVE EXPERIENCED ANY OF THE FOLLOWING EVENTS N/A 6/8/2016    Procedure: LUMBAR EPIDURAL;  Surgeon: Jayden Norton MD;  Location: [facility]    PATIENT WITHOUGH PREOPERATIVE ORDER FOR IV ANTIBIOTIC SURGICAL SITE INFECTION PROPHYLAXIS.  2/14/2014    Procedure: ;  Surgeon: Kin Hobbs MD;  Location: [facility]    PATIENT WITHOUGH PREOPERATIVE ORDER FOR IV ANTIBIOTIC SURGICAL SITE INFECTION PROPHYLAXIS. N/A 5/4/2016    Procedure: LUMBAR EPIDURAL;  Surgeon: Jayden Norton MD;  Location: [facility]    PATIENT WITHOUGH PREOPERATIVE ORDER FOR IV ANTIBIOTIC SURGICAL SITE INFECTION PROPHYLAXIS. N/A 5/25/2016    Procedure: LUMBAR EPIDURAL;  Surgeon: Jayden Norton MD;  Location: [facility]    PATIENT WITHOUGH PREOPERATIVE ORDER FOR IV ANTIBIOTIC SURGICAL SITE INFECTION PROPHYLAXIS. N/A 6/8/2016    Procedure: LUMBAR EPIDURAL;  Surgeon: Jayden Norton MD;  Location: [facility]    TOTAL KNEE REPLACEMENT Left 2013    UPPER GI ENDOSCOPY,DIAGNOSIS  4/14/16     retained gastric contents; Cesar    UPPER GI ENDOSCOPY,REMOV F.B. N/A 2/14/2014    Procedure: ESOPHAGOGASTRODUODENOSCOPY, POSSIBLE BIOPSY, POSSIBLE POLYPECTOMY 13128;  Surgeon: Kin Hobbs MD;  Location: [facility]     Family History   Problem Relation Age of Onset    Hypertension Father     Lipids Father     Cancer Father     Hypertension Mother     Psychiatric Maternal Grandmother     Diabetes Paternal Grandfather         Type 2 DM    Heart Disorder Brother         CAD with MI at age 53 - passed away    Obesity Brother     Diabetes Brother         Type 2 DM    Hypertension Brother     Lipids Brother     Thyroid Disorder Neg       reports that he quit smoking about 38 years ago. His smoking use included cigarettes. He has a 70 pack-year smoking history. He has never used smokeless tobacco. He reports that he does not currently use alcohol. He reports that he does not use drugs.    Allergies:  Allergies   Allergen Reactions    Antihistamine & Nasal Deconges [Fexofenadine-Pseudoephedrine] OTHER (SEE COMMENTS)     Altered mental status    Adhesive Tape ITCHING    Benadryl [Diphenhydramine] RESTLESSNESS    Depakote [Valproic Acid] DIZZINESS    Fluconazole OTHER (SEE COMMENTS)     Kidney labs abnormal    Mirtazapine RESTLESSNESS    Quetiapine RESTLESSNESS    Wellbutrin [Bupropion] RASH    Latex RASH       Medications:    Current Facility-Administered Medications:     insulin detemir (Levemir) 100 UNIT/ML FlexPen/FlexTouch 6 Units, 6 Units, Subcutaneous, Daily    ezetimibe (Zetia) tab 10 mg, 10 mg, Oral, Daily    famotidine (Pepcid) tab 20 mg, 20 mg, Oral, Daily    lactulose (CHRONULAC) 10 GM/15ML solution 20 g, 20 g, Oral, QPM    levothyroxine (Synthroid) tab 150 mcg, 150 mcg, Oral, Before breakfast    metoprolol tartrate (Lopressor) tab 25 mg, 25 mg, Oral, 2x Daily(Beta Blocker)    sevelamer carbonate (Renvela) tab 1,600 mg, 1,600 mg, Oral, TID CC    heparin (Porcine) 5000 UNIT/ML injection 5,000  Units, 5,000 Units, Subcutaneous, Q8H RIKKI    acetaminophen (Tylenol Extra Strength) tab 500 mg, 500 mg, Oral, Q4H PRN    melatonin tab 3 mg, 3 mg, Oral, Nightly PRN    polyethylene glycol (PEG 3350) (Miralax) 17 g oral packet 17 g, 17 g, Oral, Daily PRN    sennosides (Senokot) tab 17.2 mg, 17.2 mg, Oral, Nightly PRN    bisacodyl (Dulcolax) 10 MG rectal suppository 10 mg, 10 mg, Rectal, Daily PRN    ondansetron (Zofran) 4 MG/2ML injection 4 mg, 4 mg, Intravenous, Q6H PRN    metoclopramide (Reglan) 5 mg/mL injection 5 mg, 5 mg, Intravenous, Q8H PRN    ampicillin-sulbactam (Unasyn) 3 g in sodium chloride 0.9% 100mL IVPB-ADD, 3 g, Intravenous, q12h    glucose (Dex4) 15 GM/59ML oral liquid 15 g, 15 g, Oral, Q15 Min PRN **OR** glucose (Glutose) 40% oral gel 15 g, 15 g, Oral, Q15 Min PRN **OR** glucose-vitamin C (Dex-4) chewable tab 4 tablet, 4 tablet, Oral, Q15 Min PRN **OR** dextrose 50% injection 50 mL, 50 mL, Intravenous, Q15 Min PRN **OR** glucose (Dex4) 15 GM/59ML oral liquid 30 g, 30 g, Oral, Q15 Min PRN **OR** glucose (Glutose) 40% oral gel 30 g, 30 g, Oral, Q15 Min PRN **OR** glucose-vitamin C (Dex-4) chewable tab 8 tablet, 8 tablet, Oral, Q15 Min PRN    sertraline (Zoloft) tab 25 mg, 25 mg, Oral, Daily    insulin aspart (NovoLOG) 100 Units/mL FlexPen 1-5 Units, 1-5 Units, Subcutaneous, TID AC and HS  No current outpatient medications on file.       Review of Systems:  Denies fever/chills  Denies wt loss/gain  Denies HA or visual changes  Denies CP or palpitations  Denies SOB/cough/hemoptysis  Denies abd or flank pain  Denies N/V/D  Denies change in urinary habits or gross hematuria  Denies LE edema  Denies skin rashes/myalgias/arthralgias      Physical Exam:   /58 (BP Location: Right arm)   Pulse 68   Temp 97.5 °F (36.4 °C) (Oral)   Resp 19   Wt 157 lb 13.6 oz (71.6 kg)   SpO2 100%   BMI 25.48 kg/m²   Temp (24hrs), Av.9 °F (36.6 °C), Min:97.5 °F (36.4 °C), Max:98.4 °F (36.9 °C)       Intake/Output  Summary (Last 24 hours) at 1/29/2024 0739  Last data filed at 1/28/2024 1747  Gross per 24 hour   Intake 1060 ml   Output 5 ml   Net 1055 ml     Last 3 Weights   01/28/24 0307 157 lb 13.6 oz (71.6 kg)   01/28/24 0128 158 lb 6.4 oz (71.8 kg)   01/27/24 2041 170 lb (77.1 kg)   11/27/23 1556 171 lb 8 oz (77.8 kg)   10/21/23 0440 181 lb 7 oz (82.3 kg)   10/20/23 1806 175 lb (79.4 kg)   10/20/23 0800 175 lb (79.4 kg)   10/14/23 1517 170 lb (77.1 kg)   08/23/23 1134 172 lb 2 oz (78.1 kg)   06/20/23 1450 177 lb (80.3 kg)     General: Alert and oriented in no apparent distress.  HEENT: No scleral icterus, MMM  Neck: Supple, no KALPANA or thyromegaly  Cardiac: Regular rate and rhythm, S1, S2 normal, no murmur or rub  Lungs: Clear without wheezes, rales, rhonchi.    Abdomen: Soft, non-tender. + bowel sounds, no palpable organomegaly  Extremities: Without clubbing, cyanosis or edema. L arm AVF with bruit/thrill  Neurologic:  moving all extremities  Skin: Warm and dry, no rashes      Laboratory Data:  Lab Results   Component Value Date    WBC 9.4 01/29/2024    HGB 12.4 01/29/2024    HCT 38.8 01/29/2024    .0 01/29/2024    CREATSERUM 4.98 01/29/2024    BUN 47 01/29/2024     01/29/2024    K 4.7 01/29/2024    CL 96 01/29/2024    CO2 27.0 01/29/2024     01/29/2024    CA 9.1 01/29/2024    MG 2.5 01/29/2024    PGLU 379 01/29/2024       Glucose (mg/dL)   Date Value   06/27/2016 170 (H)   02/19/2014 301 (H)   10/11/2013 121 (H)     BUN (mg/dL)   Date Value   01/29/2024 47 (H)   01/27/2024 20   10/21/2023 43 (H)   08/07/2014 39 (H)   07/02/2014 31 (H)   04/08/2014 34 (H)     Blood Urea Nitrogen (mg/dL)   Date Value   10/25/2021 49.0 (H)   09/18/2020 48.0 (H)   09/04/2020 41.0 (H)   06/27/2016 21   04/04/2016 25   02/19/2014 34 (H)     Creatinine, Serum (mg/dL)   Date Value   06/27/2016 1.76 (H)   04/04/2016 1.99 (H)   02/19/2014 1.90 (H)     CREATININE (mg/dL)   Date Value   08/07/2014 1.69 (H)   07/02/2014 1.39 (H)    04/08/2014 1.55 (H)     Creatinine (mg/dL)   Date Value   01/29/2024 4.98 (H)   01/27/2024 3.40 (H)   10/21/2023 5.32 (H)   10/25/2021 4.45 (H)   09/18/2020 3.32 (H)   09/04/2020 3.13 (H)       Microalb/Creat Ratio   Date Value Ref Range Status   05/29/2009 283.2 (H) 0.0 - 30.0 ug/mg creat Final   08/20/2008 98.6 (H) 0.0 - 30.0 ug/mg creat Final   09/24/2007 117.0 (H) 0.0 - 30.0 ug/mg creat Final     MICROALB/CREAT RATIO   Date Value Ref Range Status   06/25/2015 857.9 (H) 0.0 - 30.0 mg/g creat Final   06/25/2014 692.8 (H) 0.0 - 30.0 mg/g creat Final   08/16/2012 338.6 (H) 0.0 - 30.0 mg/g creat Final     Malb/Cre Calc Ratio   Date Value Ref Range Status   05/25/2019 1,676.9 (H) 0.0 - 29.0 ug/mg Final     Malb/Cre Calc   Date Value Ref Range Status   01/13/2018 2,017.1 (H) <=30.0 ug/mg Final   09/06/2016 1,112.9 (H) <=30.0 ug/mg Final       Recent Labs   Lab 01/27/24 2117 01/29/24  0546   WBC 10.7 9.4   HGB 12.4* 12.4*   .9* 100.8*   .0 195.0       Recent Labs   Lab 01/27/24 2117 01/29/24  0546    134*   K 4.3 4.7   CL 99 96*   CO2 31.0 27.0   BUN 20 47*   CREATSERUM 3.40* 4.98*   CA 9.0 9.1   MG  --  2.5   * 395*       Recent Labs   Lab 01/27/24 2117   AST 37   ALB 3.0*       Recent Labs   Lab 01/28/24  1211 01/28/24  1448 01/28/24  1622 01/28/24 2047 01/29/24  0505   PGLU 93 117* 128* 238* 379*           Impression/Plan:    #1.  R groin abscess- s/p I&D.  Remains on abx    #2.  ESRD- due to DM/HTN.  HD to cont per usual TTHS schedule    #3.  HTN- cont usual antihypertensive agents    Thank you for allowing me to participate in the care of your patient. Please do not hesitate to call with any questions or concerns.       Ruiz Kraft MD  1/29/2024  7:39 AM

## 2024-01-29 NOTE — CM/SW NOTE
Noted wound care consult and recommendations for HH for wound care. Pt previously had [facility]. Referral sent for HH, including [facility].     SW/CM will follow up w/ pt to provide HH choices, to secure services prior to DC.    CM/SW will remain available for DC planning and/or support.     CLARA Espinal, CMSRN    [id number]

## 2024-01-29 NOTE — PHYSICAL THERAPY NOTE
PHYSICAL THERAPY EVALUATION - INPATIENT     Room Number: 303/303-A  Evaluation Date: 1/29/2024  Type of Evaluation: Initial  Physician Order: PT Eval and Treat    Presenting Problem: s/p R groin abscess irrigation and debridement on 1/28/24  Co-Morbidities : L TKA, spine surgeries, anemia, anxiety, OA, back pain, depression, HTN, hearing impairment, neuropathy, DM  Reason for Therapy: Mobility Dysfunction and Discharge Planning    History related to current admission: Patient is a 73 year old male admitted on 1/27/2024 from home for R groin abscess.  Pt s/p R groin abscess irrigation and debridement on 1/28/24.      ASSESSMENT   In this PT evaluation, the patient presents with the following impairments pain, decreased endurance, strength, ROM, and tolerance to activity.  These impairments and comorbidities manifest themselves as functional limitations in independent bed mobility, transfers, and gait.  The patient is below baseline and would benefit from skilled inpatient PT to address the above deficits to assist patient in returning to prior to level of function.   Functional outcome measures completed include AMPA.  The AM-PAC '6-Clicks' Inpatient Basic Mobility Short Form was completed and this patient is demonstrating a Approx Degree of Impairment: 46.58%  degree of impairment in mobility. Research supports that patients with this level of impairment may benefit from HHPT.    DISCHARGE RECOMMENDATIONS  PT Discharge Recommendations: Home with home health PT    PLAN  PT Treatment Plan: Bed mobility;Coordination;Body mechanics;Endurance;Energy conservation;Patient education;Family education;Gait training;Range of motion;Strengthening;Stoop training;Transfer training;Balance training  Rehab Potential : Good  Frequency (Obs): 3-5x/week  Number of Visits to Meet Established Goals: 3      CURRENT GOALS    Goal #1 Patient is able to demonstrate supine - sit EOB @ level: supervision     Goal #2 Patient is able to  demonstrate transfers Sit to/from Stand at assistance level: supervision     Goal #3 Patient is able to ambulate 100 feet with assist device: walker - rolling at assistance level: supervision     Goal #4    Goal #5    Goal #6    Goal Comments: Goals established on 1/29/2024    HOME SITUATION  Type of Home: House   Home Layout: One level  Stairs to Enter : 1  Railing: Yes          Lives With: Spouse  Drives: No  Patient Owned Equipment: Rollator  Patient Regularly Uses: Glasses    Prior Level of [place]: Pt reports he is able to perform functional activities at home using a rollator. Pt spouse drives him to dialysis appointments.     SUBJECTIVE  \"Thanks for getting me up and moving around\"       OBJECTIVE  Precautions: Bed/chair alarm  Fall Risk: High fall risk    WEIGHT BEARING RESTRICTION                   PAIN ASSESSMENT  Rating: Unable to rate  Location: R groin  Management Techniques: Activity promotion;Body mechanics;Relaxation;Repositioning    COGNITION  Overall Cognitive Status:  WFL - within functional limits    RANGE OF MOTION AND STRENGTH ASSESSMENT  Upper extremity ROM and strength are within functional limits     Lower extremity ROM is within functional limits     Lower extremity strength is within functional limits       BALANCE  Static Sitting: Good  Dynamic Sitting: Good  Static Standing: Poor +  Dynamic Standing: Poor +    ADDITIONAL TESTS                                    ACTIVITY TOLERANCE   Pt denies dizziness during session. Vitals stable.                       O2 WALK       NEUROLOGICAL FINDINGS                        AM-PAC '6-Clicks' INPATIENT SHORT FORM - BASIC MOBILITY  How much difficulty does the patient currently have...  Patient Difficulty: Turning over in bed (including adjusting bedclothes, sheets and blankets)?: A Little   Patient Difficulty: Sitting down on and standing up from a chair with arms (e.g., wheelchair, bedside commode, etc.): A Little   Patient Difficulty: Moving  from lying on back to sitting on the side of the bed?: A Little   How much help from another person does the patient currently need...   Help from Another: Moving to and from a bed to a chair (including a wheelchair)?: A Little   Help from Another: Need to walk in hospital room?: A Little   Help from Another: Climbing 3-5 steps with a railing?: A Little       AM-PAC Score:  Raw Score: 18   Approx Degree of Impairment: 46.58%   Standardized Score (AM-PAC Scale): 43.63   CMS Modifier (G-Code): CK    FUNCTIONAL ABILITY STATUS  Gait Assessment   Functional Mobility/Gait Assessment  Gait Assistance: Contact guard assist  Distance (ft): 25, 25  Assistive Device: Rolling walker  Pattern: R Decreased stance time (decreased gait speed)    Skilled Therapy Provided Per RN, okay for therapy. Pt received in supine and agreeable for PT session.     Bed Mobility:  Rolling: NT  Supine to sit: CGA   Sit to supine: CGA     Transfer Mobility:  Sit to stand: CGA   Stand to sit: CGA  Gait = CGA, pt ambulated 25 ft out to the hallway, then took a seated rest break and ambulated 25 ft from hallway back into bed     Therapist's Comments: Pt educated on role of therapy, goals for session, safety, and fall prevention.     Exercise/Education Provided:  Bed mobility  Body mechanics  Energy conservation  Functional activity tolerated  Posture  ROM  Strengthening    Patient End of Session: In bed;Needs met;Call light within reach;RN aware of session/findings;All patient questions and concerns addressed;Alarm set      Patient Evaluation Complexity Level:  History Moderate - 1 or 2 personal factors and/or co-morbidities   Examination of body systems Low - addressing 1-2 elements   Clinical Presentation Low - Stable   Clinical Decision Making Low - Stable       PT Session Time: 23 minutes  Gait Training: 10 minutes

## 2024-01-29 NOTE — PROGRESS NOTES
CC: follow-up hospital admission groin abscess    SUBJECTIVE:  Interval History:    Pain controlled  Tolerating diet      OBJECTIVE:  Scheduled Meds:    insulin detemir  6 Units Subcutaneous Daily    [START ON 1/30/2024] ampicillin-sulbactam  3 g Intravenous Q24H    insulin aspart  2-10 Units Subcutaneous TID AC and HS    ezetimibe  10 mg Oral Daily    famotidine  20 mg Oral Daily    lactulose  20 g Oral QPM    levothyroxine  150 mcg Oral Before breakfast    metoprolol tartrate  25 mg Oral 2x Daily(Beta Blocker)    sevelamer carbonate  1,600 mg Oral TID CC    heparin  5,000 Units Subcutaneous Q8H RIKKI    sertraline  25 mg Oral Daily     Continuous Infusions:   PRN Meds: sodium chloride **AND** albumin human, acetaminophen, melatonin, polyethylene glycol (PEG 3350), sennosides, bisacodyl, ondansetron, metoclopramide, glucose **OR** glucose **OR** glucose-vitamin C **OR** dextrose **OR** glucose **OR** glucose **OR** glucose-vitamin C    PHYSICAL EXAM  Vital signs: Temp:  [97.5 °F (36.4 °C)-98.3 °F (36.8 °C)] 97.7 °F (36.5 °C)  Pulse:  [64-91] 64  Resp:  [11-25] 25  BP: ()/(47-66) 103/54  SpO2:  [90 %-100 %] 92 %      GENERAL - NAD, AAO  EYES- sclera anicteric   HENT- normocephalic, OP - dry  NECK - supple  CV- RRR  RESP - CTAB, normal resp effort  ABDOMEN- soft, NT/ND   EXT- no LE edema   PSYCH - normal mentation/ normal affect    Data Review:   Labs:   Recent Labs   Lab 01/27/24 2117 01/29/24  0546   WBC 10.7 9.4   HGB 12.4* 12.4*   .9* 100.8*   .0 195.0       Recent Labs   Lab 01/27/24 2117 01/29/24  0546    134*   K 4.3 4.7   CL 99 96*   CO2 31.0 27.0   BUN 20 47*   CREATSERUM 3.40* 4.98*   CA 9.0 9.1   MG  --  2.5   * 395*       Recent Labs   Lab 01/27/24  2117   AST 37   ALB 3.0*       Recent Labs   Lab 01/28/24  1622 01/28/24  2047 01/29/24  0505 01/29/24  0857 01/29/24  1137   PGLU 128* 238* 379* 335* 381*           ASSESSMENT/PLAN:    Gamaliel Boyer Is a a 73 year old  male with DM2, ERSD on HD who presents with right groin abscess     Problem List / Diagnoses     Right Groin Abscess sp I&D  DM1 on insulin pump  HTN/HL  ESRD on HD  GERD  Hypothyroidism  MDD     Plan     Right Groin Abscess  -- sp I&D 01/28 by GS  - pre op Cx with Staph Aureus, OR cx pendign  - on Unasyn     DM 1  -- resume levemir and increase ISS  Pt to bring insulin pump, ok to use once here     HTN/HL  -- resume home zetia, metoprolol   Bp controlled    ESRD on HD  -- last HD 1/27  -- consult nephrology   -- resume home renvela  -- renally dose meds     GERD  -- pepcid    Hypothyroidism  -- resume home synthroid     MDD  -- resume home sertraline      DVT Ppx: DVT Mechanical Prophylaxis:   SCDs,          Will continue to follow while hospitalized. Please page me or the on-call hospitalist with questions or concerns.    Charan Eddy Hospitalist  [phone]  Answering Service: [phone]

## 2024-01-29 NOTE — PLAN OF CARE
A&Ox4. VSS. RA. . Denies chest pain and SOB.   Telemetry: NSR.   GI: Abdomen soft, nondistended. Passing gas. Belching present.   Denies nausea.   : Voids low amount - dialysis patient    Pain controlled with PRN pain medications.   Up with standby assist/walker   Drains: None  Incisions: R groin - packed/dressing in place. CDI   Diet: General diet - tolerating well.   Saline locked. All appropriate safety measures in place. All questions and concerns addressed.

## 2024-01-29 NOTE — PROGRESS NOTES
[facility]  Progress Note    Gamaliel Boyer Patient Status:  Inpatient    1950 MRN [id number]   Location [facility] 3NW-A Attending Mickey Ovalle MD   Hosp Day # 1 PCP Nima Sorensen MD     Subjective:    Patient tolerating diet  Pain controlled  Cx: prelim staph (ER)  Cx: OR pending    Objective/Physical Exam:    Vital Signs:  Blood pressure 99/52, pulse 83, temperature 98.1 °F (36.7 °C), temperature source Oral, resp. rate 16, weight 157 lb 13.6 oz (71.6 kg), SpO2 92%.    General:  Alert, orientated x3.  Cooperative.  No apparent distress.  Right lower extremity: packing removed, no purulent drainage noted, surrounding cellulitis and induration present, gauze and ABD placed over incision    Labs:  Reviewed    Lab Results   Component Value Date    WBC 9.4 2024    HGB 12.4 2024    HCT 38.8 2024    .0 2024    CREATSERUM 4.98 2024    BUN 47 2024     2024    K 4.7 2024    CL 96 2024    CO2 27.0 2024     2024    CA 9.1 2024    MG 2.5 2024    PGLU 335 2024     Problem List:  Patient Active Problem List   Diagnosis    Restless leg    Secondary hyperparathyroidism (HCC)    S/P total knee arthroplasty    Ulcerated, foot, right, with fat layer exposed (HCC)    PRANEETH on CPAP    Insulin pump status    Type 1 diabetes, uncontrolled, with neuropathy    Essential hypertension    Hypothyroidism (acquired)    Uncontrolled type 1 diabetes mellitus with hyperglycemia, with long-term current use of insulin (HCC)    Onychomycosis    Lumbar stenosis with neurogenic claudication    DDD (degenerative disc disease), lumbar    Long-term insulin use (HCC)    Uncontrolled type 1 diabetes mellitus with proliferative retinopathy without macular edema, with long-term current use of insulin    Uncontrolled type 1 diabetes mellitus with diabetic nephropathy, with long-term current use of insulin    Pulmonary  hypertension (HCC)    Uncontrolled type 1 diabetes mellitus with stage 3 chronic kidney disease    Neuropathy    Primary hypertension    Hyperlipidemia LDL goal <100    Hypoglycemia unawareness in type 1 diabetes mellitus (HCC)    Sensory hearing loss, bilateral    Moderate episode of recurrent major depressive disorder ([facility])    Controlled type 1 diabetes mellitus with complication, with long-term current use of insulin ([facility])    Myofascial pain    Charcot foot due to diabetes mellitus ([facility])    Other chronic pain    Chronic mastoiditis of right side    Cervical myelopathy ([facility])    Pulmonary emphysema, unspecified emphysema type ([facility])    Difficulty walking    S/P insertion of spinal cord stimulator    Hyperglycemia    Stage 4 chronic kidney disease (HCC)    Urinary retention    Anemia in stage 4 chronic kidney disease  ([facility])    Arthritis of facet joint of lumbar spine    Type 1 diabetes mellitus with complication, with long term current use of insulin pump  ([facility])    Esophagitis    Platelets decreased ([facility])    Jerking movements of extremities    Diabetic complication (HCC)    Acute renal failure (HCC)    Acute renal failure, unspecified acute renal failure type (HCC)    Hyponatremia    Anemia in ESRD (end-stage renal disease)  ([facility])    ESRD on hemodialysis ([facility])    Hypokalemia    Thrombocytopenia (HCC)    Altered mental status, unspecified altered mental status type    Rash    Diabetes mellitus due to underlying condition with chronic kidney disease on chronic dialysis, with long-term current use of insulin (HCC)    Other fatigue    Acute renal failure superimposed on chronic kidney disease, unspecified CKD stage, unspecified acute renal failure type    Acute on chronic congestive heart failure, unspecified heart failure type ([facility])    Urticaria    Cellulitis of left upper extremity    Anemia    Acute kidney injury (HCC)    Syncope and collapse    Abnormal movement    Atrophy of muscle of hand    Acute encephalopathy     Type 2 diabetes mellitus with hyperglycemia, with long-term current use of insulin (HCC)    Fall, initial encounter    Hypoglycemia    Hyperammonemia (HCC)    Choreiform movement    Myoclonus    Groin abscess    ESRD (end stage renal disease) (HCC)    Type 1 diabetes mellitus with hyperglycemia (HCC)       Impression:     74 y/o S/P: I & D right groin abscess  Cx pending    ESRD: dialysis set for tomorrow    Plan:    Continue IV antibiotics  Wound care consulted  Dressing changes daily, SHANKAR Cash to place 1/2 in plain packing gauze, guaze and ABD change daily  Diet as tolerated  All questions answered    CYNTHIA Chester  [facility]  General Surgery  1/29/2024

## 2024-01-29 NOTE — CONSULTS
[facility] Pharmacy Note:  Renal Adjustment for ampicillin/sulbactam (UNASYN)    Gamaliel Boyer is a 73 year old patient who has been prescribed ampicillin/sulbactam (UNASYN) 3000 mg every 12 hrs.  The estimated creatinine clearance is 11.7 mL/min (A) (based on SCr of 4.98 mg/dL (H)). The dose has been adjusted to ampicillin/sulbactam (UNASYN) 3000 mg every 24 hrs per hospital renal dose adjustment protocol for treatment of  groin abscess with worsening or SCr from 3,4 to 4.98 today .  Pharmacy will follow and adjust dose as warranted for additional renal function changes.    Thank you,    Xavi Strauss, PharmD  1/29/2024  11:45 AM

## 2024-01-29 NOTE — CDS QUERY
Clinical Documentation Clarification Form  Dear Dr. Santizo,   Clinical information (provided below) indicates a surgical debridement procedure was performed.  PLEASE “X” THE MOST APPROPRIATE STATEMENT FOR EACH REQUEST THAT APPLIES To the Incision, drainage, and debridement of skin and necrotic soft tissue, right groin  procedure done on 1/27/24  Clarification Request #1: Type of Procedure  [   ] Non-Excisional Debridement (non-operative scraping, brushing, irrigating, scrubbing, or washing of devitalized tissue, necrosis, or slough; minor removal of loose fragments)  [ x  ] Excisional Debridement (surgical removal or cutting away of devitalized tissue, necrosis, or slough to the level of viable tissue)   -If Excisional Debridement is chosen above, the following requests must be clarified:  Clarification Request #2: Instrument(s) Used       [  x ] Scalpel                   [   ] Curette      [   ] Scissors                 [ x  ] Bovie      [  x ] Forceps                  [   ] Rongeur       [   ] Other-please specify: ____________________________  Clarification #3: Appearance of the wound after procedure;   [    ] Fresh bleeding tissue   [    x] Viable tissue  [    ] Pink healthy tissue   [    ] Other: ___________________    Documentation from the Medical Record:    ___1/27 Brief Op Note: Surgical Findings: large abscess and skin necrosis right groin Specimen: us necrotic skin and subcutaneous tissue  . Estimated Blood loss: 5mL   ___1/27 OP report: OPERATIVE REPORT: PREOPERATIVE DIAGNOSIS: Large necrotic abscess, right groin. POSTOPERATIVE DIAGNOSIS: Large necrotic abscess, right groin. PROCEDURE: Incision, drainage, and debridement of skin and necrotic soft tissue, right groin. ANESTHESIA: General. OPERATIVE TECHNIQUE: Patient was brought in the operating room, placed on operating table in supine position. Inhalational anesthesia provided by the attending anesthesiologist. The right groin was prepped and draped  in usual sterile fashion. Lidocaine 1% and Marcaine 0.5% used as a local anesthetic. Patient had a previous vein harvest in this area. I could see the scar which was essentially the center portion of this wound. I opened up the old incision, and pus and necrotic tissue was removed and sent to Microbiology for evaluation. I then excised the necrotic skin and necrotic soft tissue in this area, and this was sent to Pathology. After this was complete, I probed the wound. The wound was moderately deep, at least 5 cm in depth, probably 10 cm in length and 6 cm in width. I irrigated with Irrisept. Hemostasis was obtained with electrocautery. When this was all complete, I packed the wound open with Nu Gauze and applied a sterile bandage. The patient was taken to the recovery room in satisfactory condition.        For questions regarding this query contact Clinical : Sandy Jasmine RN [phone] [email or web address]  THIS FORM IS A PERMANENT PART OF THE MEDICAL RECORD

## 2024-01-29 NOTE — PLAN OF CARE
Pt is alert and oriented x4.  VSS.  Maintaining o2 sats on r/a during the day, cpap at noc.  NSR on tele.  Heparin and scd's for dvt prophylaxis.  No c/o pain at this time.  Rt groin inscions changed, packing remained in place.  Minimal SS drainage noted.  IV ABX as ordered.  Safety precautions in place.  Pt updated w/ poc.  All questions and concerns addressed at this time.

## 2024-01-29 NOTE — CONSULTS
[facility]  Report of Inpatient Wound Care Consultation    Gamaliel Boyer Patient Status:  Inpatient    1950 MRN [id number]   Location [facility] 3NW-A Attending Charan Guido, DO   Hosp Day # 1 PCP Nima Sorensen MD     Reason for Consultation:  R thigh surgical wound    History of Present Illness:  Gamaliel Boyer is a a(n) 73 year old male.  Patient with multiple comorbidities, with present on admission skin breakdown described below.     SUBJECTIVE:  It does not hurt me.       History:  Past Medical History:   Diagnosis Date    Anemia     anxiety     back pain     Back problem     Blastomycosis     BPH (benign prostatic hyperplasia)     Depression     Diabetic retinopathy     laser surgery    End stage renal disease (HCC)     Esophageal reflux     occassion    Hearing impairment     hearing aids    HEMORRHOIDS     High blood pressure     High cholesterol     History of blood transfusion     History of renal dialysis     fistula on left arm    Hyperkalemia     hyperlipidemia     hypothyroidism     IMPOTENCE     Liver disease      - pt states all resolved after liver and kidneys shut down after issue with pneumonia     Mood disturbance     Neuropathy     osteoarthritis     Osteoarthrosis, unspecified whether generalized or localized, unspecified site     osteoporosis     Polyneuropathy in diabetes(357.2)     Sepsis (HCC)     Sleep apnea, obstructive SPLIT NIGHT 17    AHI 54 SaO2 lizeth 74 % CPAP 12 HME//     Type I (juvenile type) diabetes mellitus without mention of complication, not stated as uncontrolled     since - DR Mao follows    Unspecified essential hypertension     Visual impairment     wears glasses     Past Surgical History:   Procedure Laterality Date    BACK SURGERY      laminectomy  L1 -4    [facility]    BACK SURGERY  13    C4-C6 ACDF     BACK SURGERY  16    L2-S1 Revision Decomp possible uninstrumented fusion  9/8/16    BACK SURGERY  09/10/2018    Rev C3-C7 ACDF     CATARACT Bilateral done in 2004    IOL's    COLONOSCOPY      2006    COLONOSCOPY  2/2009    normal    COLONOSCOPY N/A 8/23/2019    adenoma- repeat 1 yr (2 dya prep)    COLONOSCOPY,BIOPSY  2/20/09    Performed by ANAM RIVERS at [facility] [place]    EGD N/A 7/23/2021    EGD & COLONOSCOPY Surgeon: Kimberly, +vik, rpt EGD 3 months, poor prep rpt colon 3 years    ENDOSCOPY, BOWEL POUCH, BIOPSY      INJECTION, W/WO CONTRAST, DX/THERAPEUTIC SUBSTANCE, EPIDURAL/SUBARACHNOID; LUMBAR/SACRAL N/A 5/4/2016    Procedure: LUMBAR EPIDURAL;  Surgeon: Jayden Norton MD;  Location: [facility]    INJECTION, W/WO CONTRAST, DX/THERAPEUTIC SUBSTANCE, EPIDURAL/SUBARACHNOID; LUMBAR/SACRAL N/A 5/25/2016    Procedure: LUMBAR EPIDURAL;  Surgeon: Jayden Norton MD;  Location: [facility]    INJECTION, W/WO CONTRAST, DX/THERAPEUTIC SUBSTANCE, EPIDURAL/SUBARACHNOID; LUMBAR/SACRAL N/A 6/8/2016    Procedure: LUMBAR EPIDURAL;  Surgeon: Jayden Norton MD;  Location: [facility]    KNEE REPLACEMENT SURGERY  2/14/13    Left TKR by Dr. Lombardi    M-SEDAJ BY  PHYS PERFRMG SVC 5+ YR N/A 5/4/2016    Procedure: LUMBAR EPIDURAL;  Surgeon: Jayden Norton MD;  Location: [facility]    M-SEDAJ BY  PHYS PERFRMG SVC 5+ YR N/A 5/25/2016    Procedure: LUMBAR EPIDURAL;  Surgeon: Jayden Norton MD;  Location: [facility]    M-SEDAJ BY  PHYS PERFRMG SVC 5+ YR N/A 6/8/2016    Procedure: LUMBAR EPIDURAL;  Surgeon: Jayden Norton MD;  Location: [facility]    OTHER SURGICAL HISTORY      Bilateral median nerve release at elbow; 2000    OTHER SURGICAL HISTORY      CTS release bilateral 2000    OTHER SURGICAL HISTORY      Surgical repair fracture left hand 5th digit    OTHER SURGICAL HISTORY      Surgery left heel ligament repair    OTHER SURGICAL HISTORY      Laser surgery  for bilateral retinopathy    OTHER SURGICAL HISTORY      Surgery to left eye for \"weak muscle.\" 2007    OTHER SURGICAL HISTORY      bilat knee repair 9/23/10    OTHER SURGICAL HISTORY  11/2013    lung resection to remove abcess    OTHER SURGICAL HISTORY  10/26/2020    Cystoscopy (Dr. Ribeiro)    PATIENT DOCUMENTED NOT TO HAVE EXPERIENCED ANY OF THE FOLLOWING EVENTS  2/14/2014    Procedure: ;  Surgeon: Kin Hobbs MD;  Location: [facility]    PATIENT DOCUMENTED NOT TO HAVE EXPERIENCED ANY OF THE FOLLOWING EVENTS N/A 5/4/2016    Procedure: LUMBAR EPIDURAL;  Surgeon: Jayden Norton MD;  Location: [facility]    PATIENT DOCUMENTED NOT TO HAVE EXPERIENCED ANY OF THE FOLLOWING EVENTS N/A 5/25/2016    Procedure: LUMBAR EPIDURAL;  Surgeon: Jayden Norton MD;  Location: [facility]    PATIENT DOCUMENTED NOT TO HAVE EXPERIENCED ANY OF THE FOLLOWING EVENTS N/A 6/8/2016    Procedure: LUMBAR EPIDURAL;  Surgeon: Jayden Norton MD;  Location: [facility]    PATIENT WITHOUGH PREOPERATIVE ORDER FOR IV ANTIBIOTIC SURGICAL SITE INFECTION PROPHYLAXIS.  2/14/2014    Procedure: ;  Surgeon: Kin Hobbs MD;  Location: [facility]    PATIENT WITHOUGH PREOPERATIVE ORDER FOR IV ANTIBIOTIC SURGICAL SITE INFECTION PROPHYLAXIS. N/A 5/4/2016    Procedure: LUMBAR EPIDURAL;  Surgeon: Jayden Norton MD;  Location: [facility]    PATIENT WITHOUGH PREOPERATIVE ORDER FOR IV ANTIBIOTIC SURGICAL SITE INFECTION PROPHYLAXIS. N/A 5/25/2016    Procedure: LUMBAR EPIDURAL;  Surgeon: Jayden Norton MD;  Location: [facility]    PATIENT WITHOUGH PREOPERATIVE ORDER FOR IV ANTIBIOTIC SURGICAL SITE INFECTION PROPHYLAXIS. N/A 6/8/2016    Procedure: LUMBAR EPIDURAL;  Surgeon: Jayden Norton MD;  Location: [facility]    TOTAL KNEE REPLACEMENT Left 2013    UPPER GI ENDOSCOPY,DIAGNOSIS  4/14/16    retained gastric  contents; Cesar    UPPER GI ENDOSCOPY,REMOV F.B. N/A 2/14/2014    Procedure: ESOPHAGOGASTRODUODENOSCOPY, POSSIBLE BIOPSY, POSSIBLE POLYPECTOMY 26288;  Surgeon: Kin Hobbs MD;  Location: [facility]      reports that he quit smoking about 38 years ago. His smoking use included cigarettes. He has a 70 pack-year smoking history. He has never used smokeless tobacco. He reports that he does not currently use alcohol. He reports that he does not use drugs.      Allergies:  @ALLERGY    Laboratory Data:    Recent Labs   Lab 01/27/24  2117 01/28/24  0139 01/29/24  0505 01/29/24  0546 01/29/24  0857 01/29/24  1137   WBC 10.7  --   --  9.4  --   --    HGB 12.4*  --   --  12.4*  --   --    HCT 39.4  --   --  38.8*  --   --    .0  --   --  195.0  --   --    CREATSERUM 3.40*  --   --  4.98*  --   --    BUN 20  --   --  47*  --   --    *  --   --  395*  --   --    CA 9.0  --   --  9.1  --   --    ALB 3.0*  --   --   --   --   --    TP 7.5  --   --   --   --   --    PGLU  --    < > 379*  --  335* 381*    < > = values in this interval not displayed.         ASSESSMENT:  Wound 01/29/24 Leg Anterior;Proximal;Right;Upper (Active)   Date First Assessed/Time First Assessed: 01/29/24 1422   Location: Leg  Wound Location Orientation: Anterior;Proximal;Right;Upper      Assessments 1/29/2024  2:22 PM   Wound Image     Wound Length (cm) 2.3 cm   Wound Width (cm) 4.5 cm   Wound Surface Area (cm^2) 10.35 cm^2   Wound Depth (cm) 1.9 cm   Wound Volume (cm^3) 19.665 cm^3   Non-staged Wound Description Full thickness   Viv-wound Assessment Swelling;Induration   Wound Granulation Tissue Red   Wound Bed Granulation (%) 80 %   Wound Bed Epithelium (%) 0 %   Wound Bed Slough (%) 2 %   Wound Bed Eschar (%) 0 %   Wound Bed Fibrin (%) 0 %   Wound Odor None   Exposed Structure Adipose          Wound Cleaning and Dressings:  Showering directions: May shower and/or cleanse wound with mild soap and water  Wound cleansing:   Cleanse with normal saline or wound cleanser  Wound cleaning frequency: Daily  Wound product: 1/2 inch plain packing gauze, 4x4 gauze, ABD pad, kerlix. Tape.   Dressing change frequency:  Change dressing daily and/or PRN    Additional Notes:  Noted above. Will need home health to follow.           Thank you for this consultation and for allowing me to participate in the care of your patient.  Please page me at #[phone] if you have any questions about this consultation and plan of care.     Time Spent 45 Minutes.    Thank you,  Dorys Hubbard, PT, MPT  Wound Care Clinician  [facility]    1/29/2024

## 2024-01-30 LAB
ANION GAP SERPL CALC-SCNC: 10 MMOL/L (ref 0–18)
BUN BLD-MCNC: 63 MG/DL (ref 9–23)
CALCIUM BLD-MCNC: 9.1 MG/DL (ref 8.5–10.1)
CHLORIDE SERPL-SCNC: 97 MMOL/L (ref 98–112)
CO2 SERPL-SCNC: 28 MMOL/L (ref 21–32)
CREAT BLD-MCNC: 6.05 MG/DL
EGFRCR SERPLBLD CKD-EPI 2021: 9 ML/MIN/1.73M2 (ref 60–?)
ERYTHROCYTE [DISTWIDTH] IN BLOOD BY AUTOMATED COUNT: 15.9 %
EST. AVERAGE GLUCOSE BLD GHB EST-MCNC: 154 MG/DL (ref 68–126)
GLUCOSE BLD-MCNC: 151 MG/DL (ref 70–99)
GLUCOSE BLD-MCNC: 160 MG/DL (ref 70–99)
GLUCOSE BLD-MCNC: 162 MG/DL (ref 70–99)
GLUCOSE BLD-MCNC: 178 MG/DL (ref 70–99)
GLUCOSE BLD-MCNC: 91 MG/DL (ref 70–99)
HBA1C MFR BLD: 7 % (ref ?–5.7)
HBV SURFACE AG SER-ACNC: 8.2 [IU]/L
HBV SURFACE AG SERPL CFM-%: REACTIVE
HBV SURFACE AG SERPL QL IA: REACTIVE
HCT VFR BLD AUTO: 37.4 %
HGB BLD-MCNC: 12 G/DL
MAGNESIUM SERPL-MCNC: 3.3 MG/DL (ref 1.6–2.6)
MCH RBC QN AUTO: 32.3 PG (ref 26–34)
MCHC RBC AUTO-ENTMCNC: 32.1 G/DL (ref 31–37)
MCV RBC AUTO: 100.5 FL
OSMOLALITY SERPL CALC.SUM OF ELEC: 302 MOSM/KG (ref 275–295)
PLATELET # BLD AUTO: 190 10(3)UL (ref 150–450)
POTASSIUM SERPL-SCNC: 4.6 MMOL/L (ref 3.5–5.1)
RBC # BLD AUTO: 3.72 X10(6)UL
SODIUM SERPL-SCNC: 135 MMOL/L (ref 136–145)
WBC # BLD AUTO: 7.8 X10(3) UL (ref 4–11)

## 2024-01-30 PROCEDURE — 90935 HEMODIALYSIS ONE EVALUATION: CPT | Performed by: INTERNAL MEDICINE

## 2024-01-30 PROCEDURE — 5A1D70Z PERFORMANCE OF URINARY FILTRATION, INTERMITTENT, LESS THAN 6 HOURS PER DAY: ICD-10-PCS | Performed by: INTERNAL MEDICINE

## 2024-01-30 RX ORDER — VANCOMYCIN 1.75 GRAM/500 ML IN 0.9 % SODIUM CHLORIDE INTRAVENOUS
25 ONCE
Qty: 500 ML | Refills: 0 | Status: COMPLETED | OUTPATIENT
Start: 2024-01-30 | End: 2024-01-30

## 2024-01-30 RX ORDER — HYDROCODONE BITARTRATE AND ACETAMINOPHEN 5; 325 MG/1; MG/1
1 TABLET ORAL AS NEEDED
Status: DISCONTINUED | OUTPATIENT
Start: 2024-01-30 | End: 2024-01-31

## 2024-01-30 RX ORDER — CHLORHEXIDINE GLUCONATE 4 G/100ML
30 SOLUTION TOPICAL DAILY
Status: DISCONTINUED | OUTPATIENT
Start: 2024-01-30 | End: 2024-01-31

## 2024-01-30 NOTE — PLAN OF CARE
A&Ox4. Hard of hearing bilateral hearing aides, glasses. VSS. RA, CPAP at night. . Denies chest pain and SOB.   Telemetry: NSR in the 60s  GI: Abdomen soft, nondistended. Passing gas.   Denies nausea.   : diminished urine output- hemodialysis patient   Patient denies pain at this time   Up with standby assist and a walker  Incisions: groin incision with packing covered with 4x4 and abd pad- CDI   Diet: tolerating regular diet   IV SL per order  All appropriate safety measures in place. All questions and concerns addressed  Insulin pump in place     Patient reported giving himself a total of 12.75 units of insulin, patient could not give a time.

## 2024-01-30 NOTE — PLAN OF CARE
A&Ox4. VSS. RA. . Denies chest pain and SOB.   Telemetry: NSR.   GI: Abdomen soft, nondistended. Passing gas. Belching present.   Denies nausea.   : Low urinary output r/t dialysis   Pain controlled with PRN pain medications.   Up with standby assist/walker   Drains: None  Incisions: R groin abscess with dressing applied   Diet: General diet - tolerating well.   Saline locked. All appropriate safety measures in place. All questions and concerns addressed.

## 2024-01-30 NOTE — CM/SW NOTE
Pt is a 72 yo male admitted for groin abscess.  Pt lives at home with his wife Ansley.  Pt has ESRD and goes to hemodialysis at [facility] on TTS at 10am.  PT is recommending HH PT.  Pt also needs HH RN for wound care.  HH referrals sent.  Called pt's wife Ansley who declined HH list because they have had [facility] in the past and would like to use them again.  [facility] reserved in [place].  SW following.     01/30/24 1000   CM/SW Referral Data   Referral Source Physician   Reason for Referral Discharge planning   Informant Spouse/Significant Other   Patient Info   Patient's Home Environment House   Patient lives with Spouse/Significant other   Discharge Needs   Anticipated D/C needs Home health care   Choice of Post-Acute Provider   Informed patient of right to choose their preferred provider Yes   List of appropriate post-acute services provided to patient/family with quality data No - Declined list

## 2024-01-30 NOTE — PROGRESS NOTES
CC: follow-up hospital admission groin abscess    SUBJECTIVE:  Interval History:    No acute issues overnight  Getting hd    Overnight it was reported to covering physician libra small pt had positive blood cx  I called micro and blood cx are negative to date.     He doesn't feel ready to go home, still weak but overall better      OBJECTIVE:  Scheduled Meds:    insulin   Subcutaneous TID AC and HS    vancomycin  25 mg/kg Intravenous Once    chlorhexidine  30 mL Topical Daily    Vancomycin IV  1 each Intravenous See Admin Instructions (RX holding)    ipratropium  2 spray Each Nare Q12H    ezetimibe  10 mg Oral Daily    famotidine  20 mg Oral Daily    lactulose  20 g Oral QPM    levothyroxine  150 mcg Oral Before breakfast    metoprolol tartrate  25 mg Oral 2x Daily(Beta Blocker)    sevelamer carbonate  1,600 mg Oral TID CC    heparin  5,000 Units Subcutaneous Q8H RIKKI    sertraline  25 mg Oral Daily     Continuous Infusions:   PRN Meds: HYDROcodone-acetaminophen, sodium chloride **AND** albumin human, acetaminophen, melatonin, polyethylene glycol (PEG 3350), sennosides, bisacodyl, ondansetron, metoclopramide, glucose **OR** glucose **OR** glucose-vitamin C **OR** dextrose **OR** glucose **OR** glucose **OR** glucose-vitamin C    PHYSICAL EXAM  Vital signs: Temp:  [97.7 °F (36.5 °C)-98.4 °F (36.9 °C)] 98.2 °F (36.8 °C)  Pulse:  [60-83] 62  Resp:  [14-31] 17  BP: ()/(52-69) 96/52  SpO2:  [92 %-96 %] 94 %      GENERAL - NAD, AAO  EYES- sclera anicteric   HENT- normocephalic, OP - dry  NECK - supple  CV- RRR  RESP - CTAB   ABDOMEN- soft, NT/ND   EXT- no  calf ttp  PSYCH - normal mentation/ normal affect    Data Review:   Labs:   Recent Labs   Lab 01/27/24  2117 01/29/24  0546 01/30/24  0603   WBC 10.7 9.4 7.8   HGB 12.4* 12.4* 12.0*   .9* 100.8* 100.5*   .0 195.0 190.0       Recent Labs   Lab 01/27/24 2117 01/29/24  0546 01/30/24  0603    134* 135*   K 4.3 4.7 4.6   CL 99 96* 97*   CO2 31.0 27.0 28.0    BUN 20 47* 63*   CREATSERUM 3.40* 4.98* 6.05*   CA 9.0 9.1 9.1   MG  --  2.5 3.3*   * 395* 162*       Recent Labs   Lab 01/27/24 2117   AST 37   ALB 3.0*       Recent Labs   Lab 01/29/24  1137 01/29/24  1631 01/29/24  2104 01/30/24  0550 01/30/24  1136   PGLU 381* 341* 160* 160* 178*           ASSESSMENT/PLAN:    Gamaliel Boyer Is a a 73 year old male with DM2, ERSD on HD who presents with right groin abscess     Problem List / Diagnoses     Right Groin Abscess sp I&D  DM1 on insulin pump  HTN/HL  ESRD on HD  GERD  Hypothyroidism  MDD     Plan     Right Groin Abscess  -- sp I&D 01/28 by GS  - pre op Cx with Staph Aureus, NSSA  - on Unasyn, dw ID - will see, may be able to get outpt abx via HD     DM 1  -- ok to use home insulin pump     HTN/HL  -- resume home zetia, metoprolol   Bp controlled    ESRD on HD  -- last HD 1/27  -- consult nephrology   -- resume home renvela  -- renally dose meds     GERD  -- pepcid    Hypothyroidism  -- resume home synthroid     MDD  -- resume home sertraline      DVT Ppx: DVT Mechanical Prophylaxis:   SCDs,      Likely dc tomorrow    Will continue to follow while hospitalized. Please page me or the on-call hospitalist with questions or concerns.    Charan Eddy Hospitalist  [phone]  Answering Service: [phone]

## 2024-01-30 NOTE — CONSULTS
[facility] Pharmacy Dosing Service      Initial Pharmacokinetic Consult for Vancomycin Dosing     Gamaliel Boyer is a 73 year old male who is being initiated on vancomycin therapy for cellulitis.  Pharmacy has been asked to dose vancomycin by Dr. Cesar Stephenson.  The initial treatment and monitoring approach will be non-AUC strategy.        Weight and Temperature:    Wt Readings from Last 1 Encounters:   24 71.6 kg (157 lb 13.6 oz)        Temp Readings from Last 1 Encounters:   24 98.2 °F (36.8 °C) (Oral)      Labs:   Recent Labs   Lab 24  0546 24  0603   CREATSERUM 3.40* 4.98* 6.05*      Estimated Creatinine Clearance: 9.7 mL/min (A) (based on SCr of 6.05 mg/dL (H)).     Recent Labs   Lab 24  0546 24  0603   WBC 10.7 9.4 7.8          The Pharmacokinetic Target is:    Trough/random 15-20 mg/L (applies to IV dosing in HD and IP dosing in APD)    Renal Dosing Considerations:    HD: Current Regimen: TuTa     Assessment/Plan:   Initial/Loading dose: Will receive 1750 mg IV (25 mg/kg, capped at 2250 mg) x 1 loading dose.      Maintenance dose: Pharmacy will dose vancomycin at 750 mg IV after each dialysis    Monitorin) Plan for vancomycin trough level to be obtained prior to the 3rd HD session    2) Pharmacy will order SCr as clinically indicated to assess renal function.    3) Pharmacy will monitor for toxicity and efficacy, adjust vancomycin dose and/or frequency, and order vancomycin levels as appropriate per the Pharmacy and Therapeutics Committee approved protocol until discontinuation of the medication.       We appreciate the opportunity to assist in the care of this patient.     Marisa Pace, PharmD  2024  1:35 PM  [facility]  Pharmacy Extension: [phone]

## 2024-01-30 NOTE — CONSULTS
[facility]    PATIENT'S NAME: XIOMARA SINGH   ATTENDING PHYSICIAN: NAZARIO Ovalle MD   CONSULTING PHYSICIAN: Cesar Stephenson M.D.   PATIENT ACCOUNT#:   [id number]    LOCATION:  [address] [place] [address]  MEDICAL RECORD #:   [id number]       YOB: 1950  ADMISSION DATE:       01/27/2024      CONSULT DATE:  01/30/2024    REPORT OF CONSULTATION    HISTORY OF PRESENT ILLNESS:  This is a 73-year-old man who has been diabetic for many years.  He now has end-stage renal disease on dialysis.  He was in his usual state of good health and had sudden onset of right groin swelling, pain, and redness, and abscess was noted.  He was admitted on the 27th, and this was drained.  A culture has Staph aureus, and I am asked to evaluate.  No trauma is noted.  No other GI, , cardiovascular, CNS, or respiratory symptoms currently noted.      PAST MEDICAL HISTORY:  He has had foot ulcers bilaterally and points out that they are worse on the left.    MEDICATIONS:  Currently on Unasyn.  Other medications reviewed in Epic.    ALLERGIES:  Listed adhesive tape, Benadryl, Depakote, fluconazole, mirtazapine, quetiapine, Wellbutrin, and latex.    SOCIAL HISTORY:  Negative for cigarettes for 40 years.  Alcohol negative.    FAMILY HISTORY:  Diabetes in the family.    REVIEW OF SYSTEMS:  Weight has been stable.      PHYSICAL EXAMINATION:    GENERAL:  This is an elderly man in good spirits, currently no acute distress.    VITAL SIGNS:  He is afebrile.  Vitals are stable.  HEENT:  Pale conjunctivae.  No oral lesions.  NECK:  Supple.  No JVD or adenopathy.  LUNGS:  Clear.  HEART:  I do not hear a murmur.  ABDOMEN:  Nontender.  No masses, rebound, or organomegaly.  EXTREMITIES:  There is a right groin abscess, packed.  Surrounding induration and fading redness are noted.  Both feet have eschars, more on the left than the right.  The left has the angriest-looking open ulcer, which is on the left first toe base.    NEUROLOGIC:  Not  further tested by me.    LABORATORY DATA:  Culture, Staph aureus, sensitivities pending.  Blood cultures, no growth.  Sensitivities have just come back as not MRSA.  White count from the 27th is 10.7, hemoglobin 12.4, platelets 158.  There is some clindamycin resistance on the Staph aureus.    IMPRESSION:    1.   Staphylococcus aureus groin abscess in this diabetic patient with end-stage renal disease.  Despite being MSSA, I will switch to vancomycin on dialysis.  This can be arranged as an outpatient.  2.   There are diabetic foot ulcers bilaterally.  Vancomycin on dialysis seems prudent for these also.  3.   I will add some Hibiclens head to toe.  4.   We will check an arterial Doppler too.      Further suggestions to follow.  Thank you very much for allowing me to see this patient.    Dictated By Cesar Stephenson M.D.  d: 01/30/2024 12:38:48  t: 01/30/2024 12:48:42  Job [id number]  [facility]/

## 2024-01-30 NOTE — PHYSICAL THERAPY NOTE
IP PT attempted, pt resting in bed, politely refusing OOB mobility as he has just finished dialysis . Pt states he will have dinner OOBTC c nsg staff.

## 2024-01-30 NOTE — CDS QUERY
CLINICAL DOCUMENTATION CLARIFICATION FORM    Dear Dr. Guido,     Clinical information in the patient's medical record (provided below) includes a diagnosis of right groin abscess with a documented history of right groin vein harvest procedure.     Can you please clarify if the diagnosis of right groin abscess is related to the previous surgical procedure?      [  ] Yes     [  x] No  do no tsuspect this   [  ] Clinically Unable to Determine        [  ] Other (please specify): _______________      Clinical Indicators/Risk:   ___10/20/23: Historical Operative report: Hamlet of right great saphenous vein. Right mid brachial artery to distal brachial artery in situ right great saphenous vein bypass. Interval ligation of the brachial artery just distal to the fistula in a distal revascularization and interval ligation procedure.   ___1/27/24: 73M to ER with draining Right groin wound.   ___1/27/24 Operative Report:    PRE/POSTOPERATIVE DIAGNOSIS: Large necrotic abscess, right groin.  PROCEDURE: Incision, drainage, and debridement of skin and necrotic soft tissue, right groin. ANESTHESIA: General.   Patient had a previous vein harvest in this area. I could see the scar which was essentially the center portion of this wound. I opened up the old incision, and pus and necrotic tissue was removed and sent to Microbiology for evaluation. I then excised the necrotic skin and necrotic soft tissue in this area, and this was sent to Pathology. After this was complete, I probed the wound. The wound was moderately deep, at least 5 cm in depth, probably 10 cm in length and 6 cm in width. I irrigated with Irrisept.    Treatment: CT scan, IV ATBX, Surgical consult, I&D, Debridement. Cultures.         For questions regarding this query, please contact: Clinical : Sandy Jasmine [phone]. [email or web address]   THIS FORM IS A PERMANENT PART OF THE MEDICAL RECORD

## 2024-01-30 NOTE — PROGRESS NOTES
[facility]  Progress Note    Gamaliel Boyer Patient Status:  Inpatient    1950 MRN [id number]   Location [facility] 3NW-A Attending Charan Guido,    Hosp Day # 2 PCP Nima Sorensen MD     Subjective:    Patient reports pain controlled  Tolerating PO diet  Dressings changed today  Currently getting HD    Objective/Physical Exam:    Vital Signs:  Blood pressure 111/63, pulse 63, temperature 97.9 °F (36.6 °C), temperature source Oral, resp. rate 14, weight 157 lb 13.6 oz (71.6 kg), SpO2 94%.    General:  Alert, orientated x3.  Cooperative.  No apparent distress.  Extremities: dressings intact    Labs:  Reviewed    Lab Results   Component Value Date    WBC 7.8 2024    HGB 12.0 2024    HCT 37.4 2024    .0 2024    CREATSERUM 6.05 2024    BUN 63 2024     2024    K 4.6 2024    CL 97 2024    CO2 28.0 2024     2024    CA 9.1 2024    MG 3.3 2024    PGLU 160 2024       Xray:  Reviewed    Problem List:  Patient Active Problem List   Diagnosis    Restless leg    Secondary hyperparathyroidism (HCC)    S/P total knee arthroplasty    Ulcerated, foot, right, with fat layer exposed (HCC)    PRANEETH on CPAP    Insulin pump status    Type 1 diabetes, uncontrolled, with neuropathy    Essential hypertension    Hypothyroidism (acquired)    Uncontrolled type 1 diabetes mellitus with hyperglycemia, with long-term current use of insulin (HCC)    Onychomycosis    Lumbar stenosis with neurogenic claudication    DDD (degenerative disc disease), lumbar    Long-term insulin use (HCC)    Uncontrolled type 1 diabetes mellitus with proliferative retinopathy without macular edema, with long-term current use of insulin    Uncontrolled type 1 diabetes mellitus with diabetic nephropathy, with long-term current use of insulin    Pulmonary hypertension (HCC)    Uncontrolled type 1 diabetes mellitus with stage 3 chronic  kidney disease    Neuropathy    Primary hypertension    Hyperlipidemia LDL goal <100    Hypoglycemia unawareness in type 1 diabetes mellitus ([facility])    Sensory hearing loss, bilateral    Moderate episode of recurrent major depressive disorder ([facility])    Controlled type 1 diabetes mellitus with complication, with long-term current use of insulin ([facility])    Myofascial pain    Charcot foot due to diabetes mellitus ([facility])    Other chronic pain    Chronic mastoiditis of right side    Cervical myelopathy ([facility])    Pulmonary emphysema, unspecified emphysema type ([facility])    Difficulty walking    S/P insertion of spinal cord stimulator    Hyperglycemia    Stage 4 chronic kidney disease ([facility])    Urinary retention    Anemia in stage 4 chronic kidney disease  ([facility])    Arthritis of facet joint of lumbar spine    Type 1 diabetes mellitus with complication, with long term current use of insulin pump  ([facility])    Esophagitis    Platelets decreased ([facility])    Jerking movements of extremities    Diabetic complication ([facility])    Acute renal failure ([facility])    Acute renal failure, unspecified acute renal failure type ([facility])    Hyponatremia    Anemia in ESRD (end-stage renal disease)  ([facility])    ESRD on hemodialysis ([facility])    Hypokalemia    Thrombocytopenia ([facility])    Altered mental status, unspecified altered mental status type    Rash    Diabetes mellitus due to underlying condition with chronic kidney disease on chronic dialysis, with long-term current use of insulin ([facility])    Other fatigue    Acute renal failure superimposed on chronic kidney disease, unspecified CKD stage, unspecified acute renal failure type    Acute on chronic congestive heart failure, unspecified heart failure type ([facility])    Urticaria    Cellulitis of left upper extremity    Anemia    Acute kidney injury ([facility])    Syncope and collapse    Abnormal movement    Atrophy of muscle of hand    Acute encephalopathy    Type 2 diabetes mellitus with hyperglycemia, with long-term current use of  insulin (HCC)    Fall, initial encounter    Hypoglycemia    Hyperammonemia (HCC)    Choreiform movement    Myoclonus    Groin abscess    ESRD (end stage renal disease) (HCC)    Type 1 diabetes mellitus with hyperglycemia (HCC)           Impression:     74 y/o S/P: I & D right groin abscess  Currently on HD  Cx: staph aureus     Plan:    Tolerating diet  Pain controlled  Continue IV antibiotics, DW hospitalist, will consult ID for antibiotic coverage as outpatient  Will need HH once dc  Dressing changes daily  F/u in 1 week  All questions answered      CYNTHIA Chester  [facility]  General Surgery  1/30/2024

## 2024-01-30 NOTE — PROGRESS NOTES
"Encounter Date: 5/19/2022       History     Chief Complaint   Patient presents with    Headache     Pt to ER via POV for headaches.  Pt states HA since April.  Was seen at [facility] and states "I was gas lit by that ER".  Pt states her head feels like it is full of fluid     22-year-old female who presents with complaint of headaches every day since April. No dizziness, no n/v. No change in vision.  She states that she has been taking ibuprofen daily for it but states that it does not really help a whole Heck of a lot.    The history is provided by the patient. No  was used.   Headache   This is a recurrent problem. The current episode started more than 1 month ago. The problem occurs daily. The problem has been unchanged. The pain is located in the bilateral and temporal region. The pain does not radiate. The quality of the pain is described as aching. The treatment provided no relief.     Review of patient's allergies indicates:  No Known Allergies  History reviewed. No pertinent past medical history.  Past Surgical History:   Procedure Laterality Date    BREAST SURGERY      EYE SURGERY       No family history on file.     Review of Systems   Neurological: Positive for headaches.   All other systems reviewed and are negative.      Physical Exam     Initial Vitals [05/19/22 1735]   BP Pulse Resp Temp SpO2   (!) 154/106 103 18 99.1 °F (37.3 °C) 100 %      MAP       --         Physical Exam    Nursing note and vitals reviewed.  Constitutional: She appears well-developed and well-nourished.   Neck:   Normal range of motion.  Cardiovascular: Regular rhythm.   Pulmonary/Chest: No respiratory distress.   Musculoskeletal:         General: Normal range of motion.      Cervical back: Normal range of motion.      Comments: Ambulates steadily.  Bilateral upper and lower extremity strength equal and strong     Neurological: She is alert and oriented to person, place, and time. She has normal strength. GCS " [facility]  Progress Note    Gamaliel Boyer Patient Status:  Inpatient    1950 MRN [id number]   [facility] 3NW-A Attending Mickey Ovalle MD   Hosp Day # 2 PCP Nima Sorensen MD     HD today  No acute events          Current Facility-Administered Medications:     insulin via Insulin Pump, , Subcutaneous, TID AC and HS    sodium chloride 0.9 % IV bolus 100 mL, 100 mL, Intravenous, Q30 Min PRN **AND** albumin human (Albumin) 25% injection 25 g, 25 g, Intravenous, PRN Dialysis    ampicillin-sulbactam (Unasyn) 3 g in sodium chloride 0.9% 100mL IVPB-ADD, 3 g, Intravenous, Q24H    ipratropium (Atrovent) 0.03 % nasal solution 2 spray, 2 spray, Each Nare, Q12H    ezetimibe (Zetia) tab 10 mg, 10 mg, Oral, Daily    famotidine (Pepcid) tab 20 mg, 20 mg, Oral, Daily    lactulose (CHRONULAC) 10 GM/15ML solution 20 g, 20 g, Oral, QPM    levothyroxine (Synthroid) tab 150 mcg, 150 mcg, Oral, Before breakfast    metoprolol tartrate (Lopressor) tab 25 mg, 25 mg, Oral, 2x Daily(Beta Blocker)    sevelamer carbonate (Renvela) tab 1,600 mg, 1,600 mg, Oral, TID CC    heparin (Porcine) 5000 UNIT/ML injection 5,000 Units, 5,000 Units, Subcutaneous, Q8H RIKKI    acetaminophen (Tylenol Extra Strength) tab 500 mg, 500 mg, Oral, Q4H PRN    melatonin tab 3 mg, 3 mg, Oral, Nightly PRN    polyethylene glycol (PEG 3350) (Miralax) 17 g oral packet 17 g, 17 g, Oral, Daily PRN    sennosides (Senokot) tab 17.2 mg, 17.2 mg, Oral, Nightly PRN    bisacodyl (Dulcolax) 10 MG rectal suppository 10 mg, 10 mg, Rectal, Daily PRN    ondansetron (Zofran) 4 MG/2ML injection 4 mg, 4 mg, Intravenous, Q6H PRN    metoclopramide (Reglan) 5 mg/mL injection 5 mg, 5 mg, Intravenous, Q8H PRN    glucose (Dex4) 15 GM/59ML oral liquid 15 g, 15 g, Oral, Q15 Min PRN **OR** glucose (Glutose) 40% oral gel 15 g, 15 g, Oral, Q15 Min PRN **OR** glucose-vitamin C (Dex-4) chewable tab 4 tablet, 4 tablet, Oral, Q15 Min PRN **OR** dextrose 50%  injection 50 mL, 50 mL, Intravenous, Q15 Min PRN **OR** glucose (Dex4) 15 GM/59ML oral liquid 30 g, 30 g, Oral, Q15 Min PRN **OR** glucose (Glutose) 40% oral gel 30 g, 30 g, Oral, Q15 Min PRN **OR** glucose-vitamin C (Dex-4) chewable tab 8 tablet, 8 tablet, Oral, Q15 Min PRN    sertraline (Zoloft) tab 25 mg, 25 mg, Oral, Daily      Physical Exam:   /65 (BP Location: Right arm)   Pulse 63   Temp 97.9 °F (36.6 °C) (Oral)   Resp 14   Wt 157 lb 13.6 oz (71.6 kg)   SpO2 95%   BMI 25.48 kg/m²   Temp (24hrs), Av °F (36.7 °C), Min:97.7 °F (36.5 °C), Max:98.4 °F (36.9 °C)       Intake/Output Summary (Last 24 hours) at 2024 0917  Last data filed at 2024 1945  Gross per 24 hour   Intake 600 ml   Output 0 ml   Net 600 ml     Last 3 Weights   24 0307 157 lb 13.6 oz (71.6 kg)   24 0128 158 lb 6.4 oz (71.8 kg)   24 2041 170 lb (77.1 kg)   23 1556 171 lb 8 oz (77.8 kg)   10/21/23 0440 181 lb 7 oz (82.3 kg)   10/20/23 1806 175 lb (79.4 kg)   10/20/23 0800 175 lb (79.4 kg)   10/14/23 1517 170 lb (77.1 kg)   23 1134 172 lb 2 oz (78.1 kg)   23 1450 177 lb (80.3 kg)     General: Alert and oriented in no apparent distress.  HEENT: No scleral icterus, MMM  Neck: Supple, no KALPANA or thyromegaly  Cardiac: Regular rate and rhythm, S1, S2 normal, no murmur or rub  Lungs: Clear without wheezes, rales, rhonchi.    Abdomen: Soft, non-tender. + bowel sounds, no palpable organomegaly  Extremities: Without clubbing, cyanosis or edema. L arm AVF with bruit/thrill  Neurologic:  moving all extremities  Skin: Warm and dry, no rashes    Recent Labs     24  0546 24  0603   WBC 10.7 9.4 7.8   HGB 12.4* 12.4* 12.0*   .9* 100.8* 100.5*   .0 195.0 190.0       Recent Labs     24  0546 24  0603    134* 135*   K 4.3 4.7 4.6   CL 99 96* 97*   CO2 31.0 27.0 28.0   BUN 20 47* 63*   CREATSERUM 3.40* 4.98* 6.05*   CA 9.0 9.1 9.1   MG   score is 15. GCS eye subscore is 4. GCS verbal subscore is 5. GCS motor subscore is 6.   Skin: Skin is warm and dry.   Psychiatric: She has a normal mood and affect.         ED Course   Procedures  Labs Reviewed - No data to display       Imaging Results          CT Head Without Contrast (Final result)  Result time 05/19/22 20:47:15    Final result by Raul Red MD (05/19/22 20:47:15)                 Impression:      No acute intracranial findings.      Electronically signed by: Raul Red  Date:    05/19/2022  Time:    20:47             Narrative:    EXAMINATION:  CT HEAD WITHOUT CONTRAST    CLINICAL HISTORY:  Transient ischemic attack (TIA); headaches    TECHNIQUE:  CT imaging of the head performed from the skull base to the vertex without intravenous contrast. DLP 1011 mGycm. Automatic exposure control, adjustment of mA/kV or iterative reconstruction technique was used to reduce radiation.    COMPARISON:  None Available.    FINDINGS:  There is no acute cortical infarct, hemorrhage or mass lesion.  The ventricles are normal in size.    Visualized paranasal sinuses and mastoid air cells are clear.                                 Medications   butalbital-acetaminophen-caffeine -40 mg per tablet 1 tablet (1 tablet Oral Given 5/19/22 2009)     Medical Decision Making:   Clinical Tests:   Radiological Study: Ordered and Reviewed  ED Management:  CT head negative.  Fioricet has helped reduce her pain.  Plan is to discharge with Fioricet and have her follow-up with Neurology for her recurrent headaches over the past month to 2 months    Additional MDM:   Differential Diagnosis:   Other: The following diagnoses were also considered and will be evaluated: Migraines, Brain bleed.           ED Course as of 05/19/22 2104   Thu May 19, 2022   2045 Patient states head pain down from 7/10 to 4/10. Discussed just waiting for head ct final results then can discharge if negative. [EV]      ED Course User Index  [EV]  --  2.5 3.3*       Recent Labs     01/27/24 2117   AST 37   ALB 3.0*       Impression/Plan:    1.  R groin abscess- s/p I&D.  Remains on abx    2.  ESRD- due to DM/HTN.  HD to cont per usual TTHS schedule; Tx today    3.  HTN- cont usual antihypertensive agents    Thank you for allowing me to participate in the care of your patient. Please do not hesitate to call with any questions or concerns.       Mo Rousseau  1/30/2024      RANDY Jiang             Clinical Impression:   Final diagnoses:  [G43.009] Migraine without aura and without status migrainosus, not intractable (Primary)          ED Disposition Condition    Discharge Stable        ED Prescriptions     Medication Sig Dispense Start Date End Date Auth. Provider    butalbital-acetaminophen-caffeine -40 mg (FIORICET, ESGIC) -40 mg per tablet Take 1 tablet by mouth every 6 (six) hours as needed for Pain or Headaches. 60 tablet 5/19/2022 6/3/2022 RANDY Jiang        Follow-up Information     Follow up With Specialties Details Why Contact Info    Prabhu Hart MD Neurology Call in 1 week for follow up appointment regarding headaches. [address] [place] [address] Dr  [address]  [facility] [address]  [phone]             RANDY Jiang  05/19/22 3205

## 2024-01-30 NOTE — PROGRESS NOTES
[facility] Infectious Disease  Progress Note    Gamaliel Boyer Patient Status:  Inpatient    1950 MRN [id number]   Location [facility] 3NW-A Attending Charan Guido, DO   Hosp Day # 2 PCP Nima Sorensen MD     Gamaliel Boyer is a 73 year old male.   Chief Complaint   Patient presents with    Abscess       HPI:      Groin abscess on rt drained  Esrd from dm   Both feet with lesions too               REVIEW OF SYSTEMS:   A comprehensive 10 point review of systems was completed.  Pertinent positives and negatives noted in the the HPI.       Allergies:  Allergies   Allergen Reactions    Antihistamine & Nasal Deconges [Fexofenadine-Pseudoephedrine] OTHER (SEE COMMENTS)     Altered mental status    Adhesive Tape ITCHING    Benadryl [Diphenhydramine] RESTLESSNESS    Depakote [Valproic Acid] DIZZINESS    Fluconazole OTHER (SEE COMMENTS)     Kidney labs abnormal    Mirtazapine RESTLESSNESS    Quetiapine RESTLESSNESS    Wellbutrin [Bupropion] RASH    Latex RASH        Current Meds:    Current Facility-Administered Medications:     insulin via Insulin Pump, , Subcutaneous, TID AC and HS    HYDROcodone-acetaminophen (Norco) 5-325 MG per tab 1 tablet, 1 tablet, Oral, PRN    sodium chloride 0.9 % IV bolus 100 mL, 100 mL, Intravenous, Q30 Min PRN **AND** albumin human (Albumin) 25% injection 25 g, 25 g, Intravenous, PRN Dialysis    ampicillin-sulbactam (Unasyn) 3 g in sodium chloride 0.9% 100mL IVPB-ADD, 3 g, Intravenous, Q24H    ipratropium (Atrovent) 0.03 % nasal solution 2 spray, 2 spray, Each Nare, Q12H    ezetimibe (Zetia) tab 10 mg, 10 mg, Oral, Daily    famotidine (Pepcid) tab 20 mg, 20 mg, Oral, Daily    lactulose (CHRONULAC) 10 GM/15ML solution 20 g, 20 g, Oral, QPM    levothyroxine (Synthroid) tab 150 mcg, 150 mcg, Oral, Before breakfast    metoprolol tartrate (Lopressor) tab 25 mg, 25 mg, Oral, 2x Daily(Beta Blocker)    sevelamer carbonate (Renvela) tab 1,600  mg, 1,600 mg, Oral, TID CC    heparin (Porcine) 5000 UNIT/ML injection 5,000 Units, 5,000 Units, Subcutaneous, Q8H RIKKI    acetaminophen (Tylenol Extra Strength) tab 500 mg, 500 mg, Oral, Q4H PRN    melatonin tab 3 mg, 3 mg, Oral, Nightly PRN    polyethylene glycol (PEG 3350) (Miralax) 17 g oral packet 17 g, 17 g, Oral, Daily PRN    sennosides (Senokot) tab 17.2 mg, 17.2 mg, Oral, Nightly PRN    bisacodyl (Dulcolax) 10 MG rectal suppository 10 mg, 10 mg, Rectal, Daily PRN    ondansetron (Zofran) 4 MG/2ML injection 4 mg, 4 mg, Intravenous, Q6H PRN    metoclopramide (Reglan) 5 mg/mL injection 5 mg, 5 mg, Intravenous, Q8H PRN    glucose (Dex4) 15 GM/59ML oral liquid 15 g, 15 g, Oral, Q15 Min PRN **OR** glucose (Glutose) 40% oral gel 15 g, 15 g, Oral, Q15 Min PRN **OR** glucose-vitamin C (Dex-4) chewable tab 4 tablet, 4 tablet, Oral, Q15 Min PRN **OR** dextrose 50% injection 50 mL, 50 mL, Intravenous, Q15 Min PRN **OR** glucose (Dex4) 15 GM/59ML oral liquid 30 g, 30 g, Oral, Q15 Min PRN **OR** glucose (Glutose) 40% oral gel 30 g, 30 g, Oral, Q15 Min PRN **OR** glucose-vitamin C (Dex-4) chewable tab 8 tablet, 8 tablet, Oral, Q15 Min PRN    sertraline (Zoloft) tab 25 mg, 25 mg, Oral, Daily   No current outpatient medications on file.        HISTORY:  Past Medical History:   Diagnosis Date    Anemia     anxiety     back pain     Back problem     Blastomycosis 1984    BPH (benign prostatic hyperplasia)     Depression     Diabetic retinopathy     laser surgery    End stage renal disease (HCC)     Esophageal reflux     occassion    Hearing impairment     hearing aids    HEMORRHOIDS     High blood pressure     High cholesterol     History of blood transfusion 2013    History of renal dialysis     fistula on left arm    Hyperkalemia     hyperlipidemia     hypothyroidism     IMPOTENCE     Liver disease     2013 - pt states all resolved after liver and kidneys shut down after issue with pneumonia     Mood disturbance      Neuropathy     osteoarthritis     Osteoarthrosis, unspecified whether generalized or localized, unspecified site     osteoporosis     Polyneuropathy in diabetes(357.2)     Sepsis (HCC)     Sleep apnea, obstructive SPLIT NIGHT 5-12-17    AHI 54 SaO2 lizeth 74 % CPAP 12 HME//     Type I (juvenile type) diabetes mellitus without mention of complication, not stated as uncontrolled     since 1963- DR Mao follows    Unspecified essential hypertension     Visual impairment     wears glasses      Past Surgical History:   Procedure Laterality Date    BACK SURGERY  2009    laminectomy  L1 -4  2/09  Dr. Eitan Guerrero [facility]    BACK SURGERY  8-8-13    C4-C6 ACDF     BACK SURGERY  9/8/16    L2-S1 Revision Decomp possible uninstrumented fusion 9/8/16    BACK SURGERY  09/10/2018    Rev C3-C7 ACDF     CATARACT Bilateral done in 2004    IOL's    COLONOSCOPY      2006    COLONOSCOPY  2/2009    normal    COLONOSCOPY N/A 8/23/2019    adenoma- repeat 1 yr (2 dya prep)    COLONOSCOPY,BIOPSY  2/20/09    Performed by ANAM RIVERS [facility] [place]    EGD N/A 7/23/2021    EGD & COLONOSCOPY Surgeon: Kimberly, +vik, rpt EGD 3 months, poor prep rpt colon 3 years    ENDOSCOPY, BOWEL POUCH, BIOPSY      INJECTION, W/WO CONTRAST, DX/THERAPEUTIC SUBSTANCE, EPIDURAL/SUBARACHNOID; LUMBAR/SACRAL N/A 5/4/2016    Procedure: LUMBAR EPIDURAL;  Surgeon: Jayden Norton MD;  Location: [facility]    INJECTION, W/WO CONTRAST, DX/THERAPEUTIC SUBSTANCE, EPIDURAL/SUBARACHNOID; LUMBAR/SACRAL N/A 5/25/2016    Procedure: LUMBAR EPIDURAL;  Surgeon: Jayden Norton MD;  Location: [facility]    INJECTION, W/WO CONTRAST, DX/THERAPEUTIC SUBSTANCE, EPIDURAL/SUBARACHNOID; LUMBAR/SACRAL N/A 6/8/2016    Procedure: LUMBAR EPIDURAL;  Surgeon: Jayden Norton MD;  Location: [facility]    KNEE REPLACEMENT SURGERY  2/14/13    Left TKR by Dr. Lombardi M-SEDAJ BY  PHYS [facility]FRMG SV 5+ YR N/A 5/4/2016     Procedure: LUMBAR EPIDURAL;  Surgeon: Jayden Norton MD;  Location: [facility]    M-SEDAJ BY  PHYS [facility] 5+ YR N/A 5/25/2016    Procedure: LUMBAR EPIDURAL;  Surgeon: Jayden Norton MD;  Location: [facility]    M-SEDAJ BY  PHYS [facility] SV 5+ YR N/A 6/8/2016    Procedure: LUMBAR EPIDURAL;  Surgeon: Jayden Norton MD;  Location: [facility]    OTHER SURGICAL HISTORY      Bilateral median nerve release at elbow; 2000    OTHER SURGICAL HISTORY      CTS release bilateral 2000    OTHER SURGICAL HISTORY      Surgical repair fracture left hand 5th digit    OTHER SURGICAL HISTORY      Surgery left heel ligament repair    OTHER SURGICAL HISTORY      Laser surgery for bilateral retinopathy    OTHER SURGICAL HISTORY      Surgery to left eye for \"weak muscle.\" 2007    OTHER SURGICAL HISTORY      bilat knee repair 9/23/10    OTHER SURGICAL HISTORY  11/2013    lung resection to remove abcess    OTHER SURGICAL HISTORY  10/26/2020    Cystoscopy (Dr. Ribeiro)    PATIENT DOCUMENTED NOT TO HAVE EXPERIENCED ANY OF THE FOLLOWING EVENTS  2/14/2014    Procedure: ;  Surgeon: Kin Hobbs MD;  Location[facility]    PATIENT DOCUMENTED NOT TO HAVE EXPERIENCED ANY OF THE FOLLOWING EVENTS N/A 5/4/2016    Procedure: LUMBAR EPIDURAL;  Surgeon: Jayden Norton MD;  Location: [facility]    PATIENT DOCUMENTED NOT TO HAVE EXPERIENCED ANY OF THE FOLLOWING EVENTS N/A 5/25/2016    Procedure: LUMBAR EPIDURAL;  Surgeon: Jayden Norton MD;  Location: [facility]    PATIENT DOCUMENTED NOT TO HAVE EXPERIENCED ANY OF THE FOLLOWING EVENTS N/A 6/8/2016    Procedure: LUMBAR EPIDURAL;  Surgeon: Jayden Norton MD;  Location: [facility]    PATIENT WITHOUGH PREOPERATIVE ORDER FOR IV ANTIBIOTIC SURGICAL SITE INFECTION PROPHYLAXIS.  2/14/2014    Procedure: ;  Surgeon: Kin Hobbs MD;  [facility]    PATIENT WITHOUGH PREOPERATIVE ORDER FOR IV ANTIBIOTIC SURGICAL SITE INFECTION PROPHYLAXIS. N/A 5/4/2016    Procedure: LUMBAR EPIDURAL;  Surgeon: Jayden Norton MD;  Location: [facility]    PATIENT WITHOUGH PREOPERATIVE ORDER FOR IV ANTIBIOTIC SURGICAL SITE INFECTION PROPHYLAXIS. N/A 5/25/2016    Procedure: LUMBAR EPIDURAL;  Surgeon: Jayden Norton MD;  Location: [facility]    PATIENT WITHOUGH PREOPERATIVE ORDER FOR IV ANTIBIOTIC SURGICAL SITE INFECTION PROPHYLAXIS. N/A 6/8/2016    Procedure: LUMBAR EPIDURAL;  Surgeon: Jayden Norton MD;  Location: [facility]    TOTAL KNEE REPLACEMENT Left 2013    UPPER GI ENDOSCOPY,DIAGNOSIS  4/14/16    retained gastric contents; Edward    UPPER GI ENDOSCOPY,REMOV F.B. N/A 2/14/2014    Procedure: ESOPHAGOGASTRODUODENOSCOPY, POSSIBLE BIOPSY, POSSIBLE POLYPECTOMY 33620;  Surgeon: Kin Hobbs MD;  Location: [facility]        Social history and family history negative related to present illness except as above.    PHYSICAL EXAM:   BP 96/52 (BP Location: Right arm)   Pulse 62   Temp 98.2 °F (36.8 °C) (Oral)   Resp 17   Wt 157 lb 13.6 oz (71.6 kg)   SpO2 94%   BMI 25.48 kg/m²   GENERAL:  Awake, alert, oriented x3. Non-tox, non-septic and in NAD.  HEENT:  Normocephalic, no scleral abnormalities.  Oropharynx clear, trachea ML.  NECK:  Supple, no masses, no lymphadenopathy.  LUNGS:  Clear to auscultation b/l, no rhonchi, rales, or wheezes.  CARDIO: RRR S1/S2, no rubs, clicks, heaves, or murmurs.  GI:  Soft NT/ND, BS present x4 quadrants, no HSM.  EXTREMITIES: rt groin abscess packed with residual induration and fading redness; both feet with eschars and ulcerations but worst at base of left 1st toe  NEURO:  No focal neurologic deficits.  DERM:  Warm, dry, no rashes.    IMPRESSION/PLAN:   S.aureus groin abscess in this diabetic pt esrd change rx vanc on dialysis  Diabetic foot ulcers  bilateral rx as above  Anticipate vanc on dialysis for both of these problems  Add hibiclens too  Art doppler  Thanks [id number]            Recent Results (from the past 72 hour(s))   Comp Metabolic Panel (14)    Collection Time: 01/27/24  9:17 PM   Result Value Ref Range    Glucose 210 (H) 70 - 99 mg/dL    Sodium 137 136 - 145 mmol/L    Potassium 4.3 3.5 - 5.1 mmol/L    Chloride 99 98 - 112 mmol/L    CO2 31.0 21.0 - 32.0 mmol/L    Anion Gap 7 0 - 18 mmol/L    BUN 20 9 - 23 mg/dL    Creatinine 3.40 (H) 0.70 - 1.30 mg/dL    Calcium, Total 9.0 8.5 - 10.1 mg/dL    Calculated Osmolality 293 275 - 295 mOsm/kg    eGFR-Cr 18 (L) >=60 mL/min/1.73m2    AST 37 15 - 37 U/L    ALT      Alkaline Phosphatase 177 (H) 45 - 117 U/L    Bilirubin, Total 1.6 0.1 - 2.0 mg/dL    Total Protein 7.5 6.4 - 8.2 g/dL    Albumin 3.0 (L) 3.4 - 5.0 g/dL    Globulin  4.5 (H) 2.8 - 4.4 g/dL    A/G Ratio 0.7 (L) 1.0 - 2.0   Lactic Acid, Plasma    Collection Time: 01/27/24  9:17 PM   Result Value Ref Range    Lactic Acid 1.6 0.4 - 2.0 mmol/L   Blood Culture    Collection Time: 01/27/24  9:17 PM    Specimen: Blood,peripheral   Result Value Ref Range    Blood Culture Result No Growth 1 Day    Aerobic Bacterial Culture    Collection Time: 01/27/24  9:17 PM    Specimen: Leg, right; Other   Result Value Ref Range    Aerobic Culture Result 4+ growth Staphylococcus aureus (A)     Aerobic Smear 3+ WBCs seen     Aerobic Smear 2+ Gram positive cocci in clusters        Susceptibility    Staphylococcus aureus -  (no method available)     Cefazolin  Sensitive      Clindamycin <=0.25 Resistant      Erythromycin >=8 Resistant      Gentamicin <=0.5 Sensitive      Levofloxacin <=0.12 Sensitive      Oxacillin <=0.25 Sensitive      Trimethoprim/Sulfa <=10 Sensitive      Vancomycin <=0.5 Sensitive    CBC W/ DIFFERENTIAL    Collection Time: 01/27/24  9:17 PM   Result Value Ref Range    WBC 10.7 4.0 - 11.0 x10(3) uL    RBC 3.83 3.80 - 5.80 x10(6)uL    HGB 12.4 (L) 13.0 -  17.5 g/dL    HCT 39.4 39.0 - 53.0 %    .0 150.0 - 450.0 10(3)uL    .9 (H) 80.0 - 100.0 fL    MCH 32.4 26.0 - 34.0 pg    MCHC 31.5 31.0 - 37.0 g/dL    RDW 16.6 %    Neutrophil Absolute Prelim 9.30 (H) 1.50 - 7.70 x10 (3) uL    Neutrophil Absolute 9.30 (H) 1.50 - 7.70 x10(3) uL    Lymphocyte Absolute 0.51 (L) 1.00 - 4.00 x10(3) uL    Monocyte Absolute 0.67 0.10 - 1.00 x10(3) uL    Eosinophil Absolute 0.16 0.00 - 0.70 x10(3) uL    Basophil Absolute 0.04 0.00 - 0.20 x10(3) uL    Immature Granulocyte Absolute 0.05 0.00 - 1.00 x10(3) uL    Neutrophil % 86.6 %    Lymphocyte % 4.8 %    Monocyte % 6.2 %    Eosinophil % 1.5 %    Basophil % 0.4 %    Immature Granulocyte % 0.5 %   Blood Culture    Collection Time: 01/27/24  9:27 PM    Specimen: Blood,peripheral   Result Value Ref Range    Blood Culture Result No Growth 1 Day    POCT Glucose    Collection Time: 01/28/24  1:39 AM   Result Value Ref Range    POC Glucose 132 (H) 70 - 99 mg/dL   POCT Glucose    Collection Time: 01/28/24  5:09 AM   Result Value Ref Range    POC Glucose 113 (H) 70 - 99 mg/dL   EKG 12 Lead    Collection Time: 01/28/24 10:21 AM   Result Value Ref Range    Ventricular rate 63 BPM    Atrial rate 63 BPM    P-R Interval 182 ms    QRS Duration 74 ms    Q-T Interval 396 ms    QTC Calculation (Bezet) 405 ms    P Axis -12 degrees    R Axis 48 degrees    T Axis 13 degrees   POCT Glucose    Collection Time: 01/28/24 12:11 PM   Result Value Ref Range    POC Glucose 93 70 - 99 mg/dL   Aerobic Bacterial Culture    Collection Time: 01/28/24  2:22 PM    Specimen: Groin; Abscess   Result Value Ref Range    Aerobic Culture Result 4+ growth Staphylococcus aureus (A)     Aerobic Smear 4+ WBCs seen     Aerobic Smear 2+ Gram positive cocci in clusters     Aerobic Smear 1+ Gram positive cocci in pairs        Susceptibility    Staphylococcus aureus -  (no method available)     Cefazolin  Sensitive      Clindamycin  Resistant      Erythromycin >=8 Resistant       Gentamicin <=0.5 Sensitive      Levofloxacin <=0.12 Sensitive      Oxacillin <=0.25 Sensitive      Trimethoprim/Sulfa <=10 Sensitive      Vancomycin <=0.5 Sensitive    Anaerobic Culture    Collection Time: 01/28/24  2:22 PM    Specimen: Groin; Abscess   Result Value Ref Range    Anaerobic Culture Pending    POCT Glucose    Collection Time: 01/28/24  2:48 PM   Result Value Ref Range    POC Glucose 117 (H) 70 - 99 mg/dL   POCT Glucose    Collection Time: 01/28/24  4:22 PM   Result Value Ref Range    POC Glucose 128 (H) 70 - 99 mg/dL   POCT Glucose    Collection Time: 01/28/24  8:47 PM   Result Value Ref Range    POC Glucose 238 (H) 70 - 99 mg/dL   POCT Glucose    Collection Time: 01/29/24  5:05 AM   Result Value Ref Range    POC Glucose 379 (H) 70 - 99 mg/dL   Basic Metabolic Panel (8)    Collection Time: 01/29/24  5:46 AM   Result Value Ref Range    Glucose 395 (H) 70 - 99 mg/dL    Sodium 134 (L) 136 - 145 mmol/L    Potassium 4.7 3.5 - 5.1 mmol/L    Chloride 96 (L) 98 - 112 mmol/L    CO2 27.0 21.0 - 32.0 mmol/L    Anion Gap 11 0 - 18 mmol/L    BUN 47 (H) 9 - 23 mg/dL    Creatinine 4.98 (H) 0.70 - 1.30 mg/dL    Calcium, Total 9.1 8.5 - 10.1 mg/dL    Calculated Osmolality 307 (H) 275 - 295 mOsm/kg    eGFR-Cr 12 (L) >=60 mL/min/1.73m2   Magnesium    Collection Time: 01/29/24  5:46 AM   Result Value Ref Range    Magnesium 2.5 1.6 - 2.6 mg/dL   CBC W/ DIFFERENTIAL    Collection Time: 01/29/24  5:46 AM   Result Value Ref Range    WBC 9.4 4.0 - 11.0 x10(3) uL    RBC 3.85 3.80 - 5.80 x10(6)uL    HGB 12.4 (L) 13.0 - 17.5 g/dL    HCT 38.8 (L) 39.0 - 53.0 %    .0 150.0 - 450.0 10(3)uL    .8 (H) 80.0 - 100.0 fL    MCH 32.2 26.0 - 34.0 pg    MCHC 32.0 31.0 - 37.0 g/dL    RDW 16.3 %    Neutrophil Absolute Prelim 8.42 (H) 1.50 - 7.70 x10 (3) uL    Neutrophil Absolute 8.42 (H) 1.50 - 7.70 x10(3) uL    Lymphocyte Absolute 0.53 (L) 1.00 - 4.00 x10(3) uL    Monocyte Absolute 0.37 0.10 - 1.00 x10(3) uL    Eosinophil  Absolute 0.01 0.00 - 0.70 x10(3) uL    Basophil Absolute 0.03 0.00 - 0.20 x10(3) uL    Immature Granulocyte Absolute 0.06 0.00 - 1.00 x10(3) uL    Neutrophil % 89.5 %    Lymphocyte % 5.6 %    Monocyte % 3.9 %    Eosinophil % 0.1 %    Basophil % 0.3 %    Immature Granulocyte % 0.6 %   Hemoglobin A1C    Collection Time: 01/29/24  5:46 AM   Result Value Ref Range    HgbA1C 7.0 (H) <5.7 %    Estimated Average Glucose 154 (H) 68 - 126 mg/dL   POCT Glucose    Collection Time: 01/29/24  8:57 AM   Result Value Ref Range    POC Glucose 335 (H) 70 - 99 mg/dL   POCT Glucose    Collection Time: 01/29/24 11:37 AM   Result Value Ref Range    POC Glucose 381 (H) 70 - 99 mg/dL   POCT Glucose    Collection Time: 01/29/24  4:31 PM   Result Value Ref Range    POC Glucose 341 (H) 70 - 99 mg/dL   POCT Glucose    Collection Time: 01/29/24  9:04 PM   Result Value Ref Range    POC Glucose 160 (H) 70 - 99 mg/dL   POCT Glucose    Collection Time: 01/30/24  5:50 AM   Result Value Ref Range    POC Glucose 160 (H) 70 - 99 mg/dL   CBC, Platelet; No Differential    Collection Time: 01/30/24  6:03 AM   Result Value Ref Range    WBC 7.8 4.0 - 11.0 x10(3) uL    RBC 3.72 (L) 3.80 - 5.80 x10(6)uL    HGB 12.0 (L) 13.0 - 17.5 g/dL    HCT 37.4 (L) 39.0 - 53.0 %    .0 150.0 - 450.0 10(3)uL    .5 (H) 80.0 - 100.0 fL    MCH 32.3 26.0 - 34.0 pg    MCHC 32.1 31.0 - 37.0 g/dL    RDW 15.9 %   Magnesium    Collection Time: 01/30/24  6:03 AM   Result Value Ref Range    Magnesium 3.3 (H) 1.6 - 2.6 mg/dL   Basic Metabolic Panel (8)    Collection Time: 01/30/24  6:03 AM   Result Value Ref Range    Glucose 162 (H) 70 - 99 mg/dL    Sodium 135 (L) 136 - 145 mmol/L    Potassium 4.6 3.5 - 5.1 mmol/L    Chloride 97 (L) 98 - 112 mmol/L    CO2 28.0 21.0 - 32.0 mmol/L    Anion Gap 10 0 - 18 mmol/L    BUN 63 (H) 9 - 23 mg/dL    Creatinine 6.05 (H) 0.70 - 1.30 mg/dL    Calcium, Total 9.1 8.5 - 10.1 mg/dL    Calculated Osmolality 302 (H) 275 - 295 mOsm/kg     eGFR-Cr 9 (L) >=60 mL/min/1.73m2   POCT Glucose    Collection Time: 01/30/24 11:36 AM   Result Value Ref Range    POC Glucose 178 (H) 70 - 99 mg/dL         Cesar Stephenson MD     CALL [facility] AT [phone] IF QUESTIONS OR CONCERNS  THANKS

## 2024-01-31 VITALS
BODY MASS INDEX: 25 KG/M2 | RESPIRATION RATE: 16 BRPM | OXYGEN SATURATION: 95 % | SYSTOLIC BLOOD PRESSURE: 128 MMHG | WEIGHT: 157.88 LBS | DIASTOLIC BLOOD PRESSURE: 68 MMHG | TEMPERATURE: 98 F | HEART RATE: 66 BPM

## 2024-01-31 LAB
ANION GAP SERPL CALC-SCNC: 8 MMOL/L (ref 0–18)
BUN BLD-MCNC: 37 MG/DL (ref 9–23)
CALCIUM BLD-MCNC: 8.9 MG/DL (ref 8.5–10.1)
CHLORIDE SERPL-SCNC: 104 MMOL/L (ref 98–112)
CO2 SERPL-SCNC: 26 MMOL/L (ref 21–32)
CREAT BLD-MCNC: 4.67 MG/DL
EGFRCR SERPLBLD CKD-EPI 2021: 12 ML/MIN/1.73M2 (ref 60–?)
ERYTHROCYTE [DISTWIDTH] IN BLOOD BY AUTOMATED COUNT: 16 %
GLUCOSE BLD-MCNC: 120 MG/DL (ref 70–99)
GLUCOSE BLD-MCNC: 142 MG/DL (ref 70–99)
GLUCOSE BLD-MCNC: 159 MG/DL (ref 70–99)
GLUCOSE BLD-MCNC: 205 MG/DL (ref 70–99)
HCT VFR BLD AUTO: 35.5 %
HGB BLD-MCNC: 11.2 G/DL
MAGNESIUM SERPL-MCNC: 2.6 MG/DL (ref 1.6–2.6)
MCH RBC QN AUTO: 32.3 PG (ref 26–34)
MCHC RBC AUTO-ENTMCNC: 31.5 G/DL (ref 31–37)
MCV RBC AUTO: 102.3 FL
OSMOLALITY SERPL CALC.SUM OF ELEC: 298 MOSM/KG (ref 275–295)
PLATELET # BLD AUTO: 158 10(3)UL (ref 150–450)
POTASSIUM SERPL-SCNC: 4.8 MMOL/L (ref 3.5–5.1)
RBC # BLD AUTO: 3.47 X10(6)UL
SODIUM SERPL-SCNC: 138 MMOL/L (ref 136–145)
WBC # BLD AUTO: 8 X10(3) UL (ref 4–11)

## 2024-01-31 PROCEDURE — 99232 SBSQ HOSP IP/OBS MODERATE 35: CPT | Performed by: INTERNAL MEDICINE

## 2024-01-31 RX ORDER — VANCOMYCIN/0.9 % SOD CHLORIDE 750MG/.15L
750 PLASTIC BAG, INJECTION (ML) INTRAVENOUS
Qty: 4000 ML | Refills: 0 | Status: SHIPPED | OUTPATIENT
Start: 2024-02-01 | End: 2024-02-15

## 2024-01-31 RX ORDER — ALBUMIN (HUMAN) 12.5 G/50ML
25 SOLUTION INTRAVENOUS
Status: DISCONTINUED | OUTPATIENT
Start: 2024-01-31 | End: 2024-01-31

## 2024-01-31 NOTE — CM/SW NOTE
IV Vanco order received.  Pt will need IV Vanco to be given with his hemodialysis.  Called [facility] [phone] who needs the order faxed.  Faxed the script & order to Gia at [facility] at [phone].     Pt will dc home today.  He will have HH at home with [facility] for RN and PT.  [facility] aware of pt's dc today.

## 2024-01-31 NOTE — PLAN OF CARE
A&Ox4. Hard of hearing bilateral hearing aides, glasses. VSS. RA, CPAP at night. . Denies chest pain and SOB.   Telemetry: NSR in the 60s  GI: Abdomen soft, nondistended. Passing gas.   Denies nausea.   : diminished urine output- hemodialysis patient   Patient denies pain at this time   Up with standby assist and a walker  Incisions: groin incision with packing covered with 4x4 and abd pad- CDI   Diet: tolerating regular diet   IV SL per order  All appropriate safety measures in place. All questions and concerns addressed  Insulin pump in place

## 2024-01-31 NOTE — DISCHARGE SUMMARY
[facility] Hospitalist Discharge Summary    Patient ID  Gamaliel Boyer  [id number]  73 year old  12/27/1950    Admit date: 1/27/2024    Discharge date: 01/31/24    Attending: Charan Guido DO     Primary Care Physician: Nima Sorensen MD      Reason for admission: groin abscess    Discharge condition: stable    Disposition: home    Important follow up:  -PCP -scheduled 02/7  -specialists:      -labs:    -radiology:      Additional patient instructions       Discharge med list     Medication List        START taking these medications      Vancomycin HCl in NaCl 750-0.9 MG/150ML-% Soln  Inject 750 mg into the vein Every Tuesday, Thursday, and Saturday for 14 days. Vancomycin 750 mg after HD on HD days. Weekly CBC, CMP and vanc trough levels.  Start taking on: February 1, 2024            CONTINUE taking these medications      Sure Comfort Insulin Syringe 29G X 1/2\" 0.3 ML Misc  Generic drug: Insulin Syringe-Needle U-100     UltiCare Mini Pen Needles 31G X 6 MM Misc  Generic drug: Insulin Pen Needle            ASK your doctor about these medications      aspirin 81 MG Tbec     cholecalciferol 50 MCG (2000 UT) Tabs     Contour Next Test Strp  Generic drug: Glucose Blood  Check BG 3 times daily, Type1 diabetes,insulin dependent on insulin pump. Patient needs to calibrate CGM and verify BG if <70     ezetimibe 10 MG Tabs  Commonly known as: Zetia  TAKE ONE TABLET BY MOUTH ONE TIME DAILY     famotidine 20 MG Tabs  Commonly known as: Pepcid     Fish Oil 1200 MG Caps     glucagon emergency 1 MG Kit  Inject 1 mg into the skin once as needed.     HYDROcodone-acetaminophen 5-325 MG Tabs  Commonly known as: Norco     insulin lispro 100 UNIT/ML Soln  Commonly known as: Humalog     lactulose 10 GM/15ML Soln  Commonly known as: CHRONULAC     levothyroxine 150 MCG Tabs  Commonly known as: Synthroid     losartan 25 MG Tabs  Commonly known as: Cozaar     metoprolol tartrate 25 MG Tabs  Commonly known as: Lopressor     renal  vitamin Tabs  Take 1 tablet by mouth daily.     sertraline 25 MG Tabs  Commonly known as: Zoloft  Ask about: Which instructions should I use?     sevelamer carbonate 800 MG Tabs  Commonly known as: Renvela     Toujeo SoloStar 300 UNIT/ML Sopn  Generic drug: Insulin Glargine (1 Unit Dial)     triamcinolone 0.1 % Crea  Commonly known as: Kenalog  Apply 1 Application topically 2 (two) times daily. Apply to itchy areas on the body twice daily as needed; do not apply to face or groin     vitamin E 400 UNITS Caps  Commonly known as: Alpha-E               Where to Get Your Medications        You can get these medications from any pharmacy    Bring a paper prescription for each of these medications  Vancomycin HCl in NaCl 750-0.9 MG/150ML-% Soln         Discharge Diagnoses:    Right Groin Abscess sp I&D  DM1 on insulin pump  HTN/HL  ESRD on HD  GERD  Hypothyroidism  MDD    Consults:  IP CONSULT TO NEPHROLOGY  IP CONSULT TO HOSPITALIST  IP CONSULT TO GENERAL SURGERY  IP CONSULT TO HOSPITALIST  CONSULT TO WOUND OSTOMY  IP CONSULT TO NEPHROLOGY  IP CONSULT TO SOCIAL WORK  IP CONSULT TO INFECTIOUS DISEASE  IP CONSULT TO PHARMACY  IP CONSULT TO SOCIAL WORK  IP CONSULT TO SOCIAL WORK    Radiology:  CT FEMUR RIGHT(CONTRAST ONLY) (CPT=73701)    Result Date: 1/27/2024  PROCEDURE:  CT FEMUR RIGHT(CONTRAST ONLY) (CPT=73701)  COMPARISON:  None.  INDICATIONS:  open draining wound to groin  TECHNIQUE:  Multi-planar CT images were created with non-ionic intravenous contrast material.  Dose reduction techniques were used. Dose information is transmitted to the ACR (American College of Radiology) NRDR (National Radiology Data Registry) which includes the Dose Index Registry.   PATIENT STATED HISTORY:(As transcribed by Technologist)  open draining wound to groin   CONTRAST USED:  100cc of Omnipaque 350  FINDINGS:   Limited views of the pelvis demonstrate a small amount of free intraperitoneal fluid.  There is probable circumferential mural  thickening of the urinary bladder.  Consider correlation for cystitis.  Presumed penile reservoir present within the right lower quadrant.  Within the soft tissues of the right proximal medial thigh there is an ill-defined fluid collection measuring approximately 3.9 x 3.1 by 4.4 cm with peripheral enhancement.  Numerous surgical clips are present in this region.  Correlation with the history is suggested.  This could represent soft tissue abscess although other processes such as necrotic mass are also within the differential.  There is effacement of the fat planes of the proximal right thigh.  The abnormal enhancement extends into the thigh musculature in this region suggestive of an aggressive process.  There is diffuse soft tissue edema noted throughout the right thigh.  Several scattered small lymph nodes are present in this region which demonstrate enhancement.  These could be reactive although pathologic nodes may also be possible.  A representative node measures up to 10 x 8 mm.            CONCLUSION:  There is a fluid collection with peripheral enhancement noted on the proximal medial aspect of the right thigh.  This is primarily within the subcutaneous fat however the enhancement extends into the thigh musculature across fat planes suggestive of an aggressive process.  While this could represent an abscess, other processes such as a necrotic mass are within the differential.  There are prominent lymph nodes in this region which may be reactive although a neoplastic process cannot be excluded.  Correlation with the history and with any other prior imaging is suggested.  A small amount of free pelvic fluid is present.  Probable circumferential mural thickening of the urinary bladder.  Correlation for cystitis is recommended.   LOCATION:  [place] [facility]   Dictated by (CST): Seymour Jain MD on 1/27/2024 at 11:52 PM     Finalized by (CST): Seymour Jain MD on 1/27/2024 at 11:58 PM       US HEAD/NECK (CPT=76536)    Result  Date: 1/8/2024  PROCEDURE:  US HEAD/NECK (CPT=76536)  COMPARISON:  None.  INDICATIONS:  D48.5 Neoplasm of uncertain behavior of skin  TECHNIQUE:  Sonography was performed of the clinically requested area of interest.  PATIENT STATED HISTORY: (As transcribed by Technologist)  Patient presents with palpable lump in his rithgt ear. Dermatologist try to drain it on November 17, 2023, but he was not able to it.    FINDINGS:  Targeted ultrasounds performed in the area of palpable abnormality which is in the pinna of the right ear.  This corresponds to a mostly cystic appearing lesion with irregular walls and multiple septations.  This measures 2.3 x 1 x 1.4 cm.            CONCLUSION:  Palpable abnormality corresponds to a cystic lesion.  This has nonspecific imaging features with some wall irregularity and septation.  There is a fairly broad differential diagnosis which would include abscess or neoplasm.  Correlation with  clinical findings and sampling are recommended.   LOCATION:  [facility]   Dictated by (CST): Carson Stockton MD on 1/08/2024 at 8:52 PM     Finalized by (CST): Carson Stockton MD on 1/08/2024 at 8:55 PM         Operative reports:  Procedure(s) (LRB):  RIGHT GROIN ABSCESS IRRIGATION & DEBRIDEMENT (Right)    Hospital course:     Gamaliel Boyer Is a a 73 year old male with DM2, ERSD on HD who presents with right groin abscess     Right Groin Abscess  -- sp I&D 01/28 by   - pre op / OR Cx with Staph Aureus, MSSA  - on Unasyn, dw ID - will see, rec Vancomycin with HD x 2 weeks  - local wound care per      DM 1  -- ok to use home insulin pump     HTN/HL  -- home meds at ID, except will hold Losartan as BP is controlled wo meds     ESRD on HD  -- last HD 1/27  -- consult nephrology   -- resume home renvela  -- renally dose meds     GERD  -- pepcid    Hypothyroidism  -- resume home synthroid     MDD  -- resume home sertraline       Day of discharge exam:  Vitals:    01/31/24 1138   BP: 114/60   Pulse: 66   Resp:  16   Temp: 98.2 °F (36.8 °C)     Pain controlled  Ambulating , no dizziness  No nv    No acute distress, alert and oriented   Lungs clear  Heart regular  Abdomen benign    Total time coordinating care 35 min      Patient and/or family had opportunity to ask questions and expressed understanding and agreement with therapeutic plan as outlined         Charan Eddy Hospitalist  [phone]  Answering Service: [phone]

## 2024-01-31 NOTE — PROGRESS NOTES
[facility] Infectious Disease Progress Note    Gamaliel Boyer Patient Status:  Inpatient    1950 MRN [id number]   Location [facility] 3NW-A Attending Charan Guido,    Hosp Day # 3 PCP Nima Sorensen MD     Subjective:  Pt with no new complaints.  Pt denies pain.     Objective:    Allergies:  Allergies   Allergen Reactions    Antihistamine & Nasal Deconges [Fexofenadine-Pseudoephedrine] OTHER (SEE COMMENTS)     Altered mental status    Adhesive Tape ITCHING    Benadryl [Diphenhydramine] RESTLESSNESS    Depakote [Valproic Acid] DIZZINESS    Fluconazole OTHER (SEE COMMENTS)     Kidney labs abnormal    Mirtazapine RESTLESSNESS    Quetiapine RESTLESSNESS    Wellbutrin [Bupropion] RASH    Latex RASH       Medications:    Current Facility-Administered Medications:     sodium chloride 0.9 % IV bolus 100 mL, 100 mL, Intravenous, Q30 Min PRN **AND** albumin human (Albumin) 25% injection 25 g, 25 g, Intravenous, PRN Dialysis    insulin via Insulin Pump, , Subcutaneous, TID AC and HS    HYDROcodone-acetaminophen (Norco) 5-325 MG per tab 1 tablet, 1 tablet, Oral, PRN    chlorhexidine (Hibiclens) 4 % external liquid 30 mL, 30 mL, Topical, Daily    Vancomycin: PHARMACY DOSING, 1 each, Intravenous, See Admin Instructions (RX holding)    ipratropium (Atrovent) 0.03 % nasal solution 2 spray, 2 spray, Each Nare, Q12H    ezetimibe (Zetia) tab 10 mg, 10 mg, Oral, Daily    famotidine (Pepcid) tab 20 mg, 20 mg, Oral, Daily    lactulose (CHRONULAC) 10 GM/15ML solution 20 g, 20 g, Oral, QPM    levothyroxine (Synthroid) tab 150 mcg, 150 mcg, Oral, Before breakfast    metoprolol tartrate (Lopressor) tab 25 mg, 25 mg, Oral, 2x Daily(Beta Blocker)    sevelamer carbonate (Renvela) tab 1,600 mg, 1,600 mg, Oral, TID CC    heparin (Porcine) 5000 UNIT/ML injection 5,000 Units, 5,000 Units, Subcutaneous, Q8H RIKKI    acetaminophen (Tylenol Extra Strength) tab 500 mg, 500 mg, Oral, Q4H PRN    melatonin tab 3 mg,  3 mg, Oral, Nightly PRN    polyethylene glycol (PEG 3350) (Miralax) 17 g oral packet 17 g, 17 g, Oral, Daily PRN    sennosides (Senokot) tab 17.2 mg, 17.2 mg, Oral, Nightly PRN    bisacodyl (Dulcolax) 10 MG rectal suppository 10 mg, 10 mg, Rectal, Daily PRN    ondansetron (Zofran) 4 MG/2ML injection 4 mg, 4 mg, Intravenous, Q6H PRN    metoclopramide (Reglan) 5 mg/mL injection 5 mg, 5 mg, Intravenous, Q8H PRN    glucose (Dex4) 15 GM/59ML oral liquid 15 g, 15 g, Oral, Q15 Min PRN **OR** glucose (Glutose) 40% oral gel 15 g, 15 g, Oral, Q15 Min PRN **OR** glucose-vitamin C (Dex-4) chewable tab 4 tablet, 4 tablet, Oral, Q15 Min PRN **OR** dextrose 50% injection 50 mL, 50 mL, Intravenous, Q15 Min PRN **OR** glucose (Dex4) 15 GM/59ML oral liquid 30 g, 30 g, Oral, Q15 Min PRN **OR** glucose (Glutose) 40% oral gel 30 g, 30 g, Oral, Q15 Min PRN **OR** glucose-vitamin C (Dex-4) chewable tab 8 tablet, 8 tablet, Oral, Q15 Min PRN    sertraline (Zoloft) tab 25 mg, 25 mg, Oral, Daily    Physical Exam:  General: Alert, orientated x3.  Cooperative.  No apparent distress.  Vital Signs:  Blood pressure 117/63, pulse 70, temperature 98 °F (36.7 °C), temperature source Oral, resp. rate 16, weight 157 lb 13.6 oz (71.6 kg), SpO2 95%.   Temp (24hrs), Av.1 °F (36.7 °C), Min:97.8 °F (36.6 °C), Max:98.2 °F (36.8 °C)      HEENT: Exam is unremarkable.  Without scleral icterus.  Mucous membranes are moist. PERRLA.  Oropharynx is clear.  Neck: No tenderness to palpitation.  Full range of motion to flexion and extension, lateral rotation and lateral flexion of cervical spine.  No JVD. Supple.   Lungs: Clear to auscultation bilaterally.  Cardiac: Regular rate and rhythm. No murmur.  Abdomen:  Soft, non-distended, non-tender, with no rebound or guarding.   Extremities:  No lower extremity edema noted.  Without clubbing or cyanosis.    Skin: R groin wound packed, no significant tenderness or redness surrounding; bilateral foot wounds  stable  Neurologic: Cranial nerves are grossly intact.  Motor strength and sensory examination is grossly normal.  No focal neurologic deficit.    Labs:  Lab Results   Component Value Date    WBC 8.0 01/31/2024    HGB 11.2 01/31/2024    HCT 35.5 01/31/2024    .0 01/31/2024    CREATSERUM 4.67 01/31/2024    BUN 37 01/31/2024     01/31/2024    K 4.8 01/31/2024     01/31/2024    CO2 26.0 01/31/2024     01/31/2024    CA 8.9 01/31/2024    MG 2.6 01/31/2024         Assessment/Plan:    1.  MSSA groin abscess  -s/p I&D in OR on 1/28  -on IV vancomycin  2.  Bilateral DFU  -followed by [facility]  3.  ESRD on HD  4.  PAD  -followed by vascular as outpatient  -s/p angio recent  -dc doppler  5.  Dispo  -IV vancomycin with HD x 2 weeks on dc  -follow up in 2 week    If you have any questions or concerns please call [facility] Infectious Disease at [phone].     CHEPE Perez

## 2024-01-31 NOTE — PROGRESS NOTES
[facility]     Nephrology Progress Note    Gamaliel Boyer Patient Status:  Inpatient    1950 MRN [id number]   Location [facility] 3NW-A Attending Charan Guido, DO   Hosp Day # 3 PCP Nima Sorensen MD       SUBJECTIVE:  Denies c/o this AM        Physical Exam:   /63 (BP Location: Right arm)   Pulse 70   Temp 98 °F (36.7 °C) (Oral)   Resp 16   Wt 157 lb 13.6 oz (71.6 kg)   SpO2 95%   BMI 25.48 kg/m²   Temp (24hrs), Av.1 °F (36.7 °C), Min:97.8 °F (36.6 °C), Max:98.2 °F (36.8 °C)       Intake/Output Summary (Last 24 hours) at 2024 0746  Last data filed at 2024 0735  Gross per 24 hour   Intake 360 ml   Output 0 ml   Net 360 ml     Last 3 Weights   24 0307 157 lb 13.6 oz (71.6 kg)   24 0128 158 lb 6.4 oz (71.8 kg)   24 2041 170 lb (77.1 kg)   23 1556 171 lb 8 oz (77.8 kg)   10/21/23 0440 181 lb 7 oz (82.3 kg)   10/20/23 1806 175 lb (79.4 kg)   10/20/23 0800 175 lb (79.4 kg)   10/14/23 1517 170 lb (77.1 kg)   23 1134 172 lb 2 oz (78.1 kg)   23 1450 177 lb (80.3 kg)     General: Alert and oriented in no apparent distress.  HEENT: No scleral icterus, MMM  Neck: Supple, no KALPANA or thyromegaly  Cardiac: Regular rate and rhythm, S1, S2 normal, no murmur or rub  Lungs: Clear without wheezes, rales, rhonchi.    Abdomen: Soft, non-tender. + bowel sounds, no palpable organomegaly  Extremities: Without clubbing, cyanosis or edema. L AVF with bruit/thrill  Neurologic: Alert and oriented, cranial nerves grossly intact, moving all extremities  Skin: Warm and dry, no rash        Labs:     Recent Labs   Lab 24  0546 24  0603 24  0506   WBC 10.7 9.4 7.8 8.0   HGB 12.4* 12.4* 12.0* 11.2*   .9* 100.8* 100.5* 102.3*   .0 195.0 190.0 158.0       Recent Labs   Lab 24  0546 24  0603 24  0506    134* 135* 138   K 4.3 4.7 4.6 4.8   CL 99 96* 97* 104   CO2 31.0 27.0 28.0  26.0   BUN 20 47* 63* 37*   CREATSERUM 3.40* 4.98* 6.05* 4.67*   CA 9.0 9.1 9.1 8.9   MG  --  2.5 3.3* 2.6   * 395* 162* 159*       Recent Labs   Lab 01/27/24 2117   AST 37   ALB 3.0*       Recent Labs   Lab 01/30/24  0550 01/30/24  1136 01/30/24  1646 01/30/24 2022 01/31/24  0558   PGLU 160* 178* 91 151* 142*       Meds:   Current Facility-Administered Medications   Medication Dose Route Frequency    insulin via Insulin Pump   Subcutaneous TID AC and HS    HYDROcodone-acetaminophen (Norco) 5-325 MG per tab 1 tablet  1 tablet Oral PRN    chlorhexidine (Hibiclens) 4 % external liquid 30 mL  30 mL Topical Daily    Vancomycin: PHARMACY DOSING  1 each Intravenous See Admin Instructions (RX holding)    ipratropium (Atrovent) 0.03 % nasal solution 2 spray  2 spray Each Nare Q12H    ezetimibe (Zetia) tab 10 mg  10 mg Oral Daily    famotidine (Pepcid) tab 20 mg  20 mg Oral Daily    lactulose (CHRONULAC) 10 GM/15ML solution 20 g  20 g Oral QPM    levothyroxine (Synthroid) tab 150 mcg  150 mcg Oral Before breakfast    metoprolol tartrate (Lopressor) tab 25 mg  25 mg Oral 2x Daily(Beta Blocker)    sevelamer carbonate (Renvela) tab 1,600 mg  1,600 mg Oral TID CC    heparin (Porcine) 5000 UNIT/ML injection 5,000 Units  5,000 Units Subcutaneous Q8H RIKKI    acetaminophen (Tylenol Extra Strength) tab 500 mg  500 mg Oral Q4H PRN    melatonin tab 3 mg  3 mg Oral Nightly PRN    polyethylene glycol (PEG 3350) (Miralax) 17 g oral packet 17 g  17 g Oral Daily PRN    sennosides (Senokot) tab 17.2 mg  17.2 mg Oral Nightly PRN    bisacodyl (Dulcolax) 10 MG rectal suppository 10 mg  10 mg Rectal Daily PRN    ondansetron (Zofran) 4 MG/2ML injection 4 mg  4 mg Intravenous Q6H PRN    metoclopramide (Reglan) 5 mg/mL injection 5 mg  5 mg Intravenous Q8H PRN    glucose (Dex4) 15 GM/59ML oral liquid 15 g  15 g Oral Q15 Min PRN    Or    glucose (Glutose) 40% oral gel 15 g  15 g Oral Q15 Min PRN    Or    glucose-vitamin C (Dex-4) chewable tab  4 tablet  4 tablet Oral Q15 Min PRN    Or    dextrose 50% injection 50 mL  50 mL Intravenous Q15 Min PRN    Or    glucose (Dex4) 15 GM/59ML oral liquid 30 g  30 g Oral Q15 Min PRN    Or    glucose (Glutose) 40% oral gel 30 g  30 g Oral Q15 Min PRN    Or    glucose-vitamin C (Dex-4) chewable tab 8 tablet  8 tablet Oral Q15 Min PRN    sertraline (Zoloft) tab 25 mg  25 mg Oral Daily         Impression/Plan:      #1.  R groin abscess- s/p I&D.  Remains on abx     #2.  ESRD- due to DM/HTN.  HD to cont per usual TTHS schedule     #3.  HTN- cont usual antihypertensive agents    Questions/concerns were discussed with patient and/or family by bedside.      OK for home at any time from nephrology standpoint    Ruiz Kraft MD  1/31/2024  7:46 AM

## 2024-01-31 NOTE — PROGRESS NOTES
A&O x4. Denies any CP, EREN, or calf pain at present. Lungs clear bilaterally. NSR on tele. Abdomen soft, nontender, nondistended. R groin dressing changed per noc shift early this am, dressing c/d/I. Decreased UOP r/t hx of HD. SL. Pt denies any pain. POC discussed and pt verbalizes understanding. Pt resting in bed, call light within reach, safety precautions in place.     [facility]  1442: [facility] notified to cancel inpatient dialysis for tomorrow as pt will be discharged today.

## 2024-01-31 NOTE — DISCHARGE INSTRUCTIONS
Daily dressing change:   Remove dressing   Cleanse with saline or Dermal wound Cleanser   Pack with 1/2 inch packing gauze   Cover with gauze, ABD pad, tape.     Follow up with Dr. Santizo in 1 week.       Sometimes managing your health at home requires assistance.  The [facility] team has recognized your preference to use [facility].  They can be reached by phone at [phone].  The fax number for your reference is [phone].  A representative from the home health agency will contact you or your family to schedule your first visit.

## 2024-01-31 NOTE — PHYSICAL THERAPY NOTE
PHYSICAL THERAPY TREATMENT NOTE - INPATIENT    Room Number: 303/303-A     Session: 1     Number of Visits to Meet Established Goals: 3    Presenting Problem: s/p R groin abscess irrigation and debridement on 1/28/24  Co-Morbidities : L TKA, spine surgeries, anemia, anxiety, OA, back pain, depression, HTN, hearing impairment, neuropathy, DM  ASSESSMENT     In this IP PT session, pt presents with decreased endurance, strength, ROM, balance, and tolerance to activity. Pt was able to ambulate an increased distance than previous session, using RW at [facility]. Pt is progressing towards IP PT goals and is recommended for HHPT to address above deficits and help pt return to PLOF.     DISCHARGE RECOMMENDATIONS  PT Discharge Recommendations: Home with home health PT     PLAN  PT Treatment Plan: Bed mobility;Coordination;Body mechanics;Endurance;Energy conservation;Patient education;Family education;Gait training;Range of motion;Strengthening;Stoop training;Transfer training;Balance training  Rehab Potential : Good  Frequency (Obs): 3-5x/week    CURRENT GOALS     Goal #1 Patient is able to demonstrate supine - sit EOB @ level: supervision   Met on 1/31/24     Goal #2 Patient is able to demonstrate transfers Sit to/from Stand at assistance level: supervision   Met on 1/31/24     Goal #3 Patient is able to ambulate 100 feet with assist device: walker - rolling at assistance level: supervision      Goal #4     Goal #5     Goal #6     Goal Comments: Goals established on 1/29/2024 1/31/2024 all goals ongoing       SUBJECTIVE  \"I guess I'll walk then\"    OBJECTIVE  Precautions: Bed/chair alarm    WEIGHT BEARING RESTRICTION                   PAIN ASSESSMENT   Rating: Unable to rate (Pt described as \"neuropathy pain\")  Location: L LE  Management Techniques: Activity promotion;Body mechanics;Relaxation;Repositioning    BALANCE                                                                                                                        Static Sitting: Good  Dynamic Sitting: Good           Static Standing: Fair -  Dynamic Standing: Poor +    ACTIVITY TOLERANCE   Pt denies dizziness during this session. Vitals stable.                       O2 WALK         AM-PAC '6-Clicks' INPATIENT SHORT FORM - BASIC MOBILITY  How much difficulty does the patient currently have...  Patient Difficulty: Turning over in bed (including adjusting bedclothes, sheets and blankets)?: A Little   Patient Difficulty: Sitting down on and standing up from a chair with arms (e.g., wheelchair, bedside commode, etc.): A Little   Patient Difficulty: Moving from lying on back to sitting on the side of the bed?: A Little   How much help from another person does the patient currently need...   Help from Another: Moving to and from a bed to a chair (including a wheelchair)?: A Little   Help from Another: Need to walk in hospital room?: A Little   Help from Another: Climbing 3-5 steps with a railing?: A Little       AM-PAC Score:  Raw Score: 18   Approx Degree of Impairment: 46.58%   Standardized Score (AM-PAC Scale): 43.63   CMS Modifier (G-Code): CK    FUNCTIONAL ABILITY STATUS  Gait Assessment   Functional Mobility/Gait Assessment  Gait Assistance: Contact guard assist  Distance (ft): 50, 50  Assistive Device: Rolling walker  Pattern:  (decreased gait speed)    Skilled Therapy Provided: Per RN, okay for therapy. Pt received in supine and agreeable for PT session.     Bed Mobility:  Rolling: NT   Supine<>Sit: supervision   Sit<>Supine: supervision     Transfer Mobility:  Sit<>Stand: CGA progressed to supervision   Stand<>Sit: CGA progressed to supervision   Gait: CGA with RW; pt ambulated 50 ft with RW in hallway then took a seated rest break and complained of pain down L LE due to history of neuropathy, then ambulated 50 ft back to room    Therapist's Comments:  Pt educated on role of therapy, goals for session, safety, and fall prevention.       THERAPEUTIC EXERCISES  Lower  Extremity Heel slides  SLR     Upper Extremity      Position Supine     Repetitions   10   Sets   2     Patient End of Session: In bed;Needs met;Call light within reach;RN aware of session/findings;All patient questions and concerns addressed;Alarm set    PT Session Time: 30 minutes  Gait Training: 15 minutes  Therapeutic Exercise: 8 minutes

## 2024-01-31 NOTE — PROGRESS NOTES
[facility]  Progress Note    Gamaliel Boyer Patient Status:  Inpatient    1950 MRN [id number]   Location [facility] 3NW-A Attending Charan Guido,    Hosp Day # 3 PCP Nima Sorensen MD     Subjective:    Patient denies any new complaints  Sitting up tolerating breakfast  ID consult note reviewed     Objective/Physical Exam:    Vital Signs:  Blood pressure 117/63, pulse 70, temperature 98 °F (36.7 °C), temperature source Oral, resp. rate 16, weight 157 lb 13.6 oz (71.6 kg), SpO2 95%.    General:  Alert, orientated x3.  Cooperative.  No apparent distress.  Extremities:  right groin with less induration and cellulitis, dressings intact    Labs:  Reviewed    Lab Results   Component Value Date    WBC 8.0 2024    HGB 11.2 2024    HCT 35.5 2024    .0 2024    CREATSERUM 4.67 2024    BUN 37 2024     2024    K 4.8 2024     2024    CO2 26.0 2024     2024    CA 8.9 2024    MG 2.6 2024    PGLU 142 2024       Problem List:  Patient Active Problem List   Diagnosis    Restless leg    Secondary hyperparathyroidism (HCC)    S/P total knee arthroplasty    Ulcerated, foot, right, with fat layer exposed (HCC)    PRANEETH on CPAP    Insulin pump status    Type 1 diabetes, uncontrolled, with neuropathy    Essential hypertension    Hypothyroidism (acquired)    Uncontrolled type 1 diabetes mellitus with hyperglycemia, with long-term current use of insulin (HCC)    Onychomycosis    Lumbar stenosis with neurogenic claudication    DDD (degenerative disc disease), lumbar    Long-term insulin use (HCC)    Uncontrolled type 1 diabetes mellitus with proliferative retinopathy without macular edema, with long-term current use of insulin    Uncontrolled type 1 diabetes mellitus with diabetic nephropathy, with long-term current use of insulin    Pulmonary hypertension (HCC)    Uncontrolled type 1 diabetes mellitus  with stage 3 chronic kidney disease    Neuropathy    Primary hypertension    Hyperlipidemia LDL goal <100    Hypoglycemia unawareness in type 1 diabetes mellitus ([facility])    Sensory hearing loss, bilateral    Moderate episode of recurrent major depressive disorder ([facility])    Controlled type 1 diabetes mellitus with complication, with long-term current use of insulin ([facility])    Myofascial pain    Charcot foot due to diabetes mellitus ([facility])    Other chronic pain    Chronic mastoiditis of right side    Cervical myelopathy ([facility])    Pulmonary emphysema, unspecified emphysema type ([facility])    Difficulty walking    S/P insertion of spinal cord stimulator    Hyperglycemia    Stage 4 chronic kidney disease ([facility])    Urinary retention    Anemia in stage 4 chronic kidney disease  ([facility])    Arthritis of facet joint of lumbar spine    Type 1 diabetes mellitus with complication, with long term current use of insulin pump  ([facility])    Esophagitis    Platelets decreased ([facility])    Jerking movements of extremities    Diabetic complication ([facility])    Acute renal failure ([facility])    Acute renal failure, unspecified acute renal failure type ([facility])    Hyponatremia    Anemia in ESRD (end-stage renal disease)  ([facility])    ESRD on hemodialysis ([facility])    Hypokalemia    Thrombocytopenia ([facility])    Altered mental status, unspecified altered mental status type    Rash    Diabetes mellitus due to underlying condition with chronic kidney disease on chronic dialysis, with long-term current use of insulin ([facility])    Other fatigue    Acute renal failure superimposed on chronic kidney disease, unspecified CKD stage, unspecified acute renal failure type    Acute on chronic congestive heart failure, unspecified heart failure type ([facility])    Urticaria    Cellulitis of left upper extremity    Anemia    Acute kidney injury ([facility])    Syncope and collapse    Abnormal movement    Atrophy of muscle of hand    Acute encephalopathy    Type 2 diabetes mellitus with hyperglycemia, with long-term  current use of insulin (HCC)    Fall, initial encounter    Hypoglycemia    Hyperammonemia (HCC)    Choreiform movement    Myoclonus    Groin abscess    ESRD (end stage renal disease) (HCC)    Type 1 diabetes mellitus with hyperglycemia (HCC)       Impression:     74 y/o S/P I & D right groin abscess  Tolerating diet  Pain controlled     Plan:    ID recommended vanco with dialysis  Diet as tolerated   Will need HH  Continue local wound care daily  DC home from surgical perspective  All questions answered      CYNTHIA Chester  [facility]  General Surgery  1/31/2024

## 2024-02-01 NOTE — PROGRESS NOTES
NURSING DISCHARGE NOTE    Discharged Home via Wheelchair.  Accompanied by Spouse  Belongings Taken by patient/family.    Pt in stable condition and reports feeling ready to go home. Discharge instructions given, pt/wife verbalize understanding. Demonstrated wound dressing changes to pt's wife, she verbalized understanding and reports feeling comfortable with performing dressing changes at home. All questions answered.

## 2024-02-07 ENCOUNTER — HOSPITAL ENCOUNTER (OUTPATIENT)
Dept: WOUND CARE | Age: 74
Discharge: STILL ADMITTED OR ADMINISTRATIVE | End: 2024-02-07
Attending: PODIATRIST | Admitting: PODIATRIST

## 2024-02-07 VITALS — TEMPERATURE: 97.9 F

## 2024-02-07 DIAGNOSIS — L97.529 DIABETIC ULCER OF LEFT GREAT TOE (CMD): ICD-10-CM

## 2024-02-07 DIAGNOSIS — I73.9 PAD (PERIPHERAL ARTERY DISEASE) (CMD): Primary | ICD-10-CM

## 2024-02-07 DIAGNOSIS — L97.511 ULCER OF RIGHT FOOT, LIMITED TO BREAKDOWN OF SKIN (CMD): ICD-10-CM

## 2024-02-07 DIAGNOSIS — E11.621 DIABETIC ULCER OF LEFT GREAT TOE (CMD): ICD-10-CM

## 2024-02-07 PROCEDURE — 11042 DBRDMT SUBQ TIS 1ST 20SQCM/<: CPT

## 2024-02-07 PROCEDURE — 11045 DBRDMT SUBQ TISS EACH ADDL: CPT

## 2024-02-07 RX ORDER — VANCOMYCIN/0.9 % SOD CHLORIDE 750MG/.15L
750 PLASTIC BAG, INJECTION (ML) INTRAVENOUS
COMMUNITY
Start: 2024-02-01 | End: 2024-02-15

## 2024-02-07 ASSESSMENT — PAIN SCALES - GENERAL: PAINLEVEL_OUTOF10: 0

## 2024-02-21 ENCOUNTER — HOSPITAL ENCOUNTER (OUTPATIENT)
Dept: WOUND CARE | Age: 74
Discharge: STILL ADMITTED OR ADMINISTRATIVE | End: 2024-02-21
Attending: PODIATRIST | Admitting: PODIATRIST

## 2024-02-21 VITALS — TEMPERATURE: 97 F

## 2024-02-21 DIAGNOSIS — I73.9 PAD (PERIPHERAL ARTERY DISEASE) (CMD): Primary | ICD-10-CM

## 2024-02-21 DIAGNOSIS — E11.621 DIABETIC ULCER OF LEFT GREAT TOE (CMD): ICD-10-CM

## 2024-02-21 DIAGNOSIS — L97.529 DIABETIC ULCER OF LEFT GREAT TOE (CMD): ICD-10-CM

## 2024-02-21 DIAGNOSIS — L97.511 ULCER OF RIGHT FOOT, LIMITED TO BREAKDOWN OF SKIN (CMD): ICD-10-CM

## 2024-02-21 PROCEDURE — 11042 DBRDMT SUBQ TIS 1ST 20SQCM/<: CPT

## 2024-02-21 PROCEDURE — 11045 DBRDMT SUBQ TISS EACH ADDL: CPT

## 2024-02-21 ASSESSMENT — PAIN SCALES - GENERAL: PAINLEVEL_OUTOF10: 0

## 2024-03-06 ENCOUNTER — HOSPITAL ENCOUNTER (OUTPATIENT)
Dept: WOUND CARE | Age: 74
Discharge: STILL ADMITTED OR ADMINISTRATIVE | End: 2024-03-06
Attending: PODIATRIST | Admitting: PODIATRIST

## 2024-03-06 VITALS — TEMPERATURE: 97.3 F

## 2024-03-06 DIAGNOSIS — I73.9 PAD (PERIPHERAL ARTERY DISEASE) (CMD): Primary | ICD-10-CM

## 2024-03-06 DIAGNOSIS — L97.529 DIABETIC ULCER OF LEFT GREAT TOE (CMD): ICD-10-CM

## 2024-03-06 DIAGNOSIS — E11.621 DIABETIC ULCER OF LEFT GREAT TOE (CMD): ICD-10-CM

## 2024-03-06 DIAGNOSIS — L97.511 ULCER OF RIGHT FOOT, LIMITED TO BREAKDOWN OF SKIN (CMD): ICD-10-CM

## 2024-03-06 PROCEDURE — 11042 DBRDMT SUBQ TIS 1ST 20SQCM/<: CPT

## 2024-03-06 PROCEDURE — 11045 DBRDMT SUBQ TISS EACH ADDL: CPT

## 2024-03-06 ASSESSMENT — PAIN SCALES - GENERAL: PAINLEVEL_OUTOF10: 0

## 2024-03-20 ENCOUNTER — HOSPITAL ENCOUNTER (OUTPATIENT)
Dept: WOUND CARE | Age: 74
Discharge: STILL ADMITTED OR ADMINISTRATIVE | End: 2024-03-20
Attending: PODIATRIST | Admitting: PODIATRIST

## 2024-03-20 VITALS — TEMPERATURE: 96.8 F

## 2024-03-20 DIAGNOSIS — L97.529 DIABETIC ULCER OF LEFT GREAT TOE (CMD): ICD-10-CM

## 2024-03-20 DIAGNOSIS — E11.621 DIABETIC ULCER OF LEFT GREAT TOE (CMD): ICD-10-CM

## 2024-03-20 DIAGNOSIS — E08.621 DIABETIC ULCER OF TOE OF LEFT FOOT ASSOCIATED WITH DIABETES MELLITUS DUE TO UNDERLYING CONDITION, LIMITED TO BREAKDOWN OF SKIN (CMD): ICD-10-CM

## 2024-03-20 DIAGNOSIS — L97.521 DIABETIC ULCER OF TOE OF LEFT FOOT ASSOCIATED WITH DIABETES MELLITUS DUE TO UNDERLYING CONDITION, LIMITED TO BREAKDOWN OF SKIN (CMD): ICD-10-CM

## 2024-03-20 DIAGNOSIS — I73.9 PAD (PERIPHERAL ARTERY DISEASE) (CMD): Primary | ICD-10-CM

## 2024-03-20 PROCEDURE — 11042 DBRDMT SUBQ TIS 1ST 20SQCM/<: CPT

## 2024-03-20 PROCEDURE — 11045 DBRDMT SUBQ TISS EACH ADDL: CPT

## 2024-03-20 ASSESSMENT — PAIN SCALES - GENERAL: PAINLEVEL_OUTOF10: 0

## 2024-04-03 ENCOUNTER — HOSPITAL ENCOUNTER (OUTPATIENT)
Dept: WOUND CARE | Age: 74
Discharge: STILL ADMITTED OR ADMINISTRATIVE | End: 2024-04-03
Attending: PODIATRIST | Admitting: PODIATRIST

## 2024-04-03 VITALS — TEMPERATURE: 96.4 F

## 2024-04-03 DIAGNOSIS — E11.621 DIABETIC ULCER OF LEFT GREAT TOE (CMD): ICD-10-CM

## 2024-04-03 DIAGNOSIS — E08.621 DIABETIC ULCER OF TOE OF LEFT FOOT ASSOCIATED WITH DIABETES MELLITUS DUE TO UNDERLYING CONDITION, LIMITED TO BREAKDOWN OF SKIN (CMD): ICD-10-CM

## 2024-04-03 DIAGNOSIS — L97.423 DIABETIC ULCER OF LEFT MIDFOOT ASSOCIATED WITH DIABETES MELLITUS DUE TO UNDERLYING CONDITION, WITH NECROSIS OF MUSCLE (CMD): ICD-10-CM

## 2024-04-03 DIAGNOSIS — L97.521 DIABETIC ULCER OF TOE OF LEFT FOOT ASSOCIATED WITH DIABETES MELLITUS DUE TO UNDERLYING CONDITION, LIMITED TO BREAKDOWN OF SKIN (CMD): ICD-10-CM

## 2024-04-03 DIAGNOSIS — E08.621 DIABETIC ULCER OF LEFT MIDFOOT ASSOCIATED WITH DIABETES MELLITUS DUE TO UNDERLYING CONDITION, WITH NECROSIS OF MUSCLE (CMD): ICD-10-CM

## 2024-04-03 DIAGNOSIS — I73.9 PAD (PERIPHERAL ARTERY DISEASE) (CMD): Primary | ICD-10-CM

## 2024-04-03 DIAGNOSIS — L97.529 DIABETIC ULCER OF LEFT GREAT TOE (CMD): ICD-10-CM

## 2024-04-03 PROCEDURE — 11047 DBRDMT BONE EACH ADDL: CPT

## 2024-04-03 PROCEDURE — 11044 DBRDMT BONE 1ST 20 SQ CM/<: CPT

## 2024-04-03 RX ORDER — HYDROCODONE BITARTRATE AND ACETAMINOPHEN 5; 325 MG/1; MG/1
1 TABLET ORAL EVERY 8 HOURS PRN
COMMUNITY
Start: 2024-02-26

## 2024-04-03 RX ORDER — IPRATROPIUM BROMIDE 21 UG/1
1 SPRAY, METERED NASAL 2 TIMES DAILY
COMMUNITY
Start: 2024-01-14

## 2024-04-03 RX ORDER — CEPHALEXIN 500 MG/1
500 TABLET ORAL 2 TIMES DAILY
Qty: 20 TABLET | Refills: 0 | Status: SHIPPED | OUTPATIENT
Start: 2024-04-03 | End: 2024-04-13

## 2024-04-03 RX ORDER — LACTULOSE 10 G/15ML
15 SOLUTION ORAL; RECTAL DAILY
COMMUNITY
Start: 2024-03-14

## 2024-04-03 ASSESSMENT — PAIN SCALES - GENERAL: PAINLEVEL_OUTOF10: 0

## 2024-04-11 NOTE — PLAN OF CARE
----- Message from Jacquie Beltran sent at 4/10/2024 10:00 AM EDT -----  Subject: Referral Request    Reason for referral request? Patient called requesting her yearly   mammogram.  Provider patient wants to be referred to(if known):     Provider Phone Number(if known):    Additional Information for Provider?   ---------------------------------------------------------------------------  --------------  CALL BACK INFO    [phone]; OK to leave message on voicemail  ---------------------------------------------------------------------------  --------------   Problem: Diabetes/Glucose Control  Goal: Glucose maintained within prescribed range  Description  INTERVENTIONS:  - Monitor Blood Glucose as ordered  - Assess for signs and symptoms of hyperglycemia and hypoglycemia  - Administer ordered medications to m

## 2024-04-17 ENCOUNTER — HOSPITAL ENCOUNTER (OUTPATIENT)
Dept: WOUND CARE | Age: 74
Discharge: STILL ADMITTED OR ADMINISTRATIVE | End: 2024-04-17
Attending: PODIATRIST | Admitting: PODIATRIST

## 2024-04-17 ENCOUNTER — APPOINTMENT (OUTPATIENT)
Dept: GENERAL RADIOLOGY | Age: 74
DRG: 617 | End: 2024-04-17
Attending: STUDENT IN AN ORGANIZED HEALTH CARE EDUCATION/TRAINING PROGRAM

## 2024-04-17 ENCOUNTER — HOSPITAL ENCOUNTER (INPATIENT)
Age: 74
LOS: 7 days | Discharge: HOME HEALTH | DRG: 617 | End: 2024-04-24
Attending: STUDENT IN AN ORGANIZED HEALTH CARE EDUCATION/TRAINING PROGRAM | Admitting: INTERNAL MEDICINE

## 2024-04-17 VITALS — TEMPERATURE: 95.9 F

## 2024-04-17 DIAGNOSIS — M86.9 OSTEOMYELITIS OF ANKLE AND FOOT  (CMD): ICD-10-CM

## 2024-04-17 DIAGNOSIS — M86.9 OSTEOMYELITIS, UNSPECIFIED SITE, UNSPECIFIED TYPE  (CMD): Primary | ICD-10-CM

## 2024-04-17 DIAGNOSIS — M86.172 ACUTE OSTEOMYELITIS OF LEFT FOOT (CMD): ICD-10-CM

## 2024-04-17 DIAGNOSIS — E11.621 DIABETIC ULCER OF LEFT GREAT TOE (CMD): ICD-10-CM

## 2024-04-17 DIAGNOSIS — L97.423 DIABETIC ULCER OF LEFT MIDFOOT ASSOCIATED WITH DIABETES MELLITUS DUE TO UNDERLYING CONDITION, WITH NECROSIS OF MUSCLE (CMD): ICD-10-CM

## 2024-04-17 DIAGNOSIS — N17.9 ACUTE KIDNEY INJURY (CMD): ICD-10-CM

## 2024-04-17 DIAGNOSIS — E13.621 DIABETIC FOOT ULCER ASSOCIATED WITH OTHER SPECIFIED DIABETES MELLITUS, UNSPECIFIED LATERALITY, UNSPECIFIED PART OF FOOT, UNSPECIFIED ULCER STAGE  (CMD): ICD-10-CM

## 2024-04-17 DIAGNOSIS — L97.529 DIABETIC ULCER OF LEFT GREAT TOE (CMD): ICD-10-CM

## 2024-04-17 DIAGNOSIS — L97.509 DIABETIC FOOT ULCER ASSOCIATED WITH OTHER SPECIFIED DIABETES MELLITUS, UNSPECIFIED LATERALITY, UNSPECIFIED PART OF FOOT, UNSPECIFIED ULCER STAGE  (CMD): ICD-10-CM

## 2024-04-17 DIAGNOSIS — I73.9 PAD (PERIPHERAL ARTERY DISEASE) (CMD): Primary | ICD-10-CM

## 2024-04-17 DIAGNOSIS — L97.511 DIABETIC ULCER OF TOE OF RIGHT FOOT ASSOCIATED WITH DIABETES MELLITUS DUE TO UNDERLYING CONDITION, LIMITED TO BREAKDOWN OF SKIN (CMD): ICD-10-CM

## 2024-04-17 DIAGNOSIS — E08.621 DIABETIC ULCER OF LEFT MIDFOOT ASSOCIATED WITH DIABETES MELLITUS DUE TO UNDERLYING CONDITION, WITH NECROSIS OF MUSCLE (CMD): ICD-10-CM

## 2024-04-17 DIAGNOSIS — E08.621 DIABETIC ULCER OF TOE OF RIGHT FOOT ASSOCIATED WITH DIABETES MELLITUS DUE TO UNDERLYING CONDITION, LIMITED TO BREAKDOWN OF SKIN (CMD): ICD-10-CM

## 2024-04-17 LAB
ANION GAP SERPL CALC-SCNC: 9 MMOL/L (ref 7–19)
BASOPHILS # BLD: 0 K/MCL (ref 0–0.3)
BASOPHILS NFR BLD: 1 %
BUN SERPL-MCNC: 29 MG/DL (ref 6–20)
BUN/CREAT SERPL: 7 (ref 7–25)
CALCIUM SERPL-MCNC: 9.3 MG/DL (ref 8.4–10.2)
CHLORIDE SERPL-SCNC: 99 MMOL/L (ref 97–110)
CO2 SERPL-SCNC: 32 MMOL/L (ref 21–32)
CREAT SERPL-MCNC: 3.94 MG/DL (ref 0.67–1.17)
CRP SERPL-MCNC: 20.3 MG/L
DEPRECATED RDW RBC: 62.5 FL (ref 39–50)
EGFRCR SERPLBLD CKD-EPI 2021: 15 ML/MIN/{1.73_M2}
EOSINOPHIL # BLD: 0.1 K/MCL (ref 0–0.5)
EOSINOPHIL NFR BLD: 1 %
ERYTHROCYTE [DISTWIDTH] IN BLOOD: 16.6 % (ref 11–15)
ERYTHROCYTE [SEDIMENTATION RATE] IN BLOOD BY WESTERGREN METHOD: 46 MM/HR (ref 0–20)
FASTING DURATION TIME PATIENT: ABNORMAL H
GLUCOSE BLDC GLUCOMTR-MCNC: 140 MG/DL (ref 70–99)
GLUCOSE BLDC GLUCOMTR-MCNC: 228 MG/DL (ref 70–99)
GLUCOSE SERPL-MCNC: 235 MG/DL (ref 70–99)
HCT VFR BLD CALC: 37 % (ref 39–51)
HGB BLD-MCNC: 11.4 G/DL (ref 13–17)
IMM GRANULOCYTES # BLD AUTO: 0 K/MCL (ref 0–0.2)
IMM GRANULOCYTES # BLD: 0 %
LYMPHOCYTES # BLD: 0.6 K/MCL (ref 1–4)
LYMPHOCYTES NFR BLD: 11 %
MCH RBC QN AUTO: 31.4 PG (ref 26–34)
MCHC RBC AUTO-ENTMCNC: 30.8 G/DL (ref 32–36.5)
MCV RBC AUTO: 101.9 FL (ref 78–100)
MONOCYTES # BLD: 0.5 K/MCL (ref 0.3–0.9)
MONOCYTES NFR BLD: 10 %
NEUTROPHILS # BLD: 4 K/MCL (ref 1.8–7.7)
NEUTROPHILS NFR BLD: 77 %
NRBC BLD MANUAL-RTO: 0 /100 WBC
PLATELET # BLD AUTO: 189 K/MCL (ref 140–450)
POTASSIUM SERPL-SCNC: 4.1 MMOL/L (ref 3.4–5.1)
RBC # BLD: 3.63 MIL/MCL (ref 4.5–5.9)
SODIUM SERPL-SCNC: 136 MMOL/L (ref 135–145)
WBC # BLD: 5.2 K/MCL (ref 4.2–11)

## 2024-04-17 PROCEDURE — 10002807 HB RX 258: Performed by: STUDENT IN AN ORGANIZED HEALTH CARE EDUCATION/TRAINING PROGRAM

## 2024-04-17 PROCEDURE — 85025 COMPLETE CBC W/AUTO DIFF WBC: CPT | Performed by: STUDENT IN AN ORGANIZED HEALTH CARE EDUCATION/TRAINING PROGRAM

## 2024-04-17 PROCEDURE — 10002801 HB RX 250 W/O HCPCS: Performed by: STUDENT IN AN ORGANIZED HEALTH CARE EDUCATION/TRAINING PROGRAM

## 2024-04-17 PROCEDURE — 11045 DBRDMT SUBQ TISS EACH ADDL: CPT

## 2024-04-17 PROCEDURE — 86704 HEP B CORE ANTIBODY TOTAL: CPT

## 2024-04-17 PROCEDURE — 10002800 HB RX 250 W HCPCS: Performed by: STUDENT IN AN ORGANIZED HEALTH CARE EDUCATION/TRAINING PROGRAM

## 2024-04-17 PROCEDURE — 96372 THER/PROPH/DIAG INJ SC/IM: CPT | Performed by: INTERNAL MEDICINE

## 2024-04-17 PROCEDURE — 10004180 HB COUNTER-TRANSPORT

## 2024-04-17 PROCEDURE — 11042 DBRDMT SUBQ TIS 1ST 20SQCM/<: CPT

## 2024-04-17 PROCEDURE — 10004651 HB RX, NO CHARGE ITEM: Performed by: INTERNAL MEDICINE

## 2024-04-17 PROCEDURE — 10000002 HB ROOM CHARGE MED SURG

## 2024-04-17 PROCEDURE — 10002800 HB RX 250 W HCPCS: Performed by: INTERNAL MEDICINE

## 2024-04-17 PROCEDURE — 99284 EMERGENCY DEPT VISIT MOD MDM: CPT

## 2024-04-17 PROCEDURE — 87340 HEPATITIS B SURFACE AG IA: CPT

## 2024-04-17 PROCEDURE — 10002803 HB RX 637: Performed by: INTERNAL MEDICINE

## 2024-04-17 PROCEDURE — 86140 C-REACTIVE PROTEIN: CPT | Performed by: STUDENT IN AN ORGANIZED HEALTH CARE EDUCATION/TRAINING PROGRAM

## 2024-04-17 PROCEDURE — 87040 BLOOD CULTURE FOR BACTERIA: CPT | Performed by: STUDENT IN AN ORGANIZED HEALTH CARE EDUCATION/TRAINING PROGRAM

## 2024-04-17 PROCEDURE — 86706 HEP B SURFACE ANTIBODY: CPT

## 2024-04-17 PROCEDURE — 73620 X-RAY EXAM OF FOOT: CPT

## 2024-04-17 PROCEDURE — 85652 RBC SED RATE AUTOMATED: CPT | Performed by: STUDENT IN AN ORGANIZED HEALTH CARE EDUCATION/TRAINING PROGRAM

## 2024-04-17 PROCEDURE — 94660 CPAP INITIATION&MGMT: CPT

## 2024-04-17 PROCEDURE — 80048 BASIC METABOLIC PNL TOTAL CA: CPT | Performed by: STUDENT IN AN ORGANIZED HEALTH CARE EDUCATION/TRAINING PROGRAM

## 2024-04-17 PROCEDURE — 87186 SC STD MICRODIL/AGAR DIL: CPT | Performed by: INTERNAL MEDICINE

## 2024-04-17 RX ORDER — EZETIMIBE 10 MG/1
10 TABLET ORAL DAILY
Status: DISCONTINUED | OUTPATIENT
Start: 2024-04-18 | End: 2024-04-24 | Stop reason: HOSPADM

## 2024-04-17 RX ORDER — NICOTINE POLACRILEX 4 MG
15 LOZENGE BUCCAL PRN
Status: DISCONTINUED | OUTPATIENT
Start: 2024-04-17 | End: 2024-04-24 | Stop reason: HOSPADM

## 2024-04-17 RX ORDER — ONDANSETRON 2 MG/ML
4 INJECTION INTRAMUSCULAR; INTRAVENOUS EVERY 12 HOURS PRN
Status: DISCONTINUED | OUTPATIENT
Start: 2024-04-17 | End: 2024-04-24 | Stop reason: HOSPADM

## 2024-04-17 RX ORDER — DEXTROSE MONOHYDRATE 25 G/50ML
25 INJECTION, SOLUTION INTRAVENOUS PRN
Status: DISCONTINUED | OUTPATIENT
Start: 2024-04-17 | End: 2024-04-24 | Stop reason: HOSPADM

## 2024-04-17 RX ORDER — ACETAMINOPHEN 500 MG
1000 TABLET ORAL PRN
COMMUNITY

## 2024-04-17 RX ORDER — SEVELAMER CARBONATE 800 MG/1
800 TABLET, FILM COATED ORAL PRN
Status: DISCONTINUED | OUTPATIENT
Start: 2024-04-17 | End: 2024-04-24 | Stop reason: HOSPADM

## 2024-04-17 RX ORDER — ONDANSETRON 4 MG/1
4 TABLET, ORALLY DISINTEGRATING ORAL EVERY 12 HOURS PRN
Status: DISCONTINUED | OUTPATIENT
Start: 2024-04-17 | End: 2024-04-24 | Stop reason: HOSPADM

## 2024-04-17 RX ORDER — VITAMIN E 268 MG
400 CAPSULE ORAL DAILY
COMMUNITY

## 2024-04-17 RX ORDER — HYDROCODONE BITARTRATE AND ACETAMINOPHEN 5; 325 MG/1; MG/1
0.5 TABLET ORAL 2 TIMES DAILY
Status: DISCONTINUED | OUTPATIENT
Start: 2024-04-17 | End: 2024-04-24 | Stop reason: HOSPADM

## 2024-04-17 RX ORDER — FAMOTIDINE 20 MG/1
20 TABLET, FILM COATED ORAL DAILY
Status: DISCONTINUED | OUTPATIENT
Start: 2024-04-18 | End: 2024-04-19

## 2024-04-17 RX ORDER — ACETAMINOPHEN 650 MG/1
650 SUPPOSITORY RECTAL EVERY 4 HOURS PRN
Status: DISCONTINUED | OUTPATIENT
Start: 2024-04-17 | End: 2024-04-24 | Stop reason: HOSPADM

## 2024-04-17 RX ORDER — POLYETHYLENE GLYCOL 3350 17 G/17G
17 POWDER, FOR SOLUTION ORAL DAILY PRN
Status: DISCONTINUED | OUTPATIENT
Start: 2024-04-17 | End: 2024-04-24 | Stop reason: HOSPADM

## 2024-04-17 RX ORDER — ACETAMINOPHEN 325 MG/1
650 TABLET ORAL EVERY 4 HOURS PRN
Status: DISCONTINUED | OUTPATIENT
Start: 2024-04-17 | End: 2024-04-24 | Stop reason: HOSPADM

## 2024-04-17 RX ORDER — SEVELAMER CARBONATE 800 MG/1
800 TABLET, FILM COATED ORAL
Status: DISCONTINUED | OUTPATIENT
Start: 2024-04-17 | End: 2024-04-24 | Stop reason: HOSPADM

## 2024-04-17 RX ORDER — HEPARIN SODIUM 5000 [USP'U]/ML
5000 INJECTION, SOLUTION INTRAVENOUS; SUBCUTANEOUS EVERY 8 HOURS SCHEDULED
Status: DISCONTINUED | OUTPATIENT
Start: 2024-04-17 | End: 2024-04-24 | Stop reason: HOSPADM

## 2024-04-17 RX ORDER — DEXTROSE MONOHYDRATE 25 G/50ML
12.5 INJECTION, SOLUTION INTRAVENOUS PRN
Status: DISCONTINUED | OUTPATIENT
Start: 2024-04-17 | End: 2024-04-24 | Stop reason: HOSPADM

## 2024-04-17 RX ORDER — CEPHALEXIN 500 MG/1
CAPSULE ORAL
COMMUNITY
Start: 2024-04-03 | End: 2024-04-17

## 2024-04-17 RX ORDER — ASPIRIN 81 MG/1
81 TABLET, CHEWABLE ORAL DAILY
Status: DISCONTINUED | OUTPATIENT
Start: 2024-04-18 | End: 2024-04-24 | Stop reason: HOSPADM

## 2024-04-17 RX ORDER — ASPIRIN 81 MG/1
1 TABLET, CHEWABLE ORAL DAILY
Status: ON HOLD | COMMUNITY
Start: 2024-02-21

## 2024-04-17 RX ORDER — 0.9 % SODIUM CHLORIDE 0.9 %
2 VIAL (ML) INJECTION EVERY 12 HOURS SCHEDULED
Status: DISCONTINUED | OUTPATIENT
Start: 2024-04-17 | End: 2024-04-24 | Stop reason: HOSPADM

## 2024-04-17 RX ORDER — LEVOTHYROXINE SODIUM 0.07 MG/1
150 TABLET ORAL DAILY
Status: DISCONTINUED | OUTPATIENT
Start: 2024-04-18 | End: 2024-04-24 | Stop reason: HOSPADM

## 2024-04-17 RX ORDER — NICOTINE POLACRILEX 4 MG
30 LOZENGE BUCCAL PRN
Status: DISCONTINUED | OUTPATIENT
Start: 2024-04-17 | End: 2024-04-24 | Stop reason: HOSPADM

## 2024-04-17 RX ADMIN — SEVELAMER CARBONATE 800 MG: 800 TABLET, FILM COATED ORAL at 21:03

## 2024-04-17 RX ADMIN — HYDROCODONE BITARTRATE AND ACETAMINOPHEN 0.5 TABLET: 5; 325 TABLET ORAL at 22:11

## 2024-04-17 RX ADMIN — HEPARIN SODIUM 5000 UNITS: 5000 INJECTION INTRAVENOUS; SUBCUTANEOUS at 18:21

## 2024-04-17 RX ADMIN — VANCOMYCIN HYDROCHLORIDE 1750 MG: 10 INJECTION, POWDER, LYOPHILIZED, FOR SOLUTION INTRAVENOUS at 16:05

## 2024-04-17 RX ADMIN — HEPARIN SODIUM 5000 UNITS: 5000 INJECTION INTRAVENOUS; SUBCUTANEOUS at 22:11

## 2024-04-17 RX ADMIN — SODIUM CHLORIDE, PRESERVATIVE FREE 2 ML: 5 INJECTION INTRAVENOUS at 22:15

## 2024-04-17 RX ADMIN — SEVELAMER CARBONATE 800 MG: 800 TABLET, FILM COATED ORAL at 18:21

## 2024-04-17 RX ADMIN — PIPERACILLIN SODIUM AND TAZOBACTAM SODIUM 4.5 G: 4; .5 INJECTION, POWDER, LYOPHILIZED, FOR SOLUTION INTRAVENOUS at 15:30

## 2024-04-17 ASSESSMENT — PAIN SCALES - GENERAL
PAINLEVEL_OUTOF10: 0
PAINLEVEL_OUTOF10: 2
PAINLEVEL_OUTOF10: 0
PAINLEVEL_OUTOF10: 2

## 2024-04-18 ENCOUNTER — APPOINTMENT (OUTPATIENT)
Dept: DIALYSIS | Age: 74
DRG: 617 | End: 2024-04-18

## 2024-04-18 ENCOUNTER — APPOINTMENT (OUTPATIENT)
Dept: ULTRASOUND IMAGING | Age: 74
DRG: 617 | End: 2024-04-18
Attending: INTERNAL MEDICINE

## 2024-04-18 PROBLEM — E11.621: Status: ACTIVE | Noted: 2024-04-18

## 2024-04-18 PROBLEM — M86.172 ACUTE OSTEOMYELITIS OF LEFT FOOT  (CMD): Status: ACTIVE | Noted: 2024-04-18

## 2024-04-18 PROBLEM — L97.419: Status: ACTIVE | Noted: 2024-04-18

## 2024-04-18 LAB
ALBUMIN SERPL-MCNC: 3.2 G/DL (ref 3.6–5.1)
ALBUMIN/GLOB SERPL: 0.7 {RATIO} (ref 1–2.4)
ALP SERPL-CCNC: 186 UNITS/L (ref 45–117)
ALT SERPL-CCNC: 16 UNITS/L
ANION GAP SERPL CALC-SCNC: 10 MMOL/L (ref 7–19)
ANION GAP SERPL CALC-SCNC: 9 MMOL/L (ref 7–19)
AST SERPL-CCNC: 23 UNITS/L
BASOPHILS # BLD: 0.1 K/MCL (ref 0–0.3)
BASOPHILS NFR BLD: 1 %
BILIRUB SERPL-MCNC: 1.1 MG/DL (ref 0.2–1)
BUN SERPL-MCNC: 18 MG/DL (ref 6–20)
BUN SERPL-MCNC: 39 MG/DL (ref 6–20)
BUN/CREAT SERPL: 7 (ref 7–25)
BUN/CREAT SERPL: 9 (ref 7–25)
CALCIUM SERPL-MCNC: 9.1 MG/DL (ref 8.4–10.2)
CALCIUM SERPL-MCNC: 9.4 MG/DL (ref 8.4–10.2)
CHLORIDE SERPL-SCNC: 100 MMOL/L (ref 97–110)
CHLORIDE SERPL-SCNC: 102 MMOL/L (ref 97–110)
CO2 SERPL-SCNC: 30 MMOL/L (ref 21–32)
CO2 SERPL-SCNC: 30 MMOL/L (ref 21–32)
CREAT SERPL-MCNC: 2.62 MG/DL (ref 0.67–1.17)
CREAT SERPL-MCNC: 4.41 MG/DL (ref 0.67–1.17)
CRP SERPL-MCNC: 18.5 MG/L
DEPRECATED RDW RBC: 62.2 FL (ref 39–50)
EGFRCR SERPLBLD CKD-EPI 2021: 13 ML/MIN/{1.73_M2}
EGFRCR SERPLBLD CKD-EPI 2021: 25 ML/MIN/{1.73_M2}
EOSINOPHIL # BLD: 0.2 K/MCL (ref 0–0.5)
EOSINOPHIL NFR BLD: 4 %
ERYTHROCYTE [DISTWIDTH] IN BLOOD: 16.6 % (ref 11–15)
FASTING DURATION TIME PATIENT: ABNORMAL H
FASTING DURATION TIME PATIENT: ABNORMAL H
GLOBULIN SER-MCNC: 4.7 G/DL (ref 2–4)
GLUCOSE BLDC GLUCOMTR-MCNC: 118 MG/DL (ref 70–99)
GLUCOSE BLDC GLUCOMTR-MCNC: 120 MG/DL (ref 70–99)
GLUCOSE BLDC GLUCOMTR-MCNC: 172 MG/DL (ref 70–99)
GLUCOSE BLDC GLUCOMTR-MCNC: 87 MG/DL (ref 70–99)
GLUCOSE SERPL-MCNC: 138 MG/DL (ref 70–99)
GLUCOSE SERPL-MCNC: 166 MG/DL (ref 70–99)
HBV CORE IGG+IGM SER QL: NEGATIVE
HBV SURFACE AB SER-ACNC: 3.3 MUNITS/ML
HBV SURFACE AG SER QL: NEGATIVE
HCT VFR BLD CALC: 36.2 % (ref 39–51)
HGB BLD-MCNC: 11.3 G/DL (ref 13–17)
IMM GRANULOCYTES # BLD AUTO: 0 K/MCL (ref 0–0.2)
IMM GRANULOCYTES # BLD: 0 %
LYMPHOCYTES # BLD: 0.5 K/MCL (ref 1–4)
LYMPHOCYTES NFR BLD: 9 %
MCH RBC QN AUTO: 31.6 PG (ref 26–34)
MCHC RBC AUTO-ENTMCNC: 31.2 G/DL (ref 32–36.5)
MCV RBC AUTO: 101.1 FL (ref 78–100)
MONOCYTES # BLD: 0.5 K/MCL (ref 0.3–0.9)
MONOCYTES NFR BLD: 9 %
NEUTROPHILS # BLD: 4.6 K/MCL (ref 1.8–7.7)
NEUTROPHILS NFR BLD: 77 %
NRBC BLD MANUAL-RTO: 0 /100 WBC
PHOSPHATE SERPL-MCNC: 3.6 MG/DL (ref 2.4–4.7)
PLATELET # BLD AUTO: 172 K/MCL (ref 140–450)
POTASSIUM SERPL-SCNC: 3.7 MMOL/L (ref 3.4–5.1)
POTASSIUM SERPL-SCNC: 4.5 MMOL/L (ref 3.4–5.1)
PROT SERPL-MCNC: 7.9 G/DL (ref 6.4–8.2)
RAINBOW EXTRA TUBES HOLD SPECIMEN: NORMAL
RBC # BLD: 3.58 MIL/MCL (ref 4.5–5.9)
SODIUM SERPL-SCNC: 134 MMOL/L (ref 135–145)
SODIUM SERPL-SCNC: 138 MMOL/L (ref 135–145)
VANCOMYCIN SERPL-MCNC: 18.7 MCG/ML
WBC # BLD: 6 K/MCL (ref 4.2–11)

## 2024-04-18 PROCEDURE — 93925 LOWER EXTREMITY STUDY: CPT

## 2024-04-18 PROCEDURE — 10002803 HB RX 637: Performed by: INTERNAL MEDICINE

## 2024-04-18 PROCEDURE — 10000002 HB ROOM CHARGE MED SURG

## 2024-04-18 PROCEDURE — 80048 BASIC METABOLIC PNL TOTAL CA: CPT | Performed by: INTERNAL MEDICINE

## 2024-04-18 PROCEDURE — 10004180 HB COUNTER-TRANSPORT

## 2024-04-18 PROCEDURE — 84100 ASSAY OF PHOSPHORUS: CPT

## 2024-04-18 PROCEDURE — 36415 COLL VENOUS BLD VENIPUNCTURE: CPT | Performed by: INTERNAL MEDICINE

## 2024-04-18 PROCEDURE — 86900 BLOOD TYPING SEROLOGIC ABO: CPT | Performed by: HOSPITALIST

## 2024-04-18 PROCEDURE — 10002800 HB RX 250 W HCPCS: Performed by: INTERNAL MEDICINE

## 2024-04-18 PROCEDURE — 10004651 HB RX, NO CHARGE ITEM: Performed by: INTERNAL MEDICINE

## 2024-04-18 PROCEDURE — 93005 ELECTROCARDIOGRAM TRACING: CPT | Performed by: INTERNAL MEDICINE

## 2024-04-18 PROCEDURE — 86140 C-REACTIVE PROTEIN: CPT | Performed by: INTERNAL MEDICINE

## 2024-04-18 PROCEDURE — 5A1D70Z PERFORMANCE OF URINARY FILTRATION, INTERMITTENT, LESS THAN 6 HOURS PER DAY: ICD-10-PCS | Performed by: INTERNAL MEDICINE

## 2024-04-18 PROCEDURE — 96372 THER/PROPH/DIAG INJ SC/IM: CPT | Performed by: INTERNAL MEDICINE

## 2024-04-18 PROCEDURE — 94660 CPAP INITIATION&MGMT: CPT

## 2024-04-18 PROCEDURE — 85025 COMPLETE CBC W/AUTO DIFF WBC: CPT | Performed by: INTERNAL MEDICINE

## 2024-04-18 PROCEDURE — 10002807 HB RX 258: Performed by: INTERNAL MEDICINE

## 2024-04-18 PROCEDURE — 80053 COMPREHEN METABOLIC PANEL: CPT | Performed by: PODIATRIST

## 2024-04-18 PROCEDURE — 80202 ASSAY OF VANCOMYCIN: CPT | Performed by: INTERNAL MEDICINE

## 2024-04-18 PROCEDURE — 90935 HEMODIALYSIS ONE EVALUATION: CPT

## 2024-04-18 RX ORDER — NICOTINE POLACRILEX 4 MG
30 LOZENGE BUCCAL
Status: DISCONTINUED | OUTPATIENT
Start: 2024-04-18 | End: 2024-04-19 | Stop reason: HOSPADM

## 2024-04-18 RX ORDER — SCOLOPAMINE TRANSDERMAL SYSTEM 1 MG/1
1 PATCH, EXTENDED RELEASE TRANSDERMAL PRN
Status: DISCONTINUED | OUTPATIENT
Start: 2024-04-18 | End: 2024-04-19 | Stop reason: HOSPADM

## 2024-04-18 RX ORDER — DEXTROSE MONOHYDRATE 25 G/50ML
25 INJECTION, SOLUTION INTRAVENOUS PRN
Status: DISCONTINUED | OUTPATIENT
Start: 2024-04-18 | End: 2024-04-19 | Stop reason: HOSPADM

## 2024-04-18 RX ORDER — CHLORHEXIDINE GLUCONATE ORAL RINSE 1.2 MG/ML
15 SOLUTION DENTAL
Status: DISCONTINUED | OUTPATIENT
Start: 2024-04-18 | End: 2024-04-19 | Stop reason: HOSPADM

## 2024-04-18 RX ORDER — ACETAMINOPHEN 500 MG
1000 TABLET ORAL
Status: DISCONTINUED | OUTPATIENT
Start: 2024-04-18 | End: 2024-04-19 | Stop reason: HOSPADM

## 2024-04-18 RX ORDER — FAMOTIDINE 20 MG/1
20 TABLET, FILM COATED ORAL
Status: DISCONTINUED | OUTPATIENT
Start: 2024-04-18 | End: 2024-04-19 | Stop reason: HOSPADM

## 2024-04-18 RX ORDER — SODIUM CITRATE 4 % (5 ML)
3 SYRINGE (ML) MISCELLANEOUS PRN
Status: DISCONTINUED | OUTPATIENT
Start: 2024-04-18 | End: 2024-04-24 | Stop reason: HOSPADM

## 2024-04-18 RX ORDER — LIDOCAINE HYDROCHLORIDE 10 MG/ML
5 INJECTION, SOLUTION EPIDURAL; INFILTRATION; INTRACAUDAL; PERINEURAL PRN
Status: DISCONTINUED | OUTPATIENT
Start: 2024-04-18 | End: 2024-04-19 | Stop reason: HOSPADM

## 2024-04-18 RX ORDER — SODIUM CHLORIDE, SODIUM LACTATE, POTASSIUM CHLORIDE, CALCIUM CHLORIDE 600; 310; 30; 20 MG/100ML; MG/100ML; MG/100ML; MG/100ML
INJECTION, SOLUTION INTRAVENOUS CONTINUOUS
Status: DISCONTINUED | OUTPATIENT
Start: 2024-04-19 | End: 2024-04-19 | Stop reason: HOSPADM

## 2024-04-18 RX ADMIN — VANCOMYCIN HYDROCHLORIDE 750 MG: 750 INJECTION, POWDER, LYOPHILIZED, FOR SOLUTION INTRAVENOUS at 18:03

## 2024-04-18 RX ADMIN — SEVELAMER CARBONATE 800 MG: 800 TABLET, FILM COATED ORAL at 08:36

## 2024-04-18 RX ADMIN — HEPARIN SODIUM 5000 UNITS: 5000 INJECTION INTRAVENOUS; SUBCUTANEOUS at 20:18

## 2024-04-18 RX ADMIN — HYDROCODONE BITARTRATE AND ACETAMINOPHEN 0.5 TABLET: 5; 325 TABLET ORAL at 21:07

## 2024-04-18 RX ADMIN — EZETIMIBE 10 MG: 10 TABLET ORAL at 08:37

## 2024-04-18 RX ADMIN — HYDROCODONE BITARTRATE AND ACETAMINOPHEN 0.5 TABLET: 5; 325 TABLET ORAL at 08:38

## 2024-04-18 RX ADMIN — FAMOTIDINE 20 MG: 20 TABLET ORAL at 08:37

## 2024-04-18 RX ADMIN — SEVELAMER CARBONATE 800 MG: 800 TABLET, FILM COATED ORAL at 17:59

## 2024-04-18 RX ADMIN — LEVOTHYROXINE SODIUM 150 MCG: 75 TABLET ORAL at 08:37

## 2024-04-18 RX ADMIN — ASPIRIN 81 MG CHEWABLE TABLET 81 MG: 81 TABLET CHEWABLE at 08:37

## 2024-04-18 RX ADMIN — SERTRALINE HYDROCHLORIDE 25 MG: 50 TABLET, FILM COATED ORAL at 08:38

## 2024-04-18 RX ADMIN — SEVELAMER CARBONATE 800 MG: 800 TABLET, FILM COATED ORAL at 11:44

## 2024-04-18 RX ADMIN — PIPERACILLIN SODIUM AND TAZOBACTAM SODIUM 3.38 G: 3; .375 INJECTION, SOLUTION INTRAVENOUS at 08:44

## 2024-04-18 RX ADMIN — SODIUM CHLORIDE, PRESERVATIVE FREE 2 ML: 5 INJECTION INTRAVENOUS at 20:22

## 2024-04-18 RX ADMIN — HEPARIN SODIUM 5000 UNITS: 5000 INJECTION INTRAVENOUS; SUBCUTANEOUS at 17:59

## 2024-04-18 RX ADMIN — HEPARIN SODIUM 5000 UNITS: 5000 INJECTION INTRAVENOUS; SUBCUTANEOUS at 08:38

## 2024-04-18 RX ADMIN — SODIUM CHLORIDE, PRESERVATIVE FREE 2 ML: 5 INJECTION INTRAVENOUS at 08:39

## 2024-04-18 RX ADMIN — PIPERACILLIN SODIUM AND TAZOBACTAM SODIUM 3.38 G: 3; .375 INJECTION, SOLUTION INTRAVENOUS at 22:47

## 2024-04-18 SDOH — ECONOMIC STABILITY: HOUSING INSECURITY: WHAT IS YOUR LIVING SITUATION TODAY?: I HAVE A STEADY PLACE TO LIVE

## 2024-04-18 ASSESSMENT — ENCOUNTER SYMPTOMS
WOUND: 1
NEUROLOGICAL NEGATIVE: 1
CONSTITUTIONAL NEGATIVE: 1
SHORTNESS OF BREATH: 0
PSYCHIATRIC NEGATIVE: 1

## 2024-04-18 ASSESSMENT — PAIN SCALES - GENERAL
PAINLEVEL_OUTOF10: 3
PAINLEVEL_OUTOF10: 4
PAINLEVEL_OUTOF10: 7

## 2024-04-18 ASSESSMENT — COGNITIVE AND FUNCTIONAL STATUS - GENERAL
BECAUSE OF A PHYSICAL, MENTAL, OR EMOTIONAL CONDITION, DO YOU HAVE DIFFICULTY DOING ERRANDS ALONE: YES
DO YOU HAVE DIFFICULTY DRESSING OR BATHING: NO
BECAUSE OF A PHYSICAL, MENTAL, OR EMOTIONAL CONDITION, DO YOU HAVE SERIOUS DIFFICULTY CONCENTRATING, REMEMBERING OR MAKING DECISIONS: NO

## 2024-04-19 ENCOUNTER — APPOINTMENT (OUTPATIENT)
Dept: GENERAL RADIOLOGY | Age: 74
DRG: 617 | End: 2024-04-19
Attending: PODIATRIST

## 2024-04-19 ENCOUNTER — ANESTHESIA (OUTPATIENT)
Dept: SURGERY | Age: 74
End: 2024-04-19

## 2024-04-19 ENCOUNTER — ANESTHESIA EVENT (OUTPATIENT)
Dept: SURGERY | Age: 74
End: 2024-04-19

## 2024-04-19 LAB
ABO + RH BLD: NORMAL
ANION GAP SERPL CALC-SCNC: 9 MMOL/L (ref 7–19)
ATRIAL RATE (BPM): 72
BASOPHILS # BLD: 0.1 K/MCL (ref 0–0.3)
BASOPHILS NFR BLD: 1 %
BLD GP AB SCN SERPL QL GEL: NEGATIVE
BUN SERPL-MCNC: 24 MG/DL (ref 6–20)
BUN/CREAT SERPL: 7 (ref 7–25)
CALCIUM SERPL-MCNC: 9.1 MG/DL (ref 8.4–10.2)
CHLORIDE SERPL-SCNC: 102 MMOL/L (ref 97–110)
CO2 SERPL-SCNC: 30 MMOL/L (ref 21–32)
CREAT SERPL-MCNC: 3.3 MG/DL (ref 0.67–1.17)
DEPRECATED RDW RBC: 62.4 FL (ref 39–50)
EGFRCR SERPLBLD CKD-EPI 2021: 19 ML/MIN/{1.73_M2}
EOSINOPHIL # BLD: 0.3 K/MCL (ref 0–0.5)
EOSINOPHIL NFR BLD: 4 %
ERYTHROCYTE [DISTWIDTH] IN BLOOD: 16.6 % (ref 11–15)
FASTING DURATION TIME PATIENT: ABNORMAL H
GLUCOSE BLDC GLUCOMTR-MCNC: 135 MG/DL (ref 70–99)
GLUCOSE BLDC GLUCOMTR-MCNC: 147 MG/DL (ref 70–99)
GLUCOSE BLDC GLUCOMTR-MCNC: 84 MG/DL (ref 70–99)
GLUCOSE BLDC GLUCOMTR-MCNC: 84 MG/DL (ref 70–99)
GLUCOSE SERPL-MCNC: 94 MG/DL (ref 70–99)
HCT VFR BLD CALC: 37.6 % (ref 39–51)
HGB BLD-MCNC: 11.5 G/DL (ref 13–17)
IMM GRANULOCYTES # BLD AUTO: 0 K/MCL (ref 0–0.2)
IMM GRANULOCYTES # BLD: 0 %
LYMPHOCYTES # BLD: 0.6 K/MCL (ref 1–4)
LYMPHOCYTES NFR BLD: 9 %
MCH RBC QN AUTO: 31 PG (ref 26–34)
MCHC RBC AUTO-ENTMCNC: 30.6 G/DL (ref 32–36.5)
MCV RBC AUTO: 101.3 FL (ref 78–100)
MONOCYTES # BLD: 0.8 K/MCL (ref 0.3–0.9)
MONOCYTES NFR BLD: 12 %
NEUTROPHILS # BLD: 4.7 K/MCL (ref 1.8–7.7)
NEUTROPHILS NFR BLD: 74 %
NRBC BLD MANUAL-RTO: 0 /100 WBC
P AXIS (DEGREES): 22
PLATELET # BLD AUTO: 167 K/MCL (ref 140–450)
POTASSIUM SERPL-SCNC: 3.9 MMOL/L (ref 3.4–5.1)
PR-INTERVAL (MSEC): 172
QRS-INTERVAL (MSEC): 80
QT-INTERVAL (MSEC): 440
QTC: 481
R AXIS (DEGREES): 25
RBC # BLD: 3.71 MIL/MCL (ref 4.5–5.9)
REPORT TEXT: NORMAL
SODIUM SERPL-SCNC: 137 MMOL/L (ref 135–145)
T AXIS (DEGREES): 54
TYPE AND SCREEN EXPIRATION DATE: NORMAL
VENTRICULAR RATE EKG/MIN (BPM): 72
WBC # BLD: 6.4 K/MCL (ref 4.2–11)

## 2024-04-19 PROCEDURE — 85025 COMPLETE CBC W/AUTO DIFF WBC: CPT | Performed by: HOSPITALIST

## 2024-04-19 PROCEDURE — 10004180 HB COUNTER-TRANSPORT

## 2024-04-19 PROCEDURE — 36415 COLL VENOUS BLD VENIPUNCTURE: CPT | Performed by: HOSPITALIST

## 2024-04-19 PROCEDURE — 10002807 HB RX 258: Performed by: ANESTHESIOLOGY

## 2024-04-19 PROCEDURE — 13000114 HB ORTHO BASIC CASE S/U + 1ST 15 MIN: Performed by: PODIATRIST

## 2024-04-19 PROCEDURE — 10002800 HB RX 250 W HCPCS: Performed by: STUDENT IN AN ORGANIZED HEALTH CARE EDUCATION/TRAINING PROGRAM

## 2024-04-19 PROCEDURE — 87186 SC STD MICRODIL/AGAR DIL: CPT | Performed by: PODIATRIST

## 2024-04-19 PROCEDURE — 13000006 HB ANESTHESIA MAC S/U + 1ST 15 MIN: Performed by: PODIATRIST

## 2024-04-19 PROCEDURE — 10002803 HB RX 637: Performed by: INTERNAL MEDICINE

## 2024-04-19 PROCEDURE — 10004651 HB RX, NO CHARGE ITEM: Performed by: PODIATRIST

## 2024-04-19 PROCEDURE — 0Y6N0Z9 DETACHMENT AT LEFT FOOT, PARTIAL 1ST RAY, OPEN APPROACH: ICD-10-PCS | Performed by: PODIATRIST

## 2024-04-19 PROCEDURE — 10002807 HB RX 258: Performed by: STUDENT IN AN ORGANIZED HEALTH CARE EDUCATION/TRAINING PROGRAM

## 2024-04-19 PROCEDURE — 10005281 FL INTRAOPERATIVE C ARM WITH REPORT

## 2024-04-19 PROCEDURE — 10002801 HB RX 250 W/O HCPCS: Performed by: STUDENT IN AN ORGANIZED HEALTH CARE EDUCATION/TRAINING PROGRAM

## 2024-04-19 PROCEDURE — 10002800 HB RX 250 W HCPCS: Performed by: PODIATRIST

## 2024-04-19 PROCEDURE — 88311 DECALCIFY TISSUE: CPT | Performed by: PODIATRIST

## 2024-04-19 PROCEDURE — 10004451 HB PACU RECOVERY 1ST 30 MINUTES: Performed by: PODIATRIST

## 2024-04-19 PROCEDURE — 10002803 HB RX 637: Performed by: HOSPITALIST

## 2024-04-19 PROCEDURE — 73630 X-RAY EXAM OF FOOT: CPT

## 2024-04-19 PROCEDURE — 80048 BASIC METABOLIC PNL TOTAL CA: CPT | Performed by: HOSPITALIST

## 2024-04-19 PROCEDURE — 10002807 HB RX 258: Performed by: INTERNAL MEDICINE

## 2024-04-19 PROCEDURE — 13000115 HB ORTHO BASIC CASE EA ADD MINUTE: Performed by: PODIATRIST

## 2024-04-19 PROCEDURE — 96372 THER/PROPH/DIAG INJ SC/IM: CPT | Performed by: INTERNAL MEDICINE

## 2024-04-19 PROCEDURE — 13000007 HB ANESTHESIA MAC EA ADD MINUTE: Performed by: PODIATRIST

## 2024-04-19 PROCEDURE — 10004452 HB PACU ADDL 30 MINUTES: Performed by: PODIATRIST

## 2024-04-19 PROCEDURE — 94660 CPAP INITIATION&MGMT: CPT

## 2024-04-19 PROCEDURE — 10002800 HB RX 250 W HCPCS: Performed by: INTERNAL MEDICINE

## 2024-04-19 PROCEDURE — 10000002 HB ROOM CHARGE MED SURG

## 2024-04-19 PROCEDURE — 10002801 HB RX 250 W/O HCPCS: Performed by: PODIATRIST

## 2024-04-19 PROCEDURE — 10006023 HB SUPPLY 272: Performed by: PODIATRIST

## 2024-04-19 RX ORDER — DROPERIDOL 2.5 MG/ML
0.62 INJECTION, SOLUTION INTRAMUSCULAR; INTRAVENOUS
Status: ACTIVE | OUTPATIENT
Start: 2024-04-19 | End: 2024-04-19

## 2024-04-19 RX ORDER — DEXTROSE MONOHYDRATE 25 G/50ML
50 INJECTION, SOLUTION INTRAVENOUS ONCE
Status: COMPLETED | OUTPATIENT
Start: 2024-04-19 | End: 2024-04-19

## 2024-04-19 RX ORDER — DIPHENHYDRAMINE HCL 25 MG
25 CAPSULE ORAL
Status: ACTIVE | OUTPATIENT
Start: 2024-04-19 | End: 2024-04-19

## 2024-04-19 RX ORDER — ONDANSETRON 2 MG/ML
4 INJECTION INTRAMUSCULAR; INTRAVENOUS
Status: ACTIVE | OUTPATIENT
Start: 2024-04-19 | End: 2024-04-19

## 2024-04-19 RX ORDER — SODIUM CHLORIDE, SODIUM LACTATE, POTASSIUM CHLORIDE, CALCIUM CHLORIDE 600; 310; 30; 20 MG/100ML; MG/100ML; MG/100ML; MG/100ML
INJECTION, SOLUTION INTRAVENOUS CONTINUOUS PRN
Status: DISCONTINUED | OUTPATIENT
Start: 2024-04-19 | End: 2024-04-19

## 2024-04-19 RX ORDER — ENALAPRILAT 1.25 MG/ML
1.25 INJECTION INTRAVENOUS
Status: DISCONTINUED | OUTPATIENT
Start: 2024-04-19 | End: 2024-04-19 | Stop reason: HOSPADM

## 2024-04-19 RX ORDER — OXYCODONE HYDROCHLORIDE 5 MG/1
5 TABLET ORAL
Status: DISCONTINUED | OUTPATIENT
Start: 2024-04-19 | End: 2024-04-19 | Stop reason: HOSPADM

## 2024-04-19 RX ORDER — PALONOSETRON 0.05 MG/ML
0.25 INJECTION, SOLUTION INTRAVENOUS
Status: ACTIVE | OUTPATIENT
Start: 2024-04-19 | End: 2024-04-19

## 2024-04-19 RX ORDER — HYDRALAZINE HYDROCHLORIDE 20 MG/ML
5 INJECTION INTRAMUSCULAR; INTRAVENOUS EVERY 10 MIN PRN
Status: DISCONTINUED | OUTPATIENT
Start: 2024-04-19 | End: 2024-04-19 | Stop reason: HOSPADM

## 2024-04-19 RX ORDER — ACETAMINOPHEN 500 MG
1000 TABLET ORAL EVERY 8 HOURS SCHEDULED
Status: DISCONTINUED | OUTPATIENT
Start: 2024-04-19 | End: 2024-04-24 | Stop reason: HOSPADM

## 2024-04-19 RX ORDER — LACTULOSE 10 G/15ML
20 SOLUTION ORAL
Status: DISCONTINUED | OUTPATIENT
Start: 2024-04-19 | End: 2024-04-24 | Stop reason: HOSPADM

## 2024-04-19 RX ORDER — BUPIVACAINE HYDROCHLORIDE 5 MG/ML
INJECTION, SOLUTION EPIDURAL; INTRACAUDAL PRN
Status: DISCONTINUED | OUTPATIENT
Start: 2024-04-19 | End: 2024-04-19 | Stop reason: HOSPADM

## 2024-04-19 RX ORDER — HYDROCODONE BITARTRATE AND ACETAMINOPHEN 5; 325 MG/1; MG/1
1 TABLET ORAL
Status: DISCONTINUED | OUTPATIENT
Start: 2024-04-19 | End: 2024-04-19 | Stop reason: HOSPADM

## 2024-04-19 RX ORDER — PROCHLORPERAZINE EDISYLATE 5 MG/ML
5 INJECTION INTRAMUSCULAR; INTRAVENOUS
Status: ACTIVE | OUTPATIENT
Start: 2024-04-19 | End: 2024-04-19

## 2024-04-19 RX ORDER — LIDOCAINE HYDROCHLORIDE 20 MG/ML
INJECTION, SOLUTION INFILTRATION; PERINEURAL PRN
Status: DISCONTINUED | OUTPATIENT
Start: 2024-04-19 | End: 2024-04-19 | Stop reason: HOSPADM

## 2024-04-19 RX ORDER — OXYCODONE HYDROCHLORIDE 5 MG/1
5 TABLET ORAL
Status: ACTIVE | OUTPATIENT
Start: 2024-04-19 | End: 2024-04-19

## 2024-04-19 RX ORDER — FAMOTIDINE 20 MG/1
20 TABLET, FILM COATED ORAL EVERY 12 HOURS SCHEDULED
Status: DISCONTINUED | OUTPATIENT
Start: 2024-04-19 | End: 2024-04-21

## 2024-04-19 RX ADMIN — LACTULOSE 20 G: 10 SOLUTION ORAL at 16:33

## 2024-04-19 RX ADMIN — HEPARIN SODIUM 5000 UNITS: 5000 INJECTION INTRAVENOUS; SUBCUTANEOUS at 22:00

## 2024-04-19 RX ADMIN — ACETAMINOPHEN 1000 MG: 500 TABLET ORAL at 14:30

## 2024-04-19 RX ADMIN — PROPOFOL INJECTABLE EMULSION 100 MCG/KG/MIN: 10 INJECTION, EMULSION INTRAVENOUS at 08:01

## 2024-04-19 RX ADMIN — FAMOTIDINE 20 MG: 20 TABLET ORAL at 22:00

## 2024-04-19 RX ADMIN — LEVOTHYROXINE SODIUM 150 MCG: 75 TABLET ORAL at 05:26

## 2024-04-19 RX ADMIN — MEROPENEM 500 MG: 500 INJECTION INTRAVENOUS at 22:16

## 2024-04-19 RX ADMIN — HYDROCODONE BITARTRATE AND ACETAMINOPHEN 0.5 TABLET: 5; 325 TABLET ORAL at 22:00

## 2024-04-19 RX ADMIN — SERTRALINE HYDROCHLORIDE 25 MG: 50 TABLET, FILM COATED ORAL at 05:26

## 2024-04-19 RX ADMIN — SODIUM CHLORIDE, POTASSIUM CHLORIDE, SODIUM LACTATE AND CALCIUM CHLORIDE: 600; 310; 30; 20 INJECTION, SOLUTION INTRAVENOUS at 07:56

## 2024-04-19 RX ADMIN — ACETAMINOPHEN 1000 MG: 500 TABLET ORAL at 22:00

## 2024-04-19 RX ADMIN — SEVELAMER CARBONATE 800 MG: 800 TABLET, FILM COATED ORAL at 16:34

## 2024-04-19 RX ADMIN — DEXTROSE MONOHYDRATE 50 G: 25 INJECTION, SOLUTION INTRAVENOUS at 07:40

## 2024-04-19 RX ADMIN — SEVELAMER CARBONATE 800 MG: 800 TABLET, FILM COATED ORAL at 12:12

## 2024-04-19 RX ADMIN — SODIUM CHLORIDE, POTASSIUM CHLORIDE, SODIUM LACTATE AND CALCIUM CHLORIDE: 600; 310; 30; 20 INJECTION, SOLUTION INTRAVENOUS at 00:14

## 2024-04-19 RX ADMIN — HEPARIN SODIUM 5000 UNITS: 5000 INJECTION INTRAVENOUS; SUBCUTANEOUS at 14:29

## 2024-04-19 RX ADMIN — FENTANYL CITRATE 50 MCG: 50 INJECTION INTRAMUSCULAR; INTRAVENOUS at 08:09

## 2024-04-19 RX ADMIN — FENTANYL CITRATE 50 MCG: 50 INJECTION INTRAMUSCULAR; INTRAVENOUS at 08:01

## 2024-04-19 SDOH — HEALTH STABILITY: PHYSICAL HEALTH: DO YOU HAVE DIFFICULTY DRESSING OR BATHING?: NO

## 2024-04-19 SDOH — ECONOMIC STABILITY: GENERAL: WOULD YOU LIKE HELP WITH ANY OF THE FOLLOWING NEEDS?: I DON'T WANT HELP WITH ANY OF THESE

## 2024-04-19 SDOH — ECONOMIC STABILITY: INCOME INSECURITY: IN THE PAST 12 MONTHS, HAS THE ELECTRIC, GAS, OIL, OR WATER COMPANY THREATENED TO SHUT OFF SERVICE IN YOUR HOME?: NO

## 2024-04-19 SDOH — ECONOMIC STABILITY: GENERAL

## 2024-04-19 SDOH — ECONOMIC STABILITY: HOUSING INSECURITY: WHAT IS YOUR LIVING SITUATION TODAY?: I HAVE A STEADY PLACE TO LIVE

## 2024-04-19 SDOH — ECONOMIC STABILITY: TRANSPORTATION INSECURITY
IN THE PAST 12 MONTHS, HAS LACK OF RELIABLE TRANSPORTATION KEPT YOU FROM MEDICAL APPOINTMENTS, MEETINGS, WORK OR FROM GETTING THINGS NEEDED FOR DAILY LIVING?: NO

## 2024-04-19 SDOH — SOCIAL STABILITY: SOCIAL INSECURITY: HOW OFTEN DOES ANYONE, INCLUDING FAMILY AND FRIENDS, SCREAM OR CURSE AT YOU?: NEVER

## 2024-04-19 SDOH — SOCIAL STABILITY: SOCIAL NETWORK: SUPPORT SYSTEMS: CASE MANAGER/SOCIAL WORKER;FAMILY MEMBERS

## 2024-04-19 SDOH — ECONOMIC STABILITY: FOOD INSECURITY: WITHIN THE PAST 12 MONTHS, THE FOOD YOU BOUGHT JUST DIDN'T LAST AND YOU DIDN'T HAVE MONEY TO GET MORE.: NEVER TRUE

## 2024-04-19 SDOH — ECONOMIC STABILITY: HOUSING INSECURITY: WHAT IS YOUR LIVING SITUATION TODAY?: CONDO

## 2024-04-19 SDOH — SOCIAL STABILITY: SOCIAL INSECURITY: HOW OFTEN DOES ANYONE, INCLUDING FAMILY AND FRIENDS, INSULT OR TALK DOWN TO YOU?: NEVER

## 2024-04-19 SDOH — SOCIAL STABILITY: SOCIAL NETWORK
HOW OFTEN DO YOU SEE OR TALK TO PEOPLE THAT YOU CARE ABOUT AND FEEL CLOSE TO? (FOR EXAMPLE: TALKING TO FRIENDS ON THE PHONE, VISITING FRIENDS OR FAMILY, GOING TO CHURCH OR CLUB MEETINGS): 5 OR MORE TIMES A WEEK

## 2024-04-19 SDOH — ECONOMIC STABILITY: HOUSING INSECURITY: DO YOU HAVE PROBLEMS WITH ANY OF THE FOLLOWING?: NONE OF THE ABOVE

## 2024-04-19 SDOH — SOCIAL STABILITY: SOCIAL INSECURITY: HOW OFTEN DOES ANYONE, INCLUDING FAMILY AND FRIENDS, PHYSICALLY HURT YOU?: NEVER

## 2024-04-19 SDOH — ECONOMIC STABILITY: HOUSING INSECURITY: WHAT IS YOUR LIVING SITUATION TODAY?: SPOUSE

## 2024-04-19 SDOH — SOCIAL STABILITY: SOCIAL INSECURITY: HOW OFTEN DOES ANYONE, INCLUDING FAMILY AND FRIENDS, THREATEN YOU WITH HARM?: NEVER

## 2024-04-19 SDOH — HEALTH STABILITY: GENERAL: BECAUSE OF A PHYSICAL, MENTAL, OR EMOTIONAL CONDITION, DO YOU HAVE DIFFICULTY DOING ERRANDS ALONE?: NO

## 2024-04-19 SDOH — HEALTH STABILITY: GENERAL
BECAUSE OF A PHYSICAL, MENTAL, OR EMOTIONAL CONDITION, DO YOU HAVE SERIOUS DIFFICULTY CONCENTRATING, REMEMBERING OR MAKING DECISIONS?: NO

## 2024-04-19 SDOH — HEALTH STABILITY: PHYSICAL HEALTH: DO YOU HAVE SERIOUS DIFFICULTY WALKING OR CLIMBING STAIRS?: YES

## 2024-04-19 ASSESSMENT — LIFESTYLE VARIABLES
HOW OFTEN DO YOU HAVE 6 OR MORE DRINKS ON ONE OCCASION: NEVER
HOW OFTEN DO YOU HAVE A DRINK CONTAINING ALCOHOL: NEVER
AUDIT-C TOTAL SCORE: 0
ALCOHOL_USE_STATUS: NO OR LOW RISK WITH VALIDATED TOOL
HOW MANY STANDARD DRINKS CONTAINING ALCOHOL DO YOU HAVE ON A TYPICAL DAY: 0,1 OR 2

## 2024-04-19 ASSESSMENT — ENCOUNTER SYMPTOMS
ALLERGIC/IMMUNOLOGIC NEGATIVE: 1
NEUROLOGICAL NEGATIVE: 1
PSYCHIATRIC NEGATIVE: 1
EXERCISE TOLERANCE: POOR (<4 METS)
RESPIRATORY NEGATIVE: 1
GASTROINTESTINAL NEGATIVE: 1
ENDOCRINE NEGATIVE: 1
HEMATOLOGIC/LYMPHATIC NEGATIVE: 1
EYES NEGATIVE: 1
CONSTITUTIONAL NEGATIVE: 1

## 2024-04-19 ASSESSMENT — COLUMBIA-SUICIDE SEVERITY RATING SCALE - C-SSRS
2. HAVE YOU ACTUALLY HAD ANY THOUGHTS OF KILLING YOURSELF?: NO
1. WITHIN THE PAST MONTH, HAVE YOU WISHED YOU WERE DEAD OR WISHED YOU COULD GO TO SLEEP AND NOT WAKE UP?: NO
6. HAVE YOU EVER DONE ANYTHING, STARTED TO DO ANYTHING, OR PREPARED TO DO ANYTHING TO END YOUR LIFE?: NO
IS THE PATIENT ABLE TO COMPLETE C-SSRS: YES

## 2024-04-19 ASSESSMENT — PATIENT HEALTH QUESTIONNAIRE - PHQ9
SUM OF ALL RESPONSES TO PHQ9 QUESTIONS 1 AND 2: 0
1. LITTLE INTEREST OR PLEASURE IN DOING THINGS: NOT AT ALL
SUM OF ALL RESPONSES TO PHQ9 QUESTIONS 1 AND 2: 0
IS PATIENT ABLE TO COMPLETE PHQ2 OR PHQ9: YES
CLINICAL INTERPRETATION OF PHQ2 SCORE: NO FURTHER SCREENING NEEDED
2. FEELING DOWN, DEPRESSED OR HOPELESS: NOT AT ALL

## 2024-04-19 ASSESSMENT — ACTIVITIES OF DAILY LIVING (ADL)
FEEDING: INDEPENDENT
ADL_BEFORE_ADMISSION: INDEPENDENT
BATHING: INDEPENDENT
DRESSING: INDEPENDENT
RECENT_DECLINE_ADL: YES, DECLINE IN AMBULATION/TRANSFERRING, COLLABORATE WITH PROVIDER (T)
TOILETING: INDEPENDENT
ADL_SHORT_OF_BREATH: NO
ADL_SCORE: 24

## 2024-04-19 ASSESSMENT — PAIN SCALES - GENERAL
PAINLEVEL_OUTOF10: 2
PAINLEVEL_OUTOF10: 1
PAINLEVEL_OUTOF10: 0
PAINLEVEL_OUTOF10: 0
PAINLEVEL_OUTOF10: 2

## 2024-04-19 ASSESSMENT — ORIENTATION MEMORY CONCENTRATION TEST (OMCT)
WHAT MONTH IS IT NOW: CORRECT
REPEAT THE NAME AND ADDRESS I ASKED YOU TO REMEMBER: CORRECT
WHAT TIME IS IT (NO WATCH OR CLOCK): INCORRECT
WHAT YEAR IS IT NOW (MUST BE EXACT): CORRECT
OMCT INTERPRETATION: 0-6: NO SIGNIFICANT IMPAIRMENT
COUNT BACKWARDS FROM 20 TO 1: CORRECT
SAY THE MONTHS IN REVERSE ORDER STARTING WITH LAST MONTH: 1 ERROR
OMCT SCORE: 5

## 2024-04-20 ENCOUNTER — APPOINTMENT (OUTPATIENT)
Dept: DIALYSIS | Age: 74
DRG: 617 | End: 2024-04-20

## 2024-04-20 LAB
ANION GAP SERPL CALC-SCNC: 10 MMOL/L (ref 7–19)
BASOPHILS # BLD: 0.1 K/MCL (ref 0–0.3)
BASOPHILS NFR BLD: 1 %
BUN SERPL-MCNC: 34 MG/DL (ref 6–20)
BUN/CREAT SERPL: 8 (ref 7–25)
CALCIUM SERPL-MCNC: 9 MG/DL (ref 8.4–10.2)
CHLORIDE SERPL-SCNC: 101 MMOL/L (ref 97–110)
CO2 SERPL-SCNC: 28 MMOL/L (ref 21–32)
CREAT SERPL-MCNC: 4.43 MG/DL (ref 0.67–1.17)
DEPRECATED RDW RBC: 62.7 FL (ref 39–50)
EGFRCR SERPLBLD CKD-EPI 2021: 13 ML/MIN/{1.73_M2}
EOSINOPHIL # BLD: 0.2 K/MCL (ref 0–0.5)
EOSINOPHIL NFR BLD: 2 %
ERYTHROCYTE [DISTWIDTH] IN BLOOD: 16.9 % (ref 11–15)
FASTING DURATION TIME PATIENT: ABNORMAL H
GLUCOSE BLDC GLUCOMTR-MCNC: 114 MG/DL (ref 70–99)
GLUCOSE BLDC GLUCOMTR-MCNC: 142 MG/DL (ref 70–99)
GLUCOSE BLDC GLUCOMTR-MCNC: 173 MG/DL (ref 70–99)
GLUCOSE SERPL-MCNC: 158 MG/DL (ref 70–99)
HCT VFR BLD CALC: 34.8 % (ref 39–51)
HGB BLD-MCNC: 10.7 G/DL (ref 13–17)
IMM GRANULOCYTES # BLD AUTO: 0 K/MCL (ref 0–0.2)
IMM GRANULOCYTES # BLD: 0 %
LYMPHOCYTES # BLD: 0.6 K/MCL (ref 1–4)
LYMPHOCYTES NFR BLD: 8 %
MCH RBC QN AUTO: 31.3 PG (ref 26–34)
MCHC RBC AUTO-ENTMCNC: 30.7 G/DL (ref 32–36.5)
MCV RBC AUTO: 101.8 FL (ref 78–100)
MONOCYTES # BLD: 0.9 K/MCL (ref 0.3–0.9)
MONOCYTES NFR BLD: 11 %
NEUTROPHILS # BLD: 5.9 K/MCL (ref 1.8–7.7)
NEUTROPHILS NFR BLD: 78 %
NRBC BLD MANUAL-RTO: 0 /100 WBC
PLATELET # BLD AUTO: 172 K/MCL (ref 140–450)
POTASSIUM SERPL-SCNC: 4.2 MMOL/L (ref 3.4–5.1)
RBC # BLD: 3.42 MIL/MCL (ref 4.5–5.9)
SODIUM SERPL-SCNC: 135 MMOL/L (ref 135–145)
VANCOMYCIN SERPL-MCNC: 17.4 MCG/ML
WBC # BLD: 7.6 K/MCL (ref 4.2–11)

## 2024-04-20 PROCEDURE — 96372 THER/PROPH/DIAG INJ SC/IM: CPT | Performed by: INTERNAL MEDICINE

## 2024-04-20 PROCEDURE — 36415 COLL VENOUS BLD VENIPUNCTURE: CPT | Performed by: HOSPITALIST

## 2024-04-20 PROCEDURE — 10002803 HB RX 637: Performed by: INTERNAL MEDICINE

## 2024-04-20 PROCEDURE — 97166 OT EVAL MOD COMPLEX 45 MIN: CPT

## 2024-04-20 PROCEDURE — 10002800 HB RX 250 W HCPCS: Performed by: INTERNAL MEDICINE

## 2024-04-20 PROCEDURE — 10004651 HB RX, NO CHARGE ITEM: Performed by: PODIATRIST

## 2024-04-20 PROCEDURE — 10000002 HB ROOM CHARGE MED SURG

## 2024-04-20 PROCEDURE — 80202 ASSAY OF VANCOMYCIN: CPT | Performed by: INTERNAL MEDICINE

## 2024-04-20 PROCEDURE — 80048 BASIC METABOLIC PNL TOTAL CA: CPT | Performed by: HOSPITALIST

## 2024-04-20 PROCEDURE — 10002803 HB RX 637: Performed by: HOSPITALIST

## 2024-04-20 PROCEDURE — 10004651 HB RX, NO CHARGE ITEM: Performed by: INTERNAL MEDICINE

## 2024-04-20 PROCEDURE — 94660 CPAP INITIATION&MGMT: CPT

## 2024-04-20 PROCEDURE — 90935 HEMODIALYSIS ONE EVALUATION: CPT

## 2024-04-20 PROCEDURE — 97162 PT EVAL MOD COMPLEX 30 MIN: CPT

## 2024-04-20 PROCEDURE — 10002807 HB RX 258: Performed by: INTERNAL MEDICINE

## 2024-04-20 PROCEDURE — 10004180 HB COUNTER-TRANSPORT

## 2024-04-20 PROCEDURE — 85025 COMPLETE CBC W/AUTO DIFF WBC: CPT | Performed by: HOSPITALIST

## 2024-04-20 PROCEDURE — 97530 THERAPEUTIC ACTIVITIES: CPT

## 2024-04-20 PROCEDURE — 97535 SELF CARE MNGMENT TRAINING: CPT

## 2024-04-20 RX ADMIN — MEROPENEM 500 MG: 500 INJECTION INTRAVENOUS at 21:41

## 2024-04-20 RX ADMIN — SODIUM CHLORIDE, PRESERVATIVE FREE 2 ML: 5 INJECTION INTRAVENOUS at 08:18

## 2024-04-20 RX ADMIN — LACTULOSE 20 G: 10 SOLUTION ORAL at 16:18

## 2024-04-20 RX ADMIN — HYDROCODONE BITARTRATE AND ACETAMINOPHEN 0.5 TABLET: 5; 325 TABLET ORAL at 21:42

## 2024-04-20 RX ADMIN — MEROPENEM 500 MG: 500 INJECTION INTRAVENOUS at 08:22

## 2024-04-20 RX ADMIN — VANCOMYCIN HYDROCHLORIDE 500 MG: 500 INJECTION, POWDER, LYOPHILIZED, FOR SOLUTION INTRAVENOUS at 18:13

## 2024-04-20 RX ADMIN — SEVELAMER CARBONATE 800 MG: 800 TABLET, FILM COATED ORAL at 08:16

## 2024-04-20 RX ADMIN — HYDROCODONE BITARTRATE AND ACETAMINOPHEN 0.5 TABLET: 5; 325 TABLET ORAL at 08:16

## 2024-04-20 RX ADMIN — SERTRALINE HYDROCHLORIDE 25 MG: 50 TABLET, FILM COATED ORAL at 05:38

## 2024-04-20 RX ADMIN — HEPARIN SODIUM 5000 UNITS: 5000 INJECTION INTRAVENOUS; SUBCUTANEOUS at 05:36

## 2024-04-20 RX ADMIN — EZETIMIBE 10 MG: 10 TABLET ORAL at 08:16

## 2024-04-20 RX ADMIN — SEVELAMER CARBONATE 800 MG: 800 TABLET, FILM COATED ORAL at 16:17

## 2024-04-20 RX ADMIN — ASPIRIN 81 MG CHEWABLE TABLET 81 MG: 81 TABLET CHEWABLE at 08:16

## 2024-04-20 RX ADMIN — ACETAMINOPHEN 1000 MG: 500 TABLET ORAL at 16:17

## 2024-04-20 RX ADMIN — HEPARIN SODIUM 5000 UNITS: 5000 INJECTION INTRAVENOUS; SUBCUTANEOUS at 21:47

## 2024-04-20 RX ADMIN — ACETAMINOPHEN 1000 MG: 500 TABLET ORAL at 05:36

## 2024-04-20 RX ADMIN — LEVOTHYROXINE SODIUM 150 MCG: 75 TABLET ORAL at 05:36

## 2024-04-20 RX ADMIN — FAMOTIDINE 20 MG: 20 TABLET ORAL at 08:16

## 2024-04-20 RX ADMIN — FAMOTIDINE 20 MG: 20 TABLET ORAL at 21:46

## 2024-04-20 ASSESSMENT — COGNITIVE AND FUNCTIONAL STATUS - GENERAL
HELP NEEDED FOR BATHING: A LITTLE
HELP NEEDED DRESSING REGULAR LOWER BODY CLOTHING: A LITTLE
DAILY_ACTIVITY_CONVERTED_SCORE: 44.27
HELP NEEDED FOR TOILETING: A LITTLE
BASIC_MOBILITY_RAW_SCORE: 14
DAILY_ACTIVITY_RAW_SCORE: 21
BASIC_MOBILITY_CONVERTED_SCORE: 35.55

## 2024-04-20 ASSESSMENT — ACTIVITIES OF DAILY LIVING (ADL)
PRIOR_ADL: INDEPENDENT
HOME_MANAGEMENT_TIME_ENTRY: 15

## 2024-04-20 ASSESSMENT — ENCOUNTER SYMPTOMS
RESPIRATORY NEGATIVE: 1
HEMATOLOGIC/LYMPHATIC NEGATIVE: 1
PAIN SEVERITY NOW: 2
PAIN SEVERITY NOW: 0
PSYCHIATRIC NEGATIVE: 1
CONSTITUTIONAL NEGATIVE: 1
ENDOCRINE NEGATIVE: 1
ALLERGIC/IMMUNOLOGIC NEGATIVE: 1
GASTROINTESTINAL NEGATIVE: 1
NEUROLOGICAL NEGATIVE: 1
EYES NEGATIVE: 1

## 2024-04-20 ASSESSMENT — PAIN SCALES - GENERAL
PAINLEVEL_OUTOF10: 2
PAINLEVEL_OUTOF10: 4
PAINLEVEL_OUTOF10: 2
PAINLEVEL_OUTOF10: 4

## 2024-04-21 LAB
GLUCOSE BLDC GLUCOMTR-MCNC: 110 MG/DL (ref 70–99)
GLUCOSE BLDC GLUCOMTR-MCNC: 115 MG/DL (ref 70–99)
GLUCOSE BLDC GLUCOMTR-MCNC: 142 MG/DL (ref 70–99)
GLUCOSE BLDC GLUCOMTR-MCNC: 250 MG/DL (ref 70–99)
GLUCOSE BLDC GLUCOMTR-MCNC: 51 MG/DL (ref 70–99)
GLUCOSE BLDC GLUCOMTR-MCNC: 64 MG/DL (ref 70–99)
GLUCOSE BLDC GLUCOMTR-MCNC: 69 MG/DL (ref 70–99)
GLUCOSE BLDC GLUCOMTR-MCNC: 74 MG/DL (ref 70–99)
GLUCOSE BLDC GLUCOMTR-MCNC: 95 MG/DL (ref 70–99)

## 2024-04-21 PROCEDURE — 94660 CPAP INITIATION&MGMT: CPT

## 2024-04-21 PROCEDURE — 10002800 HB RX 250 W HCPCS: Performed by: INTERNAL MEDICINE

## 2024-04-21 PROCEDURE — 10004180 HB COUNTER-TRANSPORT

## 2024-04-21 PROCEDURE — 10004651 HB RX, NO CHARGE ITEM: Performed by: PODIATRIST

## 2024-04-21 PROCEDURE — 10004651 HB RX, NO CHARGE ITEM: Performed by: INTERNAL MEDICINE

## 2024-04-21 PROCEDURE — 96372 THER/PROPH/DIAG INJ SC/IM: CPT | Performed by: INTERNAL MEDICINE

## 2024-04-21 PROCEDURE — 10002801 HB RX 250 W/O HCPCS: Performed by: INTERNAL MEDICINE

## 2024-04-21 PROCEDURE — 10002803 HB RX 637: Performed by: INTERNAL MEDICINE

## 2024-04-21 PROCEDURE — 10002803 HB RX 637: Performed by: HOSPITALIST

## 2024-04-21 PROCEDURE — 10002807 HB RX 258: Performed by: INTERNAL MEDICINE

## 2024-04-21 PROCEDURE — 10000002 HB ROOM CHARGE MED SURG

## 2024-04-21 RX ORDER — FAMOTIDINE 20 MG/1
20 TABLET, FILM COATED ORAL EVERY OTHER DAY
Status: DISCONTINUED | OUTPATIENT
Start: 2024-04-23 | End: 2024-04-24 | Stop reason: HOSPADM

## 2024-04-21 RX ORDER — FAMOTIDINE 20 MG/1
20 TABLET, FILM COATED ORAL DAILY
Status: DISCONTINUED | OUTPATIENT
Start: 2024-04-22 | End: 2024-04-21

## 2024-04-21 RX ADMIN — MEROPENEM 500 MG: 500 INJECTION INTRAVENOUS at 08:55

## 2024-04-21 RX ADMIN — HYDROCODONE BITARTRATE AND ACETAMINOPHEN 0.5 TABLET: 5; 325 TABLET ORAL at 08:50

## 2024-04-21 RX ADMIN — DEXTROSE MONOHYDRATE 12.5 G: 25 INJECTION, SOLUTION INTRAVENOUS at 12:01

## 2024-04-21 RX ADMIN — EZETIMIBE 10 MG: 10 TABLET ORAL at 08:51

## 2024-04-21 RX ADMIN — SEVELAMER CARBONATE 800 MG: 800 TABLET, FILM COATED ORAL at 13:33

## 2024-04-21 RX ADMIN — SEVELAMER CARBONATE 800 MG: 800 TABLET, FILM COATED ORAL at 08:51

## 2024-04-21 RX ADMIN — ACETAMINOPHEN 1000 MG: 500 TABLET ORAL at 00:58

## 2024-04-21 RX ADMIN — HEPARIN SODIUM 5000 UNITS: 5000 INJECTION INTRAVENOUS; SUBCUTANEOUS at 21:28

## 2024-04-21 RX ADMIN — LEVOTHYROXINE SODIUM 150 MCG: 75 TABLET ORAL at 05:50

## 2024-04-21 RX ADMIN — HEPARIN SODIUM 5000 UNITS: 5000 INJECTION INTRAVENOUS; SUBCUTANEOUS at 05:50

## 2024-04-21 RX ADMIN — SEVELAMER CARBONATE 800 MG: 800 TABLET, FILM COATED ORAL at 16:04

## 2024-04-21 RX ADMIN — FAMOTIDINE 20 MG: 20 TABLET ORAL at 08:52

## 2024-04-21 RX ADMIN — ACETAMINOPHEN 1000 MG: 500 TABLET ORAL at 13:34

## 2024-04-21 RX ADMIN — SERTRALINE HYDROCHLORIDE 25 MG: 50 TABLET, FILM COATED ORAL at 08:51

## 2024-04-21 RX ADMIN — ASPIRIN 81 MG CHEWABLE TABLET 81 MG: 81 TABLET CHEWABLE at 08:52

## 2024-04-21 RX ADMIN — LACTULOSE 20 G: 10 SOLUTION ORAL at 16:04

## 2024-04-21 RX ADMIN — HEPARIN SODIUM 5000 UNITS: 5000 INJECTION INTRAVENOUS; SUBCUTANEOUS at 13:33

## 2024-04-21 RX ADMIN — Medication 15 G: at 11:40

## 2024-04-21 RX ADMIN — HYDROCODONE BITARTRATE AND ACETAMINOPHEN 0.5 TABLET: 5; 325 TABLET ORAL at 21:27

## 2024-04-21 RX ADMIN — ACETAMINOPHEN 650 MG: 325 TABLET ORAL at 06:34

## 2024-04-21 ASSESSMENT — PAIN SCALES - GENERAL
PAINLEVEL_OUTOF10: 3
PAINLEVEL_OUTOF10: 3
PAINLEVEL_OUTOF10: 2
PAINLEVEL_OUTOF10: 4
PAINLEVEL_OUTOF10: 2

## 2024-04-22 LAB
BACTERIA BLD CULT: NORMAL
BACTERIA BLD CULT: NORMAL
BACTERIA SPEC ANAEROBE+AEROBE CULT: ABNORMAL
GLUCOSE BLDC GLUCOMTR-MCNC: 127 MG/DL (ref 70–99)
GLUCOSE BLDC GLUCOMTR-MCNC: 147 MG/DL (ref 70–99)
GLUCOSE BLDC GLUCOMTR-MCNC: 183 MG/DL (ref 70–99)
GLUCOSE BLDC GLUCOMTR-MCNC: 187 MG/DL (ref 70–99)
GRAM STN SPEC: ABNORMAL
VANCOMYCIN SERPL-MCNC: 14.9 MCG/ML

## 2024-04-22 PROCEDURE — 94660 CPAP INITIATION&MGMT: CPT

## 2024-04-22 PROCEDURE — 10002803 HB RX 637: Performed by: HOSPITALIST

## 2024-04-22 PROCEDURE — 10004651 HB RX, NO CHARGE ITEM: Performed by: PODIATRIST

## 2024-04-22 PROCEDURE — 10004180 HB COUNTER-TRANSPORT

## 2024-04-22 PROCEDURE — 10002800 HB RX 250 W HCPCS: Performed by: HOSPITALIST

## 2024-04-22 PROCEDURE — 36415 COLL VENOUS BLD VENIPUNCTURE: CPT | Performed by: NURSE PRACTITIONER

## 2024-04-22 PROCEDURE — 10002803 HB RX 637: Performed by: INTERNAL MEDICINE

## 2024-04-22 PROCEDURE — 97116 GAIT TRAINING THERAPY: CPT

## 2024-04-22 PROCEDURE — 10002807 HB RX 258: Performed by: INTERNAL MEDICINE

## 2024-04-22 PROCEDURE — 97110 THERAPEUTIC EXERCISES: CPT

## 2024-04-22 PROCEDURE — 80202 ASSAY OF VANCOMYCIN: CPT | Performed by: NURSE PRACTITIONER

## 2024-04-22 PROCEDURE — 10004651 HB RX, NO CHARGE ITEM: Performed by: INTERNAL MEDICINE

## 2024-04-22 PROCEDURE — 97535 SELF CARE MNGMENT TRAINING: CPT

## 2024-04-22 PROCEDURE — 97530 THERAPEUTIC ACTIVITIES: CPT

## 2024-04-22 PROCEDURE — 10002807 HB RX 258: Performed by: HOSPITALIST

## 2024-04-22 PROCEDURE — 10000002 HB ROOM CHARGE MED SURG

## 2024-04-22 PROCEDURE — 10002800 HB RX 250 W HCPCS: Performed by: INTERNAL MEDICINE

## 2024-04-22 PROCEDURE — 96372 THER/PROPH/DIAG INJ SC/IM: CPT | Performed by: INTERNAL MEDICINE

## 2024-04-22 RX ADMIN — HEPARIN SODIUM 5000 UNITS: 5000 INJECTION INTRAVENOUS; SUBCUTANEOUS at 13:15

## 2024-04-22 RX ADMIN — SODIUM CHLORIDE 25 ML: 9 INJECTION, SOLUTION INTRAVENOUS at 18:48

## 2024-04-22 RX ADMIN — ACETAMINOPHEN 1000 MG: 500 TABLET ORAL at 05:25

## 2024-04-22 RX ADMIN — ASPIRIN 81 MG CHEWABLE TABLET 81 MG: 81 TABLET CHEWABLE at 08:31

## 2024-04-22 RX ADMIN — ACETAMINOPHEN 1000 MG: 500 TABLET ORAL at 13:15

## 2024-04-22 RX ADMIN — HEPARIN SODIUM 5000 UNITS: 5000 INJECTION INTRAVENOUS; SUBCUTANEOUS at 20:30

## 2024-04-22 RX ADMIN — SODIUM CHLORIDE, PRESERVATIVE FREE 2 ML: 5 INJECTION INTRAVENOUS at 20:33

## 2024-04-22 RX ADMIN — MEROPENEM 500 MG: 500 INJECTION, POWDER, FOR SOLUTION INTRAVENOUS at 17:38

## 2024-04-22 RX ADMIN — SEVELAMER CARBONATE 800 MG: 800 TABLET, FILM COATED ORAL at 08:31

## 2024-04-22 RX ADMIN — ACETAMINOPHEN 1000 MG: 500 TABLET ORAL at 20:30

## 2024-04-22 RX ADMIN — HYDROCODONE BITARTRATE AND ACETAMINOPHEN 0.5 TABLET: 5; 325 TABLET ORAL at 20:29

## 2024-04-22 RX ADMIN — SEVELAMER CARBONATE 800 MG: 800 TABLET, FILM COATED ORAL at 17:33

## 2024-04-22 RX ADMIN — SERTRALINE HYDROCHLORIDE 25 MG: 50 TABLET, FILM COATED ORAL at 08:31

## 2024-04-22 RX ADMIN — SEVELAMER CARBONATE 800 MG: 800 TABLET, FILM COATED ORAL at 13:15

## 2024-04-22 RX ADMIN — SODIUM CHLORIDE 25 ML: 9 INJECTION, SOLUTION INTRAVENOUS at 17:37

## 2024-04-22 RX ADMIN — HYDROCODONE BITARTRATE AND ACETAMINOPHEN 0.5 TABLET: 5; 325 TABLET ORAL at 08:31

## 2024-04-22 RX ADMIN — HEPARIN SODIUM 5000 UNITS: 5000 INJECTION INTRAVENOUS; SUBCUTANEOUS at 05:27

## 2024-04-22 RX ADMIN — SODIUM CHLORIDE, PRESERVATIVE FREE 2 ML: 5 INJECTION INTRAVENOUS at 08:32

## 2024-04-22 RX ADMIN — LACTULOSE 20 G: 10 SOLUTION ORAL at 17:33

## 2024-04-22 RX ADMIN — LEVOTHYROXINE SODIUM 150 MCG: 75 TABLET ORAL at 05:25

## 2024-04-22 RX ADMIN — EZETIMIBE 10 MG: 10 TABLET ORAL at 08:31

## 2024-04-22 ASSESSMENT — ORIENTATION MEMORY CONCENTRATION TEST (OMCT)
REPEAT THE NAME AND ADDRESS I ASKED YOU TO REMEMBER: 1 ERROR
OMCT SCORE: 2
COUNT BACKWARDS FROM 20 TO 1: CORRECT
OMCT INTERPRETATION: 0-6: NO SIGNIFICANT IMPAIRMENT
SAY THE MONTHS IN REVERSE ORDER STARTING WITH LAST MONTH: CORRECT
WHAT TIME IS IT (NO WATCH OR CLOCK): CORRECT
WHAT MONTH IS IT NOW: CORRECT
WHAT YEAR IS IT NOW (MUST BE EXACT): CORRECT

## 2024-04-22 ASSESSMENT — COGNITIVE AND FUNCTIONAL STATUS - GENERAL
HELP NEEDED DRESSING REGULAR LOWER BODY CLOTHING: A LITTLE
DAILY_ACTIVITY_RAW_SCORE: 21
BASIC_MOBILITY_RAW_SCORE: 15
DAILY_ACTIVITY_CONVERTED_SCORE: 44.27
HELP NEEDED FOR BATHING: A LITTLE
BASIC_MOBILITY_CONVERTED_SCORE: 36.97
HELP NEEDED FOR TOILETING: A LITTLE

## 2024-04-22 ASSESSMENT — ENCOUNTER SYMPTOMS
ALLERGIC/IMMUNOLOGIC NEGATIVE: 1
NEUROLOGICAL NEGATIVE: 1
RESPIRATORY NEGATIVE: 1
CONSTITUTIONAL NEGATIVE: 1
PSYCHIATRIC NEGATIVE: 1
EYES NEGATIVE: 1
GASTROINTESTINAL NEGATIVE: 1
ENDOCRINE NEGATIVE: 1
PAIN SEVERITY NOW: 2
HEMATOLOGIC/LYMPHATIC NEGATIVE: 1

## 2024-04-22 ASSESSMENT — PAIN SCALES - GENERAL
PAINLEVEL_OUTOF10: 2
PAINLEVEL_OUTOF10: 3
PAINLEVEL_OUTOF10: 6
PAINLEVEL_OUTOF10: 4
PAINLEVEL_OUTOF10: 2

## 2024-04-22 ASSESSMENT — ACTIVITIES OF DAILY LIVING (ADL)
HOME_MANAGEMENT_TIME_ENTRY: 25
HOME_MANAGEMENT_TIME_ENTRY: 18

## 2024-04-23 ENCOUNTER — APPOINTMENT (OUTPATIENT)
Dept: DIALYSIS | Age: 74
DRG: 617 | End: 2024-04-23

## 2024-04-23 LAB
ANION GAP SERPL CALC-SCNC: 10 MMOL/L (ref 7–19)
BACTERIA SPEC ANAEROBE+AEROBE CULT: ABNORMAL
BACTERIA SPEC ANAEROBE+AEROBE CULT: ABNORMAL
BUN SERPL-MCNC: 44 MG/DL (ref 6–20)
BUN/CREAT SERPL: 9 (ref 7–25)
CALCIUM SERPL-MCNC: 9.4 MG/DL (ref 8.4–10.2)
CHLORIDE SERPL-SCNC: 102 MMOL/L (ref 97–110)
CO2 SERPL-SCNC: 28 MMOL/L (ref 21–32)
CREAT SERPL-MCNC: 5.08 MG/DL (ref 0.67–1.17)
EGFRCR SERPLBLD CKD-EPI 2021: 11 ML/MIN/{1.73_M2}
FASTING DURATION TIME PATIENT: ABNORMAL H
GLUCOSE BLDC GLUCOMTR-MCNC: 100 MG/DL (ref 70–99)
GLUCOSE BLDC GLUCOMTR-MCNC: 108 MG/DL (ref 70–99)
GLUCOSE BLDC GLUCOMTR-MCNC: 118 MG/DL (ref 70–99)
GLUCOSE BLDC GLUCOMTR-MCNC: 205 MG/DL (ref 70–99)
GLUCOSE BLDC GLUCOMTR-MCNC: 77 MG/DL (ref 70–99)
GLUCOSE BLDC GLUCOMTR-MCNC: 91 MG/DL (ref 70–99)
GLUCOSE BLDC GLUCOMTR-MCNC: 99 MG/DL (ref 70–99)
GLUCOSE SERPL-MCNC: 132 MG/DL (ref 70–99)
GRAM STN SPEC: ABNORMAL
POTASSIUM SERPL-SCNC: 4.8 MMOL/L (ref 3.4–5.1)
RAINBOW EXTRA TUBES HOLD SPECIMEN: NORMAL
SODIUM SERPL-SCNC: 135 MMOL/L (ref 135–145)

## 2024-04-23 PROCEDURE — 10002800 HB RX 250 W HCPCS: Performed by: INTERNAL MEDICINE

## 2024-04-23 PROCEDURE — 10004180 HB COUNTER-TRANSPORT

## 2024-04-23 PROCEDURE — 10002807 HB RX 258: Performed by: NURSE PRACTITIONER

## 2024-04-23 PROCEDURE — 10002807 HB RX 258: Performed by: HOSPITALIST

## 2024-04-23 PROCEDURE — 10002800 HB RX 250 W HCPCS: Performed by: HOSPITALIST

## 2024-04-23 PROCEDURE — 80048 BASIC METABOLIC PNL TOTAL CA: CPT

## 2024-04-23 PROCEDURE — 10002803 HB RX 637: Performed by: HOSPITALIST

## 2024-04-23 PROCEDURE — 94660 CPAP INITIATION&MGMT: CPT

## 2024-04-23 PROCEDURE — 10000002 HB ROOM CHARGE MED SURG

## 2024-04-23 PROCEDURE — 36415 COLL VENOUS BLD VENIPUNCTURE: CPT | Performed by: HOSPITALIST

## 2024-04-23 PROCEDURE — 10004651 HB RX, NO CHARGE ITEM: Performed by: INTERNAL MEDICINE

## 2024-04-23 PROCEDURE — 10002800 HB RX 250 W HCPCS: Performed by: NURSE PRACTITIONER

## 2024-04-23 PROCEDURE — 10004651 HB RX, NO CHARGE ITEM: Performed by: PODIATRIST

## 2024-04-23 PROCEDURE — 10002803 HB RX 637: Performed by: INTERNAL MEDICINE

## 2024-04-23 PROCEDURE — 90935 HEMODIALYSIS ONE EVALUATION: CPT

## 2024-04-23 PROCEDURE — 96372 THER/PROPH/DIAG INJ SC/IM: CPT | Performed by: INTERNAL MEDICINE

## 2024-04-23 RX ADMIN — SERTRALINE HYDROCHLORIDE 25 MG: 50 TABLET, FILM COATED ORAL at 08:03

## 2024-04-23 RX ADMIN — ACETAMINOPHEN 1000 MG: 500 TABLET ORAL at 20:35

## 2024-04-23 RX ADMIN — SEVELAMER CARBONATE 800 MG: 800 TABLET, FILM COATED ORAL at 14:54

## 2024-04-23 RX ADMIN — SODIUM CHLORIDE, PRESERVATIVE FREE 2 ML: 5 INJECTION INTRAVENOUS at 20:37

## 2024-04-23 RX ADMIN — SODIUM CHLORIDE, PRESERVATIVE FREE 2 ML: 5 INJECTION INTRAVENOUS at 08:03

## 2024-04-23 RX ADMIN — HYDROCODONE BITARTRATE AND ACETAMINOPHEN 0.5 TABLET: 5; 325 TABLET ORAL at 08:03

## 2024-04-23 RX ADMIN — HEPARIN SODIUM 5000 UNITS: 5000 INJECTION INTRAVENOUS; SUBCUTANEOUS at 14:54

## 2024-04-23 RX ADMIN — ACETAMINOPHEN 1000 MG: 500 TABLET ORAL at 05:45

## 2024-04-23 RX ADMIN — ASPIRIN 81 MG CHEWABLE TABLET 81 MG: 81 TABLET CHEWABLE at 08:03

## 2024-04-23 RX ADMIN — SEVELAMER CARBONATE 800 MG: 800 TABLET, FILM COATED ORAL at 08:03

## 2024-04-23 RX ADMIN — LACTULOSE 20 G: 10 SOLUTION ORAL at 16:34

## 2024-04-23 RX ADMIN — HEPARIN SODIUM 5000 UNITS: 5000 INJECTION INTRAVENOUS; SUBCUTANEOUS at 05:45

## 2024-04-23 RX ADMIN — HEPARIN SODIUM 5000 UNITS: 5000 INJECTION INTRAVENOUS; SUBCUTANEOUS at 20:35

## 2024-04-23 RX ADMIN — ACETAMINOPHEN 1000 MG: 500 TABLET ORAL at 14:54

## 2024-04-23 RX ADMIN — HYDROCODONE BITARTRATE AND ACETAMINOPHEN 0.5 TABLET: 5; 325 TABLET ORAL at 20:36

## 2024-04-23 RX ADMIN — LEVOTHYROXINE SODIUM 150 MCG: 75 TABLET ORAL at 05:45

## 2024-04-23 RX ADMIN — VANCOMYCIN HYDROCHLORIDE 500 MG: 500 INJECTION, POWDER, LYOPHILIZED, FOR SOLUTION INTRAVENOUS at 18:26

## 2024-04-23 RX ADMIN — EZETIMIBE 10 MG: 10 TABLET ORAL at 08:04

## 2024-04-23 RX ADMIN — SEVELAMER CARBONATE 800 MG: 800 TABLET, FILM COATED ORAL at 17:29

## 2024-04-23 RX ADMIN — FAMOTIDINE 20 MG: 20 TABLET ORAL at 17:31

## 2024-04-23 RX ADMIN — SEVELAMER CARBONATE 800 MG: 800 TABLET, FILM COATED ORAL at 20:35

## 2024-04-23 RX ADMIN — MEROPENEM 500 MG: 500 INJECTION, POWDER, FOR SOLUTION INTRAVENOUS at 17:36

## 2024-04-23 ASSESSMENT — ENCOUNTER SYMPTOMS
PSYCHIATRIC NEGATIVE: 1
CONSTITUTIONAL NEGATIVE: 1
RESPIRATORY NEGATIVE: 1
ENDOCRINE NEGATIVE: 1
GASTROINTESTINAL NEGATIVE: 1
EYES NEGATIVE: 1
HEMATOLOGIC/LYMPHATIC NEGATIVE: 1
ALLERGIC/IMMUNOLOGIC NEGATIVE: 1
NEUROLOGICAL NEGATIVE: 1

## 2024-04-23 ASSESSMENT — PAIN SCALES - GENERAL
PAINLEVEL_OUTOF10: 2
PAINLEVEL_OUTOF10: 2
PAINLEVEL_OUTOF10: 3
PAINLEVEL_OUTOF10: 2
PAINLEVEL_OUTOF10: 2

## 2024-04-24 VITALS
DIASTOLIC BLOOD PRESSURE: 68 MMHG | WEIGHT: 162.92 LBS | SYSTOLIC BLOOD PRESSURE: 123 MMHG | HEIGHT: 66 IN | BODY MASS INDEX: 26.18 KG/M2 | TEMPERATURE: 98.1 F | HEART RATE: 73 BPM | OXYGEN SATURATION: 98 % | RESPIRATION RATE: 16 BRPM

## 2024-04-24 LAB
GLUCOSE BLDC GLUCOMTR-MCNC: 113 MG/DL (ref 70–99)
GLUCOSE BLDC GLUCOMTR-MCNC: 126 MG/DL (ref 70–99)
GLUCOSE BLDC GLUCOMTR-MCNC: 171 MG/DL (ref 70–99)

## 2024-04-24 PROCEDURE — 10002803 HB RX 637: Performed by: INTERNAL MEDICINE

## 2024-04-24 PROCEDURE — 97535 SELF CARE MNGMENT TRAINING: CPT

## 2024-04-24 PROCEDURE — 10004651 HB RX, NO CHARGE ITEM: Performed by: PODIATRIST

## 2024-04-24 PROCEDURE — 10002800 HB RX 250 W HCPCS: Performed by: NURSE PRACTITIONER

## 2024-04-24 PROCEDURE — 96372 THER/PROPH/DIAG INJ SC/IM: CPT | Performed by: INTERNAL MEDICINE

## 2024-04-24 PROCEDURE — 94660 CPAP INITIATION&MGMT: CPT

## 2024-04-24 PROCEDURE — 10004651 HB RX, NO CHARGE ITEM: Performed by: INTERNAL MEDICINE

## 2024-04-24 PROCEDURE — 10004180 HB COUNTER-TRANSPORT

## 2024-04-24 PROCEDURE — 10002803 HB RX 637: Performed by: HOSPITALIST

## 2024-04-24 PROCEDURE — 10002807 HB RX 258: Performed by: NURSE PRACTITIONER

## 2024-04-24 PROCEDURE — 10002800 HB RX 250 W HCPCS: Performed by: INTERNAL MEDICINE

## 2024-04-24 RX ORDER — FAMOTIDINE 20 MG/1
20 TABLET, FILM COATED ORAL DAILY
Status: SHIPPED | COMMUNITY
Start: 2024-04-24

## 2024-04-24 RX ADMIN — HEPARIN SODIUM 5000 UNITS: 5000 INJECTION INTRAVENOUS; SUBCUTANEOUS at 13:42

## 2024-04-24 RX ADMIN — LEVOTHYROXINE SODIUM 150 MCG: 75 TABLET ORAL at 05:07

## 2024-04-24 RX ADMIN — CEFTAZIDIME 1000 MG: 1 INJECTION, POWDER, FOR SOLUTION INTRAMUSCULAR; INTRAVENOUS at 17:42

## 2024-04-24 RX ADMIN — HYDROCODONE BITARTRATE AND ACETAMINOPHEN 0.5 TABLET: 5; 325 TABLET ORAL at 08:47

## 2024-04-24 RX ADMIN — SEVELAMER CARBONATE 800 MG: 800 TABLET, FILM COATED ORAL at 08:47

## 2024-04-24 RX ADMIN — HEPARIN SODIUM 5000 UNITS: 5000 INJECTION INTRAVENOUS; SUBCUTANEOUS at 05:08

## 2024-04-24 RX ADMIN — LACTULOSE 20 G: 10 SOLUTION ORAL at 17:39

## 2024-04-24 RX ADMIN — SERTRALINE HYDROCHLORIDE 25 MG: 50 TABLET, FILM COATED ORAL at 08:47

## 2024-04-24 RX ADMIN — EZETIMIBE 10 MG: 10 TABLET ORAL at 08:47

## 2024-04-24 RX ADMIN — ASPIRIN 81 MG CHEWABLE TABLET 81 MG: 81 TABLET CHEWABLE at 08:47

## 2024-04-24 RX ADMIN — SEVELAMER CARBONATE 800 MG: 800 TABLET, FILM COATED ORAL at 17:39

## 2024-04-24 RX ADMIN — ACETAMINOPHEN 1000 MG: 500 TABLET ORAL at 13:42

## 2024-04-24 RX ADMIN — SEVELAMER CARBONATE 800 MG: 800 TABLET, FILM COATED ORAL at 13:42

## 2024-04-24 ASSESSMENT — ENCOUNTER SYMPTOMS
ENDOCRINE NEGATIVE: 1
EYES NEGATIVE: 1
CONSTITUTIONAL NEGATIVE: 1
PSYCHIATRIC NEGATIVE: 1
NEUROLOGICAL NEGATIVE: 1
RESPIRATORY NEGATIVE: 1
ALLERGIC/IMMUNOLOGIC NEGATIVE: 1
HEMATOLOGIC/LYMPHATIC NEGATIVE: 1
GASTROINTESTINAL NEGATIVE: 1

## 2024-04-24 ASSESSMENT — COGNITIVE AND FUNCTIONAL STATUS - GENERAL
DAILY_ACTIVITY_RAW_SCORE: 23
DAILY_ACTIVITY_CONVERTED_SCORE: 51.12
HELP NEEDED FOR BATHING: A LITTLE

## 2024-04-24 ASSESSMENT — ACTIVITIES OF DAILY LIVING (ADL): HOME_MANAGEMENT_TIME_ENTRY: 26

## 2024-04-24 ASSESSMENT — PAIN SCALES - GENERAL
PAINLEVEL_OUTOF10: 3
PAINLEVEL_OUTOF10: 3

## 2024-04-25 LAB
ASR DISCLAIMER: NORMAL
CASE RPRT: NORMAL
CLINICAL INFO: NORMAL
PATH REPORT.FINAL DX SPEC: NORMAL
PATH REPORT.GROSS SPEC: NORMAL

## 2024-04-26 ENCOUNTER — TELEPHONE (OUTPATIENT)
Dept: CARE COORDINATION | Age: 74
End: 2024-04-26

## 2024-05-01 ENCOUNTER — HOSPITAL ENCOUNTER (OUTPATIENT)
Dept: WOUND CARE | Age: 74
Discharge: STILL ADMITTED OR ADMINISTRATIVE | End: 2024-05-01
Attending: PODIATRIST | Admitting: PODIATRIST

## 2024-05-01 VITALS — TEMPERATURE: 97.5 F

## 2024-05-01 DIAGNOSIS — L97.511 DIABETIC ULCER OF TOE OF RIGHT FOOT ASSOCIATED WITH DIABETES MELLITUS DUE TO UNDERLYING CONDITION, LIMITED TO BREAKDOWN OF SKIN  (CMD): ICD-10-CM

## 2024-05-01 DIAGNOSIS — M86.172 ACUTE OSTEOMYELITIS OF LEFT FOOT  (CMD): ICD-10-CM

## 2024-05-01 DIAGNOSIS — E08.621 DIABETIC ULCER OF TOE OF RIGHT FOOT ASSOCIATED WITH DIABETES MELLITUS DUE TO UNDERLYING CONDITION, LIMITED TO BREAKDOWN OF SKIN  (CMD): ICD-10-CM

## 2024-05-01 DIAGNOSIS — L97.423: ICD-10-CM

## 2024-05-01 DIAGNOSIS — I73.9 PAD (PERIPHERAL ARTERY DISEASE) (CMD): Primary | ICD-10-CM

## 2024-05-01 DIAGNOSIS — E08.621: ICD-10-CM

## 2024-05-01 DIAGNOSIS — Z89.412 HISTORY OF PARTIAL RAY AMPUTATION OF LEFT GREAT TOE  (CMD): ICD-10-CM

## 2024-05-01 DIAGNOSIS — L97.511 ULCER OF RIGHT FOOT, LIMITED TO BREAKDOWN OF SKIN  (CMD): ICD-10-CM

## 2024-05-01 PROCEDURE — 99213 OFFICE O/P EST LOW 20 MIN: CPT

## 2024-05-01 PROCEDURE — 11042 DBRDMT SUBQ TIS 1ST 20SQCM/<: CPT

## 2024-05-01 ASSESSMENT — PAIN SCALES - GENERAL: PAINLEVEL_OUTOF10: 2

## 2024-05-03 ENCOUNTER — TELEPHONE (OUTPATIENT)
Dept: CARE COORDINATION | Age: 74
End: 2024-05-03

## 2024-05-03 ENCOUNTER — CASE MANAGEMENT (OUTPATIENT)
Dept: CARE COORDINATION | Age: 74
End: 2024-05-03

## 2024-05-08 ENCOUNTER — HOSPITAL ENCOUNTER (OUTPATIENT)
Dept: WOUND CARE | Age: 74
Discharge: STILL ADMITTED OR ADMINISTRATIVE | End: 2024-05-08
Attending: PODIATRIST | Admitting: PODIATRIST

## 2024-05-08 VITALS — TEMPERATURE: 99.1 F

## 2024-05-08 DIAGNOSIS — E11.621 DIABETIC ULCER OF LEFT GREAT TOE  (CMD): ICD-10-CM

## 2024-05-08 DIAGNOSIS — E11.621 DIABETIC ULCER OF RIGHT GREAT TOE  (CMD): ICD-10-CM

## 2024-05-08 DIAGNOSIS — I73.9 PAD (PERIPHERAL ARTERY DISEASE) (CMD): ICD-10-CM

## 2024-05-08 DIAGNOSIS — G62.9 PERIPHERAL POLYNEUROPATHY: ICD-10-CM

## 2024-05-08 DIAGNOSIS — L97.519 DIABETIC ULCER OF RIGHT GREAT TOE  (CMD): ICD-10-CM

## 2024-05-08 DIAGNOSIS — I73.9 PVD (PERIPHERAL VASCULAR DISEASE) (CMD): Primary | ICD-10-CM

## 2024-05-08 DIAGNOSIS — Z89.412 HISTORY OF PARTIAL RAY AMPUTATION OF LEFT GREAT TOE  (CMD): ICD-10-CM

## 2024-05-08 DIAGNOSIS — L97.529 DIABETIC ULCER OF LEFT GREAT TOE  (CMD): ICD-10-CM

## 2024-05-08 DIAGNOSIS — M86.172 ACUTE OSTEOMYELITIS OF LEFT FOOT  (CMD): ICD-10-CM

## 2024-05-08 DIAGNOSIS — L97.511 ULCER OF RIGHT FOOT, LIMITED TO BREAKDOWN OF SKIN  (CMD): ICD-10-CM

## 2024-05-08 PROCEDURE — 11042 DBRDMT SUBQ TIS 1ST 20SQCM/<: CPT

## 2024-05-08 PROCEDURE — 11045 DBRDMT SUBQ TISS EACH ADDL: CPT

## 2024-05-08 ASSESSMENT — PAIN SCALES - GENERAL: PAINLEVEL_OUTOF10: 0

## 2024-05-10 ENCOUNTER — TELEPHONE (OUTPATIENT)
Dept: CARE COORDINATION | Age: 74
End: 2024-05-10

## 2024-05-10 ENCOUNTER — CASE MANAGEMENT (OUTPATIENT)
Dept: CARE COORDINATION | Age: 74
End: 2024-05-10

## 2024-05-15 ENCOUNTER — HOSPITAL ENCOUNTER (OUTPATIENT)
Dept: WOUND CARE | Age: 74
Discharge: STILL ADMITTED OR ADMINISTRATIVE | End: 2024-05-15
Attending: PODIATRIST | Admitting: PODIATRIST

## 2024-05-15 VITALS — TEMPERATURE: 96.1 F

## 2024-05-15 DIAGNOSIS — L97.511 ULCER OF RIGHT FOOT, LIMITED TO BREAKDOWN OF SKIN  (CMD): ICD-10-CM

## 2024-05-15 DIAGNOSIS — L97.529 DIABETIC ULCER OF LEFT GREAT TOE  (CMD): ICD-10-CM

## 2024-05-15 DIAGNOSIS — Z89.412 HISTORY OF PARTIAL RAY AMPUTATION OF LEFT GREAT TOE  (CMD): ICD-10-CM

## 2024-05-15 DIAGNOSIS — I73.9 PVD (PERIPHERAL VASCULAR DISEASE) (CMD): Primary | ICD-10-CM

## 2024-05-15 DIAGNOSIS — I73.9 PAD (PERIPHERAL ARTERY DISEASE) (CMD): ICD-10-CM

## 2024-05-15 DIAGNOSIS — E11.621 DIABETIC ULCER OF LEFT GREAT TOE  (CMD): ICD-10-CM

## 2024-05-15 DIAGNOSIS — G62.9 PERIPHERAL POLYNEUROPATHY: ICD-10-CM

## 2024-05-15 DIAGNOSIS — L97.519 DIABETIC ULCER OF RIGHT GREAT TOE  (CMD): ICD-10-CM

## 2024-05-15 DIAGNOSIS — E11.621 DIABETIC ULCER OF RIGHT GREAT TOE  (CMD): ICD-10-CM

## 2024-05-15 DIAGNOSIS — L97.912 SKIN ULCER OF RIGHT LOWER LEG WITH FAT LAYER EXPOSED  (CMD): ICD-10-CM

## 2024-05-15 PROCEDURE — 11042 DBRDMT SUBQ TIS 1ST 20SQCM/<: CPT

## 2024-05-15 PROCEDURE — 11045 DBRDMT SUBQ TISS EACH ADDL: CPT

## 2024-05-15 ASSESSMENT — PAIN SCALES - GENERAL: PAINLEVEL_OUTOF10: 0

## 2024-05-17 ENCOUNTER — TELEPHONE (OUTPATIENT)
Dept: CARE COORDINATION | Age: 74
End: 2024-05-17

## 2024-05-17 ENCOUNTER — CASE MANAGEMENT (OUTPATIENT)
Dept: CARE COORDINATION | Age: 74
End: 2024-05-17

## 2024-05-24 ENCOUNTER — TELEPHONE (OUTPATIENT)
Dept: CARE COORDINATION | Age: 74
End: 2024-05-24

## 2024-05-29 ENCOUNTER — HOSPITAL ENCOUNTER (OUTPATIENT)
Dept: WOUND CARE | Age: 74
Discharge: STILL ADMITTED OR ADMINISTRATIVE | End: 2024-05-29
Attending: PODIATRIST | Admitting: PODIATRIST

## 2024-05-29 VITALS — TEMPERATURE: 98.4 F

## 2024-05-29 DIAGNOSIS — G62.9 PERIPHERAL POLYNEUROPATHY: ICD-10-CM

## 2024-05-29 DIAGNOSIS — I89.0 LYMPHEDEMA OF BOTH LOWER EXTREMITIES: Primary | ICD-10-CM

## 2024-05-29 DIAGNOSIS — Z89.412 HISTORY OF PARTIAL RAY AMPUTATION OF LEFT GREAT TOE  (CMD): ICD-10-CM

## 2024-05-29 DIAGNOSIS — E11.621 DIABETIC ULCER OF LEFT GREAT TOE  (CMD): ICD-10-CM

## 2024-05-29 DIAGNOSIS — L97.929 VENOUS ULCER OF LEFT LEG  (CMD): ICD-10-CM

## 2024-05-29 DIAGNOSIS — E11.621 DIABETIC ULCER OF RIGHT GREAT TOE  (CMD): ICD-10-CM

## 2024-05-29 DIAGNOSIS — L97.529 DIABETIC ULCER OF LEFT GREAT TOE  (CMD): ICD-10-CM

## 2024-05-29 DIAGNOSIS — I73.9 PAD (PERIPHERAL ARTERY DISEASE) (CMD): ICD-10-CM

## 2024-05-29 DIAGNOSIS — L97.519 DIABETIC ULCER OF RIGHT GREAT TOE  (CMD): ICD-10-CM

## 2024-05-29 DIAGNOSIS — I83.029 VENOUS ULCER OF LEFT LEG  (CMD): ICD-10-CM

## 2024-05-29 ASSESSMENT — PAIN SCALES - GENERAL: PAINLEVEL_OUTOF10: 0

## 2024-06-10 ENCOUNTER — TELEPHONE (OUTPATIENT)
Dept: NEPHROLOGY | Facility: CLINIC | Age: 74
End: 2024-06-10

## 2024-06-12 ENCOUNTER — HOSPITAL ENCOUNTER (OUTPATIENT)
Dept: WOUND CARE | Age: 74
Discharge: STILL ADMITTED OR ADMINISTRATIVE | End: 2024-06-12
Attending: PODIATRIST | Admitting: PODIATRIST

## 2024-06-12 VITALS — TEMPERATURE: 97 F

## 2024-06-12 DIAGNOSIS — Z89.412 HISTORY OF PARTIAL RAY AMPUTATION OF LEFT GREAT TOE  (CMD): ICD-10-CM

## 2024-06-12 DIAGNOSIS — I73.9 PAD (PERIPHERAL ARTERY DISEASE) (CMD): ICD-10-CM

## 2024-06-12 DIAGNOSIS — L97.529 DIABETIC ULCER OF LEFT GREAT TOE  (CMD): Primary | ICD-10-CM

## 2024-06-12 DIAGNOSIS — E11.621 DIABETIC ULCER OF LEFT GREAT TOE  (CMD): Primary | ICD-10-CM

## 2024-06-12 PROCEDURE — 11042 DBRDMT SUBQ TIS 1ST 20SQCM/<: CPT

## 2024-06-12 ASSESSMENT — PAIN SCALES - GENERAL: PAINLEVEL_OUTOF10: 0

## 2024-06-24 ENCOUNTER — PATIENT MESSAGE (OUTPATIENT)
Dept: NEPHROLOGY | Facility: CLINIC | Age: 74
End: 2024-06-24

## 2024-06-24 RX ORDER — GABAPENTIN 100 MG/1
200 CAPSULE ORAL NIGHTLY
Qty: 60 CAPSULE | Refills: 11 | Status: SHIPPED | OUTPATIENT
Start: 2024-06-24

## 2024-06-24 NOTE — TELEPHONE ENCOUNTER
From: Gamaliel Boyer  To: Rojelio Carter  Sent: 6/24/2024 9:51 AM CDT  Subject: Blood Pressure and Gabapentin    Dr. Carter, as I take my blood pressure everyday, since June 11th my BP tends to be only around 100 over 50+. I had an appt with Dr. Grande (fern) Friday. She said to ask you if I should be on medication such as midodrine to help this. She also ordered a Cortisol test. Results are in but not yet reviewed by her.    Also I’ve taken the gabapentin which has helped immensely. I have been able to sleep at night without being restless and it has taken away my pain. I stopped taking Norco.     However, I feel it is too strong as it knocks me out completely and I have a hard time staying awake or even waking up. I am only also taking one pill at night instead of the two as prescribed per day. Can it be decreased to 100 mg 2x a day as a starter as 300 seems to be too potent and I sleep too much.    I have an appt scheduled with you next Wednesday, 7/3, at 1:00.    Thank you,  Rigo Boyer

## 2024-06-26 ENCOUNTER — HOSPITAL ENCOUNTER (OUTPATIENT)
Dept: WOUND CARE | Age: 74
Discharge: STILL ADMITTED OR ADMINISTRATIVE | End: 2024-06-26
Attending: PODIATRIST | Admitting: PODIATRIST

## 2024-06-26 VITALS — TEMPERATURE: 96.8 F

## 2024-06-26 DIAGNOSIS — E11.621 DIABETIC ULCER OF LEFT GREAT TOE  (CMD): Primary | ICD-10-CM

## 2024-06-26 DIAGNOSIS — L97.529 DIABETIC ULCER OF LEFT GREAT TOE  (CMD): Primary | ICD-10-CM

## 2024-06-26 DIAGNOSIS — I73.9 PAD (PERIPHERAL ARTERY DISEASE) (CMD): ICD-10-CM

## 2024-06-26 DIAGNOSIS — L97.511 ULCER OF RIGHT FOOT, LIMITED TO BREAKDOWN OF SKIN  (CMD): ICD-10-CM

## 2024-06-26 PROCEDURE — 11042 DBRDMT SUBQ TIS 1ST 20SQCM/<: CPT

## 2024-06-26 RX ORDER — GABAPENTIN 100 MG/1
200 CAPSULE ORAL
COMMUNITY
Start: 2024-06-24

## 2024-06-26 ASSESSMENT — PAIN SCALES - GENERAL: PAINLEVEL_OUTOF10: 0

## 2024-06-27 ENCOUNTER — TELEPHONE (OUTPATIENT)
Dept: NEPHROLOGY | Facility: CLINIC | Age: 74
End: 2024-06-27

## 2024-06-27 NOTE — TELEPHONE ENCOUNTER
Pt's wife, Ansley called due to the pt calling her from dialysis. The nurse told him his BP is consistently low. It's become a problem. Lisbeth said that you should be notified to see what you like to do.

## 2024-07-03 ENCOUNTER — OFFICE VISIT (OUTPATIENT)
Dept: NEPHROLOGY | Facility: CLINIC | Age: 74
End: 2024-07-03
Payer: MEDICARE

## 2024-07-03 DIAGNOSIS — N18.6 ESRD (END STAGE RENAL DISEASE) (HCC): Primary | ICD-10-CM

## 2024-07-03 DIAGNOSIS — R18.8 OTHER ASCITES: ICD-10-CM

## 2024-07-03 PROCEDURE — 99214 OFFICE O/P EST MOD 30 MIN: CPT | Performed by: INTERNAL MEDICINE

## 2024-07-03 NOTE — PROGRESS NOTES
Nephrology Progress Note      ASSESSMENT/PLAN:      1) ESRD- due to type 1 DM > 60 yrs with gradual progression recently reaching ESRD and doing reasonably well at [facility]. Meeting targets for dialysis adequacy, phosphorus and anemia management. PLAN- to continue HD 3x/wk    2) HTN- longstanding refractory HTN; BP's recently lower after starting dialysis as expected- off meds currently    3) Anemia- due to CKD; continue EPO with HD.     4) DM 1- A1C 6.7 on insulin pump (Dr. Grande)    5) Hypotension / cramps- due to large fluid gains (3 L) between HD; to limit fluid intake    6) Anxiety- intolerant of SSRIs, etc.    7) ? Movement disorder ?- ongoing f/u with neurology (Dr. Mcleod)    8) Steal syndrome s/p AVF revision (Dr. Og)    9) Ascites- due to ESRD + large fluid gains- seen by Dr. Peoples, had 2.6 L paracentesis. Echo 2022 with preserved EF / valves, etc.        HPI:   Gamaliel Boyer is a 73 year old male who presents for follow-up of   Chief Complaint   Patient presents with    Dialysis    Hypertension       Presents for follow-up of chronic kidney disease; is doing well without complaints over the past year without hospitalizations, major illnesses or procedures.  Has experienced increasing fatigue and takes naps daily; chief complaint though is of chronic back pain due to spinal stenosis and has undergone 3 previous surgical procedures as well as multiple injections.  Was told that he is no longer a surgical candidate and to follow-up with his pain specialist.      HISTORY:  Past Medical History:    Anemia    anxiety    back pain    Back problem    Blastomycosis    BPH (benign prostatic hyperplasia)    Depression    Diabetic retinopathy    laser surgery    End stage renal disease (HCC)    Esophageal reflux    occassion    Hearing impairment    hearing aids    HEMORRHOIDS    High blood pressure    High cholesterol    History of blood transfusion    History of renal dialysis    fistula on left  arm    Hyperkalemia    hyperlipidemia    hypothyroidism    IMPOTENCE    Liver disease    2013 - pt states all resolved after liver and kidneys shut down after issue with pneumonia     Mood disturbance    Neuropathy    osteoarthritis    Osteoarthrosis, unspecified whether generalized or localized, unspecified site    osteoporosis    Polyneuropathy in diabetes(357.2)    Sepsis (HCC)    Sleep apnea, obstructive    AHI 54 SaO2 lizeth 74 % CPAP 12 HME//     Type I (juvenile type) diabetes mellitus without mention of complication, not stated as uncontrolled    since 1963- DR Mao follows    Unspecified essential hypertension    Visual impairment    wears glasses      Past Surgical History:   Procedure Laterality Date    Back surgery  2009    laminectomy  L1 -4  2/09  Dr. Eitan Guerrero [facility]    Back surgery  8-8-13    C4-C6 ACDF     Back surgery  9/8/16    L2-S1 Revision Decomp possible uninstrumented fusion 9/8/16    Back surgery  09/10/2018    Rev C3-C7 ACDF     Cataract Bilateral done in 2004    IOL's    Colonoscopy      2006    Colonoscopy  2/2009    normal    Colonoscopy N/A 8/23/2019    adenoma- repeat 1 yr (2 dya prep)    Colonoscopy,biopsy  2/20/09    Performed by ANAM RIVERS at [facility]    Egd N/A 7/23/2021    EGD & COLONOSCOPY Surgeon: Kimberly, +vik, rpt EGD 3 months, poor prep rpt colon 3 years    Endoscopy, bowel pouch, biopsy      Injection, w/wo contrast, dx/therapeutic substance, epidural/subarachnoid; lumbar/sacral N/A 5/4/2016    Procedure: LUMBAR EPIDURAL;  Surgeon: Jayden Norton MD;  Location: [facility]    Injection, w/wo contrast, dx/therapeutic substance, epidural/subarachnoid; lumbar/sacral N/A 5/25/2016    Procedure: LUMBAR EPIDURAL;  Surgeon: Jayden Norton MD;  Location: [facility]    Injection, w/wo contrast, dx/therapeutic substance, epidural/subarachnoid; lumbar/sacral N/A 6/8/2016    Procedure: LUMBAR EPIDURAL;  Surgeon:  Jayden Norton MD;  Location: [facility]    Knee replacement surgery  2/14/13    Left TKR by Dr. Lombardi    M-sedaj by  phys perfrmg sv 5+ yr N/A 5/4/2016    Procedure: LUMBAR EPIDURAL;  Surgeon: Jayden Norton MD;  Location: [facility]    M-sedaj by  phys perfrmg svc 5+ yr N/A 5/25/2016    Procedure: LUMBAR EPIDURAL;  Surgeon: Jayden Norton MD;  Location: [facility]    M-sedaj by  phys perfrmg sv 5+ yr N/A 6/8/2016    Procedure: LUMBAR EPIDURAL;  Surgeon: Jayden Norton MD;  Location: [facility]    Other surgical history      Bilateral median nerve release at elbow; 2000    Other surgical history      CTS release bilateral 2000    Other surgical history      Surgical repair fracture left hand 5th digit    Other surgical history      Surgery left heel ligament repair    Other surgical history      Laser surgery for bilateral retinopathy    Other surgical history      Surgery to left eye for \"weak muscle.\" 2007    Other surgical history      bilat knee repair 9/23/10    Other surgical history  11/2013    lung resection to remove abcess    Other surgical history  10/26/2020    Cystoscopy (Dr. Ribeiro)    Patient documented not to have experienced any of the following events  2/14/2014    Procedure: ;  Surgeon: Kin Hobbs MD;  Location: [facility]    Patient documented not to have experienced any of the following events N/A 5/4/2016    Procedure: LUMBAR EPIDURAL;  Surgeon: Jayden Norton MD;  Location: [facility]    Patient documented not to have experienced any of the following events N/A 5/25/2016    Procedure: LUMBAR EPIDURAL;  Surgeon: Jayden Norton MD;  Location: [facility]    Patient documented not to have experienced any of the following events N/A 6/8/2016    Procedure: LUMBAR EPIDURAL;  Surgeon: Jayden Norton MD;  Location: [facility]     Patient withough preoperative order for iv antibiotic surgical site infection prophylaxis.  2/14/2014    Procedure: ;  Surgeon: Kin Hobbs MD;  Location: [facility]    Patient withough preoperative order for iv antibiotic surgical site infection prophylaxis. N/A 5/4/2016    Procedure: LUMBAR EPIDURAL;  Surgeon: Jayden Norton MD;  Location: [facility]    Patient withough preoperative order for iv antibiotic surgical site infection prophylaxis. N/A 5/25/2016    Procedure: LUMBAR EPIDURAL;  Surgeon: Jayden Norton MD;  Location: [facility]    Patient withough preoperative order for iv antibiotic surgical site infection prophylaxis. N/A 6/8/2016    Procedure: LUMBAR EPIDURAL;  Surgeon: Jayden Norton MD;  Location: [facility]    Total knee replacement Left 2013    Upper gi endoscopy,diagnosis  4/14/16    retained gastric contents; [place]    Upper gi endoscopy,remov f.b. N/A 2/14/2014    Procedure: ESOPHAGOGASTRODUODENOSCOPY, POSSIBLE BIOPSY, POSSIBLE POLYPECTOMY [id number];  Surgeon: Kin Hobbs MD;  Location: [facility]      Family History   Problem Relation Age of Onset    Hypertension Father     Lipids Father     Cancer Father     Hypertension Mother     Psychiatric Maternal Grandmother     Diabetes Paternal Grandfather         Type 2 DM    Heart Disorder Brother         CAD with MI at age 53 - passed away    Obesity Brother     Diabetes Brother         Type 2 DM    Hypertension Brother     Lipids Brother     Thyroid Disorder Neg       Social History:   Social History     Socioeconomic History    Marital status:     Number of children: 1   Occupational History    Occupation: Retired--Printer   Tobacco Use    Smoking status: Former     Current packs/day: 0.00     Average packs/day: 3.5 packs/day for 20.0 years (70.0 ttl pk-yrs)     Types: Cigarettes     Start date: 1/1/1966     Quit date: 1/1/1986     Years since  quittin.5    Smokeless tobacco: Never    Tobacco comments:     Quit 25 years ago   Vaping Use    Vaping status: Never Used   Substance and Sexual Activity    Alcohol use: Not Currently     Comment: very rarely    Drug use: No    Sexual activity: Yes     Partners: Female   Other Topics Concern     Service No    Blood Transfusions No    Caffeine Concern No     Comment: coffee 1 cup per day    Sleep Concern No    Exercise Yes     Comment: PT/OT 1x per week    Seat Belt Yes     Social Determinants of Health     Financial Resource Strain: Low Risk  (2024)    Received from [facility]    Financial Resource Strain     In the past year, have you or any family members you live with been unable to get any of the following when it was really needed? Check all that apply.: None   Food Insecurity: Low Risk  (2024)    Received from [facility]    Food Insecurity     Within the past 12 months, you worried that your food would run out before you got money to buy more.  : Never true     Within the past 12 months, the food you bought just didn't last and you didn't have money to get more. : Never true   Transportation Needs: Not At Risk (2024)    Received from [facility]    Transportation Needs     In the past 12 months, has lack of reliable transportation kept you from medical appointments, meetings, work or from getting things needed for daily living? : No   Social Connections: Low Risk  (2024)    Received from [facility]    Social Connections     How often do you see or talk to people that you care about and feel close to? (For example: talking to friends on the phone, visiting friends or family, going to Sikh or club meetings): 5 or more times a week   Housing Stability: Low Risk  (2024)    Housing Stability     Housing Instability: No        Medications (Active prior to today's visit):  Current Outpatient Medications   Medication Sig Dispense  Refill    gabapentin 100 MG Oral Cap Take 2 capsules (200 mg total) by mouth nightly. 60 capsule 11    mupirocin 2 % External Ointment Apply 1 Application topically 2 (two) times daily. 30 g 1    sertraline 25 MG Oral Tab Take 1 tablet (25 mg total) by mouth daily.      triamcinolone 0.1 % External Cream Apply 1 Application topically 2 (two) times daily. Apply to itchy areas on the body twice daily as needed; do not apply to face or groin 453.6 g 2    Insulin Glargine, 1 Unit Dial, (TOUJEO SOLOSTAR) 300 UNIT/ML Subcutaneous Solution Pen-injector Inject 6 Units into the skin daily.      cholecalciferol 50 MCG (2000 UT) Oral Tab Take 1 tablet (2,000 Units total) by mouth daily.      vitamin E 400 UNITS Oral Cap Take 1,000 Units by mouth daily.      Omega-3 Fatty Acids (FISH OIL) 1200 MG Oral Cap Take 1,200 mg by mouth daily.      HYDROcodone-acetaminophen 5-325 MG Oral Tab Take 1 tablet by mouth as needed. Before dialysis      insulin lispro 100 UNIT/ML Injection Solution INJECT 50 UNITS BY INSULIN PUMP ROUTE DAILY. USE VIA INSULIN PUMP. MDD 50 UNITS      metoprolol tartrate 25 MG Oral Tab Take 1 tablet (25 mg total) by mouth 2 (two) times daily.      aspirin 81 MG Oral Tab EC Take 1 tablet (81 mg total) by mouth daily.      levothyroxine 150 MCG Oral Tab Take 1 tablet (150 mcg total) by mouth daily.      renal vitamin Oral Tab Take 1 tablet by mouth daily. 90 tablet 1    lactulose 10 GM/15ML Oral Solution Take 30 mL (20 g total) by mouth every evening.      Sevelamer 800 MG Oral Tab Take 2 tablets (1,600 mg total) by mouth 3 (three) times daily before meals. and 1 before snacks      famotidine 20 MG Oral Tab Take 1 tablet (20 mg total) by mouth 2 (two) times daily.      GLUCAGON EMERGENCY 1 MG Injection Kit Inject 1 mg into the skin once as needed. 1 kit 1    EZETIMIBE 10 MG Oral Tab TAKE ONE TABLET BY MOUTH ONE TIME DAILY (Patient taking differently: Take 1 tablet (10 mg total) by mouth every morning.) 90 tablet 3        Allergies:  Allergies   Allergen Reactions    Antihistamine & Nasal Deconges [Fexofenadine-Pseudoephedrine] OTHER (SEE COMMENTS)     Altered mental status    Adhesive Tape ITCHING    Benadryl [Diphenhydramine] RESTLESSNESS    Depakote [Valproic Acid] DIZZINESS    Fluconazole OTHER (SEE COMMENTS)     Kidney labs abnormal    Mirtazapine RESTLESSNESS    Quetiapine RESTLESSNESS    Wellbutrin [Bupropion] RASH    Latex RASH       ROS:     Denies fever/chills  Denies wt loss/gain  Denies HA or visual changes  Denies CP or palpitations  Denies SOB/cough/hemoptysis  Denies abd or flank pain  Denies N/V/D  Denies change in urinary habits or gross hematuria  Denies LE edema  Denies skin rashes/myalgias/arthralgias      PHYSICAL EXAM:   There were no vitals taken for this visit.  Wt Readings from Last 3 Encounters:   01/28/24 157 lb 13.6 oz (71.6 kg)   11/27/23 171 lb 8 oz (77.8 kg)   10/21/23 181 lb 7 oz (82.3 kg)     General: Alert and oriented in no apparent distress.  HEENT: No scleral icterus, MMM  Neck: Supple, no KALPANA or thyromegaly  Cardiac: Regular rate and rhythm, S1, S2 normal, no murmur or rub  Lungs: Clear without wheezes, rales, rhonchi.    Abdomen: Soft, non-tender. + bowel sounds, no palpable organomegaly  Extremities: Without clubbing, cyanosis or edema.  Neurologic: Alert and oriented, normal affect, cranial nerves grossly intact, moving all extremities  Skin: Warm and dry, no rashes      Rojelio Carter MD  7/3/2024  444 PM

## 2024-07-09 ENCOUNTER — HOSPITAL ENCOUNTER (EMERGENCY)
Facility: HOSPITAL | Age: 74
Discharge: HOME | End: 2024-07-09
Attending: EMERGENCY MEDICINE
Payer: MEDICARE

## 2024-07-09 VITALS
DIASTOLIC BLOOD PRESSURE: 54 MMHG | HEART RATE: 68 BPM | OXYGEN SATURATION: 96 % | RESPIRATION RATE: 18 BRPM | TEMPERATURE: 99 F | SYSTOLIC BLOOD PRESSURE: 102 MMHG

## 2024-07-09 DIAGNOSIS — R06.02 SOB (SHORTNESS OF BREATH): Primary | ICD-10-CM

## 2024-07-09 LAB
ALBUMIN SERPL-MCNC: 2.7 G/DL (ref 3.4–5)
ALBUMIN/GLOB SERPL: 0.6 {RATIO} (ref 1–2)
ALP LIVER SERPL-CCNC: 229 U/L
ALT SERPL-CCNC: 16 U/L
ANION GAP SERPL CALC-SCNC: 8 MMOL/L (ref 0–18)
AST SERPL-CCNC: 24 U/L (ref 15–37)
BASOPHILS # BLD AUTO: 0.04 X10(3) UL (ref 0–0.2)
BASOPHILS NFR BLD AUTO: 0.7 %
BILIRUB SERPL-MCNC: 0.9 MG/DL (ref 0.1–2)
BUN BLD-MCNC: 23 MG/DL (ref 9–23)
CALCIUM BLD-MCNC: 9.4 MG/DL (ref 8.5–10.1)
CHLORIDE SERPL-SCNC: 97 MMOL/L (ref 98–112)
CO2 SERPL-SCNC: 31 MMOL/L (ref 21–32)
CREAT BLD-MCNC: 3.75 MG/DL
EGFRCR SERPLBLD CKD-EPI 2021: 16 ML/MIN/1.73M2 (ref 60–?)
EOSINOPHIL # BLD AUTO: 0.13 X10(3) UL (ref 0–0.7)
EOSINOPHIL NFR BLD AUTO: 2.2 %
ERYTHROCYTE [DISTWIDTH] IN BLOOD BY AUTOMATED COUNT: 17.2 %
GLOBULIN PLAS-MCNC: 4.6 G/DL (ref 2.8–4.4)
GLUCOSE BLD-MCNC: 137 MG/DL (ref 70–99)
HCT VFR BLD AUTO: 32.6 %
HGB BLD-MCNC: 10.2 G/DL
IMM GRANULOCYTES # BLD AUTO: 0.02 X10(3) UL (ref 0–1)
IMM GRANULOCYTES NFR BLD: 0.3 %
LYMPHOCYTES # BLD AUTO: 0.44 X10(3) UL (ref 1–4)
LYMPHOCYTES NFR BLD AUTO: 7.3 %
MCH RBC QN AUTO: 31 PG (ref 26–34)
MCHC RBC AUTO-ENTMCNC: 31.3 G/DL (ref 31–37)
MCV RBC AUTO: 99.1 FL
MONOCYTES # BLD AUTO: 0.5 X10(3) UL (ref 0.1–1)
MONOCYTES NFR BLD AUTO: 8.3 %
NEUTROPHILS # BLD AUTO: 4.9 X10 (3) UL (ref 1.5–7.7)
NEUTROPHILS # BLD AUTO: 4.9 X10(3) UL (ref 1.5–7.7)
NEUTROPHILS NFR BLD AUTO: 81.2 %
OSMOLALITY SERPL CALC.SUM OF ELEC: 288 MOSM/KG (ref 275–295)
PLATELET # BLD AUTO: 178 10(3)UL (ref 150–450)
POTASSIUM SERPL-SCNC: 3.3 MMOL/L (ref 3.5–5.1)
PROT SERPL-MCNC: 7.3 G/DL (ref 6.4–8.2)
RBC # BLD AUTO: 3.29 X10(6)UL
SODIUM SERPL-SCNC: 136 MMOL/L (ref 136–145)
WBC # BLD AUTO: 6 X10(3) UL (ref 4–11)

## 2024-07-09 PROCEDURE — 36415 COLL VENOUS BLD VENIPUNCTURE: CPT

## 2024-07-09 PROCEDURE — 93010 ELECTROCARDIOGRAM REPORT: CPT

## 2024-07-09 PROCEDURE — 99284 EMERGENCY DEPT VISIT MOD MDM: CPT

## 2024-07-09 PROCEDURE — 85025 COMPLETE CBC W/AUTO DIFF WBC: CPT | Performed by: EMERGENCY MEDICINE

## 2024-07-09 PROCEDURE — 80053 COMPREHEN METABOLIC PANEL: CPT

## 2024-07-09 PROCEDURE — 80053 COMPREHEN METABOLIC PANEL: CPT | Performed by: EMERGENCY MEDICINE

## 2024-07-09 PROCEDURE — 85025 COMPLETE CBC W/AUTO DIFF WBC: CPT

## 2024-07-09 PROCEDURE — 93005 ELECTROCARDIOGRAM TRACING: CPT

## 2024-07-10 ENCOUNTER — HOSPITAL ENCOUNTER (OUTPATIENT)
Dept: WOUND CARE | Age: 74
Discharge: STILL ADMITTED OR ADMINISTRATIVE | End: 2024-07-10
Attending: PODIATRIST | Admitting: PODIATRIST

## 2024-07-10 VITALS — TEMPERATURE: 96.8 F

## 2024-07-10 DIAGNOSIS — L97.529 DIABETIC ULCER OF LEFT GREAT TOE  (CMD): Primary | ICD-10-CM

## 2024-07-10 DIAGNOSIS — L97.511 ULCER OF RIGHT FOOT, LIMITED TO BREAKDOWN OF SKIN  (CMD): ICD-10-CM

## 2024-07-10 DIAGNOSIS — I73.9 PAD (PERIPHERAL ARTERY DISEASE) (CMD): ICD-10-CM

## 2024-07-10 DIAGNOSIS — E11.621 DIABETIC ULCER OF LEFT GREAT TOE  (CMD): Primary | ICD-10-CM

## 2024-07-10 LAB
ATRIAL RATE: 70 BPM
P AXIS: 32 DEGREES
P-R INTERVAL: 158 MS
Q-T INTERVAL: 372 MS
QRS DURATION: 68 MS
QTC CALCULATION (BEZET): 401 MS
R AXIS: 70 DEGREES
T AXIS: -30 DEGREES
VENTRICULAR RATE: 70 BPM

## 2024-07-10 PROCEDURE — 11042 DBRDMT SUBQ TIS 1ST 20SQCM/<: CPT

## 2024-07-10 ASSESSMENT — PAIN SCALES - GENERAL: PAINLEVEL_OUTOF10: 0

## 2024-07-10 NOTE — ED QUICK NOTES
Rounding Completed    Plan of Care reviewed. Waiting for Labs and MD fischer.  Elimination needs assessed.  Provided Comfort measures.    Bed is locked and in lowest position. Call light within reach.

## 2024-07-10 NOTE — ED PROVIDER NOTES
Patient Seen in: [facility] Emergency Department      History     Chief Complaint   Patient presents with    Difficulty Breathing     Stated Complaint: sob    Subjective:   HPI    73-year-old male presenting emerged part for shortness of breath.  Patient actually did not have shortness of breath he was noted to have a low pulse ox reading on a new machine with absolutely no symptoms of shortness of breath and was brought to the emergency department.  He did receive dialysis earlier today had no problems with dialysis has not been sick recently cough cold runny nose no recent swelling no difficulty breathing no chest pain no other complaints EMS arrived on scene pulse ox with his and acceptable is here pulse ox is within acceptable limits.    Objective:   Past Medical History:    Anemia    anxiety    back pain    Back problem    Blastomycosis    BPH (benign prostatic hyperplasia)    Depression    Diabetic retinopathy    laser surgery    End stage renal disease (HCC)    Esophageal reflux    occassion    Hearing impairment    hearing aids    HEMORRHOIDS    High blood pressure    High cholesterol    History of blood transfusion    History of renal dialysis    fistula on left arm    Hyperkalemia    hyperlipidemia    hypothyroidism    IMPOTENCE    Liver disease    2013 - pt states all resolved after liver and kidneys shut down after issue with pneumonia     Mood disturbance    Neuropathy    osteoarthritis    Osteoarthrosis, unspecified whether generalized or localized, unspecified site    osteoporosis    Polyneuropathy in diabetes(357.2)    Sepsis (HCC)    Sleep apnea, obstructive    AHI 54 SaO2 lizeth 74 % CPAP 12 HME//     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 Eitan Guerrero [facility]    Back surgery  8-8-13    C4-C6 ACDF     Back surgery  9/8/16    L2-S1 Revision Decomp possible uninstrumented fusion 9/8/16    Back surgery  09/10/2018    Rev C3-C7 ACDF     Cataract Bilateral done in 2004    IOL's    Colonoscopy      2006    Colonoscopy  2/2009    normal    Colonoscopy N/A 8/23/2019    adenoma- repeat 1 yr (2 dya prep)    Colonoscopy,biopsy  2/20/09    Performed by ANAM RIVERS at [facility]    Egd N/A 7/23/2021    EGD & COLONOSCOPY Surgeon: Kimberly, +vik, rpt EGD 3 months, poor prep rpt colon 3 years    Endoscopy, bowel pouch, biopsy      Injection, w/wo contrast, dx/therapeutic substance, epidural/subarachnoid; lumbar/sacral N/A 5/4/2016    Procedure: LUMBAR EPIDURAL;  Surgeon: Jayden Norton MD;  Location: [facility]    Injection, w/wo contrast, dx/therapeutic substance, epidural/subarachnoid; lumbar/sacral N/A 5/25/2016    Procedure: LUMBAR EPIDURAL;  Surgeon: Jayden Norton MD;  Location: [facility]    Injection, w/wo contrast, dx/therapeutic substance, epidural/subarachnoid; lumbar/sacral N/A 6/8/2016    Procedure: LUMBAR EPIDURAL;  Surgeon: Jayden Norton MD;  Location: [facility]    Knee replacement surgery  2/14/13    Left TKR by Dr. Lombardi    M-sedaj by  phys perfrmg svc 5+ yr N/A 5/4/2016    Procedure: LUMBAR EPIDURAL;  Surgeon: Jayden Norton MD;  Location: [facility]    M-sedaj by  phys perfrmg svc 5+ yr N/A 5/25/2016    Procedure: LUMBAR EPIDURAL;  Surgeon: Jayden Norton MD;  Location: [facility]    M-sedaj by  phys perfrmg svc 5+ yr N/A 6/8/2016    Procedure: LUMBAR EPIDURAL;  Surgeon: Jayden Norton MD;  Location: [facility]    Other surgical history      Bilateral median nerve release at elbow; 2000    Other surgical history      CTS release bilateral 2000    Other surgical  history      Surgical repair fracture left hand 5th digit    Other surgical history      Surgery left heel ligament repair    Other surgical history      Laser surgery for bilateral retinopathy    Other surgical history      Surgery to left eye for \"weak muscle.\" 2007    Other surgical history      bilat knee repair 9/23/10    Other surgical history  11/2013    lung resection to remove abcess    Other surgical history  10/26/2020    Cystoscopy (Dr. Ribeiro)    Patient documented not to have experienced any of the following events  2/14/2014    Procedure: ;  Surgeon: Kin Hobbs MD;  Location: [facility]    Patient documented not to have experienced any of the following events N/A 5/4/2016    Procedure: LUMBAR EPIDURAL;  Surgeon: Jayden Norton MD;  Location: [facility]    Patient documented not to have experienced any of the following events N/A 5/25/2016    Procedure: LUMBAR EPIDURAL;  Surgeon: Jayden Norton MD;  Location: [facility]    Patient documented not to have experienced any of the following events N/A 6/8/2016    Procedure: LUMBAR EPIDURAL;  Surgeon: Jayden Norton MD;  Location: [facility]    Patient withough preoperative order for iv antibiotic surgical site infection prophylaxis.  2/14/2014    Procedure: ;  Surgeon: Kin Hobbs MD;  Location: [facility]    Patient withough preoperative order for iv antibiotic surgical site infection prophylaxis. N/A 5/4/2016    Procedure: LUMBAR EPIDURAL;  Surgeon: Jayden Norton MD;  Location: [facility]    Patient withough preoperative order for iv antibiotic surgical site infection prophylaxis. N/A 5/25/2016    Procedure: LUMBAR EPIDURAL;  Surgeon: Jayden Norton MD;  Location: [facility]    Patient withough preoperative order for iv antibiotic surgical site infection prophylaxis. N/A 6/8/2016    Procedure: LUMBAR EPIDURAL;   Surgeon: Jayden Norton MD;  Location: [facility]    Total knee replacement Left     Upper gi endoscopy,diagnosis  16    retained gastric contents; Cesar    Upper gi endoscopy,remov f.b. N/A 2014    Procedure: ESOPHAGOGASTRODUODENOSCOPY, POSSIBLE BIOPSY, POSSIBLE POLYPECTOMY 39152;  Surgeon: Kin Hobbs MD;  [facility]                Social History     Socioeconomic History    Marital status:     Number of children: 1   Occupational History    Occupation: Retired--Printer   Tobacco Use    Smoking status: Former     Current packs/day: 0.00     Average packs/day: 3.5 packs/day for 20.0 years (70.0 ttl pk-yrs)     Types: Cigarettes     Start date: 1966     Quit date: 1986     Years since quittin.5    Smokeless tobacco: Never    Tobacco comments:     Quit 25 years ago   Vaping Use    Vaping status: Never Used   Substance and Sexual Activity    Alcohol use: Not Currently     Comment: very rarely    Drug use: No    Sexual activity: Yes     Partners: Female   Other Topics Concern     Service No    Blood Transfusions No    Caffeine Concern No     Comment: coffee 1 cup per day    Sleep Concern No    Exercise Yes     Comment: PT/OT 1x per week    Seat Belt Yes     Social Determinants of Health     Financial Resource Strain: Low Risk  (2024)    Received from [facility]    Financial Resource Strain     In the past year, have you or any family members you live with been unable to get any of the following when it was really needed? Check all that apply.: None   Food Insecurity: Low Risk  (2024)    Received from [facility]    Food Insecurity     Within the past 12 months, you worried that your food would run out before you got money to buy more.  : Never true     Within the past 12 months, the food you bought just didn't last and you didn't have money to get more. : Never true   Transportation Needs: Not  At Risk (4/19/2024)    Received from [facility]    Transportation Needs     In the past 12 months, has lack of reliable transportation kept you from medical appointments, meetings, work or from getting things needed for daily living? : No   Social Connections: Low Risk  (4/19/2024)    Received from [facility]    Social Connections     How often do you see or talk to people that you care about and feel close to? (For example: talking to friends on the phone, visiting friends or family, going to [employer] or club meetings): 5 or more times a week   Housing Stability: Low Risk  (1/28/2024)    Housing Stability     Housing Instability: No              Review of Systems    Positive for stated Chief Complaint: Difficulty Breathing    Other systems are as noted in HPI.  Constitutional and vital signs reviewed.      All other systems reviewed and negative except as noted above.    Physical Exam     ED Triage Vitals [07/09/24 2100]   /56   Pulse 71   Resp 16   Temp 98.9 °F (37.2 °C)   Temp src Temporal   SpO2 97 %   O2 Device None (Room air)       Current Vitals:   Vital Signs  BP: 102/54  Pulse: 68  Resp: 18  Temp: 98.9 °F (37.2 °C)  Temp src: Temporal  MAP (mmHg): 69    Oxygen Therapy  SpO2: 96 %  O2 Device: None (Room air)            Physical Exam  Awake alert patient appears no distress HEENT exam is normal lungs are clear cardiovascular exam shows regular rhythm abdomen soft nontender extremities no clubbing cyanosis no focal neurologic deficits       ED Course     Labs Reviewed   COMP METABOLIC PANEL (14) - Abnormal; Notable for the following components:       Result Value    Glucose 137 (*)     Potassium 3.3 (*)     Chloride 97 (*)     Creatinine 3.75 (*)     eGFR-Cr 16 (*)     Alkaline Phosphatase 229 (*)     Albumin 2.7 (*)     Globulin  4.6 (*)     A/G Ratio 0.6 (*)     All other components within normal limits   CBC W/ DIFFERENTIAL - Abnormal; Notable for the following components:    RBC  3.29 (*)     HGB 10.2 (*)     HCT 32.6 (*)     Lymphocyte Absolute 0.44 (*)     All other components within normal limits   CBC WITH DIFFERENTIAL WITH PLATELET    Narrative:     The following orders were created for panel order CBC With Differential With Platelet.  Procedure                               Abnormality         Status                     ---------                               -----------         ------                     CBC W/ DIFFERENTIAL[[id number]]          Abnormal            Final result                 Please view results for these tests on the individual orders.   RAINBOW DRAW LAVENDER   RAINBOW DRAW LIGHT GREEN   RAINBOW DRAW BLUE     EKG    Rate, intervals and axes as noted on EKG Report.  Rate: 70  Rhythm: Sinus Rhythm  Reading: No areas of acute ST segment elevation or depression                 Differential diagnosis includes CHF exacerbation, acute coronary syndrome         MDM                                         Medical Decision Making  73-year-old male presenting emerged part for low pulse ox reading patient has remained asymptomatic now.  We believe it was likely the pulse ox monitor itself was not reading adequately I still recommended further testing at this time to rule out cardiac or pulmonary issues or renal issues that may have caused that reading but it is likely related to machine error.  Patient understands risk and benefits of this decision he says he is requesting to go home understanding the risk of death and permanent disability.  His wife is at bedside understands agrees to plan  The patient was screened and evaluated during this visit.  As a treating physician attending to the patient, I determined, within reasonable clinical confidence and prior to discharge, that an emergency medical condition was not or was no longer present.  There was no indication for further evaluation, treatment or admission on an emergency basis.    The usual and customary discharge  instructions were discussed given the patient's ER course.  We discussed signs and symptoms that should prompt the patient's immediate return to the emergency department.  Reasonable over-the-counter and prescription treatment options and physician follow-up plan was discussed.  Patient was discharged home in good condition  This note was prepared using Dragon Medical voice recognition dictation software.  As a result errors may occur.  When identified to these areas have been corrected.  While every attempt is made to correct errors during dictation discrepancies may still exist.  Please contact if there are any errors    Amount and/or Complexity of Data Reviewed  Labs: ordered. Decision-making details documented in ED Course.  ECG/medicine tests: ordered and independent interpretation performed. Decision-making details documented in ED Course.        Disposition and Plan     Clinical Impression:  1. SOB (shortness of breath)         Disposition:  Discharge  7/9/2024 10:36 pm    Follow-up:  Nima Sorensen MD  [address] [facility]  [address] [place] [id number] [phone]    Follow up in 1 day(s)            Medications Prescribed:  Current Discharge Medication List

## 2024-07-10 NOTE — ED INITIAL ASSESSMENT (HPI)
Pt to ED via EMS c/o EREN that started 2-3 days ago, denies chest pain/cough, dialysis patient, home o2 mid 80s per patient, 99% on room air on arrival

## 2024-07-31 ENCOUNTER — HOSPITAL ENCOUNTER (OUTPATIENT)
Dept: WOUND CARE | Age: 74
Discharge: STILL ADMITTED OR ADMINISTRATIVE | End: 2024-07-31
Attending: PODIATRIST | Admitting: PODIATRIST

## 2024-07-31 VITALS — TEMPERATURE: 96.3 F

## 2024-07-31 DIAGNOSIS — G62.9 PERIPHERAL POLYNEUROPATHY: ICD-10-CM

## 2024-07-31 DIAGNOSIS — Z89.412 HISTORY OF PARTIAL RAY AMPUTATION OF LEFT GREAT TOE  (CMD): ICD-10-CM

## 2024-07-31 DIAGNOSIS — E11.621 DIABETIC ULCER OF LEFT GREAT TOE  (CMD): Primary | ICD-10-CM

## 2024-07-31 DIAGNOSIS — I73.9 PAD (PERIPHERAL ARTERY DISEASE) (CMD): ICD-10-CM

## 2024-07-31 DIAGNOSIS — L97.529 DIABETIC ULCER OF LEFT GREAT TOE  (CMD): Primary | ICD-10-CM

## 2024-07-31 DIAGNOSIS — L97.511 ULCER OF RIGHT FOOT, LIMITED TO BREAKDOWN OF SKIN  (CMD): ICD-10-CM

## 2024-07-31 PROCEDURE — 11042 DBRDMT SUBQ TIS 1ST 20SQCM/<: CPT

## 2024-07-31 ASSESSMENT — PAIN SCALES - GENERAL: PAINLEVEL_OUTOF10: 0

## 2024-08-14 ENCOUNTER — HOSPITAL ENCOUNTER (OUTPATIENT)
Dept: WOUND CARE | Age: 74
Discharge: STILL ADMITTED OR ADMINISTRATIVE | End: 2024-08-14
Attending: PODIATRIST | Admitting: PODIATRIST

## 2024-08-14 VITALS — TEMPERATURE: 96.3 F

## 2024-08-14 DIAGNOSIS — L97.511 ULCER OF RIGHT FOOT, LIMITED TO BREAKDOWN OF SKIN  (CMD): ICD-10-CM

## 2024-08-14 DIAGNOSIS — L97.529 DIABETIC ULCER OF LEFT GREAT TOE  (CMD): Primary | ICD-10-CM

## 2024-08-14 DIAGNOSIS — Z89.412 HISTORY OF PARTIAL RAY AMPUTATION OF LEFT GREAT TOE  (CMD): ICD-10-CM

## 2024-08-14 DIAGNOSIS — I73.9 PAD (PERIPHERAL ARTERY DISEASE) (CMD): ICD-10-CM

## 2024-08-14 DIAGNOSIS — I73.9 PVD (PERIPHERAL VASCULAR DISEASE) (CMD): ICD-10-CM

## 2024-08-14 DIAGNOSIS — E11.621 DIABETIC ULCER OF LEFT GREAT TOE  (CMD): Primary | ICD-10-CM

## 2024-08-14 DIAGNOSIS — G62.9 PERIPHERAL POLYNEUROPATHY: ICD-10-CM

## 2024-08-14 PROCEDURE — 11042 DBRDMT SUBQ TIS 1ST 20SQCM/<: CPT

## 2024-08-14 ASSESSMENT — PAIN SCALES - GENERAL: PAINLEVEL_OUTOF10: 0

## 2024-08-16 RX ORDER — MIDODRINE HYDROCHLORIDE 5 MG/1
5 TABLET ORAL AS DIRECTED
COMMUNITY

## 2024-08-16 NOTE — PAT NURSING NOTE
PreOp Instructions     You are scheduled for: a Cardiac Procedure     Date of Procedure: 08/23/24     Diet Instructions: Do not eat or drink anything for 8 hours before the procedure     Medications: Medications you are allowed to take can be taken with a sip of water the morning of your procedure     Medications to Stop: Hold herbal supplements and vitamins the morning of your procedure.     Other Medications: Hold Lactulose the morning of your procedure.     Diabetic Instructions: Do not take morning dose of your diabetic medications. Change your insulin pump to a basal rate. If you wear a CGM (continuous glucose monitor), you will need to remove the entire device (sensor/transmitter) and leave at home prior to your procedure    Sleep Apnea: If you have sleep apnea, please bring your mask and tubing     Skin Prep: Shower with antibacterial soap using a clean washcloth, prior to procedure     Arrival Time: The day prior to your procedure you will receive a phone call before 6:00 pm with your arrival time. If you haven't received a phone call, please check your voicemail messages., If you did not receive a voice mail and it is after 6:00 pm, please call the nursing supervisor at [phone].    Driving After Procedure: If sedation is given, you WILL NOT be able to drive home. You will need a responsible adult  to drive you home.     Discharge Teaching: Your nurse will give you specific instructions before discharge, Most people can resume normal activities in 2-3 days, Any questions, please call the physician's office

## 2024-08-23 ENCOUNTER — HOSPITAL ENCOUNTER (OUTPATIENT)
Dept: INTERVENTIONAL RADIOLOGY/VASCULAR | Facility: HOSPITAL | Age: 74
Discharge: HOME | End: 2024-08-23
Attending: INTERNAL MEDICINE | Admitting: INTERNAL MEDICINE
Payer: MEDICARE

## 2024-08-23 VITALS
RESPIRATION RATE: 15 BRPM | WEIGHT: 163 LBS | OXYGEN SATURATION: 96 % | HEART RATE: 62 BPM | BODY MASS INDEX: 26.2 KG/M2 | TEMPERATURE: 97 F | HEIGHT: 66 IN | SYSTOLIC BLOOD PRESSURE: 107 MMHG | DIASTOLIC BLOOD PRESSURE: 51 MMHG

## 2024-08-23 DIAGNOSIS — E87.70 HYPERVOLEMIA: ICD-10-CM

## 2024-08-23 LAB
GLUCOSE BLD-MCNC: 122 MG/DL (ref 70–99)
GLUCOSE BLD-MCNC: 148 MG/DL (ref 70–99)

## 2024-08-23 PROCEDURE — 4A023N6 MEASUREMENT OF CARDIAC SAMPLING AND PRESSURE, RIGHT HEART, PERCUTANEOUS APPROACH: ICD-10-PCS | Performed by: INTERNAL MEDICINE

## 2024-08-23 PROCEDURE — 82962 GLUCOSE BLOOD TEST: CPT

## 2024-08-23 PROCEDURE — 93451 RIGHT HEART CATH: CPT | Performed by: INTERNAL MEDICINE

## 2024-08-23 RX ORDER — HEPARIN SODIUM 5000 [USP'U]/ML
INJECTION, SOLUTION INTRAVENOUS; SUBCUTANEOUS
Status: COMPLETED
Start: 2024-08-23 | End: 2024-08-23

## 2024-08-23 RX ORDER — LIDOCAINE HYDROCHLORIDE 10 MG/ML
INJECTION, SOLUTION EPIDURAL; INFILTRATION; INTRACAUDAL; PERINEURAL
Status: COMPLETED
Start: 2024-08-23 | End: 2024-08-23

## 2024-08-23 RX ORDER — SODIUM CHLORIDE 9 MG/ML
INJECTION, SOLUTION INTRAVENOUS
Status: DISCONTINUED | OUTPATIENT
Start: 2024-08-24 | End: 2024-08-23 | Stop reason: HOSPADM

## 2024-08-23 RX ORDER — MIDAZOLAM HYDROCHLORIDE 1 MG/ML
INJECTION INTRAMUSCULAR; INTRAVENOUS
Status: COMPLETED
Start: 2024-08-23 | End: 2024-08-23

## 2024-08-23 NOTE — PLAN OF CARE
Patient here today for RHC with Dr. Gibbons. Patient has CGM on. Instructed patient and wife that we recommend removing CGM prior to going back to cath lab. Patient and wife prefer to leave CGM connected, cath lab staff aware.

## 2024-08-23 NOTE — H&P
Patient seen and examined independently. H and P reviewed. No changes in H and P. Risks and benefits of procedure were discussed with patient. Airway examined.  Patient is ASA class 2 and mallampati class 2. Pt is appropriate for conscious sedation. No history of difficult airway.    The risks, benefits, and alternatives of cardiac catheterization were discussed. The risks included, but were not limited to: bleeding, allergic reaction, infection, stroke, myocardial infarction (heart attack), and death. Benefits of the procedure included: symptomatic improvement, diagnosis of heart disease and prevention of myocardial infarction. Alternatives to the procedure included: not performing cardiac catheterization, treatment with medications only, and observation.        Appropriate candidate for Sedation/Analgesia: Yes  Plan for Sedation reviewed: Yes    Explained Anesthesia options and attendant risks, and have determined patient is an appropriate candidate. Yes  Consent for Sedation obtained: Yes  Patient reevaluated immediately prior to Sedation/Analgesia: Yes

## 2024-08-23 NOTE — DISCHARGE INSTRUCTIONS
HOME CARE INSTRUCTIONS FOLLOWING VENOUS ACCESS PROCEDURES:   MYOCARDIAL BIOPSY, RIGHT HEART CATHETERIZATION, VENAGRAM, IVC FILTER INSERTION, CLOSURE OF ASD OR PFO     Activity    DO NOT drive after the procedure. You may resume driving the following day according to the nurse or physician's instructions    Plan on resting and relaxing tonight and tomorrow    Do not lift anything over 10 pounds for the next 24 hours    Avoid sexual activity for the next 24 hours    Avoid drinking alcohol for the next 24 hours    Resume your normal activity after 24 hours, or as instructed by your physician     What is Normal?    The procedure site may appear bruised or discolored    The procedure site may be tender to the touch    There may be a small amount of drainage on the bandage     Special Instructions    The bandage is to remain in place for 24 hours    After 24 hours, you must remove the bandage. You should shower after removing the bandage, and wash the procedure site gently with soap and water. (If you choose to wear a bandage for a few days, make sure it remains clean and dry and that it is changed daily.)     When you should NOTIFY YOUR PHYSICIAN    If you have shortness of breath or a persistent cough    If you have chest pain (angina)    If you have palpitations or irregular heart beats    If you have persistent pain at the procedure site    If you experience signs of a fever, temperature >101o, chills, infection (redness, swelling, thick yellow drainage, or a foul odor from the procedure site)     Other    You may resume your present diet, unless otherwise specified by your physician    You may resume all of your medications as prescribed, unless otherwise directed by your physician. A list of your medications was provided to you at discharge    Please call your physician's office for a follow-up appointment. You should be seen in 1 to 2 weeks     Do not make any personal/business decisions and/or sign any legal  documents for the next 24 hours.

## 2024-08-23 NOTE — PROCEDURES
[facility] Cardiology  Cardiac Catheterization Report    PREOPERATIVE DIAGNOSIS: LUGO cirrhosis/ascites (high SAAG/normal albumen), ESRD on HD, r/o cardiogenic ascites    POSTOPERATIVE DIAGNOSIS: same as above    PROCEDURE PERFORMED: ultrasound guided right brachial artery access, Right heart catheterization (CPT code 14869)    CONSENT: The risks, benefits, and alternatives of right heart catheterization were discussed. The risks included, but were not limited to: bleeding, pulmonary embolus, pulmonary infarction, cardiac tamponade, and death. Benefits of the procedure included: diagnosis of heart disease and symptomatic improvement.   Alternatives to the procedure included: not performing cardiac catheterization, treatment with medications only, and observation.     DESCRIPTION OF PROCEDURE: The patient was brought to the cardiac catheterization laboratory.  Bilateral groins were prepped and draped with sterile technique. 10 mL of 1% lidocaine were injected into the right groin for local anesthesia.     Once local anesthesia was achieved, sedation was administered. The IV was maintained by the RN and moderate conscious sedation with a total of Versed 1mg and Fentanyl 25mcg was given. The patient was assessed and monitoring of oxygen, heart rate and blood pressure by nurse and myself during the exam 1357 (time of 1st dose administered when I was present) to 1419 (procedure end time).     Attempt was made to attain right internal jugular vein access with ultrasound guidance; while we were able to successfully cannulate the vein with a micropuncture needle, the wire would not successfully advance into the vessel.  Decision was made to attempt right brachial vein access.  Using ultrasound guidance, the vein was accessed with the micropuncture needle and the wire did advance, a 5F sheath was placed.  Right heart catheterization was performed per usual routine using a 7F pulmonary capillary, balloon tipped  wedge catheter.  The catheter was removed and hemostasis with manual pressure.  The procedure was tolerated well.  No complications were noted.    FINDINGS:    HEMODYNAMICS:  RA pressure:  10/19/17mm Hg  RV pressure: 69/14/26 mm Hg  PA pressure: 63/24/40 mm Hg  PA wedge pressure: 24/23/18 mm Hg  Cardiac output: 4.6 L/min  Cardiac index: 2.5 L/min/m2  Pulmonary vascular resistance: 4.8 Woods units    OXIMETRY:  SVC saturation: 75.1%, 10.3 g/dL   PA saturation: 62.3,   Aortic saturation: 98.0%    CONCLUSIONS:  - elevated right heart filling pressure  - moderate-severe pulmonary hypertension  - mildly elevated pulmonary capillary wedge pressure  - mildly depressed cardiac output with normal cardiac index  - no intracardiac shunt by saturations    Hemodynamic findings show elevated right heart filling pressures/pulmonary hypertension in the absence of significantly elevated pulmonary capillary wedge pressure (surrogate measurement of LVEDP).  This would suggest that congestive heart failure due to pump failure is not a significant contributor to volume overload/congestion.     Ruiz Gibbons MD  8/23/2024  2:22 PM

## 2024-08-23 NOTE — PLAN OF CARE
Patient had RHC today with Dr. Gibbons. Right brachial site soft, CDI. VSS. Patient's wife @ bedside. Patient tolerating po intake. Dr. Gibbons @ bedside post procedure. Recovery time completed. Discharge instructions reviewed. IV D/C'd. Patient discharged to [facility] by wheelchair with belongings. Patient's wife is .

## 2024-08-28 ENCOUNTER — HOSPITAL ENCOUNTER (OUTPATIENT)
Dept: WOUND CARE | Age: 74
Discharge: STILL ADMITTED OR ADMINISTRATIVE | End: 2024-08-28
Attending: PODIATRIST | Admitting: PODIATRIST

## 2024-08-28 VITALS — TEMPERATURE: 97.5 F

## 2024-08-28 DIAGNOSIS — I73.9 PAD (PERIPHERAL ARTERY DISEASE) (CMD): ICD-10-CM

## 2024-08-28 DIAGNOSIS — E11.621 DIABETIC ULCER OF LEFT GREAT TOE  (CMD): Primary | ICD-10-CM

## 2024-08-28 DIAGNOSIS — L97.511 ULCER OF RIGHT FOOT, LIMITED TO BREAKDOWN OF SKIN  (CMD): ICD-10-CM

## 2024-08-28 DIAGNOSIS — L97.529 DIABETIC ULCER OF LEFT GREAT TOE  (CMD): Primary | ICD-10-CM

## 2024-08-28 DIAGNOSIS — L97.912 SKIN ULCER OF RIGHT LOWER LEG WITH FAT LAYER EXPOSED  (CMD): ICD-10-CM

## 2024-08-28 PROCEDURE — 11045 DBRDMT SUBQ TISS EACH ADDL: CPT

## 2024-08-28 PROCEDURE — 11042 DBRDMT SUBQ TIS 1ST 20SQCM/<: CPT

## 2024-08-28 ASSESSMENT — PAIN SCALES - GENERAL: PAINLEVEL_OUTOF10: 5

## 2024-09-05 ENCOUNTER — APPOINTMENT (OUTPATIENT)
Dept: GENERAL RADIOLOGY | Facility: HOSPITAL | Age: 74
End: 2024-09-05
Attending: EMERGENCY MEDICINE
Payer: MEDICARE

## 2024-09-05 ENCOUNTER — HOSPITAL ENCOUNTER (INPATIENT)
Facility: HOSPITAL | Age: 74
LOS: 6 days | Discharge: HOME HEALTH | End: 2024-09-12
Attending: EMERGENCY MEDICINE | Admitting: HOSPITALIST
Payer: MEDICARE

## 2024-09-05 ENCOUNTER — APPOINTMENT (OUTPATIENT)
Dept: GENERAL RADIOLOGY | Facility: HOSPITAL | Age: 74
End: 2024-09-05
Attending: HOSPITALIST
Payer: MEDICARE

## 2024-09-05 DIAGNOSIS — R53.1 WEAKNESS GENERALIZED: ICD-10-CM

## 2024-09-05 DIAGNOSIS — R29.6 MULTIPLE FALLS: Primary | ICD-10-CM

## 2024-09-05 DIAGNOSIS — R18.8 OTHER ASCITES: ICD-10-CM

## 2024-09-05 DIAGNOSIS — S30.0XXA CONTUSION OF COCCYX, INITIAL ENCOUNTER: ICD-10-CM

## 2024-09-05 LAB
ALBUMIN SERPL-MCNC: 4 G/DL (ref 3.2–4.8)
ALBUMIN/GLOB SERPL: 1.1 {RATIO} (ref 1–2)
ALP LIVER SERPL-CCNC: 323 U/L
ALT SERPL-CCNC: 17 U/L
AMMONIA PLAS-MCNC: 32 UMOL/L (ref 11–32)
ANION GAP SERPL CALC-SCNC: 12 MMOL/L (ref 0–18)
APTT PPP: 46.3 SECONDS (ref 23–36)
AST SERPL-CCNC: 24 U/L (ref ?–34)
ATRIAL RATE: 63 BPM
BASOPHILS # BLD AUTO: 0.04 X10(3) UL (ref 0–0.2)
BASOPHILS NFR BLD AUTO: 0.6 %
BILIRUB SERPL-MCNC: 0.5 MG/DL (ref 0.2–1.1)
BUN BLD-MCNC: 56 MG/DL (ref 9–23)
CALCIUM BLD-MCNC: 9.7 MG/DL (ref 8.7–10.4)
CHLORIDE SERPL-SCNC: 97 MMOL/L (ref 98–112)
CO2 SERPL-SCNC: 27 MMOL/L (ref 21–32)
CREAT BLD-MCNC: 5.5 MG/DL
EGFRCR SERPLBLD CKD-EPI 2021: 10 ML/MIN/1.73M2 (ref 60–?)
EOSINOPHIL # BLD AUTO: 0.09 X10(3) UL (ref 0–0.7)
EOSINOPHIL NFR BLD AUTO: 1.4 %
ERYTHROCYTE [DISTWIDTH] IN BLOOD BY AUTOMATED COUNT: 19.6 %
GLOBULIN PLAS-MCNC: 3.6 G/DL (ref 2–3.5)
GLUCOSE BLD-MCNC: 118 MG/DL (ref 70–99)
GLUCOSE BLD-MCNC: 160 MG/DL (ref 70–99)
GLUCOSE BLD-MCNC: 99 MG/DL (ref 70–99)
HCT VFR BLD AUTO: 34.3 %
HGB BLD-MCNC: 10.7 G/DL
IMM GRANULOCYTES # BLD AUTO: 0.05 X10(3) UL (ref 0–1)
IMM GRANULOCYTES NFR BLD: 0.8 %
INR BLD: 1.18 (ref 0.8–1.2)
LYMPHOCYTES # BLD AUTO: 0.51 X10(3) UL (ref 1–4)
LYMPHOCYTES NFR BLD AUTO: 8.1 %
MCH RBC QN AUTO: 31.1 PG (ref 26–34)
MCHC RBC AUTO-ENTMCNC: 31.2 G/DL (ref 31–37)
MCV RBC AUTO: 99.7 FL
MONOCYTES # BLD AUTO: 0.43 X10(3) UL (ref 0.1–1)
MONOCYTES NFR BLD AUTO: 6.8 %
NEUTROPHILS # BLD AUTO: 5.21 X10 (3) UL (ref 1.5–7.7)
NEUTROPHILS # BLD AUTO: 5.21 X10(3) UL (ref 1.5–7.7)
NEUTROPHILS NFR BLD AUTO: 82.3 %
OSMOLALITY SERPL CALC.SUM OF ELEC: 301 MOSM/KG (ref 275–295)
P AXIS: 12 DEGREES
P-R INTERVAL: 178 MS
PLATELET # BLD AUTO: 214 10(3)UL (ref 150–450)
POTASSIUM SERPL-SCNC: 4.4 MMOL/L (ref 3.5–5.1)
PROT SERPL-MCNC: 7.6 G/DL (ref 5.7–8.2)
PROTHROMBIN TIME: 15.1 SECONDS (ref 11.6–14.8)
Q-T INTERVAL: 418 MS
QRS DURATION: 80 MS
QTC CALCULATION (BEZET): 427 MS
R AXIS: 48 DEGREES
RBC # BLD AUTO: 3.44 X10(6)UL
SODIUM SERPL-SCNC: 136 MMOL/L (ref 136–145)
T AXIS: -3 DEGREES
VENTRICULAR RATE: 63 BPM
WBC # BLD AUTO: 6.3 X10(3) UL (ref 4–11)

## 2024-09-05 PROCEDURE — 71045 X-RAY EXAM CHEST 1 VIEW: CPT | Performed by: EMERGENCY MEDICINE

## 2024-09-05 PROCEDURE — 99222 1ST HOSP IP/OBS MODERATE 55: CPT | Performed by: INTERNAL MEDICINE

## 2024-09-05 PROCEDURE — 72220 X-RAY EXAM SACRUM TAILBONE: CPT | Performed by: EMERGENCY MEDICINE

## 2024-09-05 PROCEDURE — 5A09357 ASSISTANCE WITH RESPIRATORY VENTILATION, LESS THAN 24 CONSECUTIVE HOURS, CONTINUOUS POSITIVE AIRWAY PRESSURE: ICD-10-PCS | Performed by: INTERNAL MEDICINE

## 2024-09-05 PROCEDURE — 72100 X-RAY EXAM L-S SPINE 2/3 VWS: CPT | Performed by: HOSPITALIST

## 2024-09-05 RX ORDER — MELATONIN
9 NIGHTLY PRN
Status: DISCONTINUED | OUTPATIENT
Start: 2024-09-05 | End: 2024-09-12

## 2024-09-05 RX ORDER — LACTULOSE 10 G/15ML
20 SOLUTION ORAL EVERY EVENING
Status: DISCONTINUED | OUTPATIENT
Start: 2024-09-05 | End: 2024-09-12

## 2024-09-05 RX ORDER — GABAPENTIN 100 MG/1
200 CAPSULE ORAL NIGHTLY
Status: DISCONTINUED | OUTPATIENT
Start: 2024-09-05 | End: 2024-09-12

## 2024-09-05 RX ORDER — MIDODRINE HYDROCHLORIDE 10 MG/1
10 TABLET ORAL
Status: DISCONTINUED | OUTPATIENT
Start: 2024-09-06 | End: 2024-09-06

## 2024-09-05 RX ORDER — ALBUMIN (HUMAN) 12.5 G/50ML
25 SOLUTION INTRAVENOUS
Status: DISPENSED | OUTPATIENT
Start: 2024-09-05 | End: 2024-09-07

## 2024-09-05 RX ORDER — SERTRALINE HYDROCHLORIDE 25 MG/1
25 TABLET, FILM COATED ORAL DAILY
Status: DISCONTINUED | OUTPATIENT
Start: 2024-09-06 | End: 2024-09-12

## 2024-09-05 RX ORDER — ACETAMINOPHEN 500 MG
500 TABLET ORAL EVERY 4 HOURS PRN
Status: DISCONTINUED | OUTPATIENT
Start: 2024-09-05 | End: 2024-09-12

## 2024-09-05 RX ORDER — FAMOTIDINE 10 MG
10 TABLET ORAL EVERY OTHER DAY
Status: DISCONTINUED | OUTPATIENT
Start: 2024-09-06 | End: 2024-09-12

## 2024-09-05 RX ORDER — SEVELAMER CARBONATE 800 MG/1
800 TABLET, FILM COATED ORAL
Status: DISCONTINUED | OUTPATIENT
Start: 2024-09-05 | End: 2024-09-12

## 2024-09-05 RX ORDER — CYCLOBENZAPRINE HCL 5 MG
5 TABLET ORAL 3 TIMES DAILY PRN
Status: DISCONTINUED | OUTPATIENT
Start: 2024-09-05 | End: 2024-09-12

## 2024-09-05 RX ORDER — HEPARIN SODIUM 5000 [USP'U]/ML
5000 INJECTION, SOLUTION INTRAVENOUS; SUBCUTANEOUS EVERY 8 HOURS SCHEDULED
Status: DISCONTINUED | OUTPATIENT
Start: 2024-09-05 | End: 2024-09-05

## 2024-09-05 RX ORDER — FAMOTIDINE 20 MG/1
20 TABLET, FILM COATED ORAL 2 TIMES DAILY
Status: DISCONTINUED | OUTPATIENT
Start: 2024-09-05 | End: 2024-09-05 | Stop reason: DRUGHIGH

## 2024-09-05 RX ORDER — NICOTINE POLACRILEX 4 MG
30 LOZENGE BUCCAL
Status: DISCONTINUED | OUTPATIENT
Start: 2024-09-05 | End: 2024-09-12

## 2024-09-05 RX ORDER — MIDODRINE HYDROCHLORIDE 5 MG/1
5 TABLET ORAL
Status: DISCONTINUED | OUTPATIENT
Start: 2024-09-10 | End: 2024-09-07

## 2024-09-05 RX ORDER — LEVOTHYROXINE SODIUM 150 UG/1
150 TABLET ORAL
Status: DISCONTINUED | OUTPATIENT
Start: 2024-09-06 | End: 2024-09-12

## 2024-09-05 RX ORDER — EZETIMIBE 10 MG/1
10 TABLET ORAL EVERY MORNING
Status: DISCONTINUED | OUTPATIENT
Start: 2024-09-05 | End: 2024-09-12

## 2024-09-05 RX ORDER — HEPARIN SODIUM 5000 [USP'U]/ML
5000 INJECTION, SOLUTION INTRAVENOUS; SUBCUTANEOUS EVERY 8 HOURS SCHEDULED
Status: COMPLETED | OUTPATIENT
Start: 2024-09-05 | End: 2024-09-05

## 2024-09-05 RX ORDER — ASPIRIN 81 MG/1
81 TABLET ORAL DAILY
Status: DISCONTINUED | OUTPATIENT
Start: 2024-09-06 | End: 2024-09-12

## 2024-09-05 RX ORDER — HYDROCODONE BITARTRATE AND ACETAMINOPHEN 5; 325 MG/1; MG/1
1 TABLET ORAL EVERY 6 HOURS PRN
Status: DISCONTINUED | OUTPATIENT
Start: 2024-09-05 | End: 2024-09-12

## 2024-09-05 RX ORDER — DEXTROSE MONOHYDRATE 25 G/50ML
50 INJECTION, SOLUTION INTRAVENOUS
Status: DISCONTINUED | OUTPATIENT
Start: 2024-09-05 | End: 2024-09-12

## 2024-09-05 RX ORDER — NICOTINE POLACRILEX 4 MG
15 LOZENGE BUCCAL
Status: DISCONTINUED | OUTPATIENT
Start: 2024-09-05 | End: 2024-09-12

## 2024-09-05 NOTE — ED PROVIDER NOTES
Patient Seen in: [facility] Emergency Department      History     Chief Complaint   Patient presents with    Trauma     Stated Complaint: fall x 2    Subjective:   HPI    Mr. Boyer has been experiencing a series of falls at home, with an increase in frequency over the past week. He has also been showing signs of confusion, which has been a cause for concern. His confusion and weakness have been progressively worsening each day, with some days being better than others. On the previous Friday, he required assistance to enter his house after a dinner outing due to a near fall. The following day, he had a fall in the kitchen while transitioning from his walker to the kitchen chair. He reported that he thought he had locked his walker, but he hadn't, resulting in him falling flat on his tailbone. He has been experiencing soreness in his tailbone since the fall. Mr. Boyer was due for a paracentesis procedure, which usually occurs every three weeks. However, due to scheduling issues, the procedure was delayed to 4.5 weeks. The delay in the procedure has been suspected to contribute to his current symptoms. He also has liver issues due to kidney failure from dialysis. Today was supposed to be a dialysis day, but he was unable to transition from his walker in the house to the garage due to weakness. He ended up slowly going down with assistance. His blood pressure has been low, which is normal for him. He takes 10 mg of Midodrine before dialysis to manage his blood pressure. If his blood pressure drops during dialysis, he takes another dose to ensure they can take out what they need to take out. He reported feeling full in his abdomen and occasionally experiences shortness of breath, which he manages by taking deep breaths. He is a type 1 diabetic and uses a pump to manage his diabetes. His blood sugar level was 175 at the time of the consultation.    Objective:   Past Medical History:    Anemia    anxiety     back pain    Back problem    Blastomycosis    BPH (benign prostatic hyperplasia)    Depression    Diabetic retinopathy    laser surgery    End stage renal disease (HCC)    Esophageal reflux    occassion    Hearing impairment    hearing aids    HEMORRHOIDS    High blood pressure    High cholesterol    History of blood transfusion    History of renal dialysis    fistula on left arm    Hyperkalemia    hyperlipidemia    hypothyroidism    IMPOTENCE    Liver disease    2013 - pt states all resolved after liver and kidneys shut down after issue with pneumonia     Mood disturbance    Neuropathy    osteoarthritis    Osteoarthrosis, unspecified whether generalized or localized, unspecified site    osteoporosis    Polyneuropathy in diabetes(357.2)    Sepsis (HCC)    Sleep apnea, obstructive    AHI 54 SaO2 lizeth 74 % CPAP 12 HME//     Type I (juvenile type) diabetes mellitus without mention of complication, not stated as uncontrolled    since 1963- DR Mao follows    Unspecified essential hypertension    Visual impairment    wears glasses              Past Surgical History:   Procedure Laterality Date    Back surgery  2009    laminectomy  L1 -4  2/09  Dr. Eitan Guerrero [facility]    Back surgery  8-8-13    C4-C6 ACDF     Back surgery  9/8/16    L2-S1 Revision Decomp possible uninstrumented fusion 9/8/16    Back surgery  09/10/2018    Rev C3-C7 ACDF     Cataract Bilateral done in 2004    IOL's    Colonoscopy      2006    Colonoscopy  2/2009    normal    Colonoscopy N/A 8/23/2019    adenoma- repeat 1 yr (2 dya prep)    Colonoscopy,biopsy  2/20/09    Performed by ANAM RIVERS at [facility] [place], [facility]    Egd N/A 7/23/2021    EGD & COLONOSCOPY Surgeon: Kimberly, +vik, rpt EGD 3 months, poor prep rpt colon 3 years    Endoscopy, bowel pouch, biopsy      Injection, w/wo contrast, dx/therapeutic substance, epidural/subarachnoid; lumbar/sacral N/A 5/4/2016    Procedure: LUMBAR EPIDURAL;  Surgeon: Jayden Norton MD;  Location: [facility]  [place] [facility]    Injection, w/wo contrast, dx/therapeutic substance, epidural/subarachnoid; lumbar/sacral N/A 5/25/2016    Procedure: LUMBAR EPIDURAL;  Surgeon: Jayden Norton MD;  Location: [facility]IN [facility]    Injection, w/wo contrast, dx/therapeutic substance, epidural/subarachnoid; lumbar/sacral N/A 6/8/2016    Procedure: LUMBAR EPIDURAL;  Surgeon: Jayden Norton MD;  Location: [facility]IN [facility]    Knee replacement surgery  2/14/13    Left TKR by Dr. Lombardi    M-sedaj by  phys perfrmg svc 5+ yr N/A 5/4/2016    Procedure: LUMBAR EPIDURAL;  Surgeon: Jayden Norton MD;  Location: [facility]    M-seda by  phys perfrmg sv 5+ yr N/A 5/25/2016    Procedure: LUMBAR EPIDURAL;  Surgeon: Jayden Norton MD;  Location: [facility]    -seda by  phys perfrmg sv 5+ yr N/A 6/8/2016    Procedure: LUMBAR EPIDURAL;  Surgeon: Jayden Norton MD;  Location: [facility]IN [facility]    Other surgical history      Bilateral median nerve release at elbow; 2000    Other surgical history      CTS release bilateral 2000    Other surgical history      Surgical repair fracture left hand 5th digit    Other surgical history      Surgery left heel ligament repair    Other surgical history      Laser surgery for bilateral retinopathy    Other surgical history      Surgery to left eye for \"weak muscle.\" 2007    Other surgical history      bilat knee repair 9/23/10    Other surgical history  11/2013    lung resection to remove abcess    Other surgical history  10/26/2020    Cystoscopy (Dr. Ribeiro)    Patient documented not to have experienced any of the following events  2/14/2014    Procedure: ;  Surgeon: Kin Hobbs MD;  Location: [facility]    Patient documented not to have experienced any of the following events N/A 5/4/2016    Procedure: LUMBAR EPIDURAL;  Surgeon: Jayden Norton MD;  Location: [facility]IN [facility]     Patient documented not to have experienced any of the following events N/A 2016    Procedure: LUMBAR EPIDURAL;  Surgeon: Jayden Norton MD;  Location: [facility]IN [facility]    Patient documented not to have experienced any of the following events N/A 2016    Procedure: LUMBAR EPIDURAL;  Surgeon: Jayden Norton MD;  Location: [facility]IN [facility]    Patient withough preoperative order for iv antibiotic surgical site infection prophylaxis.  2014    Procedure: ;  Surgeon: Kin Hobbs MD;  Location: [facility]    Patient withough preoperative order for iv antibiotic surgical site infection prophylaxis. N/A 2016    Procedure: LUMBAR EPIDURAL;  Surgeon: Jayden Norton MD;  Location: [facility]IN [facility]    Patient withough preoperative order for iv antibiotic surgical site infection prophylaxis. N/A 2016    Procedure: LUMBAR EPIDURAL;  Surgeon: Jayden Norton MD;  Location: [facility]    Patient withough preoperative order for iv antibiotic surgical site infection prophylaxis. N/A 2016    Procedure: LUMBAR EPIDURAL;  Surgeon: Jayden Norton MD;  Location: [facility]    Total knee replacement Left     Upper gi endoscopy,diagnosis  16    retained gastric contents; Cesar    Upper gi endoscopy,remov f.b. N/A 2014    Procedure: ESOPHAGOGASTRODUODENOSCOPY, POSSIBLE BIOPSY, POSSIBLE POLYPECTOMY 41890;  Surgeon: Kin Hobbs MD;  Location: [facility]                Social History     Socioeconomic History    Marital status:     Number of children: 1   Occupational History    Occupation: Retired--Printer   Tobacco Use    Smoking status: Former     Current packs/day: 0.00     Average packs/day: 3.5 packs/day for 20.0 years (70.0 ttl pk-yrs)     Types: Cigarettes     Start date: 1966     Quit date: 1986     Years since quittin.7    Smokeless tobacco: Never     Tobacco comments:     Quit 25 years ago   Vaping Use    Vaping status: Never Used   Substance and Sexual Activity    Alcohol use: Not Currently     Comment: very rarely    Drug use: No    Sexual activity: Yes     Partners: Female   Other Topics Concern     Service No    Blood Transfusions No    Caffeine Concern No     Comment: coffee 1 cup per day    Sleep Concern No    Exercise Yes     Comment: PT/OT 1x per week    Seat Belt Yes     Social Determinants of Health     Financial Resource Strain: Low Risk  (4/19/2024)    Received from [facility]    Financial Resource Strain     In the past year, have you or any family members you live with been unable to get any of the following when it was really needed? Check all that apply.: None   Food Insecurity: Low Risk  (4/19/2024)    Received from [facility]    Food Insecurity     Within the past 12 months, you worried that your food would run out before you got money to buy more.  : Never true     Within the past 12 months, the food you bought just didn't last and you didn't have money to get more. : Never true   Transportation Needs: Not At Risk (4/19/2024)    Received from [facility]    Transportation Needs     In the past 12 months, has lack of reliable transportation kept you from medical appointments, meetings, work or from getting things needed for daily living? : No   Social Connections: Low Risk  (4/19/2024)    Received from [facility]    Social Connections     How often do you see or talk to people that you care about and feel close to? (For example: talking to friends on the phone, visiting friends or family, going to Islam or club meetings): 5 or more times a week   Housing Stability: Low Risk  (1/28/2024)    Housing Stability     Housing Instability: No              Review of Systems    Positive for stated Chief Complaint: Trauma    Other systems are as noted in HPI.  Constitutional and vital signs reviewed.       All other systems reviewed and negative except as noted above.    Physical Exam     ED Triage Vitals [09/05/24 1042]   /55   Pulse 62   Resp 12   Temp 98.2 °F (36.8 °C)   Temp src Oral   SpO2 95 %   O2 Device None (Room air)       Current Vitals:   Vital Signs  BP: 98/59  Pulse: 60  Resp: 14  Temp: 98.2 °F (36.8 °C)  Temp src: Oral  MAP (mmHg): 71    Oxygen Therapy  SpO2: 94 %  O2 Device: None (Room air)            Physical Exam    General: Awake and alert, answers questions appropriately, appears fatigued, in no acute distress.  HEENT: Normocephalic, atraumatic, pupils equal round and reactive to light.  Neck: Supple.  Cardiovascular: Regular rate and rhythm.  Respiratory: Lungs clear to auscultation.  Abdomen: Soft, distended, no focal tenderness, no rebound or guarding, normal active bowel sounds, no CVA tenderness.  Extremities: No CCE.  Skin: Warm and dry.  Neurologic: Nonfocal.    ED Course     Labs Reviewed   COMP METABOLIC PANEL (14) - Abnormal; Notable for the following components:       Result Value    Glucose 160 (*)     Chloride 97 (*)     BUN 56 (*)     Creatinine 5.50 (*)     Calculated Osmolality 301 (*)     eGFR-Cr 10 (*)     Alkaline Phosphatase 323 (*)     Globulin  3.6 (*)     All other components within normal limits   CBC WITH DIFFERENTIAL WITH PLATELET - Abnormal; Notable for the following components:    RBC 3.44 (*)     HGB 10.7 (*)     HCT 34.3 (*)     Lymphocyte Absolute 0.51 (*)     All other components within normal limits   PROTHROMBIN TIME (PT) - Abnormal; Notable for the following components:    PT 15.1 (*)     All other components within normal limits   PTT, ACTIVATED - Abnormal; Notable for the following components:    PTT 46.3 (*)     All other components within normal limits   AMMONIA, PLASMA - Normal   RAINBOW DRAW LAVENDER   RAINBOW DRAW LIGHT GREEN   RAINBOW DRAW BLUE   RAINBOW DRAW GOLD     EKG    Rate, intervals and axes as noted on EKG Report.  Rate: 63  Rhythm:  Sinus Rhythm  Reading: Low voltage QRS, no acute ischemic abnormality         I personally reviewed the coccyx and chest films and no fracture or pulmonary edema noted.        XR SACRUM + COCCYX (MIN 2 VIEWS) (CPT=72220)    Result Date: 9/5/2024  PROCEDURE:  XR SACRUM + COCCYX (MIN 2 VIEWS) (CPT=72220)  INDICATIONS:  fall x 2, tailbone pain  COMPARISON:  None.  PATIENT STATED HISTORY: (As transcribed by Technologist)  Patient states fall last night while trying to get his walker. He states pain in his tailbone.   FINDINGS:  No evidence of acute displaced fracture or dislocation.  Osteopenia.  Degenerative changes in the partially imaged lower lumbar spine.  Spinal stimulator pack noted.  Penile prosthesis seen.  Surgical clips in the pelvis.            CONCLUSION:  No evidence of acute displaced fracture or dislocation in the sacrococcygeal spine.  If there is further clinical concern for a potential occult fracture, an MRI of the sacrum may be helpful for further evaluation.  LOCATION:  [id number]   Dictated by (CST): Carl Kat MD on 9/05/2024 at 12:38 PM     Finalized by (CST): Carl Kat MD on 9/05/2024 at 12:39 PM       XR CHEST AP PORTABLE  (CPT=71045)    Result Date: 9/5/2024  PROCEDURE:  XR CHEST AP PORTABLE  (CPT=71045)  TECHNIQUE:  AP chest radiograph was obtained.  COMPARISON:  None.  INDICATIONS:  josias  PATIENT STATED HISTORY: (As transcribed by Technologist)  Patient states fall last night while trying to get his walker. He states pain in his tailbone.     FINDINGS:  Cardiomegaly with normal pulmonary vascularity. No pleural effusion or pneumothorax. No lobar consolidation.  Cervical fusion hardware is partially imaged.  Thoracic stimulator leads also noted.  Degenerative changes in the spine.  Calcified plaque in the thoracic aorta.            CONCLUSION:  No lobar pneumonia or overt congestive failure.   LOCATION:  [id number]      Dictated by (CST): Carl Kat MD on 9/05/2024 at 12:38 PM      Finalized by (CST): Carl Kat MD on 9/05/2024 at 12:38 PM            [facility]      Patient presents with weakness, confusion, frequent falls and coccyx pain.  Differential diagnosis includes but is not limited to anemia, hepatic encephalopathy, fracture, electrolyte abnormalities and pulmonary edema.  The patient does not have a coccyx fracture on x-ray.  He does not have evidence of pulmonary edema.  His chemistry show elevated liver enzymes and renal failure but no other electrolyte abnormalities.  His bicarb is normal and his glucose is only mildly elevated.  His hemoglobin is stable and his ammonia level has come back within normal limits.  Nonetheless the patient remains very weak.  His wife does not feel comfortable taking him home at this time as he is a significant fall risk.  She feels that he may improve if he can have his ascites drained.  He also has missed his dialysis today so we will need to have dialysis as well.  I discussed his case with Dr. Guido from the [facility] service who has agreed to admit him for further treatment.  Admission disposition: 9/5/2024  2:16 PM                                [facility]    Disposition and Plan     Clinical Impression:  1. Multiple falls    2. Contusion of coccyx, initial encounter    3. Weakness generalized    4. Other ascites         Disposition:  Admit  9/5/2024  2:16 pm    Follow-up:  No follow-up provider specified.        Medications Prescribed:  Current Discharge Medication List                            Hospital Problems       Present on Admission  Date Reviewed: 7/9/2024            ICD-10-CM Noted POA    * (Principal) Multiple falls R29.6 9/5/2024 Unknown

## 2024-09-05 NOTE — H&P
Surjit Hospitalist H&P/Consult note       CC:   Chief Complaint   Patient presents with    Trauma        PCP: Nima Sorensen MD    History of Present Illness: Patient is a 73 year old male with PMH sig for ESRD on HD, cirrhosis, HE, DM 1 here for fall, back pain    Yesterday he had mechanical fall when he forgot to lock the wheels of the walker and lost balance and fell on hisbottom. This am was trying to walk but was hving pain / weakness and came to the ER.   No fevers. No sycnope.   Pain is worse with any movement, ok at rest  No radicular symptoms (has chronic intermittent radicular symptoms ni R leg but not worse)  No f/c. No cp.   He is also due to have paracentesis and hd. No abd pain. No sob      PMH  Past Medical History:    Anemia    anxiety    back pain    Back problem    Blastomycosis    BPH (benign prostatic hyperplasia)    Depression    Diabetic retinopathy    laser surgery    End stage renal disease (HCC)    Esophageal reflux    occassion    Hearing impairment    hearing aids    HEMORRHOIDS    High blood pressure    High cholesterol    History of blood transfusion    History of renal dialysis    fistula on left arm    Hyperkalemia    hyperlipidemia    hypothyroidism    IMPOTENCE    Liver disease    2013 - pt states all resolved after liver and kidneys shut down after issue with pneumonia     Mood disturbance    Neuropathy    osteoarthritis    Osteoarthrosis, unspecified whether generalized or localized, unspecified site    osteoporosis    Polyneuropathy in diabetes(357.2)    Sepsis (HCC)    Sleep apnea, obstructive    AHI 54 SaO2 lizeth 74 % CPAP 12 HME//     Type I (juvenile type) diabetes mellitus without mention of complication, not stated as uncontrolled    since 1963- DR Mao follows    Unspecified essential hypertension    Visual impairment    wears glasses        PSH  Past Surgical History:   Procedure Laterality Date    Back surgery  2009    laminectomy  L1 -4  2/09  [facility]     Back surgery  8-8-13    C4-C6 ACDF     Back surgery  9/8/16    L2-S1 Revision Decomp possible uninstrumented fusion 9/8/16    Back surgery  09/10/2018    Rev C3-C7 ACDF     Cataract Bilateral done in 2004    IOL's    Colonoscopy      2006    Colonoscopy  2/2009    normal    Colonoscopy N/A 8/23/2019    adenoma- repeat 1 yr (2 dya prep)    Colonoscopy,biopsy  2/20/09    Performed by ANAM RVIERS at [facility] [place] [facility]    Egd N/A 7/23/2021    EGD & COLONOSCOPY Surgeon: Kimberly, +vik, rpt EGD 3 months, poor prep rpt colon 3 years    Endoscopy, bowel pouch, biopsy      Injection, w/wo contrast, dx/therapeutic substance, epidural/subarachnoid; lumbar/sacral N/A 5/4/2016    Procedure: LUMBAR EPIDURAL;  Surgeon: Jayden Norton MD;  Location: [facility]    Injection, w/wo contrast, dx/therapeutic substance, epidural/subarachnoid; lumbar/sacral N/A 5/25/2016    Procedure: LUMBAR EPIDURAL;  Surgeon: Jayden Norton MD;  Location: [facility]    Injection, w/wo contrast, dx/therapeutic substance, epidural/subarachnoid; lumbar/sacral N/A 6/8/2016    Procedure: LUMBAR EPIDURAL;  Surgeon: Jayden Norton MD;  Location: [facility]    Knee replacement surgery  2/14/13    Left TKR by Dr. Lombardi    M-sedaj by  phys perfrmg svc 5+ yr N/A 5/4/2016    Procedure: LUMBAR EPIDURAL;  Surgeon: Jayden Norton MD;  Location: [facility]    M-sedaj by  phys perfrmg svc 5+ yr N/A 5/25/2016    Procedure: LUMBAR EPIDURAL;  Surgeon: Jayden Norton MD;  Location: [facility]    M-sedaj by  phys perfrmg svc 5+ yr N/A 6/8/2016    Procedure: LUMBAR EPIDURAL;  Surgeon: Jayden Norton MD;  Location: [facility]    Other surgical history      Bilateral median nerve release at elbow; 2000    Other surgical history      CTS release bilateral 2000    Other surgical history      Surgical repair fracture left hand 5th  digit    Other surgical history      Surgery left heel ligament repair    Other surgical history      Laser surgery for bilateral retinopathy    Other surgical history      Surgery to left eye for \"weak muscle.\" 2007    Other surgical history      bilat knee repair 9/23/10    Other surgical history  11/2013    lung resection to remove abcess    Other surgical history  10/26/2020    Cystoscopy (Dr. Ribeiro)    Patient documented not to have experienced any of the following events  2/14/2014    Procedure: ;  Surgeon: Kin Hobbs MD;  Location: [facility]    Patient documented not to have experienced any of the following events N/A 5/4/2016    Procedure: LUMBAR EPIDURAL;  Surgeon: Jayden Norton MD;  Location: [facility]    Patient documented not to have experienced any of the following events N/A 5/25/2016    Procedure: LUMBAR EPIDURAL;  Surgeon: Jayden Norton MD;  Location: [facility]    Patient documented not to have experienced any of the following events N/A 6/8/2016    Procedure: LUMBAR EPIDURAL;  Surgeon: Jayden Norton MD;  Location: [facility]    Patient withough preoperative order for iv antibiotic surgical site infection prophylaxis.  2/14/2014    Procedure: ;  Surgeon: Kin Hobbs MD;  Location: [facility]    Patient withough preoperative order for iv antibiotic surgical site infection prophylaxis. N/A 5/4/2016    Procedure: LUMBAR EPIDURAL;  Surgeon: Jayden Norton MD;  Location: [facility]    Patient withough preoperative order for iv antibiotic surgical site infection prophylaxis. N/A 5/25/2016    Procedure: LUMBAR EPIDURAL;  Surgeon: Jayden Norton MD;  Location: [facility]    Patient withough preoperative order for iv antibiotic surgical site infection prophylaxis. N/A 6/8/2016    Procedure: LUMBAR EPIDURAL;  Surgeon: Jayden Norton MD;  Location: [facility]  [facility]    Total knee replacement Left 2013    Upper gi endoscopy,diagnosis  4/14/16    retained gastric contents; Cesar    Upper gi endoscopy,remov f.b. N/A 2/14/2014    Procedure: ESOPHAGOGASTRODUODENOSCOPY, POSSIBLE BIOPSY, POSSIBLE POLYPECTOMY 33181;  Surgeon: Kin Hobbs MD;  Location: [facility] [place] [facility]GICAL [place]        ALL:  Allergies   Allergen Reactions    Antihistamine & Nasal Deconges [Fexofenadine-Pseudoephedrine] OTHER (SEE COMMENTS)     Altered mental status    Adhesive Tape ITCHING    Albuterol DIZZINESS and OTHER (SEE COMMENTS)     Albuterol Inhalation. Lightheadedness    Benadryl [Diphenhydramine] RESTLESSNESS    Depakote [Valproic Acid] DIZZINESS    Fluconazole OTHER (SEE COMMENTS)     Kidney labs abnormal    Mirtazapine RESTLESSNESS    Quetiapine RESTLESSNESS    Wellbutrin [Bupropion] RASH    Latex RASH        Home Medications:  Outpatient Medications Marked as Taking for the 9/5/24 encounter (Hospital Encounter)   Medication Sig Dispense Refill    Melatonin 3 MG Oral Cap Take by mouth.      Probiotic Product (PROBIOTIC BLEND OR) Take 1 tablet by mouth daily.      midodrine 5 MG Oral Tab Take 1 tablet (5 mg total) by mouth As Directed. Before each dialysis      gabapentin 100 MG Oral Cap Take 2 capsules (200 mg total) by mouth nightly. 60 capsule 11    sertraline 25 MG Oral Tab Take 1 tablet (25 mg total) by mouth daily.      Insulin Glargine, 1 Unit Dial, (TOUJEO SOLOSTAR) 300 UNIT/ML Subcutaneous Solution Pen-injector Inject 6 Units into the skin as needed (If insulin pump not working).      cholecalciferol 50 MCG (2000 UT) Oral Tab Take 1 tablet (2,000 Units total) by mouth daily.      vitamin E 400 UNITS Oral Cap Take 1,000 Units by mouth daily.      Omega-3 Fatty Acids (FISH OIL) 1200 MG Oral Cap Take 1,200 mg by mouth daily.      insulin lispro 100 UNIT/ML Injection Solution INJECT 50 UNITS BY INSULIN PUMP ROUTE DAILY. USE VIA INSULIN PUMP. MDD 50 UNITS      aspirin 81 MG Oral Tab  EC Take 1 tablet (81 mg total) by mouth daily.      levothyroxine 150 MCG Oral Tab Take 1 tablet (150 mcg total) by mouth daily.      lactulose 10 GM/15ML Oral Solution Take 30 mL (20 g total) by mouth every evening.      Sevelamer 800 MG Oral Tab Take 1 tablet (800 mg total) by mouth 3 (three) times daily before meals. and 1 before snacks      famotidine 20 MG Oral Tab Take 1 tablet (20 mg total) by mouth 2 (two) times daily.      EZETIMIBE 10 MG Oral Tab TAKE ONE TABLET BY MOUTH ONE TIME DAILY (Patient taking differently: Take 1 tablet (10 mg total) by mouth every morning.) 90 tablet 3         Soc Hx  Social History     Tobacco Use    Smoking status: Former     Current packs/day: 0.00     Average packs/day: 3.5 packs/day for 20.0 years (70.0 ttl pk-yrs)     Types: Cigarettes     Start date: 1966     Quit date: 1986     Years since quittin.7    Smokeless tobacco: Never    Tobacco comments:     Quit 25 years ago   Substance Use Topics    Alcohol use: Not Currently     Comment: very rarely        Fam Hx  Family History   Problem Relation Age of Onset    Hypertension Father     Lipids Father     Cancer Father     Hypertension Mother     Psychiatric Maternal Grandmother     Diabetes Paternal Grandfather         Type 2 DM    Heart Disorder Brother         CAD with MI at age 53 - passed away    Obesity Brother     Diabetes Brother         Type 2 DM    Hypertension Brother     Lipids Brother     Thyroid Disorder Neg        Review of Systems  Comprehensive ROS reviewed and negative except for what's stated above.        OBJECTIVE:  /61   Pulse 60   Temp 98.2 °F (36.8 °C) (Oral)   Resp 17   Ht 5' 6\" (1.676 m)   Wt 163 lb (73.9 kg)   SpO2 96%   BMI 26.31 kg/m²   General:  Alert, no distress, appears stated age.   Head:  Normocephalic,    Eyes:  Sclera anicteric, No conjunctival pallor, EOMs intact.    Throat: dry   Neck: Supple    Lungs:   Clear to auscultation bilaterally. Normal effort   Chest  wall:  No tenderness or deformity.   Heart:  Regular rate and rhythm    Abdomen:   Soft, slightly distended, no guarding or rebound   Extremities: Atraumatic, mild edmea  Ttp in lower back   Skin: No visible rashes or lesions.    Neurologic: Strength in ext symmetrical. No dysarthria or facia asymetry      Diagnostic Data:    CBC/Chem  Recent Labs   Lab 09/05/24  1116   WBC 6.3   HGB 10.7*   MCV 99.7   .0   INR 1.18       Recent Labs   Lab 09/05/24  1116      K 4.4   CL 97*   CO2 27.0   BUN 56*   CREATSERUM 5.50*   *   CA 9.7       Recent Labs   Lab 09/05/24  1116   ALT 17   AST 24   ALB 4.0       No results for input(s): \"TROP\" in the last 168 hours.         Radiology: XR SACRUM + COCCYX (MIN 2 VIEWS) (CPT=72220)    Result Date: 9/5/2024  PROCEDURE:  XR SACRUM + COCCYX (MIN 2 VIEWS) (CPT=72220)  INDICATIONS:  fall x 2, tailbone pain  COMPARISON:  None.  PATIENT STATED HISTORY: (As transcribed by Technologist)  Patient states fall last night while trying to get his walker. He states pain in his tailbone.   FINDINGS:  No evidence of acute displaced fracture or dislocation.  Osteopenia.  Degenerative changes in the partially imaged lower lumbar spine.  Spinal stimulator pack noted.  Penile prosthesis seen.  Surgical clips in the pelvis.            CONCLUSION:  No evidence of acute displaced fracture or dislocation in the sacrococcygeal spine.  If there is further clinical concern for a potential occult fracture, an MRI of the sacrum may be helpful for further evaluation.  LOCATION:  [id number]   Dictated by (CST): Carl Kat MD on 9/05/2024 at 12:38 PM     Finalized by (CST): Carl Kat MD on 9/05/2024 at 12:39 PM       XR CHEST AP PORTABLE  (CPT=71045)    Result Date: 9/5/2024  PROCEDURE:  XR CHEST AP PORTABLE  (CPT=71045)  TECHNIQUE:  AP chest radiograph was obtained.  COMPARISON:  None.  INDICATIONS:  josias  PATIENT STATED HISTORY: (As transcribed by Technologist)  Patient states fall last  night while trying to get his walker. He states pain in his tailbone.     FINDINGS:  Cardiomegaly with normal pulmonary vascularity. No pleural effusion or pneumothorax. No lobar consolidation.  Cervical fusion hardware is partially imaged.  Thoracic stimulator leads also noted.  Degenerative changes in the spine.  Calcified plaque in the thoracic aorta.            CONCLUSION:  No lobar pneumonia or overt congestive failure.   LOCATION:  [facility]      Dictated by (CST): Carl Kat MD on 9/05/2024 at 12:38 PM     Finalized by (CST): Carl Kat MD on 9/05/2024 at 12:38 PM       CATH ANGIO    Result Date: 8/23/2024  This exam has been completed. Please refer to Notes for the results to this procedure.    CATH ANGIO    Result Date: 8/23/2024  Ruiz Gibbons MD     8/23/2024  2:31 PM [facility] Cardiology Cardiac Catheterization Report PREOPERATIVE DIAGNOSIS: LUGO cirrhosis/ascites (high SAAG/normal albumen), ESRD on HD, r/o cardiogenic ascites POSTOPERATIVE DIAGNOSIS: same as above PROCEDURE PERFORMED: ultrasound guided right brachial artery access, Right heart catheterization (CPT code 80911) CONSENT: The risks, benefits, and alternatives of right heart catheterization were discussed. The risks included, but were not limited to: bleeding, pulmonary embolus, pulmonary infarction, cardiac tamponade, and death. Benefits of the procedure included: diagnosis of heart disease and symptomatic improvement.   Alternatives to the procedure included: not performing cardiac catheterization, treatment with medications only, and observation.  DESCRIPTION OF PROCEDURE: The patient was brought to the cardiac catheterization laboratory.  Bilateral groins were prepped and draped with sterile technique. 10 mL of 1% lidocaine were injected into the right groin for local anesthesia. Once local anesthesia was achieved, sedation was administered. The IV was maintained by the RN and moderate conscious sedation with a total  of Versed 1mg and Fentanyl 25mcg was given. The patient was assessed and monitoring of oxygen, heart rate and blood pressure by nurse and myself during the exam 1357 (time of 1st dose administered when I was present) to 1419 (procedure end time). Attempt was made to attain right internal jugular vein access with ultrasound guidance; while we were able to successfully cannulate the vein with a micropuncture needle, the wire would not successfully advance into the vessel.  Decision was made to attempt right brachial vein access.  Using ultrasound guidance, the vein was accessed with the micropuncture needle and the wire did advance, a 5F sheath was placed.  Right heart catheterization was performed per usual routine using a 7F pulmonary capillary, balloon tipped wedge catheter.  The catheter was removed and hemostasis with manual pressure.  The procedure was tolerated well.  No complications were noted. FINDINGS: HEMODYNAMICS: RA pressure:  10/19/17mm Hg RV pressure: 69/14/26 mm Hg PA pressure: 63/24/40 mm Hg PA wedge pressure: 24/23/18 mm Hg Cardiac output: 4.6 L/min Cardiac index: 2.5 L/min/m2 Pulmonary vascular resistance: 4.8 Woods units OXIMETRY: SVC saturation: 75.1%, 10.3 g/dL PA saturation: 62.3, Aortic saturation: 98.0% CONCLUSIONS: - elevated right heart filling pressure - moderate-severe pulmonary hypertension - mildly elevated pulmonary capillary wedge pressure - mildly depressed cardiac output with normal cardiac index - no intracardiac shunt by saturations Hemodynamic findings show elevated right heart filling pressures/pulmonary hypertension in the absence of significantly elevated pulmonary capillary wedge pressure (surrogate measurement of LVEDP).  This would suggest that congestive heart failure due to pump failure is not a significant contributor to volume overload/congestion. Ruiz Gibbons MD 8/23/2024 2:22 PM        ASSESSMENT / PLAN:     Patient is a 73 year old male with PMH sig for ESRD on HD,  cirrhosis, HE, DM 1 here for fall, back pain    Impression    -generalized weakness with fall    -ESRD on HD    -recurrent ascites (cardiogenic)  -LUGO cirrhosis  -HE on lactulose   -DM 1  -hypothyroidism  =hypotension on midodrine with HD    Plan    -PT OT  -check Xray of L spine. Suspect msk pain. Add flexeril. Add norco, tyleno  -abd soft, nt. He is due to have paracentesis. Ordered  -plan for HD tomorrow, dw renal  -cont lactulose  -ok to use home insulin pump   -cont synthroid     Scds  Heparin        Further recommendations pending patient's clinical course. Surjit hospitalist to continue to follow patient while in house    Patient and/or patient's family given opportunity to ask questions and note understanding and agreeing with therapeutic plan as outlined    Charan Eddy Hospitalist  [phone]  Answering Service: [phone]

## 2024-09-05 NOTE — ED QUICK NOTES
Orders for admission, patient is aware of plan and ready to go upstairs. Any questions, please call ED RN Sanjana at extension [phone]  .     Patient Covid vaccination status: Fully vaccinated     COVID Test Ordered in ED: None    COVID Suspicion at Admission: N/A    Running Infusions:  None    Mental Status/LOC at time of transport: A/Ox3    Other pertinent information:   CIWA score: N/A   NIH score:  N/A

## 2024-09-05 NOTE — ED INITIAL ASSESSMENT (HPI)
Patient to ED c/o fall last night, + tailbone pain, that caused a fall again today per patient.     BP 88/55 initially and increased to 106/60 enroute

## 2024-09-05 NOTE — ED QUICK NOTES
Wife now at bedside.  Reports over the past few days the patient has been more confused than normal.  Reports he fell yesterday + tailbone pain.  Today when the patient was sitting on the toilet he was leaning to the side like he was going to fall over.  Patient was unable to transfer from walker to wheelchair today.  Patient reports this was because he was in too much pain.  Patient was due for dialysis today and scheduled for a paracentesis tomorrow, which is later than it should've been

## 2024-09-05 NOTE — CONSULTS
[facility]  Report of Consultation    Gamaliel Boyer Patient Status:  Observation    1950 MRN [id number]   Location [facility] 3SW-A Attending Charan Guido, DO   Hosp Day # 0 PCP Nima Sorensen MD     Reason for Consultation:  ESRD on HD/fall/weakness      History of Present Illness:  Gamaliel Boyer is a a(n) 73 year old man well known to our service with mult med probs incl ESRD due to DM on HD TTHS at [facility] brought to ED for eval of mult issues incl weakness/falls and tailbone pain. In addition he has had incr confusion at times.  He was not dialyzed today.  Outpt paracentesis was planned as he has recurrent ascites.         History:  Past Medical History:    Anemia    anxiety    back pain    Back problem    Blastomycosis    BPH (benign prostatic hyperplasia)    Depression    Diabetic retinopathy    laser surgery    End stage renal disease (HCC)    Esophageal reflux    occassion    Hearing impairment    hearing aids    HEMORRHOIDS    High blood pressure    High cholesterol    History of blood transfusion    History of renal dialysis    fistula on left arm    Hyperkalemia    hyperlipidemia    hypothyroidism    IMPOTENCE    Liver disease     - pt states all resolved after liver and kidneys shut down after issue with pneumonia     Mood disturbance    Neuropathy    osteoarthritis    Osteoarthrosis, unspecified whether generalized or localized, unspecified site    osteoporosis    Polyneuropathy in diabetes(357.2)    Sepsis (HCC)    Sleep apnea, obstructive    AHI 54 SaO2 lizeth 74 % CPAP 12 HME//     Type I (juvenile type) diabetes mellitus without mention of complication, not stated as uncontrolled    since - DR Mao follows    Unspecified essential hypertension    Visual impairment    wears glasses     Past Surgical History:   Procedure Laterality Date    Back surgery      laminectomy  L1 -4    Dr. Eitan Guerrero [facility]    Back surgery  13    C4-C6 ACDF      Back surgery  9/8/16    L2-S1 Revision Decomp possible uninstrumented fusion 9/8/16    Back surgery  09/10/2018    Rev C3-C7 ACDF     Cataract Bilateral done in 2004    IOL's    Colonoscopy      2006    Colonoscopy  2/2009    normal    Colonoscopy N/A 8/23/2019    adenoma- repeat 1 yr (2 dya prep)    Colonoscopy,biopsy  2/20/09    Performed by ANAM RIVERS at [facility] [place]    Egd N/A 7/23/2021    EGD & COLONOSCOPY Surgeon: Kimberly, +vik, rpt EGD 3 months, poor prep rpt colon 3 years    Endoscopy, bowel pouch, biopsy      Injection, w/wo contrast, dx/therapeutic substance, epidural/subarachnoid; lumbar/sacral N/A 5/4/2016    Procedure: LUMBAR EPIDURAL;  Surgeon: Jayden Norton MD;  Location: [facility]    Injection, w/wo contrast, dx/therapeutic substance, epidural/subarachnoid; lumbar/sacral N/A 5/25/2016    Procedure: LUMBAR EPIDURAL;  Surgeon: Jayden Norton MD;  Location: [facility]    Injection, w/wo contrast, dx/therapeutic substance, epidural/subarachnoid; lumbar/sacral N/A 6/8/2016    Procedure: LUMBAR EPIDURAL;  Surgeon: Jayden Norton MD;  Location: [facility]    Knee replacement surgery  2/14/13    Left TKR by Dr. Lombardi    M-sedaj by  phys perfrmg svc 5+ yr N/A 5/4/2016    Procedure: LUMBAR EPIDURAL;  Surgeon: Jayden Norton MD;  Location: [facility]    M-sedaj by  phys perfrmg svc 5+ yr N/A 5/25/2016    Procedure: LUMBAR EPIDURAL;  Surgeon: Jayden Norton MD;  Location: [facility]    M-sedaj by  phys perfrmg svc 5+ yr N/A 6/8/2016    Procedure: LUMBAR EPIDURAL;  Surgeon: Jayden Norton MD;  Location: [facility]    Other surgical history      Bilateral median nerve release at elbow; 2000    Other surgical history      CTS release bilateral 2000    Other surgical history      Surgical repair fracture left hand 5th digit    Other surgical history       Surgery left heel ligament repair    Other surgical history      Laser surgery for bilateral retinopathy    Other surgical history      Surgery to left eye for \"weak muscle.\" 2007    Other surgical history      bilat knee repair 9/23/10    Other surgical history  11/2013    lung resection to remove abcess    Other surgical history  10/26/2020    Cystoscopy (Dr. Ribeiro)    Patient documented not to have experienced any of the following events  2/14/2014    Procedure: ;  Surgeon: Kin Hobbs MD;  Location: [facility]    Patient documented not to have experienced any of the following events N/A 5/4/2016    Procedure: LUMBAR EPIDURAL;  Surgeon: Jayden Norton MD;  Location: [facility]    Patient documented not to have experienced any of the following events N/A 5/25/2016    Procedure: LUMBAR EPIDURAL;  Surgeon: Jayden Norton MD;  Location: [facility]    Patient documented not to have experienced any of the following events N/A 6/8/2016    Procedure: LUMBAR EPIDURAL;  Surgeon: Jayden Norton MD;  Location: [facility]    Patient withough preoperative order for iv antibiotic surgical site infection prophylaxis.  2/14/2014    Procedure: ;  Surgeon: Kin Hobbs MD;  Location: [facility]    Patient withough preoperative order for iv antibiotic surgical site infection prophylaxis. N/A 5/4/2016    Procedure: LUMBAR EPIDURAL;  Surgeon: Jayden Norton MD;  Location: [facility]    Patient withough preoperative order for iv antibiotic surgical site infection prophylaxis. N/A 5/25/2016    Procedure: LUMBAR EPIDURAL;  Surgeon: Jayden Norton MD;  Location: [facility]    Patient withough preoperative order for iv antibiotic surgical site infection prophylaxis. N/A 6/8/2016    Procedure: LUMBAR EPIDURAL;  Surgeon: Jayden Norton MD;  Location: [facility]    Total knee  replacement Left 2013    Upper gi endoscopy,diagnosis  4/14/16    retained gastric contents; Cesar    Upper gi endoscopy,remov f.b. N/A 2/14/2014    Procedure: ESOPHAGOGASTRODUODENOSCOPY, POSSIBLE BIOPSY, POSSIBLE POLYPECTOMY 79785;  Surgeon: Kin Hobbs MD;  [facility]     Family History   Problem Relation Age of Onset    Hypertension Father     Lipids Father     Cancer Father     Hypertension Mother     Psychiatric Maternal Grandmother     Diabetes Paternal Grandfather         Type 2 DM    Heart Disorder Brother         CAD with MI at age 53 - passed away    Obesity Brother     Diabetes Brother         Type 2 DM    Hypertension Brother     Lipids Brother     Thyroid Disorder Neg       reports that he quit smoking about 38 years ago. His smoking use included cigarettes. He started smoking about 58 years ago. He has a 70 pack-year smoking history. He has never used smokeless tobacco. He reports that he does not currently use alcohol. He reports that he does not use drugs.    Allergies:  Allergies   Allergen Reactions    Antihistamine & Nasal Deconges [Fexofenadine-Pseudoephedrine] OTHER (SEE COMMENTS)     Altered mental status    Adhesive Tape ITCHING    Albuterol DIZZINESS and OTHER (SEE COMMENTS)     Albuterol Inhalation. Lightheadedness    Benadryl [Diphenhydramine] RESTLESSNESS    Depakote [Valproic Acid] DIZZINESS    Fluconazole OTHER (SEE COMMENTS)     Kidney labs abnormal    Mirtazapine RESTLESSNESS    Quetiapine RESTLESSNESS    Wellbutrin [Bupropion] RASH    Latex RASH       Medications:  No current facility-administered medications for this encounter.  No current outpatient medications on file.       Review of Systems:  Denies fever/chills  Denies wt loss/gain  Denies HA or visual changes  Denies CP or palpitations  Denies SOB/cough/hemoptysis  Denies abd or flank pain  Denies N/V/D  Denies change in urinary habits or gross hematuria  Denies LE edema  + low back  pain      Physical Exam:   BP 99/60   Pulse 60   Temp 98.2 °F (36.8 °C) (Oral)   Resp 17   Ht 5' 6\" (1.676 m)   Wt 163 lb (73.9 kg)   SpO2 94%   BMI 26.31 kg/m²   Temp (24hrs), Av.2 °F (36.8 °C), Min:98.2 °F (36.8 °C), Max:98.2 °F (36.8 °C)     No intake or output data in the 24 hours ending 24 1620  Last 3 Weights   24 1604 163 lb (73.9 kg)   24 1036 163 lb (73.9 kg)   24 1103 163 lb (73.9 kg)   24 0307 157 lb 13.6 oz (71.6 kg)   24 0128 158 lb 6.4 oz (71.8 kg)   24 2041 170 lb (77.1 kg)   23 1556 171 lb 8 oz (77.8 kg)   10/21/23 0440 181 lb 7 oz (82.3 kg)   10/20/23 1806 175 lb (79.4 kg)   10/20/23 0800 175 lb (79.4 kg)   10/14/23 1517 170 lb (77.1 kg)     General: Alert and in no apparent distress.  HEENT: No scleral icterus, MMM  Neck: Supple, no KALPANA or thyromegaly  Cardiac: Regular rate and rhythm, S1, S2 normal, no murmur or rub  Lungs: Clear without wheezes, rales, rhonchi.    Abdomen: Soft, non-tender. + mildly distended, + bowel sounds, no palpable organomegaly  Extremities: Without clubbing, cyanosis or edema. L arm AVF with bruit/thrill  Neurologic:  moving all extremities  Skin: Warm and dry, no rashes      Laboratory Data:  Lab Results   Component Value Date    WBC 6.3 2024    HGB 10.7 2024    HCT 34.3 2024    .0 2024    CREATSERUM 5.50 2024    BUN 56 2024     2024    K 4.4 2024    CL 97 2024    CO2 27.0 2024     2024    CA 9.7 2024    ALB 4.0 2024    ALKPHO 323 2024    BILT 0.5 2024    TP 7.6 2024    AST 24 2024    ALT 17 2024    PTT 46.3 2024    INR 1.18 2024    PTP 15.1 2024       Glucose (mg/dL)   Date Value   2016 170 (H)   2014 301 (H)   10/11/2013 121 (H)     BUN (mg/dL)   Date Value   2024 56 (H)   2024 23   2024 37 (H)   2014 39 (H)   2014 31 (H)    04/08/2014 34 (H)     Blood Urea Nitrogen (mg/dL)   Date Value   10/25/2021 49.0 (H)   09/18/2020 48.0 (H)   09/04/2020 41.0 (H)   06/27/2016 21   04/04/2016 25   02/19/2014 34 (H)     Creatinine, Serum (mg/dL)   Date Value   06/27/2016 1.76 (H)   04/04/2016 1.99 (H)   02/19/2014 1.90 (H)     CREATININE (mg/dL)   Date Value   08/07/2014 1.69 (H)   07/02/2014 1.39 (H)   04/08/2014 1.55 (H)     Creatinine (mg/dL)   Date Value   09/05/2024 5.50 (H)   07/09/2024 3.75 (H)   01/31/2024 4.67 (H)   10/25/2021 4.45 (H)   09/18/2020 3.32 (H)   09/04/2020 3.13 (H)       Microalb/Creat Ratio   Date Value Ref Range Status   05/29/2009 283.2 (H) 0.0 - 30.0 ug/mg creat Final   08/20/2008 98.6 (H) 0.0 - 30.0 ug/mg creat Final   09/24/2007 117.0 (H) 0.0 - 30.0 ug/mg creat Final     MICROALB/CREAT RATIO   Date Value Ref Range Status   06/25/2015 857.9 (H) 0.0 - 30.0 mg/g creat Final   06/25/2014 692.8 (H) 0.0 - 30.0 mg/g creat Final   08/16/2012 338.6 (H) 0.0 - 30.0 mg/g creat Final     Malb/Cre Calc Ratio   Date Value Ref Range Status   05/25/2019 1,676.9 (H) 0.0 - 29.0 ug/mg Final     Malb/Cre Calc   Date Value Ref Range Status   01/13/2018 2,017.1 (H) <=30.0 ug/mg Final   09/06/2016 1,112.9 (H) <=30.0 ug/mg Final       Recent Labs   Lab 09/05/24  1116   WBC 6.3   HGB 10.7*   MCV 99.7   .0   INR 1.18       Recent Labs   Lab 09/05/24  1116      K 4.4   CL 97*   CO2 27.0   BUN 56*   CREATSERUM 5.50*   CA 9.7   *       Recent Labs   Lab 09/05/24  1116   ALT 17   AST 24   ALB 4.0       No results for input(s): \"PGLU\" in the last 168 hours.        Imaging:  CXR reviewed    Impression/Plan:    #1.  ESRD- due to DM.  Usually TTHS as outpt but missed today.  Will dialyze tomorrow (volume status/lytes stable today) and then again Saturday to resume usual routine    #2.  Anemia- due to ESRD.  PAYAL for goal hgb 10-11 gms    #3.  Orthostatic hypotension- cont midodrine with HD    #4.  Ascites- reports usually has  paracentesis every 3 weeks or so but has now been closer to 5.  Being scheduled for tomorrow        Thank you for allowing me to participate in the care of your patient. Please do not hesitate to call with any questions or concerns.       Ruiz Kraft MD  9/5/2024  4:20 PM

## 2024-09-05 NOTE — ED QUICK NOTES
Rounding Completed    Plan of Care reviewed with patient and wife.  Waiting for additional orders and admission.  Patient agrees with plan for admission and get dialysis and paracentesis     Bed is locked and in lowest position. Call light within reach.

## 2024-09-05 NOTE — PLAN OF CARE
NURSING ADMISSION NOTE      Patient admitted via Cart from ED.   Oriented to room.  Safety precautions initiated.  Bed in low position.  Call light in reach. 2 person skin assessment done with PCT.     A&Ox4. VSS on RA, hx PRANEETH w CPAP. . Telemetry monitoring. SCD's. CGM and insulin pump agreement signed and in pt chart. Denies pain at this time. Hx neuropathy to bilateral feet. Call light within reach. Will continue to monitor.

## 2024-09-06 ENCOUNTER — APPOINTMENT (OUTPATIENT)
Dept: CT IMAGING | Facility: HOSPITAL | Age: 74
End: 2024-09-06
Attending: HOSPITALIST
Payer: MEDICARE

## 2024-09-06 ENCOUNTER — APPOINTMENT (OUTPATIENT)
Dept: ULTRASOUND IMAGING | Facility: HOSPITAL | Age: 74
End: 2024-09-06
Attending: HOSPITALIST
Payer: MEDICARE

## 2024-09-06 LAB
ALBUMIN SERPL-MCNC: 4 G/DL (ref 3.2–4.8)
ALBUMIN/GLOB SERPL: 1.2 {RATIO} (ref 1–2)
ALP LIVER SERPL-CCNC: 345 U/L
ALT SERPL-CCNC: 16 U/L
ANION GAP SERPL CALC-SCNC: 16 MMOL/L (ref 0–18)
AST SERPL-CCNC: 21 U/L (ref ?–34)
BILIRUB SERPL-MCNC: 0.5 MG/DL (ref 0.2–1.1)
BUN BLD-MCNC: 71 MG/DL (ref 9–23)
CALCIUM BLD-MCNC: 10 MG/DL (ref 8.7–10.4)
CHLORIDE SERPL-SCNC: 94 MMOL/L (ref 98–112)
CO2 SERPL-SCNC: 24 MMOL/L (ref 21–32)
CREAT BLD-MCNC: 6.55 MG/DL
CRP SERPL-MCNC: 8.8 MG/DL (ref ?–0.5)
EGFRCR SERPLBLD CKD-EPI 2021: 8 ML/MIN/1.73M2 (ref 60–?)
ERYTHROCYTE [DISTWIDTH] IN BLOOD BY AUTOMATED COUNT: 19.3 %
ERYTHROCYTE [SEDIMENTATION RATE] IN BLOOD: 100 MM/HR
EST. AVERAGE GLUCOSE BLD GHB EST-MCNC: 143 MG/DL (ref 68–126)
GLOBULIN PLAS-MCNC: 3.4 G/DL (ref 2–3.5)
GLUCOSE BLD-MCNC: 112 MG/DL (ref 70–99)
GLUCOSE BLD-MCNC: 159 MG/DL (ref 70–99)
GLUCOSE BLD-MCNC: 171 MG/DL (ref 70–99)
GLUCOSE BLD-MCNC: 211 MG/DL (ref 70–99)
GLUCOSE BLD-MCNC: 219 MG/DL (ref 70–99)
GLUCOSE BLD-MCNC: 296 MG/DL (ref 70–99)
GLUCOSE BLD-MCNC: 349 MG/DL (ref 70–99)
GLUCOSE BLD-MCNC: 366 MG/DL (ref 70–99)
GLUCOSE BLD-MCNC: 80 MG/DL (ref 70–99)
HBA1C MFR BLD: 6.6 % (ref ?–5.7)
HCT VFR BLD AUTO: 35.1 %
HGB BLD-MCNC: 11.1 G/DL
MAGNESIUM SERPL-MCNC: 2.9 MG/DL (ref 1.6–2.6)
MCH RBC QN AUTO: 31.4 PG (ref 26–34)
MCHC RBC AUTO-ENTMCNC: 31.6 G/DL (ref 31–37)
MCV RBC AUTO: 99.4 FL
OSMOLALITY SERPL CALC.SUM OF ELEC: 310 MOSM/KG (ref 275–295)
PLATELET # BLD AUTO: 222 10(3)UL (ref 150–450)
POTASSIUM SERPL-SCNC: 5.6 MMOL/L (ref 3.5–5.1)
PROT SERPL-MCNC: 7.4 G/DL (ref 5.7–8.2)
RBC # BLD AUTO: 3.53 X10(6)UL
SODIUM SERPL-SCNC: 134 MMOL/L (ref 136–145)
WBC # BLD AUTO: 6.3 X10(3) UL (ref 4–11)

## 2024-09-06 PROCEDURE — 5A1D70Z PERFORMANCE OF URINARY FILTRATION, INTERMITTENT, LESS THAN 6 HOURS PER DAY: ICD-10-PCS | Performed by: INTERNAL MEDICINE

## 2024-09-06 PROCEDURE — 0W9G3ZZ DRAINAGE OF PERITONEAL CAVITY, PERCUTANEOUS APPROACH: ICD-10-PCS | Performed by: RADIOLOGY

## 2024-09-06 PROCEDURE — 99232 SBSQ HOSP IP/OBS MODERATE 35: CPT | Performed by: INTERNAL MEDICINE

## 2024-09-06 PROCEDURE — 72132 CT LUMBAR SPINE W/DYE: CPT | Performed by: HOSPITALIST

## 2024-09-06 PROCEDURE — 49083 ABD PARACENTESIS W/IMAGING: CPT | Performed by: HOSPITALIST

## 2024-09-06 RX ORDER — MIDODRINE HYDROCHLORIDE 10 MG/1
10 TABLET ORAL
Status: DISCONTINUED | OUTPATIENT
Start: 2024-09-07 | End: 2024-09-06

## 2024-09-06 RX ORDER — SILVER SULFADIAZINE 10 MG/G
CREAM TOPICAL DAILY
Status: DISCONTINUED | OUTPATIENT
Start: 2024-09-06 | End: 2024-09-12

## 2024-09-06 RX ORDER — MIDODRINE HYDROCHLORIDE 10 MG/1
10 TABLET ORAL
Status: COMPLETED | OUTPATIENT
Start: 2024-09-06 | End: 2024-09-06

## 2024-09-06 RX ORDER — ALBUMIN (HUMAN) 12.5 G/50ML
25 SOLUTION INTRAVENOUS
Status: ACTIVE | OUTPATIENT
Start: 2024-09-06 | End: 2024-09-08

## 2024-09-06 NOTE — PHYSICAL THERAPY NOTE
PHYSICAL THERAPY EVALUATION - INPATIENT     Room Number: 363/363-A  Evaluation Date: 9/6/2024  Type of Evaluation: Initial  Physician Order: PT Eval and Treat    Presenting Problem: fall, AMS  Co-Morbidities : ESRD on HD, neuropathy, DM, cirrhosis  Reason for Therapy: Mobility Dysfunction and Discharge Planning    PHYSICAL THERAPY MEDICAL/SOCIAL HISTORY  History related to current admission: Patient is a 73 year old male admitted on 9/5/2024 from home for fall and AMS.  L/S XRAY with concern for discitis/OM. Pt unable to have MRI due to spinal cord stimulator. Pt with recurrent ascites and regular paracentesis ~ every 3 weeks. Plan for paracentesis and HD today.     PHYSICAL THERAPY ASSESSMENT   Patient is currently functioning below baseline with bed mobility, transfers, gait, and standing prolonged periods.  Prior to admission, patient's baseline is supervision.  Patient is requiring maximum assist as a result of the following impairments: decreased functional strength, pain, impaired standing balance, and medical status. Physical Therapy will continue to follow for duration of hospitalization.      Patient will benefit from continued skilled PT Services to promote return to prior level of function and safety with continuous assistance and gradual rehabilitative therapy .    PLAN  PT Treatment Plan: Bed mobility;Endurance;Energy conservation;Patient education;Gait training;Strengthening;Balance training;Transfer training  Rehab Potential : Fair  Frequency (Obs): 3-5x/week  Number of Visits to Meet Established Goals: 5      CURRENT GOALS    Goal #1 Patient is able to demonstrate supine - sit EOB @ level: minimum assistance     Goal #2 Patient is able to demonstrate transfers Sit to/from Stand at assistance level: moderate assistance     Goal #3 Patient is able to transfer to bedside chair with MOD assist     Goal #4    Goal #5    Goal #6    Goal Comments: Goals established on 9/6/2024        HOME SITUATION  Type of  Home: House   Home Layout: One level  Stairs to Enter : 1             Lives With: Spouse  Drives: No  Patient Owned Equipment: Rolling walker;Wheelchair       Prior Level of [place]: Pt lives with spouse in single story home. Pt is able to ambulate household distances with RW. Pts spouse assists with ADL. Pt uses W/C in the community.     SUBJECTIVE  \"I don't know what that happens?\" Re LEs giving out in standing      OBJECTIVE  Precautions: Limb alert - left;Bed/chair alarm (hypotension)  Fall Risk: High fall risk    WEIGHT BEARING RESTRICTION  Weight Bearing Restriction: R lower extremity;L lower extremity        R Lower Extremity:  (post-op shoe)  L Lower Extremity:  (post-op shoe)    PAIN ASSESSMENT  Rating: 3  Location: back  Management Techniques: Activity promotion;Repositioning    COGNITION  Overall Cognitive Status:  WFL - within functional limits  Safety Judgement:  decreased awareness of need for safety    RANGE OF MOTION AND STRENGTH ASSESSMENT  Upper extremity ROM and strength are within functional limits     Lower extremity ROM is within functional limits     Lower extremity strength is within functional limits except for the following:    Right Knee extension  3+/5  Left Knee extension  3+/5      BALANCE  Static Sitting: Fair  Dynamic Sitting: Fair  Static Standing: Poor -  Dynamic Standing: Not tested    ADDITIONAL TESTS                                    ACTIVITY TOLERANCE         BP sitting EOB: 85/58  BP post standing sitting EOB: 89/67                O2 WALK       NEUROLOGICAL FINDINGS                        AM-PAC '6-Clicks' INPATIENT SHORT FORM - BASIC MOBILITY  How much difficulty does the patient currently have...  Patient Difficulty: Turning over in bed (including adjusting bedclothes, sheets and blankets)?: A Lot   Patient Difficulty: Sitting down on and standing up from a chair with arms (e.g., wheelchair, bedside commode, etc.): A Lot   Patient Difficulty: Moving from lying on back  to sitting on the side of the bed?: A Lot   How much help from another person does the patient currently need...   Help from Another: Moving to and from a bed to a chair (including a wheelchair)?: Total   Help from Another: Need to walk in hospital room?: Total   Help from Another: Climbing 3-5 steps with a railing?: Total       AM-PAC Score:  Raw Score: 9   Approx Degree of Impairment: 81.38%   Standardized Score (AM-PAC Scale): 30.55   CMS Modifier (G-Code): CM    FUNCTIONAL ABILITY STATUS  Gait Assessment   Functional Mobility/Gait Assessment  Gait Assistance: Not tested  Distance (ft): 0    Skilled Therapy Provided   MAX assist for trunk support with supine-sit.   VC for anterior weightshift and UE placement.   Able to scoot to EOB.   Assist with donning post op shoes.   VC for safe transfer set up.   MAX assist for force generation with sit-stand to RW.   VC for force generation and posture.   Pt with LEs buckling after standing 5 sec and returned to bedside with MAX assist for decent.   Able to scoot laterally along bedside with MIN assist.   Assist to doff shoes.   Returned to supine in bed with MOD assist for LE support.     Bed Mobility:  Rolling: MOD  Supine to sit: MAX   Sit to supine: MOD     Transfer Mobility:  Sit to stand: MAX   Stand to sit: MAX  Gait = NT    Therapist's Comments:  Recommend total lift for safe OOB mobility.     Exercise/Education Provided:  Bed mobility  Energy conservation  Functional activity tolerated  Strengthening  Transfer training    Patient End of Session: In bed;Needs met;Call light within reach;RN aware of session/findings;All patient questions and concerns addressed;Alarm set      Patient Evaluation Complexity Level:  History High - 3 or more personal factors and/or co-morbidities   Examination of body systems Moderate - addressing a total of 3 or more elements   Clinical Presentation  Moderate - Evolving   Clinical Decision Making Moderate Complexity       PT Session  Time: 30 minutes  Gait Training:  minutes  Therapeutic Activity: 15 minutes  Neuromuscular Re-education:  minutes  Therapeutic Exercise:  minutes

## 2024-09-06 NOTE — PROGRESS NOTES
[facility]   part of [facility]     Nephrology Progress Note    Gamaliel Boyer Patient Status:  Inpatient    1950 MRN [id number]   Location [facility] 3SW-A Attending Charan Guido, DO   Hosp Day # 0 PCP Nima Sorensen MD       SUBJECTIVE:  Pt seen in ultrasound         Physical Exam:   /61 (BP Location: Right arm)   Pulse 76   Temp 98 °F (36.7 °C) (Oral)   Resp 18   Ht 5' 6\" (1.676 m)   Wt 163 lb (73.9 kg)   SpO2 96%   BMI 26.31 kg/m²   Temp (24hrs), Av.2 °F (36.8 °C), Min:97.9 °F (36.6 °C), Max:98.8 °F (37.1 °C)       Intake/Output Summary (Last 24 hours) at 2024 1139  Last data filed at 2024 1738  Gross per 24 hour   Intake 240 ml   Output --   Net 240 ml     Last 3 Weights   24 1604 163 lb (73.9 kg)   24 1036 163 lb (73.9 kg)   24 1103 163 lb (73.9 kg)   24 0307 157 lb 13.6 oz (71.6 kg)   24 0128 158 lb 6.4 oz (71.8 kg)   24 2041 170 lb (77.1 kg)   23 1556 171 lb 8 oz (77.8 kg)   10/21/23 0440 181 lb 7 oz (82.3 kg)   10/20/23 1806 175 lb (79.4 kg)   10/20/23 0800 175 lb (79.4 kg)   10/14/23 1517 170 lb (77.1 kg)     General: Alert and in no apparent distress.  HEENT: No scleral icterus, MMM  Neck: Supple, no KALPANA or thyromegaly  Cardiac: Regular rate and rhythm, S1, S2 normal, no murmur or rub  Lungs: Clear without wheezes, rales, rhonchi.    Abdomen: Soft, non-tender. + bowel sounds, no palpable organomegaly  Extremities: Without clubbing, cyanosis or edema. L arm AVF with bruit/thrill  Neurologic:  moving all extremities  Skin: Warm and dry, no rash        Labs:     Recent Labs   Lab 24  1116 248   WBC 6.3 6.3   HGB 10.7* 11.1*   MCV 99.7 99.4   .0 222.0   INR 1.18  --        Recent Labs   Lab 24  1116 24    134*   K 4.4 5.6*   CL 97* 94*   CO2 27.0 24.0   BUN 56* 71*   CREATSERUM 5.50* 6.55*   CA 9.7 10.0   MG  --  2.9*   * 296*       Recent Labs   Lab  09/05/24  1116 09/06/24  0448   ALT 17 16   AST 24 21   ALB 4.0 4.0       Recent Labs   Lab 09/06/24  0207 09/06/24  0309 09/06/24  0538 09/06/24  0648 09/06/24  1014   PGLU 171* 219* 349* 366* 80       Meds:   Current Facility-Administered Medications   Medication Dose Route Frequency    insulin via Insulin Pump   Subcutaneous TID AC and HS    midodrine (ProAmatine) tab 10 mg  10 mg Oral Once in dialysis    sodium chloride 0.9 % IV bolus 100 mL  100 mL Intravenous Q30 Min PRN    And    albumin human (Albumin) 25% injection 25 g  25 g Intravenous PRN Dialysis    [START ON 9/7/2024] midodrine (ProAmatine) tab 10 mg  10 mg Oral Once in dialysis    aspirin DR tab 81 mg  81 mg Oral Daily    ezetimibe (Zetia) tab 10 mg  10 mg Oral QAM    gabapentin (Neurontin) cap 200 mg  200 mg Oral Nightly    lactulose (CHRONULAC) 10 GM/15ML solution 20 g  20 g Oral QPM    levothyroxine (Synthroid) tab 150 mcg  150 mcg Oral Daily @ 0700    [START ON 9/7/2024] midodrine (ProAmatine) tab 5 mg  5 mg Oral Once per day on Tuesday Thursday Saturday    sertraline (Zoloft) tab 25 mg  25 mg Oral Daily    sevelamer carbonate (Renvela) tab 800 mg  800 mg Oral TID AC    acetaminophen (Tylenol Extra Strength) tab 500 mg  500 mg Oral Q4H PRN    HYDROcodone-acetaminophen (Norco) 5-325 MG per tab 1 tablet  1 tablet Oral Q6H PRN    cyclobenzaprine (Flexeril) tab 5 mg  5 mg Oral TID PRN    epoetin kendrick (Epogen, Procrit) 69326 UNIT/ML injection 10,000 Units  10,000 Units Intravenous Once in dialysis    sodium chloride 0.9 % IV bolus 100 mL  100 mL Intravenous Q30 Min PRN    And    albumin human (Albumin) 25% injection 25 g  25 g Intravenous PRN Dialysis    famotidine (Pepcid) tab 10 mg  10 mg Oral QOD    melatonin tab 9 mg  9 mg Oral Nightly PRN    glucose (Dex4) 15 GM/59ML oral liquid 15 g  15 g Oral Q15 Min PRN    Or    glucose (Glutose) 40% oral gel 15 g  15 g Oral Q15 Min PRN    Or    glucose-vitamin C (Dex-4) chewable tab 4 tablet  4 tablet Oral Q15  Min PRN    Or    dextrose 50% injection 50 mL  50 mL Intravenous Q15 Min PRN    Or    glucose (Dex4) 15 GM/59ML oral liquid 30 g  30 g Oral Q15 Min PRN    Or    glucose (Glutose) 40% oral gel 30 g  30 g Oral Q15 Min PRN    Or    glucose-vitamin C (Dex-4) chewable tab 8 tablet  8 tablet Oral Q15 Min PRN         Impression/Plan:      #1.  ESRD- due to DM.  HD today off schedule and then again Saturday to resume usual routine     #2.  Anemia- due to ESRD.  PAYAL for goal hgb 10-11 gms     #3.  Orthostatic hypotension- cont midodrine with HD     #4.  Ascites- reports usually has paracentesis every 3 weeks or so but has now been closer to 5.  Paracentesis today        Ruiz Kraft MD  9/6/2024  11:39 AM

## 2024-09-06 NOTE — PROGRESS NOTES
Spoke to ultrasound this am. Per Michele in ultrasound it is ok to for patient to leave on his insulin pump and CGM for the US guided paracentesis. Insulin pump to left lower abdomen in place and CGM to right back upper arm in place. ACCU check prior to going down is 80.

## 2024-09-06 NOTE — PROGRESS NOTES
CC: follow-up hospital admission back pain    SUBJECTIVE:  Interval History: feels better today. Pain better   Glucose elevated but he states he gave himself more insulin    OBJECTIVE:  Scheduled Meds:    insulin   Subcutaneous TID AC and HS    aspirin  81 mg Oral Daily    ezetimibe  10 mg Oral QAM    gabapentin  200 mg Oral Nightly    lactulose  20 g Oral QPM    levothyroxine  150 mcg Oral Daily @ 0700    [START ON 9/7/2024] midodrine  5 mg Oral Once per day on Tuesday Thursday Saturday    sertraline  25 mg Oral Daily    sevelamer carbonate  800 mg Oral TID AC    epoetin kendrick  10,000 Units Intravenous Once in dialysis    midodrine  10 mg Oral Once in dialysis    famotidine  10 mg Oral QOD     Continuous Infusions:   PRN Meds:   acetaminophen    HYDROcodone-acetaminophen    cyclobenzaprine    sodium chloride **AND** albumin human    melatonin    glucose **OR** glucose **OR** glucose-vitamin C **OR** dextrose **OR** glucose **OR** glucose **OR** glucose-vitamin C    PHYSICAL EXAM  Vital signs: Temp:  [97.9 °F (36.6 °C)-98.8 °F (37.1 °C)] 98 °F (36.7 °C)  Pulse:  [60-72] 72  Resp:  [12-23] 18  BP: ()/(51-65) 99/58  SpO2:  [93 %-99 %] 96 %      GENERAL - NAD, AAO  EYES- sclera anicteric,   HENT- normocephalic, OP - MMM  NECK - no JVD  CV- RRR  RESP - CTAB, normal resp effort  ABDOMEN- soft, NT/ND   EXT- no LE edema   PSYCH - normal mentation/ normal affect    Data Review:   Labs:   Recent Labs   Lab 09/05/24  1116 09/06/24  0448   WBC 6.3 6.3   HGB 10.7* 11.1*   MCV 99.7 99.4   .0 222.0   INR 1.18  --        Recent Labs   Lab 09/05/24  1116 09/06/24  0448    134*   K 4.4 5.6*   CL 97* 94*   CO2 27.0 24.0   BUN 56* 71*   CREATSERUM 5.50* 6.55*   CA 9.7 10.0   MG  --  2.9*   * 296*       Recent Labs   Lab 09/05/24  1116 09/06/24  0448   ALT 17 16   AST 24 21   ALB 4.0 4.0       Recent Labs   Lab 09/05/24  2334 09/06/24  0207 09/06/24  0309 09/06/24  0538 09/06/24  0648   PGLU 99 171* 219* 349*  366*           ASSESSMENT/PLAN:    Patient is a 73 year old male with PMH sig for ESRD on HD, cirrhosis, HE, DM 1 here for fall, back pain     Impression     -generalized weakness with fall     -ESRD on HD     -recurrent ascites (cardiogenic)  -LUGO cirrhosis  -HE on lactulose   -DM 1  -hypothyroidism  =hypotension on midodrine with HD     Plan     -PT OT  -check Xray of L spine. Suspect msk pain. Add flexeril. Add norco, tylenol.  Xray of spine showing possible diskitis in L5. He denied any preceding back pain. His pain appears rather acute after the fall. Less likely diskitis. Will check blood cx / esr / crp. He states he cannot have MRI due to prior spinal cord stimulator    -abd soft, nt. He is due to have paracentesis. Ordered  -plan for HD today dw renal  -cont lactulose  -ok to use home insulin pump   -cont synthroid      Scds  Heparin held for procedures      addnedum    Dw radiology, since pt unable to get MRI rec CT with contrast to eval bone better  Repeat esr  crp in am    Additional time 20 min, total time 70 min  Dw RN as well      Will continue to follow while hospitalized. Please page me or the on-call hospitalist with questions or concerns.    Charan Eddy Hospitalist  [phone]  Answering Service: [phone]

## 2024-09-06 NOTE — PROGRESS NOTES
Order for dialysis today[facility] Patient added to dialysis schedule today.

## 2024-09-06 NOTE — PLAN OF CARE
Returned from paracentesis at 1315, bandaid dry and intact to [facility].  Accu check 112 on arrival.  HD tech setting up for treatment.  Midodrine given.

## 2024-09-06 NOTE — PLAN OF CARE
AxO4. L limb precautions maintained. On RA - softer BP. PRANEETH w/ own mask and tubing. On tele-NSR,SB. On SCD. Denies nausea. On HD Tues,Thur,Sat - will have HD tomorrow, and resume on Saturday. Abdomen distended, soft - US paracentesis pending for tomorrow. Insulin pump managed by patient - on QID accuchecks. C/o lower back pain - given PRN meds. PT/OT to see - walker @ baseline. Spouse @ bedside - updated on POC. Safety measures placed. Care ongoing.    0650: blood sugar this morning elevated - 349 @ 0538. Patient gave himself bolus through insulin pump. 366 on recheck @ 0648. Patient wants to hold off on giving insulin bolus, denying s/s. Patient NPO for US paracentesis later today, sched for HD today as well. Will continue to monitor

## 2024-09-06 NOTE — CM/SW NOTE
09/06/24 1200   CM/SW Referral Data   Referral Source Social Work (self-referral)   Reason for Referral Discharge planning   Informant EMR;Clinical Staff Member   Patient Info   Patient's Current Mental Status at Time of Assessment Alert;Oriented   Patient lives with Spouse/Significant other   Patient Status Prior to Admission   Services in place prior to admission Home Health Care;Dialysis   Home Health Provider Info formerly [facility]   Dialysis Clinic [facility]  ([facility])   Scheduled Dialysis days T-TH-SAT   Discharge Needs   Anticipated D/C needs Home health care;Subacute rehab;Transportation services;To be determined       Patient is a 72 y/o man admitted with falls.  MRI concerning for discitis.  Spoke with PT who indicated pt would benefit from inpatient rehab setting at [place].  Attempted to meet with pt to discuss DC planning, however pt is off the floor at this time.  Chart reviewed.  Pt has had previous hospital stay at [facility] in 4/2024.  [facility] considered at that time but pt preferred DC to home.  Pt is current with [facility] services.  Referral sent to [facility] via [facility].  Referral also sent to NH facilities with on site HD in case [facility] is needed.  Noted pt made inpatient status today 9/6 and would require a medical need for continued hospital stay until 9/9 in order to have Medicare coverage for [facility].  / to remain available for support and/or discharge planning.     Jessica Avery [facility]  Discharge Planner  [phone]    Addendum:  Met with pt to discuss DC planning.  Pt stated he would consider [facility] if needed.  He has been to [facility] in the past.  Pt stated he was feeling unwell when PT worked with him and hopes to do better during his next session.  [facility] referrals pending.  Will need [facility].    Addendum:  Received call from pt's wife.  Discussed DC planning as above.  Pt's wife feels they are likely able to manage pt's needs at home.  She stated that  they use a wheelchair on days pt is not able to walk.  Pt required max assist today.  Plan to follow progress for further DC planning.

## 2024-09-06 NOTE — IMAGING NOTE
[facility] RN called regarding pt's procedure.    Instructed to keep pt clear liquids for 4 hrs prior to procedure.    Labs reviewed.      Pt is consentable.     Scheduled for 10:30 today.    RN verbalized understanding.

## 2024-09-06 NOTE — PLAN OF CARE
Patient alert and oriented x4. Left limb alert, AV fistula in place. VSS on RA. Tele in NSR. Denies pain. Insulin pump to left abdomen in place. CGM to right arm in place. SCDs in place, ankle pumps encouraged. LBM 9/6. Pt anuric, dialysis patient.Able to stand at edge of bed with walker. NPO, denies n/v.     Plan: Dialysis today, paracentesis today

## 2024-09-07 ENCOUNTER — APPOINTMENT (OUTPATIENT)
Dept: GENERAL RADIOLOGY | Facility: HOSPITAL | Age: 74
End: 2024-09-07
Attending: ORTHOPAEDIC SURGERY
Payer: MEDICARE

## 2024-09-07 LAB
ANION GAP SERPL CALC-SCNC: 12 MMOL/L (ref 0–18)
BUN BLD-MCNC: 42 MG/DL (ref 9–23)
CALCIUM BLD-MCNC: 9.4 MG/DL (ref 8.7–10.4)
CHLORIDE SERPL-SCNC: 97 MMOL/L (ref 98–112)
CO2 SERPL-SCNC: 26 MMOL/L (ref 21–32)
CREAT BLD-MCNC: 4.86 MG/DL
CRP SERPL-MCNC: 7.6 MG/DL (ref ?–0.5)
EGFRCR SERPLBLD CKD-EPI 2021: 12 ML/MIN/1.73M2 (ref 60–?)
ERYTHROCYTE [DISTWIDTH] IN BLOOD BY AUTOMATED COUNT: 20 %
ERYTHROCYTE [SEDIMENTATION RATE] IN BLOOD: 89 MM/HR
GLUCOSE BLD-MCNC: 117 MG/DL (ref 70–99)
GLUCOSE BLD-MCNC: 124 MG/DL (ref 70–99)
GLUCOSE BLD-MCNC: 183 MG/DL (ref 70–99)
GLUCOSE BLD-MCNC: 190 MG/DL (ref 70–99)
GLUCOSE BLD-MCNC: 76 MG/DL (ref 70–99)
GLUCOSE BLD-MCNC: 89 MG/DL (ref 70–99)
HBV SURFACE AG SER-ACNC: <0.1 [IU]/L
HBV SURFACE AG SERPL QL IA: NONREACTIVE
HCT VFR BLD AUTO: 36.1 %
HGB BLD-MCNC: 11.2 G/DL
MAGNESIUM SERPL-MCNC: 2.5 MG/DL (ref 1.6–2.6)
MCH RBC QN AUTO: 31.1 PG (ref 26–34)
MCHC RBC AUTO-ENTMCNC: 31 G/DL (ref 31–37)
MCV RBC AUTO: 100.3 FL
OSMOLALITY SERPL CALC.SUM OF ELEC: 295 MOSM/KG (ref 275–295)
PLATELET # BLD AUTO: 214 10(3)UL (ref 150–450)
POTASSIUM SERPL-SCNC: 4.8 MMOL/L (ref 3.5–5.1)
RBC # BLD AUTO: 3.6 X10(6)UL
SODIUM SERPL-SCNC: 135 MMOL/L (ref 136–145)
WBC # BLD AUTO: 6.5 X10(3) UL (ref 4–11)

## 2024-09-07 PROCEDURE — 90935 HEMODIALYSIS ONE EVALUATION: CPT | Performed by: INTERNAL MEDICINE

## 2024-09-07 PROCEDURE — 71046 X-RAY EXAM CHEST 2 VIEWS: CPT | Performed by: ORTHOPAEDIC SURGERY

## 2024-09-07 RX ORDER — PREDNISOLONE ACETATE 10 MG/ML
1 SUSPENSION/ DROPS OPHTHALMIC 4 TIMES DAILY
COMMUNITY
Start: 2024-08-19

## 2024-09-07 RX ORDER — MOXIFLOXACIN 5 MG/ML
1 SOLUTION/ DROPS OPHTHALMIC 3 TIMES DAILY
Status: DISCONTINUED | OUTPATIENT
Start: 2024-09-07 | End: 2024-09-12

## 2024-09-07 RX ORDER — PREDNISOLONE ACETATE 10 MG/ML
1 SUSPENSION/ DROPS OPHTHALMIC
Status: DISCONTINUED | OUTPATIENT
Start: 2024-09-07 | End: 2024-09-08

## 2024-09-07 RX ORDER — SILVER SULFADIAZINE 1 %
1 CREAM (GRAM) TOPICAL DAILY
COMMUNITY

## 2024-09-07 RX ORDER — MIDODRINE HYDROCHLORIDE 5 MG/1
5 TABLET ORAL
Status: DISCONTINUED | OUTPATIENT
Start: 2024-09-07 | End: 2024-09-12

## 2024-09-07 RX ORDER — MOXIFLOXACIN 5 MG/ML
1 SOLUTION/ DROPS OPHTHALMIC 3 TIMES DAILY
COMMUNITY

## 2024-09-07 NOTE — CONSULTS
Infectious Disease Initial Consultation      Date of admission: 9/5/2024 10:33 AM     Date of service: 09/07/24 7:33 AM    Consult requested by: Charan Guido DO     Reason for consult: Question of discitis    Chief complaint: Back pain    History of present illness: Gamaliel Boyer is a 73 year old male with history of end-stage renal disease on hemodialysis, cirrhosis, type 1 diabetes, who presents here with back pain after falling down.  The patient apparently had a mechanical fall when he forgot to lock the wheels on his walker and lost balance and fell on his back.  Since then, has been having back pain.  As such, he presented to the emergency room.    In the ED, the patient was afebrile, hemodynamically stable.  Labs revealed a BMP consistent with his end-stage renal disease.  LFTs showed a slightly elevated alk phos but otherwise was unremarkable.  CBC showed mild anemia but otherwise was unremarkable.  CRP was slightly elevated at 8.8, today down to 7.6.  Sed rate was 100.  2 sets of blood cultures obtained, remain negative to date.  A CT of the lumbar spine was done that showed bony destruction of L5-S1 endplate suspicious for osteomyelitis.  There was also her degenerative noted at the ventral aspect of the epidural space at the level of L5 and S1 which could represent an epidural phlegmon.  ID were consulted given CT findings.    Review of systems:  All other components of the review of systems are negative, except those described in the history of present illness.     Past Medical History:    Anemia    anxiety    back pain    Back problem    Blastomycosis    BPH (benign prostatic hyperplasia)    Depression    Diabetic retinopathy    laser surgery    End stage renal disease (HCC)    Esophageal reflux    occassion    Hearing impairment    hearing aids    HEMORRHOIDS    High blood pressure    High cholesterol    History of blood transfusion    History of renal dialysis    fistula on left arm     Hyperkalemia    hyperlipidemia    hypothyroidism    IMPOTENCE    Liver disease    2013 - pt states all resolved after liver and kidneys shut down after issue with pneumonia     Mood disturbance    Neuropathy    osteoarthritis    Osteoarthrosis, unspecified whether generalized or localized, unspecified site    osteoporosis    Polyneuropathy in diabetes(357.2)    Sepsis (HCC)    Sleep apnea, obstructive    AHI 54 SaO2 lizeth 74 % CPAP 12 HME//     Type I (juvenile type) diabetes mellitus without mention of complication, not stated as uncontrolled    since 1963- DR Mao follows    Unspecified essential hypertension    Visual impairment    wears glasses     Past Surgical History:   Procedure Laterality Date    Back surgery  2009    laminectomy  L1 -4  2/09  Dr. Eitan Guerrero [facility]    Back surgery  8-8-13    C4-C6 ACDF     Back surgery  9/8/16    L2-S1 Revision Decomp possible uninstrumented fusion 9/8/16    Back surgery  09/10/2018    Rev C3-C7 ACDF     Cataract Bilateral done in 2004    IOL's    Colonoscopy      2006    Colonoscopy  2/2009    normal    Colonoscopy N/A 8/23/2019    adenoma- repeat 1 yr (2 dya prep)    Colonoscopy,biopsy  2/20/09    Performed by ANAM RIVERS at [facility]    Egd N/A 7/23/2021    EGD & COLONOSCOPY Surgeon: Kimberly, +vik, rpt EGD 3 months, poor prep rpt colon 3 years    Endoscopy, bowel pouch, biopsy      Injection, w/wo contrast, dx/therapeutic substance, epidural/subarachnoid; lumbar/sacral N/A 5/4/2016    Procedure: LUMBAR EPIDURAL;  Surgeon: Jayden Norton MD;  Location: [facility]    Injection, w/wo contrast, dx/therapeutic substance, epidural/subarachnoid; lumbar/sacral N/A 5/25/2016    Procedure: LUMBAR EPIDURAL;  Surgeon: Jayden Norton MD;  Location: [facility]    Injection, w/wo contrast, dx/therapeutic substance, epidural/subarachnoid; lumbar/sacral N/A 6/8/2016    Procedure: LUMBAR EPIDURAL;  Surgeon: Errol  MD Jayden;  Location: [facility]    Knee replacement surgery  2/14/13    Left TKR by Dr. Lombardi    M-sedaj by  phys perfrmg sv 5+ yr N/A 5/4/2016    Procedure: LUMBAR EPIDURAL;  Surgeon: Jayden Norton MD;  Location: [facility]    M-sedaj by  phys perfrmg sv 5+ yr N/A 5/25/2016    Procedure: LUMBAR EPIDURAL;  Surgeon: Jayden Norton MD;  Location: [facility]    M-sedaj by  phys perfrmg sv 5+ yr N/A 6/8/2016    Procedure: LUMBAR EPIDURAL;  Surgeon: Jayden Norton MD;  Location: [facility]    Other surgical history      Bilateral median nerve release at elbow; 2000    Other surgical history      CTS release bilateral 2000    Other surgical history      Surgical repair fracture left hand 5th digit    Other surgical history      Surgery left heel ligament repair    Other surgical history      Laser surgery for bilateral retinopathy    Other surgical history      Surgery to left eye for \"weak muscle.\" 2007    Other surgical history      bilat knee repair 9/23/10    Other surgical history  11/2013    lung resection to remove abcess    Other surgical history  10/26/2020    Cystoscopy (Dr. Ribeiro)    Patient documented not to have experienced any of the following events  2/14/2014    Procedure: ;  Surgeon: Kin Hobbs MD;  Location: [facility]    Patient documented not to have experienced any of the following events N/A 5/4/2016    Procedure: LUMBAR EPIDURAL;  Surgeon: Jayden Norton MD;  Location: [facility]    Patient documented not to have experienced any of the following events N/A 5/25/2016    Procedure: LUMBAR EPIDURAL;  Surgeon: Jayden Norton MD;  Location: [facility]    Patient documented not to have experienced any of the following events N/A 6/8/2016    Procedure: LUMBAR EPIDURAL;  Surgeon: Jayden Norton MD;  Location: [facility]    Patient  withough preoperative order for iv antibiotic surgical site infection prophylaxis.  2014    Procedure: ;  Surgeon: Kin Hobbs MD;  Location: [facility]    Patient withough preoperative order for iv antibiotic surgical site infection prophylaxis. N/A 2016    Procedure: LUMBAR EPIDURAL;  Surgeon: Jayden Norton MD;  Location: [facility]    Patient withough preoperative order for iv antibiotic surgical site infection prophylaxis. N/A 2016    Procedure: LUMBAR EPIDURAL;  Surgeon: Jayden Norton MD;  Location: [facility]    Patient withough preoperative order for iv antibiotic surgical site infection prophylaxis. N/A 2016    Procedure: LUMBAR EPIDURAL;  Surgeon: Jayden Norton MD;  Location: [facility]    Total knee replacement Left     Upper gi endoscopy,diagnosis  16    retained gastric contents; [place]    Upper gi endoscopy,remov f.b. N/A 2014    Procedure: ESOPHAGOGASTRODUODENOSCOPY, POSSIBLE BIOPSY, POSSIBLE POLYPECTOMY 38359;  Surgeon: Kin Hobbs MD;  Location: [facility]     Social History     Socioeconomic History    Marital status:     Number of children: 1   Occupational History    Occupation: Retired--Printer   Tobacco Use    Smoking status: Former     Current packs/day: 0.00     Average packs/day: 3.5 packs/day for 20.0 years (70.0 ttl pk-yrs)     Types: Cigarettes     Start date: 1966     Quit date: 1986     Years since quittin.7    Smokeless tobacco: Never    Tobacco comments:     Quit 25 years ago   Vaping Use    Vaping status: Never Used   Substance and Sexual Activity    Alcohol use: Not Currently     Comment: very rarely    Drug use: No    Sexual activity: Yes     Partners: Female   Other Topics Concern     Service No    Blood Transfusions No    Caffeine Concern No     Comment: coffee 1 cup per day    Sleep Concern No    Exercise Yes      Comment: PT/OT 1x per week    Seat Belt Yes     Social Determinants of Health     Financial Resource Strain: Low Risk  (4/19/2024)    Received from [facility]    Financial Resource Strain     In the past year, have you or any family members you live with been unable to get any of the following when it was really needed? Check all that apply.: None   Food Insecurity: No Food Insecurity (9/5/2024)    Food Insecurity     Food Insecurity: Never true   Transportation Needs: No Transportation Needs (9/5/2024)    Transportation Needs     Lack of Transportation: No   Social Connections: Low Risk  (4/19/2024)    Received from [facility]    Social Connections     How often do you see or talk to people that you care about and feel close to? (For example: talking to friends on the phone, visiting friends or family, going to [employer]ational or club meetings): 5 or more times a week   Housing Stability: Low Risk  (9/5/2024)    Housing Stability     Housing Instability: No     Family History   Problem Relation Age of Onset    Hypertension Father     Lipids Father     Cancer Father     Hypertension Mother     Psychiatric Maternal Grandmother     Diabetes Paternal Grandfather         Type 2 DM    Heart Disorder Brother         CAD with MI at age 53 - passed away    Obesity Brother     Diabetes Brother         Type 2 DM    Hypertension Brother     Lipids Brother     Thyroid Disorder Neg      Reviewed, see above    Medications:    bacitracin-polymyxin b    moxifloxacin    prednisoLONE    insulin    sodium chloride **AND** albumin human    silver sulfADIAZINE    aspirin    ezetimibe    gabapentin    lactulose    levothyroxine    midodrine    sertraline    sevelamer carbonate    acetaminophen    HYDROcodone-acetaminophen    cyclobenzaprine    sodium chloride **AND** albumin human    famotidine    melatonin    glucose **OR** glucose **OR** glucose-vitamin C **OR** dextrose **OR** glucose **OR** glucose **OR**  glucose-vitamin C     Allergies:  Allergies   Allergen Reactions    Antihistamine & Nasal Deconges [Fexofenadine-Pseudoephedrine] OTHER (SEE COMMENTS)     Altered mental status    Adhesive Tape ITCHING    Albuterol DIZZINESS and OTHER (SEE COMMENTS)     Albuterol Inhalation. Lightheadedness    Benadryl [Diphenhydramine] RESTLESSNESS    Depakote [Valproic Acid] DIZZINESS    Fluconazole OTHER (SEE COMMENTS)     Kidney labs abnormal    Mirtazapine RESTLESSNESS    Quetiapine RESTLESSNESS    Wellbutrin [Bupropion] RASH    Latex RASH       Physical Exam:  Vitals:    09/07/24 0332   BP: 102/58   Pulse: 59   Resp: 16   Temp: 98.1 °F (36.7 °C)     Vitals signs and nursing note reviewed.   Constitutional:       Appearance: Normal appearance.   HENT:      Head: Normocephalic and atraumatic.      Mouth: Mucous membranes are moist.   Neck:      Musculoskeletal: Neck supple.   Cardiovascular:      Rate and Rhythm: Normal rate.      Heart sounds: Normal heart sounds. No murmur. No friction rub. No gallop.    Pulmonary:      Effort: Pulmonary effort is normal. No respiratory distress.      Breath sounds: Normal breath sounds. No stridor. No wheezing, rhonchi or rales.   Chest:      Chest wall: No tenderness.   Abdominal:      General: Abdomen is flat. There is no distension.      Palpations: Abdomen is soft. There is no mass.      Tenderness: There is no tenderness. There is no guarding or rebound.      Hernia: No hernia is present.   Musculoskeletal:      Right lower leg: No edema.  Right leg venous ulcer noted without any signs of infection     Left lower leg: No edema.  Left great toe amputation site noted without any signs of infection  Skin:     General: Skin is warm and dry.   Neurological:      General: No focal deficit present.      Mental Status: Alert and oriented to person, place, and time.     Laboratory data:  I have independently reviewed all lab results; including old microbiological results.  Lab Results   Component  Value Date    WBC 6.5 09/07/2024    HGB 11.2 09/07/2024    HCT 36.1 09/07/2024    .0 09/07/2024    CREATSERUM 4.86 09/07/2024    BUN 42 09/07/2024     09/07/2024    K 4.8 09/07/2024    CL 97 09/07/2024    CO2 26.0 09/07/2024     09/07/2024    CA 9.4 09/07/2024    CRP 7.60 09/07/2024    MG 2.5 09/07/2024        Recent Labs   Lab 09/05/24  1116 09/06/24  0448 09/07/24  0650   RBC 3.44*   < > 3.60*   HGB 10.7*   < > 11.2*   HCT 34.3*   < > 36.1*   MCV 99.7   < > 100.3*   MCH 31.1   < > 31.1   MCHC 31.2   < > 31.0   RDW 19.6   < > 20.0   NEPRELIM 5.21  --   --    WBC 6.3   < > 6.5   .0   < > 214.0    < > = values in this interval not displayed.       Microbiology data:  No results found for this visit on 09/05/24.      Radiology:  I have reviewed all imaging data available independently.   CT of the lumbar spine on 9/6/2024:  Bony destruction of L5-S1 endplates suspicious for osteomyelitis.  Question of L5-S1 epidural phlegmon    Impression:  Gamaliel Boyer is a 73 year old male with     Traumatic back pain after a fall from his wheelchair  CT is concerning for osteomyelitis with epidural phlegmon; however, clinical picture is not consistent with an infectious process  The patient denies any fevers or chills  Up until his fall, the patient was not having any worsening back pain outside of his chronic pain  An MRI would be helpful to shed more light on his spine  IR consult for possible biopsy and pathology would be useful if the above MRI confirms discitis  End stage renal disease  On hemodialysis  Antibiotics will be renally adjusted  Type 1 diabetes  As per the primary team  Reported allergy to fluconazole  Develops electrolyte abnormalities    Recommendations:     MRI of the lumbar and sacral spine with and without contrast, with anesthesia given his claustrophobia  Given the possibility of an epidural phlegmon, we will start the patient on cefepime and vancomycin  Based on the MRI  results, we will decide whether the patient will need IR biopsy for pathology and cultures  Agree with spine consult  Continue to monitor daily labs for antibiotic toxicity  Further recommendations will depend on the above work-up and clinical progress     The plan of care was discussed with the primary hospital team, Charan Guido DO     Recommendations were also discussed with the patient; all questions were answered.     Thank you for this consultation. Please don't hesitate to call the ID team for questions or any acute changes in patient's clinical condition.    Please note that this report has been produced using speech recognition software and may contain errors related to that system including, but not limited to, errors in grammar, punctuation, and spelling, as well as words and phrases that possibly may have been recognized inappropriately.  If there are any questions or concerns, contact the dictating provider for clarification.    The 21st Century Cures Act makes medical notes like these available to patients in the interest of transparency. Please be advised this is a medical document. Medical documents are intended to carry relevant information, facts as evident, and the clinical opinion of the practitioner. The medical note is intended as peer to peer communication and may appear blunt or direct. It is written in medical language and may contain abbreviations or verbiage that are unfamiliar.     Taryn Ibanez MD  [facility]. Tel: [phone]. Fax: [phone].     Gamaliel Boyer : 1950 MRN: [id number] [facility]: [id number]

## 2024-09-07 NOTE — PROGRESS NOTES
CC: follow-up hospital admission back pain    SUBJECTIVE:  Interval History: pain ok, no cp/sob. No f/c. Asking for cpap. Getting HD     OBJECTIVE:  Scheduled Meds:    bacitracin-polymyxin b  1 inch Right Eye TID    moxifloxacin  1 drop Right Eye TID    prednisoLONE  1 drop Right Eye 6 times per day    cefepime  1 g Intravenous Q24H    vancomycin  10 mg/kg Intravenous Q Tu, Th and Sa    midodrine  5 mg Oral Q Tu, Th and Sa    insulin   Subcutaneous TID AC and HS    silver sulfADIAZINE   Topical Daily    [Held by provider] aspirin  81 mg Oral Daily    ezetimibe  10 mg Oral QAM    gabapentin  200 mg Oral Nightly    lactulose  20 g Oral QPM    levothyroxine  150 mcg Oral Daily @ 0700    sertraline  25 mg Oral Daily    sevelamer carbonate  800 mg Oral TID AC    famotidine  10 mg Oral QOD     Continuous Infusions:   PRN Meds:   sodium chloride **AND** albumin human    acetaminophen    HYDROcodone-acetaminophen    cyclobenzaprine    melatonin    glucose **OR** glucose **OR** glucose-vitamin C **OR** dextrose **OR** glucose **OR** glucose **OR** glucose-vitamin C    PHYSICAL EXAM  Vital signs: Temp:  [98.1 °F (36.7 °C)-99.2 °F (37.3 °C)] 98.5 °F (36.9 °C)  Pulse:  [59-64] 62  Resp:  [14-16] 16  BP: ()/(37-61) 118/50  SpO2:  [92 %-96 %] 94 %      GENERAL - NAD, AAO  EYES- sclera anicteric,   HENT- normocephalic, OP - MMM  NECK - no JVD  CV- RRR  RESP - CTAB, normal resp effort  ABDOMEN- soft, NT/ND   EXT- no LE edema   PSYCH - normal mentation/ normal affect    Data Review:   Labs:   Recent Labs   Lab 09/05/24  1116 09/06/24  0448 09/07/24  0650   WBC 6.3 6.3 6.5   HGB 10.7* 11.1* 11.2*   MCV 99.7 99.4 100.3*   .0 222.0 214.0   INR 1.18  --   --        Recent Labs   Lab 09/05/24  1116 09/06/24 0448 09/07/24  0650    134* 135*   K 4.4 5.6* 4.8   CL 97* 94* 97*   CO2 27.0 24.0 26.0   BUN 56* 71* 42*   CREATSERUM 5.50* 6.55* 4.86*   CA 9.7 10.0 9.4   MG  --  2.9* 2.5   * 296* 183*       Recent Labs    Lab 09/05/24  1116 09/06/24  0448   ALT 17 16   AST 24 21   ALB 4.0 4.0       Recent Labs   Lab 09/06/24  1746 09/06/24  2105 09/07/24  0506 09/07/24  1009 09/07/24  1155   PGLU 159* 211* 190* 124* 89           ASSESSMENT/PLAN:    Patient is a 73 year old male with PMH sig for ESRD on HD, cirrhosis, HE, DM 1 here for fall, back pain     Impression     -generalized weakness with fall     -ESRD on HD     -recurrent ascites (cardiogenic)  -LUGO cirrhosis  -HE on lactulose   -DM 1  -hypothyroidism  =hypotension on midodrine with HD     Plan     -PT OT  -check Xray of L spine. Suspect msk pain. Add flexeril. Add norco, tylenol.  Xray of spine showing possible diskitis in L5. He denied any preceding back pain. His pain appears rather acute after the fall. Less likely diskitis. Will check blood cx / esr / crp. He states he cannot have MRI due to prior spinal cord stimulator. Based on Dr. Figueroa noted he had a Nevro SCS placed in 2020, and did have a brain MRI in 2021.   -MRI which is scheduled Monday with sedation   -Given the possibility of an epidural phlegmon, we will start the patient on cefepime and vancomycin, apprec ID recs   - possible IR bx   -CT with contrast noted  -Repeated esr  crp     -abd soft, nt. He is due to have paracentesis. Ordered  -HD per renal  -cont lactulose  -ok to use home insulin pump   -cont synthroid      Scds  Heparin held for procedures         Will continue to follow while hospitalized. Please page me or the on-call hospitalist with questions or concerns. Reviewed chart including previous progress notes. D/w RN     Nikunj Kong MD  [facility] Hospitalist  [phone]  9/7/2024  5:17 PM

## 2024-09-07 NOTE — PROGRESS NOTES
[facility] Pharmacy Dosing Service      Initial Pharmacokinetic Consult for Vancomycin Dosing     Gamaliel Boyer is a 73 year old male who is being initiated on vancomycin therapy for osteomyelitis.  Pharmacy has been asked to dose vancomycin by Dr. Matute.  The initial treatment and monitoring approach will be non-AUC strategy.        Weight and Temperature:    Wt Readings from Last 1 Encounters:   24 73.9 kg (163 lb)        Temp Readings from Last 1 Encounters:   24 98.8 °F (37.1 °C) (Oral)      Labs:   Recent Labs   Lab 24  1116 24  0448 24  0650   CREATSERUM 5.50* 6.55* 4.86*      Estimated Creatinine Clearance: 12 mL/min (A) (based on SCr of 4.86 mg/dL (H)).     Recent Labs   Lab 24  1116 24  0448 24  0650   WBC 6.3 6.3 6.5          The Pharmacokinetic Target is:    Trough/random 15-20 mg/L (applies to IV dosing in HD and IP dosing in APD)    Renal Dosing Considerations:    HD: Home regimen: Tues/Thurs/Saturday     Assessment/Plan:   Initial/Loading dose: Will receive 1000 mg IV (15 mg/kg, capped at 2250 mg) x 1 initial dose.      Maintenance dose: Pharmacy will dose vancomycin at 750 mg IV after each dialysis    Monitorin) Plan for vancomycin trough to be obtained prior to the 3rd HD session    2) Pharmacy will order SCr as clinically indicated to assess renal function.    3) Pharmacy will monitor for toxicity and efficacy, adjust vancomycin dose and/or frequency, and order vancomycin levels as appropriate per the Pharmacy and Therapeutics Committee approved protocol until discontinuation of the medication.       We appreciate the opportunity to assist in the care of this patient.     Lizeth LEES CPhT  2024  10:23 AM  [facility]  Pharmacy Extension: [phone]

## 2024-09-07 NOTE — PROGRESS NOTES
[facility]   part of [facility]     Nephrology Progress Note    Gamaliel Boyer Patient Status:  Inpatient    1950 MRN [id number]   Location [facility] 3SW-A Attending Charan Guido,    Hosp Day # 1 PCP Nima Sorensen MD       SUBJECTIVE:  No complains  States she slept well  Denies any cp/sob  No LH/dizziness  No cp/sob    HD today      Current Facility-Administered Medications:     bacitracin-polymyxin b (Polysporin) ophthalmic ointment 1 inch, 1 inch, Right Eye, TID    moxifloxacin (Vigamox) 0.5 % ophthalmic solution 1 drop, 1 drop, Right Eye, TID    prednisoLONE (Pred Forte) 1 % ophthalmic suspension 1 drop, 1 drop, Right Eye, 6 times per day    ceFEPIme (Maxipime) 1 g in sodium chloride 0.9% 100 mL IVPB-MBP, 1 g, Intravenous, Q24H    vancomycin (Vancocin) 750 mg in sodium chloride 0.9% 250 mL IVPB-ADDV [To be given AFTER HD], 10 mg/kg, Intravenous, Q ,  and Sa    insulin via Insulin Pump, , Subcutaneous, TID AC and HS    sodium chloride 0.9 % IV bolus 100 mL, 100 mL, Intravenous, Q30 Min PRN **AND** albumin human (Albumin) 25% injection 25 g, 25 g, Intravenous, PRN Dialysis    silver sulfADIAZINE (Silvadene) 1 % cream, , Topical, Daily    aspirin DR tab 81 mg, 81 mg, Oral, Daily    ezetimibe (Zetia) tab 10 mg, 10 mg, Oral, QAM    gabapentin (Neurontin) cap 200 mg, 200 mg, Oral, Nightly    lactulose (CHRONULAC) 10 GM/15ML solution 20 g, 20 g, Oral, QPM    levothyroxine (Synthroid) tab 150 mcg, 150 mcg, Oral, Daily @ 0700    [START ON 9/10/2024] midodrine (ProAmatine) tab 5 mg, 5 mg, Oral, Once per day on     sertraline (Zoloft) tab 25 mg, 25 mg, Oral, Daily    sevelamer carbonate (Renvela) tab 800 mg, 800 mg, Oral, TID AC    acetaminophen (Tylenol Extra Strength) tab 500 mg, 500 mg, Oral, Q4H PRN    HYDROcodone-acetaminophen (Norco) 5-325 MG per tab 1 tablet, 1 tablet, Oral, Q6H PRN    cyclobenzaprine (Flexeril) tab 5 mg, 5 mg, Oral, TID PRN     sodium chloride 0.9 % IV bolus 100 mL, 100 mL, Intravenous, Q30 Min PRN **AND** albumin human (Albumin) 25% injection 25 g, 25 g, Intravenous, PRN Dialysis    famotidine (Pepcid) tab 10 mg, 10 mg, Oral, QOD    melatonin tab 9 mg, 9 mg, Oral, Nightly PRN    glucose (Dex4) 15 GM/59ML oral liquid 15 g, 15 g, Oral, Q15 Min PRN **OR** glucose (Glutose) 40% oral gel 15 g, 15 g, Oral, Q15 Min PRN **OR** glucose-vitamin C (Dex-4) chewable tab 4 tablet, 4 tablet, Oral, Q15 Min PRN **OR** dextrose 50% injection 50 mL, 50 mL, Intravenous, Q15 Min PRN **OR** glucose (Dex4) 15 GM/59ML oral liquid 30 g, 30 g, Oral, Q15 Min PRN **OR** glucose (Glutose) 40% oral gel 30 g, 30 g, Oral, Q15 Min PRN **OR** glucose-vitamin C (Dex-4) chewable tab 8 tablet, 8 tablet, Oral, Q15 Min PRN          Physical Exam:   /60 (BP Location: Right arm)   Pulse 63   Temp 98.9 °F (37.2 °C) (Oral)   Resp 16   Ht 5' 6\" (1.676 m)   Wt 163 lb (73.9 kg)   SpO2 93%   BMI 26.31 kg/m²   Temp (24hrs), Av.7 °F (37.1 °C), Min:98.1 °F (36.7 °C), Max:99.2 °F (37.3 °C)       Intake/Output Summary (Last 24 hours) at 2024 1244  Last data filed at 2024 0755  Gross per 24 hour   Intake 240 ml   Output 1000 ml   Net -760 ml     Last 3 Weights   24 1604 163 lb (73.9 kg)   24 1036 163 lb (73.9 kg)   24 1103 163 lb (73.9 kg)   24 0307 157 lb 13.6 oz (71.6 kg)   24 0128 158 lb 6.4 oz (71.8 kg)   24 2041 170 lb (77.1 kg)   23 1556 171 lb 8 oz (77.8 kg)   10/21/23 0440 181 lb 7 oz (82.3 kg)   10/20/23 1806 175 lb (79.4 kg)   10/20/23 0800 175 lb (79.4 kg)   10/14/23 1517 170 lb (77.1 kg)     General: Alert and in no apparent distress.  HEENT: No scleral icterus, MMM  Neck: Supple, no KALPANA or thyromegaly  Cardiac: Regular rate and rhythm, S1, S2 normal, no murmur or rub  Lungs: Clear without wheezes, rales, rhonchi.    Abdomen: Soft, non-tender. + bowel sounds, no palpable organomegaly  Extremities: Without  clubbing, cyanosis or edema. L arm AVF with bruit/thrill  Neurologic:  moving all extremities  Skin: Warm and dry, no rash      Recent Labs     09/05/24  1116 09/06/24  0448 09/07/24  0650   WBC 6.3 6.3 6.5   HGB 10.7* 11.1* 11.2*   MCV 99.7 99.4 100.3*   .0 222.0 214.0   INR 1.18  --   --        Recent Labs     09/05/24  1116 09/06/24  0448 09/07/24  0650    134* 135*   K 4.4 5.6* 4.8   CL 97* 94* 97*   CO2 27.0 24.0 26.0   BUN 56* 71* 42*   CREATSERUM 5.50* 6.55* 4.86*   CA 9.7 10.0 9.4   MG  --  2.9* 2.5       Recent Labs     09/05/24 1116 09/06/24  0448   ALT 17 16   AST 24 21   ALB 4.0 4.0         Impression/Plan:      1.  ESRD- due to DM.  HD today per routine TTS schedule      2.  Anemia- due to ESRD.  PAYAL for goal hgb 10-11 gms     3.  Orthostatic hypotension- cont midodrine with HD     4.  Ascites- reports usually has paracentesis every 3 weeks or so but has now been closer to 5.  S/p paracentesis 9/6- 3.1 L       Mo Rousseau  9/7/2024

## 2024-09-07 NOTE — CONSULTS
[facility]  Report of Consultation    Gamaliel Boyer Patient Status:  Inpatient    1950 MRN [id number]   Location [facility] 3SW-A Attending Charan Guido, DO   Hosp Day # 1 PCP Nima Sorensen MD     Reason for Consultation:  Back pain    History of Present Illness:  Gamaliel Boyer is a a(n) 73 year old male. Patient states he has a long history of low back pain, and bilateral ankle stiffness.  He states that he fell when going from his walker to his chair in the kitchen, and landed on his buttocks.  Since then he has had left sided buttock pain.  He has no new leg pain, numbness, or weakness.  He states he has not been able to move his ankles for many years.  He had a lumbar decompression done 8 years ago,  He then had a Nevro SCS placed in .  He has had injections in the past by Dr. Renner.  He states his main complaint is left sided buttock pain since his fall.  ID is on consult, and would like a MRI of the lumbar spine/sacrum/coccyx.  They are waiting on those results prior to deciding if a Biopsy is needed to rule out Osteomyelitis.  They are starting him on ABX.    History:  Past Medical History:    Anemia    anxiety    back pain    Back problem    Blastomycosis    BPH (benign prostatic hyperplasia)    Depression    Diabetic retinopathy    laser surgery    End stage renal disease (HCC)    Esophageal reflux    occassion    Hearing impairment    hearing aids    HEMORRHOIDS    High blood pressure    High cholesterol    History of blood transfusion    History of renal dialysis    fistula on left arm    Hyperkalemia    hyperlipidemia    hypothyroidism    IMPOTENCE    Liver disease    2013 - pt states all resolved after liver and kidneys shut down after issue with pneumonia     Mood disturbance    Neuropathy    osteoarthritis    Osteoarthrosis, unspecified whether generalized or localized, unspecified site    osteoporosis    Polyneuropathy in diabetes(357.2)    Sepsis (HCC)     Sleep apnea, obstructive    AHI 54 SaO2 lizeth 74 % CPAP 12 HME//     Type I (juvenile type) diabetes mellitus without mention of complication, not stated as uncontrolled    since 1963- DR Mao follows    Unspecified essential hypertension    Visual impairment    wears glasses     Past Surgical History:   Procedure Laterality Date    Back surgery  2009    laminectomy  L1 -4  2/09  Dr. Eitan Guerrero [facility]    Back surgery  8-8-13    C4-C6 ACDF     Back surgery  9/8/16    L2-S1 Revision Decomp possible uninstrumented fusion 9/8/16    Back surgery  09/10/2018    Rev C3-C7 ACDF     Cataract Bilateral done in 2004    IOL's    Colonoscopy      2006    Colonoscopy  2/2009    normal    Colonoscopy N/A 8/23/2019    adenoma- repeat 1 yr (2 dya prep)    Colonoscopy,biopsy  2/20/09    Performed by ANAM RIVERS [place] [facility] [place]    Egd N/A 7/23/2021    EGD & COLONOSCOPY Surgeon: Kimberly, +vik, rpt EGD 3 months, poor prep rpt colon 3 years    Endoscopy, bowel pouch, biopsy      Injection, w/wo contrast, dx/therapeutic substance, epidural/subarachnoid; lumbar/sacral N/A 5/4/2016    Procedure: LUMBAR EPIDURAL;  Surgeon: Jayden Norton MD;  Location: [facility]    Injection, w/wo contrast, dx/therapeutic substance, epidural/subarachnoid; lumbar/sacral N/A 5/25/2016    Procedure: LUMBAR EPIDURAL;  Surgeon: Jayden Norton MD;  Location: [facility]    Injection, w/wo contrast, dx/therapeutic substance, epidural/subarachnoid; lumbar/sacral N/A 6/8/2016    Procedure: LUMBAR EPIDURAL;  Surgeon: Jayden Norton MD;  Location: [facility]    Knee replacement surgery  2/14/13    Left TKR by Dr. Lombardi    M-sedaj by  phys perfrmg svc 5+ yr N/A 5/4/2016    Procedure: LUMBAR EPIDURAL;  Surgeon: Jayden Norton MD;  Location: [facility]    -seda by  phys perfrmg svc 5+ yr N/A 5/25/2016    Procedure: LUMBAR EPIDURAL;  Surgeon: Errol  MD Jayden;  Location: [facility]    M-sedaj by  phys perfrmg sv 5+ yr N/A 6/8/2016    Procedure: LUMBAR EPIDURAL;  Surgeon: Jayden Norton MD;  Location: [facility]    Other surgical history      Bilateral median nerve release at elbow; 2000    Other surgical history      CTS release bilateral 2000    Other surgical history      Surgical repair fracture left hand 5th digit    Other surgical history      Surgery left heel ligament repair    Other surgical history      Laser surgery for bilateral retinopathy    Other surgical history      Surgery to left eye for \"weak muscle.\" 2007    Other surgical history      bilat knee repair 9/23/10    Other surgical history  11/2013    lung resection to remove abcess    Other surgical history  10/26/2020    Cystoscopy (Dr. Ribeiro)    Patient documented not to have experienced any of the following events  2/14/2014    Procedure: ;  Surgeon: Kin Hobbs MD;  Location: [facility]    Patient documented not to have experienced any of the following events N/A 5/4/2016    Procedure: LUMBAR EPIDURAL;  Surgeon: aJyden Norton MD;  Location: [facility]    Patient documented not to have experienced any of the following events N/A 5/25/2016    Procedure: LUMBAR EPIDURAL;  Surgeon: Jayden Norton MD;  Location: [facility]    Patient documented not to have experienced any of the following events N/A 6/8/2016    Procedure: LUMBAR EPIDURAL;  Surgeon: Jayden Norton MD;  Location: [facility]    Patient withough preoperative order for iv antibiotic surgical site infection prophylaxis.  2/14/2014    Procedure: ;  Surgeon: Kin Hobbs MD;  Location: [facility]    Patient withough preoperative order for iv antibiotic surgical site infection prophylaxis. N/A 5/4/2016    Procedure: LUMBAR EPIDURAL;  Surgeon: Jayden Norton MD;  Location: [facility]    Patient withough preoperative order for iv antibiotic surgical site infection prophylaxis. N/A 5/25/2016    Procedure: LUMBAR EPIDURAL;  Surgeon: Jayden Norton MD;  Location: [facility]    Patient withough preoperative order for iv antibiotic surgical site infection prophylaxis. N/A 6/8/2016    Procedure: LUMBAR EPIDURAL;  Surgeon: Jayden Norton MD;  Location: [facility]    Total knee replacement Left 2013    Upper gi endoscopy,diagnosis  4/14/16    retained gastric contents; Edward    Upper gi endoscopy,remov f.b. N/A 2/14/2014    Procedure: ESOPHAGOGASTRODUODENOSCOPY, POSSIBLE BIOPSY, POSSIBLE POLYPECTOMY 25002;  Surgeon: Kin Hobbs MD;  [facility]     Family History   Problem Relation Age of Onset    Hypertension Father     Lipids Father     Cancer Father     Hypertension Mother     Psychiatric Maternal Grandmother     Diabetes Paternal Grandfather         Type 2 DM    Heart Disorder Brother         CAD with MI at age 53 - passed away    Obesity Brother     Diabetes Brother         Type 2 DM    Hypertension Brother     Lipids Brother     Thyroid Disorder Neg       reports that he quit smoking about 38 years ago. His smoking use included cigarettes. He started smoking about 58 years ago. He has a 70 pack-year smoking history. He has never used smokeless tobacco. He reports that he does not currently use alcohol. He reports that he does not use drugs.    Allergies:  Allergies   Allergen Reactions    Antihistamine & Nasal Deconges [Fexofenadine-Pseudoephedrine] OTHER (SEE COMMENTS)     Altered mental status    Adhesive Tape ITCHING    Albuterol DIZZINESS and OTHER (SEE COMMENTS)     Albuterol Inhalation. Lightheadedness    Benadryl [Diphenhydramine] RESTLESSNESS    Depakote [Valproic Acid] DIZZINESS    Fluconazole OTHER (SEE COMMENTS)     Kidney labs abnormal    Mirtazapine RESTLESSNESS    Quetiapine RESTLESSNESS    Wellbutrin  [Bupropion] RASH    Latex RASH       Medications:    Current Facility-Administered Medications:     bacitracin-polymyxin b (Polysporin) ophthalmic ointment 1 inch, 1 inch, Right Eye, TID    moxifloxacin (Vigamox) 0.5 % ophthalmic solution 1 drop, 1 drop, Right Eye, TID    prednisoLONE (Pred Forte) 1 % ophthalmic suspension 1 drop, 1 drop, Right Eye, 6 times per day    ceFEPIme (Maxipime) 1 g in sodium chloride 0.9% 100 mL IVPB-MBP, 1 g, Intravenous, Q24H    vancomycin (Vancocin) 1,000 mg in sodium chloride 0.9% 250 mL IVPB-ADDV, 15 mg/kg, Intravenous, Once    insulin via Insulin Pump, , Subcutaneous, TID AC and HS    sodium chloride 0.9 % IV bolus 100 mL, 100 mL, Intravenous, Q30 Min PRN **AND** albumin human (Albumin) 25% injection 25 g, 25 g, Intravenous, PRN Dialysis    silver sulfADIAZINE (Silvadene) 1 % cream, , Topical, Daily    aspirin DR tab 81 mg, 81 mg, Oral, Daily    ezetimibe (Zetia) tab 10 mg, 10 mg, Oral, QAM    gabapentin (Neurontin) cap 200 mg, 200 mg, Oral, Nightly    lactulose (CHRONULAC) 10 GM/15ML solution 20 g, 20 g, Oral, QPM    levothyroxine (Synthroid) tab 150 mcg, 150 mcg, Oral, Daily @ 0700    midodrine (ProAmatine) tab 5 mg, 5 mg, Oral, Once per day on Tuesday Thursday Saturday    sertraline (Zoloft) tab 25 mg, 25 mg, Oral, Daily    sevelamer carbonate (Renvela) tab 800 mg, 800 mg, Oral, TID AC    acetaminophen (Tylenol Extra Strength) tab 500 mg, 500 mg, Oral, Q4H PRN    HYDROcodone-acetaminophen (Norco) 5-325 MG per tab 1 tablet, 1 tablet, Oral, Q6H PRN    cyclobenzaprine (Flexeril) tab 5 mg, 5 mg, Oral, TID PRN    sodium chloride 0.9 % IV bolus 100 mL, 100 mL, Intravenous, Q30 Min PRN **AND** albumin human (Albumin) 25% injection 25 g, 25 g, Intravenous, PRN Dialysis    famotidine (Pepcid) tab 10 mg, 10 mg, Oral, QOD    melatonin tab 9 mg, 9 mg, Oral, Nightly PRN    glucose (Dex4) 15 GM/59ML oral liquid 15 g, 15 g, Oral, Q15 Min PRN **OR** glucose (Glutose) 40% oral gel 15 g, 15 g,  Oral, Q15 Min PRN **OR** glucose-vitamin C (Dex-4) chewable tab 4 tablet, 4 tablet, Oral, Q15 Min PRN **OR** dextrose 50% injection 50 mL, 50 mL, Intravenous, Q15 Min PRN **OR** glucose (Dex4) 15 GM/59ML oral liquid 30 g, 30 g, Oral, Q15 Min PRN **OR** glucose (Glutose) 40% oral gel 30 g, 30 g, Oral, Q15 Min PRN **OR** glucose-vitamin C (Dex-4) chewable tab 8 tablet, 8 tablet, Oral, Q15 Min PRN    Review of Systems:  Pertinent items are noted in HPI.    Physical Exam:    General: Alert, orientated x3.  Cooperative.  No apparent distress.  Vital Signs:  Blood pressure 124/41, pulse 63, temperature 98.8 °F (37.1 °C), temperature source Oral, resp. rate 16, height 5' 6\" (1.676 m), weight 163 lb (73.9 kg), SpO2 93%.    In the seated position:   Left 5/5 Hip flex Right 5/5 Hip flex     5/5 Knee ext  5/5 Knee ext    0/5 EHL  0/5 EHL    1/5 DF   1/5 DF    1/5 PF   2/5 PF    negative SLR  negative SLR  Tenderness noted to the sacrum    Clonus is negative to bilateral lower extremities  Calves soft and non tender bilaterally   sensation is fully intact  Smooth pain free ROM to bilateral Hips, Knees, Ankles      Laboratory Data:  Recent Labs   Lab 09/05/24  1116 09/06/24  0448 09/07/24  0650   RBC 3.44* 3.53* 3.60*   HGB 10.7* 11.1* 11.2*   HCT 34.3* 35.1* 36.1*   MCV 99.7 99.4 100.3*   MCH 31.1 31.4 31.1   MCHC 31.2 31.6 31.0   RDW 19.6 19.3 20.0   NEPRELIM 5.21  --   --    WBC 6.3 6.3 6.5   .0 222.0 214.0       C-Reactive Protein (mg/dL)   Date Value   09/07/2024 7.60 (H)         Impression and Plan:  Patient Active Problem List   Diagnosis    Restless leg    Secondary hyperparathyroidism (HCC)    S/P total knee arthroplasty    Ulcerated, foot, right, with fat layer exposed (HCC)    PRANEETH on CPAP    Insulin pump status    Type 1 diabetes, uncontrolled, with neuropathy    Essential hypertension    Hypothyroidism (acquired)    Uncontrolled type 1 diabetes mellitus with hyperglycemia, with long-term current use of insulin  (HCC)    Onychomycosis    Lumbar stenosis with neurogenic claudication    DDD (degenerative disc disease), lumbar    Long-term insulin use (HCC)    Uncontrolled type 1 diabetes mellitus with proliferative retinopathy without macular edema, with long-term current use of insulin    Uncontrolled type 1 diabetes mellitus with diabetic nephropathy, with long-term current use of insulin    Pulmonary hypertension (HCC)    Uncontrolled type 1 diabetes mellitus with stage 3 chronic kidney disease    Neuropathy    Primary hypertension    Hyperlipidemia LDL goal <100    Hypoglycemia unawareness in type 1 diabetes mellitus ([facility])    Sensory hearing loss, bilateral    Moderate episode of recurrent major depressive disorder ([facility])    Controlled type 1 diabetes mellitus with complication, with long-term current use of insulin ([facility])    Myofascial pain    Charcot foot due to diabetes mellitus ([facility])    Other chronic pain    Chronic mastoiditis of right side    Cervical myelopathy ([facility])    Pulmonary emphysema, unspecified emphysema type ([facility])    Difficulty walking    S/P insertion of spinal cord stimulator    Hyperglycemia    Stage 4 chronic kidney disease (HCC)    Urinary retention    Anemia in stage 4 chronic kidney disease ([facility])    Arthritis of facet joint of lumbar spine    Orthostatic hypotension    Type 1 diabetes mellitus with complication, with long term current use of insulin pump (HCC)    Esophagitis    Platelets decreased (HCC)    Jerking movements of extremities    Diabetic complication (HCC)    Acute renal failure (HCC)    Acute renal failure, unspecified acute renal failure type (HCC)    Hyponatremia    Anemia in ESRD (end-stage renal disease) (HCC)    ESRD on hemodialysis (HCC)    Hypokalemia    Thrombocytopenia (HCC)    Altered mental status, unspecified altered mental status type    Rash    Diabetes mellitus due to underlying condition with chronic kidney disease on chronic dialysis, with long-term current use of  insulin (HCC)    Other fatigue    Acute renal failure superimposed on chronic kidney disease, unspecified CKD stage, unspecified acute renal failure type    Acute on chronic congestive heart failure, unspecified heart failure type (HCC)    Urticaria    Cellulitis of left upper extremity    Anemia    Acute kidney injury (HCC)    Syncope and collapse    Abnormal movement    Atrophy of muscle of hand    Acute encephalopathy    Type 2 diabetes mellitus with hyperglycemia, with long-term current use of insulin (HCC)    Fall, initial encounter    Hypoglycemia    Hyperammonemia (HCC)    Choreiform movement    Myoclonus    Groin abscess    ESRD (end stage renal disease) (HCC)    Type 1 diabetes mellitus with hyperglycemia (HCC)    Multiple falls    Contusion of coccyx, initial encounter    Weakness generalized    Other ascites   Buttock pain  Possible discitis.     Dr. Salazar has been involved in his care.  Per the discussion he had with ID we will hold on the biopsy from IR until after the MRI which is scheduled Monday with sedation.  His only new complaint is the buttock pain from the fall so I do think they should include sacrum to rule out fracture.  Patient states he has had weakness of his ankles for many years.  Based on Dr. Figueroa noted he had a Nevro SCS placed in 2020, and did have a brain MRI in 2021.  I did ask him to find his card for his SCS.   ID will start ABX at this time.  Dr. Salazar was updated on the patient, and the exam.    Time spent on counseling/coordination of care:  45 Minutes    Total time spent with patient:  30 Minutes    CYNTHIA Whitten  9/7/2024  10:35 AM    Addendum:  Patient then told me he had his left ankle fused after an infection in the ankle.    Mo Miles PA-C

## 2024-09-07 NOTE — PLAN OF CARE
AxO4. VSS on RA. PRANEETH w/ own CPAP. On tele-NSR. Tolerating renal diet. Managing own insulin pump - on QID accuchecks. Sched for HD 9/7 to resume usual shced T/TH/SAT. Resting comfortably, denying pain. Wound on RLE, and blisters on bilat feet dressed w/ spouse - treated w/ silvadine, see EPIC for dressing change. Eye drops for R eye ordered per hospitalist. Up max w/ walker. ID and Ortho consulted for poss discitis/OM - IR aspiration to obtain sample ordered, anticoags to be held. Updated on POC. Safety measures placed. Care ongoing.

## 2024-09-08 LAB
ANION GAP SERPL CALC-SCNC: 11 MMOL/L (ref 0–18)
BUN BLD-MCNC: 30 MG/DL (ref 9–23)
CALCIUM BLD-MCNC: 9.4 MG/DL (ref 8.7–10.4)
CHLORIDE SERPL-SCNC: 100 MMOL/L (ref 98–112)
CO2 SERPL-SCNC: 25 MMOL/L (ref 21–32)
CREAT BLD-MCNC: 3.88 MG/DL
EGFRCR SERPLBLD CKD-EPI 2021: 16 ML/MIN/1.73M2 (ref 60–?)
ERYTHROCYTE [DISTWIDTH] IN BLOOD BY AUTOMATED COUNT: 20 %
GLUCOSE BLD-MCNC: 101 MG/DL (ref 70–99)
GLUCOSE BLD-MCNC: 110 MG/DL (ref 70–99)
GLUCOSE BLD-MCNC: 113 MG/DL (ref 70–99)
GLUCOSE BLD-MCNC: 135 MG/DL (ref 70–99)
GLUCOSE BLD-MCNC: 152 MG/DL (ref 70–99)
GLUCOSE BLD-MCNC: 42 MG/DL (ref 70–99)
GLUCOSE BLD-MCNC: 52 MG/DL (ref 70–99)
GLUCOSE BLD-MCNC: 70 MG/DL (ref 70–99)
GLUCOSE BLD-MCNC: 91 MG/DL (ref 70–99)
HCT VFR BLD AUTO: 34.2 %
HGB BLD-MCNC: 10.3 G/DL
MAGNESIUM SERPL-MCNC: 2.3 MG/DL (ref 1.6–2.6)
MCH RBC QN AUTO: 31.1 PG (ref 26–34)
MCHC RBC AUTO-ENTMCNC: 30.1 G/DL (ref 31–37)
MCV RBC AUTO: 103.3 FL
OSMOLALITY SERPL CALC.SUM OF ELEC: 291 MOSM/KG (ref 275–295)
PLATELET # BLD AUTO: 201 10(3)UL (ref 150–450)
POTASSIUM SERPL-SCNC: 4.4 MMOL/L (ref 3.5–5.1)
RBC # BLD AUTO: 3.31 X10(6)UL
SODIUM SERPL-SCNC: 136 MMOL/L (ref 136–145)
WBC # BLD AUTO: 6.1 X10(3) UL (ref 4–11)

## 2024-09-08 RX ORDER — PREDNISOLONE ACETATE 10 MG/ML
1 SUSPENSION/ DROPS OPHTHALMIC 4 TIMES DAILY
Status: DISCONTINUED | OUTPATIENT
Start: 2024-09-08 | End: 2024-09-12

## 2024-09-08 NOTE — PLAN OF CARE
Pt A&O. [place] w/ bilateral H/A. Had a \"scratch\" to his right eye. Tolerating eye gtts as ordered. On room air. Wears cpap when sleeping. Tele and  monitoring maintained. Tolerating renal diet with good appetite. Blood sugars monitored and insulin given per pt own insulin pump. Last BM 9/7/24. HD Tu, Th, Sat. Had HD yesterday. Pt is anuric. Pain managed with current medications. Pt reminded to \"call; don't fall\". Participating in therapy as ordered. Up with min assist using walker and gait belt. Post op shoes to BLE when up. Wounds to left shin, left toes, right shin all cleansed and drsgs changed. Pt normally goes to [facility] wound care outpt clinic every other week. RLQ abdominal paracentesis site C/D/I. Old drainage noted again this morning. Tolerating IV atbx as ordered by ID. Pt will have MRI with anesthesia on Monday. Pt is current with [facility]. Pt's spouse, Ansley, at bedside and updated with plan of care.     Pt lost IV site today. Several RNs attempted restart unsuccessfully, including house supervisor. Pt may need to have anesthesia start tomorrow.

## 2024-09-08 NOTE — PLAN OF CARE
AxO4. VSS on RA. PRANEETH w/ own CPAP. On tele-NSR. Tolerating renal diet. Managing own insulin pump - on QID accuchecks. Lower back pain controlled w/ PRN pain meds. Wound on RLE, and blisters on bilat feet dressed w/ spouse - treated w/ silvadine, see EPIC for dressing change. Pending MRI w/ anesthesia on Monday. On IV vanco. Up mod w/ walker. d. Updated on POC. Safety measures placed. Care ongoing.

## 2024-09-08 NOTE — PLAN OF CARE
Pt A&O. [place] w/ bilateral H/A. Had a \"scratch\" to his right eye. Tolerating eye gtts as ordered. On room air. Wears cpap when sleeping. Tele and  monitoring maintained. Tolerating renal diet with good appetite. Blood sugars monitored and insulin given per pt own insulin pump. Last BM 9/7/24. HD Tu, Th, Sat. Had HD today. 1.3 L removed. Midodrine given prior to HD. IVF bolus and albumin given during HD. Pt is anuric. Pain managed with current medications. Pt reminded to \"call; don't fall\". Participating in therapy as ordered. Up with min/mod assist using walker and gait belt. Wounds to left shin, left toes, rt shin all cleansed and drsgs changed. Pt normally goes to [facility] care outpt clinic every other week. RLQ abdominal paracentesis site C/D/I. Old drainage noted this morning. Tolerating IV atbx as ordered by ID. Pt will have MRI with anesthesia on Monday. Pt is current with [facility]. Pt's spouse, Ansley, at bedside and updated with plan of care.

## 2024-09-08 NOTE — OCCUPATIONAL THERAPY NOTE
OCCUPATIONAL THERAPY EVALUATION - INPATIENT     Room Number: 363/363-A  Evaluation Date: 9/8/2024  Type of Evaluation: Initial  Presenting Problem: AMS, falls, weakness, coccyx contusion, ascites    Physician Order: IP Consult to Occupational Therapy  Reason for Therapy: ADL/IADL Dysfunction and Discharge Planning    OCCUPATIONAL THERAPY ASSESSMENT   Patient is currently functioning below baseline with toileting, bathing, upper body dressing, lower body dressing, bed mobility, and transfers. Prior to admission, patient's baseline is using a RW for short distances and a W/C for community distances and having assist with ADL as needed.  Patient is requiring maximum assistance as a result of the following impairments: decreased functional strength, decreased functional reach, decreased endurance, pain, and impaired standing balance. Occupational Therapy will continue to follow for duration of hospitalization.    Patient will benefit from continued skilled OT Services to promote return to prior level of function and safety with continuous assistance and gradual rehabilitative therapy      History Related to Current Admission: Patient is a 73 year old male admitted on 9/5/2024 with Presenting Problem: AMS, falls, weakness, coccyx contusion, ascites. L/S XRAY with concern for discitis/OM. Pt unable to have MRI due to spinal cord stimulator. Pt with recurrent ascites and regular paracentesis ~ every 3 weeks.      Co-Morbidities : ESRD on HD, neuropathy, DM, cirrhosis    WEIGHT BEARING RESTRICTION  Weight Bearing Restriction: R lower extremity;L lower extremity        R Lower Extremity:  (post-op shoe)  L Lower Extremity:  (post-op shoe)    Recommendations for nursing staff:   Transfers: min A with RW  Toileting location: bedside commode     EVALUATION SESSION:  Patient Start of Session: Pt was found in his bed.       FUNCTIONAL TRANSFER ASSESSMENT  Sit to Stand: Edge of Bed  Edge of Bed: Minimal Assist    BED  MOBILITY  Supine to Sit : Supervision    BALANCE ASSESSMENT     FUNCTIONAL ADL ASSESSMENT  LB Dressing Seated: Maximum Assist      ACTIVITY TOLERANCE:                          O2 SATURATIONS       COGNITION  Overall Cognitive Status:  WFL - within functional limits    Upper Extremity   ROM: within functional limits   Strength: within functional limits     EDUCATION PROVIDED  Patient: Role of Occupational Therapy; Plan of Care; Discharge Recommendations; Functional Transfer Techniques; Fall Prevention; Compensatory ADL Techniques  Patient's Response to Education: Verbalized Understanding    Equipment used: RW, gait belt   Demonstrates functional use, Would benefit from additional trial      Therapist comments: supine>sit EOB>LB dressing to don bilateral post op shoes>stand to RW>steps to chair placed to pt's R side>sitting in chair>pt educated on B UE AROM HEP exercises     Patient End of Session: Up in chair;Needs met;Call light within reach;RN aware of session/findings;Alarm set    OCCUPATIONAL PROFILE    HOME SITUATION  Type of Home: House  Home Layout: One level  Lives With: Spouse    Toilet and Equipment: Standard height toilet  Shower/Tub and Equipment: Walk-in shower;Shower chair  Other Equipment: Other (Comment) (RW, W/C)    Occupation/Status: Retired  Hand Dominance: Right  Drives: No  Patient Regularly Uses: Glasses    Prior Level of Function: Pt lives with spouse in single story home. Pt is able to ambulate household distances with RW. Pts spouse assists with ADL. Pt uses W/C in the community.     SUBJECTIVE   \"I wasn't able to do this the other day.\" Re: getting to the chair     PAIN ASSESSMENT  Ratin  Location: N/A       OBJECTIVE  Precautions: Limb alert - left;Bed/chair alarm (hypotension)  Fall Risk: High fall risk      ASSESSMENTS    AM-PAC ‘6-Clicks’ Inpatient Daily Activity Short Form  -   Putting on and taking off regular lower body clothing?: A Lot  -   Bathing (including washing, rinsing,  drying)?: A Lot  -   Toileting, which includes using toilet, bedpan or urinal? : A Lot  -   Putting on and taking off regular upper body clothing?: A Little  -   Taking care of personal grooming such as brushing teeth?: A Little  -   Eating meals?: A Little    AM-PAC Score:  Score: 15  Approx Degree of Impairment: 56.46%  Standardized Score (AM-PAC Scale): 34.69    ADDITIONAL TESTS     NEUROLOGICAL FINDINGS      COGNITION ASSESSMENTS       PLAN  OT Treatment Plan: Balance activities;Energy conservation/work simplification techniques;ADL training;Functional transfer training;Endurance training;Patient/Family education;Patient/Family training;Equipment eval/education;Compensatory technique education;Continued evaluation  Rehab Potential : Fair  Frequency: 3-5x/week  Number of Visits to Meet Established Goals: 5    ADL Goals   Patient will perform lower body dressing:  with mod assist  Patient will perform toileting: with mod assist    Functional Transfer Goals  Patient will transfer from bed to chair:  with min assist  Patient will transfer from sit to supine:  with min assist  Patient will transfer from supine to sit:  with min assist  Patient will transfer from sit to stand:  with min assist  Patient will transfer to bedside commode:  with min assist    UE Exercise Program Goal  Patient will be supervision with bilateral AROM HEP (home exercise program).      Patient Evaluation Complexity Level:   Occupational Profile/Medical History MODERATE - Expanded review of history including review of medical or therapy record   Specific performance deficits impacting engagement in ADL/IADL MODERATE  3 - 5 performance deficits   Client Assessment/Performance Deficits MODERATE - Comorbidities and min to mod modifications of tasks    Clinical Decision Making MODERATE - Analysis of occupational profile, detailed assessments, several treatment options    Overall Complexity MODERATE     OT Session Time: 30 minutes  Therapeutic  Activity: 15 minutes

## 2024-09-08 NOTE — PROGRESS NOTES
S: no back pain or buttock pain today.  He states he feels better.  His last pain medicine was last night.  No leg pain, numbness, or new weakness.  He has some left shoulder discomfort from sleeping on his left side last night.     Inspection:  Awake alert No acute distress. No difficulty breathing     Blood pressure 92/56, pulse 66, temperature 97.9 °F (36.6 °C), temperature source Axillary, resp. rate 16, height 5' 6\" (1.676 m), weight 163 lb (73.9 kg), SpO2 94%.    Recent Labs   Lab 09/05/24  1116 09/06/24  0448 09/08/24  0540   RBC 3.44*   < > 3.31*   HGB 10.7*   < > 10.3*   HCT 34.3*   < > 34.2*   MCV 99.7   < > 103.3*   MCH 31.1   < > 31.1   MCHC 31.2   < > 30.1*   RDW 19.6   < > 20.0   NEPRELIM 5.21  --   --    WBC 6.3   < > 6.1   .0   < > 201.0    < > = values in this interval not displayed.     Patient is in no apparent distress.  Alert and oriented times 3.  In the seated position:   Left 5/5 Hip flex Right 5/5 Hip flex     5/5 Knee ext  5/5 Knee ext    0/5 EHL  0/5 EHL    1/5 DF   1/5 DF    1/5 PF   2/5 PF    negative SLR  negative SLR  Calves soft and non tender bilaterally     Sensation is fully intact      A/P: Sacral pain s/p fall    P: Patient is to have MRI today.  RN is working on getting the remote for the SCS so he can have the MRI.  He is doing better today.      Mo Miles PA-C

## 2024-09-09 ENCOUNTER — APPOINTMENT (OUTPATIENT)
Dept: CT IMAGING | Facility: HOSPITAL | Age: 74
End: 2024-09-09
Attending: PHYSICIAN ASSISTANT
Payer: MEDICARE

## 2024-09-09 PROBLEM — Z99.2 ANEMIA IN CHRONIC KIDNEY DISEASE, ON CHRONIC DIALYSIS (HCC): Status: ACTIVE | Noted: 2024-09-09

## 2024-09-09 PROBLEM — D63.1 ANEMIA IN CHRONIC KIDNEY DISEASE, ON CHRONIC DIALYSIS (HCC): Status: ACTIVE | Noted: 2024-09-09

## 2024-09-09 PROBLEM — N18.6 ANEMIA IN CHRONIC KIDNEY DISEASE, ON CHRONIC DIALYSIS (HCC): Status: ACTIVE | Noted: 2024-09-09

## 2024-09-09 LAB
ANION GAP SERPL CALC-SCNC: 13 MMOL/L (ref 0–18)
BUN BLD-MCNC: 45 MG/DL (ref 9–23)
CALCIUM BLD-MCNC: 9.4 MG/DL (ref 8.7–10.4)
CHLORIDE SERPL-SCNC: 97 MMOL/L (ref 98–112)
CO2 SERPL-SCNC: 24 MMOL/L (ref 21–32)
CREAT BLD-MCNC: 5.27 MG/DL
EGFRCR SERPLBLD CKD-EPI 2021: 11 ML/MIN/1.73M2 (ref 60–?)
ERYTHROCYTE [DISTWIDTH] IN BLOOD BY AUTOMATED COUNT: 20.1 %
GLUCOSE BLD-MCNC: 146 MG/DL (ref 70–99)
GLUCOSE BLD-MCNC: 147 MG/DL (ref 70–99)
GLUCOSE BLD-MCNC: 220 MG/DL (ref 70–99)
GLUCOSE BLD-MCNC: 226 MG/DL (ref 70–99)
GLUCOSE BLD-MCNC: 237 MG/DL (ref 70–99)
GLUCOSE BLD-MCNC: 244 MG/DL (ref 70–99)
HCT VFR BLD AUTO: 34.6 %
HGB BLD-MCNC: 10.6 G/DL
INR BLD: 1.19 (ref 0.8–1.2)
MAGNESIUM SERPL-MCNC: 2.5 MG/DL (ref 1.6–2.6)
MCH RBC QN AUTO: 31.2 PG (ref 26–34)
MCHC RBC AUTO-ENTMCNC: 30.6 G/DL (ref 31–37)
MCV RBC AUTO: 101.8 FL
OSMOLALITY SERPL CALC.SUM OF ELEC: 297 MOSM/KG (ref 275–295)
PLATELET # BLD AUTO: 235 10(3)UL (ref 150–450)
POTASSIUM SERPL-SCNC: 5 MMOL/L (ref 3.5–5.1)
PROTHROMBIN TIME: 15.2 SECONDS (ref 11.6–14.8)
RBC # BLD AUTO: 3.4 X10(6)UL
SODIUM SERPL-SCNC: 134 MMOL/L (ref 136–145)
WBC # BLD AUTO: 7 X10(3) UL (ref 4–11)

## 2024-09-09 PROCEDURE — 99232 SBSQ HOSP IP/OBS MODERATE 35: CPT | Performed by: INTERNAL MEDICINE

## 2024-09-09 PROCEDURE — 72192 CT PELVIS W/O DYE: CPT | Performed by: PHYSICIAN ASSISTANT

## 2024-09-09 NOTE — PROGRESS NOTES
CC: follow-up hospital admission back pain    SUBJECTIVE:  Interval History: pain ok, walked a few steps. No cp/sob/f/c. Dec BG o/n    OBJECTIVE:  Scheduled Meds:    [START ON 9/10/2024] epoetin kendrick  10,000 Units Intravenous Once in dialysis    prednisoLONE  1 drop Right Eye 4x daily    bacitracin-polymyxin b  1 inch Right Eye TID    moxifloxacin  1 drop Right Eye TID    cefepime  1 g Intravenous Q24H    vancomycin  10 mg/kg Intravenous Q Tu, Th and Sa    midodrine  5 mg Oral Q Tu, Th and Sa    insulin   Subcutaneous TID AC and HS    silver sulfADIAZINE   Topical Daily    [Held by provider] aspirin  81 mg Oral Daily    ezetimibe  10 mg Oral QAM    gabapentin  200 mg Oral Nightly    lactulose  20 g Oral QPM    levothyroxine  150 mcg Oral Daily @ 0700    sertraline  25 mg Oral Daily    sevelamer carbonate  800 mg Oral TID AC    famotidine  10 mg Oral QOD     Continuous Infusions:   PRN Meds:   acetaminophen    HYDROcodone-acetaminophen    cyclobenzaprine    melatonin    glucose **OR** glucose **OR** glucose-vitamin C **OR** dextrose **OR** glucose **OR** glucose **OR** glucose-vitamin C    PHYSICAL EXAM  Vital signs: Temp:  [97.7 °F (36.5 °C)-98.5 °F (36.9 °C)] 98.4 °F (36.9 °C)  Pulse:  [59-66] 59  Resp:  [16-17] 16  BP: ()/(47-60) 88/59  SpO2:  [91 %-98 %] 93 %      GENERAL - NAD, AAO  EYES- sclera anicteric,   HENT- normocephalic, OP - MMM  NECK - no JVD  CV- RRR  RESP - CTAB, normal resp effort  ABDOMEN- soft, NT/ND   EXT- no LE edema   PSYCH - normal mentation/ normal affect    Data Review:   Labs:   Recent Labs   Lab 09/05/24  1116 09/06/24  0448 09/07/24  0650 09/08/24  0540 09/09/24  0516 09/09/24  1320   WBC 6.3 6.3 6.5 6.1 7.0  --    HGB 10.7* 11.1* 11.2* 10.3* 10.6*  --    MCV 99.7 99.4 100.3* 103.3* 101.8*  --    .0 222.0 214.0 201.0 235.0  --    INR 1.18  --   --   --   --  1.19       Recent Labs   Lab 09/05/24  1116 09/06/24  0448 09/07/24  0650 09/08/24  0540 09/08/24  2103 09/09/24  0516     134* 135* 136  --  134*   K 4.4 5.6* 4.8 4.4  --  5.0   CL 97* 94* 97* 100  --  97*   CO2 27.0 24.0 26.0 25.0  --  24.0   BUN 56* 71* 42* 30*  --  45*   CREATSERUM 5.50* 6.55* 4.86* 3.88*  --  5.27*   CA 9.7 10.0 9.4 9.4  --  9.4   MG  --  2.9* 2.5 2.3  --  2.5   * 296* 183* 152* 70 226*       Recent Labs   Lab 09/05/24  1116 09/06/24  0448   ALT 17 16   AST 24 21   ALB 4.0 4.0       Recent Labs   Lab 09/08/24  2323 09/09/24  0126 09/09/24  0542 09/09/24  1134 09/09/24  1504   PGLU 91 220* 237* 147* 244*           ASSESSMENT/PLAN:    Patient is a 73 year old male with PMH sig for ESRD on HD, cirrhosis, HE, DM 1 here for fall, back pain     Impression     -generalized weakness with fall     -ESRD on HD     -recurrent ascites (cardiogenic)  -LUGO cirrhosis  -HE on lactulose   -DM 1  -hypothyroidism  =hypotension on midodrine with HD     Plan     -PT OT  -Xray of L spine. Suspect msk pain. Add flexeril. Add norco, tylenol.  Xray of spine showing possible diskitis in L5. He denied any preceding back pain. His pain appears rather acute after the fall. Less likely diskitis. Will check blood cx / esr / crp. He states he cannot have MRI due to prior spinal cord stimulator. Based on Dr. Figueroa noted he had a Nevro SCS placed in 2020, and did have a brain MRI in 2021.   -MRI was scheduled with sedation but unable to do c stimulator  -Given possibility of an epidural phlegmon, started on cefepime and vancomycin, apprec ID recs   - possible IR bx   -CT with contrast noted  -Repeated esr  crp     -abd soft, nt. He is due to have paracentesis. Ordered  -HD per renal  -cont lactulose  -ok to use home insulin pump; low glc while NPO  -cont synthroid      Scds  Heparin held for procedures         Will continue to follow while hospitalized. Please page me or the on-call hospitalist with questions or concerns. D/w RN     Nikunj Kong MD  [facility] Hospitalist  [phone]  9/9/2024  4:46 PM

## 2024-09-09 NOTE — PLAN OF CARE
Patient is alert and oriented x4. VSS on RA. No complaint of numbness or tingling. Pulses bilateral intact. Appropriate strength and mobility in lower extremities. Pain pretty well. Patient ambulates min w/ walker. Tolerates food and beverages well. IV abx as ordered. Dialysis T,TH, SAT. IS and ankle pumps encouraged. Belongings within reach. Encouraged to call for any needs.        [facility] order for [facility] tomorrow.

## 2024-09-09 NOTE — PHYSICAL THERAPY NOTE
PHYSICAL THERAPY TREATMENT NOTE - INPATIENT    Room Number: 363/363-A     Session: 1     Number of Visits to Meet Established Goals: 5    Presenting Problem: fall, AMS  Co-Morbidities : ESRD on HD, neuropathy, DM, cirrhosis    ASSESSMENT   Patient demonstrates fair progress this session, goals remain in progress.    Patient continues to function below baseline with bed mobility, transfers, and gait. Contributing factors to remaining limitations include decreased functional strength, decreased endurance/aerobic capacity, and decreased muscular endurance.  Next session anticipate patient to progress bed mobility, transfers, and gait.  Physical Therapy will continue to follow patient for duration of hospitalization.    Patient continues to benefit from continued skilled PT services: to promote return to prior level of function and safety with continuous assistance and gradual rehabilitative therapy .    PLAN  PT Treatment Plan: Bed mobility;Endurance;Energy conservation;Patient education;Gait training;Strengthening;Balance training;Transfer training  Rehab Potential : Fair  Frequency (Obs): 3-5x/week    CURRENT GOALS       Goal #1 Patient is able to demonstrate supine - sit EOB @ level: minimum assistance      Goal #2 Patient is able to demonstrate transfers Sit to/from Stand at assistance level: moderate assistance      Goal #3 Patient is able to transfer to bedside chair with MOD assist      Goal #4     Goal #5     Goal #6     Goal Comments: Goals established on 2024 all goals ongoing    SUBJECTIVE  \"Now I don't know if they will do the MRI\"     OBJECTIVE  Precautions: Limb alert - left;Bed/chair alarm (hypotension)    WEIGHT BEARING RESTRICTION  Weight Bearing Restriction: R lower extremity;L lower extremity        R Lower Extremity:  (post-op shoe)  L Lower Extremity:  (post-op shoe)    PAIN ASSESSMENT   Ratin  Location: pt denies  Management Techniques: Activity  promotion;Repositioning    BALANCE                                                                                                                       Static Sitting: Fair  Dynamic Sitting: Fair -           Static Standing: Poor +  Dynamic Standing: Poor    ACTIVITY TOLERANCE                         O2 WALK         AM-PAC '6-Clicks' INPATIENT SHORT FORM - BASIC MOBILITY  How much difficulty does the patient currently have...  Patient Difficulty: Turning over in bed (including adjusting bedclothes, sheets and blankets)?: None   Patient Difficulty: Sitting down on and standing up from a chair with arms (e.g., wheelchair, bedside commode, etc.): A Little   Patient Difficulty: Moving from lying on back to sitting on the side of the bed?: A Little   How much help from another person does the patient currently need...   Help from Another: Moving to and from a bed to a chair (including a wheelchair)?: A Little   Help from Another: Need to walk in hospital room?: A Lot   Help from Another: Climbing 3-5 steps with a railing?: Total       AM-PAC Score:  Raw Score: 16   Approx Degree of Impairment: 54.16%   Standardized Score (AM-PAC Scale): 40.78   CMS Modifier (G-Code): CK    FUNCTIONAL ABILITY STATUS  Gait Assessment   Functional Mobility/Gait Assessment  Gait Assistance: Minimum assistance  Distance (ft): 2  Assistive Device: Rolling walker  Pattern: Shuffle    Skilled Therapy Provided  Pt presents in semi sup  Therapist educated pt on benefit of mobility to prevent deconditioning  Max A to don B sx shoes, pt states his spouse assists at home   Pt t/f and gait trained c cues for hand/foot placement   Pt able to stand from BS chair min A to place waffle cushion  Therapist cued pt for seated therex BLE   Bed Mobility:  Rolling: supervision    Supine<>Sit: min A    Sit<>Supine: nt      Transfer Mobility:  Sit<>Stand: min A    Stand<>Sit: CGA    Gait: min A x 1 c RW no evidence of buckle     Therapist's Comments: O2 sats 94%  on RA  BP 92/70 seated in BS chair s/p t/f       THERAPEUTIC EXERCISES  Lower Extremity Alternating marching  Knee extension  LAQ     Upper Extremity      Position Sitting     Repetitions   10   Sets   1     Patient End of Session: Up in chair;Needs met;Call light within reach;RN aware of session/findings;All patient questions and concerns addressed;Alarm set    PT Session Time: 27 minutes  Gait Training: 10 minutes  Therapeutic Activity: 17 minutes  Therapeutic Exercise:  minutes   Neuromuscular Re-education:  minutes

## 2024-09-09 NOTE — PROGRESS NOTES
Call placed [employer] in regards to patient's spinal cord stimulator for MRI safety. Impedence test completed with representative over the phone and patient at bedside. Stimulator did NOT pass impedence testing and is NOT MRI safe per representative.    1130: Spoke with spine / infectious disease teams, orders placed for CT sacrum and CT aspiration d/t MRI not being an option for this patient.    1300: Spoke with radiology team, aspiration to be done tomorrow, PT/INR ordered for AM, patient to be NPO at midnight.

## 2024-09-09 NOTE — PROGRESS NOTES
[facility]   part of [facility] Infectious Disease Progress Note    Gamaliel Boyer Patient Status:  Inpatient    1950 MRN [id number]   Location [facility] 3SW-A Attending Nikunj Kong MD   Hosp Day # 3 PCP Nima Sorensen MD     Subjective:  Chart reviewed, no acute events.  Pt seen bedside. MRI unable to be done yesterday.  He reports his back pain is better than previously.  No f/c.  Is hungry- has been NPO since last night.     Objective:    Allergies:  Allergies   Allergen Reactions    Antihistamine & Nasal Deconges [Fexofenadine-Pseudoephedrine] OTHER (SEE COMMENTS)     Altered mental status    Adhesive Tape ITCHING    Albuterol DIZZINESS and OTHER (SEE COMMENTS)     Albuterol Inhalation. Lightheadedness    Benadryl [Diphenhydramine] RESTLESSNESS    Depakote [Valproic Acid] DIZZINESS    Fluconazole OTHER (SEE COMMENTS)     Kidney labs abnormal    Mirtazapine RESTLESSNESS    Quetiapine RESTLESSNESS    Wellbutrin [Bupropion] RASH    Latex RASH       Medications:    Current Facility-Administered Medications:     [START ON 9/10/2024] epoetin kendrick (Epogen, Procrit) 16396 UNIT/ML injection 10,000 Units, 10,000 Units, Intravenous, Once in dialysis    prednisoLONE (Pred Forte) 1 % ophthalmic suspension 1 drop, 1 drop, Right Eye, 4x daily    bacitracin-polymyxin b (Polysporin) ophthalmic ointment 1 inch, 1 inch, Right Eye, TID    moxifloxacin (Vigamox) 0.5 % ophthalmic solution 1 drop, 1 drop, Right Eye, TID    ceFEPIme (Maxipime) 1 g in sodium chloride 0.9% 100 mL IVPB-MBP, 1 g, Intravenous, Q24H    vancomycin (Vancocin) 750 mg in sodium chloride 0.9% 250 mL IVPB-ADDV [To be given AFTER HD], 10 mg/kg, Intravenous, Q Tu, Th and Sa    midodrine (ProAmatine) tab 5 mg, 5 mg, Oral, Q Tu, Th and Sa    insulin via Insulin Pump, , Subcutaneous, TID AC and HS    silver sulfADIAZINE (Silvadene) 1 % cream, , Topical, Daily    [Held by provider] aspirin DR tab 81 mg, 81 mg, Oral,  Daily    ezetimibe (Zetia) tab 10 mg, 10 mg, Oral, QAM    gabapentin (Neurontin) cap 200 mg, 200 mg, Oral, Nightly    lactulose (CHRONULAC) 10 GM/15ML solution 20 g, 20 g, Oral, QPM    levothyroxine (Synthroid) tab 150 mcg, 150 mcg, Oral, Daily @ 0700    sertraline (Zoloft) tab 25 mg, 25 mg, Oral, Daily    sevelamer carbonate (Renvela) tab 800 mg, 800 mg, Oral, TID AC    acetaminophen (Tylenol Extra Strength) tab 500 mg, 500 mg, Oral, Q4H PRN    HYDROcodone-acetaminophen (Norco) 5-325 MG per tab 1 tablet, 1 tablet, Oral, Q6H PRN    cyclobenzaprine (Flexeril) tab 5 mg, 5 mg, Oral, TID PRN    famotidine (Pepcid) tab 10 mg, 10 mg, Oral, QOD    melatonin tab 9 mg, 9 mg, Oral, Nightly PRN    glucose (Dex4) 15 GM/59ML oral liquid 15 g, 15 g, Oral, Q15 Min PRN **OR** glucose (Glutose) 40% oral gel 15 g, 15 g, Oral, Q15 Min PRN **OR** glucose-vitamin C (Dex-4) chewable tab 4 tablet, 4 tablet, Oral, Q15 Min PRN **OR** dextrose 50% injection 50 mL, 50 mL, Intravenous, Q15 Min PRN **OR** glucose (Dex4) 15 GM/59ML oral liquid 30 g, 30 g, Oral, Q15 Min PRN **OR** glucose (Glutose) 40% oral gel 30 g, 30 g, Oral, Q15 Min PRN **OR** glucose-vitamin C (Dex-4) chewable tab 8 tablet, 8 tablet, Oral, Q15 Min PRN    Physical Exam:  General: Alert, orientated x3.  Cooperative.  No apparent distress.  Vital Signs:  Blood pressure 101/58, pulse 64, temperature 98.5 °F (36.9 °C), temperature source Oral, resp. rate 17, height 5' 6\" (1.676 m), weight 163 lb (73.9 kg), SpO2 95%.   Temp (24hrs), Av.5 °F (36.9 °C), Min:97.7 °F (36.5 °C), Max:98.9 °F (37.2 °C)      HEENT: Exam is unremarkable.  Without scleral icterus.  Mucous membranes are moist. PERRLA.  Oropharynx is clear.  Lungs: Clear to auscultation bilaterally.  Cardiac: Regular rate   Abdomen:  Soft, non-distended, non-tender, with no rebound or guarding.    Extremities:  No lower extremity edema noted.  Without clubbing or cyanosis.    Skin: Normal texture and turgor.  Neurologic:  Cranial nerves are grossly intact.      Labs:  Lab Results   Component Value Date    WBC 7.0 09/09/2024    HGB 10.6 09/09/2024    HCT 34.6 09/09/2024    .0 09/09/2024    CREATSERUM 5.27 09/09/2024    BUN 45 09/09/2024     09/09/2024    K 5.0 09/09/2024    CL 97 09/09/2024    CO2 24.0 09/09/2024     09/09/2024    CA 9.4 09/09/2024    MG 2.5 09/09/2024       Radiology:  CT spine 9/6/24:  CONCLUSION:       1. There is bony destruction at the L5-S1 endplates suspicious for osteomyelitis until proven otherwise.  Percutaneous sampling could be obtained for further assessment as clinically warranted.  There is air within the L5-S1 disc space which could relate    to pre-existing vacuum disc phenomenon.  Discitis with gas-forming organisms is another possibility.      2. There is heterogeneity to the ventral aspect of the epidural space at the L5-S1 level which could represent epidural phlegmon.      3. Moderate degenerative changes in the lumbar spine.  Laminectomy changes are seen at L3, L4, and L5.      4. There is nonspecific bladder wall thickening.  Clinical correlation with urinalysis is suggested.  Mild ascites is also noted.     Assessment/Plan:    1.  Traumatic back pain after a fall  -CT c/w OM with epidural phlegmon, however prior to fall no s/s of infectious process and no worsening back pain  -MRI unable to be performed  -blood cultures NGTD  -ESR elevated at 89, CRP elevated at 7.6   -on IV Vancomycin and Cefepime given CT findings     2. ESRD  -on HD  -antibiotics renally adjusted    3. DM type 1  -with end stage disease as above    DISPO:  Continue IV Vancomycin and Cefepime.  MRI unable to be done due to spinal cord stimulator, would suggest IR consult for biopsy and culture of epidural phlegmon noted on CT.  d/w Dr Ibanez.  D/w patient and wife bedside.  Will follow     If you have any questions or concerns please call [facility] at [phone].     CYNTHIA Pabon  9/9/2024  10:55  AM

## 2024-09-09 NOTE — PROGRESS NOTES
CC: follow-up hospital admission back pain    SUBJECTIVE:  Interval History: feeling ok, 92/51. No cp/sob.     OBJECTIVE:  Scheduled Meds:    prednisoLONE  1 drop Right Eye 4x daily    bacitracin-polymyxin b  1 inch Right Eye TID    moxifloxacin  1 drop Right Eye TID    cefepime  1 g Intravenous Q24H    vancomycin  10 mg/kg Intravenous Q Tu, Th and Sa    midodrine  5 mg Oral Q Tu, Th and Sa    insulin   Subcutaneous TID AC and HS    silver sulfADIAZINE   Topical Daily    [Held by provider] aspirin  81 mg Oral Daily    ezetimibe  10 mg Oral QAM    gabapentin  200 mg Oral Nightly    lactulose  20 g Oral QPM    levothyroxine  150 mcg Oral Daily @ 0700    sertraline  25 mg Oral Daily    sevelamer carbonate  800 mg Oral TID AC    famotidine  10 mg Oral QOD     Continuous Infusions:   PRN Meds:   acetaminophen    HYDROcodone-acetaminophen    cyclobenzaprine    melatonin    glucose **OR** glucose **OR** glucose-vitamin C **OR** dextrose **OR** glucose **OR** glucose **OR** glucose-vitamin C    PHYSICAL EXAM  Vital signs: Temp:  [97.7 °F (36.5 °C)-98.9 °F (37.2 °C)] 98.5 °F (36.9 °C)  Pulse:  [61-69] 61  Resp:  [16-17] 16  BP: ()/(48-60) 102/53  SpO2:  [90 %-95 %] 95 %      GENERAL - NAD, AAO  EYES- sclera anicteric,   HENT- normocephalic, OP - MMM  NECK - no JVD  CV- RRR  RESP - CTAB, normal resp effort  ABDOMEN- soft, NT/ND   EXT- no LE edema   PSYCH - normal mentation/ normal affect    Data Review:   Labs:   Recent Labs   Lab 09/05/24  1116 09/06/24  0448 09/07/24  0650 09/08/24  0540   WBC 6.3 6.3 6.5 6.1   HGB 10.7* 11.1* 11.2* 10.3*   MCV 99.7 99.4 100.3* 103.3*   .0 222.0 214.0 201.0   INR 1.18  --   --   --        Recent Labs   Lab 09/05/24  1116 09/06/24  0448 09/07/24  0650 09/08/24  0540 09/08/24 2103    134* 135* 136  --    K 4.4 5.6* 4.8 4.4  --    CL 97* 94* 97* 100  --    CO2 27.0 24.0 26.0 25.0  --    BUN 56* 71* 42* 30*  --    CREATSERUM 5.50* 6.55* 4.86* 3.88*  --    CA 9.7 10.0 9.4 9.4   --    MG  --  2.9* 2.5 2.3  --    * 296* 183* 152* 70       Recent Labs   Lab 09/05/24  1116 09/06/24  0448   ALT 17 16   AST 24 21   ALB 4.0 4.0       Recent Labs   Lab 09/08/24  1711 09/08/24 2028 09/08/24  2049 09/08/24  2107 09/08/24  2323   PGLU 113* 42* 52* 101* 91           ASSESSMENT/PLAN:    Patient is a 73 year old male with PMH sig for ESRD on HD, cirrhosis, HE, DM 1 here for fall, back pain     Impression     -generalized weakness with fall     -ESRD on HD     -recurrent ascites (cardiogenic)  -LUGO cirrhosis  -HE on lactulose   -DM 1  -hypothyroidism  =hypotension on midodrine with HD     Plan     -PT OT  -check Xray of L spine. Suspect msk pain. Add flexeril. Add norco, tylenol.  Xray of spine showing possible diskitis in L5. He denied any preceding back pain. His pain appears rather acute after the fall. Less likely diskitis. Will check blood cx / esr / crp. He states he cannot have MRI due to prior spinal cord stimulator. Based on Dr. Figueroa noted he had a Nevro SCS placed in 2020, and did have a brain MRI in 2021.   -MRI which is scheduled Monday with sedation   -Given possibility of an epidural phlegmon, started on cefepime and vancomycin, apprec ID recs   - possible IR bx   -CT with contrast noted  -Repeated esr  crp     -abd soft, nt. He is due to have paracentesis. Ordered  -HD per renal  -cont lactulose  -ok to use home insulin pump   -cont synthroid      Scds  Heparin held for procedures         Will continue to follow while hospitalized. Please page me or the on-call hospitalist with questions or concerns. D/w RN     Nikunj Kong MD  [facility] Hospitalist  [phone]  9/8/2024  5:17 PM

## 2024-09-09 NOTE — CONGREGATE LIVING REVIEW
[facility] Living Authorization    [facility] Review Committee has reviewed this case and the patient IS APPROVED for discharge to a facility for Short Term Skilled once the following procedure is followed:     - The physician discharge instructions (contained within the IMAN note for SNF) must inlcude the below appropriate and approved COVID instructions to the facility    For questions regarding CLRC approval process, please contact the CM assigned to the case.  For questions regarding RN discharge workflow, please contact the unit Clinical Leader.

## 2024-09-09 NOTE — PLAN OF CARE
Patient A&Ox4 . Vital signs stable . On room air during the day and CPAP at night . Tele with sinus rhythm . Pain controlled with norco . Patient is anuric . Dialysis T,TH,Sa . Left arm AV fistula intact . Able to place peripheral iv in the right arm . Dressing to both legs clean and dry . On iv antibiotics . Up with min to moderate assist. Patient has insulin pump to his RUQ and CGM to right upper arm . Patient manage it by himself . Blood sugar dropped to 42 . Patient is awake and alert treated per protocol . Stat lab draw done and was 70.  patient had liquid glucose before the blood draw . Notified MD Monica GARCIA post midnight for MRI under anesthesia . Dressing to wounds in both legs clean and dry . All safety precautions in place , reminded to \"call don't fall\" . Will continue to monitor .

## 2024-09-09 NOTE — IMAGING NOTE
Spoke with Floor RNChris. Plan for procedure tomorrow 9/10/24 with tentative time of 1300. Informed RN to keep patient NPO at midnight tonight. Last dose of ASA was 9/7/24 at 1253. Please send patient to procedure with saline locked IV. Patient is consentable. PT/INR to be ordered STAT tomorrow AM 9/10/24. Pt wears CPAP at [facility], instructed to bring CPAP down for procedure. RN verbalized understanding.

## 2024-09-09 NOTE — PROGRESS NOTES
[facility]   part of [facility]     Nephrology Progress Note    Gamaliel Boyer Patient Status:  Inpatient    1950 MRN [id number]   [facility] 3SW-A Attending Charan Guido,    Hosp Day # 3 PCP Nima Sorensen MD       SUBJECTIVE:  No complains    Current Facility-Administered Medications:     prednisoLONE (Pred Forte) 1 % ophthalmic suspension 1 drop, 1 drop, Right Eye, 4x daily    bacitracin-polymyxin b (Polysporin) ophthalmic ointment 1 inch, 1 inch, Right Eye, TID    moxifloxacin (Vigamox) 0.5 % ophthalmic solution 1 drop, 1 drop, Right Eye, TID    ceFEPIme (Maxipime) 1 g in sodium chloride 0.9% 100 mL IVPB-MBP, 1 g, Intravenous, Q24H    vancomycin (Vancocin) 750 mg in sodium chloride 0.9% 250 mL IVPB-ADDV [To be given AFTER HD], 10 mg/kg, Intravenous, Q Tu, Th and Sa    midodrine (ProAmatine) tab 5 mg, 5 mg, Oral, Q Tu, Th and Sa    insulin via Insulin Pump, , Subcutaneous, TID AC and HS    silver sulfADIAZINE (Silvadene) 1 % cream, , Topical, Daily    [Held by provider] aspirin DR tab 81 mg, 81 mg, Oral, Daily    ezetimibe (Zetia) tab 10 mg, 10 mg, Oral, QAM    gabapentin (Neurontin) cap 200 mg, 200 mg, Oral, Nightly    lactulose (CHRONULAC) 10 GM/15ML solution 20 g, 20 g, Oral, QPM    levothyroxine (Synthroid) tab 150 mcg, 150 mcg, Oral, Daily @ 0700    sertraline (Zoloft) tab 25 mg, 25 mg, Oral, Daily    sevelamer carbonate (Renvela) tab 800 mg, 800 mg, Oral, TID AC    acetaminophen (Tylenol Extra Strength) tab 500 mg, 500 mg, Oral, Q4H PRN    HYDROcodone-acetaminophen (Norco) 5-325 MG per tab 1 tablet, 1 tablet, Oral, Q6H PRN    cyclobenzaprine (Flexeril) tab 5 mg, 5 mg, Oral, TID PRN    famotidine (Pepcid) tab 10 mg, 10 mg, Oral, QOD    melatonin tab 9 mg, 9 mg, Oral, Nightly PRN    glucose (Dex4) 15 GM/59ML oral liquid 15 g, 15 g, Oral, Q15 Min PRN **OR** glucose (Glutose) 40% oral gel 15 g, 15 g, Oral, Q15 Min PRN **OR** glucose-vitamin C (Dex-4) chewable tab  4 tablet, 4 tablet, Oral, Q15 Min PRN **OR** dextrose 50% injection 50 mL, 50 mL, Intravenous, Q15 Min PRN **OR** glucose (Dex4) 15 GM/59ML oral liquid 30 g, 30 g, Oral, Q15 Min PRN **OR** glucose (Glutose) 40% oral gel 30 g, 30 g, Oral, Q15 Min PRN **OR** glucose-vitamin C (Dex-4) chewable tab 8 tablet, 8 tablet, Oral, Q15 Min PRN          Physical Exam:   /58 (BP Location: Right arm)   Pulse 64   Temp 98.5 °F (36.9 °C) (Oral)   Resp 17   Ht 5' 6\" (1.676 m)   Wt 163 lb (73.9 kg)   SpO2 95%   BMI 26.31 kg/m²   Temp (24hrs), Av.5 °F (36.9 °C), Min:97.7 °F (36.5 °C), Max:98.9 °F (37.2 °C)       Intake/Output Summary (Last 24 hours) at 2024 1042  Last data filed at 2024 0730  Gross per 24 hour   Intake 840 ml   Output --   Net 840 ml     Last 3 Weights   24 1604 163 lb (73.9 kg)   24 1036 163 lb (73.9 kg)   24 1103 163 lb (73.9 kg)   24 0307 157 lb 13.6 oz (71.6 kg)   24 0128 158 lb 6.4 oz (71.8 kg)   24 2041 170 lb (77.1 kg)   23 1556 171 lb 8 oz (77.8 kg)   10/21/23 0440 181 lb 7 oz (82.3 kg)   10/20/23 1806 175 lb (79.4 kg)   10/20/23 0800 175 lb (79.4 kg)   10/14/23 1517 170 lb (77.1 kg)     General: Alert and in no apparent distress.  HEENT: No scleral icterus, MMM  Neck: Supple, no KALPANA or thyromegaly  Cardiac: Regular rate and rhythm, S1, S2 normal, no murmur or rub  Lungs: Clear without wheezes, rales, rhonchi.    Abdomen: Soft, non-tender. + bowel sounds, no palpable organomegaly  Extremities: Without clubbing, cyanosis or edema. L arm AVF with bruit/thrill  Neurologic:  moving all extremities  Skin: Warm and dry, no rash    Recent Labs     24  0650 24  0540 24  0516   WBC 6.5 6.1 7.0   HGB 11.2* 10.3* 10.6*   .3* 103.3* 101.8*   .0 201.0 235.0       Recent Labs     24  0650 24  0540 24  0516   * 136 134*   K 4.8 4.4 5.0   CL 97* 100 97*   CO2 26.0 25.0 24.0   BUN 42* 30* 45*   CREATSERUM  4.86* 3.88* 5.27*   CA 9.4 9.4 9.4   MG 2.5 2.3 2.5                Impression/Plan:      1.  ESRD- due to DM.  HD today per routine TTS schedule      2.  Anemia- due to ESRD.  PAYAL for goal hgb 10-11 gms     3.  Orthostatic hypotension- cont midodrine with HD     4.  Ascites- reports usually has paracentesis every 3 weeks or so but has now been closer to 5.  S/p paracentesis 9/6- 3.1 L       Mo Rousseau  9/9/2024

## 2024-09-10 ENCOUNTER — APPOINTMENT (OUTPATIENT)
Dept: CT IMAGING | Facility: HOSPITAL | Age: 74
End: 2024-09-10
Attending: PHYSICIAN ASSISTANT
Payer: MEDICARE

## 2024-09-10 PROBLEM — Z99.2 ESRD (END STAGE RENAL DISEASE) ON DIALYSIS (HCC): Status: ACTIVE | Noted: 2024-01-28

## 2024-09-10 PROBLEM — N18.6 ESRD (END STAGE RENAL DISEASE) ON DIALYSIS (HCC): Status: ACTIVE | Noted: 2024-01-28

## 2024-09-10 LAB
ANION GAP SERPL CALC-SCNC: 11 MMOL/L (ref 0–18)
BUN BLD-MCNC: 61 MG/DL (ref 9–23)
CALCIUM BLD-MCNC: 9.5 MG/DL (ref 8.7–10.4)
CHLORIDE SERPL-SCNC: 99 MMOL/L (ref 98–112)
CO2 SERPL-SCNC: 24 MMOL/L (ref 21–32)
CREAT BLD-MCNC: 6.36 MG/DL
EGFRCR SERPLBLD CKD-EPI 2021: 9 ML/MIN/1.73M2 (ref 60–?)
ERYTHROCYTE [DISTWIDTH] IN BLOOD BY AUTOMATED COUNT: 20.2 %
GLUCOSE BLD-MCNC: 130 MG/DL (ref 70–99)
GLUCOSE BLD-MCNC: 150 MG/DL (ref 70–99)
GLUCOSE BLD-MCNC: 180 MG/DL (ref 70–99)
GLUCOSE BLD-MCNC: 224 MG/DL (ref 70–99)
GLUCOSE BLD-MCNC: 230 MG/DL (ref 70–99)
GLUCOSE BLD-MCNC: 252 MG/DL (ref 70–99)
HCT VFR BLD AUTO: 33.4 %
HGB BLD-MCNC: 10.4 G/DL
INR BLD: 1.17 (ref 0.8–1.2)
MAGNESIUM SERPL-MCNC: 2.7 MG/DL (ref 1.6–2.6)
MCH RBC QN AUTO: 31.5 PG (ref 26–34)
MCHC RBC AUTO-ENTMCNC: 31.1 G/DL (ref 31–37)
MCV RBC AUTO: 101.2 FL
OSMOLALITY SERPL CALC.SUM OF ELEC: 302 MOSM/KG (ref 275–295)
PLATELET # BLD AUTO: 218 10(3)UL (ref 150–450)
POTASSIUM SERPL-SCNC: 5.2 MMOL/L (ref 3.5–5.1)
PROTHROMBIN TIME: 15 SECONDS (ref 11.6–14.8)
RBC # BLD AUTO: 3.3 X10(6)UL
SODIUM SERPL-SCNC: 134 MMOL/L (ref 136–145)
WBC # BLD AUTO: 6.4 X10(3) UL (ref 4–11)

## 2024-09-10 PROCEDURE — 99153 MOD SED SAME PHYS/QHP EA: CPT | Performed by: PHYSICIAN ASSISTANT

## 2024-09-10 PROCEDURE — 77012 CT SCAN FOR NEEDLE BIOPSY: CPT | Performed by: PHYSICIAN ASSISTANT

## 2024-09-10 PROCEDURE — 0S934ZZ DRAINAGE OF LUMBOSACRAL JOINT, PERCUTANEOUS ENDOSCOPIC APPROACH: ICD-10-PCS | Performed by: RADIOLOGY

## 2024-09-10 PROCEDURE — 90935 HEMODIALYSIS ONE EVALUATION: CPT | Performed by: INTERNAL MEDICINE

## 2024-09-10 PROCEDURE — 99152 MOD SED SAME PHYS/QHP 5/>YRS: CPT | Performed by: PHYSICIAN ASSISTANT

## 2024-09-10 PROCEDURE — 62267 INTERDISCAL PERQ ASPIR DX: CPT | Performed by: PHYSICIAN ASSISTANT

## 2024-09-10 RX ORDER — MIDAZOLAM HYDROCHLORIDE 1 MG/ML
1 INJECTION INTRAMUSCULAR; INTRAVENOUS EVERY 5 MIN PRN
Status: DISCONTINUED | OUTPATIENT
Start: 2024-09-10 | End: 2024-09-10 | Stop reason: HOSPADM

## 2024-09-10 RX ORDER — FLUMAZENIL 0.1 MG/ML
0.2 INJECTION INTRAVENOUS AS NEEDED
Status: DISCONTINUED | OUTPATIENT
Start: 2024-09-10 | End: 2024-09-10 | Stop reason: HOSPADM

## 2024-09-10 RX ORDER — MIDAZOLAM HYDROCHLORIDE 1 MG/ML
INJECTION INTRAMUSCULAR; INTRAVENOUS
Status: COMPLETED
Start: 2024-09-10 | End: 2024-09-10

## 2024-09-10 RX ORDER — NALOXONE HYDROCHLORIDE 0.4 MG/ML
0.08 INJECTION, SOLUTION INTRAMUSCULAR; INTRAVENOUS; SUBCUTANEOUS AS NEEDED
Status: DISCONTINUED | OUTPATIENT
Start: 2024-09-10 | End: 2024-09-10 | Stop reason: HOSPADM

## 2024-09-10 RX ORDER — NALOXONE HYDROCHLORIDE 0.4 MG/ML
INJECTION, SOLUTION INTRAMUSCULAR; INTRAVENOUS; SUBCUTANEOUS
Status: DISCONTINUED
Start: 2024-09-10 | End: 2024-09-10 | Stop reason: WASHOUT

## 2024-09-10 RX ORDER — SODIUM CHLORIDE 9 MG/ML
INJECTION, SOLUTION INTRAVENOUS CONTINUOUS
Status: DISCONTINUED | OUTPATIENT
Start: 2024-09-10 | End: 2024-09-10 | Stop reason: HOSPADM

## 2024-09-10 NOTE — PROGRESS NOTES
CC: follow-up hospital admission back pain    SUBJECTIVE:    Interval History: lying in bed, feeling ok. Discussed plan and procedure. Answered questions of wife.     OBJECTIVE:  Scheduled Meds:    prednisoLONE  1 drop Right Eye 4x daily    bacitracin-polymyxin b  1 inch Right Eye TID    moxifloxacin  1 drop Right Eye TID    cefepime  1 g Intravenous Q24H    vancomycin  10 mg/kg Intravenous Q Tu, Th and Sa    midodrine  5 mg Oral Q Tu, Th and Sa    insulin   Subcutaneous TID AC and HS    silver sulfADIAZINE   Topical Daily    [Held by provider] aspirin  81 mg Oral Daily    ezetimibe  10 mg Oral QAM    gabapentin  200 mg Oral Nightly    lactulose  20 g Oral QPM    levothyroxine  150 mcg Oral Daily @ 0700    sertraline  25 mg Oral Daily    sevelamer carbonate  800 mg Oral TID AC    famotidine  10 mg Oral QOD     Continuous Infusions:   PRN Meds:   acetaminophen    HYDROcodone-acetaminophen    cyclobenzaprine    melatonin    glucose **OR** glucose **OR** glucose-vitamin C **OR** dextrose **OR** glucose **OR** glucose **OR** glucose-vitamin C    PHYSICAL EXAM  Vital signs: Temp:  [97.3 °F (36.3 °C)-98 °F (36.7 °C)] 97.7 °F (36.5 °C)  Pulse:  [54-70] 70  Resp:  [10-20] 17  BP: ()/(51-76) 92/61  SpO2:  [93 %-100 %] 95 %      GENERAL - NAD, AAO  EYES- sclera anicteric,   HENT- normocephalic, OP - MMM  NECK - no JVD  CV- RRR  RESP - CTAB, normal resp effort  ABDOMEN- soft, NT/ND   EXT- no LE edema   PSYCH - normal mentation/ normal affect    Data Review:   Labs:   Recent Labs   Lab 09/05/24  1116 09/06/24  0448 09/07/24  0650 09/08/24  0540 09/09/24  0516 09/09/24  1320 09/10/24  0542   WBC 6.3 6.3 6.5 6.1 7.0  --  6.4   HGB 10.7* 11.1* 11.2* 10.3* 10.6*  --  10.4*   MCV 99.7 99.4 100.3* 103.3* 101.8*  --  101.2*   .0 222.0 214.0 201.0 235.0  --  218.0   INR 1.18  --   --   --   --  1.19 1.17       Recent Labs   Lab 09/06/24  0448 09/07/24  0650 09/08/24  0540 09/08/24  2103 09/09/24  0516 09/10/24  0542   NA  134* 135* 136  --  134* 134*   K 5.6* 4.8 4.4  --  5.0 5.2*   CL 94* 97* 100  --  97* 99   CO2 24.0 26.0 25.0  --  24.0 24.0   BUN 71* 42* 30*  --  45* 61*   CREATSERUM 6.55* 4.86* 3.88*  --  5.27* 6.36*   CA 10.0 9.4 9.4  --  9.4 9.5   MG 2.9* 2.5 2.3  --  2.5 2.7*   * 183* 152* 70 226* 224*       Recent Labs   Lab 09/05/24  1116 09/06/24  0448   ALT 17 16   AST 24 21   ALB 4.0 4.0       Recent Labs   Lab 09/09/24  2139 09/10/24  0321 09/10/24  0505 09/10/24  1257 09/10/24  1732   PGLU 146* 252* 230* 130* 180*           ASSESSMENT/PLAN:    Patient is a 73 year old male with PMH sig for ESRD on HD, cirrhosis, HE, DM 1 here for fall, back pain     Impression     -generalized weakness with fall     -ESRD on HD     -recurrent ascites (cardiogenic)  -LUGO cirrhosis  -HE on lactulose   -DM 1  -hypothyroidism  =hypotension on midodrine with HD     Plan      L5-S1 discitis/osteomyelitis.   -PT OT  -Xray of L spine. Suspect msk pain. Add flexeril. Add norco, tylenol.  Xray of spine showing possible diskitis in L5. He denied any preceding back pain. His pain appears rather acute after the fall. Less likely diskitis. Will check blood cx / esr / crp. He states he cannot have MRI due to prior spinal cord stimulator. Based on Dr. Figueroa noted he had a Nevro SCS placed in 2020, and did have a brain MRI in 2021.   -MRI was scheduled with sedation but unable to do c stimulator  -Given possibility of an epidural phlegmon, started on cefepime and vancomycin, apprec ID recs   - s/p CT guided L5-S1 disc biopsy 9/10  -CT with contrast noted, diffuse disc bulge; Marked irregularity and erosion of endplates at the L5-S1 level   -Repeated esr  crp       -abd soft, nt. He is due to have paracentesis. Ordered  -HD per renal  -cont lactulose  -ok to use home insulin pump; low glc while NPO  -cont synthroid      Orthostatic hypotension  - continue midodrine with HD     Scds  Heparin held for procedures         Will continue to follow  while hospitalized. Please page me or the on-call hospitalist with questions or concerns.     Nikunj Kong MD  [facility] Hospitalist  [phone]  9/10/2024  5:46 PM

## 2024-09-10 NOTE — DISCHARGE INSTRUCTIONS
Patient will get antibiotic infusions during dialysis sessions.    Sometimes managing your health at home requires assistance.  The [facility] team has recognized your preference to use [facility].  They can be reached by phone at [phone].  The fax number for your reference is [phone].  A representative from the home health agency will contact you or your family to schedule your first visit.        Discharge/After Visit Instructions  [place] - Department of Radiology  Biopsy - Renal (Kidney), Liver, Lung, Bone, Retroperitoneal Location    Post Sedation  Follow these guidelines:  You should be watched overnight by a responsible adult. This person should make sure your condition remains stable.   Do not drink any alcohol for 24 hours after the procedure.  Don’t drive, operate dangerous machinery, make important business or personal decisions, or sign legal documents for 24 hours after the procedure.  Note: Your healthcare provider may tell you not to take any medicine by mouth for pain or sleep for a period of time. These medicines may react with the medicine you were given during your procedure. This could cause a much stronger response than usual.     Activity:  Take it easy for the rest of the day after your biopsy.   You may be sore at the site of the biopsy for the next 5 days.   Do not do any strenuous exercises or lift over five pounds for the next 24 hours.     Biopsy Site:  Keep a bandage on the biopsy site for 24 hours after the procedure.   You may shower after 24 hours, but no soaking in a bathtub for 48 hours.     Lung Biopsy: If chest pain or shortness of breath occurs, go to the nearest Emergency Room.   Liver Biopsy: If there is any increase in right-sided back, shoulder, or abdominal pain, go to the nearest Emergency Room. Call your doctor for unusual dizziness or weakness.   Renal Biopsy: Report any bloody urine, bleeding at the  site, swelling, or pain to your ordering provider,      Diet: Drink plenty of fluids and resume your usual home diet. A slow return to normal foods minimizes nausea.    Pain Management:   You may use over-the-counter or prescribed pain relief medication if it is not contraindicated by another condition.     Medications:  You may resume your usual home medications. You may resume blood thinners 24 hours after the procedure if there is no bleeding from/hematoma at the puncture site or bloody urine (Renal Biopsy only)     When to seek medical advice:  Call the provider that ordered the biopsy with any questions and to discuss test results. These may also be available in your [facility] Chart. You may also contact the Radiology Nurse at [phone], M-F, 8-5 with any additional questions or concerns.  Dial [phone] and ask the  to page the on-call IR Radiologist if any of these occur:    A change in color or temperature of the area where the biopsy was performed.  You develop increasing pain or shortness of breath.  Unusual drowsiness, weakness, or dizziness.  Unusual vomiting.     IF YOU FEEL YOU ARE EXPERIENCING AN EMERGENCY,   CALL 911 OR GO TO THE NEAREST EMERGENCY ROOM      4.2.24 MO/  Radiology

## 2024-09-10 NOTE — PROGRESS NOTES
[facility]   part of [facility]     Nephrology Progress Note    Gamaliel Boyer Patient Status:  Inpatient    1950 MRN [id number]   Location [facility] 3SW-A Attending Charan Guido,    Hosp Day # 4 PCP Nima Sorensen MD       SUBJECTIVE:  Resting comfortably this morning, receiving HD. Plan for IR biopsy later today    Current Facility-Administered Medications:     prednisoLONE (Pred Forte) 1 % ophthalmic suspension 1 drop, 1 drop, Right Eye, 4x daily    bacitracin-polymyxin b (Polysporin) ophthalmic ointment 1 inch, 1 inch, Right Eye, TID    moxifloxacin (Vigamox) 0.5 % ophthalmic solution 1 drop, 1 drop, Right Eye, TID    ceFEPIme (Maxipime) 1 g in sodium chloride 0.9% 100 mL IVPB-MBP, 1 g, Intravenous, Q24H    vancomycin (Vancocin) 750 mg in sodium chloride 0.9% 250 mL IVPB-ADDV [To be given AFTER HD], 10 mg/kg, Intravenous, Q Tu, Th and Sa    midodrine (ProAmatine) tab 5 mg, 5 mg, Oral, Q Tu, Th and Sa    insulin via Insulin Pump, , Subcutaneous, TID AC and HS    silver sulfADIAZINE (Silvadene) 1 % cream, , Topical, Daily    [Held by provider] aspirin DR tab 81 mg, 81 mg, Oral, Daily    ezetimibe (Zetia) tab 10 mg, 10 mg, Oral, QAM    gabapentin (Neurontin) cap 200 mg, 200 mg, Oral, Nightly    lactulose (CHRONULAC) 10 GM/15ML solution 20 g, 20 g, Oral, QPM    levothyroxine (Synthroid) tab 150 mcg, 150 mcg, Oral, Daily @ 0700    sertraline (Zoloft) tab 25 mg, 25 mg, Oral, Daily    sevelamer carbonate (Renvela) tab 800 mg, 800 mg, Oral, TID AC    acetaminophen (Tylenol Extra Strength) tab 500 mg, 500 mg, Oral, Q4H PRN    HYDROcodone-acetaminophen (Norco) 5-325 MG per tab 1 tablet, 1 tablet, Oral, Q6H PRN    cyclobenzaprine (Flexeril) tab 5 mg, 5 mg, Oral, TID PRN    famotidine (Pepcid) tab 10 mg, 10 mg, Oral, QOD    melatonin tab 9 mg, 9 mg, Oral, Nightly PRN    glucose (Dex4) 15 GM/59ML oral liquid 15 g, 15 g, Oral, Q15 Min PRN **OR** glucose (Glutose) 40% oral gel 15 g, 15  g, Oral, Q15 Min PRN **OR** glucose-vitamin C (Dex-4) chewable tab 4 tablet, 4 tablet, Oral, Q15 Min PRN **OR** dextrose 50% injection 50 mL, 50 mL, Intravenous, Q15 Min PRN **OR** glucose (Dex4) 15 GM/59ML oral liquid 30 g, 30 g, Oral, Q15 Min PRN **OR** glucose (Glutose) 40% oral gel 30 g, 30 g, Oral, Q15 Min PRN **OR** glucose-vitamin C (Dex-4) chewable tab 8 tablet, 8 tablet, Oral, Q15 Min PRN          Physical Exam:   BP 93/51 (BP Location: Right arm)   Pulse 66   Temp 97.3 °F (36.3 °C) (Axillary)   Resp 16   Ht 5' 6\" (1.676 m)   Wt 163 lb (73.9 kg)   SpO2 97%   BMI 26.31 kg/m²   Temp (24hrs), Av.9 °F (36.6 °C), Min:97.3 °F (36.3 °C), Max:98.4 °F (36.9 °C)       Intake/Output Summary (Last 24 hours) at 9/10/2024 1041  Last data filed at 2024 1641  Gross per 24 hour   Intake 240 ml   Output --   Net 240 ml     Last 3 Weights   24 1604 163 lb (73.9 kg)   24 1036 163 lb (73.9 kg)   24 1103 163 lb (73.9 kg)   24 0307 157 lb 13.6 oz (71.6 kg)   24 0128 158 lb 6.4 oz (71.8 kg)   24 2041 170 lb (77.1 kg)   23 1556 171 lb 8 oz (77.8 kg)   10/21/23 0440 181 lb 7 oz (82.3 kg)   10/20/23 1806 175 lb (79.4 kg)   10/20/23 0800 175 lb (79.4 kg)   10/14/23 1517 170 lb (77.1 kg)     General: Alert and in no apparent distress.  HEENT: No scleral icterus, MMM  Neck: Supple, no KALPANA or thyromegaly  Cardiac: Regular rate and rhythm, S1, S2 normal  Lungs: Clear to auscultation, no increased work of breathing  Extremities: No edema. L arm AVF with bruit/thrill  Neurologic:  moving all extremities    Recent Labs   Lab 24  0540 24  2103 24  0516 09/10/24  0542   * 70 226* 224*   BUN 30*  --  45* 61*   CREATSERUM 3.88*  --  5.27* 6.36*   EGFRCR 16*  --  11* 9*   CA 9.4  --  9.4 9.5     --  134* 134*   K 4.4  --  5.0 5.2*     --  97* 99   CO2 25.0  --  24.0 24.0     Recent Labs   Lab 24  1116 24  0448 24  0540 24  0516  09/10/24  0542   RBC 3.44*   < > 3.31* 3.40* 3.30*   HGB 10.7*   < > 10.3* 10.6* 10.4*   HCT 34.3*   < > 34.2* 34.6* 33.4*   MCV 99.7   < > 103.3* 101.8* 101.2*   MCH 31.1   < > 31.1 31.2 31.5   MCHC 31.2   < > 30.1* 30.6* 31.1   RDW 19.6   < > 20.0 20.1 20.2   NEPRELIM 5.21  --   --   --   --    WBC 6.3   < > 6.1 7.0 6.4   .0   < > 201.0 235.0 218.0    < > = values in this interval not displayed.       Impression/Plan:      1)  End stage renal disease: Due to diabetes. On HD via AVF per outpatient TThS schedule. Plan for HD today.       2.  Anemia: due to ESRD.  PAYAL for goal hgb 10-11 g/dL.      3.  Orthostatic hypotension: continue midodrine with HD     4.  Ascites: reports usually has paracentesis every 3 weeks or so but has now been closer to 5.  S/p paracentesis 9/6- 3.1 L     5. Back pain: CT concerning for osteomyelitis with epidural phlegmon. ID following, on Cefepime and Vancomycin. Plan for IR biopsy 9/10.     Marlin Benson MD  09/10/24

## 2024-09-10 NOTE — PROCEDURES
[facility]    Gamaliel Boyer Patient Status:  Inpatient    1950 MRN [id number]   Location [facility] 3SW-A Attending Nikunj Kong MD   Hosp Day # 4 PCP Nima Sorensen MD         Brief Procedure Report    Pre-Operative Diagnosis: L5-S1 discitis osteomyelitis.    Post-Operative Diagnosis: Same as above.    Procedure Performed: CT guided L5-S1 disc aspiration    Anesthesia: 1% lidocaine.  Moderate sedation    EBL: 0    Complications: None    Summary of Case: 17g needle aspiration of left lateral aspect of L5-S1 disc.   Fluid was sent for routine gram stain and cultures. Patient tolerated procedure well without immediate complication. Full report to follow in PACS.    Edward Romo

## 2024-09-10 NOTE — IMAGING NOTE
1100- Pt arrives by bed with transport.  Upon arrival, 22G peripheral IV in R wrist no longer working; new 20G peripheral IV in R FA placed by CT technician Nancy.  NPO since midnight on 9/10/24.  Confirmed w/ SHANKAR Brito that last dose of aspirin was 9/7/24.  Labs drawn today and results reviewed. Consent form signed by pt, time out completed prior to procedure.  RT set up pt's CPAP machine for use during procedure.    1240- Intervertebral disc biopsy complete.  Pt arousable but drowsy, vital signs stable.  Pt switched from CPAP to 2L NC after procedure.  Pt denies pain.  Biopsy site dressing CDI.  Report given to SHANKAR Brito at bedside, pt transferred to room 363.

## 2024-09-10 NOTE — PROGRESS NOTES
/[facility]   part of [facility]    Progress Note    Gamaliel Boyer Patient Status:  Inpatient    1950 MRN [id number]   Location [facility] 3SW-A Attending Nikunj Kong MD   Hosp Day # 4 PCP Nima Sorensen MD       S: Moderate back pain left lower extremity pain that patient states has been present for years. He states that he fell when going from his walker to his chair in the kitchen, and landed on his buttocks. Since then he has had left sided buttock pain. History of lumbar decompression with Dr. Laird 8 years ago. Patient currently being managed by ID. No MRI completed at this time. CT scan of the sacrum complete. History of infection of the left ankle which resulted in ankle fusion.     Inspection:  Awake alert No acute distress. No difficulty breathing     Blood pressure 93/51, pulse 66, temperature 97.3 °F (36.3 °C), temperature source Axillary, resp. rate 16, height 5' 6\" (1.676 m), weight 163 lb (73.9 kg), SpO2 97%.    Recent Labs   Lab 24  1116 24  0448 09/10/24  0542   RBC 3.44*   < > 3.30*   HGB 10.7*   < > 10.4*   HCT 34.3*   < > 33.4*   MCV 99.7   < > 101.2*   MCH 31.1   < > 31.5   MCHC 31.2   < > 31.1   RDW 19.6   < > 20.2   NEPRELIM 5.21  --   --    WBC 6.3   < > 6.4   .0   < > 218.0    < > = values in this interval not displayed.       Strength: Strength of bilateral lower extremities:     Left Right    EHL 0/5 0/5    DF 1/5 1/5    PF 4/5 4/5    Quads 5/5 5/5    IP 5/5 5/5  Sensation: No sensory deficits noted on bilateral lower extremities      HEAD/NECK: Head is normocephalic  EYES: EOMI, GLORIA  SKIN EXAM: Skin is intact, head, neck, trunk and arms/legs. No rashes, mottling or ulcerations.  LYMPH EXAM: There is no lymph edema in either lower extremity.  VASCULAR EXAM:  Good distal perfusion. No clubbing or cyanosis.Calves supple, NT bilaterally  ABDOMINAL EXAM: Abdomen is soft, NT.      A/P: sacral pain after fall    P: CT scan negative  for any sacral fracture. No new recommendations at this time. Awaiting biopsy results. Continue with ID.     Priscila Varela PA-C   09/10/24

## 2024-09-10 NOTE — PLAN OF CARE
Pt resting comfortably this evening, refusing eye drops & ointment, also stating he does not need pain meds at this time. Vss att his time. Dressings all clean dry and intact, changed by day rn this afternoon. Tele,  in place. Pt verbalized understanding of poc Importance to maintain npo since midnight for ct aspiration w ir later today. Plan to arjun when ready. Call light within reach, pt verbalized understanding of poc & call dont fall protocol.

## 2024-09-10 NOTE — PROGRESS NOTES
Infectious Disease Progress Note      Date of admission: 9/5/2024 10:33 AM     Reason for consult: Question of discitis with epidural phlegmon    Subjective: Patient's back pain is improving.  No nausea or vomiting.  No diarrhea.  No shortness of breath.  No cough or sputum production.    The rest of the systems were reviewed and found to be negative except was mentioned above    Interval events: This is a 73-year-old male patient with history of end-stage renal disease on hemodialysis, liver cirrhosis, type 1 diabetes, who presents here with worsening back pain after falling down.  CT scan of the spine showing bony destruction of L5-S1 endplate suspicious for osteomyelitis with question of epidural phlegmon involving the same area  Unable to obtain an MRI of the lumbar spine given his stimulator.  Blood cultures with no growth to date.  Scheduled for IR biopsy and cultures later today.    Antimicrobials:  Cefepime from 9/8 through today  IV vancomycin from 9/7 through today      Medications:    epoetin kendrick    prednisoLONE    bacitracin-polymyxin b    moxifloxacin    cefepime    vancomycin    midodrine    insulin    silver sulfADIAZINE    [Held by provider] aspirin    ezetimibe    gabapentin    lactulose    levothyroxine    sertraline    sevelamer carbonate    acetaminophen    HYDROcodone-acetaminophen    cyclobenzaprine    famotidine    melatonin    glucose **OR** glucose **OR** glucose-vitamin C **OR** dextrose **OR** glucose **OR** glucose **OR** glucose-vitamin C     Allergies:  Allergies   Allergen Reactions    Antihistamine & Nasal Deconges [Fexofenadine-Pseudoephedrine] OTHER (SEE COMMENTS)     Altered mental status    Adhesive Tape ITCHING    Albuterol DIZZINESS and OTHER (SEE COMMENTS)     Albuterol Inhalation. Lightheadedness    Benadryl [Diphenhydramine] RESTLESSNESS    Depakote [Valproic Acid] DIZZINESS    Fluconazole OTHER (SEE COMMENTS)     Kidney labs abnormal    Mirtazapine RESTLESSNESS     Quetiapine RESTLESSNESS    Wellbutrin [Bupropion] RASH    Latex RASH       Physical Exam:  Vitals:    09/10/24 0815   BP: 93/51   Pulse: 66   Resp: 16   Temp: 97.3 °F (36.3 °C)     Vitals signs and nursing note reviewed.   Constitutional:       Appearance: Normal appearance.   HENT:      Head: Normocephalic and atraumatic.      Mouth: Mucous membranes are moist.   Neck:      Musculoskeletal: Neck supple.   Cardiovascular:      Rate and Rhythm: Normal rate.   Pulmonary:      Effort: Pulmonary effort is normal. No respiratory distress.   Musculoskeletal:      Right lower leg: No edema.      Left lower leg: No edema.   Skin:     General: Skin is warm and dry.   Neurological:      General: No focal deficit present.      Mental Status: Alert and oriented to person, place, and time.       Laboratory data:  I have reviewed all the lab results independently.  Lab Results   Component Value Date    WBC 6.4 09/10/2024    HGB 10.4 09/10/2024    HCT 33.4 09/10/2024    .0 09/10/2024    CREATSERUM 6.36 09/10/2024    BUN 61 09/10/2024     09/10/2024    K 5.2 09/10/2024    CL 99 09/10/2024    CO2 24.0 09/10/2024     09/10/2024    CA 9.5 09/10/2024    INR 1.17 09/10/2024    MG 2.7 09/10/2024      Recent Labs   Lab 09/05/24  1116 09/06/24  0448 09/10/24  0542   RBC 3.44*   < > 3.30*   HGB 10.7*   < > 10.4*   HCT 34.3*   < > 33.4*   MCV 99.7   < > 101.2*   MCH 31.1   < > 31.5   MCHC 31.2   < > 31.1   RDW 19.6   < > 20.2   NEPRELIM 5.21  --   --    WBC 6.3   < > 6.4   .0   < > 218.0    < > = values in this interval not displayed.      Microbiology data:  Hospital Encounter on 09/05/24   1. Blood Culture     Status: None (Preliminary result)    Collection Time: 09/06/24 10:13 AM    Specimen: Blood,peripheral   Result Value Ref Range    Blood Culture Result No Growth 3 Days N/A        Radiology:  I have reviewed all imagining data available independently.   CT of the lumbar spine on 9/6/2024:  Bony destruction  of L5-S1 endplates suspicious for osteomyelitis.  Question of L5-S1 epidural phlegmon     Impression:  Gamaliel Boyer is a 73 year old male with    Traumatic back pain after a fall from his wheelchair  CT concerning for osteomyelitis with epidural phlegmon; however, clinical picture is not consistent with an infectious process  The patient denies any fevers or chills.  Up until his fall, the patient was not having any worsening back pain outside of his chronic pain  Unable to get an MRI due to his neurostimulator  Blood cultures with no growth to date  Currently on IV cefepime and IV vancomycin given the concern for an epidural phlegmon  Antimicrobials:  IV cefepime from 9/7 through today  IV vancomycin from 9/7 through today  Now scheduled for IR biopsy and cultures for 9/10  End stage renal disease  On hemodialysis  Antibiotics will be renally adjusted  Type 1 diabetes  As per the primary team  Reported allergy to fluconazole  Develops electrolyte abnormalities    Recommendations:    Follow-up on IR cultures and biopsy results  Continue IV cefepime IV vancomycin  Continue to monitor daily labs for antibiotic toxicities  Further recommendations will depend on the above work-up and clinical progress     The plan of care was discussed with the primary hospital team, Nikunj Kong MD     Recommendations were also discussed with the patient; all questions were answered.     Thank you for this consultation. Please don't hesitate to call the ID team for questions or any acute changes in patient's clinical condition.    Please note that this report has been produced using speech recognition software and may contain errors related to that system including, but not limited to, errors in grammar, punctuation, and spelling, as well as words and phrases that possibly may have been recognized inappropriately.  If there are any questions or concerns, contact the dictating provider for clarification.    The 21st  Century Cures Act makes medical notes like these available to patients in the interest of transparency. Please be advised this is a medical document. Medical documents are intended to carry relevant information, facts as evident, and the clinical opinion of the practitioner. The medical note is intended as peer to peer communication and may appear blunt or direct. It is written in medical language and may contain abbreviations or verbiage that are unfamiliar.     Taryn Ibanez MD  [facility]. Tel: [phone]. Fax: [phone].     Gamaliel Boyer : 1950 MRN: [id number] CSN: [id number]

## 2024-09-10 NOTE — PLAN OF CARE
ED admit falls, and confusion, Pt is AAOX4, forgetful, VSS BP runs low, PRANEETH CPAP, TELE, HD today 1L removed, NPO for CT aspiration today, wound care following, up SBA, IV ABX, plan TBD, Pt doing well, all needs met, all safety measures in place, call light within reach, will CTM.

## 2024-09-10 NOTE — CM/SW NOTE
09/10/24 1012   Choice of Post-Acute Provider   Informed patient of right to choose their preferred provider Yes   List of appropriate post-acute services provided to patient/family with quality data Yes   Information given to Patient       Spoke with pt's RN who stated plan for CT bone biopsy today.  Spoke with Dr Ibanez who anticipates pt will need IV abx (cefepime/vanco) with HD at discharge.  PT/OT continuing to indicate pt would benefit from AUB[facility]Y at DC.    Met with pt and provided list of accepting facilities for A[facility]Y.  Pt with preference for [facility], but informed him that they have indicated no HD bed availability at this time.  Encouraged pt to discuss alternative options with his wife.  Pt stated he feels that he may be able to DC to home.  If so, plan for resumption of HHC with [facility].  / to remain available for support and/or discharge planning.     Jessica Avery, [facility]  Discharge Planner  [phone]    Addendum:  met with pt's wife at bedside to review DC planning as above.  Pt's wife hopeful pt can return to home at DC, but will review A[facility]REY options in case needed.  She confirmed preference for [facility] and may ask to check again with this facility regarding HD bed availability if pt/wife decide that AUBREY is needed.  / to remain available for support and/or discharge planning.

## 2024-09-10 NOTE — PROCEDURES
[facility]  Pre-Procedure Note    Name: Gamaliel Boyer  MRN#: [id number]  : 1950    Procedure:  CT guided L5-S1 disc biopsy    Indication: L5-S1 discitis/osteomyelitis.     Allergies:    Allergies   Allergen Reactions    Antihistamine & Nasal Deconges [Fexofenadine-Pseudoephedrine] OTHER (SEE COMMENTS)     Altered mental status    Adhesive Tape ITCHING    Albuterol DIZZINESS and OTHER (SEE COMMENTS)     Albuterol Inhalation. Lightheadedness    Benadryl [Diphenhydramine] RESTLESSNESS    Depakote [Valproic Acid] DIZZINESS    Fluconazole OTHER (SEE COMMENTS)     Kidney labs abnormal    Mirtazapine RESTLESSNESS    Quetiapine RESTLESSNESS    Wellbutrin [Bupropion] RASH    Latex RASH       Pertinent Medications:    Is patient on any Aspirin, Coumadin, or any other Anticoagulations/Antiplatelet medications?  no      Mental Status:  Alert and Oriented      Health Status: Acceptable for Procedure    Impression and Plans:    CT findings concerning for L5-S1 discitis OM. Request for needle aspiration/biopsy.  Unable to have MRI. Blood cultures are negative. Discussed potential difficulty in needle placement.  Will attempt CT guided disc aspiration/biopsy.     I have reviewed the above information prior to procedure.    I have discussed the risks and benefits and alternatives with the patient.  The patient understands and agrees to proceed with plan of care.    Edward Romo MD

## 2024-09-10 NOTE — OCCUPATIONAL THERAPY NOTE
IP OT attempted. RN made aware of attempt. Pt occupied with HD. Will follow-up later today as schedule permits.

## 2024-09-10 NOTE — CONSULTS
[facility]  Report of Inpatient Wound Care Consultation    Gamaliel Boyer Patient Status:  Inpatient    1950 MRN [id number]   Location [facility] 3SW-A Attending Nikunj Kong MD   Hosp Day # 4 PCP Nima Sorensen MD     Reason for Consultation:  Chronic wounds    History of Present Illness:  Gamaliel Boyer is a a(n) 73 year old male. Patient seen at bedside with a fellow wound care nurse.Patient presents with chronic wounds to bilateral lower extremities as described below. Denies pain in the wounds at this time.     History:  Past Medical History:    Anemia    anxiety    back pain    Back problem    Blastomycosis    BPH (benign prostatic hyperplasia)    Depression    Diabetic retinopathy    laser surgery    End stage renal disease (HCC)    Esophageal reflux    occassion    Hearing impairment    hearing aids    HEMORRHOIDS    High blood pressure    High cholesterol    History of blood transfusion    History of renal dialysis    fistula on left arm    Hyperkalemia    hyperlipidemia    hypothyroidism    IMPOTENCE    Liver disease    2013 - pt states all resolved after liver and kidneys shut down after issue with pneumonia     Mood disturbance    Neuropathy    osteoarthritis    Osteoarthrosis, unspecified whether generalized or localized, unspecified site    osteoporosis    Polyneuropathy in diabetes(357.2)    Sepsis (HCC)    Sleep apnea, obstructive    AHI 54 SaO2 lizeth 74 % CPAP 12 HME//     Type I (juvenile type) diabetes mellitus without mention of complication, not stated as uncontrolled    since - DR Mao follows    Unspecified essential hypertension    Visual impairment    wears glasses     Past Surgical History:   Procedure Laterality Date    Back surgery      laminectomy  L1 -4    Dr. Eitan Guerrero [facility]    Back surgery  13    C4-C6 ACDF     Back surgery  16    L2-S1 Revision Decomp possible uninstrumented fusion 16    Back surgery  09/10/2018     Rev C3-C7 ACDF     Cataract Bilateral done in 2004    IOL's    Colonoscopy      2006    Colonoscopy  2/2009    normal    Colonoscopy N/A 8/23/2019    adenoma- repeat 1 yr (2 dya prep)    Colonoscopy,biopsy  2/20/09    Performed by ANAM RIVERS at [facility] [place]    Egd N/A 7/23/2021    EGD & COLONOSCOPY Surgeon: Kimberly, +vik, rpt EGD 3 months, poor prep rpt colon 3 years    Endoscopy, bowel pouch, biopsy      Injection, w/wo contrast, dx/therapeutic substance, epidural/subarachnoid; lumbar/sacral N/A 5/4/2016    Procedure: LUMBAR EPIDURAL;  Surgeon: Jayden Norton MD;  Location: [facility]    Injection, w/wo contrast, dx/therapeutic substance, epidural/subarachnoid; lumbar/sacral N/A 5/25/2016    Procedure: LUMBAR EPIDURAL;  Surgeon: Jayden Norton MD;  Location: [facility]    Injection, w/wo contrast, dx/therapeutic substance, epidural/subarachnoid; lumbar/sacral N/A 6/8/2016    Procedure: LUMBAR EPIDURAL;  Surgeon: Jayden Norton MD;  Location: [facility]    Knee replacement surgery  2/14/13    Left TKR by Dr. Lombardi    M-sedaj by  phys perfrmg svc 5+ yr N/A 5/4/2016    Procedure: LUMBAR EPIDURAL;  Surgeon: Jayden Norton MD;  Location: [facility]    M-seda by  phys perfrmg svc 5+ yr N/A 5/25/2016    Procedure: LUMBAR EPIDURAL;  Surgeon: Jayden Norton MD;  Location: [facility]    M-sedaj by  phys perfrmg svc 5+ yr N/A 6/8/2016    Procedure: LUMBAR EPIDURAL;  Surgeon: Jayden Norton MD;  Location: [facility]    Other surgical history      Bilateral median nerve release at elbow; 2000    Other surgical history      CTS release bilateral 2000    Other surgical history      Surgical repair fracture left hand 5th digit    Other surgical history      Surgery left heel ligament repair    Other surgical history      Laser surgery for bilateral retinopathy    Other  surgical history      Surgery to left eye for \"weak muscle.\" 2007    Other surgical history      bilat knee repair 9/23/10    Other surgical history  11/2013    lung resection to remove abcess    Other surgical history  10/26/2020    Cystoscopy (Dr. Ribeiro)    Patient documented not to have experienced any of the following events  2/14/2014    Procedure: ;  Surgeon: Kin Hobbs MD;  Location: [facility]    Patient documented not to have experienced any of the following events N/A 5/4/2016    Procedure: LUMBAR EPIDURAL;  Surgeon: Jayden Norton MD;  Location: [facility]    Patient documented not to have experienced any of the following events N/A 5/25/2016    Procedure: LUMBAR EPIDURAL;  Surgeon: Jayden Norton MD;  Location: [facility]    Patient documented not to have experienced any of the following events N/A 6/8/2016    Procedure: LUMBAR EPIDURAL;  Surgeon: Jayden Norton MD;  Location: [facility]    Patient withough preoperative order for iv antibiotic surgical site infection prophylaxis.  2/14/2014    Procedure: ;  Surgeon: Kin Hobbs MD;  Location: [facility]    Patient withough preoperative order for iv antibiotic surgical site infection prophylaxis. N/A 5/4/2016    Procedure: LUMBAR EPIDURAL;  Surgeon: Jayden Norton MD;  Location: [facility]    Patient withough preoperative order for iv antibiotic surgical site infection prophylaxis. N/A 5/25/2016    Procedure: LUMBAR EPIDURAL;  Surgeon: Jayden Norton MD;  Location: [facility]    Patient withough preoperative order for iv antibiotic surgical site infection prophylaxis. N/A 6/8/2016    Procedure: LUMBAR EPIDURAL;  Surgeon: Jayden Norton MD;  Location: [facility]    Total knee replacement Left 2013    Upper gi endoscopy,diagnosis  4/14/16    retained gastric contents; [facility]    Upper gi  endoscopy,remov f.b. N/A 2/14/2014    Procedure: ESOPHAGOGASTRODUODENOSCOPY, POSSIBLE BIOPSY, POSSIBLE POLYPECTOMY 35828;  Surgeon: Kin Hobbs MD;  Location: [facility]      reports that he quit smoking about 38 years ago. His smoking use included cigarettes. He started smoking about 58 years ago. He has a 70 pack-year smoking history. He has never used smokeless tobacco. He reports that he does not currently use alcohol. He reports that he does not use drugs.      Allergies:  @ALLERGY    Laboratory Data:    Recent Labs   Lab 09/05/24  1116 09/05/24  2203 09/06/24  0448 09/06/24  0538 09/07/24  0650 09/07/24  1009 09/08/24  0540 09/08/24  1232 09/08/24  2103 09/08/24  2107 09/09/24  0516 09/09/24  0542 09/09/24  1320 09/09/24  1504 09/09/24  2139 09/10/24  0321 09/10/24  0505 09/10/24  0542   WBC 6.3  --  6.3  --  6.5  --  6.1  --   --   --  7.0  --   --   --   --   --   --  6.4   HGB 10.7*  --  11.1*  --  11.2*  --  10.3*  --   --   --  10.6*  --   --   --   --   --   --  10.4*   HCT 34.3*  --  35.1*  --  36.1*  --  34.2*  --   --   --  34.6*  --   --   --   --   --   --  33.4*   .0  --  222.0  --  214.0  --  201.0  --   --   --  235.0  --   --   --   --   --   --  218.0   CREATSERUM 5.50*  --  6.55*  --  4.86*  --  3.88*  --   --   --  5.27*  --   --   --   --   --   --  6.36*   BUN 56*  --  71*  --  42*  --  30*  --   --   --  45*  --   --   --   --   --   --  61*   *  --  296*  --  183*  --  152*  --  70  --  226*  --   --   --   --   --   --  224*   CA 9.7  --  10.0  --  9.4  --  9.4  --   --   --  9.4  --   --   --   --   --   --  9.5   ALB 4.0  --  4.0  --   --   --   --   --   --   --   --   --   --   --   --   --   --   --    TP 7.6  --  7.4  --   --   --   --   --   --   --   --   --   --   --   --   --   --   --    PTT 46.3*  --   --   --   --   --   --   --   --   --   --   --   --   --   --   --   --   --    INR 1.18  --   --   --   --   --   --   --   --   --   --   --   1.19  --   --   --   --  1.17   ESRML  --   --  100*  --  89*  --   --   --   --   --   --   --   --   --   --   --   --   --    CRP  --   --  8.80*  --  7.60*  --   --   --   --   --   --   --   --   --   --   --   --   --    PGLU  --    < >  --    < >  --    < >  --    < >  --    < >  --    < >  --    < > 146* 252* 230*  --     < > = values in this interval not displayed.         ASSESSMENT:  Wound 10/20/23 Leg Right (Active)   Date First Assessed/Time First Assessed: 10/20/23 1223   Primary Wound Type: Incision  Location: Leg  Wound Location Orientation: Right      Assessments 9/10/2024  9:15 AM   Wound Image     Drainage Amount Small   Drainage Description Serosanguineous   Wound Length (cm) 5.5 cm   Wound Width (cm) 3.5 cm   Wound Surface Area (cm^2) 19.25 cm^2   Wound Depth (cm) 0.1 cm   Wound Volume (cm^3) 1.925 cm^3   Margins Undefined edges   Non-staged Wound Description Partial thickness   Viv-wound Assessment Edema (scabbed)   Wound Odor None   Shape Measured in cluster.Noted about 50 % pink non granular tissue (About 50 % intact skin)       Wound 10/20/23 Toe (Comment which one) Dorsal;Left (Active)   Date First Assessed/Time First Assessed: 10/20/23 1825   Location: Toe (Comment which one)  Wound Location Orientation: Dorsal;Left  Wound Description (Comments): dorsal aspect of left 2nd toe      Assessments 9/10/2024  9:21 AM   Wound Image     Drainage Amount None   Wound Odor None   Shape Callous       Wound 10/20/23 Toe (Comment which one) Right;Medial (Active)   Date First Assessed/Time First Assessed: 10/20/23 1827   Present on Original Admission: Yes  Location: Toe (Comment which one)  Wound Location Orientation: Right;Medial  Wound Description (Comments): medial aspect of right great toe      Assessments 9/10/2024  9:18 AM   Wound Image     Drainage Amount Small   Drainage Description Serous;Yellow   Wound Length (cm) 0.5 cm   Wound Width (cm) 1.6 cm   Wound Surface Area (cm^2) 0.8 cm^2   Wound  Depth (cm) 0.5 cm   Wound Volume (cm^3) 0.4 cm^3   Margins Epibole (Rolled edges)   Non-staged Wound Description Full thickness   Viv-wound Assessment Edema   Wound Granulation Tissue Pink;Firm   Wound Bed Granulation (%) 5 %   Wound Bed Epithelium (%) 5 %   Wound Bed Slough (%) 90 %   Wound Odor None   Local pulse : via handheld doppler  Capillary refills < 3 seconds  Temperature : within normal limits     Wound Cleaning and Dressings:  1.Right lower leg  Wound cleansing:  Cleanse with saline  Wound product: Xeroform gauze.Cover with bordered foam  Dressing change frequency:  Change dressing daily and/or PRN    2.Left medial foot  Wound cleansing:  Cleanse with saline  Wound product:Honey gel.Cover with bordered foam  Dressing change frequency:  Change dressing daily and/or PRN    3.Left second toe  Wound cleansing:  Cleanse with saline  Wound product:Paint with betadine daily       Compression Therapy:   Elevate leg(s) as much as possible    Miscellaneous/Additional Orders:  Offloading: Turn Q 2 hours and as needed, off load heels to prevent further skin breakdown    Miscellaneous recommendations : Increase dietary protein intake.On dialysis.Dietitian to evaluate amount of protein intake/supplement.       Thank you for this consultation and for allowing me to participate in the care of your patient.        Time Spent 30 Minutes.    Thank you,  Fatimah Santos RN NSN [facility]  Wound Care Clinician  [facility]  9/10/2024  8:40 AM

## 2024-09-11 ENCOUNTER — APPOINTMENT (OUTPATIENT)
Dept: WOUND CARE | Age: 74
End: 2024-09-11
Attending: PODIATRIST

## 2024-09-11 LAB
ANION GAP SERPL CALC-SCNC: 11 MMOL/L (ref 0–18)
BUN BLD-MCNC: 43 MG/DL (ref 9–23)
CALCIUM BLD-MCNC: 9.2 MG/DL (ref 8.7–10.4)
CHLORIDE SERPL-SCNC: 99 MMOL/L (ref 98–112)
CO2 SERPL-SCNC: 27 MMOL/L (ref 21–32)
CREAT BLD-MCNC: 4.92 MG/DL
EGFRCR SERPLBLD CKD-EPI 2021: 12 ML/MIN/1.73M2 (ref 60–?)
ERYTHROCYTE [DISTWIDTH] IN BLOOD BY AUTOMATED COUNT: 20.7 %
GLUCOSE BLD-MCNC: 142 MG/DL (ref 70–99)
GLUCOSE BLD-MCNC: 145 MG/DL (ref 70–99)
GLUCOSE BLD-MCNC: 157 MG/DL (ref 70–99)
GLUCOSE BLD-MCNC: 162 MG/DL (ref 70–99)
GLUCOSE BLD-MCNC: 196 MG/DL (ref 70–99)
GLUCOSE BLD-MCNC: 208 MG/DL (ref 70–99)
GLUCOSE BLD-MCNC: 51 MG/DL (ref 70–99)
GLUCOSE BLD-MCNC: 69 MG/DL (ref 70–99)
GLUCOSE BLD-MCNC: 80 MG/DL (ref 70–99)
HCT VFR BLD AUTO: 33.5 %
HGB BLD-MCNC: 10.5 G/DL
MAGNESIUM SERPL-MCNC: 2.4 MG/DL (ref 1.6–2.6)
MCH RBC QN AUTO: 31.6 PG (ref 26–34)
MCHC RBC AUTO-ENTMCNC: 31.3 G/DL (ref 31–37)
MCV RBC AUTO: 100.9 FL
OSMOLALITY SERPL CALC.SUM OF ELEC: 298 MOSM/KG (ref 275–295)
PLATELET # BLD AUTO: 181 10(3)UL (ref 150–450)
POTASSIUM SERPL-SCNC: 4.6 MMOL/L (ref 3.5–5.1)
RBC # BLD AUTO: 3.32 X10(6)UL
SODIUM SERPL-SCNC: 137 MMOL/L (ref 136–145)
WBC # BLD AUTO: 5.9 X10(3) UL (ref 4–11)

## 2024-09-11 PROCEDURE — 99232 SBSQ HOSP IP/OBS MODERATE 35: CPT | Performed by: INTERNAL MEDICINE

## 2024-09-11 RX ORDER — ALBUMIN (HUMAN) 12.5 G/50ML
25 SOLUTION INTRAVENOUS
Status: DISCONTINUED | OUTPATIENT
Start: 2024-09-12 | End: 2024-09-12

## 2024-09-11 NOTE — PROGRESS NOTES
[facility]   part of [facility]     Nephrology Progress Note    Gamaliel Boyer Patient Status:  Inpatient    1950 MRN [id number]   Location [facility] 3SW-A Attending Charan Guido, DO   Hosp Day # 5 PCP Nima Sorensen MD       SUBJECTIVE:  He reports feeling well though continues to have back pain. Denies any shortness of breath or edema. Had IR CT guided L5-S1 disc biopsy yesterday    Current Facility-Administered Medications:     prednisoLONE (Pred Forte) 1 % ophthalmic suspension 1 drop, 1 drop, Right Eye, 4x daily    bacitracin-polymyxin b (Polysporin) ophthalmic ointment 1 inch, 1 inch, Right Eye, TID    moxifloxacin (Vigamox) 0.5 % ophthalmic solution 1 drop, 1 drop, Right Eye, TID    ceFEPIme (Maxipime) 1 g in sodium chloride 0.9% 100 mL IVPB-MBP, 1 g, Intravenous, Q24H    vancomycin (Vancocin) 750 mg in sodium chloride 0.9% 250 mL IVPB-ADDV [To be given AFTER HD], 10 mg/kg, Intravenous, Q Tu, Th and Sa    midodrine (ProAmatine) tab 5 mg, 5 mg, Oral, Q Tu, Th and Sa    insulin via Insulin Pump, , Subcutaneous, TID AC and HS    silver sulfADIAZINE (Silvadene) 1 % cream, , Topical, Daily    [Held by provider] aspirin DR tab 81 mg, 81 mg, Oral, Daily    ezetimibe (Zetia) tab 10 mg, 10 mg, Oral, QAM    gabapentin (Neurontin) cap 200 mg, 200 mg, Oral, Nightly    lactulose (CHRONULAC) 10 GM/15ML solution 20 g, 20 g, Oral, QPM    levothyroxine (Synthroid) tab 150 mcg, 150 mcg, Oral, Daily @ 0700    sertraline (Zoloft) tab 25 mg, 25 mg, Oral, Daily    sevelamer carbonate (Renvela) tab 800 mg, 800 mg, Oral, TID AC    acetaminophen (Tylenol Extra Strength) tab 500 mg, 500 mg, Oral, Q4H PRN    HYDROcodone-acetaminophen (Norco) 5-325 MG per tab 1 tablet, 1 tablet, Oral, Q6H PRN    cyclobenzaprine (Flexeril) tab 5 mg, 5 mg, Oral, TID PRN    famotidine (Pepcid) tab 10 mg, 10 mg, Oral, QOD    melatonin tab 9 mg, 9 mg, Oral, Nightly PRN    glucose (Dex4) 15 GM/59ML oral liquid 15 g, 15 g,  Oral, Q15 Min PRN **OR** glucose (Glutose) 40% oral gel 15 g, 15 g, Oral, Q15 Min PRN **OR** glucose-vitamin C (Dex-4) chewable tab 4 tablet, 4 tablet, Oral, Q15 Min PRN **OR** dextrose 50% injection 50 mL, 50 mL, Intravenous, Q15 Min PRN **OR** glucose (Dex4) 15 GM/59ML oral liquid 30 g, 30 g, Oral, Q15 Min PRN **OR** glucose (Glutose) 40% oral gel 30 g, 30 g, Oral, Q15 Min PRN **OR** glucose-vitamin C (Dex-4) chewable tab 8 tablet, 8 tablet, Oral, Q15 Min PRN          Physical Exam:   BP 91/57 (BP Location: Right arm)   Pulse 53   Temp 97.8 °F (36.6 °C) (Oral)   Resp 16   Ht 5' 6\" (1.676 m)   Wt 163 lb (73.9 kg)   SpO2 95%   BMI 26.31 kg/m²   Temp (24hrs), Av.2 °F (36.8 °C), Min:97.7 °F (36.5 °C), Max:99 °F (37.2 °C)       Intake/Output Summary (Last 24 hours) at 2024 0956  Last data filed at 2024 0924  Gross per 24 hour   Intake 750 ml   Output 1000 ml   Net -250 ml     Last 3 Weights   24 1604 163 lb (73.9 kg)   24 1036 163 lb (73.9 kg)   24 1103 163 lb (73.9 kg)   24 0307 157 lb 13.6 oz (71.6 kg)   24 0128 158 lb 6.4 oz (71.8 kg)   24 2041 170 lb (77.1 kg)   23 1556 171 lb 8 oz (77.8 kg)   10/21/23 0440 181 lb 7 oz (82.3 kg)   10/20/23 1806 175 lb (79.4 kg)   10/20/23 0800 175 lb (79.4 kg)   10/14/23 1517 170 lb (77.1 kg)     General: Alert and in no apparent distress.  HEENT: No scleral icterus, MMM  Neck: Supple, no KALPANA or thyromegaly  Cardiac: Regular rate and rhythm, S1, S2 normal  Lungs: Clear to auscultation, no increased work of breathing  Extremities: No edema. L arm AVF with bruit/thrill  Neurologic:  moving all extremities    Recent Labs   Lab 24  0516 09/10/24  0542 24  0526   * 224* 157*   BUN 45* 61* 43*   CREATSERUM 5.27* 6.36* 4.92*   EGFRCR 11* 9* 12*   CA 9.4 9.5 9.2   * 134* 137   K 5.0 5.2* 4.6   CL 97* 99 99   CO2 24.0 24.0 27.0     Recent Labs   Lab 24  1116 24  0448 24  0516  09/10/24  0542 09/11/24  0527   RBC 3.44*   < > 3.40* 3.30* 3.32*   HGB 10.7*   < > 10.6* 10.4* 10.5*   HCT 34.3*   < > 34.6* 33.4* 33.5*   MCV 99.7   < > 101.8* 101.2* 100.9*   MCH 31.1   < > 31.2 31.5 31.6   MCHC 31.2   < > 30.6* 31.1 31.3   RDW 19.6   < > 20.1 20.2 20.7   NEPRELIM 5.21  --   --   --   --    WBC 6.3   < > 7.0 6.4 5.9   .0   < > 235.0 218.0 181.0    < > = values in this interval not displayed.       Impression/Plan:      1)  End stage renal disease: Due to diabetes. On HD via AVF per outpatient TThS schedule, last HD 9/10.      2.  Anemia: due to ESRD.  PAYAL for goal hgb 10-11 g/dL.      3.  Orthostatic hypotension: continue midodrine with HD     4.  Ascites: reports usually has paracentesis every 3 weeks or so but has now been closer to 5.  S/p paracentesis 9/6- 3.1 L     5. Back pain: CT concerning for osteomyelitis with epidural phlegmon. ID following, on Cefepime and Vancomycin. S/p IR biopsy 9/10.     Marlin Benson MD  09/11/24

## 2024-09-11 NOTE — PLAN OF CARE
A&oriented x4 . VSS. . IS encouraged. Telemetry monitoring. SCDs. Ankle pumps encouraged. Tolerating diet.  Anuric. Pain managed with po [place]. Dressing to R Juarez and L foot C/D/I. Puncture site from CT aspiration with old drainage. Ambulating with  x1 assist. Plan is  IV abx, HD tomorrow. Patient updated and in agreement with plan of care. Safety precautions in place. Instructed patient to call for assistance, call light within reach.

## 2024-09-11 NOTE — CM/SW NOTE
PT recs changed to [facility].  Pt will need 6 weeks of IV abs with dialysis- will need to send orders to [facility].    Sw spoke  to pt/wife and they are both in agreement with plan.    DELMY RodriguesW  /Discharge Planner

## 2024-09-11 NOTE — PHYSICAL THERAPY NOTE
PHYSICAL THERAPY TREATMENT NOTE - INPATIENT    Room Number: 363/363-A     Session: 2     Number of Visits to Meet Established Goals: 5    Presenting Problem: fall, AMS  Co-Morbidities : ESRD on HD, neuropathy, DM, cirrhosis    ASSESSMENT   Patient demonstrates good  progress this session, goals remain in progress.    Patient continues to function below baseline with bed mobility, transfers, and gait. Contributing factors to remaining limitations include decreased functional strength, decreased endurance/aerobic capacity, and decreased muscular endurance.  Next session anticipate patient to progress bed mobility, transfers, and gait.  Physical Therapy will continue to follow patient for duration of hospitalization.    Patient continues to benefit from continued skilled PT services: at discharge to promote prior level of function and safety with additional support and return home with home health PT.  Pt with improvement in mobility this session, d/w PT ELISE PETIT  PT Treatment Plan: Bed mobility;Endurance;Energy conservation;Patient education;Gait training;Strengthening;Balance training;Transfer training  Rehab Potential : Fair  Frequency (Obs): 3-5x/week    CURRENT GOALS       Goal #1 Patient is able to demonstrate supine - sit EOB @ level: minimum assistance      Goal #2 Patient is able to demonstrate transfers Sit to/from Stand at assistance level: moderate assistance  Met  Updated goal: mod I       Goal #3 Patient is able to transfer to bedside chair with MOD assist  Met   Updated goal: mod I       Goal #4     Goal #5     Goal #6     Goal Comments: Goals established on 9/6/2024 9/11/2024 all goals ongoing    SUBJECTIVE  \"I'll order the Janis Malnati's , you bring the wine\"     OBJECTIVE  Precautions: Limb alert - left;Bed/chair alarm (hypotension)    WEIGHT BEARING RESTRICTION  Weight Bearing Restriction: R lower extremity;L lower extremity        R Lower Extremity:  (post-op shoe)  L Lower Extremity:   (post-op shoe)    PAIN ASSESSMENT   Rating: Unable to rate  Location: back  Management Techniques: Activity promotion;Body mechanics;Breathing techniques;Relaxation;Repositioning    BALANCE                                                                                                                       Static Sitting: Fair +  Dynamic Sitting: Fair           Static Standing: Fair -  Dynamic Standing: Poor +    ACTIVITY TOLERANCE                         O2 WALK         AM-PAC '6-Clicks' INPATIENT SHORT FORM - BASIC MOBILITY  How much difficulty does the patient currently have...  Patient Difficulty: Turning over in bed (including adjusting bedclothes, sheets and blankets)?: None   Patient Difficulty: Sitting down on and standing up from a chair with arms (e.g., wheelchair, bedside commode, etc.): A Little   Patient Difficulty: Moving from lying on back to sitting on the side of the bed?: A Little   How much help from another person does the patient currently need...   Help from Another: Moving to and from a bed to a chair (including a wheelchair)?: A Little   Help from Another: Need to walk in hospital room?: A Little   Help from Another: Climbing 3-5 steps with a railing?: A Lot       AM-PAC Score:  Raw Score: 18   Approx Degree of Impairment: 46.58%   Standardized Score (AM-PAC Scale): 43.63   CMS Modifier (G-Code): CK    FUNCTIONAL ABILITY STATUS  Gait Assessment   Functional Mobility/Gait Assessment  Gait Assistance: Contact guard assist  Distance (ft): 50  Assistive Device: Rolling walker  Pattern: Shuffle;R Steppage    Skilled Therapy Provided  Pt presents seated in BS chair  Max A to don B post op shoes   Therapist educated pt on  benefits of mobility   Pt gait trained c RW CGA , decreased adelso , no LOB  Pt stand>sit>stand>sit EOB c supervision to place waffle cushion  Pt required min A for RLE into bed at end of session      Therapist cued pt for seated therex BLE   Bed Mobility:  Rolling: supervision     Supine<>Sit: nt   Sit<>Supine: CGA to min A only for RLE into bed      Transfer Mobility:  Sit<>Stand: CGA    Stand<>Sit: CGA    Gait: CGA c RW    Therapist's Comments: /59 seated s/p ambulation   Updated SW and PCT       THERAPEUTIC EXERCISES  Lower Extremity Alternating marching  Knee extension  LAQ  AP   Upper Extremity      Position Sitting     Repetitions   10   Sets   1     Patient End of Session: In bed;Needs met;Call light within reach;RN aware of session/findings;All patient questions and concerns addressed;Alarm set    PT Session Time: 27 minutes  Gait Training: 10 minutes  Therapeutic Activity: 17 minutes  Therapeutic Exercise:  minutes   Neuromuscular Re-education:  minutes

## 2024-09-11 NOTE — PROGRESS NOTES
[facility]   part of [facility]    Progress Note    Gamaliel Boyer Patient Status:  Inpatient    1950 MRN [id number]   Location [facility] 3SW-A Attending Nikunj Kong MD   Hosp Day # 5 PCP Nima Sorensen MD       S: Moderate back pain with unchanged left lower extremity symptoms. Patient was asleep and awoken for exam. CT biopsy obtained yesterday and awaiting results.     Inspection:  Awake alert No acute distress. No difficulty breathing     Blood pressure 96/56, pulse 59, temperature 98.1 °F (36.7 °C), temperature source Oral, resp. rate 15, height 5' 6\" (1.676 m), weight 163 lb (73.9 kg), SpO2 (!) 89%.    Recent Labs   Lab 24  1116 24  0448 24  0527   RBC 3.44*   < > 3.32*   HGB 10.7*   < > 10.5*   HCT 34.3*   < > 33.5*   MCV 99.7   < > 100.9*   MCH 31.1   < > 31.6   MCHC 31.2   < > 31.3   RDW 19.6   < > 20.7   NEPRELIM 5.21  --   --    WBC 6.3   < > 5.9   .0   < > 181.0    < > = values in this interval not displayed.       Strength: Strength of bilateral lower extremities:                                            Left        Right                              EHL       0/5         0/5                              DF         1/5         1/5                              PF         4/5         4/5                              Quads   5/5         5/5                              IP          5/5         5/5  Sensation: No sensory deficits noted on bilateral lower extremities        A/P: sacral pain after fall  Possible discitis    P: CT biopsy completed yesterday. Awaiting results. Continue with ID. No further recommendations at this time.     Priscila Varela PA-C   24

## 2024-09-11 NOTE — PROGRESS NOTES
Infectious Disease Progress Note      Date of admission: 9/5/2024 10:33 AM     Reason for consult: Question of discitis with epidural phlegmon    Subjective: The patient is complaining of mild back pain at the site of his biopsy.  Otherwise, no other constitutional symptoms.  Feeling well.    The rest of the systems were reviewed and found to be negative except was mentioned above    Interval events: This is a 73-year-old male patient with history of end-stage renal disease on hemodialysis, liver cirrhosis, type 1 diabetes, who presents here with worsening back pain after falling down.  CT scan of the spine showing bony destruction of L5-S1 endplate suspicious for osteomyelitis with question of epidural phlegmon involving the same area  Unable to obtain an MRI of the lumbar spine given his stimulator.  Blood cultures with no growth to date.  Status post IR biopsy with cultures on 9/10/2024.    Antimicrobials:  Cefepime from 9/8 through today  IV vancomycin from 9/7 through today    Medications:    [START ON 9/12/2024] sodium chloride **AND** [START ON 9/12/2024] albumin human    prednisoLONE    bacitracin-polymyxin b    moxifloxacin    cefepime    vancomycin    midodrine    insulin    silver sulfADIAZINE    [Held by provider] aspirin    ezetimibe    gabapentin    lactulose    levothyroxine    sertraline    sevelamer carbonate    acetaminophen    HYDROcodone-acetaminophen    cyclobenzaprine    famotidine    melatonin    glucose **OR** glucose **OR** glucose-vitamin C **OR** dextrose **OR** glucose **OR** glucose **OR** glucose-vitamin C     Allergies:  Allergies   Allergen Reactions    Antihistamine & Nasal Deconges [Fexofenadine-Pseudoephedrine] OTHER (SEE COMMENTS)     Altered mental status    Adhesive Tape ITCHING    Albuterol DIZZINESS and OTHER (SEE COMMENTS)     Albuterol Inhalation. Lightheadedness    Benadryl [Diphenhydramine] RESTLESSNESS    Depakote [Valproic Acid] DIZZINESS    Fluconazole OTHER (SEE  COMMENTS)     Kidney labs abnormal    Mirtazapine RESTLESSNESS    Quetiapine RESTLESSNESS    Wellbutrin [Bupropion] RASH    Latex RASH       Physical Exam:  Vitals:    09/11/24 0738   BP: 91/57   Pulse: 53   Resp: 16   Temp: 97.8 °F (36.6 °C)     Vitals signs and nursing note reviewed.   Constitutional:       Appearance: Normal appearance.   HENT:      Head: Normocephalic and atraumatic.      Mouth: Mucous membranes are moist.   Neck:      Musculoskeletal: Neck supple.   Cardiovascular:      Rate and Rhythm: Normal rate.   Pulmonary:      Effort: Pulmonary effort is normal. No respiratory distress.   Musculoskeletal:      Right lower leg: No edema.      Left lower leg: No edema.   Skin:     General: Skin is warm and dry.   Neurological:      General: No focal deficit present.      Mental Status: Alert and oriented to person, place, and time.       Laboratory data:  I have reviewed all the lab results independently.  Lab Results   Component Value Date    WBC 5.9 09/11/2024    HGB 10.5 09/11/2024    HCT 33.5 09/11/2024    .0 09/11/2024    CREATSERUM 4.92 09/11/2024    BUN 43 09/11/2024     09/11/2024    K 4.6 09/11/2024    CL 99 09/11/2024    CO2 27.0 09/11/2024     09/11/2024    CA 9.2 09/11/2024    MG 2.4 09/11/2024      Recent Labs   Lab 09/05/24  1116 09/06/24  0448 09/11/24  0527   RBC 3.44*   < > 3.32*   HGB 10.7*   < > 10.5*   HCT 34.3*   < > 33.5*   MCV 99.7   < > 100.9*   MCH 31.1   < > 31.6   MCHC 31.2   < > 31.3   RDW 19.6   < > 20.7   NEPRELIM 5.21  --   --    WBC 6.3   < > 5.9   .0   < > 181.0    < > = values in this interval not displayed.      Microbiology data:  Hospital Encounter on 09/05/24   1. Tissue Aerobic Culture     Status: None (Preliminary result)    Collection Time: 09/10/24 12:34 PM    Specimen: Interverterbal disc space; Tissue   Result Value Ref Range    Tissue Culture Result No Growth at <18 hours N/A    Tissue Smear No WBCs seen N/A    Tissue Smear No  organisms seen N/A   2. Blood Culture     Status: None (Preliminary result)    Collection Time: 09/06/24 10:13 AM    Specimen: Blood,peripheral   Result Value Ref Range    Blood Culture Result No Growth 4 Days N/A        Radiology:  I have reviewed all imagining data available independently.   CT of the lumbar spine on 9/6/2024:  Bony destruction of L5-S1 endplates suspicious for osteomyelitis.  Question of L5-S1 epidural phlegmon     Impression:  Gamaliel Boyer is a 73 year old male with    Traumatic back pain after a fall from his wheelchair  CT concerning for osteomyelitis with epidural phlegmon; however, clinical picture is not consistent with an infectious process  The patient denies any fevers or chills.  Up until his fall, the patient was not having any worsening back pain outside of his chronic pain  Unable to get an MRI due to his neurostimulator  Blood cultures with no growth to date  Currently on IV cefepime and IV vancomycin given the concern for an epidural phlegmon  Antimicrobials:  IV cefepime from 9/7 through today  IV vancomycin from 9/7 through today  Status post IR biopsy and cultures on 9/10  Cultures pending  End stage renal disease  On hemodialysis  Antibiotics will be renally adjusted  Type 1 diabetes  As per the primary team  Reported allergy to fluconazole  Develops electrolyte abnormalities    Recommendations:    Follow-up on IR cultures and biopsy results  Continue IV cefepime IV vancomycin  If nothing grows by tomorrow, will ask the lab to extend his cultures for 3 weeks and discharge the patient on cefepime and IV vancomycin for 6 weeks with hemodialysis.  Continue to monitor daily labs for antibiotic toxicities  Further recommendations will depend on the above work-up and clinical progress     The plan of care was discussed with the primary hospital team, Nikunj Kong MD     Recommendations were also discussed with the patient; all questions were answered.     Thank you for  this consultation. Please don't hesitate to call the ID team for questions or any acute changes in patient's clinical condition.    Please note that this report has been produced using speech recognition software and may contain errors related to that system including, but not limited to, errors in grammar, punctuation, and spelling, as well as words and phrases that possibly may have been recognized inappropriately.  If there are any questions or concerns, contact the dictating provider for clarification.    The  Cent[employer] Cures Act makes medical notes like these available to patients in the interest of transparency. Please be advised this is a medical document. Medical documents are intended to carry relevant information, facts as evident, and the clinical opinion of the practitioner. The medical note is intended as peer to peer communication and may appear blunt or direct. It is written in medical language and may contain abbreviations or verbiage that are unfamiliar.     Taryn Ibanez MD  [facility]. Tel: [phone]. Fax: [phone].     Gamaliel Boyer : 1950 MRN: [id number] [facility]: [phone]

## 2024-09-12 VITALS
HEART RATE: 61 BPM | BODY MASS INDEX: 26.2 KG/M2 | SYSTOLIC BLOOD PRESSURE: 101 MMHG | RESPIRATION RATE: 18 BRPM | OXYGEN SATURATION: 96 % | DIASTOLIC BLOOD PRESSURE: 54 MMHG | WEIGHT: 163 LBS | HEIGHT: 66 IN | TEMPERATURE: 98 F

## 2024-09-12 LAB
ANION GAP SERPL CALC-SCNC: 12 MMOL/L (ref 0–18)
BUN BLD-MCNC: 60 MG/DL (ref 9–23)
CALCIUM BLD-MCNC: 9.4 MG/DL (ref 8.7–10.4)
CHLORIDE SERPL-SCNC: 97 MMOL/L (ref 98–112)
CO2 SERPL-SCNC: 27 MMOL/L (ref 21–32)
CREAT BLD-MCNC: 6.31 MG/DL
EGFRCR SERPLBLD CKD-EPI 2021: 9 ML/MIN/1.73M2 (ref 60–?)
ERYTHROCYTE [DISTWIDTH] IN BLOOD BY AUTOMATED COUNT: 20.6 %
GLUCOSE BLD-MCNC: 146 MG/DL (ref 70–99)
GLUCOSE BLD-MCNC: 156 MG/DL (ref 70–99)
GLUCOSE BLD-MCNC: 179 MG/DL (ref 70–99)
HCT VFR BLD AUTO: 33.6 %
HGB BLD-MCNC: 10.8 G/DL
MAGNESIUM SERPL-MCNC: 2.6 MG/DL (ref 1.6–2.6)
MCH RBC QN AUTO: 32.4 PG (ref 26–34)
MCHC RBC AUTO-ENTMCNC: 32.1 G/DL (ref 31–37)
MCV RBC AUTO: 100.9 FL
OSMOLALITY SERPL CALC.SUM OF ELEC: 302 MOSM/KG (ref 275–295)
PLATELET # BLD AUTO: 209 10(3)UL (ref 150–450)
POTASSIUM SERPL-SCNC: 5.2 MMOL/L (ref 3.5–5.1)
RBC # BLD AUTO: 3.33 X10(6)UL
SODIUM SERPL-SCNC: 136 MMOL/L (ref 136–145)
VANCOMYCIN SERPL-MCNC: 12.2 UG/ML (ref ?–40)
WBC # BLD AUTO: 6.1 X10(3) UL (ref 4–11)

## 2024-09-12 PROCEDURE — 90935 HEMODIALYSIS ONE EVALUATION: CPT | Performed by: INTERNAL MEDICINE

## 2024-09-12 RX ORDER — VANCOMYCIN/0.9 % SOD CHLORIDE 1 G/100 ML
1 PLASTIC BAG, INJECTION (ML) INTRAVENOUS
Qty: 5000 ML | Refills: 0 | Status: SHIPPED | OUTPATIENT
Start: 2024-09-12 | End: 2024-10-21

## 2024-09-12 NOTE — CM/SW NOTE
Plan for DC to home with [facility] today.  Spoke with SHANKAR Thao at [facility] ([phone], fax: [phone]) regarding need for IV abx with HD.  Scripts faxed to clinic.  Updated [facility] via [facility] of DC plan for today.  / to remain available for support and/or discharge planning.     Jessica Avery, [facility]  Discharge Planner  [phone]

## 2024-09-12 NOTE — PROGRESS NOTES
[facility]   part of [facility]    Progress Note    Gamaliel Boyer Patient Status:  Inpatient    1950 MRN [id number]   Location [facility] 3SW-A Attending Nikunj Kong MD   Hosp Day # 6 PCP Nima Sorensen MD       S: Today patient notes left buttock pain and some mild left leg pain. States his pain is improved from yesterday. Awaiting biopsy cultures.     Inspection:  Awake alert No acute distress. No difficulty breathing     Blood pressure 101/65, pulse 60, temperature 97.7 °F (36.5 °C), temperature source Oral, resp. rate 14, height 5' 6\" (1.676 m), weight 163 lb (73.9 kg), SpO2 94%.    Recent Labs   Lab 24  1116 24  0448 24  0510   RBC 3.44*   < > 3.33*   HGB 10.7*   < > 10.8*   HCT 34.3*   < > 33.6*   MCV 99.7   < > 100.9*   MCH 31.1   < > 32.4   MCHC 31.2   < > 32.1   RDW 19.6   < > 20.6   NEPRELIM 5.21  --   --    WBC 6.3   < > 6.1   .0   < > 209.0    < > = values in this interval not displayed.         Strength: Strength of bilateral lower extremities:                                            Left        Right                              EHL       0/5         0/5                              DF         1/5         1/5                              PF         4/5         4/5                              Quads   5/5         5/5                              IP          5/5         5/5  Sensation: No sensory deficits noted on bilateral lower extremities      HEAD/NECK: Head is normocephalic  EYES: EOMI, GLORIA  SKIN EXAM: Skin is intact, head, neck, trunk and arms/legs. No rashes, mottling or ulcerations.  LYMPH EXAM: There is no lymph edema in either lower extremity.  VASCULAR EXAM:  Good distal perfusion. No clubbing or cyanosis.Calves supple, NT bilaterally  ABDOMINAL EXAM: Abdomen is soft, NT.      A/P: Sacral pain after fall  Possible discitis    P: Patient to continue with ID. No surgical recommendations at this time. Cleared for discharge  from spine surgery standpoint once cleared by other services.     Priscila Varela PA-C   09/12/24

## 2024-09-12 NOTE — PROGRESS NOTES
Per Edyta CHAPARRO, patient cleared for discharge by all services. IV removed. Patient educated on all AVS discharge information, all questions answered. Patient reports understanding about receiving antibiotics during dialysis outpatient. Patient given antibiotic scripts to bring to dialysis. [facility].  Patient to be brought down by floor staff with all belongings and paperwork to discharge home once wife arrives. Edyta CHAPARRO aware.

## 2024-09-12 NOTE — PROGRESS NOTES
Pt A&Ox4, VSS on RA. IS encouraged, PRANEETH w/ CPAP. Tele, NSR. Pain managed with PO pain meds. BLE dressings, C/D/I. CGM in place. Up min assist with the walker and gait belt. Plan to continue IV abx, OR cx pending. DC when medically cleared. Call light in reach, safety measures in place.

## 2024-09-12 NOTE — PROGRESS NOTES
[facility] Pharmacy Dosing Service      Follow Up Pharmacokinetic Consult for Vancomycin Dosing     Gamaliel Boyer is a 73 year old male who is receiving vancomycin therapy for  discitis, possible OM . Patient is on day 6 of vancomycin and is currently receiving 10 mg/kg vancomycin after each HD session. The current treatment and monitoring approach is non-AUC strategy.        Weight and Temperature:    Wt Readings from Last 1 Encounters:   24 73.9 kg (163 lb)         Temp Readings from Last 1 Encounters:   24 97.7 °F (36.5 °C) (Oral)      Labs:   Recent Labs   Lab 09/10/24  0542 24  0526 24  0510   CREATSERUM 6.36* 4.92* 6.31*      Estimated Creatinine Clearance: 9.3 mL/min (A) (based on SCr of 6.31 mg/dL (H)).     Recent Labs   Lab 09/10/24  0542 24  0527 24  0510   WBC 6.4 5.9 6.1        Vancomycin Levels:  Lab Results   Component Value Date/Time    VANCR 12.2 2024 05:10 AM       Corresponding 24 h-AUC: N/A     The Pharmacokinetic Target is:    Trough/random 15-20 mg/L (applies to IV dosing in HD and IP dosing in APD)    Renal Dosing Considerations:    HD: Current regimen: T,,Sa     Assessment/Plan:   Maintenance Regimen:  Give 12.5 mg/kg dose today for level <15, then 10 mg/kg vancomycin after each HD session with dose adjustments dependent on levels    Monitorin) Plan for vancomycin random level to be obtained  prior to HD on     2) Pharmacy will order SCr as clinically indicated to assess renal function.    3) Pharmacy will monitor for toxicity and efficacy, adjust vancomycin dose and/or frequency, and order vancomycin levels as appropriate per the Pharmacy and Therapeutics Committee approved protocol until discontinuation of the medication.       We appreciate the opportunity to assist in the care of this patient.     Amber Martinez, PharmD  2024  8:39 AM  [facility]armacy Extension: [phone]

## 2024-09-12 NOTE — PROGRESS NOTES
CC: follow-up hospital admission back pain    SUBJECTIVE:    Interval History: lying in bed, leg pain, feels nerve aggravated. Walked.     Dec glc o/n    OBJECTIVE:  Scheduled Meds:    prednisoLONE  1 drop Right Eye 4x daily    bacitracin-polymyxin b  1 inch Right Eye TID    moxifloxacin  1 drop Right Eye TID    cefepime  1 g Intravenous Q24H    vancomycin  10 mg/kg Intravenous Q Tu, Th and Sa    midodrine  5 mg Oral Q Tu, Th and Sa    insulin   Subcutaneous TID AC and HS    silver sulfADIAZINE   Topical Daily    [Held by provider] aspirin  81 mg Oral Daily    ezetimibe  10 mg Oral QAM    gabapentin  200 mg Oral Nightly    lactulose  20 g Oral QPM    levothyroxine  150 mcg Oral Daily @ 0700    sertraline  25 mg Oral Daily    sevelamer carbonate  800 mg Oral TID AC    famotidine  10 mg Oral QOD     Continuous Infusions:   PRN Meds:   [START ON 9/12/2024] sodium chloride **AND** [START ON 9/12/2024] albumin human    acetaminophen    HYDROcodone-acetaminophen    cyclobenzaprine    melatonin    glucose **OR** glucose **OR** glucose-vitamin C **OR** dextrose **OR** glucose **OR** glucose **OR** glucose-vitamin C    PHYSICAL EXAM  Vital signs: Temp:  [97.8 °F (36.6 °C)-98.6 °F (37 °C)] 98.2 °F (36.8 °C)  Pulse:  [53-66] 66  Resp:  [15-18] 16  BP: ()/(56-62) 102/62  SpO2:  [89 %-95 %] 91 %      GENERAL - NAD, AAO  EYES- sclera anicteric,   HENT- normocephalic, OP - MMM  NECK - no JVD  CV- RRR  RESP - CTAB, normal resp effort  ABDOMEN- soft, NT/ND   EXT- no LE edema   PSYCH - normal mentation/ normal affect    Data Review:   Labs:   Recent Labs   Lab 09/05/24  1116 09/06/24  0448 09/07/24  0650 09/08/24  0540 09/09/24  0516 09/09/24  1320 09/10/24  0542 09/11/24  0527   WBC 6.3   < > 6.5 6.1 7.0  --  6.4 5.9   HGB 10.7*   < > 11.2* 10.3* 10.6*  --  10.4* 10.5*   MCV 99.7   < > 100.3* 103.3* 101.8*  --  101.2* 100.9*   .0   < > 214.0 201.0 235.0  --  218.0 181.0   INR 1.18  --   --   --   --  1.19 1.17  --     <  > = values in this interval not displayed.       Recent Labs   Lab 09/07/24  0650 09/08/24  0540 09/08/24  2103 09/09/24  0516 09/10/24  0542 09/11/24  0526   * 136  --  134* 134* 137   K 4.8 4.4  --  5.0 5.2* 4.6   CL 97* 100  --  97* 99 99   CO2 26.0 25.0  --  24.0 24.0 27.0   BUN 42* 30*  --  45* 61* 43*   CREATSERUM 4.86* 3.88*  --  5.27* 6.36* 4.92*   CA 9.4 9.4  --  9.4 9.5 9.2   MG 2.5 2.3  --  2.5 2.7* 2.4   * 152* 70 226* 224* 157*       Recent Labs   Lab 09/05/24  1116 09/06/24  0448   ALT 17 16   AST 24 21   ALB 4.0 4.0       Recent Labs   Lab 09/11/24  1223 09/11/24  1243 09/11/24  1405 09/11/24  1558 09/11/24  2110   PGLU 51* 80 145* 208* 162*           ASSESSMENT/PLAN:    Patient is a 73 year old male with PMH sig for ESRD on HD, cirrhosis, HE, DM 1 here for fall, back pain     Impression     -generalized weakness with fall     -ESRD on HD     -recurrent ascites (cardiogenic)  -LUGO cirrhosis  -HE on lactulose   -DM 1  -hypothyroidism  =hypotension on midodrine with HD     Plan      L5-S1 discitis/osteomyelitis.   -PT OT  -Xray of L spine. Suspect msk pain. Add flexeril. Add norco, tylenol.  Xray of spine showing possible diskitis in L5. He denied any preceding back pain. His pain appears rather acute after the fall. Less likely diskitis. Will check blood cx / esr / crp. He states he cannot have MRI due to prior spinal cord stimulator. Based on Dr. Figueroa noted he had a Nevro SCS placed in 2020, and did have a brain MRI in 2021.   -MRI was scheduled with sedation but unable to do c stimulator  -Given possibility of an epidural phlegmon, started on cefepime and vancomycin, apprec ID recs   - s/p CT guided L5-S1 disc biopsy 9/10  -CT with contrast noted, diffuse disc bulge; Marked irregularity and erosion of endplates at the L5-S1 level   -Repeated esr  crp   -plan to discharge the patient on cefepime and IV vancomycin for 6 weeks with hemodialysis if no growth; ask the lab to extend his  cultures for 3 weeks       -abd soft, nt. He is due to have paracentesis. Ordered  -HD per renal  -cont lactulose  -ok to use home insulin pump; low glc while NPO  -cont synthroid      Orthostatic hypotension  - continue midodrine with HD     Scds  Heparin held for procedures         Will continue to follow while hospitalized. Please page me or the on-call hospitalist with questions or concerns. D/w RN     Nikunj Kong MD  [facility] Hospitalist  [phone]  9/11/2024  11:59 PM

## 2024-09-12 NOTE — PLAN OF CARE
Pt A&Ox4, VSS on RA. IS encouraged, PRANEETH w/ CPAP. Tele, NSR. Pain managed with PO pain meds. BLE dressings, C/D/I. CGM in place. Up min assist with the walker and gait belt. Plan to continue IV abx, OR cx pending. HD today. DC with HH and IV abx through HD. Call light in reach, safety measures in place.    2 person skin check completed with PCT. Pressure points intact at this time.

## 2024-09-12 NOTE — PROGRESS NOTES
[facility]   part of [facility]     Nephrology Progress Note    Gamaliel Boyer Patient Status:  Inpatient    1950 MRN [id number]   Formerly [facility] 3SW-A Attending Charan Guido, DO   Hosp Day # 6 PCP Nima Sorensen MD       SUBJECTIVE:  Seen on HD this morning. He reports feeling well with improved pain.     Current Facility-Administered Medications:     vancomycin (Vancocin) 1,000 mg in sodium chloride 0.9% 250 mL IVPB-ADDV [GIVE AFTER HD], 12.5 mg/kg, Intravenous, Once    [START ON 2024] vancomycin (Vancocin) 750 mg in sodium chloride 0.9% 250 mL IVPB-ADDV [GIVE AFTER HD], 10 mg/kg, Intravenous, Q , Th and Sa    sodium chloride 0.9 % IV bolus 100 mL, 100 mL, Intravenous, Q30 Min PRN **AND** albumin human (Albumin) 25% injection 25 g, 25 g, Intravenous, PRN Dialysis    prednisoLONE (Pred Forte) 1 % ophthalmic suspension 1 drop, 1 drop, Right Eye, 4x daily    bacitracin-polymyxin b (Polysporin) ophthalmic ointment 1 inch, 1 inch, Right Eye, TID    moxifloxacin (Vigamox) 0.5 % ophthalmic solution 1 drop, 1 drop, Right Eye, TID    ceFEPIme (Maxipime) 1 g in sodium chloride 0.9% 100 mL IVPB-MBP, 1 g, Intravenous, Q24H    midodrine (ProAmatine) tab 5 mg, 5 mg, Oral, Q , Th and Sa    insulin via Insulin Pump, , Subcutaneous, TID AC and HS    silver sulfADIAZINE (Silvadene) 1 % cream, , Topical, Daily    [Held by provider] aspirin DR tab 81 mg, 81 mg, Oral, Daily    ezetimibe (Zetia) tab 10 mg, 10 mg, Oral, QAM    gabapentin (Neurontin) cap 200 mg, 200 mg, Oral, Nightly    lactulose (CHRONULAC) 10 GM/15ML solution 20 g, 20 g, Oral, QPM    levothyroxine (Synthroid) tab 150 mcg, 150 mcg, Oral, Daily @ 0700    sertraline (Zoloft) tab 25 mg, 25 mg, Oral, Daily    sevelamer carbonate (Renvela) tab 800 mg, 800 mg, Oral, TID AC    acetaminophen (Tylenol Extra Strength) tab 500 mg, 500 mg, Oral, Q4H PRN    HYDROcodone-acetaminophen (Norco) 5-325 MG per tab 1 tablet, 1 tablet, Oral,  Q6H PRN    cyclobenzaprine (Flexeril) tab 5 mg, 5 mg, Oral, TID PRN    famotidine (Pepcid) tab 10 mg, 10 mg, Oral, QOD    melatonin tab 9 mg, 9 mg, Oral, Nightly PRN    glucose (Dex4) 15 GM/59ML oral liquid 15 g, 15 g, Oral, Q15 Min PRN **OR** glucose (Glutose) 40% oral gel 15 g, 15 g, Oral, Q15 Min PRN **OR** glucose-vitamin C (Dex-4) chewable tab 4 tablet, 4 tablet, Oral, Q15 Min PRN **OR** dextrose 50% injection 50 mL, 50 mL, Intravenous, Q15 Min PRN **OR** glucose (Dex4) 15 GM/59ML oral liquid 30 g, 30 g, Oral, Q15 Min PRN **OR** glucose (Glutose) 40% oral gel 30 g, 30 g, Oral, Q15 Min PRN **OR** glucose-vitamin C (Dex-4) chewable tab 8 tablet, 8 tablet, Oral, Q15 Min PRN      Physical Exam:   /65 (BP Location: Right arm)   Pulse 60   Temp 97.7 °F (36.5 °C) (Oral)   Resp 14   Ht 5' 6\" (1.676 m)   Wt 163 lb (73.9 kg)   SpO2 94%   BMI 26.31 kg/m²   Temp (24hrs), Av.3 °F (36.8 °C), Min:97.7 °F (36.5 °C), Max:98.6 °F (37 °C)       Intake/Output Summary (Last 24 hours) at 2024 0911  Last data filed at 2024 1838  Gross per 24 hour   Intake 720 ml   Output --   Net 720 ml     Last 3 Weights   24 1604 163 lb (73.9 kg)   24 1036 163 lb (73.9 kg)   24 1103 163 lb (73.9 kg)   24 0307 157 lb 13.6 oz (71.6 kg)   24 0128 158 lb 6.4 oz (71.8 kg)   24  170 lb (77.1 kg)   23 1556 171 lb 8 oz (77.8 kg)   10/21/23 0440 181 lb 7 oz (82.3 kg)   10/20/23 1806 175 lb (79.4 kg)   10/20/23 0800 175 lb (79.4 kg)   10/14/23 1517 170 lb (77.1 kg)     General: Alert and in no apparent distress.  HEENT: No scleral icterus, MMM  Neck: Supple  Cardiac: Regular rate and rhythm, S1, S2 normal  Lungs: Clear to auscultation, no increased work of breathing  Extremities: No edema. L arm AVF with bruit/thrill  Neurologic:  moving all extremities    Recent Labs   Lab 09/10/24  0542 24  0526 24  0510   * 157* 156*   BUN 61* 43* 60*   CREATSERUM 6.36* 4.92* 6.31*    EGFRCR 9* 12* 9*   CA 9.5 9.2 9.4   * 137 136   K 5.2* 4.6 5.2*   CL 99 99 97*   CO2 24.0 27.0 27.0     Recent Labs   Lab 09/05/24  1116 09/06/24  0448 09/10/24  0542 09/11/24  0527 09/12/24  0510   RBC 3.44*   < > 3.30* 3.32* 3.33*   HGB 10.7*   < > 10.4* 10.5* 10.8*   HCT 34.3*   < > 33.4* 33.5* 33.6*   MCV 99.7   < > 101.2* 100.9* 100.9*   MCH 31.1   < > 31.5 31.6 32.4   MCHC 31.2   < > 31.1 31.3 32.1   RDW 19.6   < > 20.2 20.7 20.6   NEPRELIM 5.21  --   --   --   --    WBC 6.3   < > 6.4 5.9 6.1   .0   < > 218.0 181.0 209.0    < > = values in this interval not displayed.       Impression/Plan:      1)  End stage renal disease: Due to diabetes. On HD via AVF per outpatient [facility] schedule, HD today.     2.  Anemia: due to ESRD.  PAYAL for goal hgb 10-11 g/dL.      3.  Orthostatic hypotension: continue midodrine with HD     4.  Ascites: reports usually has paracentesis every 3 weeks or so but has now been closer to 5.  S/p paracentesis 9/6- 3.1 L     5. Back pain: CT concerning for osteomyelitis with epidural phlegmon. ID following, on Cefepime and Vancomycin. S/p IR biopsy 9/10, cultures in process, thus far with no growth.     Marlin Benson MD  09/12/24

## 2024-09-12 NOTE — DISCHARGE SUMMARY
[facility] [place] [facility]   Patient ID:  Gamaliel Boyer  [id number]  73 year old  12/27/1950    Admit date: 9/5/2024    Discharge date and time: 9/12/2024     Attending Physician: Nikunj Kong MD     Primary Care Physician: Nima Sorensen MD     Admit Dx: Other ascites [R18.8]  Weakness generalized [R53.1]  Multiple falls [R29.6]  Contusion of coccyx, initial encounter [S30.0XXA]    Hospital Discharge Diagnoses:    L5-S1 discitis/osteomyelitis.        Disposition: home    Important follow up:  -PCP in [x] within 7 days [] within 14 days [] other    Mirna Ceja, APRN  [address]  [facility] [address]  [place] [address] [phone]    Call  Call to make a follow up appt with ID in 2 weeks      Hospital Course:    73 year old male with PMH sig for ESRD on HD, cirrhosis, HE, DM 1 here for fall, back pain     Impression     -generalized weakness with fall     -ESRD on HD     -recurrent ascites (cardiogenic)  -LUGO cirrhosis  -HE on lactulose   -DM 1  -hypothyroidism  =hypotension on midodrine with HD     Plan      L5-S1 discitis/osteomyelitis.   -PT OT  -Xray of L spine. Suspect msk pain. Add flexeril. Add norco, tylenol.  Xray of spine showing possible diskitis in L5. He denied any preceding back pain. His pain appears rather acute after the fall. Less likely diskitis. Will check blood cx / esr / crp. He states he cannot have MRI due to prior spinal cord stimulator. Based on Dr. Figueroa noted he had a Nevro SCS placed in 2020, and did have a brain MRI in 2021.   -MRI was scheduled with sedation but unable to do c stimulator  -Given possibility of an epidural phlegmon, started on cefepime and vancomycin, apprec ID recs   - s/p CT guided L5-S1 disc biopsy 9/10  -CT with contrast noted, diffuse disc bulge; Marked irregularity and erosion of endplates at the L5-S1 level   -Repeated esr  crp   - Continue IV Vancomycin and cefepime with HD to complete a total of 6 weeks of IV abx through  10/19/24      -abd soft, nt. He is due to have paracentesis. Ordered  -HD per renal  -cont lactulose  -ok to use home insulin pump; low glc while NPO  -cont synthroid      Orthostatic hypotension  - continue midodrine with HD      Scds  Heparin held for procedures          Day of discharge Exam   Vitals:    09/12/24 1225   BP: 101/54   Pulse: 61   Resp: 18   Temp: 98.2 °F (36.8 °C)       Exam on day of discharge:  Pain in L butt cheek.     Gen: No acute distress, alert and oriented  CV: RRR, +s1/s2  Lungs: CTAB, good respiratory effort  Abdomen: s/nt/nd  Ext: Moves all 4 extremities, no c/c/e  Neuro: CN Intact, no focal deficits      Discharge meds     Medication List        START taking these medications      ceFEPIme 2 g in sodium chloride 100 mL  Inject 2 g into the vein 3 (three) times a week. To be given 3 times weekly with hemodialysis.  Weekly CBC with differential, CMP, sed rate and CRP.  Fax results to duly infectious disease.  Start taking on: September 13, 2024     Vancomycin HCl in NaCl 1-0.9 GM/250ML-% Soln  Inject 250 mL (1 g total) into the vein as needed with dialysis. To be given 3 times weekly with hemodialysis as per protocol.  Weekly CBC with differential, CMP, sed rate and CRP.  Vancomycin levels as needed.            CHANGE how you take these medications      ezetimibe 10 MG Tabs  Commonly known as: Zetia  TAKE ONE TABLET BY MOUTH ONE TIME DAILY  What changed: when to take this            CONTINUE taking these medications      aspirin 81 MG Tbec     bacitracin-polymyxin b Oint  Commonly known as: Polysporin     cholecalciferol 50 MCG (2000 UT) Tabs  Commonly known as: Vitamin D3     famotidine 20 MG Tabs  Commonly known as: Pepcid     Fish Oil 1200 MG Caps     gabapentin 100 MG Caps  Commonly known as: Neurontin  Take 2 capsules (200 mg total) by mouth nightly.     glucagon emergency 1 MG Kit  Inject 1 mg into the skin once as needed.     insulin lispro 100 UNIT/ML Soln  Commonly known as:  Humalog     lactulose 10 GM/15ML Soln  Commonly known as: CHRONULAC     levothyroxine 150 MCG Tabs  Commonly known as: Synthroid     Melatonin 3 MG Caps     midodrine 5 MG Tabs  Commonly known as: ProAmatine     moxifloxacin 0.5 % Soln  Commonly known as: Vigamox     prednisoLONE 1 % Susp  Commonly known as: Pred Forte     PROBIOTIC BLEND OR     renal vitamin Tabs  Take 1 tablet by mouth daily.     sertraline 25 MG Tabs  Commonly known as: Zoloft     sevelamer carbonate 800 MG Tabs  Commonly known as: Renvela     SSD 1 % Crea  Generic drug: silver sulfADIAZINE     Toujeo SoloStar 300 UNIT/ML Sopn  Generic drug: Insulin Glargine (1 Unit Dial)     vitamin E 400 UNITS Caps  Commonly known as: Alpha-E            STOP taking these medications      HYDROcodone-acetaminophen 5-325 MG Tabs  Commonly known as: Norco               Where to Get Your Medications        You can get these medications from any pharmacy    Bring a paper prescription for each of these medications  ceFEPIme 2 g in sodium chloride 100 mL  Vancomycin HCl in NaCl 1-0.9 GM/250ML-% Soln         Consults: IP CONSULT TO HOSPITALIST  IP CONSULT TO HOSPITALIST  IP CONSULT TO HOSPITALIST  IP CONSULT TO NEPHROLOGY  IP CONSULT TO SOCIAL WORK  IP CONSULT TO INFECTIOUS DISEASE  IP CONSULT TO ORTHO SPINE  IP CONSULT TO PHARMACY  CONSULT TO WOUND OSTOMY  IP CONSULT TO SOCIAL WORK    Radiology: CT BIOPSY INTERVERTEBRAL DISC(TLJ=46545/54718)    Result Date: 9/10/2024  PROCEDURE:  CT BIOPSY INTERVERTEBRAL DISC(OKS=05830/80056)  INDICATIONS:  L5-S1 disc itis/osteomyelitis.  COMPARISON:  None.  TECHNIQUE:  The risks, benefits alternatives of CT-guided L5-S1 disc aspiration and possible biopsy were explained in detail prior to the exam and written informed consent was obtained.  The risks explained included but were not limited to bleeding, infection and nerve injury.  The patient voiced understanding wished to proceed with the exam.  The patient was placed prone on the  CT table.  Preliminary CT images were obtained with a paper localizing grid in place.   An IV was checked and maintained by the radiology nurse.  Moderate sedation was performed. 39 minutes of moderate sedation time was performed under my direct supervision.  The radiology nurse and myself were in attendance throughout the procedure and sedation.  A combination of Versed and Fentanyl were utilized as the sedation agents.  The patient was assessed and continuous pulse oximetry monitoring, continuous heart rate monitoring and blood pressure monitoring was performed during the exam.   From a right posterolateral approach, a 17 gauge guiding needle was percutaneously passed to the left lateral periphery of the L5-S1 disc space.  More central placement could not be achieved because of left lower extremity pain.  Aspiration was performed.  No dary fluid could be aspirated.  Approximately 2 cc of nonbacteriostatic saline was then injected through the needle and aspiration was performed.  There is aspiration of minimal blood tinged fluid which was placed into a sterile cup and sent for cultures and Gram stain.  Needle was then removed and direct pressure was held until adequate hemostasis was obtained.  The patient tolerated the procedure well without immediate complication left department stable condition.  Additional findings noted during the CT images demonstrate abdominal ascites.  Moderate stool seen within the colon.             CONCLUSION:  CT-guided aspiration of the L5-S1 disc as described above.    Dictated by (CST): Edward Romo MD on 9/10/2024 at 6:26 PM     Finalized by (CST): Edward Romo MD on 9/10/2024 at 6:31 PM       CT SACRUM (CPT=72192)    Result Date: 9/9/2024  PROCEDURE:  CT SACRUM (CPT=72192)  COMPARISON:  [facility] , CT, CT CHEST+ABDOMEN+PELVIS(ALL CNTRST ONLY)(CPT=71260/21839), 11/14/2013, 2:34 PM.  [facility] , CT, CT SPINE LUMBAR(CONTRAST ONLY) (CPT=72132), 9/06/2024, 8:19 PM.  INDICATIONS:   Discitis? Epdiural phlegmon? Unable to get MRI  TECHNIQUE:  Multi-planar CT images were created without intravenous contrast. Shaded surface renderings are generated on an independent CT scanner workstation.  Dose reduction techniques were used. Dose information is transmitted to the ACR (American College of Radiology) NRDR (National Radiology Data Registry) which includes the Dose Index Registry  3-D RENDERING:  Not applicable  PATIENT STATED HISTORY:(As transcribed by Technologist)  Evaluation of sacrum.    FINDINGS:  BONES:  Marked irregularity and erosion of endplates at the L5-S1 level is again noted.  There is vacuum phenomenon in the disc. SOFT TISSUES:  Within the limits of CT there is no epidural fluid collection detected.  There is soft tissue extending from the disc into the ventral epidural space and into the foramina bilaterally which has imaging appearance most consistent with diffuse disc bulge.  Inflammatory discitis would not be excluded. EFFUSION:  None visible. OTHER:  There is a moderate amount of free fluid in the pelvis.            CONCLUSION:  1. Irregularity of endplates at the L5-S1 level is again noted and could indicate discitis and osteomyelitis. 2. Soft tissue protruding from the disc into the epidural space has imaging appearance more consistent with a diffuse disc bulge.  There is no specific epidural fluid collection although noncontrast CT would be limited in evaluating for epidural abscess. 3. There is ascites noted in the visualized abdomen.   LOCATION:  [facility]   Dictated by (CST): Carson Stockton MD on 9/09/2024 at 4:08 PM     Finalized by (CST): Carson Stockton MD on 9/09/2024 at 4:15 PM       XR CHEST PA + LAT CHEST (CPT=71046)    Result Date: 9/7/2024  PROCEDURE:  XR CHEST PA + LAT CHEST (CPT=71046)  INDICATIONS:  infection  COMPARISON:  [facility] , XR, XR CHEST AP PORTABLE  (CPT=71045), 9/05/2024, 12:14 PM.  TECHNIQUE:  PA and lateral chest radiographs were obtained.  PATIENT STATED  HISTORY: (As transcribed by Technologist)  Patient offered no additional history at this time.    FINDINGS:  LUNGS:  Mildly prominent interstitial opacities are seen bilaterally suggesting possible mild interstitial pulmonary edema.  Other possible considerations include interstitial pneumonia or other interstitial lung disease. CARDIAC:  There is cardiomegaly and vascular congestion. MEDIASTINUM:  Mild atheromatous plaque noted in the aorta. PLEURA:  No pleural effusion or pneumothorax. BONES:  Spinal cord stimulator leads are seen in the lower thoracic spine.  There appears to be a compression fracture deformity again noted and what appears to be T 6.  Orthopedic hardware seen in the lower cervical spine.            CONCLUSION:  1. Heart size is mildly enlarged and there is vascular congestion with suspected interstitial pulmonary edema.  Differential considerations for the interstitial lung opacities also include interstitial pneumonia and other interstitial lung disease. 2. Stable compression fracture deformity  of T6.   LOCATION:  [facility]   Dictated by (CST): Edward Romo MD on 9/07/2024 at 9:07 AM     Finalized by (CST): Edward Romo MD on 9/07/2024 at 9:10 AM       CT SPINE LUMBAR(CONTRAST ONLY) (CPT=72132)    Result Date: 9/6/2024  PROCEDURE:  CT SPINE LUMBAR(CONTRAST ONLY) (CPT=72132)  COMPARISON:  [facility] , XR, XR OR LUMBAR SPINE (1 VIEW)   (CPT=72020), 9/08/2016, 11:59 AM.  [facility] , XR, XR LUMBAR SPINE (MIN 2 VIEWS) (CPT=72100), 9/05/2024, 7:51 PM.  INDICATIONS:  eval L5 for diskitis, cannot get MRI  TECHNIQUE:  Multi-planar CT images were created with intravenous contrast.  Dose reduction techniques were used. Dose information is transmitted to the ACR (American College of Radiology) NRDR (National Radiology Data Registry) which includes the Dose Index Registry.  PATIENT STATED HISTORY:(As transcribed by Technologist)   Eval L5 for diskitis.   CONTRAST USED:  100cc of Isovue 370  FINDINGS:    There is bony destruction at the L5-S1 endplates suspicious for osteomyelitis until proven otherwise.  There is air within the L5-S1 disc space.  There are partially visualized spinal canal electrodes.  These enter the spinal canal at the T11-T12 level dorsally.  There is lumbar lordosis.  Assessment of the spinal canal is limited secondary to CT technique.  There are postoperative changes from laminectomies at the L3, L4, and L5 levels.  There are moderate degenerative changes in the lumbar spine.  For example at L2-L3, there is a mild disc bulge with moderate facet hypertrophy.  Mild spinal canal stenosis.  Mild bilateral foraminal narrowing.  At L3-L4, there is a mild disc bulge.  There is moderate facet hypertrophy and mild spinal canal stenosis.  Moderate bilateral foraminal narrowing is seen at L3-L4.  At L4-5, there is moderate facet hypertrophy and a mild disc bulge.  L4-5 is posteriorly decompressed.  There is right-sided subarticular zone narrowing.  There is mild bilateral foraminal narrowing.  At L5-S1, there may be mild heterogeneity to the ventral aspect of the epidural space which could represent phlegmon.  Alternatively, this could represent disc material.  Moderate facet hypertrophy.  Mild to moderate bilateral foraminal narrowing.  A partially visualized mild amount of ascites is seen.  The kidneys are atrophic.  There is nonspecific bladder wall thickening.  Scattered atherosclerosis.  Degenerative changes are noted at the SI joints.            CONCLUSION:   1. There is bony destruction at the L5-S1 endplates suspicious for osteomyelitis until proven otherwise.  Percutaneous sampling could be obtained for further assessment as clinically warranted.  There is air within the L5-S1 disc space which could relate  to pre-existing vacuum disc phenomenon.  Discitis with gas-forming organisms is another possibility.  2. There is heterogeneity to the ventral aspect of the epidural space at the L5-S1 level  which could represent epidural phlegmon.  3. Moderate degenerative changes in the lumbar spine.  Laminectomy changes are seen at L3, L4, and L5.  4. There is nonspecific bladder wall thickening.  Clinical correlation with urinalysis is suggested.  Mild ascites is also noted.   Critical results were discussed with Nura CHAPARRO at 2140 hours on 9/6/2024. Critical results were read back.   LOCATION:  [facility]   Dictated by ([facility]): Stromberg, LeRoy, MD on 9/06/2024 at 9:28 PM     Finalized by (CST): Stromberg, LeRoy, MD on 9/06/2024 at 9:42 PM       US PARACENTESIS W IMAGING (CPT=49083)    Result Date: 9/6/2024  PROCEDURE:  US PARACENTESIS W IMAGING  (CPT=49083)  COMPARISON:  None.  INDICATIONS:  paracentesis - gets one every few weeks  DESCRIPTION OF PROCEDURE:  Informed consent was obtained.  The patient was prepped and draped in the standard sterile fashion.  An ultrasound-guided paracentesis was performed in the usual sterile manner, including sonographic localization of a pocket of  fluid suitable for paracentesis, utilization of Doppler ultrasound in the region selected for paracentesis to exclude any potential prominent vessels in the region, cleansing of the skin, and injection of local anesthetic..  LOCATION:  Right lower quadrant FLUID REMOVED:  3.1 liters MEDICATION:  10.0 cc of 1% Lidocaine for local anesthetic. COMPLICATIONS:  None. LABORATORY:  None requested            CONCLUSION:  Ultrasound-guided paracentesis was performed without complication.   LOCATION:  [facility]     Dictated by ([facility]): Reno Espana MD on 9/06/2024 at 11:46 AM     Finalized by (CST): Reno Espana MD on 9/06/2024 at 11:46 AM       XR LUMBAR SPINE (MIN 2 VIEWS) (CPT=72100)    Result Date: 9/5/2024  PROCEDURE:  XR LUMBAR SPINE (MIN 2 VIEWS) (CPT=72100)  TECHNIQUE:  AP, lateral, and coned down L5-S1 views were obtained.  COMPARISON:  [facility] , XR, XR OR LUMBAR SPINE (1 VIEW)   (CPT=72020), 9/08/2016, 11:59 AM.  INDICATIONS:  Back pain   PATIENT STATED HISTORY: (As transcribed by Technologist)  Patient offered no additional history at this time.    FINDINGS:  Normal alignment appears no scoliosis or subluxation.  The bones are demineralized.  Multilevel degenerative disc changes.  However the inferior endplate of L5 appears eroded, with loss of the usual sharp border of the inferior endplate, and instead irregular sclerosis and lucency the of the inferior aspect of L5.  This represents a change from the prior.            CONCLUSION:    Concern for potential discitis/osteomyelitis, now with an eroded appearance of the inferior endplate of L5, with loss of the usual sharp endplate border, with irregular sclerotic/lucent appearance.  Consider contrast MRI follow-up.     LOCATION:  [id number]   Dictated by ([facility]): Reno Espana MD on 9/05/2024 at 7:58 PM     Finalized by (CST): Reno Espana MD on 9/05/2024 at 8:01 PM       XR SACRUM + COCCYX (MIN 2 VIEWS) (CPT=72220)    Result Date: 9/5/2024  PROCEDURE:  XR SACRUM + COCCYX (MIN 2 VIEWS) (CPT=72220)  INDICATIONS:  fall x 2, tailbone pain  COMPARISON:  None.  PATIENT STATED HISTORY: (As transcribed by Technologist)  Patient states fall last night while trying to get his walker. He states pain in his tailbone.   FINDINGS:  No evidence of acute displaced fracture or dislocation.  Osteopenia.  Degenerative changes in the partially imaged lower lumbar spine.  Spinal stimulator pack noted.  Penile prosthesis seen.  Surgical clips in the pelvis.            CONCLUSION:  No evidence of acute displaced fracture or dislocation in the sacrococcygeal spine.  If there is further clinical concern for a potential occult fracture, an MRI of the sacrum may be helpful for further evaluation.  LOCATION:  [id number]   Dictated by ([facility]): Carl Kat MD on 9/05/2024 at 12:38 PM     Finalized by (CST): Carl Kat MD on 9/05/2024 at 12:39 PM       XR CHEST AP PORTABLE  (CPT=71045)    Result Date: 9/5/2024  PROCEDURE:  XR  CHEST AP PORTABLE  (CPT=71045)  TECHNIQUE:  AP chest radiograph was obtained.  COMPARISON:  None.  INDICATIONS:  josias  PATIENT STATED HISTORY: (As transcribed by Technologist)  Patient states fall last night while trying to get his walker. He states pain in his tailbone.     FINDINGS:  Cardiomegaly with normal pulmonary vascularity. No pleural effusion or pneumothorax. No lobar consolidation.  Cervical fusion hardware is partially imaged.  Thoracic stimulator leads also noted.  Degenerative changes in the spine.  Calcified plaque in the thoracic aorta.            CONCLUSION:  No lobar pneumonia or overt congestive failure.   LOCATION:  [id number]      Dictated by (CST): Carl Kat MD on 9/05/2024 at 12:38 PM     Finalized by (CST): Carl Kat MD on 9/05/2024 at 12:38 PM       CATH ANGIO    Result Date: 8/23/2024  This exam has been completed. Please refer to Notes for the results to this procedure.    CATH ANGIO    Result Date: 8/23/2024  Ruiz Gibbons MD     8/23/2024  2:31 PM [facility] Cardiology Cardiac Catheterization Report PREOPERATIVE DIAGNOSIS: LUGO cirrhosis/ascites (high SAAG/normal albumen), ESRD on HD, r/o cardiogenic ascites POSTOPERATIVE DIAGNOSIS: same as above PROCEDURE PERFORMED: ultrasound guided right brachial artery access, Right heart catheterization (CPT code 78541) CONSENT: The risks, benefits, and alternatives of right heart catheterization were discussed. The risks included, but were not limited to: bleeding, pulmonary embolus, pulmonary infarction, cardiac tamponade, and death. Benefits of the procedure included: diagnosis of heart disease and symptomatic improvement.   Alternatives to the procedure included: not performing cardiac catheterization, treatment with medications only, and observation.  DESCRIPTION OF PROCEDURE: The patient was brought to the cardiac catheterization laboratory.  Bilateral groins were prepped and draped with sterile technique. 10 mL of 1%  lidocaine were injected into the right groin for local anesthesia. Once local anesthesia was achieved, sedation was administered. The IV was maintained by the RN and moderate conscious sedation with a total of Versed 1mg and Fentanyl 25mcg was given. The patient was assessed and monitoring of oxygen, heart rate and blood pressure by nurse and myself during the exam 1357 (time of 1st dose administered when I was present) to 1419 (procedure end time). Attempt was made to attain right internal jugular vein access with ultrasound guidance; while we were able to successfully cannulate the vein with a micropuncture needle, the wire would not successfully advance into the vessel.  Decision was made to attempt right brachial vein access.  Using ultrasound guidance, the vein was accessed with the micropuncture needle and the wire did advance, a 5F sheath was placed.  Right heart catheterization was performed per usual routine using a 7F pulmonary capillary, balloon tipped wedge catheter.  The catheter was removed and hemostasis with manual pressure.  The procedure was tolerated well.  No complications were noted. FINDINGS: HEMODYNAMICS: RA pressure:  10/19/17mm Hg RV pressure: 69/14/26 mm Hg PA pressure: 63/24/40 mm Hg PA wedge pressure: 24/23/18 mm Hg Cardiac output: 4.6 L/min Cardiac index: 2.5 L/min/m2 Pulmonary vascular resistance: 4.8 Woods units OXIMETRY: SVC saturation: 75.1%, 10.3 g/dL PA saturation: 62.3, Aortic saturation: 98.0% CONCLUSIONS: - elevated right heart filling pressure - moderate-severe pulmonary hypertension - mildly elevated pulmonary capillary wedge pressure - mildly depressed cardiac output with normal cardiac index - no intracardiac shunt by saturations Hemodynamic findings show elevated right heart filling pressures/pulmonary hypertension in the absence of significantly elevated pulmonary capillary wedge pressure (surrogate measurement of LVEDP).  This would suggest that congestive heart failure  due to pump failure is not a significant contributor to volume overload/congestion. Ruiz Gibbons MD 8/23/2024 2:22 PM        Operative Procedures:      Code Status: Full Code    Total Time Coordinating Care: Greater than 30 minutes    Patient had opportunity to ask questions and state understand and agree with therapeutic plan as outlined. I reviewed and reconciled current and discharge medications on day of discharge and discussed meds with patient. D/w RN     Nikunj Kong MD  [facility] Hospitalist  [phone]  9/12/2024  2:24 PM

## 2024-09-12 NOTE — PROGRESS NOTES
[facility]   part of [facility] Infectious Disease  Progress Note    Gamaliel Boyer Patient Status:  Inpatient    1950 MRN [id number]   Location [facility] 3SW-A Attending Nikunj Kong MD   Hosp Day # 6 PCP Nima Sorensen MD     Subjective:    Patient seen and examined resting in bed, tolerating the IV abx. No new complaints or concerns.    Review of Systems:  Review of systems reviewed and negative except as mentioned    Objective:  Blood pressure 101/65, pulse 60, temperature 97.7 °F (36.5 °C), temperature source Oral, resp. rate 14, height 5' 6\" (1.676 m), weight 163 lb (73.9 kg), SpO2 94%.           Physical Exam:  General: Awake, alert, non-tox, NAD.  HEENT:  Oropharynx clear, trachea ML.  Heart: RRR S1S2 no murmurs.  Lungs: Essentially CTA b/l, no rhonchi, rales, wheezes.  Abdomen: Soft, NT/ND.  BS present.  No organomegaly.  Extremity: No edema.  Neurological: No focal deficits.  Derm:  Warm, dry, free from rashes.    Lab Data Review:  Lab Results   Component Value Date    WBC 6.1 2024    HGB 10.8 2024    HCT 33.6 2024    .0 2024    CREATSERUM 6.31 2024    BUN 60 2024     2024    K 5.2 2024    CL 97 2024    CO2 27.0 2024     2024    CA 9.4 2024    MG 2.6 2024        Cultures:  Hospital Encounter on 24   1. Tissue Aerobic Culture     Status: None (Preliminary result)    Collection Time: 09/10/24 12:34 PM    Specimen: Interverterbal disc space; Tissue   Result Value Ref Range    Tissue Culture Result No Growth 1 Day N/A    Tissue Smear No WBCs seen N/A    Tissue Smear No organisms seen N/A   2. Blood Culture     Status: None    Collection Time: 24 10:13 AM    Specimen: Blood,peripheral   Result Value Ref Range    Blood Culture Result No Growth 5 Days N/A        Radiology:  CT BIOPSY INTERVERTEBRAL DISC(XEE=11907/30371)    Result Date:  9/10/2024  CONCLUSION:  CT-guided aspiration of the L5-S1 disc as described above.    Dictated by (CST): Edward Romo MD on 9/10/2024 at 6:26 PM     Finalized by (CST): Edward Romo MD on 9/10/2024 at 6:31 PM       CT SACRUM (CPT=72192)    Result Date: 9/9/2024  CONCLUSION:  1. Irregularity of endplates at the L5-S1 level is again noted and could indicate discitis and osteomyelitis. 2. Soft tissue protruding from the disc into the epidural space has imaging appearance more consistent with a diffuse disc bulge.  There is no specific epidural fluid collection although noncontrast CT would be limited in evaluating for epidural abscess. 3. There is ascites noted in the visualized abdomen.   LOCATION:  [facility]   Dictated by (CST): Carson Stockton MD on 9/09/2024 at 4:08 PM     Finalized by (CST): Carson Stockton MD on 9/09/2024 at 4:15 PM       XR CHEST PA + LAT CHEST (CPT=71046)    Result Date: 9/7/2024  CONCLUSION:  1. Heart size is mildly enlarged and there is vascular congestion with suspected interstitial pulmonary edema.  Differential considerations for the interstitial lung opacities also include interstitial pneumonia and other interstitial lung disease. 2. Stable compression fracture deformity  of T6.   LOCATION:  [facility]   Dictated by (CST): Edward Romo MD on 9/07/2024 at 9:07 AM     Finalized by (CST): Edward Romo MD on 9/07/2024 at 9:10 AM       CT SPINE LUMBAR(CONTRAST ONLY) (CPT=72132)    Result Date: 9/6/2024  CONCLUSION:   1. There is bony destruction at the L5-S1 endplates suspicious for osteomyelitis until proven otherwise.  Percutaneous sampling could be obtained for further assessment as clinically warranted.  There is air within the L5-S1 disc space which could relate  to pre-existing vacuum disc phenomenon.  Discitis with gas-forming organisms is another possibility.  2. There is heterogeneity to the ventral aspect of the epidural space at the L5-S1 level which could represent  epidural phlegmon.  3. Moderate degenerative changes in the lumbar spine.  Laminectomy changes are seen at L3, L4, and L5.  4. There is nonspecific bladder wall thickening.  Clinical correlation with urinalysis is suggested.  Mild ascites is also noted.   Critical results were discussed with Nura CHAPARRO at 2140 hours on 9/6/2024. Critical results were read back.   LOCATION:  [facility]   Dictated by (CST): Stromberg, LeRoy, MD on 9/06/2024 at 9:28 PM     Finalized by (CST): Stromberg, LeRoy, MD on 9/06/2024 at 9:42 PM       US PARACENTESIS W IMAGING (CPT=49083)    Result Date: 9/6/2024  CONCLUSION:  Ultrasound-guided paracentesis was performed without complication.   LOCATION:  [facility]     Dictated by (CST): Reno Espana MD on 9/06/2024 at 11:46 AM     Finalized by (CST): Reno Espana MD on 9/06/2024 at 11:46 AM       XR LUMBAR SPINE (MIN 2 VIEWS) (CPT=72100)    Result Date: 9/5/2024  CONCLUSION:    Concern for potential discitis/osteomyelitis, now with an eroded appearance of the inferior endplate of L5, with loss of the usual sharp endplate border, with irregular sclerotic/lucent appearance.  Consider contrast MRI follow-up.     LOCATION:  [id number]   Dictated by (CST): Reno Espana MD on 9/05/2024 at 7:58 PM     Finalized by (CST): Reno Espana MD on 9/05/2024 at 8:01 PM       XR SACRUM + COCCYX (MIN 2 VIEWS) (CPT=72220)    Result Date: 9/5/2024  CONCLUSION:  No evidence of acute displaced fracture or dislocation in the sacrococcygeal spine.  If there is further clinical concern for a potential occult fracture, an MRI of the sacrum may be helpful for further evaluation.  LOCATION:  [id number]   Dictated by (CST): Carl Kat MD on 9/05/2024 at 12:38 PM     Finalized by (CST): Carl Kat MD on 9/05/2024 at 12:39 PM       XR CHEST AP PORTABLE  (CPT=71045)    Result Date: 9/5/2024  CONCLUSION:  No lobar pneumonia or overt congestive failure.   LOCATION:  [id number]      Dictated by (CST): Carl Kat MD on  9/05/2024 at 12:38 PM     Finalized by (CST): Carl Kat MD on 9/05/2024 at 12:38 PM          Assessment and Plan:    Traumatic back pain after a fall from his wheelchair  - CT concerning for osteomyelitis with epidural phlegmon; however, clinical picture is not consistent with an infectious process  - The patient denies any fevers or chills.  - Up until his fall, the patient was not having any worsening back pain outside of his chronic pain  - Unable to get an MRI due to his neurostimulator  - Blood cultures with no growth to date  - s/p IR biopsy on 9/10/24, cul with NGTD  - IV Vancomycin and cefepime ongoing since 9/7/24    End stage renal disease  - On hemodialysis  - Antibiotics will be renally adjusted    Type 1 diabetes  - As per the primary team    Reported allergy to fluconazole  - Develops electrolyte abnormalities    Recommendations  - Continue IV Vancomycin and cefepime as Rx with plans to dc on the same dosed with HD to complete a total of 6 weeks of IV abx through 10/19/24. DC med rec completed  - Follow pending cultures, asked lab to extend for 3 weeks  - Stable to dc from ID standpoint when opt IV set up and when okay with others  - Follow up with ID 2 weeks after dc, sooner if needed.    Plan of care discussed with Dr. Ibanez, he is in agreement with the plan of care.      If you have any questions or concerns please call [facility] and [facility] Infectious Disease at [phone].     SUYAPA Larose    9/12/2024  10:03 AM

## 2024-09-12 NOTE — PLAN OF CARE
Post-op day 2. Dressing is clean, dry, and intact w/ scant discharge noted. Alert and oriented x4. Room air. PRANEETH w/ CPAP. Telemetry monitoring Hard of hearing with bilateral hearing aides. Glasses. Partial upper plate in place. CGM in place. Up with min assist and walker w/ bilateral post-op shoes in place. Dialysis w/ 1.2 L removed per orders. Deemed appropriate for discharge home with home health, dialysis, and antibiotics scheduled during dialysis.

## 2024-09-13 NOTE — CDS QUERY
Dear Dr. Kong,     Can you please clarify the etiology of patient's back pain?    [   ] Back pain due to discitis/osteomyelitis  [   ] Back pain due to a fall   [   ] Other (please specify):      CLINICAL INFORMATION FROM THE MEDICAL RECORD    Risk Factors:  PMH of multiple falls    Clinical Indicators:   Pt to ED with complaint of back pain, reports falling prior to ED arrival  9/5 XR Lumbar spine: Concern for potential discitis/osteomyelitis, now with an eroded appearance of the inferior endplate of L5, with loss of the usual sharp endplate border, with irregular sclerotic/lucent appearance.    9/5 CT Spine: There is bony destruction at the L5-S1 endplates suspicious for osteomyelitis.  9/12 ID Progress Note: CT concerning for osteomyelitis with epidural phlegmon; however, clinical  picture is not consistent with an infectious process. Blood cultures with no growth to date. S/p IR biopsy on 9/10/24, cul with NGTD. IV Vancomycin and cefepime ongoing since 9/7/24 9/12 Orthopedics Assessment/Plan: Sacral pain after fall. Possible discitis. Patient to continue with ID. No surgical recommendations at this time.    9/12 Discharge Summary: 9/11 Hospitalist Plan: L5-S1 discitis/osteomyelitis. Xray of L spine. Suspect msk pain. Add flexeril. Add norco, tylenol. Xray of spine showing possible diskitis in L5. He denied any preceding back pain. His pain appears rather acute after the fall. Less likely diskitis. MRI was scheduled with sedation but unable to do c stimulator. Given possibility of an epidural phlegmon, started on cefepime and vancomycin, apprec ID recs.    Treatment:   Orthopedic and ID consult  XR Lumbar spine, CT Spine, MRI (unable to complete)  CT guided aspiration of the L5-S1 disc  Vancomycin and cefepime  Pain management - Flexeril, Norco, Tylenol    Use of terms such as suspected, possible, or probable (associated with a specific diagnosis that is being evaluated, monitored, or treated as if it  exists) are acceptable and can be coded in the inpatient setting, when documented at the time of discharge.    Please add any additional documentation to your progress note and continue to document this through discharge. For questions regarding this query, please contact Clinical : Sasha Hansen RN at #[phone].    THIS FORM IS A PERMANENT PART OF THE MEDICAL RECORD

## 2024-09-30 ENCOUNTER — HOSPITAL ENCOUNTER (INPATIENT)
Facility: HOSPITAL | Age: 74
LOS: 4 days | Discharge: HOME | End: 2024-10-04
Attending: HOSPITALIST | Admitting: HOSPITALIST
Payer: MEDICARE

## 2024-09-30 ENCOUNTER — APPOINTMENT (OUTPATIENT)
Dept: CT IMAGING | Facility: HOSPITAL | Age: 74
End: 2024-09-30
Attending: INTERNAL MEDICINE
Payer: MEDICARE

## 2024-09-30 DIAGNOSIS — M86.9 OSTEOMYELITIS, UNSPECIFIED SITE, UNSPECIFIED TYPE (HCC): Primary | ICD-10-CM

## 2024-09-30 PROBLEM — M46.46 DISCITIS OF LUMBAR REGION: Status: ACTIVE | Noted: 2024-09-30

## 2024-09-30 LAB
GLUCOSE BLD-MCNC: 74 MG/DL (ref 70–99)
GLUCOSE BLD-MCNC: 76 MG/DL (ref 70–99)
GLUCOSE BLD-MCNC: 86 MG/DL (ref 70–99)

## 2024-09-30 PROCEDURE — 99152 MOD SED SAME PHYS/QHP 5/>YRS: CPT

## 2024-09-30 PROCEDURE — 5A1D70Z PERFORMANCE OF URINARY FILTRATION, INTERMITTENT, LESS THAN 6 HOURS PER DAY: ICD-10-PCS | Performed by: INTERNAL MEDICINE

## 2024-09-30 PROCEDURE — 72132 CT LUMBAR SPINE W/DYE: CPT | Performed by: INTERNAL MEDICINE

## 2024-09-30 PROCEDURE — 99223 1ST HOSP IP/OBS HIGH 75: CPT | Performed by: INTERNAL MEDICINE

## 2024-09-30 PROCEDURE — 99153 MOD SED SAME PHYS/QHP EA: CPT

## 2024-09-30 RX ORDER — CIPROFLOXACIN HYDROCHLORIDE 3.5 MG/ML
1 SOLUTION/ DROPS TOPICAL 3 TIMES DAILY
Status: DISCONTINUED | OUTPATIENT
Start: 2024-09-30 | End: 2024-10-04

## 2024-09-30 RX ORDER — ALBUMIN (HUMAN) 12.5 G/50ML
25 SOLUTION INTRAVENOUS
Status: DISCONTINUED | OUTPATIENT
Start: 2024-10-01 | End: 2024-10-02

## 2024-09-30 RX ORDER — GABAPENTIN 100 MG/1
200 CAPSULE ORAL NIGHTLY
Status: DISCONTINUED | OUTPATIENT
Start: 2024-09-30 | End: 2024-09-30

## 2024-09-30 RX ORDER — DEXTROSE MONOHYDRATE 25 G/50ML
50 INJECTION, SOLUTION INTRAVENOUS
Status: DISCONTINUED | OUTPATIENT
Start: 2024-09-30 | End: 2024-10-04

## 2024-09-30 RX ORDER — ACETAMINOPHEN 325 MG/1
650 TABLET ORAL EVERY 6 HOURS PRN
Status: DISCONTINUED | OUTPATIENT
Start: 2024-09-30 | End: 2024-10-04

## 2024-09-30 RX ORDER — PREDNISOLONE ACETATE 10 MG/ML
1 SUSPENSION/ DROPS OPHTHALMIC 4 TIMES DAILY
Status: DISCONTINUED | OUTPATIENT
Start: 2024-09-30 | End: 2024-10-04

## 2024-09-30 RX ORDER — NICOTINE POLACRILEX 4 MG
30 LOZENGE BUCCAL
Status: DISCONTINUED | OUTPATIENT
Start: 2024-09-30 | End: 2024-10-04

## 2024-09-30 RX ORDER — ONDANSETRON 2 MG/ML
4 INJECTION INTRAMUSCULAR; INTRAVENOUS EVERY 6 HOURS PRN
Status: DISCONTINUED | OUTPATIENT
Start: 2024-09-30 | End: 2024-10-04

## 2024-09-30 RX ORDER — MIDODRINE HYDROCHLORIDE 5 MG/1
5 TABLET ORAL
Status: DISCONTINUED | OUTPATIENT
Start: 2024-09-30 | End: 2024-10-04

## 2024-09-30 RX ORDER — FAMOTIDINE 20 MG/1
20 TABLET, FILM COATED ORAL 2 TIMES DAILY
Status: DISCONTINUED | OUTPATIENT
Start: 2024-09-30 | End: 2024-10-04

## 2024-09-30 RX ORDER — MULTIVIT WITH MINERALS/LUTEIN
1000 TABLET ORAL DAILY
Status: DISCONTINUED | OUTPATIENT
Start: 2024-10-01 | End: 2024-10-04

## 2024-09-30 RX ORDER — EZETIMIBE 10 MG/1
10 TABLET ORAL DAILY
Status: DISCONTINUED | OUTPATIENT
Start: 2024-10-01 | End: 2024-10-04

## 2024-09-30 RX ORDER — SILVER SULFADIAZINE 10 MG/G
CREAM TOPICAL DAILY
Status: DISCONTINUED | OUTPATIENT
Start: 2024-09-30 | End: 2024-10-03

## 2024-09-30 RX ORDER — CHOLECALCIFEROL (VITAMIN D3) 50 MCG
2000 TABLET ORAL DAILY
Status: DISCONTINUED | OUTPATIENT
Start: 2024-10-01 | End: 2024-10-04

## 2024-09-30 RX ORDER — SEVELAMER CARBONATE 800 MG/1
800 TABLET, FILM COATED ORAL
Status: DISCONTINUED | OUTPATIENT
Start: 2024-09-30 | End: 2024-10-04

## 2024-09-30 RX ORDER — SERTRALINE HYDROCHLORIDE 25 MG/1
25 TABLET, FILM COATED ORAL DAILY
Status: DISCONTINUED | OUTPATIENT
Start: 2024-10-01 | End: 2024-10-04

## 2024-09-30 RX ORDER — LEVOTHYROXINE SODIUM 150 UG/1
150 TABLET ORAL
Status: DISCONTINUED | OUTPATIENT
Start: 2024-10-01 | End: 2024-10-04

## 2024-09-30 RX ORDER — LACTULOSE 10 G/15ML
20 SOLUTION ORAL EVERY EVENING
Status: DISCONTINUED | OUTPATIENT
Start: 2024-09-30 | End: 2024-10-04

## 2024-09-30 RX ORDER — HYDROCODONE BITARTRATE AND ACETAMINOPHEN 10; 325 MG/1; MG/1
1 TABLET ORAL EVERY 4 HOURS PRN
Status: DISCONTINUED | OUTPATIENT
Start: 2024-09-30 | End: 2024-10-04

## 2024-09-30 RX ORDER — NICOTINE POLACRILEX 4 MG
15 LOZENGE BUCCAL
Status: DISCONTINUED | OUTPATIENT
Start: 2024-09-30 | End: 2024-10-04

## 2024-09-30 RX ORDER — HEPARIN SODIUM 5000 [USP'U]/ML
5000 INJECTION, SOLUTION INTRAVENOUS; SUBCUTANEOUS EVERY 8 HOURS SCHEDULED
Status: DISCONTINUED | OUTPATIENT
Start: 2024-09-30 | End: 2024-10-04

## 2024-09-30 NOTE — TRANSFER CENTER NOTE
DIRECT ADMISSION    Admitting MD: Scott  Called in by: Nima Sorensen  Call back #: [phone]  Date of admission: 9/30/2024  Diagnosis: Osteomyelitis  Specialist MD:  Jose Laird  Hospitalist assigned: Yes, Duly  Type of admission: INPT  Type of bed: Surgical  Time of admission: 1100  Insurance Verification complete: Yes

## 2024-09-30 NOTE — PROGRESS NOTES
Called Lisbeth at this time regarding new order for HD tomorrow.  2 person skin check completed with RAMÍREZ Arellano, See flow sheet, multiple old scab/healing looking wounds to BLE.  Pictures taken.

## 2024-09-30 NOTE — H&P
[facility] Hospitalist History and Physical      Chief complaint:  Back pain     PCP: Nima Sorensen MD      History of Present Illness: Patient is a 73 year old male with PMH sig for ESRD on HD, DM 1 on insulin pump, HLD, HTN, LUGO cirrhosis, and spinal stenosis s/p spinal cord stimulator who presents as a direct admission due to concern for osteomyelitis.  He was admitted in early September to  for possible discitis, had biopsy done which showed no growth.  He was discharged home on course of IV abx per ID.  Over the past 10 days his back pain has worsened and he also has rising inflammatory markers.  This past Friday 9/27, he saw ortho spine who stopped his abx, last dose was Saturday 9/28 with ID.  Direct admission for repeat CT spine and possible bx was recommended.  Pt lives at home with his spouse, uses a walker to get around.  No F/C, N/V.  Currently uses an insulin pump, has it on.        Past Medical History:    Anemia    anxiety    back pain    Back problem    Blastomycosis    BPH (benign prostatic hyperplasia)    Depression    Diabetic retinopathy    laser surgery    End stage renal disease (HCC)    Esophageal reflux    occassion    Hearing impairment    hearing aids    HEMORRHOIDS    High blood pressure    High cholesterol    History of blood transfusion    History of renal dialysis    fistula on left arm    Hyperkalemia    hyperlipidemia    hypothyroidism    IMPOTENCE    Liver disease    2013 - pt states all resolved after liver and kidneys shut down after issue with pneumonia     Mood disturbance    Neuropathy    osteoarthritis    Osteoarthrosis, unspecified whether generalized or localized, unspecified site    osteoporosis    Polyneuropathy in diabetes(357.2)    Sepsis (HCC)    Sleep apnea, obstructive    AHI 54 SaO2 lizeth 74 % CPAP 12 HME//     Type I (juvenile type) diabetes mellitus without mention of complication, not stated as uncontrolled    since 1963- DR Mao follows     Unspecified essential hypertension    Visual impairment    wears glasses      Past Surgical History:   Procedure Laterality Date    Back surgery  2009    laminectomy  L1 -4  2/09  Dr. Eitan Guerrero [facility]    Back surgery  8-8-13    C4-C6 ACDF     Back surgery  9/8/16    L2-S1 Revision Decomp possible uninstrumented fusion 9/8/16    Back surgery  09/10/2018    Rev C3-C7 ACDF     Cataract Bilateral done in 2004    IOL's    Colonoscopy      2006    Colonoscopy  2/2009    normal    Colonoscopy N/A 8/23/2019    adenoma- repeat 1 yr (2 dya prep)    Colonoscopy,biopsy  2/20/09    Performed by ANAM RIVERS at [facility] SURGICAL [place], [facility]    Egd N/A 7/23/2021    EGD & COLONOSCOPY Surgeon: Kimberly, +vik, rpt EGD 3 months, poor prep rpt colon 3 years    Endoscopy, bowel pouch, biopsy      Injection, w/wo contrast, dx/therapeutic substance, epidural/subarachnoid; lumbar/sacral N/A 5/4/2016    Procedure: LUMBAR EPIDURAL;  Surgeon: Jayden Norton MD;  Location: [facility]    Injection, w/wo contrast, dx/therapeutic substance, epidural/subarachnoid; lumbar/sacral N/A 5/25/2016    Procedure: LUMBAR EPIDURAL;  Surgeon: Jayden Norton MD;  Location: [facility]    Injection, w/wo contrast, dx/therapeutic substance, epidural/subarachnoid; lumbar/sacral N/A 6/8/2016    Procedure: LUMBAR EPIDURAL;  Surgeon: Jayden Norton MD;  Location: [facility]    Knee replacement surgery  2/14/13    Left TKR by Dr. Lombardi    M-sedaj by  phys perfrmg svc 5+ yr N/A 5/4/2016    Procedure: LUMBAR EPIDURAL;  Surgeon: Jayden Norton MD;  Location: [facility]    M-sedaj by  phys perfrmg svc 5+ yr N/A 5/25/2016    Procedure: LUMBAR EPIDURAL;  Surgeon: Jayden Norton MD;  Location: [facility]    M-sedaj by  phys perfrmg svc 5+ yr N/A 6/8/2016    Procedure: LUMBAR EPIDURAL;  Surgeon: Jayden Norton MD;  Location: [facility]     Other surgical history      Bilateral median nerve release at elbow; 2000    Other surgical history      CTS release bilateral 2000    Other surgical history      Surgical repair fracture left hand 5th digit    Other surgical history      Surgery left heel ligament repair    Other surgical history      Laser surgery for bilateral retinopathy    Other surgical history      Surgery to left eye for \"weak muscle.\" 2007    Other surgical history      bilat knee repair 9/23/10    Other surgical history  11/2013    lung resection to remove abcess    Other surgical history  10/26/2020    Cystoscopy (Dr. Ribeiro)    Patient documented not to have experienced any of the following events  2/14/2014    Procedure: ;  Surgeon: Kin Hobbs MD;  Location: [facility]    Patient documented not to have experienced any of the following events N/A 5/4/2016    Procedure: LUMBAR EPIDURAL;  Surgeon: Jayden Norton MD;  Location: [facility]    Patient documented not to have experienced any of the following events N/A 5/25/2016    Procedure: LUMBAR EPIDURAL;  Surgeon: Jayden Norton MD;  Location: [facility]    Patient documented not to have experienced any of the following events N/A 6/8/2016    Procedure: LUMBAR EPIDURAL;  Surgeon: Jayden Norton MD;  Location: [facility]    Patient withough preoperative order for iv antibiotic surgical site infection prophylaxis.  2/14/2014    Procedure: ;  Surgeon: Kin Hobbs MD;  Location: [facility]    Patient withough preoperative order for iv antibiotic surgical site infection prophylaxis. N/A 5/4/2016    Procedure: LUMBAR EPIDURAL;  Surgeon: Jayden Norton MD;  Location: [facility]    Patient withough preoperative order for iv antibiotic surgical site infection prophylaxis. N/A 5/25/2016    Procedure: LUMBAR EPIDURAL;  Surgeon: Jayden Norton MD;  Location: [facility]    Patient withough preoperative order for iv antibiotic surgical site infection prophylaxis. N/A 2016    Procedure: LUMBAR EPIDURAL;  Surgeon: Jayden Norton MD;  Location: [facility]    Total knee replacement Left     Upper gi endoscopy,diagnosis  16    retained gastric contents; [facility]    Upper gi endoscopy,remov f.b. N/A 2014    Procedure: ESOPHAGOGASTRODUODENOSCOPY, POSSIBLE BIOPSY, POSSIBLE POLYPECTOMY 04100;  Surgeon: Kin Hobbs MD;  Location: [facility]        ALL:  Allergies   Allergen Reactions    Antihistamine & Nasal Deconges [Fexofenadine-Pseudoephedrine] OTHER (SEE COMMENTS)     Altered mental status    Adhesive Tape ITCHING    Albuterol DIZZINESS and OTHER (SEE COMMENTS)     Albuterol Inhalation. Lightheadedness    Benadryl [Diphenhydramine] RESTLESSNESS    Depakote [Valproic Acid] DIZZINESS    Fluconazole OTHER (SEE COMMENTS)     Kidney labs abnormal    Mirtazapine RESTLESSNESS    Quetiapine RESTLESSNESS    Wellbutrin [Bupropion] RASH    Latex RASH        No current outpatient medications on file.       Social History     Tobacco Use    Smoking status: Former     Current packs/day: 0.00     Average packs/day: 3.5 packs/day for 20.0 years (70.0 ttl pk-yrs)     Types: Cigarettes     Start date: 1966     Quit date: 1986     Years since quittin.7    Smokeless tobacco: Never    Tobacco comments:     Quit 25 years ago   Substance Use Topics    Alcohol use: Not Currently     Comment: very rarely        Fam Hx  Family History   Problem Relation Age of Onset    Hypertension Father     Lipids Father     Cancer Father     Hypertension Mother     Psychiatric Maternal Grandmother     Diabetes Paternal Grandfather         Type 2 DM    Heart Disorder Brother         CAD with MI at age 53 - passed away    Obesity Brother     Diabetes Brother         Type 2 DM    Hypertension Brother     Lipids Brother     Thyroid Disorder Neg         Review of Systems  Comprehensive ROS reviewed and negative except for what is stated in HPI.      OBJECTIVE:  /57 (BP Location: Right arm)   Pulse 66   Temp 97.8 °F (36.6 °C) (Oral)   Resp 17   SpO2 100%   Gen: No acute distress, alert and oriented x3, no focal neurologic deficits.  Chronically ill appearing male.    HEENT:  EOMI, PERRLA, OP clear, MMM  Pulm: Lungs clear bilaterally, normal respiratory effort  CV: Heart with regular rate and rhythm, no murmur.  Normal PMI.    Abd: Abdomen soft, nontender, nondistended, no organomegaly, bowel sounds present  MSK: Full range of motion in extremities, no clubbing, no cyanosis.  LUE AVF.  Skin: +LE wounds  Neuro:  Grossly intact, no focal deficits      Data Review:    LABS:        CXR: image personally reviewed.      Radiology: CT BIOPSY INTERVERTEBRAL DISC(HUA=11900/26833)    Result Date: 9/10/2024  PROCEDURE:  CT BIOPSY INTERVERTEBRAL DISC(JHR=43291/[id number])  INDICATIONS:  L5-S1 disc itis/osteomyelitis.  COMPARISON:  None.  TECHNIQUE:  The risks, benefits alternatives of CT-guided L5-S1 disc aspiration and possible biopsy were explained in detail prior to the exam and written informed consent was obtained.  The risks explained included but were not limited to bleeding, infection and nerve injury.  The patient voiced understanding wished to proceed with the exam.  The patient was placed prone on the CT table.  Preliminary CT images were obtained with a paper localizing grid in place.   An IV was checked and maintained by the radiology nurse.  Moderate sedation was performed. 39 minutes of moderate sedation time was performed under my direct supervision.  The radiology nurse and myself were in attendance throughout the procedure and sedation.  A combination of Versed and Fentanyl were utilized as the sedation agents.  The patient was assessed and continuous pulse oximetry monitoring, continuous heart rate monitoring and blood pressure monitoring was  performed during the exam.   From a right posterolateral approach, a 17 gauge guiding needle was percutaneously passed to the left lateral periphery of the L5-S1 disc space.  More central placement could not be achieved because of left lower extremity pain.  Aspiration was performed.  No dary fluid could be aspirated.  Approximately 2 cc of nonbacteriostatic saline was then injected through the needle and aspiration was performed.  There is aspiration of minimal blood tinged fluid which was placed into a sterile cup and sent for cultures and Gram stain.  Needle was then removed and direct pressure was held until adequate hemostasis was obtained.  The patient tolerated the procedure well without immediate complication left department stable condition.  Additional findings noted during the CT images demonstrate abdominal ascites.  Moderate stool seen within the colon.             CONCLUSION:  CT-guided aspiration of the L5-S1 disc as described above.    Dictated by (CST): [facility] Romo MD on 9/10/2024 at 6:26 PM     Finalized by (CST): [facility] Romo MD on 9/10/2024 at 6:31 PM       CT SACRUM (CPT=72192)    Result Date: 9/9/2024  PROCEDURE:  CT SACRUM (CPT=72192)  COMPARISON:  [facility] , CT, CT CHEST+ABDOMEN+PELVIS(ALL CNTRST ONLY)(CPT=71260/32310), 11/14/2013, 2:34 PM.  [facility] , CT, CT SPINE LUMBAR(CONTRAST ONLY) (CPT=72132), 9/06/2024, 8:19 PM.  INDICATIONS:  Discitis? Epdiural phlegmon? Unable to get MRI  TECHNIQUE:  Multi-planar CT images were created without intravenous contrast. Shaded surface renderings are generated on an independent CT scanner workstation.  Dose reduction techniques were used. Dose information is transmitted to the ACR (American College of Radiology) NRDR (National Radiology Data Registry) which includes the Dose Index Registry  3-D RENDERING:  Not applicable  PATIENT STATED HISTORY:(As transcribed by Technologist)  Evaluation of sacrum.    FINDINGS:  BONES:  Marked irregularity and  erosion of endplates at the L5-S1 level is again noted.  There is vacuum phenomenon in the disc. SOFT TISSUES:  Within the limits of CT there is no epidural fluid collection detected.  There is soft tissue extending from the disc into the ventral epidural space and into the foramina bilaterally which has imaging appearance most consistent with diffuse disc bulge.  Inflammatory discitis would not be excluded. EFFUSION:  None visible. OTHER:  There is a moderate amount of free fluid in the pelvis.            CONCLUSION:  1. Irregularity of endplates at the L5-S1 level is again noted and could indicate discitis and osteomyelitis. 2. Soft tissue protruding from the disc into the epidural space has imaging appearance more consistent with a diffuse disc bulge.  There is no specific epidural fluid collection although noncontrast CT would be limited in evaluating for epidural abscess. 3. There is ascites noted in the visualized abdomen.   LOCATION:  [facility]   Dictated by (CST): Carson Stockton MD on 9/09/2024 at 4:08 PM     Finalized by (CST): Carson Stockton MD on 9/09/2024 at 4:15 PM       XR CHEST PA + LAT CHEST (CPT=71046)    Result Date: 9/7/2024  PROCEDURE:  XR CHEST PA + LAT CHEST (CPT=71046)  INDICATIONS:  infection  COMPARISON:  [facility] , XR, XR CHEST AP PORTABLE  (CPT=71045), 9/05/2024, 12:14 PM.  TECHNIQUE:  PA and lateral chest radiographs were obtained.  PATIENT STATED HISTORY: (As transcribed by Technologist)  Patient offered no additional history at this time.    FINDINGS:  LUNGS:  Mildly prominent interstitial opacities are seen bilaterally suggesting possible mild interstitial pulmonary edema.  Other possible considerations include interstitial pneumonia or other interstitial lung disease. CARDIAC:  There is cardiomegaly and vascular congestion. MEDIASTINUM:  Mild atheromatous plaque noted in the aorta. PLEURA:  No pleural effusion or pneumothorax. BONES:  Spinal cord stimulator leads are seen in the lower  thoracic spine.  There appears to be a compression fracture deformity again noted and what appears to be T 6.  Orthopedic hardware seen in the lower cervical spine.            CONCLUSION:  1. Heart size is mildly enlarged and there is vascular congestion with suspected interstitial pulmonary edema.  Differential considerations for the interstitial lung opacities also include interstitial pneumonia and other interstitial lung disease. 2. Stable compression fracture deformity  of T6.   LOCATION:  [facility]   Dictated by (CST): [facility] Romo MD on 9/07/2024 at 9:07 AM     Finalized by (CST): [facility] Romo MD on 9/07/2024 at 9:10 AM       CT SPINE LUMBAR(CONTRAST ONLY) (CPT=72132)    Result Date: 9/6/2024  PROCEDURE:  CT SPINE LUMBAR(CONTRAST ONLY) (CPT=72132)  COMPARISON:  [facility] , XR, XR OR LUMBAR SPINE (1 VIEW)   (CPT=72020), 9/08/2016, 11:59 AM.  [facility] , XR, XR LUMBAR SPINE (MIN 2 VIEWS) (CPT=72100), 9/05/2024, 7:51 PM.  INDICATIONS:  eval L5 for diskitis, cannot get MRI  TECHNIQUE:  Multi-planar CT images were created with intravenous contrast.  Dose reduction techniques were used. Dose information is transmitted to the ACR (American College of Radiology) NRDR (National Radiology Data Registry) which includes the Dose Index Registry.  PATIENT STATED HISTORY:(As transcribed by Technologist)   Eval L5 for diskitis.   CONTRAST USED:  100cc of Isovue 370  FINDINGS:   There is bony destruction at the L5-S1 endplates suspicious for osteomyelitis until proven otherwise.  There is air within the L5-S1 disc space.  There are partially visualized spinal canal electrodes.  These enter the spinal canal at the T11-T12 level dorsally.  There is lumbar lordosis.  Assessment of the spinal canal is limited secondary to CT technique.  There are postoperative changes from laminectomies at the L3, L4, and L5 levels.  There are moderate degenerative changes in the lumbar spine.  For example at L2-L3, there is a mild disc bulge  with moderate facet hypertrophy.  Mild spinal canal stenosis.  Mild bilateral foraminal narrowing.  At L3-L4, there is a mild disc bulge.  There is moderate facet hypertrophy and mild spinal canal stenosis.  Moderate bilateral foraminal narrowing is seen at L3-L4.  At L4-5, there is moderate facet hypertrophy and a mild disc bulge.  L4-5 is posteriorly decompressed.  There is right-sided subarticular zone narrowing.  There is mild bilateral foraminal narrowing.  At L5-S1, there may be mild heterogeneity to the ventral aspect of the epidural space which could represent phlegmon.  Alternatively, this could represent disc material.  Moderate facet hypertrophy.  Mild to moderate bilateral foraminal narrowing.  A partially visualized mild amount of ascites is seen.  The kidneys are atrophic.  There is nonspecific bladder wall thickening.  Scattered atherosclerosis.  Degenerative changes are noted at the SI joints.            CONCLUSION:   1. There is bony destruction at the L5-S1 endplates suspicious for osteomyelitis until proven otherwise.  Percutaneous sampling could be obtained for further assessment as clinically warranted.  There is air within the L5-S1 disc space which could relate  to pre-existing vacuum disc phenomenon.  Discitis with gas-forming organisms is another possibility.  2. There is heterogeneity to the ventral aspect of the epidural space at the L5-S1 level which could represent epidural phlegmon.  3. Moderate degenerative changes in the lumbar spine.  Laminectomy changes are seen at L3, L4, and L5.  4. There is nonspecific bladder wall thickening.  Clinical correlation with urinalysis is suggested.  Mild ascites is also noted.   Critical results were discussed with Nura CHAPARRO at 2140 hours on 9/6/2024. Critical results were read back.   LOCATION:  [facility]   Dictated by (CST): Stromberg, LeRoy, MD on 9/06/2024 at 9:28 PM     Finalized by (CST): Stromberg, LeRoy, MD on 9/06/2024 at 9:42 PM       US  PARACENTESIS W IMAGING (CPT=49083)    Result Date: 9/6/2024  PROCEDURE:  US PARACENTESIS W IMAGING  (CPT=49083)  COMPARISON:  None.  INDICATIONS:  paracentesis - gets one every few weeks  DESCRIPTION OF PROCEDURE:  Informed consent was obtained.  The patient was prepped and draped in the standard sterile fashion.  An ultrasound-guided paracentesis was performed in the usual sterile manner, including sonographic localization of a pocket of  fluid suitable for paracentesis, utilization of Doppler ultrasound in the region selected for paracentesis to exclude any potential prominent vessels in the region, cleansing of the skin, and injection of local anesthetic..  LOCATION:  Right lower quadrant FLUID REMOVED:  3.1 liters MEDICATION:  10.0 cc of 1% Lidocaine for local anesthetic. COMPLICATIONS:  None. LABORATORY:  None requested            CONCLUSION:  Ultrasound-guided paracentesis was performed without complication.   LOCATION:  [facility]     Dictated by (CST): Reno Espana MD on 9/06/2024 at 11:46 AM     Finalized by (CST): Reno Espana MD on 9/06/2024 at 11:46 AM       XR LUMBAR SPINE (MIN 2 VIEWS) (CPT=72100)    Result Date: 9/5/2024  PROCEDURE:  XR LUMBAR SPINE (MIN 2 VIEWS) (CPT=72100)  TECHNIQUE:  AP, lateral, and coned down L5-S1 views were obtained.  COMPARISON:  [facility] , XR, XR OR LUMBAR SPINE (1 VIEW)   (CPT=72020), 9/08/2016, 11:59 AM.  INDICATIONS:  Back pain  PATIENT STATED HISTORY: (As transcribed by Technologist)  Patient offered no additional history at this time.    FINDINGS:  Normal alignment appears no scoliosis or subluxation.  The bones are demineralized.  Multilevel degenerative disc changes.  However the inferior endplate of L5 appears eroded, with loss of the usual sharp border of the inferior endplate, and instead irregular sclerosis and lucency the of the inferior aspect of L5.  This represents a change from the prior.            CONCLUSION:    Concern for potential discitis/osteomyelitis,  now with an eroded appearance of the inferior endplate of L5, with loss of the usual sharp endplate border, with irregular sclerotic/lucent appearance.  Consider contrast MRI follow-up.     LOCATION:  [id number]   Dictated by ([facility]): Reno Espana MD on 9/05/2024 at 7:58 PM     Finalized by (CST): Reno Espana MD on 9/05/2024 at 8:01 PM       XR SACRUM + COCCYX (MIN 2 VIEWS) (CPT=72220)    Result Date: 9/5/2024  PROCEDURE:  XR SACRUM + COCCYX (MIN 2 VIEWS) (CPT=72220)  INDICATIONS:  fall x 2, tailbone pain  COMPARISON:  None.  PATIENT STATED HISTORY: (As transcribed by Technologist)  Patient states fall last night while trying to get his walker. He states pain in his tailbone.   FINDINGS:  No evidence of acute displaced fracture or dislocation.  Osteopenia.  Degenerative changes in the partially imaged lower lumbar spine.  Spinal stimulator pack noted.  Penile prosthesis seen.  Surgical clips in the pelvis.            CONCLUSION:  No evidence of acute displaced fracture or dislocation in the sacrococcygeal spine.  If there is further clinical concern for a potential occult fracture, an MRI of the sacrum may be helpful for further evaluation.  LOCATION:  [id number]   Dictated by ([facility]): Carl Kat MD on 9/05/2024 at 12:38 PM     Finalized by ([facility]): Carl Kat MD on 9/05/2024 at 12:39 PM       XR CHEST AP PORTABLE  (CPT=71045)    Result Date: 9/5/2024  PROCEDURE:  XR CHEST AP PORTABLE  (CPT=71045)  TECHNIQUE:  AP chest radiograph was obtained.  COMPARISON:  None.  INDICATIONS:  josias  PATIENT STATED HISTORY: (As transcribed by Technologist)  Patient states fall last night while trying to get his walker. He states pain in his tailbone.     FINDINGS:  Cardiomegaly with normal pulmonary vascularity. No pleural effusion or pneumothorax. No lobar consolidation.  Cervical fusion hardware is partially imaged.  Thoracic stimulator leads also noted.  Degenerative changes in the spine.  Calcified plaque in the thoracic  aorta.            CONCLUSION:  No lobar pneumonia or overt congestive failure.   LOCATION:  [id number]      Dictated by (CST): Carl Kat MD on 9/05/2024 at 12:38 PM     Finalized by (CST): Carl Kat MD on 9/05/2024 at 12:38 PM          Assessment/Plan:     73 yr old male with PMH sig for ESRD on HD, DM 1 on insulin pump, HLD, HTN, LUGO cirrhosis, and spinal stenosis s/p spinal cord stimulator who presents as a direct admission due to concern for osteomyelitis.    # Lumbosacral discitis   - s/p biopsy and cultures 9/10/24, neg  - s/p IV cefepime and vanco with HD, until 9/28/24  - ID and spine c/s appreciated   - CT lumbar spine with IV contrast ordered   - possible biopsy pending CT results     # ESRD on HD  - renal c/s appreciated  - resume HD per renal    # DM type 1 on insulin pump  - resume insulin pump  - pt to monitor sugars     # LUGO cirrhosis   - compensated   - cont lactulose     # Hypothyroidism   - cont levothyroxine     # Major depression, recurrent   - cont zoloft     # LE wounds, chronic   - follows with wound clinic  - wound RN c/s    DVT prophy - hep subcutaneous   Dispo: inpt care.  POC d/w pt and his spouse Ansley who agrees.     Outpatient records or previous hospital records reviewed.   [facility] hospitalist to continue to follow patient while in house  A total of 76 minutes taken with patient and coordinating care.  Greater than 50% face to face encounter.      Advanced Care Planning  While discussing goals of care with pt, Rigo voluntarily participated in an advanced care planning discussion.  Additionally, his spouse Ansley participated in the conversation.  The following was discussed: POA  and code status.  He confirms that his spouse Ansley has his healthcare POA.  Pt confirms FULL CODE status.   17 minutes were spent discussing advanced care planning.  This time was exclusive of the documented time for this visit.     Gigi Gutierrez DO  [facility] Hospitalist

## 2024-09-30 NOTE — PLAN OF CARE
Pt A & O x4, on RA.  /IS.  Tele.  SCDs.  Wounds noted to R and Left foot/shins, pictures on chart.  Pt sees wound care nurse, rinse with saline and apply SSD cream to sites.  Wraps Left foot in kerlix with ABD for cushion for when pt has shoes on.  Pt has HH nurse 2 x/week.  Pt sees OT/PT weekly.  Pt has stimulator in back which is currently turned off due to lead moving.  Pain has increased severely since that.  Norco given for pain.  Pt has HD on T, Th, Sat.  Fistula LUE +/+.  Pt uses rolling walker at home very short distances per wife otherwise she uses wheelchair.  Lives at home with spouse.  Hx PRANEETH uses CPAP, wife going to bring in.  Last BM 9/30.  Pt has insulin pump RUQ abdomen changed today at home.  CGM R upper arm.  Pt NPO for now, spine notified by Dr. Gutierrez.  ID saw pt.  Neph was consulted earlier.

## 2024-09-30 NOTE — CONSULTS
[facility]  Report of Consultation    Gamaliel Boyer Patient Status:  Inpatient    1950 MRN [id number]   Location [facility] 3SW-A Attending Gigi Gutierrez, DO   Hosp Day # 0 PCP Nima Sorensen MD     Reason for Consultation:  ESRD    History of Present Illness:  Gamaliel Boyer is a a 73 year old male with history of ESRD on HD, DM1, HLD, HTN, LUGO cirrhosis and spinal stenosis who presents as direct admission due to concern for osteomyelitis. Nephrology consulted for inpatient ESRD management.     Patient was recently admitted for possible discitis. He had biopsy with no growth and was discharged home with six week course of antibiotics per ID. He was doing well initially but then began to have worsening pain for the past 10 days. He also had rising inflammatory markers. He saw spine surgery on Friday who stopped ABX.  As a result, he was directly for additional work-up. He had last dialysis session on Saturday.     History:  Past Medical History:    Anemia    anxiety    back pain    Back problem    Blastomycosis    BPH (benign prostatic hyperplasia)    Depression    Diabetic retinopathy    laser surgery    End stage renal disease (HCC)    Esophageal reflux    occassion    Hearing impairment    hearing aids    HEMORRHOIDS    High blood pressure    High cholesterol    History of blood transfusion    History of renal dialysis    fistula on left arm    Hyperkalemia    hyperlipidemia    hypothyroidism    IMPOTENCE    Liver disease    2013 - pt states all resolved after liver and kidneys shut down after issue with pneumonia     Mood disturbance    Neuropathy    osteoarthritis    Osteoarthrosis, unspecified whether generalized or localized, unspecified site    osteoporosis    Polyneuropathy in diabetes(357.2)    Sepsis (HCC)    Sleep apnea, obstructive    AHI 54 SaO2 lizeth 74 % CPAP 12 HME//     Type I (juvenile type) diabetes mellitus without mention of complication, not stated as  uncontrolled    since 1963- DR Mao follows    Unspecified essential hypertension    Visual impairment    wears glasses     Past Surgical History:   Procedure Laterality Date    Back surgery  2009    laminectomy  L1 -4  2/09  Dr. Eitan Guerrero [facility]    Back surgery  8-8-13    C4-C6 ACDF     Back surgery  9/8/16    L2-S1 Revision Decomp possible uninstrumented fusion 9/8/16    Back surgery  09/10/2018    Rev C3-C7 ACDF     Cataract Bilateral done in 2004    IOL's    Colonoscopy      2006    Colonoscopy  2/2009    normal    Colonoscopy N/A 8/23/2019    adenoma- repeat 1 yr (2 dya prep)    Colonoscopy,biopsy  2/20/09    [place] [facility] [place]    Egd N/A 7/23/2021    EGD & COLONOSCOPY Surgeon: Kimberly, +vik, rpt EGD 3 months, poor prep rpt colon 3 years    Endoscopy, bowel pouch, biopsy      Injection, w/wo contrast, dx/therapeutic substance, epidural/subarachnoid; lumbar/sacral N/A 5/4/2016    Procedure: LUMBAR EPIDURAL;  Surgeon: Jayden Norton MD;  Location: [facility]    Injection, w/wo contrast, dx/therapeutic substance, epidural/subarachnoid; lumbar/sacral N/A 5/25/2016    Procedure: LUMBAR EPIDURAL;  Surgeon: Jayden Norton MD;  Location: [facility]    Injection, w/wo contrast, dx/therapeutic substance, epidural/subarachnoid; lumbar/sacral N/A 6/8/2016    Procedure: LUMBAR EPIDURAL;  Surgeon: Jayden Norton MD;  Location: [facility]    Knee replacement surgery  2/14/13    Left TKR by Dr. Lombardi    M-sedaj by  phys perfrmg svc 5+ yr N/A 5/4/2016    Procedure: LUMBAR EPIDURAL;  Surgeon: Jayden Norton MD;  Location: [facility]    M-sedaj by  phys perfrmg svc 5+ yr N/A 5/25/2016    Procedure: LUMBAR EPIDURAL;  Surgeon: Jayden Norton MD;  Location: [facility]    M-sedaj by  phys perfrmg svc 5+ yr N/A 6/8/2016    Procedure: LUMBAR EPIDURAL;  Surgeon: Jayden Norton  MD;  Location: [facility]    Other surgical history      Bilateral median nerve release at elbow; 2000    Other surgical history      CTS release bilateral 2000    Other surgical history      Surgical repair fracture left hand 5th digit    Other surgical history      Surgery left heel ligament repair    Other surgical history      Laser surgery for bilateral retinopathy    Other surgical history      Surgery to left eye for \"weak muscle.\" 2007    Other surgical history      bilat knee repair 9/23/10    Other surgical history  11/2013    lung resection to remove abcess    Other surgical history  10/26/2020    Cystoscopy (Dr. Ribeiro)    Patient documented not to have experienced any of the following events  2/14/2014    Procedure: ;  Surgeon: Kin Hobbs MD;  Location: [facility]    Patient documented not to have experienced any of the following events N/A 5/4/2016    Procedure: LUMBAR EPIDURAL;  Surgeon: Jayden Norton MD;  Location: [facility]    Patient documented not to have experienced any of the following events N/A 5/25/2016    Procedure: LUMBAR EPIDURAL;  Surgeon: Jayden Norton MD;  Location: [facility]    Patient documented not to have experienced any of the following events N/A 6/8/2016    Procedure: LUMBAR EPIDURAL;  Surgeon: Jayden Norton MD;  Location: [facility]    Patient withough preoperative order for iv antibiotic surgical site infection prophylaxis.  2/14/2014    Procedure: ;  Surgeon: Kin Hobbs MD;  Location: [facility]    Patient withough preoperative order for iv antibiotic surgical site infection prophylaxis. N/A 5/4/2016    Procedure: LUMBAR EPIDURAL;  Surgeon: Jayden Norton MD;  Location: [facility]    Patient withough preoperative order for iv antibiotic surgical site infection prophylaxis. N/A 5/25/2016    Procedure: LUMBAR EPIDURAL;  Surgeon:  Jayden Norton MD;  Location: [facility]    Patient withough preoperative order for iv antibiotic surgical site infection prophylaxis. N/A 6/8/2016    Procedure: LUMBAR EPIDURAL;  Surgeon: Jayden Norton MD;  Location: [facility]    Total knee replacement Left 2013    Upper gi endoscopy,diagnosis  4/14/16    retained gastric contents; Cesar    Upper gi endoscopy,remov f.b. N/A 2/14/2014    Procedure: ESOPHAGOGASTRODUODENOSCOPY, POSSIBLE BIOPSY, POSSIBLE POLYPECTOMY 62036;  Surgeon: Kin Hobbs MD;  Location: [facility] [place] [facility]     Family History   Problem Relation Age of Onset    Hypertension Father     Lipids Father     Cancer Father     Hypertension Mother     Psychiatric Maternal Grandmother     Diabetes Paternal Grandfather         Type 2 DM    Heart Disorder Brother         CAD with MI at age 53 - passed away    Obesity Brother     Diabetes Brother         Type 2 DM    Hypertension Brother     Lipids Brother     Thyroid Disorder Neg      Denies family history of kidney disease.    reports that he quit smoking about 38 years ago. His smoking use included cigarettes. He started smoking about 58 years ago. He has a 70 pack-year smoking history. He has never used smokeless tobacco. He reports that he does not currently use alcohol. He reports that he does not use drugs.    Allergies:  Allergies   Allergen Reactions    Antihistamine & Nasal Deconges [Fexofenadine-Pseudoephedrine] OTHER (SEE COMMENTS)     Altered mental status    Adhesive Tape ITCHING    Albuterol DIZZINESS and OTHER (SEE COMMENTS)     Albuterol Inhalation. Lightheadedness    Benadryl [Diphenhydramine] RESTLESSNESS    Depakote [Valproic Acid] DIZZINESS    Fluconazole OTHER (SEE COMMENTS)     Kidney labs abnormal    Mirtazapine RESTLESSNESS    Quetiapine RESTLESSNESS    Wellbutrin [Bupropion] RASH    Gabapentin OTHER (SEE COMMENTS)     Severe jerking motions    Latex RASH        Medications:    Current Facility-Administered Medications:     [START ON 10/1/2024] cholecalciferol (Vitamin D3) tab 2,000 Units, 2,000 Units, Oral, Daily    [START ON 10/1/2024] ezetimibe (Zetia) tab 10 mg, 10 mg, Oral, Daily    famotidine (Pepcid) tab 20 mg, 20 mg, Oral, BID    lactulose (CHRONULAC) 10 GM/15ML solution 20 g, 20 g, Oral, QPM    [START ON 10/1/2024] levothyroxine (Synthroid) tab 150 mcg, 150 mcg, Oral, QAM AC    midodrine (ProAmatine) tab 5 mg, 5 mg, Oral, Daily PRN    ciprofloxacin (Ciloxan) 0.3 % ophthalmic solution 1 drop, 1 drop, Right Eye, Q4H While awake    prednisoLONE (Pred Forte) 1 % ophthalmic suspension 1 drop, 1 drop, Right Eye, QID    [START ON 10/1/2024] sertraline (Zoloft) tab 25 mg, 25 mg, Oral, Daily    sevelamer carbonate (Renvela) tab 800 mg, 800 mg, Oral, TID AC    [START ON 10/1/2024] vitamin E (Alpha-E) cap 1,000 Units, 1,000 Units, Oral, Daily    HYDROcodone-acetaminophen (Norco)  MG per tab 1 tablet, 1 tablet, Oral, Q4H PRN    ondansetron (Zofran) 4 MG/2ML injection 4 mg, 4 mg, Intravenous, Q6H PRN    acetaminophen (Tylenol) tab 650 mg, 650 mg, Oral, Q6H PRN  No current outpatient medications on file.       Review of Systems:  Please see HPI for pertinent positives. 10 point review of systems otherwise reviewed and negative.     Physical Exam:  /57 (BP Location: Right arm)   Pulse 66   Temp 97.8 °F (36.6 °C) (Oral)   Resp 17   Wt 160 lb 8 oz (72.8 kg)   SpO2 100%   BMI 25.91 kg/m²   Temp (24hrs), Av.8 °F (36.6 °C), Min:97.8 °F (36.6 °C), Max:97.8 °F (36.6 °C)       Intake/Output Summary (Last 24 hours) at 2024 1440  Last data filed at 2024 1400  Gross per 24 hour   Intake 0 ml   Output --   Net 0 ml     Wt Readings from Last 3 Encounters:   24 160 lb 8 oz (72.8 kg)   24 163 lb (73.9 kg)   24 163 lb (73.9 kg)     General: awake, alert  HEENT: No scleral icterus, MMM  Neck: Supple, no KALPANA or thyromegaly  Cardiac: Regular  rate and rhythm, S1, S2 normal, no murmur  Lungs: Decreased breath sounds at the bases bilaterally.   Extremities: Without clubbing, cyanosis; no edema  Neurologic: Cranial nerves grossly intact, moving all extremities  Skin: Warm and dry, no rashes      Laboratory Data:       Imaging:  All imaging studies reviewed.      Assessment / Plan:    1) ESRD: He has end stage renal disease due to long-standing diabetes. He receives HD TThS via AVF. Last HD 9/28. Plan for HD tomorrow per his regular outpatient schedule.     2) Possible discitis/OM: Recent admission with c/f discitis, s/p biopsy with no growth of organisms.  Was discharged with IV antibiotics but then began to have worsening pain and inflammatory marker. Plan for repeat CT and repeat IR biopsy due to concern for infection. Per ID and spine surgery     3) LUGO cirrhosis: follows with GI as outpatient. 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 Receives paracentesis every 3-5 weeks, last 9/6 with 3.1L removed    4) Orthostatic hypotension: Continue midodrine with HD    5) Anemia: Due to ESRD. PAYAL for goal hemoglobin 10-11 g/dL.    Thank you for allowing me to participate in this patient's care. Please feel free to call me with any questions or concerns.     Marlin Benson MD  09/30/24

## 2024-09-30 NOTE — CONSULTS
Infectious Disease Initial Consultation      Date of admission: 9/30/2024 11:53 AM     Date of service: 09/30/24 1:52 PM    Consult requested by: Gigi Gutierrez DO     Reason for consult: Lumbar discitis    Chief complaint: Worsening back pain    History of present illness: Gamaliel Boyer is a 73 year old male with history of end-stage renal disease on hemodialysis, liver cirrhosis, blastomycosis back in the 70s, type 1 diabetes who was recently admitted to the hospital 9/5 with worsening back pain with CT findings concerning for underlying osteomyelitis and discitis and possible epidural phlegmon in the lumbar sacral area.  He was unable to obtain an MRI of his lumbar spine given the presence of his stimulator.  He underwent IR guided biopsy with cultures of his back on 9/10.  Cultures were held for 3 weeks.  Nothing growing on the bacterial, fungal or AFB cultures.  He was discharged on cefepime and vancomycin with hemodialysis for for 6 weeks; however, over the last 10 days, has been having worsening back pain.  His symptomatic markers have also been rising.  He saw spine surgery on Friday who stopped his antibiotics and now he is admitted here to the hospital for repeat cultures and biopsy.  The patient does report that about 10 days ago, his stimulator was turned off.  No fevers or chills.  No other constitutional symptoms.    Review of systems:  All other components of the review of systems are negative, except those described in the history of present illness.     Past Medical History:    Anemia    anxiety    back pain    Back problem    Blastomycosis    BPH (benign prostatic hyperplasia)    Depression    Diabetic retinopathy    laser surgery    End stage renal disease (HCC)    Esophageal reflux    occassion    Hearing impairment    hearing aids    HEMORRHOIDS    High blood pressure    High cholesterol    History of blood transfusion    History of renal dialysis    fistula on left arm     Hyperkalemia    hyperlipidemia    hypothyroidism    IMPOTENCE    Liver disease    2013 - pt states all resolved after liver and kidneys shut down after issue with pneumonia     Mood disturbance    Neuropathy    osteoarthritis    Osteoarthrosis, unspecified whether generalized or localized, unspecified site    osteoporosis    Polyneuropathy in diabetes(357.2)    Sepsis (HCC)    Sleep apnea, obstructive    AHI 54 SaO2 lizeth 74 % CPAP 12 HME//     Type I (juvenile type) diabetes mellitus without mention of complication, not stated as uncontrolled    since 1963- DR Mao follows    Unspecified essential hypertension    Visual impairment    wears glasses     Past Surgical History:   Procedure Laterality Date    Back surgery  2009    laminectomy  L1 -4  2/09  Dr. Eitan Guerrero [facility]    Back surgery  8-8-13    C4-C6 ACDF     Back surgery  9/8/16    L2-S1 Revision Decomp possible uninstrumented fusion 9/8/16    Back surgery  09/10/2018    Rev C3-C7 ACDF     Cataract Bilateral done in 2004    IOL's    Colonoscopy      2006    Colonoscopy  2/2009    normal    Colonoscopy N/A 8/23/2019    adenoma- repeat 1 yr (2 dya prep)    Colonoscopy,biopsy  2/20/09    Performed by ANAM RIVERS at [facility] SURGICAL [facility]    Egd N/A 7/23/2021    EGD & COLONOSCOPY Surgeon: Kimberly, +vik, rpt EGD 3 months, poor prep rpt colon 3 years    Endoscopy, bowel pouch, biopsy      Injection, w/wo contrast, dx/therapeutic substance, epidural/subarachnoid; lumbar/sacral N/A 5/4/2016    Procedure: LUMBAR EPIDURAL;  Surgeon: Jayden Norton MD;  Location: [facility]    Injection, w/wo contrast, dx/therapeutic substance, epidural/subarachnoid; lumbar/sacral N/A 5/25/2016    Procedure: LUMBAR EPIDURAL;  Surgeon: Jayden Norton MD;  Location: [facility]    Injection, w/wo contrast, dx/therapeutic substance, epidural/subarachnoid; lumbar/sacral N/A 6/8/2016    Procedure: LUMBAR EPIDURAL;  Surgeon: Errol  MD Jayden;  Location: [facility]    Knee replacement surgery  2/14/13    Left TKR by Dr. Lombardi    M-sedaj by  phys perfrmg sv 5+ yr N/A 5/4/2016    Procedure: LUMBAR EPIDURAL;  Surgeon: Jayden Norton MD;  Location: [facility]    M-sedaj by  phys perfrmg sv 5+ yr N/A 5/25/2016    Procedure: LUMBAR EPIDURAL;  Surgeon: Jayden Norton MD;  Location: [facility]    M-sedaj by  phys perfrmg sv 5+ yr N/A 6/8/2016    Procedure: LUMBAR EPIDURAL;  Surgeon: Jayden Norton MD;  Location: [facility]    Other surgical history      Bilateral median nerve release at elbow; 2000    Other surgical history      CTS release bilateral 2000    Other surgical history      Surgical repair fracture left hand 5th digit    Other surgical history      Surgery left heel ligament repair    Other surgical history      Laser surgery for bilateral retinopathy    Other surgical history      Surgery to left eye for \"weak muscle.\" 2007    Other surgical history      bilat knee repair 9/23/10    Other surgical history  11/2013    lung resection to remove abcess    Other surgical history  10/26/2020    Cystoscopy (Dr. Ribeiro)    Patient documented not to have experienced any of the following events  2/14/2014    Procedure: ;  Surgeon: Kin Hobbs MD;  Location: [facility]    Patient documented not to have experienced any of the following events N/A 5/4/2016    Procedure: LUMBAR EPIDURAL;  Surgeon: Jayden Norton MD;  Location: [facility]    Patient documented not to have experienced any of the following events N/A 5/25/2016    Procedure: LUMBAR EPIDURAL;  Surgeon: Jayden Norton MD;  Location: [facility]    Patient documented not to have experienced any of the following events N/A 6/8/2016    Procedure: LUMBAR EPIDURAL;  Surgeon: Jayden Norton MD;  Location: [facility]    Patient  withough preoperative order for iv antibiotic surgical site infection prophylaxis.  2014    Procedure: ;  Surgeon: Kin Hobbs MD;  Location: [facility]    Patient withough preoperative order for iv antibiotic surgical site infection prophylaxis. N/A 2016    Procedure: LUMBAR EPIDURAL;  Surgeon: Jayden Norton MD;  Location: [facility]NAGEMENT    Patient withough preoperative order for iv antibiotic surgical site infection prophylaxis. N/A 2016    Procedure: LUMBAR EPIDURAL;  Surgeon: Jayden Norton MD;  Location: [facility] PAIN MANAGEMENT    Patient withough preoperative order for iv antibiotic surgical site infection prophylaxis. N/A 2016    Procedure: LUMBAR EPIDURAL;  Surgeon: Jayden Norton MD;  [place] [facility]    Total knee replacement Left     Upper gi endoscopy,diagnosis  16    retained gastric contents; [place]    Upper gi endoscopy,remov f.b. N/A 2014    Procedure: ESOPHAGOGASTRODUODENOSCOPY, POSSIBLE BIOPSY, POSSIBLE POLYPECTOMY 45803;  Surgeon: Kin Hobbs MD;  Location: [facility]     Social History     Socioeconomic History    Marital status:     Number of children: 1   Occupational History    Occupation: Retired--Printer   Tobacco Use    Smoking status: Former     Current packs/day: 0.00     Average packs/day: 3.5 packs/day for 20.0 years (70.0 ttl pk-yrs)     Types: Cigarettes     Start date: 1966     Quit date: 1986     Years since quittin.7    Smokeless tobacco: Never    Tobacco comments:     Quit 25 years ago   Vaping Use    Vaping status: Never Used   Substance and Sexual Activity    Alcohol use: Not Currently     Comment: very rarely    Drug use: No    Sexual activity: Yes     Partners: Female   Other Topics Concern     Service No    Blood Transfusions No    Caffeine Concern No     Comment: coffee 1 cup per day    Sleep Concern No    Exercise Yes      Comment: PT/OT 1x per week    Seat Belt Yes     Social Determinants of Health     Financial Resource Strain: Low Risk  (4/19/2024)    Received from [facility]    Financial Resource Strain     In the past year, have you or any family members you live with been unable to get any of the following when it was really needed? Check all that apply.: None   Food Insecurity: No Food Insecurity (9/30/2024)    Food Insecurity     Food Insecurity: Never true   Transportation Needs: No Transportation Needs (9/30/2024)    Transportation Needs     Lack of Transportation: No   Social Connections: Low Risk  (4/19/2024)    Received from [facility]    Social Connections     How often do you see or talk to people that you care about and feel close to? (For example: talking to friends on the phone, visiting friends or family, going to Rastaf[employer] or club meetings): 5 or more times a week   Housing Stability: Low Risk  (9/30/2024)    Housing Stability     Housing Instability: No     Family History   Problem Relation Age of Onset    Hypertension Father     Lipids Father     Cancer Father     Hypertension Mother     Psychiatric Maternal Grandmother     Diabetes Paternal Grandfather         Type 2 DM    Heart Disorder Brother         CAD with MI at age 53 - passed away    Obesity Brother     Diabetes Brother         Type 2 DM    Hypertension Brother     Lipids Brother     Thyroid Disorder Neg      Reviewed, see above    Medications:    [START ON 10/1/2024] cholecalciferol    [START ON 10/1/2024] ezetimibe    famotidine    gabapentin    lactulose    [START ON 10/1/2024] levothyroxine    midodrine    ciprofloxacin    prednisoLONE    [START ON 10/1/2024] sertraline    sevelamer carbonate    [START ON 10/1/2024] vitamin E    HYDROcodone-acetaminophen    ondansetron    acetaminophen     Allergies:  Allergies   Allergen Reactions    Antihistamine & Nasal Deconges [Fexofenadine-Pseudoephedrine] OTHER (SEE COMMENTS)     Altered  mental status    Adhesive Tape ITCHING    Albuterol DIZZINESS and OTHER (SEE COMMENTS)     Albuterol Inhalation. Lightheadedness    Benadryl [Diphenhydramine] RESTLESSNESS    Depakote [Valproic Acid] DIZZINESS    Fluconazole OTHER (SEE COMMENTS)     Kidney labs abnormal    Mirtazapine RESTLESSNESS    Quetiapine RESTLESSNESS    Wellbutrin [Bupropion] RASH    Gabapentin OTHER (SEE COMMENTS)     Severe jerking motions    Latex RASH       Physical Exam:  Vitals:    09/30/24 1239   BP: 111/57   Pulse: 66   Resp: 17   Temp: 97.8 °F (36.6 °C)     Vitals signs and nursing note reviewed.   Constitutional:       Appearance: Normal appearance.   HENT:      Head: Normocephalic and atraumatic.      Mouth/Throat: Normal dentition     Mouth: Mucous membranes are moist.   Neck:      Musculoskeletal: Neck supple.   Cardiovascular:      Rate and Rhythm: Normal rate.      Heart sounds: Normal heart sounds. No murmur. No friction rub. No gallop.    Pulmonary:      Effort: Pulmonary effort is normal. No respiratory distress.      Breath sounds: Normal breath sounds. No stridor. No wheezing, rhonchi or rales.   Chest:      Chest wall: No tenderness.   Abdominal:      General: Abdomen is flat. There is no distension.      Palpations: Abdomen is soft. There is no mass.      Tenderness: There is no tenderness. There is no guarding or rebound.      Hernia: No hernia is present.   Musculoskeletal:      Right lower leg: No edema.      Left lower leg: No edema.   Skin:     General: Skin is warm and dry.   Neurological:      General: No focal deficit present.      Mental Status: Alert and oriented to person, place, and time.     Laboratory data:  I have independently reviewed all lab results; including old microbiological results.        No results for input(s): \"RBC\", \"HGB\", \"HCT\", \"MCV\", \"MCH\", \"MCHC\", \"RDW\", \"NEPRELIM\", \"WBC\", \"PLT\", \"NEUT\", \"LYMPH\", \"MON\", \"EOS\", \"NRBC\" in the last 168 hours.    Microbiology data:  No results found for this  visit on 09/30/24.   09/10/24 12:34   AFB CULTURE AND SMEAR Rpt (IP)   AFB CULTURE(P) Rpt (P)   AFB SMEAR Rpt   ANAEROBIC CULTURE Rpt (P)   TISSUE AEROBIC CULTURE Rpt   FUNGUS CULTURE AND SMEAR(INCLUDES STAIN) Rpt (IP)   FUNGUS CULTURE(P) Rpt (P)   FUNGUS STAIN Rpt   (IP): In Process  (P): Preliminary  Rpt: View report in Results Review for more information    Impression:  Gamaliel Boyer is a 73 year old male with     Lumbosacral discitis  Status post biopsy and cultures back on 9/10  Aerobic and anaerobic cultures negative, held for 3 weeks  AFB stains and cultures along with fungal stains and cultures are all negative thus far  Was discharged on 6 weeks of IV cefepime and IV vancomycin, currently on hold since 9/27 in anticipation for repeat biopsy and cultures  Worsening back pain is concerning for complication or worsening infection  However, the patient also had turned off his neurostimulator 10 days ago, would explain partly why he is having back pain; however, it would not explain his rising inflammatory markers   End stage renal disease  Hemodialysis  History of blastomycosis back in the 70s    Recommendations:     Repeat a CT of the lumbosacral spine with contrast  Agree with IR biopsy, sent for pathology, aerobic and anaerobic cultures, fungal stains and cultures, AFB stains and cultures  Continue to hold antibiotics at this point  Continue to monitor daily labs for antibiotic toxicity  Further recommendations will depend on the above work-up and clinical progress     The plan of care was discussed with the primary hospital team, Gigi Gutierrez DO     Recommendations were also discussed with the patient; all questions were answered.     Thank you for this consultation. Please don't hesitate to call the ID team for questions or any acute changes in patient's clinical condition.    Please note that this report has been produced using speech recognition software and may contain errors related to that  system including, but not limited to, errors in grammar, punctuation, and spelling, as well as words and phrases that possibly may have been recognized inappropriately.  If there are any questions or concerns, contact the dictating provider for clarification.    The 21st Century Cures Act makes medical notes like these available to patients in the interest of transparency. Please be advised this is a medical document. Medical documents are intended to carry relevant information, facts as evident, and the clinical opinion of the practitioner. The medical note is intended as peer to peer communication and may appear blunt or direct. It is written in medical language and may contain abbreviations or verbiage that are unfamiliar.     Taryn Ibanez MD  [facility]. Tel: [phone]. Fax: [phone].     Gamaliel Boyer : 1950 MRN: [id number] [facility]: [id number]

## 2024-10-01 LAB
ANION GAP SERPL CALC-SCNC: 11 MMOL/L (ref 0–18)
BASOPHILS # BLD AUTO: 0.07 X10(3) UL (ref 0–0.2)
BASOPHILS NFR BLD AUTO: 1.3 %
BUN BLD-MCNC: 45 MG/DL (ref 9–23)
CALCIUM BLD-MCNC: 9.6 MG/DL (ref 8.7–10.4)
CHLORIDE SERPL-SCNC: 96 MMOL/L (ref 98–112)
CO2 SERPL-SCNC: 26 MMOL/L (ref 21–32)
CREAT BLD-MCNC: 6.09 MG/DL
CRP SERPL-MCNC: 1.3 MG/DL (ref ?–0.5)
EGFRCR SERPLBLD CKD-EPI 2021: 9 ML/MIN/1.73M2 (ref 60–?)
EOSINOPHIL # BLD AUTO: 0.14 X10(3) UL (ref 0–0.7)
EOSINOPHIL NFR BLD AUTO: 2.6 %
ERYTHROCYTE [DISTWIDTH] IN BLOOD BY AUTOMATED COUNT: 21 %
ERYTHROCYTE [SEDIMENTATION RATE] IN BLOOD: 48 MM/HR
GLUCOSE BLD-MCNC: 144 MG/DL (ref 70–99)
GLUCOSE BLD-MCNC: 188 MG/DL (ref 70–99)
GLUCOSE BLD-MCNC: 201 MG/DL (ref 70–99)
GLUCOSE BLD-MCNC: 211 MG/DL (ref 70–99)
GLUCOSE BLD-MCNC: 217 MG/DL (ref 70–99)
GLUCOSE BLD-MCNC: 288 MG/DL (ref 70–99)
GLUCOSE BLD-MCNC: 368 MG/DL (ref 70–99)
GLUCOSE BLD-MCNC: 381 MG/DL (ref 70–99)
GLUCOSE BLD-MCNC: 444 MG/DL (ref 70–99)
HBV SURFACE AG SER-ACNC: <0.1 [IU]/L
HBV SURFACE AG SERPL QL IA: NONREACTIVE
HCT VFR BLD AUTO: 35.9 %
HGB BLD-MCNC: 11.4 G/DL
IMM GRANULOCYTES # BLD AUTO: 0.03 X10(3) UL (ref 0–1)
IMM GRANULOCYTES NFR BLD: 0.6 %
LYMPHOCYTES # BLD AUTO: 0.71 X10(3) UL (ref 1–4)
LYMPHOCYTES NFR BLD AUTO: 13.3 %
MCH RBC QN AUTO: 32.5 PG (ref 26–34)
MCHC RBC AUTO-ENTMCNC: 31.8 G/DL (ref 31–37)
MCV RBC AUTO: 102.3 FL
MONOCYTES # BLD AUTO: 0.5 X10(3) UL (ref 0.1–1)
MONOCYTES NFR BLD AUTO: 9.4 %
NEUTROPHILS # BLD AUTO: 3.89 X10 (3) UL (ref 1.5–7.7)
NEUTROPHILS # BLD AUTO: 3.89 X10(3) UL (ref 1.5–7.7)
NEUTROPHILS NFR BLD AUTO: 72.8 %
OSMOLALITY SERPL CALC.SUM OF ELEC: 294 MOSM/KG (ref 275–295)
PLATELET # BLD AUTO: 170 10(3)UL (ref 150–450)
POTASSIUM SERPL-SCNC: 4.6 MMOL/L (ref 3.5–5.1)
RBC # BLD AUTO: 3.51 X10(6)UL
SODIUM SERPL-SCNC: 133 MMOL/L (ref 136–145)
WBC # BLD AUTO: 5.3 X10(3) UL (ref 4–11)

## 2024-10-01 PROCEDURE — 0Q903ZX DRAINAGE OF LUMBAR VERTEBRA, PERCUTANEOUS APPROACH, DIAGNOSTIC: ICD-10-PCS | Performed by: INTERNAL MEDICINE

## 2024-10-01 PROCEDURE — 90935 HEMODIALYSIS ONE EVALUATION: CPT | Performed by: INTERNAL MEDICINE

## 2024-10-01 RX ORDER — INSULIN DEGLUDEC 100 U/ML
6 INJECTION, SOLUTION SUBCUTANEOUS NIGHTLY
Status: DISCONTINUED | OUTPATIENT
Start: 2024-10-02 | End: 2024-10-01

## 2024-10-01 RX ORDER — HYDROMORPHONE HYDROCHLORIDE 1 MG/ML
0.2 INJECTION, SOLUTION INTRAMUSCULAR; INTRAVENOUS; SUBCUTANEOUS EVERY 2 HOUR PRN
Status: DISCONTINUED | OUTPATIENT
Start: 2024-10-01 | End: 2024-10-01

## 2024-10-01 RX ORDER — HYDROMORPHONE HYDROCHLORIDE 1 MG/ML
0.4 INJECTION, SOLUTION INTRAMUSCULAR; INTRAVENOUS; SUBCUTANEOUS
Status: DISCONTINUED | OUTPATIENT
Start: 2024-10-01 | End: 2024-10-04

## 2024-10-01 RX ORDER — HYDROMORPHONE HYDROCHLORIDE 1 MG/ML
0.5 INJECTION, SOLUTION INTRAMUSCULAR; INTRAVENOUS; SUBCUTANEOUS EVERY 4 HOURS PRN
Status: DISCONTINUED | OUTPATIENT
Start: 2024-10-01 | End: 2024-10-01

## 2024-10-01 RX ORDER — ACETAMINOPHEN 500 MG
1000 TABLET ORAL EVERY 6 HOURS
Status: DISCONTINUED | OUTPATIENT
Start: 2024-10-01 | End: 2024-10-04

## 2024-10-01 RX ORDER — MIDODRINE HYDROCHLORIDE 5 MG/1
5 TABLET ORAL ONCE
Status: COMPLETED | OUTPATIENT
Start: 2024-10-01 | End: 2024-10-01

## 2024-10-01 RX ORDER — HYDROMORPHONE HYDROCHLORIDE 1 MG/ML
0.2 INJECTION, SOLUTION INTRAMUSCULAR; INTRAVENOUS; SUBCUTANEOUS EVERY 2 HOUR PRN
Status: CANCELLED | OUTPATIENT
Start: 2024-10-01

## 2024-10-01 RX ORDER — HYDROMORPHONE HYDROCHLORIDE 1 MG/ML
0.8 INJECTION, SOLUTION INTRAMUSCULAR; INTRAVENOUS; SUBCUTANEOUS EVERY 2 HOUR PRN
Status: CANCELLED | OUTPATIENT
Start: 2024-10-01

## 2024-10-01 RX ORDER — HYDROMORPHONE HYDROCHLORIDE 1 MG/ML
2 INJECTION, SOLUTION INTRAMUSCULAR; INTRAVENOUS; SUBCUTANEOUS EVERY 4 HOURS PRN
Status: DISCONTINUED | OUTPATIENT
Start: 2024-10-01 | End: 2024-10-01

## 2024-10-01 RX ORDER — HYDROMORPHONE HYDROCHLORIDE 1 MG/ML
0.4 INJECTION, SOLUTION INTRAMUSCULAR; INTRAVENOUS; SUBCUTANEOUS EVERY 2 HOUR PRN
Status: DISCONTINUED | OUTPATIENT
Start: 2024-10-01 | End: 2024-10-01

## 2024-10-01 RX ORDER — INSULIN DEGLUDEC 100 U/ML
4 INJECTION, SOLUTION SUBCUTANEOUS NIGHTLY
Status: DISCONTINUED | OUTPATIENT
Start: 2024-10-02 | End: 2024-10-04

## 2024-10-01 RX ORDER — HYDROMORPHONE HYDROCHLORIDE 1 MG/ML
0.8 INJECTION, SOLUTION INTRAMUSCULAR; INTRAVENOUS; SUBCUTANEOUS
Status: DISCONTINUED | OUTPATIENT
Start: 2024-10-01 | End: 2024-10-04

## 2024-10-01 RX ORDER — HYDROMORPHONE HYDROCHLORIDE 1 MG/ML
0.8 INJECTION, SOLUTION INTRAMUSCULAR; INTRAVENOUS; SUBCUTANEOUS EVERY 2 HOUR PRN
Status: DISCONTINUED | OUTPATIENT
Start: 2024-10-01 | End: 2024-10-01

## 2024-10-01 RX ORDER — HYDROMORPHONE HYDROCHLORIDE 1 MG/ML
0.2 INJECTION, SOLUTION INTRAMUSCULAR; INTRAVENOUS; SUBCUTANEOUS
Status: DISCONTINUED | OUTPATIENT
Start: 2024-10-01 | End: 2024-10-04

## 2024-10-01 RX ORDER — HYDROMORPHONE HYDROCHLORIDE 1 MG/ML
0.4 INJECTION, SOLUTION INTRAMUSCULAR; INTRAVENOUS; SUBCUTANEOUS EVERY 2 HOUR PRN
Status: CANCELLED | OUTPATIENT
Start: 2024-10-01

## 2024-10-01 RX ORDER — HYDROMORPHONE HYDROCHLORIDE 1 MG/ML
1 INJECTION, SOLUTION INTRAMUSCULAR; INTRAVENOUS; SUBCUTANEOUS EVERY 4 HOURS PRN
Status: DISCONTINUED | OUTPATIENT
Start: 2024-10-01 | End: 2024-10-01

## 2024-10-01 NOTE — PROGRESS NOTES
[facility]  Nephrology Progress Note    Gamaliel Boyer Patient Status:  Inpatient    1950 MRN [id number]   Location [facility] 3SW-A Attending Bahman Recinos, DO   Hosp Day # 1 PCP Nima Sorensen MD     Subjective:  Seen on HD. He reports that he could not sleep overnight.     Objective:  Vital signs: Blood pressure 121/53, pulse 64, temperature 98.2 °F (36.8 °C), temperature source Oral, resp. rate 17, weight 160 lb 8 oz (72.8 kg), SpO2 91%.    General: No acute distress. Alert  HEENT: Moist mucous membranes. EOM-I.   Respiratory: Clear to auscultation bilaterally.  No wheezes.   Cardiovascular: S1, S2.  Regular rate and rhythm.   Neurologic: No focal neurological deficits.  Musculoskeletal:  No swelling noted.  Integument: No lesions. No erythema.  Psychiatric: Appropriate mood and affect.    Current Facility-Administered Medications   Medication Dose Route Frequency    cholecalciferol (Vitamin D3) tab 2,000 Units  2,000 Units Oral Daily    ezetimibe (Zetia) tab 10 mg  10 mg Oral Daily    famotidine (Pepcid) tab 20 mg  20 mg Oral BID    lactulose (CHRONULAC) 10 GM/15ML solution 20 g  20 g Oral QPM    levothyroxine (Synthroid) tab 150 mcg  150 mcg Oral QAM AC    midodrine (ProAmatine) tab 5 mg  5 mg Oral Daily PRN    ciprofloxacin (Ciloxan) 0.3 % ophthalmic solution 1 drop  1 drop Right Eye TID    prednisoLONE (Pred Forte) 1 % ophthalmic suspension 1 drop  1 drop Right Eye QID    sertraline (Zoloft) tab 25 mg  25 mg Oral Daily    sevelamer carbonate (Renvela) tab 800 mg  800 mg Oral TID AC    vitamin E (Alpha-E) cap 1,000 Units  1,000 Units Oral Daily    HYDROcodone-acetaminophen (Norco)  MG per tab 1 tablet  1 tablet Oral Q4H PRN    ondansetron (Zofran) 4 MG/2ML injection 4 mg  4 mg Intravenous Q6H PRN    acetaminophen (Tylenol) tab 650 mg  650 mg Oral Q6H PRN    sodium chloride 0.9 % IV bolus 100 mL  100 mL Intravenous Q30 Min PRN    And    albumin human (Albumin) 25% injection 25 g   25 g Intravenous PRN Dialysis    heparin (Porcine) 5000 UNIT/ML injection 5,000 Units  5,000 Units Subcutaneous Q8H [facility]    silver sulfADIAZINE (Silvadene) 1 % cream   Topical Daily    insulin via Insulin Pump   Subcutaneous TID AC and HS    glucose (Dex4) 15 GM/59ML oral liquid 15 g  15 g Oral Q15 Min PRN    Or    glucose (Glutose) 40% oral gel 15 g  15 g Oral Q15 Min PRN    Or    glucose-vitamin C (Dex-4) chewable tab 4 tablet  4 tablet Oral Q15 Min PRN    Or    dextrose 50% injection 50 mL  50 mL Intravenous Q15 Min PRN    Or    glucose (Dex4) 15 GM/59ML oral liquid 30 g  30 g Oral Q15 Min PRN    Or    glucose (Glutose) 40% oral gel 30 g  30 g Oral Q15 Min PRN    Or    glucose-vitamin C (Dex-4) chewable tab 8 tablet  8 tablet Oral Q15 Min PRN       Recent Labs     10/01/24  0607   WBC 5.3   HGB 11.4*   .3*   .0       Recent Labs     10/01/24  0607   *   K 4.6   CL 96*   CO2 26.0   BUN 45*   CREATSERUM 6.09*   CA 9.6       Assessment/Plan:  1) ESRD: He has end stage renal disease due to long-standing diabetes. He receives HD TThS via [facility]. Last HD 9/28. Plan for HD today per his regular outpatient schedule.      2) Possible discitis/OM: Recent admission with c/f discitis, s/p biopsy with no growth of organisms.  Was discharged with IV antibiotics but then began to have worsening pain and inflammatory marker. Now re-admitted due to concern for worsening infection. Per ID and spine surgery      3) LUGO cirrhosis: follows with GI as outpatient. 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 Receives paracentesis every 3-5 weeks, last 9/25     4) Orthostatic hypotension: Continue midodrine with HD     5) Anemia: Due to ESRD. PAYAL for goal hemoglobin 10-11 g/dL, EPO held as hemoglobin at goal.    Thank you for allowing me to participate in this patient's care. Please feel free to call me with any questions or concerns.     Marlin Benson MD  10/01/24

## 2024-10-01 NOTE — PLAN OF CARE
After review of prior Endocrinology records and after speaking w/ Dr. Royal, Endocrinology, recommended continuing patient's insulin pump, and to utilize 80% of basal insulin prior to procedure. Nursing communication placed. Rate change to start at midnight  Pain regimen adjusted    Bahman Recinos D.O.  [facility]

## 2024-10-01 NOTE — DISCHARGE INSTRUCTIONS
May discharge per endo. Patient to resume insulin pump with same settings at 7pm on the day of discharge. Patient able to do this himself. Follow up with your endocrinologist as scheduled.    Sometimes managing your health at home requires assistance.  The [facility] team has recognized your preference to use [facility].  They can be reached by phone at [phone].  The fax number for your reference is [phone].  A representative from the home health agency will contact you or your family to schedule your first visit.      Wound cleansing:  Cleanse with saline  Wound product: Honey gel.Bordered gauze  Dressing change frequency:  Change dressing daily and/or PRN

## 2024-10-01 NOTE — PROGRESS NOTES
Infectious Disease Progress Note      Date of admission: 9/30/2024 11:53 AM     Reason for consult: Lumbar discitis    Referring physician: Bahman Recinos DO    Subjective: The patient is feeling about the same.  Back pain is about the same.  No nausea or vomiting.  No shortness of breath.  No cough or sputum production.    The rest of the systems were reviewed and found to be negative except was mentioned above    Interval events: This is a 73-year-old male patient with history of end-stage renal disease, on hemodialysis, liver cirrhosis, blastomycosis back in the 70s, type 1 diabetes, recent admitted to the hospital 9/5 with worsening back pain with CT findings concerning for underlying osteomyelitis and discitis and possible epidural phlegmon.  He was unable to obtain MRI due to his stimulator.  He underwent IR guided biopsy, cultures with no growth to date.  He was discharged on IV cefepime along with IV vancomycin.  His stimulator was turned off about 10 days prior to admission, was having worsening back pain along with escalating inflammatory markers hence was admitted back to the hospital for possible repeat biopsies.  His antibiotics were stopped on 9/27 in anticipation for repeat biopsy.  Repeat CT of the lumbar spine at this point is not showing any changes compared to 9/6.  Bony destruction noted at L5-S1 that is unchanged.  No epidural collections.      Medications:    cholecalciferol    ezetimibe    famotidine    lactulose    levothyroxine    midodrine    ciprofloxacin    prednisoLONE    sertraline    sevelamer carbonate    vitamin E    HYDROcodone-acetaminophen    ondansetron    acetaminophen    sodium chloride **AND** albumin human    heparin    silver sulfADIAZINE    insulin    glucose **OR** glucose **OR** glucose-vitamin C **OR** dextrose **OR** glucose **OR** glucose **OR** glucose-vitamin C     Allergies:  Allergies   Allergen Reactions    Antihistamine & Nasal Deconges  [Fexofenadine-Pseudoephedrine] OTHER (SEE COMMENTS)     Altered mental status    Adhesive Tape ITCHING    Albuterol DIZZINESS and OTHER (SEE COMMENTS)     Albuterol Inhalation. Lightheadedness    Benadryl [Diphenhydramine] RESTLESSNESS    Depakote [Valproic Acid] DIZZINESS    Fluconazole OTHER (SEE COMMENTS)     Kidney labs abnormal    Mirtazapine RESTLESSNESS    Quetiapine RESTLESSNESS    Wellbutrin [Bupropion] RASH    Gabapentin OTHER (SEE COMMENTS)     Severe jerking motions    Latex RASH       Physical Exam:  Vitals:    10/01/24 0715   BP: 121/53   Pulse: 64   Resp: 17   Temp: 98.2 °F (36.8 °C)     Vitals signs and nursing note reviewed.   Constitutional:       Appearance: Normal appearance.   HENT:      Head: Normocephalic and atraumatic.      Mouth: Mucous membranes are moist.   Neck:      Musculoskeletal: Neck supple.   Cardiovascular:      Rate and Rhythm: Normal rate.   Pulmonary:      Effort: Pulmonary effort is normal. No respiratory distress.   Abdominal:      General: Abdomen is flat. There is no distension.      Palpations: Abdomen is soft. There is no mass.      Tenderness: There is no tenderness. There is no guarding or rebound.      Hernia: No hernia is present.   Musculoskeletal:      Right lower leg: No edema.      Left lower leg: No edema.   Skin:     General: Skin is warm and dry.   Neurological:      General: No focal deficit present.      Mental Status: Alert and oriented to person, place, and time.     Laboratory data:  I have reviewed all the lab results independently.  Lab Results   Component Value Date    WBC 5.3 10/01/2024    HGB 11.4 10/01/2024    HCT 35.9 10/01/2024    .0 10/01/2024    CREATSERUM 6.09 10/01/2024    BUN 45 10/01/2024     10/01/2024    K 4.6 10/01/2024    CL 96 10/01/2024    CO2 26.0 10/01/2024     10/01/2024    CA 9.6 10/01/2024    ESRML 48 10/01/2024    CRP 1.30 10/01/2024      Recent Labs   Lab 10/01/24  0607   RBC 3.51*   HGB 11.4*   HCT 35.9*    .3*   MCH 32.5   MCHC 31.8   RDW 21.0   NEPRELIM 3.89   WBC 5.3   .0      Microbiology data:  No results found for this visit on 09/30/24.     Radiology:  I have reviewed all imagining data available independently.   CT lumbar spine with contrast on 9/30:  No significant change since 9/6.  Bony destruction at L5-S1 that is unchanged compared to 9/6.  No epidural collections.    Impression:  Gamaliel Boyer is a 73 year old male with    Lumbosacral discitis  Status post biopsy and cultures back on 9/10  Aerobic and anaerobic cultures held for 3 weeks, remain negative to date  AFB stains and cultures along with fungal stains and cultures are all negative thus far  He was discharged on 6 weeks of IV cefepime and IV vancomycin through 10/18  Now with worsening back pain; however, neurostimulator was also turned off about 10 days ago  Inflammatory markers uptrending as an outpatient, remain elevated but stable here  Antibiotics on hold since 9/27  Now repeat CT lumbar spine is unchanged.  No progression or worsening.  No epidural collection  This argues against failure of therapy  Awaiting spine input  End stage renal disease  On hemodialysis  History of blastomycosis back in the 70s    Recommendations:    Await spine input  If infection is still in question, recommend IR biopsy sending to that foot pathology, aerobic and anaerobic cultures, fungal stains and cultures along with AFB stains and cultures  However, if a biopsy is not pursued, then would recommend restarting IV antibiotics to finish his course as previously planned through 10/18  Continue to monitor daily labs for antibiotic toxicities  Further recommendations will depend on the above work-up and clinical progress     The plan of care was discussed with the primary hospital team, Bahman Recinos DO     Recommendations were also discussed with the patient; all questions were answered.     Thank you for this consultation. Please don't hesitate  to call the ID team for questions or any acute changes in patient's clinical condition.    Please note that this report has been produced using speech recognition software and may contain errors related to that system including, but not limited to, errors in grammar, punctuation, and spelling, as well as words and phrases that possibly may have been recognized inappropriately.  If there are any questions or concerns, contact the dictating provider for clarification.    The  Cent[employer] Cures Act makes medical notes like these available to patients in the interest of transparency. Please be advised this is a medical document. Medical documents are intended to carry relevant information, facts as evident, and the clinical opinion of the practitioner. The medical note is intended as peer to peer communication and may appear blunt or direct. It is written in medical language and may contain abbreviations or verbiage that are unfamiliar.     Taryn Ibanez MD  [facility]. Tel: [phone]. Fax: [phone].     Gamaliel Boyer : 1950 MRN: [id number] [facility]: [id number]

## 2024-10-01 NOTE — CONSULTS
[facility]  Report of Inpatient Wound Care Consultation    Gamaliel Boyer Patient Status:  Inpatient    1950 MRN [id number]   Location [facility] 3SW-A Attending Bahman Recinos, DO   Hosp Day # 1 PCP Nima Sorensen MD     Reason for Consultation:  BLE    History of Present Illness:  Gamaliel Boyer is a a(n) 73 year old male. Patient seen at bedside with fellow wound care nurse.Patient presents with multiple skin issues to bilateral lower extremities.Patient follows up at [facility] 's wound clinic Patient with multiple comorbidities.          History:  Past Medical History:    Anemia    anxiety    back pain    Back problem    Blastomycosis    BPH (benign prostatic hyperplasia)    Depression    Diabetic retinopathy    laser surgery    End stage renal disease (HCC)    Esophageal reflux    occassion    Hearing impairment    hearing aids    HEMORRHOIDS    High blood pressure    High cholesterol    History of blood transfusion    History of renal dialysis    fistula on left arm    Hyperkalemia    hyperlipidemia    hypothyroidism    IMPOTENCE    Liver disease    2013 - pt states all resolved after liver and kidneys shut down after issue with pneumonia     Mood disturbance    Neuropathy    osteoarthritis    Osteoarthrosis, unspecified whether generalized or localized, unspecified site    osteoporosis    Polyneuropathy in diabetes(357.2)    Sepsis (HCC)    Sleep apnea, obstructive    AHI 54 SaO2 lizeth 74 % CPAP 12 HME//     Type I (juvenile type) diabetes mellitus without mention of complication, not stated as uncontrolled    since - DR Mao follows    Unspecified essential hypertension    Visual impairment    wears glasses     Past Surgical History:   Procedure Laterality Date    Back surgery      laminectomy  L1 -4    Dr. [facility]    Back surgery  13    C4-C6 ACDF     Back surgery  16    L2-S1 Revision Decomp possible uninstrumented fusion 16     Back surgery  09/10/2018    Rev C3-C7 ACDF     Cataract Bilateral done in 2004    IOL's    Colonoscopy      2006    Colonoscopy  2/2009    normal    Colonoscopy N/A 8/23/2019    adenoma- repeat 1 yr (2 dya prep)    Colonoscopy,biopsy  2/20/09    Performed by ANAM RIVERS at [facility], [facility]    Egd N/A 7/23/2021    EGD & COLONOSCOPY Surgeon: Kimberly, +vik, rpt EGD 3 months, poor prep rpt colon 3 years    Endoscopy, bowel pouch, biopsy      Injection, w/wo contrast, dx/therapeutic substance, epidural/subarachnoid; lumbar/sacral N/A 5/4/2016    Procedure: LUMBAR EPIDURAL;  Surgeon: Jayden Norton MD;  Location: [facility]    Injection, w/wo contrast, dx/therapeutic substance, epidural/subarachnoid; lumbar/sacral N/A 5/25/2016    Procedure: LUMBAR EPIDURAL;  Surgeon: Jayden Norton MD;  Location: [facility]    Injection, w/wo contrast, dx/therapeutic substance, epidural/subarachnoid; lumbar/sacral N/A 6/8/2016    Procedure: LUMBAR EPIDURAL;  Surgeon: Jayden Norton MD;  Location: [facility]    Knee replacement surgery  2/14/13    Left TKR by Dr. Lombardi    M-seda by  phys perfrmg svc 5+ yr N/A 5/4/2016    Procedure: LUMBAR EPIDURAL;  Surgeon: Jayden Norton MD;  Location: [facility]    M-seda by  phys perfrmg svc 5+ yr N/A 5/25/2016    Procedure: LUMBAR EPIDURAL;  Surgeon: Jayden Norton MD;  Location: [facility]    M-sedaj by  phys perfrmg svc 5+ yr N/A 6/8/2016    Procedure: LUMBAR EPIDURAL;  Surgeon: Jayden Norton MD;  Location: [facility]    Other surgical history      Bilateral median nerve release at elbow; 2000    Other surgical history      CTS release bilateral 2000    Other surgical history      Surgical repair fracture left hand 5th digit    Other surgical history      Surgery left heel ligament repair    Other surgical history      Laser surgery for  bilateral retinopathy    Other surgical history      Surgery to left eye for \"weak muscle.\" 2007    Other surgical history      bilat knee repair 9/23/10    Other surgical history  11/2013    lung resection to remove abcess    Other surgical history  10/26/2020    Cystoscopy (Dr. Ribeiro)    Patient documented not to have experienced any of the following events  2/14/2014    Procedure: ;  Surgeon: Kin Hobbs MD;  Location: [facility]    Patient documented not to have experienced any of the following events N/A 5/4/2016    Procedure: LUMBAR EPIDURAL;  Surgeon: Jayden Norton MD;  Location: [facility]    Patient documented not to have experienced any of the following events N/A 5/25/2016    Procedure: LUMBAR EPIDURAL;  Surgeon: Jayden Norton MD;  Location: [facility]    Patient documented not to have experienced any of the following events N/A 6/8/2016    Procedure: LUMBAR EPIDURAL;  Surgeon: Jayden Norton MD;  Location: [facility]    Patient withough preoperative order for iv antibiotic surgical site infection prophylaxis.  2/14/2014    Procedure: ;  Surgeon: Kin Hobbs MD;  Location: [facility]    Patient withough preoperative order for iv antibiotic surgical site infection prophylaxis. N/A 5/4/2016    Procedure: LUMBAR EPIDURAL;  Surgeon: Jayden Norton MD;  Location: [facility]    Patient withough preoperative order for iv antibiotic surgical site infection prophylaxis. N/A 5/25/2016    Procedure: LUMBAR EPIDURAL;  Surgeon: Jayden Norton MD;  Location: [facility]    Patient withough preoperative order for iv antibiotic surgical site infection prophylaxis. N/A 6/8/2016    Procedure: LUMBAR EPIDURAL;  Surgeon: Jayden Norton MD;  Location: [facility]    Total knee replacement Left 2013    Upper gi endoscopy,diagnosis  4/14/16    retained gastric  contents; Cesar    Upper gi endoscopy,remov f.b. N/A 2/14/2014    Procedure: ESOPHAGOGASTRODUODENOSCOPY, POSSIBLE BIOPSY, POSSIBLE POLYPECTOMY 91019;  Surgeon: Kin Hobbs MD;  Location: [facility]      reports that he quit smoking about 38 years ago. His smoking use included cigarettes. He started smoking about 58 years ago. He has a 70 pack-year smoking history. He has never used smokeless tobacco. He reports that he does not currently use alcohol. He reports that he does not use drugs.      Allergies:  @ALLERGY    Laboratory Data:    Recent Labs   Lab 10/01/24  0357 10/01/24  0535 10/01/24  0607 10/01/24  0811   WBC  --   --  5.3  --    HGB  --   --  11.4*  --    HCT  --   --  35.9*  --    PLT  --   --  170.0  --    CREATSERUM  --   --  6.09*  --    BUN  --   --  45*  --    GLU  --   --  211*  --    CA  --   --  9.6  --    ESRML  --   --  48*  --    CRP  --   --  1.30*  --    PGLU 188* 217*  --  201*         ASSESSMENT:  Wound 10/20/23 Foot Left;Medial (Active)   Date First Assessed/Time First Assessed: 10/20/23 0800   Present on Original Admission: Yes  Primary Wound Type: (c)   Location: Foot  Wound Location Orientation: Left;Medial  Wound Description (Comments): on left bunion      Assessments 10/1/2024 10:55 AM   Wound Image     Drainage Amount None   Wound Length (cm) 5 cm   Wound Width (cm) 2.8 cm   Wound Surface Area (cm^2) 14 cm^2   Wound Depth (cm) 0.4 cm   Wound Volume (cm^3) 5.6 cm^3   Margins Epibole (Rolled edges)   Non-staged Wound Description Full thickness   Viv-wound Assessment Edema   Wound Bed Slough (%) 50 %   Wound Odor None   Shape About 50 % intact skin included in the measurement       Wound 10/20/23 Arm Left (Active)   Date First Assessed/Time First Assessed: 10/20/23 1106   Primary Wound Type: Incision  Location: Arm  Wound Location Orientation: Left      Assessments 10/1/2024 11:00 AM   Wound Image     Shape No open wound at this time       Wound 10/20/23 Leg Right  (Active)   Date First Assessed/Time First Assessed: 10/20/23 1223   Primary Wound Type: Incision  Location: Leg  Wound Location Orientation: Right      Assessments 10/1/2024 10:57 AM   Wound Image     Drainage Amount None   Wound Length (cm) 0.5 cm   Wound Width (cm) 0.4 cm   Wound Surface Area (cm^2) 0.2 cm^2   Wound Depth (cm) 0.1 cm   Wound Volume (cm^3) 0.02 cm^3   Wound Healing % 99   Margins Well-defined edges   Non-staged Wound Description Full thickness   Viv-wound Assessment Intact   Wound Bed Slough (%) 100 %       Wound 10/20/23 Toe (Comment which one) Dorsal;Left (Active)   Date First Assessed/Time First Assessed: 10/20/23 1825   Location: Toe (Comment which one)  Wound Location Orientation: Dorsal;Left  Wound Description (Comments): dorsal aspect of left 2nd toe      Assessments 10/1/2024 10:53 AM   Wound Image     Drainage Amount None   Wound Length (cm) 0.6 cm   Wound Width (cm) 0.5 cm   Wound Surface Area (cm^2) 0.3 cm^2   Wound Depth (cm) 0.1 cm   Wound Volume (cm^3) 0.03 cm^3   Margins Well-defined edges   Non-staged Wound Description Full thickness   Viv-wound Assessment Edema   Wound Bed Slough (%) 100 %   Wound Odor None       Wound 09/30/24 Tibial Left (Active)   Date First Assessed/Time First Assessed: 09/30/24 1319   Present on Original Admission: Yes  Location: Tibial  Wound Location Orientation: Left      Assessments 10/1/2024 11:39 AM   Drainage Amount None       Wound 09/30/24 Foot Dorsal;Right (Active)   Date First Assessed/Time First Assessed: 09/30/24 1320   Present on Original Admission: No  Location: Foot  Wound Location Orientation: Dorsal;Right      Assessments 10/1/2024 11:40 AM   Wound Image     Drainage Amount None   Wound Length (cm) 0 cm   Wound Width (cm) 0 cm   Wound Surface Area (cm^2) 0 cm^2   Wound Depth (cm) 0 cm   Wound Volume (cm^3) 0 cm^3   Wound Odor None   Shape No open wound at this time   Local pulses : weak, palpable dorsalis pedis  Capillary refills > 3  seconds  Temperature : warm     Wound Cleaning and Dressings:  Wound cleansing:  Cleanse with saline  Wound product: Honey gel.Bordered gauze  Dressing change frequency:  Change dressing daily and/or PRN    Compression Therapy:   Elevate leg(s) as much as possible      Miscellaneous/Additional Orders:  Offloading: Turn Q 2 hours and as needed, off load heels to prevent further skin breakdown    Miscellaneous orders: Increase dietary protein intake to promote healing.On dialysis.Dietitian to evaluate amount of protein intake /supplements     Care Summary:  Care Summary: Discussed Plan of Care at beside with patient. Patient verbally acknowledges understanding of all instructions and all questions were answered.      Additional Notes:    Continue to follow up at [facility] wound clinic if discharged to home      Thank you for this consultation and for allowing me to participate in the care of your patient.      Time Spent 30 Minutes.    Thank you,  Fatimah Santos RN BSN [facility]  Wound Care Clinician  [place] Wound Care Team  10/1/2024  11:46 AM

## 2024-10-01 NOTE — IMAGING NOTE
Call placed to SHANKAR Zheng. The following instructions were given:  NPO at midnight.   Hold heparin on 10/2/24.  Continue to hold ASA.  PT/INR STAT with AM labs on 10/2/24.  Accucheck prior to leaving the floor for procedure.  Call hospitalist for insulin pump orders.  Radiology to obtain consent.  Send CPAP with patient to procedure.  Procedure scheduled for 10/2/24 at 09:15.

## 2024-10-01 NOTE — PLAN OF CARE
Pt A&Ox4, VSS on RA. Tele NSR. Heparin. Pain managed with PO pain meds. Dressing to BLE C/D/I. Up min assist with the rolling walker (uses walker or wheelchair at home). Anuric - HD patient. Plan for lumbar CT, woundcare to see, HD tomorrow. Call light in reach, safety measures in place.

## 2024-10-01 NOTE — CM/SW NOTE
10/01/24 1400   CM/SW Referral Data   Referral Source Social Work (self-referral)   Reason for Referral Discharge planning   Informant EMR;Clinical Staff Member   Patient Info   Patient's Current Mental Status at Time of Assessment Alert;Oriented   Patient lives with Spouse/Significant other   Patient Status Prior to Admission   Services in place prior to admission Home Health Care;Dialysis   Home Health Provider Info [facility]   Dialysis Clinic [facility]  ([facility])   Scheduled Dialysis days T-TH-SAT   Discharge Needs   Anticipated D/C needs Home health care;To be determined       Order received for [facility] services.  Pt is a 74 y/o man with history of ESRD on HD currently admitted with discitis.  Pt is familiar to me from recent hospital stay last month. At that time pt was discharged to home with his wife and [facility] services through [facility].  Pt received IV abx with HD at clinic.    Contacted [facility] and confirmed pt is current with them for services.  Referral with resumption order sent to them via [facility].  SW to follow pt's progress for any other DC needs.  / to remain available for support and/or discharge planning.     Jessica Avery, [facility]  Discharge Planner  [phone]

## 2024-10-01 NOTE — PLAN OF CARE
Patient A&Ox4, lethargic, states he got no sleep last night. VSS on RA, . PRN midodrine given pre HD. Tolerated well. 1L removed per dialysis. Wound care consult complete, see orders. Insulin pump changed today and intact. Plan to be NPO at midnight for CT biopsy of L5-S1 disc. Wife updated on POC. Plan of care reviewed with patient. Patient demonstrates understanding.

## 2024-10-01 NOTE — PROGRESS NOTES
Blood sugar 74, pt eating dinner now.  Endorse to noc RN that MD did not place order for insulin to be charted at meal times per pt's pump.  She will page MD.

## 2024-10-01 NOTE — PROGRESS NOTES
Gamaliel Boyer is a 73 year old male, who comes back today to discuss his LBP to his left buttock, and down his posterior thigh to his foot. Denies saddle anesthesia or incontinence. His pain has been worse since having his SCS removed.  He was admitted to have CT guided biopsy of the L5-S1 Disc space.  No new numbness, or weakness.     VAS Pain Score: /10    Aggravating Factors: Relieving Factors:   Sitting  Standing  walking  Car rides  bending sitting     Past Treatment Attempted/Patient’s Response:    Past Medical History:   Diagnosis Date   ANXIETY   BACK PAIN   Blastomycosis 1984   BPH (benign prostatic hyperplasia)   DEPRESSION   DIABETES dx at age 12   Type 1 DM   Diabetic retinopathy   laser surgery   Disorder of kidney and ureter   Kidneys shut down but started working again. No dialysis. Stage 3   Esophageal reflux   occassion   Hearing impairment   hearing aids   HEMORRHOIDS   History of blood transfusion 2013   Hospitalization or health care facility admission within last 6 months   this was in 2013   HYPERLIPIDEMIA   HYPERTENSION   HYPOTHYROIDISM   IMPOTENCE   Kidney disease   ARF-2013   Liver disease   2013 - pt states all resolved after liver and kidneys shut down after issue with pneumonia   Neuropathy   Obstructive sleep apnea 09/27/2023   [place] HST AHI 22.7   OSTEOARTHRITIS   Osteoarthrosis, unspecified whether generalized or localized, unspecified site   OSTEOPOROSIS   OTHER DISEASES   in 2013-pt had pneumonia, sepsis, dialysis, peg tube, vent, now resolved.   Other disorders of plasma-protein metabolism, not elsewhere classified 07/03/2023   Polyneuropathy in diabetes(357.2)   SLEEP APNEA   Uses his CPAP machine consistently   Sleep apnea, obstructive SPLIT NIGHT 5-12-17   AHI 54 SaO2 lizeth 74 % CPAP 12 HME//   Type I (juvenile type) diabetes mellitus without mention of complication, not stated as uncontrolled   since 1963- DR Mao follows   Unspecified disorder of thyroid   Unspecified  essential hypertension   Visual impairment   wears glasses     Past Surgical History:   Procedure Laterality Date   BACK SURGERY 2009   laminectomy L1 -4 2/09 Dr. Eitan Guerrero [facility]   BACK SURGERY 8-8-13   C4-C6 ACDF   BACK SURGERY 9/8/16   L2-S1 Revision Decomp possible uninstrumented fusion 9/8/16   BACK SURGERY 09/10/2018   Rev C3-C7 ACDF   CATARACT Bilateral done in 2004   IOL's   COLONOSCOPY   2006   COLONOSCOPY 2/2009   normal   COLONOSCOPY N/A 8/23/2019   adenoma- repeat 1 yr (2 dya prep)   COLONOSCOPY,BIOPSY 2/20/09   Performed by ANAM RIVERS at [facility] [place]   EGD N/A 7/23/2021   EGD & COLONOSCOPY Surgeon: Kimberly, +vik, rpt EGD 3 months, poor prep rpt colon 3 years   ENDOSCOPY, BOWEL POUCH, BIOPSY   INJECTION, W/WO CONTRAST, DX/THERAPEUTIC SUBSTANCE, EPIDURAL/SUBARACHNOID; LUMBAR/SACRAL N/A 5/4/2016   Procedure: LUMBAR EPIDURAL; Surgeon: Jayden Norton MD; Location: [facility]   INJECTION, W/WO CONTRAST, DX/THERAPEUTIC SUBSTANCE, EPIDURAL/SUBARACHNOID; LUMBAR/SACRAL N/A 5/25/2016   Procedure: LUMBAR EPIDURAL; Surgeon: Jayden Norton MD; Location: [facility]   INJECTION, W/WO CONTRAST, DX/THERAPEUTIC SUBSTANCE, EPIDURAL/SUBARACHNOID; LUMBAR/SACRAL N/A 6/8/2016   Procedure: LUMBAR EPIDURAL; Surgeon: Jayden Norton MD; Location: [facility]   KNEE REPLACEMENT SURGERY 2/14/13   Left TKR by Dr. Lombardi   M-SEDAJ BY  PHYS PERFRMG SVC 5+ YR N/A 5/4/2016   Procedure: LUMBAR EPIDURAL; Surgeon: Jayden Norton MD; Location: [facility]   M-SEDAJ BY  PHYS PERFRMG SVC 5+ YR N/A 5/25/2016   Procedure: LUMBAR EPIDURAL; Surgeon: Jayden Norton MD; Location: [facility]   M-SEDAJ BY  PHYS PERFRMG SVC 5+ YR N/A 6/8/2016   Procedure: LUMBAR EPIDURAL; Surgeon: Jayden Norton MD; Location: [facility]   OTHER SURGICAL HISTORY   Bilateral median nerve release at elbow; 2000   OTHER  SURGICAL HISTORY   CTS release bilateral 2000   OTHER SURGICAL HISTORY   Surgical repair fracture left hand 5th digit   OTHER SURGICAL HISTORY   Surgery left heel ligament repair   OTHER SURGICAL HISTORY   Laser surgery for bilateral retinopathy   OTHER SURGICAL HISTORY   Surgery to left eye for \"weak muscle.\" 2007   OTHER SURGICAL HISTORY   bilat knee repair 9/23/10   OTHER SURGICAL HISTORY 11/2013   lung resection to remove abcess   OTHER SURGICAL HISTORY 10/26/2020   Cystoscopy (Dr. Ribeiro)   PATIENT DOCUMENTED NOT TO HAVE EXPERIENCED ANY OF THE FOLLOWING EVENTS 2/14/2014   Procedure: ; Surgeon: Kin Hobbs MD; Location: [facility]   PATIENT DOCUMENTED NOT TO HAVE EXPERIENCED ANY OF THE FOLLOWING EVENTS N/A 5/4/2016   Procedure: LUMBAR EPIDURAL; Surgeon: Jayden Norton MD; Location: [facility]   PATIENT DOCUMENTED NOT TO HAVE EXPERIENCED ANY OF THE FOLLOWING EVENTS N/A 5/25/2016   Procedure: LUMBAR EPIDURAL; Surgeon: Jayden Norton MD; Location: [facility]   PATIENT DOCUMENTED NOT TO HAVE EXPERIENCED ANY OF THE FOLLOWING EVENTS N/A 6/8/2016   Procedure: LUMBAR EPIDURAL; Surgeon: Jayden Norton MD; Location: [facility]   PATIENT WITHOUGH PREOPERATIVE ORDER FOR IV ANTIBIOTIC SURGICAL SITE INFECTION PROPHYLAXIS. 2/14/2014   Procedure: ; Surgeon: Kin Hobbs MD; Location: [facility]   PATIENT WITHOUGH PREOPERATIVE ORDER FOR IV ANTIBIOTIC SURGICAL SITE INFECTION PROPHYLAXIS. N/A 5/4/2016   Procedure: LUMBAR EPIDURAL; Surgeon: Jayden Norton MD; Location: [facility]   PATIENT WITHOUGH PREOPERATIVE ORDER FOR IV ANTIBIOTIC SURGICAL SITE INFECTION PROPHYLAXIS. N/A 5/25/2016   Procedure: LUMBAR EPIDURAL; Surgeon: Jayden Norton MD; Location: [facility]   PATIENT WITHOUGH PREOPERATIVE ORDER FOR IV ANTIBIOTIC SURGICAL SITE INFECTION PROPHYLAXIS. N/A 6/8/2016   Procedure: LUMBAR  EPIDURAL; Surgeon: Jayden Norton MD; Location: [facility]   TOTAL KNEE REPLACEMENT Left 2013   UPPER GI ENDOSCOPY,DIAGNOSIS 16   retained gastric contents; Cesar   UPPER GI ENDOSCOPY,REMOV F.B. N/A 2014   Procedure: ESOPHAGOGASTRODUODENOSCOPY, POSSIBLE BIOPSY, POSSIBLE POLYPECTOMY 63621; Surgeon: Kin Hobbs MD; [facility]     Family History   Problem Relation Age of Onset   Hypertension Mother   Hypertension Father   Lipids Father   Cancer Father   Heart Disorder Brother   CAD with MI at age 53 - passed away   Obesity Brother   Diabetes Brother   Type 2 DM   Hypertension Brother   Lipids Brother   Psychiatric Maternal Grandmother   Diabetes Paternal Grandfather   Type 2 DM   Thyroid Disorder Neg     Social History  Socioeconomic History  Marital status:   Number of children: 1  Occupational History  Occupation: Retired--Printer  Tobacco Use  Smoking status: Former  Packs/day: 0.00  Years: 3.5 packs/day for 20.0 years (70.0 ttl pk-yrs)  Types: Cigarettes  Start date: 1966  Quit date: 1986  Years since quittin.7  Smokeless tobacco: Never  Tobacco comments: Quit 25 years ago  Vaping Use  Vaping status: Never Used  Substance and Sexual Activity  Alcohol use: Yes  Comment: will have 1 serving per month  Drug use: No  Sexual activity: Yes  Partners: Female  Other Topics  Concerns:   Service: No  Blood Transfusions: No  Caffeine Concern: Yes  coffee 2 cups per day  Sleep Concern: No  Exercise: Yes  walking  Seat Belt: Yes    Current Medications:  Current Outpatient Medications   Medication Sig Dispense Refill   lidocaine-prilocaine 2.5-2.5 % External Cream Apply topically once daily prior to dialysis 30 g 2   ezetimibe 10 MG Oral Tab Take 1 tablet (10 mg total) by mouth daily. 90 tablet 3   levothyroxine 150 MCG Oral Tab Take 1 tablet by mouth daily. 90 tablet 3   lidocaine-prilocaine 2.5-2.5 % External Cream Apply topically once  daily prior to dialysis 30 g 2   prednisoLONE 1 % Ophthalmic Suspension Place 1 drop into the right eye 4 (four) times daily. 5 mL 1   moxifloxacin 0.5 % Ophthalmic Solution 1 drop to od, t.I.d. 3 mL 1   bacitracin-polymyxin b Ophthalmic Ointment Apply 1 inch to od , 2-3 times a day 3.5 g 1   Glucagon, rDNA, (GLUCAGON EMERGENCY) 1 MG Injection Kit Inject 1 mg into the skin as needed. 1 kit 2   Insulin Lispro 100 UNIT/ML Injection Solution Inject 50 Units into the skin daily. E10.65 Via insulin pump. MDD of 50 units Pt is on tandem insulin pump 50 mL 3   NYSTATIN 760978 UNIT/GM External Powder Apply twice daily up to 2-4 weeks as needed. 30 g 0   gabapentin 300 MG Oral Cap Take 1 capsule (300 mg total) by mouth 2 (two) times a day. 180 capsule 3   lactulose 10 GM/15ML Oral Solution 2 TBSP daily 2700 mL 0   B Complex-C-Zn-Folic Acid (DIALYVITE/ZINC) Oral Tab Take 1 tablet by mouth.   silver sulfADIAZINE 1 % External Cream Apply 1 Application topically daily.   HYDROcodone-acetaminophen 5-325 MG Oral Tab Take 1 tablet by mouth every 8 (eight) hours as needed for Pain. 90 tablet 0   Insulin Glargine, 2 Unit Dial, (TOUJEO MAX SOLOSTAR) 300 UNIT/ML Subcutaneous Solution Pen-injector Inject into the skin.   ZINC OR Use as directed in the mouth or throat.   Ascorbic Acid (VITAMIN C) 500 MG Oral Cap Take by mouth.   B Complex-C-Folic Acid (RENAL VITAMIN) Oral Tab Take 1 tablet by mouth daily. 90 tablet 3   furosemide 40 MG Oral Tab Take 1 tablet by mouth.   sertraline 25 MG Oral Tab One po q evening with dinner 90 tablet 3   ipratropium 0.03 % Nasal Solution 2 sprays by Nasal route every 12 (twelve) hours. 30 mL 1   aspirin 81 MG Oral Tab EC Take 81 mg by mouth daily.   Sevelamer 800 MG Oral Tab Take 800 mg by mouth 3 (three) times daily with meals.   famotidine 20 MG Oral Tab Take 1 tablet (20 mg total) by mouth 2 (two) times daily. 180 tablet 3   Insulin Pen Needle (PEN NEEDLES) 31G X 6 MM Does not apply Misc Use to inject  Hermannjeo daily 90 each 1   Insulin Syringe 29G X 1/2\" 0.3 ML Does not apply Misc Use with meals incase of pump failure. 3 times daily 100 each 1   vitamin E 1000 UNITS Oral Cap Take 1,000 Units by mouth daily.   PREVIDENT 5000 DRY MOUTH 1.1 % Dental Gel APPLY 2 TO 3 TIMES DAILY AS NEEDED FOR DRY MOUTH. Strength: 1.1 % 100 g 2   Glucose Blood (CONTOUR NEXT TEST) In Vitro Strip Check BG 3 times daily, Type1 diabetes,insulin dependent on insulin pump. Patient needs to calibrate CGM and verify BG if <70 300 strip 0   omega-3 fatty acids 1000 MG Oral Cap Take 1,000 mg by mouth daily.       Narrative   PROCEDURE:  CT SPINE LUMBAR(CONTRAST ONLY) (CPT=72132)     COMPARISON:  ELIZABETH OLGUIN, CT SPINE LUMBAR(CONTRAST ONLY) (CPT=72132), 9/06/2024, 8:19 PM.     INDICATIONS:  Follow up on discitis, worsening back pain     TECHNIQUE:  Multi-planar CT images were created with intravenous contrast.  Dose reduction techniques were used. Dose information is transmitted to the ACR (American College of Radiology) NRDR (National Radiology Data Registry) which includes the Dose  Index Registry.     PATIENT STATED HISTORY:(As transcribed by Technologist)  Follow up on discitis, worsening back pain. Patient unable to provide history      CONTRAST USED:  80cc of Isovue 370     FINDINGS:  Motion degraded examination.     There is bony destruction at the L5-S1 endplates again concerning for discitis/osteomyelitis.  This has a similar appearance since 9/6/2024.  Continued clinical and imaging follow-up is recommended.  There is air within the L5-S1 disc space.     There are partially visualized spinal canal electrodes.  These enter the spinal canal at the T11-T12 level dorsally.     There is lumbar lordosis.  Assessment of the spinal canal is limited secondary to CT technique.  Laminectomy changes are again noted at the lower lumbar spine.     Moderate degenerative changes in the lumbar spine, not significantly changed since 9/6/2024.  No epidural  collection is seen.  Heterogeneous soft tissue attenuation within the ventral aspect of the epidural space at L5-S1 could represent disc material  with phlegmon not excluded.  This has a similar appearance since 9/6/2024.     A partially visualized mild amount of ascites is seen.  The kidneys are atrophic.  Scattered atherosclerosis.  Degenerative changes are noted at the SI joints.  Liver demonstrates heterogeneous enhancement.                   Impression   CONCLUSION:  No significant interval change since 9/6/2024.  Bony destruction of the L5-S1 endplates again could represent discitis/osteomyelitis.  The bony destruction is similar in extent since 9/6/2024.  No definitive epidural collection is seen.    There is persistent air within the L5-S1 disc space.  Please see above for further details.        LOCATION:  [id number]        Dictated by (CST): Stromberg, LeRoy, MD on 9/30/2024 at 9:27 PM      Finalized by (CST): Stromberg, LeRoy, MD on 9/30/2024 at 9:36 PM          Inspection: No acute distress. Patient is unable to normal heel walk, normal toe walk.     Coordination: in a wheelchair    ROM: Forward Flexion Pain   Extension Pain     Palpation:  See chart below:  Palpation of Lumbar Area Right   (POS or NEG) Left   (POS or NEG)   Lumbar paraspinals Neg Neg   SIJ Neg POS   PSIS Neg Neg   Trochanteric Bursa Neg Neg     Strength: DTR's:  Left Right Left Right  EHL 0/5 0/5 Patella 2+/4 2+/4  DF 1/5 1/5 Achilles 2+/4 2+/4  PF 1/5 2/5  Quad 5/5 5/5  IP 5/5 5/5    Sensation: Sensory deficits noted on bilateral lower extremities to light touch: none    Test Right   (POS or NEG) Left   (POS or NEG)   SLR Neg Neg   Clonus NEG NEG     Calves supple, NT   MUSCULOSKELETAL: Fluid, pain-free ROM to bilateral: ankles, knees & hips      MEDICAL DECISION MAKING:   Impression: Lumbar Spine: Lumbar Spondylosis 721.3   Lumbar Spinal Stenosis 724.02   Possible discitis  Plan: We discussed the diagnosis and treatment options today,  including rationale for surgical vs non-surgical options. Dr. Laird saw the patient, and examined him.  He would like a L5-S1 CT guided biopsy of the disc space.  He would like it sent for Fungal, AFB, Anaerobic, and Aerobic.  Dr. Laird did review the patient with his ID team last week.  Concerns were addressed with the patient.    Restrictions and limitations were reviewed with the patient.  Concerns were addressed.  Questions were encouraged and answered.  Patient voiced understanding.  .Dr. Laird had a lengthy conversation regarding treatment options. He explained what their diagnosis is and what their expectations should be for treatment.  They voiced understanding of their options.  Questions were encouraged and answered regarding the treatment options.          The patient indicates understanding of these issues and agrees to the plan.    Thank you very much.     Respectfully yours,    Mo LOYA

## 2024-10-02 ENCOUNTER — APPOINTMENT (OUTPATIENT)
Dept: CT IMAGING | Facility: HOSPITAL | Age: 74
End: 2024-10-02
Attending: PHYSICIAN ASSISTANT
Payer: MEDICARE

## 2024-10-02 ENCOUNTER — APPOINTMENT (OUTPATIENT)
Dept: WOUND CARE | Age: 74
End: 2024-10-02
Attending: PODIATRIST

## 2024-10-02 PROBLEM — N18.6 ESRD (END STAGE RENAL DISEASE) (HCC): Status: ACTIVE | Noted: 2024-10-02

## 2024-10-02 LAB
GLUCOSE BLD-MCNC: 178 MG/DL (ref 70–99)
GLUCOSE BLD-MCNC: 213 MG/DL (ref 70–99)
GLUCOSE BLD-MCNC: 215 MG/DL (ref 70–99)
GLUCOSE BLD-MCNC: 339 MG/DL (ref 70–99)
GLUCOSE BLD-MCNC: 401 MG/DL (ref 70–99)
INR BLD: 1.2 (ref 0.8–1.2)
PROTHROMBIN TIME: 15.3 SECONDS (ref 11.6–14.8)

## 2024-10-02 PROCEDURE — 99232 SBSQ HOSP IP/OBS MODERATE 35: CPT | Performed by: INTERNAL MEDICINE

## 2024-10-02 PROCEDURE — 10009 FNA BX W/CT GDN 1ST LES: CPT | Performed by: PHYSICIAN ASSISTANT

## 2024-10-02 RX ORDER — FLUMAZENIL 0.1 MG/ML
0.2 INJECTION INTRAVENOUS AS NEEDED
Status: DISCONTINUED | OUTPATIENT
Start: 2024-10-02 | End: 2024-10-02 | Stop reason: HOSPADM

## 2024-10-02 RX ORDER — LIDOCAINE/PRILOCAINE 2.5 %-2.5%
CREAM (GRAM) TOPICAL AS NEEDED
Status: DISCONTINUED | OUTPATIENT
Start: 2024-10-03 | End: 2024-10-04

## 2024-10-02 RX ORDER — NALOXONE HYDROCHLORIDE 0.4 MG/ML
0.08 INJECTION, SOLUTION INTRAMUSCULAR; INTRAVENOUS; SUBCUTANEOUS AS NEEDED
Status: DISCONTINUED | OUTPATIENT
Start: 2024-10-02 | End: 2024-10-02 | Stop reason: HOSPADM

## 2024-10-02 RX ORDER — MIDAZOLAM HYDROCHLORIDE 1 MG/ML
1 INJECTION INTRAMUSCULAR; INTRAVENOUS EVERY 5 MIN PRN
Status: ACTIVE | OUTPATIENT
Start: 2024-10-02 | End: 2024-10-02

## 2024-10-02 RX ORDER — ALBUMIN (HUMAN) 12.5 G/50ML
25 SOLUTION INTRAVENOUS
Status: DISCONTINUED | OUTPATIENT
Start: 2024-10-03 | End: 2024-10-04

## 2024-10-02 RX ORDER — MIDAZOLAM HYDROCHLORIDE 1 MG/ML
INJECTION INTRAMUSCULAR; INTRAVENOUS
Status: DISCONTINUED
Start: 2024-10-02 | End: 2024-10-02 | Stop reason: WASHOUT

## 2024-10-02 RX ORDER — VANCOMYCIN 1.75 GRAM/500 ML IN 0.9 % SODIUM CHLORIDE INTRAVENOUS
25 ONCE
Status: DISCONTINUED | OUTPATIENT
Start: 2024-10-02 | End: 2024-10-02

## 2024-10-02 RX ORDER — SODIUM CHLORIDE 9 MG/ML
INJECTION, SOLUTION INTRAVENOUS CONTINUOUS
Status: DISCONTINUED | OUTPATIENT
Start: 2024-10-02 | End: 2024-10-02 | Stop reason: HOSPADM

## 2024-10-02 RX ORDER — ASPIRIN 81 MG/1
81 TABLET ORAL DAILY
Status: DISCONTINUED | OUTPATIENT
Start: 2024-10-03 | End: 2024-10-04

## 2024-10-02 RX ORDER — ALBUMIN (HUMAN) 12.5 G/50ML
25 SOLUTION INTRAVENOUS
Status: ACTIVE | OUTPATIENT
Start: 2024-10-02 | End: 2024-10-04

## 2024-10-02 NOTE — PLAN OF CARE
Pt A&Ox3, confused and lethargic but follows commands. VSS on RA. PRANEETH w/ CPAP. Tele NSR, Heparin. Up min assist with the walker. NPO @ [facility] for biopsy today. Woundcare per order. Call light in reach, safety measures in place.  *Insulin pump basal rate to be changed to 80% at MN for biopsy at 0915 tomorrow.    23:34: Dr. Guadarrama paged about high blood sugars. Pt confused and not properly managing insulin via insulin pump. Advised to turn off and remove insulin pump and administer 5 units of novolog and 4 units of tresiba. Pt to continue following sliding scale. Wife Ansley was updated on plan of care, agreed with removal of pump and following sliding scale. Pump has been labeled and placed in pt's closet.

## 2024-10-02 NOTE — PROGRESS NOTES
[place]   part of [facility] Infectious Disease Progress Note    Gamaliel Boyer Patient Status:  Inpatient    1950 MRN [id number]   Location [facility] 3SW-A Attending Bahman Recinos, DO   Hosp Day # 2 PCP Nima Sorensen MD     Subjective:  Pt lethargic on exam. Discussed with family at bedside.     Objective:    Allergies:  Allergies   Allergen Reactions    Antihistamine & Nasal Deconges [Fexofenadine-Pseudoephedrine] OTHER (SEE COMMENTS)     Altered mental status    Adhesive Tape ITCHING    Albuterol DIZZINESS and OTHER (SEE COMMENTS)     Albuterol Inhalation. Lightheadedness    Benadryl [Diphenhydramine] RESTLESSNESS    Depakote [Valproic Acid] DIZZINESS    Fluconazole OTHER (SEE COMMENTS)     Kidney labs abnormal    Mirtazapine RESTLESSNESS    Quetiapine RESTLESSNESS    Wellbutrin [Bupropion] RASH    Gabapentin OTHER (SEE COMMENTS)     Severe jerking motions    Latex RASH       Medications:    Current Facility-Administered Medications:     sodium chloride 0.9% infusion, , Intravenous, Continuous    midazolam (Versed) 2 MG/2ML injection 1 mg, 1 mg, Intravenous, Q5 Min PRN    fentaNYL (Sublimaze) 50 mcg/mL injection 50 mcg, 50 mcg, Intravenous, Q5 Min PRN    naloxone (Narcan) 0.4 MG/ML injection 0.08 mg, 0.08 mg, Intravenous, PRN    flumazenil (Romazicon) 0.5 mg/5mL injection 0.2 mg, 0.2 mg, Intravenous, PRN    bacitracin-polymyxin b (Polysporin) ophthalmic ointment 1 inch, 1 inch, Right Eye, TID    influenza virus trivalent high dose PF (Fluzone HD) 0.5 mL injection (ages >/= 65 years) 0.5 mL, 0.5 mL, Intramuscular, Prior to discharge    acetaminophen (Tylenol Extra Strength) tab 1,000 mg, 1,000 mg, Oral, q6h    lidocaine-menthol 4-1 % patch 2 patch, 2 patch, Transdermal, Daily    HYDROmorphone (Dilaudid) 1 MG/ML injection 0.2 mg, 0.2 mg, Intravenous, Q3H PRN **OR** HYDROmorphone (Dilaudid) 1 MG/ML injection 0.4 mg, 0.4 mg, Intravenous, Q3H PRN **OR** HYDROmorphone  (Dilaudid) 1 MG/ML injection 0.8 mg, 0.8 mg, Intravenous, Q3H PRN    insulin aspart (NovoLOG) 100 Units/mL FlexPen 1-5 Units, 1-5 Units, Subcutaneous, TID AC and HS    insulin degludec (Tresiba) 100 units/mL flextouch 4 Units, 4 Units, Subcutaneous, Nightly    cholecalciferol (Vitamin D3) tab 2,000 Units, 2,000 Units, Oral, Daily    ezetimibe (Zetia) tab 10 mg, 10 mg, Oral, Daily    famotidine (Pepcid) tab 20 mg, 20 mg, Oral, BID    lactulose (CHRONULAC) 10 GM/15ML solution 20 g, 20 g, Oral, QPM    levothyroxine (Synthroid) tab 150 mcg, 150 mcg, Oral, QAM AC    midodrine (ProAmatine) tab 5 mg, 5 mg, Oral, Daily PRN    ciprofloxacin (Ciloxan) 0.3 % ophthalmic solution 1 drop, 1 drop, Right Eye, TID    prednisoLONE (Pred Forte) 1 % ophthalmic suspension 1 drop, 1 drop, Right Eye, QID    sertraline (Zoloft) tab 25 mg, 25 mg, Oral, Daily    sevelamer carbonate (Renvela) tab 800 mg, 800 mg, Oral, TID AC    vitamin E (Alpha-E) cap 1,000 Units, 1,000 Units, Oral, Daily    HYDROcodone-acetaminophen (Norco)  MG per tab 1 tablet, 1 tablet, Oral, Q4H PRN    ondansetron (Zofran) 4 MG/2ML injection 4 mg, 4 mg, Intravenous, Q6H PRN    acetaminophen (Tylenol) tab 650 mg, 650 mg, Oral, Q6H PRN    sodium chloride 0.9 % IV bolus 100 mL, 100 mL, Intravenous, Q30 Min PRN **AND** albumin human (Albumin) 25% injection 25 g, 25 g, Intravenous, PRN Dialysis    heparin (Porcine) 5000 UNIT/ML injection 5,000 Units, 5,000 Units, Subcutaneous, Q8H RIKKI    silver sulfADIAZINE (Silvadene) 1 % cream, , Topical, Daily    glucose (Dex4) 15 GM/59ML oral liquid 15 g, 15 g, Oral, Q15 Min PRN **OR** glucose (Glutose) 40% oral gel 15 g, 15 g, Oral, Q15 Min PRN **OR** glucose-vitamin C (Dex-4) chewable tab 4 tablet, 4 tablet, Oral, Q15 Min PRN **OR** dextrose 50% injection 50 mL, 50 mL, Intravenous, Q15 Min PRN **OR** glucose (Dex4) 15 GM/59ML oral liquid 30 g, 30 g, Oral, Q15 Min PRN **OR** glucose (Glutose) 40% oral gel 30 g, 30 g, Oral, Q15 Min  PRN **OR** glucose-vitamin C (Dex-4) chewable tab 8 tablet, 8 tablet, Oral, Q15 Min PRN    Physical Exam:  General: Alert, orientated x3.  Cooperative.  Lethargic. No apparent distress.  Vital Signs:  Blood pressure 96/51, pulse 60, temperature 98 °F (36.7 °C), temperature source Oral, resp. rate 14, weight 160 lb 8 oz (72.8 kg), SpO2 95%.   Temp (24hrs), Av.1 °F (36.7 °C), Min:97.8 °F (36.6 °C), Max:98.4 °F (36.9 °C)      HEENT: Exam is unremarkable.  Without scleral icterus.  Mucous membranes are moist. PERRLA.  Oropharynx is clear.  Neck: No tenderness to palpitation.  Full range of motion to flexion and extension, lateral rotation and lateral flexion of cervical spine.  No JVD. Supple.   Lungs: Clear to auscultation bilaterally.  Cardiac: Regular rate and rhythm. No murmur.  Abdomen:  Soft, non-distended, non-tender, with no rebound or guarding.   Extremities:  No lower extremity edema noted.  Without clubbing or cyanosis.    Skin: Normal texture and turgor.  Neurologic: Cranial nerves are grossly intact.  Motor strength and sensory examination is grossly normal.  No focal neurologic deficit.    Labs:  Lab Results   Component Value Date    INR 1.20 10/02/2024         Assessment/Plan:    1.  Discitis  -s/p biopsy with culture on 9/10, cultures NGTD  -dc on IV cefepime and vancomycin x 6 weeks through 10/18  -readmitted with worsening back pain (neurostimulator turned off ~10 days PTA)  -also with worsening inflammatory markers  -repeat CT essentially unchanged  -s/p biopsy today  -abx on hold since 24  2.  Hx of blastmycosis in 1970s  3.  Hx of MSSA groin abscess  -s/p I&D in OR on 24  -on IV vancomycin  4.  Hx of Bilateral DFU with OM  -hx of enterobacter, morganella and e coli  -s/p greater toe amputation in 24  -s/p IV abx  5.  ESRD on HD    If you have any questions or concerns please call [facility] [place] Infectious Disease at [phone].     Earl Garcia, APRN

## 2024-10-02 NOTE — PROGRESS NOTES
[place]   part of [facility] Hospitalist Progress Note     Gamaliel Boyer Patient Status:  Inpatient    1950 MRN [id number]   Location [facility] 3SW-A Attending Bahman Recinos, DO   Hosp Day # 2 PCP Nima Sorensen MD     Assessment & Plan:    73 yr old male with PMH sig for ESRD on HD, DM 1 on insulin pump, HLD, HTN, LUGO cirrhosis, and spinal stenosis s/p spinal cord stimulator who presents as a direct admission due to concern for osteomyelitis.    Lumbosacral discitis   - s/p biopsy and cultures 9/10/24, neg  - s/p IV cefepime and vanco with HD, until 24  - CT L Spine: no significant change since  study, bony destruction of L5/S1 end plates could represent discitis/OM, similar in extent since , no epidural collection noted  - ID and spine c/s appreciated, ortho pursuing bx of L5/S1 via CT guidance, planned for today, will follow results  - NPO  - Holding off on abx until completion of bx  - PRN pain medications adjusted     # ESRD on HD  - renal c/s appreciated  - resume HD per renal     # DM type 1 on insulin pump  - switched to basal bolus regimen due to hyperglycemia overnight, will continue for now and consult diabetic aprn tomorrow for transition back to insulin pump, 4u tresiba and LDISS     # LUGO cirrhosis   - compensated   - cont lactulose      # Hypothyroidism   - cont levothyroxine      # Major depression, recurrent   - cont zoloft      # LE wounds, chronic   - follows with wound clinic  - wound RN c/s    DVT Ppx: SQH  Code: Full  Lucero: None    Subjective:     Patient was attempted to be seen and examined this morning prior to Bx however had already gone down prior to my evaluation. Will attempt again later today    Vital signs:  Temp:  [97.8 °F (36.6 °C)-98.4 °F (36.9 °C)] 98 °F (36.7 °C)  Pulse:  [57-76] 60  Resp:  [11-22] 15  BP: ()/(37-65) 96/51  SpO2:  [92 %-100 %] 95 %    Physical Exam:    Deferred    Diagnostic Data:     Pertinent Labs:  Reviewed    Imaging Studies:  CT L- Spine Noted    Bahman Recinos D.O.  [facility] Hospitalist

## 2024-10-02 NOTE — PLAN OF CARE
PT A&Ox4. Lethargic but answers questions appropriately. VSS on RA. SPB in 90s, nephro made aware, states no intervention unless SPB less than 80. Mild pain in the left leg reported, treated with lidocaine and tylenol. CT Biopsy completed, dressing C/D/I. Endocrine consulted, will see tomorrow. IV ABX to begin after HD per ID. Cultures pending. Wife present at bedside and updated on POC. HD scheduled for 10/3.

## 2024-10-02 NOTE — H&P
[facility]   part of [facility]   History & Physical    Gamaliel Boyer Patient Status:  Inpatient    1950 MRN [id number]   Location [facility] 3SW-A Attending Bahman Recinos, DO   Hosp Day # 2 PCP Nima Sorensen MD     Admitting Diagnosis:   Back pain    History of Present Illness:   74 yo M back pain possible discitis presents for repeat aspiration at L5-S1 region.    History   Past Medical History:  Past Medical History:    Anemia    anxiety    back pain    Back problem    Blastomycosis    BPH (benign prostatic hyperplasia)    Depression    Diabetic retinopathy    laser surgery    End stage renal disease (HCC)    Esophageal reflux    occassion    Hearing impairment    hearing aids    HEMORRHOIDS    High blood pressure    High cholesterol    History of blood transfusion    History of renal dialysis    fistula on left arm    Hyperkalemia    hyperlipidemia    hypothyroidism    IMPOTENCE    Liver disease     - pt states all resolved after liver and kidneys shut down after issue with pneumonia     Mood disturbance    Neuropathy    osteoarthritis    Osteoarthrosis, unspecified whether generalized or localized, unspecified site    osteoporosis    Polyneuropathy in diabetes(357.2)    Sepsis (HCC)    Sleep apnea, obstructive    AHI 54 SaO2 lizeth 74 % CPAP 12 HME//     Type I (juvenile type) diabetes mellitus without mention of complication, not stated as uncontrolled    since - DR Mao follows    Unspecified essential hypertension    Visual impairment    wears glasses       Past Surgical History:  Past Surgical History:   Procedure Laterality Date    Back surgery      laminectomy  L1 -4    Dr. [facility]    Back surgery  13    C4-C6 ACDF     Back surgery  16    L2-S1 Revision Decomp possible uninstrumented fusion 16    Back surgery  09/10/2018    Rev C3-C7 ACDF     Cataract Bilateral done in     IOL's    Colonoscopy          Colonoscopy  2009     normal    Colonoscopy N/A 8/23/2019    adenoma- repeat 1 yr (2 dya prep)    Colonoscopy,biopsy  2/20/09    Performed by ANAM RIVERS at [facility] [place], [place] SURGICAL [place], [facility]    Egd N/A 7/23/2021    EGD & COLONOSCOPY Surgeon: Kimberly, +vik, rpt EGD 3 months, poor prep rpt colon 3 years    Endoscopy, bowel pouch, biopsy      Injection, w/wo contrast, dx/therapeutic substance, epidural/subarachnoid; lumbar/sacral N/A 5/4/2016    Procedure: LUMBAR EPIDURAL;  Surgeon: Jayden Norton MD;  Location: [facility]    Injection, w/wo contrast, dx/therapeutic substance, epidural/subarachnoid; lumbar/sacral N/A 5/25/2016    Procedure: LUMBAR EPIDURAL;  Surgeon: Jayden Norton MD;  Location: [facility] PAIN MANAGEMENT    Injection, w/wo contrast, dx/therapeutic substance, epidural/subarachnoid; lumbar/sacral N/A 6/8/2016    Procedure: LUMBAR EPIDURAL;  Surgeon: Jayden Norton MD;  Location: [facility]    Knee replacement surgery  2/14/13    Left TKR by Dr. Lombardi    M-sedaj by  phys perfrmg svc 5+ yr N/A 5/4/2016    Procedure: LUMBAR EPIDURAL;  Surgeon: Jayden Norton MD;  Location: [facility]    M-sedaj by  phys perfrmg svc 5+ yr N/A 5/25/2016    Procedure: LUMBAR EPIDURAL;  Surgeon: Jayden Norton MD;  Location: [facility]    M-seda by  phys perfrmg svc 5+ yr N/A 6/8/2016    Procedure: LUMBAR EPIDURAL;  Surgeon: Jayden Norton MD;  Location: [facility]    Other surgical history      Bilateral median nerve release at elbow; 2000    Other surgical history      CTS release bilateral 2000    Other surgical history      Surgical repair fracture left hand 5th digit    Other surgical history      Surgery left heel ligament repair    Other surgical history      Laser surgery for bilateral retinopathy    Other surgical history      Surgery to left eye for \"weak muscle.\" 2007    Other surgical history      bilat knee repair  9/23/10    Other surgical history  11/2013    lung resection to remove abcess    Other surgical history  10/26/2020    Cystoscopy (Dr. Ribeiro)    Patient documented not to have experienced any of the following events  2/14/2014    Procedure: ;  Surgeon: Kin Hobbs MD;  Location: [facility]    Patient documented not to have experienced any of the following events N/A 5/4/2016    Procedure: LUMBAR EPIDURAL;  Surgeon: Jayden Norton MD;  Location: [facility]    Patient documented not to have experienced any of the following events N/A 5/25/2016    Procedure: LUMBAR EPIDURAL;  Surgeon: Jayden Norton MD;  Location: [facility]    Patient documented not to have experienced any of the following events N/A 6/8/2016    Procedure: LUMBAR EPIDURAL;  Surgeon: Jayden Norton MD;  Location: [facility]    Patient withough preoperative order for iv antibiotic surgical site infection prophylaxis.  2/14/2014    Procedure: ;  Surgeon: Kin Hobbs MD;  Location: [facility]    Patient withough preoperative order for iv antibiotic surgical site infection prophylaxis. N/A 5/4/2016    Procedure: LUMBAR EPIDURAL;  Surgeon: Jayden Norton MD;  Location: [facility]    Patient withough preoperative order for iv antibiotic surgical site infection prophylaxis. N/A 5/25/2016    Procedure: LUMBAR EPIDURAL;  Surgeon: Jayden Norton MD;  Location: [facility]    Patient withough preoperative order for iv antibiotic surgical site infection prophylaxis. N/A 6/8/2016    Procedure: LUMBAR EPIDURAL;  Surgeon: Jayden Norton MD;  Location: [facility]    Total knee replacement Left 2013    Upper gi endoscopy,diagnosis  4/14/16    retained gastric contents; Cesar    Upper gi endoscopy,remov f.b. N/A 2/14/2014    Procedure: ESOPHAGOGASTRODUODENOSCOPY, POSSIBLE BIOPSY, POSSIBLE POLYPECTOMY 84362;   Surgeon: Kin Hobbs MD;  Location: [facility]       Social History:  Social History     Tobacco Use    Smoking status: Former     Current packs/day: 0.00     Average packs/day: 3.5 packs/day for 20.0 years (70.0 ttl pk-yrs)     Types: Cigarettes     Start date: 1966     Quit date: 1986     Years since quittin.7    Smokeless tobacco: Never    Tobacco comments:     Quit 25 years ago   Substance Use Topics    Alcohol use: Not Currently     Comment: very rarely        Family History:  Family History   Problem Relation Age of Onset    Hypertension Father     Lipids Father     Cancer Father     Hypertension Mother     Psychiatric Maternal Grandmother     Diabetes Paternal Grandfather         Type 2 DM    Heart Disorder Brother         CAD with MI at age 53 - passed away    Obesity Brother     Diabetes Brother         Type 2 DM    Hypertension Brother     Lipids Brother     Thyroid Disorder Neg        Allergies/Medications:   Allergies:  Allergies   Allergen Reactions    Antihistamine & Nasal Deconges [Fexofenadine-Pseudoephedrine] OTHER (SEE COMMENTS)     Altered mental status    Adhesive Tape ITCHING    Albuterol DIZZINESS and OTHER (SEE COMMENTS)     Albuterol Inhalation. Lightheadedness    Benadryl [Diphenhydramine] RESTLESSNESS    Depakote [Valproic Acid] DIZZINESS    Fluconazole OTHER (SEE COMMENTS)     Kidney labs abnormal    Mirtazapine RESTLESSNESS    Quetiapine RESTLESSNESS    Wellbutrin [Bupropion] RASH    Gabapentin OTHER (SEE COMMENTS)     Severe jerking motions    Latex RASH       Medications:  No current outpatient medications on file.    Physical Exam & Review of Systems:   Physical Exam:    /60 (BP Location: Right arm)   Pulse 61   Temp 98 °F (36.7 °C) (Oral)   Resp 15   Wt 160 lb 8 oz   SpO2 96%   BMI 25.91 kg/m²     General: NAD  Neck: No JVD  Lungs: CTA bilat  Heart: RRR, S1, S2  Abdomen: Soft, NT/ND, BS+x4  Extremities: Warm, dry, no LE edema bilat  Pulses:  2+ bilat DP    Results:   Labs:  Recent Labs   Lab 10/01/24  0607   RBC 3.51*   HGB 11.4*   HCT 35.9*   .3*   MCH 32.5   MCHC 31.8   RDW 21.0   NEPRELIM 3.89   WBC 5.3   .0     Recent Labs   Lab 10/02/24  0452   PTP 15.3*   INR 1.20     Recent Labs   Lab 10/01/24  0607   *   BUN 45*   CREATSERUM 6.09*   CA 9.6   *   K 4.6   CL 96*   CO2 26.0       Assessment/Plan:   Impression: 74 yo M presumed discitis    I have discussed with the patient and/or legal representative the potential benefits, risks, and side effects of this procedure, the likelihood of the patient achieving goals; and the potential problems that might occur during recuperation.  I discussed reasonable alternatives to the procedure, including risks, benefits and side effects related to the alternatives, and risks related to not receiving this procedure.      Recommendations: CT guided biopsy disc or paraspinal tissues at L5-S1    Seymour Jain MD  10/2/2024  9:27 AM

## 2024-10-02 NOTE — PROCEDURES
[facility]   part of [facility]  Procedure Note    Gamaliel Boyer Patient Status:  Inpatient    1950 MRN [id number]   Location [facility] 3SW-A Attending Bahman Recinos, DO   Hosp Day # 2 PCP Nima Sorensen MD     Procedure: CT biopsy L5-S1 disc    Pre-Procedure Diagnosis:  Back pain    Post-Procedure Diagnosis: Same    Anesthesia:  Local and Sedation    Findings:  21g spinal needle to L5-S1 disc space. Approx 1 ml bloody fluid obtained.    Specimens: As above    Blood Loss:  Minimal    Tourniquet Time: None  Complications:  None  Drains:  None    Secondary Diagnosis:  None    Seymour Jain MD  10/2/2024

## 2024-10-02 NOTE — PROGRESS NOTES
[facility]  Nephrology Progress Note    Gamaliel Boyer Patient Status:  Inpatient    1950 MRN [id number]   [facility] 3SW-A Attending Bahman Recinos, DO   Hosp Day # 2 PCP Nima Sorensen MD     Subjective:  No acute events  Drowsy; s/p CT biopsy  L5/S1 disc       Current Facility-Administered Medications:     sodium chloride 0.9% infusion, , Intravenous, Continuous    naloxone (Narcan) 0.4 MG/ML injection 0.08 mg, 0.08 mg, Intravenous, PRN    flumazenil (Romazicon) 0.5 mg/5mL injection 0.2 mg, 0.2 mg, Intravenous, PRN    melatonin cap/tab 10 mg, 10 mg, Oral, Nightly    [START ON 10/3/2024] aspirin DR tab 81 mg, 81 mg, Oral, Daily    bacitracin-polymyxin b (Polysporin) ophthalmic ointment 1 inch, 1 inch, Right Eye, TID    influenza virus trivalent high dose PF (Fluzone HD) 0.5 mL injection (ages >/= 65 years) 0.5 mL, 0.5 mL, Intramuscular, Prior to discharge    acetaminophen (Tylenol Extra Strength) tab 1,000 mg, 1,000 mg, Oral, q6h    lidocaine-menthol 4-1 % patch 2 patch, 2 patch, Transdermal, Daily    HYDROmorphone (Dilaudid) 1 MG/ML injection 0.2 mg, 0.2 mg, Intravenous, Q3H PRN **OR** HYDROmorphone (Dilaudid) 1 MG/ML injection 0.4 mg, 0.4 mg, Intravenous, Q3H PRN **OR** HYDROmorphone (Dilaudid) 1 MG/ML injection 0.8 mg, 0.8 mg, Intravenous, Q3H PRN    insulin aspart (NovoLOG) 100 Units/mL FlexPen 1-5 Units, 1-5 Units, Subcutaneous, TID AC and HS    insulin degludec (Tresiba) 100 units/mL flextouch 4 Units, 4 Units, Subcutaneous, Nightly    cholecalciferol (Vitamin D3) tab 2,000 Units, 2,000 Units, Oral, Daily    ezetimibe (Zetia) tab 10 mg, 10 mg, Oral, Daily    famotidine (Pepcid) tab 20 mg, 20 mg, Oral, BID    lactulose (CHRONULAC) 10 GM/15ML solution 20 g, 20 g, Oral, QPM    levothyroxine (Synthroid) tab 150 mcg, 150 mcg, Oral, QAM AC    midodrine (ProAmatine) tab 5 mg, 5 mg, Oral, Daily PRN    ciprofloxacin (Ciloxan) 0.3 % ophthalmic solution 1 drop, 1 drop, Right Eye, TID     prednisoLONE (Pred Forte) 1 % ophthalmic suspension 1 drop, 1 drop, Right Eye, QID    sertraline (Zoloft) tab 25 mg, 25 mg, Oral, Daily    sevelamer carbonate (Renvela) tab 800 mg, 800 mg, Oral, TID AC    vitamin E (Alpha-E) cap 1,000 Units, 1,000 Units, Oral, Daily    HYDROcodone-acetaminophen (Norco)  MG per tab 1 tablet, 1 tablet, Oral, Q4H PRN    ondansetron (Zofran) 4 MG/2ML injection 4 mg, 4 mg, Intravenous, Q6H PRN    acetaminophen (Tylenol) tab 650 mg, 650 mg, Oral, Q6H PRN    sodium chloride 0.9 % IV bolus 100 mL, 100 mL, Intravenous, Q30 Min PRN **AND** albumin human (Albumin) 25% injection 25 g, 25 g, Intravenous, PRN Dialysis    heparin (Porcine) 5000 UNIT/ML injection 5,000 Units, 5,000 Units, Subcutaneous, Q8H RIKKI    silver sulfADIAZINE (Silvadene) 1 % cream, , Topical, Daily    glucose (Dex4) 15 GM/59ML oral liquid 15 g, 15 g, Oral, Q15 Min PRN **OR** glucose (Glutose) 40% oral gel 15 g, 15 g, Oral, Q15 Min PRN **OR** glucose-vitamin C (Dex-4) chewable tab 4 tablet, 4 tablet, Oral, Q15 Min PRN **OR** dextrose 50% injection 50 mL, 50 mL, Intravenous, Q15 Min PRN **OR** glucose (Dex4) 15 GM/59ML oral liquid 30 g, 30 g, Oral, Q15 Min PRN **OR** glucose (Glutose) 40% oral gel 30 g, 30 g, Oral, Q15 Min PRN **OR** glucose-vitamin C (Dex-4) chewable tab 8 tablet, 8 tablet, Oral, Q15 Min PRN      Objective:  Vital signs: Blood pressure 93/54, pulse 59, temperature 97.9 °F (36.6 °C), temperature source Oral, resp. rate 15, weight 160 lb 8 oz (72.8 kg), SpO2 95%.    General: No acute distress. Awakens to voice- appropriate   HEENT: Moist mucous membranes. EOM-I.   Respiratory: Clear to auscultation bilaterally.  No wheezes.   Cardiovascular: S1, S2.  Regular rate and rhythm.   Neurologic: No focal neurological deficits.  Musculoskeletal:  No swelling noted.  Integument: No lesions. No erythema.  Psychiatric: Appropriate mood and affect.    Recent Labs     10/01/24  0607 10/02/24  0452   WBC 5.3  --    HGB  11.4*  --    .3*  --    .0  --    INR  --  1.20       Recent Labs     10/01/24  0607   *   K 4.6   CL 96*   CO2 26.0   BUN 45*   CREATSERUM 6.09*   CA 9.6       No results for input(s): \"ALT\", \"AST\", \"ALB\", \"AMYLASE\", \"LIPASE\", \"LDH\" in the last 72 hours.    Invalid input(s): \"ALPHOS\", \"TBIL\", \"DBIL\", \"TPROT\"      Assessment/Plan:  1. ESRD: He has end stage renal disease due to long-standing diabetes TTS schedule  - HD tomorrow per routine      2. Possible discitis/OM: Recent admission with c/f discitis, s/p biopsy with no growth of organisms.  Was discharged with IV antibiotics but then began to have worsening pain and inflammatory marker. Now re-admitted due to concern for worsening infection. Per ID and spine surgery - now s/p CT guided bx today      3. LUGO cirrhosis: follows with GI as outpatient. 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 Receives paracentesis every 3-5 weeks, last 9/25     4. Orthostatic hypotension: Continue midodrine with HD     5. Anemia: Due to ESRD. PAYAL for goal hemoglobin 10-11 g/dL, EPO held as hemoglobin at goal.    Thank you for allowing me to participate in this patient's care. Please feel free to call me with any questions or concerns.     Mo Rousseau MD  10/2/2024

## 2024-10-02 NOTE — PROGRESS NOTES
[facility]   part of [facility] Hospitalist Progress Note     Gamaliel Boyer Patient Status:  Inpatient    1950 MRN [id number]   Location [facility] 3SW-A Attending Bahman Recinos, DO   Hosp Day # 1 PCP Nima Sorensen MD     Assessment & Plan:    73 yr old male with PMH sig for ESRD on HD, DM 1 on insulin pump, HLD, HTN, LUGO cirrhosis, and spinal stenosis s/p spinal cord stimulator who presents as a direct admission due to concern for osteomyelitis.    Lumbosacral discitis   - s/p biopsy and cultures 9/10/24, neg  - s/p IV cefepime and vanco with HD, until 24  - CT L Spine: no significant change since  study, bony destruction of L5/S1 end plates could represent discitis/OM, similar in extent since , no epidural collection noted  - ID and spine c/s appreciated, ortho pursuing bx of L5/S1 via CT guidance  - NPO at MN  - Holding off on abx until completion of bx  - PRN pain medications adjusted     # ESRD on HD  - renal c/s appreciated  - resume HD per renal     # DM type 1 on insulin pump  - resume insulin pump, patient to utilize 80% of basal insulin on insulin pump prior to procedure, new rate change to occur at midnight as per Endocrinology, Dr. Royal's, recommendations. Backup recs reviewed from outpatient endo records indications 8u of long acting w/ slide scale as needed, if backup is needed would recommend 50% of home regimen or 4u of glargine prior to procedure.  - will consult endo APRN in the morning in the event they can come prior to procedure, not necessary to proceed     # LUGO cirrhosis   - compensated   - cont lactulose      # Hypothyroidism   - cont levothyroxine      # Major depression, recurrent   - cont zoloft      # LE wounds, chronic   - follows with wound clinic  - wound RN c/s    DVT Ppx: SQH  Code: Full  Lucero: None    Subjective:     Patient seen and examined this morning at bedside. Doing well, having some pain but  controlled this morning, seen sitting up in chair, anxious to speak with ortho team.    Vital signs:  Temp:  [97.7 °F (36.5 °C)-98.4 °F (36.9 °C)] 98.4 °F (36.9 °C)  Pulse:  [61-72] 70  Resp:  [16-17] 16  BP: ()/(40-79) 102/41  SpO2:  [91 %-98 %] 98 %    Physical Exam:    General: No acute distress.   Respiratory: Clear to auscultation bilaterally. No wheezes.  Cardiovascular: S1, S2. Regular rate and rhythm  Abdomen: Soft, nontender, nondistended.  Positive bowel sounds. No rebound or guarding.  Extremities: No edema.LUE AVF noted, chronic skin changes in lower extremities noted  Neuro:  Grossly non focal, no motor deficits.      Diagnostic Data:    Pertinent Labs:  Reviewed    Imaging Studies:  CT L- Spine Noted    Bahman Recinos D.O.  [facility] Hospitalist

## 2024-10-02 NOTE — CONSULTS
[facility] Pharmacy Dosing Service      Initial Pharmacokinetic Consult for Vancomycin Dosing     Gamaliel Boyer is a 73 year old male who is being initiated on vancomycin therapy for osteomyelitis.  Pharmacy has been asked to dose vancomycin by Dr Ibanez.  The initial treatment and monitoring approach will be non-AUC strategy.        Weight and Temperature:    Wt Readings from Last 1 Encounters:   24 72.8 kg (160 lb 8 oz)        Temp Readings from Last 1 Encounters:   10/02/24 97.9 °F (36.6 °C) (Oral)      Labs:   Recent Labs   Lab 10/01/24  0607   CREATSERUM 6.09*      Estimated Creatinine Clearance: 9.6 mL/min (A) (based on SCr of 6.09 mg/dL (H)).     Recent Labs   Lab 10/01/24  0607   WBC 5.3          The Pharmacokinetic Target is:    Trough/random 15-20 mg/L (applies to IV dosing in HD and IP dosing in APD)    Renal Dosing Considerations:    HD: Current regimen: //     Assessment/Plan:   Initial/Loading dose: Will receive 1750 mg IV (25 mg/kg, capped at 2250 mg) x 1 initial dose.      Maintenance dose: Pharmacy will dose vancomycin at 750 mg IV after each dialysis    Monitorin) Plan for vancomycin trough to be obtained prior to the 3rd HD session    2) Pharmacy will order SCr as clinically indicated to assess renal function.    3) Pharmacy will monitor for toxicity and efficacy, adjust vancomycin dose and/or frequency, and order vancomycin levels as appropriate per the Pharmacy and Therapeutics Committee approved protocol until discontinuation of the medication.       We appreciate the opportunity to assist in the care of this patient.     Dolores Gilbert, PharmD  10/2/2024  5:11 PM  [facility]  Pharmacy Extension: [phone]

## 2024-10-02 NOTE — IMAGING NOTE
Called and spoke to Stefan Bey/Spine RN regarding pt's procedure.    Verified patient NPO 6 hrs prior to procedure and no blood thinners.      Pt is consentable.     Plan for 0915 today.    RN verbalized understanding.

## 2024-10-02 NOTE — IMAGING NOTE
Rigo came to CT room image guided paraspinal L5-S1 biopsy. 0.9 NS IV initiated to maintain patency.    Informed consent obtained by Dr Jain. Patient positioned on scanner. Biopsy obtained. Specimen sent to Pathology.      Fentanyl only, no sedation given.    Presently denies pain. Tolerated procedure well. Please see flowsheets for vital signs and/or additional information.    Transported pt to  385 accompanied by Rad RN and CT Technician. Bedside report given to Stefan Bey/Spine RN. biopsy site/ drsg verified C/D/I.

## 2024-10-03 LAB
ANION GAP SERPL CALC-SCNC: 11 MMOL/L (ref 0–18)
BASOPHILS # BLD AUTO: 0.07 X10(3) UL (ref 0–0.2)
BASOPHILS NFR BLD AUTO: 1.3 %
BUN BLD-MCNC: 50 MG/DL (ref 9–23)
CALCIUM BLD-MCNC: 9.5 MG/DL (ref 8.7–10.4)
CHLORIDE SERPL-SCNC: 98 MMOL/L (ref 98–112)
CO2 SERPL-SCNC: 24 MMOL/L (ref 21–32)
CREAT BLD-MCNC: 6.06 MG/DL
EGFRCR SERPLBLD CKD-EPI 2021: 9 ML/MIN/1.73M2 (ref 60–?)
EOSINOPHIL # BLD AUTO: 0.15 X10(3) UL (ref 0–0.7)
EOSINOPHIL NFR BLD AUTO: 2.9 %
ERYTHROCYTE [DISTWIDTH] IN BLOOD BY AUTOMATED COUNT: 20.7 %
GLUCOSE BLD-MCNC: 147 MG/DL (ref 70–99)
GLUCOSE BLD-MCNC: 172 MG/DL (ref 70–99)
GLUCOSE BLD-MCNC: 178 MG/DL (ref 70–99)
GLUCOSE BLD-MCNC: 229 MG/DL (ref 70–99)
GLUCOSE BLD-MCNC: 295 MG/DL (ref 70–99)
HCT VFR BLD AUTO: 37.3 %
HGB BLD-MCNC: 11.5 G/DL
IMM GRANULOCYTES # BLD AUTO: 0.01 X10(3) UL (ref 0–1)
IMM GRANULOCYTES NFR BLD: 0.2 %
LYMPHOCYTES # BLD AUTO: 0.54 X10(3) UL (ref 1–4)
LYMPHOCYTES NFR BLD AUTO: 10.4 %
MAGNESIUM SERPL-MCNC: 2.6 MG/DL (ref 1.6–2.6)
MCH RBC QN AUTO: 32.2 PG (ref 26–34)
MCHC RBC AUTO-ENTMCNC: 30.8 G/DL (ref 31–37)
MCV RBC AUTO: 104.5 FL
MONOCYTES # BLD AUTO: 0.52 X10(3) UL (ref 0.1–1)
MONOCYTES NFR BLD AUTO: 10 %
NEUTROPHILS # BLD AUTO: 3.91 X10 (3) UL (ref 1.5–7.7)
NEUTROPHILS # BLD AUTO: 3.91 X10(3) UL (ref 1.5–7.7)
NEUTROPHILS NFR BLD AUTO: 75.2 %
OSMOLALITY SERPL CALC.SUM OF ELEC: 294 MOSM/KG (ref 275–295)
PLATELET # BLD AUTO: 138 10(3)UL (ref 150–450)
POTASSIUM SERPL-SCNC: 4.4 MMOL/L (ref 3.5–5.1)
RBC # BLD AUTO: 3.57 X10(6)UL
SODIUM SERPL-SCNC: 133 MMOL/L (ref 136–145)
WBC # BLD AUTO: 5.2 X10(3) UL (ref 4–11)

## 2024-10-03 PROCEDURE — 90935 HEMODIALYSIS ONE EVALUATION: CPT | Performed by: INTERNAL MEDICINE

## 2024-10-03 PROCEDURE — 99222 1ST HOSP IP/OBS MODERATE 55: CPT | Performed by: STUDENT IN AN ORGANIZED HEALTH CARE EDUCATION/TRAINING PROGRAM

## 2024-10-03 NOTE — PROGRESS NOTES
[facility]  Nephrology Progress Note    Gamaliel Boyer Patient Status:  Inpatient    1950 MRN [id number]   [facility] 3SW-A Attending Bahman Recinos, DO   Hosp Day # 3 PCP Nima Sorensen MD     Subjective:  Seen on HD this morning, no major overnight events. S/p CT biopsy L5-S1 disc space yesterday.      Current Facility-Administered Medications:     melatonin cap/tab 10 mg, 10 mg, Oral, Nightly    aspirin DR tab 81 mg, 81 mg, Oral, Daily    sodium chloride 0.9 % IV bolus 100 mL, 100 mL, Intravenous, Q30 Min PRN **AND** albumin human (Albumin) 25% injection 25 g, 25 g, Intravenous, PRN Dialysis    sodium chloride 0.9 % IV bolus 100 mL, 100 mL, Intravenous, Q30 Min PRN **AND** albumin human (Albumin) 25% injection 25 g, 25 g, Intravenous, PRN Dialysis    lidocaine-prilocaine (Emla) 2.5-2.5 % cream, , Topical, PRN    ceFEPIme (Maxipime) 1 g in sodium chloride 0.9% 100 mL IVPB-MBP, 1 g, Intravenous, Q24H    Vancomycin: PHARMACY DOSING, 1 each, Intravenous, See Admin Instructions (RX holding)    vancomycin (Vancocin) 750 mg in sodium chloride 0.9% 250 mL IVPB, 750 mg, Intravenous, Once    bacitracin-polymyxin b (Polysporin) ophthalmic ointment 1 inch, 1 inch, Right Eye, TID    influenza virus trivalent high dose PF (Fluzone HD) 0.5 mL injection (ages >/= 65 years) 0.5 mL, 0.5 mL, Intramuscular, Prior to discharge    acetaminophen (Tylenol Extra Strength) tab 1,000 mg, 1,000 mg, Oral, q6h    lidocaine-menthol 4-1 % patch 2 patch, 2 patch, Transdermal, Daily    HYDROmorphone (Dilaudid) 1 MG/ML injection 0.2 mg, 0.2 mg, Intravenous, Q3H PRN **OR** HYDROmorphone (Dilaudid) 1 MG/ML injection 0.4 mg, 0.4 mg, Intravenous, Q3H PRN **OR** HYDROmorphone (Dilaudid) 1 MG/ML injection 0.8 mg, 0.8 mg, Intravenous, Q3H PRN    insulin aspart (NovoLOG) 100 Units/mL FlexPen 1-5 Units, 1-5 Units, Subcutaneous, TID AC and HS    insulin degludec (Tresiba) 100 units/mL flextouch 4 Units, 4 Units,  Subcutaneous, Nightly    cholecalciferol (Vitamin D3) tab 2,000 Units, 2,000 Units, Oral, Daily    ezetimibe (Zetia) tab 10 mg, 10 mg, Oral, Daily    famotidine (Pepcid) tab 20 mg, 20 mg, Oral, BID    lactulose (CHRONULAC) 10 GM/15ML solution 20 g, 20 g, Oral, QPM    levothyroxine (Synthroid) tab 150 mcg, 150 mcg, Oral, QAM AC    midodrine (ProAmatine) tab 5 mg, 5 mg, Oral, Daily PRN    ciprofloxacin (Ciloxan) 0.3 % ophthalmic solution 1 drop, 1 drop, Right Eye, TID    prednisoLONE (Pred Forte) 1 % ophthalmic suspension 1 drop, 1 drop, Right Eye, QID    sertraline (Zoloft) tab 25 mg, 25 mg, Oral, Daily    sevelamer carbonate (Renvela) tab 800 mg, 800 mg, Oral, TID AC    vitamin E (Alpha-E) cap 1,000 Units, 1,000 Units, Oral, Daily    HYDROcodone-acetaminophen (Norco)  MG per tab 1 tablet, 1 tablet, Oral, Q4H PRN    ondansetron (Zofran) 4 MG/2ML injection 4 mg, 4 mg, Intravenous, Q6H PRN    acetaminophen (Tylenol) tab 650 mg, 650 mg, Oral, Q6H PRN    heparin (Porcine) 5000 UNIT/ML injection 5,000 Units, 5,000 Units, Subcutaneous, Q8H RIKKI    silver sulfADIAZINE (Silvadene) 1 % cream, , Topical, Daily    glucose (Dex4) 15 GM/59ML oral liquid 15 g, 15 g, Oral, Q15 Min PRN **OR** glucose (Glutose) 40% oral gel 15 g, 15 g, Oral, Q15 Min PRN **OR** glucose-vitamin C (Dex-4) chewable tab 4 tablet, 4 tablet, Oral, Q15 Min PRN **OR** dextrose 50% injection 50 mL, 50 mL, Intravenous, Q15 Min PRN **OR** glucose (Dex4) 15 GM/59ML oral liquid 30 g, 30 g, Oral, Q15 Min PRN **OR** glucose (Glutose) 40% oral gel 30 g, 30 g, Oral, Q15 Min PRN **OR** glucose-vitamin C (Dex-4) chewable tab 8 tablet, 8 tablet, Oral, Q15 Min PRN      Objective:  Vital signs: Blood pressure 102/51, pulse 57, temperature 98.1 °F (36.7 °C), temperature source Oral, resp. rate 14, weight 160 lb 8 oz (72.8 kg), SpO2 97%.    General: No acute distress.   HEENT: Moist mucous membranes. EOM-I.   Respiratory: Clear to auscultation bilaterally.  No wheezes.    Cardiovascular: S1, S2.  Regular rate and rhythm.   Neurologic: No focal neurological deficits.  Musculoskeletal:  No swelling noted.  Integument: No lesions. No erythema.  Psychiatric: Appropriate mood and affect.    Recent Labs     10/01/24  0607 10/02/24  0452 10/03/24  0542   WBC 5.3  --  5.2   HGB 11.4*  --  11.5*   .3*  --  104.5*   .0  --  138.0*   INR  --  1.20  --        Recent Labs     10/01/24  0607 10/03/24  0518   * 133*   K 4.6 4.4   CL 96* 98   CO2 26.0 24.0   BUN 45* 50*   CREATSERUM 6.09* 6.06*   CA 9.6 9.5   MG  --  2.6     Assessment/Plan:  1) ESRD: He has end stage renal disease due to long-standing diabetes. Receives HD on a TTS schedule, plan for HD today.     2) Possible discitis/OM: Recent admission with c/f discitis, s/p biopsy with no growth of organisms.  Was discharged with IV antibiotics but then began to have worsening pain and inflammatory marker. Now re-admitted due to concern for worsening infection. Per ID and spine surgery - now s/p CT guided bx 10/2. Cultures with [facility]     3) LUGO cirrhosis: follows with GI as outpatient. 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 Receives paracentesis every 3-5 weeks, last 9/25     4) Orthostatic hypotension: Continue midodrine with HD     5) Anemia: Due to ESRD. PAYAL for goal hemoglobin 10-11 g/dL, EPO held as hemoglobin at goal.    Thank you for allowing me to participate in this patient's care. Please feel free to call me with any questions or concerns.     Marlin Benson MD  10/03/24

## 2024-10-03 NOTE — PHYSICAL THERAPY NOTE
PT order received. Chart reviewed. Pt admitted with possible lumbar discitis. He is currently occupied with dialysis treatment. Will re-attempt as medically appropriate and as schedule allows. RN aware.

## 2024-10-03 NOTE — PLAN OF CARE
Patient A & O x2-4, confused/forgetful at times. VSS, on RA. PRANEETH with CPAP.  IV abx. Anuric. HD scheduled for Thursday. Fistula to left arm. Up standby. Skin check performed with [facility]. Generalized bruising all over body. Wound to right tibia, coverlet changed. Wound to left foot, coverlet changed. Abrasion to left tibia, open to air. Dressing to biopsy site on back covered with telfa/tegaderm. Safety measures in place. Instructed to use call light.

## 2024-10-03 NOTE — PROGRESS NOTES
[facility]   part of [facility] Hospitalist Progress Note     Gamaliel Boyer Patient Status:  Inpatient    1950 MRN [id number]   Location [facility] 3SW-A Attending Bahman Recinos, DO   Hosp Day # 3 PCP Nima Sorensen MD     Assessment & Plan:    73 yr old male with PMH sig for ESRD on HD, DM 1 on insulin pump, HLD, HTN, LUGO cirrhosis, and spinal stenosis s/p spinal cord stimulator who presents as a direct admission due to concern for osteomyelitis.    Lumbosacral discitis   - s/p biopsy and cultures 9/10/24, neg  - s/p IV cefepime and vanco with HD, until 24  - CT L Spine: no significant change since  study, bony destruction of L5/S1 end plates could represent discitis/OM, similar in extent since , no epidural collection noted  - ID and spine c/s appreciated, s/p bx of L5-S1, pending cultures  - ID started Cefepime/Vanc pending final cultures, these were started after bx was completed  - PRN pain medications adjusted     # ESRD on HD  - renal c/s appreciated  - resume HD per renal     # DM type 1 on insulin pump  - consult endo for return to insulin pump     # LUGO cirrhosis   - compensated   - cont lactulose      # Hypothyroidism   - cont levothyroxine      # Major depression, recurrent   - cont zoloft      # LE wounds, chronic   - follows with wound clinic  - wound RN c/s    DVT Ppx: SQH  Code: Full  Lucero: None    Subjective:     Patient was seen and evaluated this morning. Doing well, pain controlled. No acute events overnight.    Vital signs:  Temp:  [97.7 °F (36.5 °C)-98.5 °F (36.9 °C)] 98.1 °F (36.7 °C)  Pulse:  [57-63] 57  Resp:  [14-16] 14  BP: ()/(49-59) 102/51  SpO2:  [91 %-97 %] 97 %    Physical Exam:    General: No acute distress.   Respiratory: Clear to auscultation bilaterally. No wheezes.  Cardiovascular: S1, S2. Regular rate and rhythm  Abdomen: Soft, nontender, nondistended.  Positive bowel sounds. No rebound or  guarding.  Extremities: No edema.LUE AVF noted, chronic skin changes in lower extremities noted  Neuro:  Grossly non focal, no motor deficits.      Diagnostic Data:    Pertinent Labs:  Stable    Imaging Studies:  CT L- Spine Noted    Bahman Recinos D.O.  [facility] Hospitalist

## 2024-10-03 NOTE — CONSULTS
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  Patient was admitted directly for repeat CT spine and possible biopsy.  Endocrinology consulted regarding T1DM and insulin pump management ISO possible infection.   He had a recent admission for possible discitis in early September and was discharged home with 6-weeks of IV antibiotics.    On evaluation this a.m., patient notes blood glucose have been stable during these last few weeks.  He was noted to have high glucose overnight on 10/1/2024 and patient was switched to a basal bolus regimen of 4 units of Tresiba with NovoLog sliding scale.  After receiving Tresiba his pump was disconnected.   He underwent repeat CT of the lumbosacral spine with contrast and IR biopsy 10/2/2024.  He was started on vancomycin for osteomyelitis.    DM hx:   Diagnosed at age 12  Last A1c value was 6.6% done 2024.  He follows with Dr. Hetal Grande, LOV 2024   Current Regimen for diabetes:    Tandem  T-slim with Dexcom   Insulin Settings  Time Basal ICR ISF Active Insulin Time Target   Mn  0.1  1:75 5 hours 140   3:30  0.35       8 am   0.55 1:12      10A 0.85       11A 0.15 1:12      1P 0.2       3P 0.75       5P 0.675 1:10      6P 0.375       7P 0.35       11P 0.2  1:140     Total 8.725 Units per day           Current DM  regimen:  Tresiba 4 units at bedtime with Novolog correction  Last dose of Tresiba was at 2132 on 10/2/2024       Past Medical History  Past Medical History:    Anemia    anxiety    back pain    Back problem    Blastomycosis    BPH (benign prostatic hyperplasia)    Depression    Diabetic retinopathy    laser surgery    End stage renal disease (HCC)    Esophageal reflux    occassion    Hearing impairment    hearing aids    HEMORRHOIDS    High blood pressure    High cholesterol    History of blood transfusion    History of renal dialysis    fistula on left arm    Hyperkalemia    hyperlipidemia    hypothyroidism    IMPOTENCE    Liver disease    2013 - pt states all resolved after liver and kidneys shut down after issue with pneumonia     Mood disturbance    Neuropathy    osteoarthritis    Osteoarthrosis, unspecified whether generalized or localized, unspecified site    osteoporosis    Polyneuropathy in diabetes(357.2)    Sepsis (HCC)    Sleep apnea, obstructive    AHI 54 SaO2 lizeth 74 % CPAP 12 HME//     Type I (juvenile type) diabetes mellitus without mention of complication, not stated as uncontrolled    since 1963- DR Mao follows    Unspecified essential hypertension    Visual impairment    wears glasses       Past Surgical History  Past Surgical History:   Procedure Laterality Date    Back surgery  2009    laminectomy  L1 -4  2/09  Dr. Eitan Guerrero [facility]    Back surgery  8-8-13    C4-C6 ACDF     Back surgery  9/8/16    L2-S1 Revision Decomp possible uninstrumented fusion 9/8/16    Back surgery  09/10/2018    Rev C3-C7 ACDF     Cataract Bilateral done in 2004    IOL's    Colonoscopy      2006    Colonoscopy  2/2009    normal    Colonoscopy N/A 8/23/2019    adenoma- repeat 1 yr (2 dya prep)    Colonoscopy,biopsy  2/20/09    Performed by ANAM RIVERS at [facility] [place]    Egd N/A 7/23/2021    EGD & COLONOSCOPY Surgeon: Kimberly, +vik, rpt EGD 3 months, poor prep rpt colon 3 years    Endoscopy, bowel  pouch, biopsy      Injection, w/wo contrast, dx/therapeutic substance, epidural/subarachnoid; lumbar/sacral N/A 5/4/2016    Procedure: LUMBAR EPIDURAL;  Surgeon: Jayden Norton MD;  Location: [facility]    Injection, w/wo contrast, dx/therapeutic substance, epidural/subarachnoid; lumbar/sacral N/A 5/25/2016    Procedure: LUMBAR EPIDURAL;  Surgeon: Jayden Norton MD;  Location: [facility]    Injection, w/wo contrast, dx/therapeutic substance, epidural/subarachnoid; lumbar/sacral N/A 6/8/2016    Procedure: LUMBAR EPIDURAL;  Surgeon: Jayden Norton MD;  Location: [facility]    Knee replacement surgery  2/14/13    Left TKR by Dr. Lombardi    M-sedaj by  phys perfrmg svc 5+ yr N/A 5/4/2016    Procedure: LUMBAR EPIDURAL;  Surgeon: Jayden Norton MD;  Location: [facility]    M-sedaj by  phys perfrmg svc 5+ yr N/A 5/25/2016    Procedure: LUMBAR EPIDURAL;  Surgeon: Jayden Norton MD;  Location: [facility]    M-sedaj by  phys perfrmg svc 5+ yr N/A 6/8/2016    Procedure: LUMBAR EPIDURAL;  Surgeon: Jayden Norton MD;  Location: [facility]    Other surgical history      Bilateral median nerve release at elbow; 2000    Other surgical history      CTS release bilateral 2000    Other surgical history      Surgical repair fracture left hand 5th digit    Other surgical history      Surgery left heel ligament repair    Other surgical history      Laser surgery for bilateral retinopathy    Other surgical history      Surgery to left eye for \"weak muscle.\" 2007    Other surgical history      bilat knee repair 9/23/10    Other surgical history  11/2013    lung resection to remove abcess    Other surgical history  10/26/2020    Cystoscopy (Dr. Ribeiro)    Patient documented not to have experienced any of the following events  2/14/2014    Procedure: ;  Surgeon: Kin Hobbs MD;  Location: [facility]  [facility]    Patient documented not to have experienced any of the following events N/A 5/4/2016    Procedure: LUMBAR EPIDURAL;  Surgeon: Jayden Norton MD;  Location: [facility]    Patient documented not to have experienced any of the following events N/A 5/25/2016    Procedure: LUMBAR EPIDURAL;  Surgeon: Jayden Norton MD;  Location: [facility]    Patient documented not to have experienced any of the following events N/A 6/8/2016    Procedure: LUMBAR EPIDURAL;  Surgeon: Jayden Norton MD;  Location: [facility]    Patient withough preoperative order for iv antibiotic surgical site infection prophylaxis.  2/14/2014    Procedure: ;  Surgeon: Kin Hobbs MD;  Location: [facility]    Patient withough preoperative order for iv antibiotic surgical site infection prophylaxis. N/A 5/4/2016    Procedure: LUMBAR EPIDURAL;  Surgeon: Jayden Norton MD;  Location: [facility]    Patient withough preoperative order for iv antibiotic surgical site infection prophylaxis. N/A 5/25/2016    Procedure: LUMBAR EPIDURAL;  Surgeon: Jayden Norton MD;  Location: [facility]    Patient withough preoperative order for iv antibiotic surgical site infection prophylaxis. N/A 6/8/2016    Procedure: LUMBAR EPIDURAL;  Surgeon: Jayden Norton MD;  Location: [facility]    Total knee replacement Left 2013    Upper gi endoscopy,diagnosis  4/14/16    retained gastric contents; Cesar    Upper gi endoscopy,remov f.b. N/A 2/14/2014    Procedure: ESOPHAGOGASTRODUODENOSCOPY, POSSIBLE BIOPSY, POSSIBLE POLYPECTOMY 16753;  Surgeon: Kin Hobbs MD;  Location: [facility]       Family History  Family History   Problem Relation Age of Onset    Hypertension Father     Lipids Father     Cancer Father     Hypertension Mother     Psychiatric Maternal Grandmother     Diabetes Paternal Grandfather          Type 2 DM    Heart Disorder Brother         CAD with MI at age 53 - passed away    Obesity Brother     Diabetes Brother         Type 2 DM    Hypertension Brother     Lipids Brother     Thyroid Disorder Neg        Social History  Social History     Socioeconomic History    Marital status:     Number of children: 1   Occupational History    Occupation: Retired--Printer   Tobacco Use    Smoking status: Former     Current packs/day: 0.00     Average packs/day: 3.5 packs/day for 20.0 years (70.0 ttl pk-yrs)     Types: Cigarettes     Start date: 1966     Quit date: 1986     Years since quittin.7    Smokeless tobacco: Never    Tobacco comments:     Quit 25 years ago   Vaping Use    Vaping status: Never Used   Substance and Sexual Activity    Alcohol use: Not Currently     Comment: very rarely    Drug use: No    Sexual activity: Yes     Partners: Female   Other Topics Concern     Service No    Blood Transfusions No    Caffeine Concern No     Comment: coffee 1 cup per day    Sleep Concern No    Exercise Yes     Comment: PT/OT 1x per week    Seat Belt Yes     Social Determinants of Health     Financial Resource Strain: Low Risk  (2024)    Received from [facility]    Financial Resource Strain     In the past year, have you or any family members you live with been unable to get any of the following when it was really needed? Check all that apply.: None   Food Insecurity: No Food Insecurity (2024)    Food Insecurity     Food Insecurity: Never true   Transportation Needs: No Transportation Needs (2024)    Transportation Needs     Lack of Transportation: No   Social Connections: Low Risk  (2024)    Received from [facility]    Social Connections     How often do you see or talk to people that you care about and feel close to? (For example: talking to friends on the phone, visiting friends or family, going to Faith or club meetings): 5 or more times a week    Housing Stability: Low Risk  (9/30/2024)    Housing Stability     Housing Instability: No           Current Medications:  Current Facility-Administered Medications   Medication Dose Route Frequency    melatonin cap/tab 10 mg  10 mg Oral Nightly    aspirin DR tab 81 mg  81 mg Oral Daily    sodium chloride 0.9 % IV bolus 100 mL  100 mL Intravenous Q30 Min PRN    And    albumin human (Albumin) 25% injection 25 g  25 g Intravenous PRN Dialysis    sodium chloride 0.9 % IV bolus 100 mL  100 mL Intravenous Q30 Min PRN    And    albumin human (Albumin) 25% injection 25 g  25 g Intravenous PRN Dialysis    lidocaine-prilocaine (Emla) 2.5-2.5 % cream   Topical PRN    ceFEPIme (Maxipime) 1 g in sodium chloride 0.9% 100 mL IVPB-MBP  1 g Intravenous Q24H    Vancomycin: PHARMACY DOSING  1 each Intravenous See Admin Instructions (RX holding)    vancomycin (Vancocin) 750 mg in sodium chloride 0.9% 250 mL IVPB  750 mg Intravenous Once    bacitracin-polymyxin b (Polysporin) ophthalmic ointment 1 inch  1 inch Right Eye TID    influenza virus trivalent high dose PF (Fluzone HD) 0.5 mL injection (ages >/= 65 years) 0.5 mL  0.5 mL Intramuscular Prior to discharge    acetaminophen (Tylenol Extra Strength) tab 1,000 mg  1,000 mg Oral q6h    lidocaine-menthol 4-1 % patch 2 patch  2 patch Transdermal Daily    HYDROmorphone (Dilaudid) 1 MG/ML injection 0.2 mg  0.2 mg Intravenous Q3H PRN    Or    HYDROmorphone (Dilaudid) 1 MG/ML injection 0.4 mg  0.4 mg Intravenous Q3H PRN    Or    HYDROmorphone (Dilaudid) 1 MG/ML injection 0.8 mg  0.8 mg Intravenous Q3H PRN    insulin aspart (NovoLOG) 100 Units/mL FlexPen 1-5 Units  1-5 Units Subcutaneous TID AC and HS    insulin degludec (Tresiba) 100 units/mL flextouch 4 Units  4 Units Subcutaneous Nightly    cholecalciferol (Vitamin D3) tab 2,000 Units  2,000 Units Oral Daily    ezetimibe (Zetia) tab 10 mg  10 mg Oral Daily    famotidine (Pepcid) tab 20 mg  20 mg Oral BID    lactulose (CHRONULAC) 10  GM/15ML solution 20 g  20 g Oral QPM    levothyroxine (Synthroid) tab 150 mcg  150 mcg Oral QAM AC    midodrine (ProAmatine) tab 5 mg  5 mg Oral Daily PRN    ciprofloxacin (Ciloxan) 0.3 % ophthalmic solution 1 drop  1 drop Right Eye TID    prednisoLONE (Pred Forte) 1 % ophthalmic suspension 1 drop  1 drop Right Eye QID    sertraline (Zoloft) tab 25 mg  25 mg Oral Daily    sevelamer carbonate (Renvela) tab 800 mg  800 mg Oral TID AC    vitamin E (Alpha-E) cap 1,000 Units  1,000 Units Oral Daily    HYDROcodone-acetaminophen (Norco)  MG per tab 1 tablet  1 tablet Oral Q4H PRN    ondansetron (Zofran) 4 MG/2ML injection 4 mg  4 mg Intravenous Q6H PRN    acetaminophen (Tylenol) tab 650 mg  650 mg Oral Q6H PRN    heparin (Porcine) 5000 UNIT/ML injection 5,000 Units  5,000 Units Subcutaneous Q8H RIKKI    silver sulfADIAZINE (Silvadene) 1 % cream   Topical Daily    glucose (Dex4) 15 GM/59ML oral liquid 15 g  15 g Oral Q15 Min PRN    Or    glucose (Glutose) 40% oral gel 15 g  15 g Oral Q15 Min PRN    Or    glucose-vitamin C (Dex-4) chewable tab 4 tablet  4 tablet Oral Q15 Min PRN    Or    dextrose 50% injection 50 mL  50 mL Intravenous Q15 Min PRN    Or    glucose (Dex4) 15 GM/59ML oral liquid 30 g  30 g Oral Q15 Min PRN    Or    glucose (Glutose) 40% oral gel 30 g  30 g Oral Q15 Min PRN    Or    glucose-vitamin C (Dex-4) chewable tab 8 tablet  8 tablet Oral Q15 Min PRN     Medications Prior to Admission   Medication Sig    bacitracin-polymyxin b Ophthalmic Ointment Place 1 inch into the right eye in the morning, at noon, and at bedtime.    moxifloxacin 0.5 % Ophthalmic Solution Place 1 drop into the right eye 3 (three) times daily.    prednisoLONE 1 % Ophthalmic Suspension Apply 1 drop to eye 4 (four) times daily.    SSD 1 % External Cream Apply 1 Application topically daily.    Melatonin 3 MG Oral Cap Take by mouth.    Probiotic Product (PROBIOTIC BLEND OR) Take 1 tablet by mouth daily.    midodrine 5 MG Oral Tab Take 1  tablet (5 mg total) by mouth As Directed. Before each dialysis    sertraline 25 MG Oral Tab Take 1 tablet (25 mg total) by mouth daily.    cholecalciferol 50 MCG (2000 UT) Oral Tab Take 1 tablet (2,000 Units total) by mouth daily.    vitamin E 400 UNITS Oral Cap Take 1,000 Units by mouth daily.    Omega-3 Fatty Acids (FISH OIL) 1200 MG Oral Cap Take 1,200 mg by mouth daily.    insulin lispro 100 UNIT/ML Injection Solution INJECT 50 UNITS BY INSULIN PUMP ROUTE DAILY. USE VIA INSULIN PUMP. MDD 50 UNITS    aspirin 81 MG Oral Tab EC Take 1 tablet (81 mg total) by mouth daily.    levothyroxine 150 MCG Oral Tab Take 1 tablet (150 mcg total) by mouth daily.    renal vitamin Oral Tab Take 1 tablet by mouth daily.    lactulose 10 GM/15ML Oral Solution Take 30 mL (20 g total) by mouth every evening.    Sevelamer 800 MG Oral Tab Take 1 tablet (800 mg total) by mouth 3 (three) times daily before meals. and 1 before snacks    famotidine 20 MG Oral Tab Take 1 tablet (20 mg total) by mouth 2 (two) times daily.    EZETIMIBE 10 MG Oral Tab TAKE ONE TABLET BY MOUTH ONE TIME DAILY (Patient taking differently: Take 1 tablet (10 mg total) by mouth every morning.)    Insulin Glargine, 1 Unit Dial, (TOUJEO SOLOSTAR) 300 UNIT/ML Subcutaneous Solution Pen-injector Inject 6 Units into the skin as needed (If insulin pump not working).    GLUCAGON EMERGENCY 1 MG Injection Kit Inject 1 mg into the skin once as needed.       Allergies  Allergies   Allergen Reactions    Antihistamine & Nasal Deconges [Fexofenadine-Pseudoephedrine] OTHER (SEE COMMENTS)     Altered mental status    Adhesive Tape ITCHING    Albuterol DIZZINESS and OTHER (SEE COMMENTS)     Albuterol Inhalation. Lightheadedness    Benadryl [Diphenhydramine] RESTLESSNESS    Depakote [Valproic Acid] DIZZINESS    Fluconazole OTHER (SEE COMMENTS)     Kidney labs abnormal    Mirtazapine RESTLESSNESS    Quetiapine RESTLESSNESS    Wellbutrin [Bupropion] RASH    Gabapentin OTHER (SEE  COMMENTS)     Severe jerking motions    Latex RASH       Review of Systems:   A 10 point review of systems is completed with pertinent positives and negatives noted in HPI.    Physical Exam:   Vital Signs:  Blood pressure 102/51, pulse 57, temperature 98.1 °F (36.7 °C), temperature source Oral, resp. rate 14, weight 160 lb 8 oz (72.8 kg), SpO2 97%.     General: No acute distress. Alert and oriented x 3. On HD  HEENT: Moist mucous membranes. EOM-I.    Neck: No lymphadenopathy.  No thyromegaly  Respiratory: Clear to auscultation bilaterally.  On O2  Cardiovascular: S1, S2.  Regular rate and rhythm   Abdomen: Soft, nontender, nondistended.    Neurologic: No focal neurological deficits.  Musculoskeletal: Full range of motion of all extremities.    Integument: No lesions. No erythema.  Psychiatric: Appropriate mood and affect.    Results:     Laboratory Data:  Lab Results   Component Value Date    WBC 5.2 10/03/2024    HGB 11.5 (L) 10/03/2024    HCT 37.3 (L) 10/03/2024    .0 (L) 10/03/2024    CREATSERUM 6.06 (H) 10/03/2024    BUN 50 (H) 10/03/2024     (L) 10/03/2024    K 4.4 10/03/2024    CL 98 10/03/2024    CO2 24.0 10/03/2024     (H) 10/03/2024    CA 9.5 10/03/2024    ALB 4.0 09/06/2024    ALKPHO 345 (H) 09/06/2024    TP 7.4 09/06/2024    AST 21 09/06/2024    ALT 16 09/06/2024    PTT 46.3 (H) 09/05/2024    INR 1.20 10/02/2024    PTP 15.3 (H) 10/02/2024    T4F 0.7 (L) 05/10/2023    TSH 9.820 (H) 05/10/2023    LIP 92 02/27/2023    LIP 27 02/27/2023     (H) 12/17/2022    PSA 0.519 06/03/2019    DDIMER 3.01 (H) 10/30/2013    ESRML 48 (H) 10/01/2024    CRP 1.30 (H) 10/01/2024    BNP 44 11/07/2013    MG 2.6 10/03/2024    PHOS 4.0 05/05/2023    TROP <0.045 07/29/2021     (H) 12/17/2022    CKMB 111.9 (H) 12/04/2013    MASSIMO NEGATIVE 12/03/2013    B12 >2,000 (H) 12/16/2022    POCGLU 250 (H) 12/09/2013         .last  Recent Labs     10/11/21  1339 12/16/22  1159 05/10/23  0513   T4F  --  0.8 0.7*    TSH 1.470 9.070* 9.820*           Impression:     Patient Active Problem List   Diagnosis    Restless leg    Secondary hyperparathyroidism (HCC)    S/P total knee arthroplasty    Ulcerated, foot, right, with fat layer exposed (HCC)    PRANEETH on CPAP    Insulin pump status    Type 1 diabetes, uncontrolled, with neuropathy    Essential hypertension    Hypothyroidism (acquired)    Uncontrolled type 1 diabetes mellitus with hyperglycemia, with long-term current use of insulin ([facility])    Onychomycosis    Lumbar stenosis with neurogenic claudication    DDD (degenerative disc disease), lumbar    Long-term insulin use ([facility])    Uncontrolled type 1 diabetes mellitus with proliferative retinopathy without macular edema, with long-term current use of insulin    Uncontrolled type 1 diabetes mellitus with diabetic nephropathy, with long-term current use of insulin    Pulmonary hypertension ([facility])    Uncontrolled type 1 diabetes mellitus with stage 3 chronic kidney disease    Neuropathy    Primary hypertension    Hyperlipidemia LDL goal <100    Hypoglycemia unawareness in type 1 diabetes mellitus ([facility])    Sensory hearing loss, bilateral    Moderate episode of recurrent major depressive disorder ([facility])    Controlled type 1 diabetes mellitus with complication, with long-term current use of insulin ([facility])    Myofascial pain    Charcot foot due to diabetes mellitus ([facility])    Other chronic pain    Chronic mastoiditis of right side    Cervical myelopathy ([facility])    Pulmonary emphysema, unspecified emphysema type ([facility])    Difficulty walking    S/P insertion of spinal cord stimulator    Hyperglycemia    Stage 4 chronic kidney disease ([facility])    Urinary retention    Anemia in stage 4 chronic kidney disease ([facility])    Arthritis of facet joint of lumbar spine    Orthostatic hypotension    Type 1 diabetes mellitus with complication, with long term current use of insulin pump ([facility])    Esophagitis    Platelets decreased ([facility])    Jerking movements of  extremities    Diabetic complication (HCC)    Acute renal failure (HCC)    Acute renal failure, unspecified acute renal failure type (HCC)    Hyponatremia    Anemia in ESRD (end-stage renal disease) ([facility])    ESRD on hemodialysis ([facility])    Hypokalemia    Thrombocytopenia ([facility])    Altered mental status, unspecified altered mental status type    Rash    Diabetes mellitus due to underlying condition with chronic kidney disease on chronic dialysis, with long-term current use of insulin (HCC)    Other fatigue    Acute renal failure superimposed on chronic kidney disease, unspecified CKD stage, unspecified acute renal failure type    Acute on chronic congestive heart failure, unspecified heart failure type ([facility])    Urticaria    Cellulitis of left upper extremity    Anemia    Acute kidney injury ([facility])    Syncope and collapse    Abnormal movement    Atrophy of muscle of hand    Acute encephalopathy    Type 2 diabetes mellitus with hyperglycemia, with long-term current use of insulin ([facility])    Fall, initial encounter    Hypoglycemia    Hyperammonemia ([facility])    Choreiform movement    Myoclonus    Groin abscess    ESRD (end stage renal disease) on dialysis ([facility])    Type 1 diabetes mellitus with hyperglycemia ([facility])    Multiple falls    Contusion of coccyx, initial encounter    Weakness generalized    Other ascites    Anemia in chronic kidney disease, on chronic dialysis ([facility])    Discitis of lumbar region    Osteomyelitis ([facility])    ESRD (end stage renal disease) ([facility])       #Type 1 Diabetes  #Osteomyelitis   Diagnosed with T1DM at age 12  Last A1c value was 6.6% done 9/6/2024.  He follows with Dr. Hetal Grande, LOV 9/27/2024  Insulin pump settings as above  Current regimen of Tresiba 4 units at bedtime with Novolog SS, last dose of Tresiba at 2132 on 10/2    Recommendations:  Patient does not have his pump supplies with him but he does endorse being able to manage his pump  We discussed that although he has his dexcom, we will  still check accucheck QID per hospital protocol  Discussed that if patient is able to get his pump supplies, patient to restart Tandem pump with dexcom at 2100, around 24 hours after last dose of Tresiba. Patient runs in control IQ mode with target glucose of 140. If pump is restarted, please do not give Tresiba dose that is scheduled for 10/3 bedtime. If pump is NOT started, okay to continue on current regimen  Patient verbalized understanding of above and all questions were answered    - Staff updated on plan and confirmed orders   - will continue to follow     Thank you for allowing me to participate in the care of your patient.    Anabel Royal, DO  10/3/2024

## 2024-10-03 NOTE — PROGRESS NOTES
[facility]  Nephrology Progress Note    Gamaliel Boyer Patient Status:  Inpatient    1950 MRN [id number]   [facility] 3SW-A Attending Bahman Recinos, DO   Hosp Day # 3 PCP Nima Sorensen MD     Subjective:  No acute events  HD today       Current Facility-Administered Medications:     melatonin cap/tab 10 mg, 10 mg, Oral, Nightly    aspirin DR tab 81 mg, 81 mg, Oral, Daily    sodium chloride 0.9 % IV bolus 100 mL, 100 mL, Intravenous, Q30 Min PRN **AND** albumin human (Albumin) 25% injection 25 g, 25 g, Intravenous, PRN Dialysis    sodium chloride 0.9 % IV bolus 100 mL, 100 mL, Intravenous, Q30 Min PRN **AND** albumin human (Albumin) 25% injection 25 g, 25 g, Intravenous, PRN Dialysis    lidocaine-prilocaine (Emla) 2.5-2.5 % cream, , Topical, PRN    ceFEPIme (Maxipime) 1 g in sodium chloride 0.9% 100 mL IVPB-MBP, 1 g, Intravenous, Q24H    Vancomycin: PHARMACY DOSING, 1 each, Intravenous, See Admin Instructions (RX holding)    vancomycin (Vancocin) 750 mg in sodium chloride 0.9% 250 mL IVPB, 750 mg, Intravenous, Once    bacitracin-polymyxin b (Polysporin) ophthalmic ointment 1 inch, 1 inch, Right Eye, TID    influenza virus trivalent high dose PF (Fluzone HD) 0.5 mL injection (ages >/= 65 years) 0.5 mL, 0.5 mL, Intramuscular, Prior to discharge    acetaminophen (Tylenol Extra Strength) tab 1,000 mg, 1,000 mg, Oral, q6h    lidocaine-menthol 4-1 % patch 2 patch, 2 patch, Transdermal, Daily    HYDROmorphone (Dilaudid) 1 MG/ML injection 0.2 mg, 0.2 mg, Intravenous, Q3H PRN **OR** HYDROmorphone (Dilaudid) 1 MG/ML injection 0.4 mg, 0.4 mg, Intravenous, Q3H PRN **OR** HYDROmorphone (Dilaudid) 1 MG/ML injection 0.8 mg, 0.8 mg, Intravenous, Q3H PRN    insulin aspart (NovoLOG) 100 Units/mL FlexPen 1-5 Units, 1-5 Units, Subcutaneous, TID AC and HS    insulin degludec (Tresiba) 100 units/mL flextouch 4 Units, 4 Units, Subcutaneous, Nightly    cholecalciferol (Vitamin D3) tab 2,000 Units, 2,000  Units, Oral, Daily    ezetimibe (Zetia) tab 10 mg, 10 mg, Oral, Daily    famotidine (Pepcid) tab 20 mg, 20 mg, Oral, BID    lactulose (CHRONULAC) 10 GM/15ML solution 20 g, 20 g, Oral, QPM    levothyroxine (Synthroid) tab 150 mcg, 150 mcg, Oral, QAM AC    midodrine (ProAmatine) tab 5 mg, 5 mg, Oral, Daily PRN    ciprofloxacin (Ciloxan) 0.3 % ophthalmic solution 1 drop, 1 drop, Right Eye, TID    prednisoLONE (Pred Forte) 1 % ophthalmic suspension 1 drop, 1 drop, Right Eye, QID    sertraline (Zoloft) tab 25 mg, 25 mg, Oral, Daily    sevelamer carbonate (Renvela) tab 800 mg, 800 mg, Oral, TID AC    vitamin E (Alpha-E) cap 1,000 Units, 1,000 Units, Oral, Daily    HYDROcodone-acetaminophen (Norco)  MG per tab 1 tablet, 1 tablet, Oral, Q4H PRN    ondansetron (Zofran) 4 MG/2ML injection 4 mg, 4 mg, Intravenous, Q6H PRN    acetaminophen (Tylenol) tab 650 mg, 650 mg, Oral, Q6H PRN    heparin (Porcine) 5000 UNIT/ML injection 5,000 Units, 5,000 Units, Subcutaneous, Q8H RIKKI    silver sulfADIAZINE (Silvadene) 1 % cream, , Topical, Daily    glucose (Dex4) 15 GM/59ML oral liquid 15 g, 15 g, Oral, Q15 Min PRN **OR** glucose (Glutose) 40% oral gel 15 g, 15 g, Oral, Q15 Min PRN **OR** glucose-vitamin C (Dex-4) chewable tab 4 tablet, 4 tablet, Oral, Q15 Min PRN **OR** dextrose 50% injection 50 mL, 50 mL, Intravenous, Q15 Min PRN **OR** glucose (Dex4) 15 GM/59ML oral liquid 30 g, 30 g, Oral, Q15 Min PRN **OR** glucose (Glutose) 40% oral gel 30 g, 30 g, Oral, Q15 Min PRN **OR** glucose-vitamin C (Dex-4) chewable tab 8 tablet, 8 tablet, Oral, Q15 Min PRN      Objective:  Vital signs: Blood pressure 102/51, pulse 57, temperature 98.1 °F (36.7 °C), temperature source Oral, resp. rate 14, weight 160 lb 8 oz (72.8 kg), SpO2 97%.    General: No acute distress.    HEENT: Moist mucous membranes. EOM-I.   Respiratory: Clear to auscultation bilaterally.  No wheezes.   Cardiovascular: S1, S2.  Regular rate and rhythm.   Neurologic: No focal  neurological deficits.  Musculoskeletal:  No swelling noted.  Integument: No lesions. No erythema.  Psychiatric: Appropriate mood and affect.      Recent Labs     10/01/24  0607 10/02/24  0452 10/03/24  0542   WBC 5.3  --  5.2   HGB 11.4*  --  11.5*   .3*  --  104.5*   .0  --  138.0*   INR  --  1.20  --        Recent Labs     10/01/24  0607 10/03/24  0518   * 133*   K 4.6 4.4   CL 96* 98   CO2 26.0 24.0   BUN 45* 50*   CREATSERUM 6.09* 6.06*   CA 9.6 9.5   MG  --  2.6       No results for input(s): \"ALT\", \"AST\", \"ALB\", \"AMYLASE\", \"LIPASE\", \"LDH\" in the last 72 hours.    Invalid input(s): \"ALPHOS\", \"TBIL\", \"DBIL\", \"TPROT\"      Assessment/Plan:  1. ESRD: He has end stage renal disease due to long-standing diabetes TTS schedule  - HD today per routine ; UF as tolerated; midodrine w/ HD      2. Possible discitis/OM: Recent admission with c/f discitis, s/p biopsy with no growth of organisms.  Was discharged with IV antibiotics but then began to have worsening pain and inflammatory marker. Now re-admitted due to concern for worsening infection. Per ID and spine surgery - now s/p CT guided bx  - await results       3. LUGO cirrhosis: follows with GI as outpatient. 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 Receives paracentesis every 3-5 weeks, last 9/25     4. Orthostatic hypotension: Continue midodrine with HD     5. Anemia: Due to ESRD. PAYAL for goal hemoglobin 10-11 g/dL, EPO held as hemoglobin at goal.    Thank you for allowing me to participate in this patient's care. Please feel free to call me with any questions or concerns.     Mo Rousseau MD  10/3/2024

## 2024-10-04 VITALS
HEART RATE: 65 BPM | TEMPERATURE: 98 F | WEIGHT: 160.5 LBS | BODY MASS INDEX: 26 KG/M2 | RESPIRATION RATE: 16 BRPM | SYSTOLIC BLOOD PRESSURE: 99 MMHG | OXYGEN SATURATION: 98 % | DIASTOLIC BLOOD PRESSURE: 54 MMHG

## 2024-10-04 LAB
ANION GAP SERPL CALC-SCNC: 10 MMOL/L (ref 0–18)
BUN BLD-MCNC: 31 MG/DL (ref 9–23)
CALCIUM BLD-MCNC: 9.3 MG/DL (ref 8.7–10.4)
CHLORIDE SERPL-SCNC: 99 MMOL/L (ref 98–112)
CO2 SERPL-SCNC: 26 MMOL/L (ref 21–32)
CREAT BLD-MCNC: 4.84 MG/DL
EGFRCR SERPLBLD CKD-EPI 2021: 12 ML/MIN/1.73M2 (ref 60–?)
GLUCOSE BLD-MCNC: 101 MG/DL (ref 70–99)
GLUCOSE BLD-MCNC: 120 MG/DL (ref 70–99)
GLUCOSE BLD-MCNC: 168 MG/DL (ref 70–99)
GLUCOSE BLD-MCNC: 187 MG/DL (ref 70–99)
GLUCOSE BLD-MCNC: 56 MG/DL (ref 70–99)
GLUCOSE BLD-MCNC: 58 MG/DL (ref 70–99)
OSMOLALITY SERPL CALC.SUM OF ELEC: 284 MOSM/KG (ref 275–295)
POTASSIUM SERPL-SCNC: 4.2 MMOL/L (ref 3.5–5.1)
SODIUM SERPL-SCNC: 135 MMOL/L (ref 136–145)

## 2024-10-04 PROCEDURE — 99232 SBSQ HOSP IP/OBS MODERATE 35: CPT | Performed by: INTERNAL MEDICINE

## 2024-10-04 PROCEDURE — 99232 SBSQ HOSP IP/OBS MODERATE 35: CPT | Performed by: STUDENT IN AN ORGANIZED HEALTH CARE EDUCATION/TRAINING PROGRAM

## 2024-10-04 RX ORDER — VANCOMYCIN/0.9 % SOD CHLORIDE 1 G/100 ML
1 PLASTIC BAG, INJECTION (ML) INTRAVENOUS
Qty: 5000 ML | Refills: 0 | Status: SHIPPED | OUTPATIENT
Start: 2024-10-04 | End: 2024-10-18

## 2024-10-04 RX ORDER — HYDROCODONE BITARTRATE AND ACETAMINOPHEN 10; 325 MG/1; MG/1
1 TABLET ORAL EVERY 4 HOURS PRN
Qty: 20 TABLET | Refills: 0 | Status: SHIPPED | OUTPATIENT
Start: 2024-10-04

## 2024-10-04 NOTE — PROGRESS NOTES
[facility]   part of [facility] Infectious Disease Progress Note    Gamaliel Boyer Patient Status:  Inpatient    1950 MRN [id number]   Location [facility] 3SW-A Attending Gigi Gutierrez, DO   Hosp Day # 4 PCP Nima Sorensen MD     Subjective:  Chart reviewed, no acute events.  Pt seen bedside.  He reports ongoing LE pain;  had significant R ankle/foot pain overnight and some LLE pain.  States the pain in his back is improved overall and LE pain is most bothersome.  No fevers     Objective:    Allergies:  Allergies   Allergen Reactions    Antihistamine & Nasal Deconges [Fexofenadine-Pseudoephedrine] OTHER (SEE COMMENTS)     Altered mental status    Adhesive Tape ITCHING    Albuterol DIZZINESS and OTHER (SEE COMMENTS)     Albuterol Inhalation. Lightheadedness    Benadryl [Diphenhydramine] RESTLESSNESS    Depakote [Valproic Acid] DIZZINESS    Fluconazole OTHER (SEE COMMENTS)     Kidney labs abnormal    Mirtazapine RESTLESSNESS    Quetiapine RESTLESSNESS    Wellbutrin [Bupropion] RASH    Gabapentin OTHER (SEE COMMENTS)     Severe jerking motions    Latex RASH       Medications:    Current Facility-Administered Medications:     insulin aspart (NovoLOG) 100 Units/mL FlexPen 1-5 Units, 1-5 Units, Subcutaneous, TID CC    melatonin cap/tab 10 mg, 10 mg, Oral, Nightly    aspirin DR tab 81 mg, 81 mg, Oral, Daily    sodium chloride 0.9 % IV bolus 100 mL, 100 mL, Intravenous, Q30 Min PRN **AND** albumin human (Albumin) 25% injection 25 g, 25 g, Intravenous, PRN Dialysis    sodium chloride 0.9 % IV bolus 100 mL, 100 mL, Intravenous, Q30 Min PRN **AND** albumin human (Albumin) 25% injection 25 g, 25 g, Intravenous, PRN Dialysis    lidocaine-prilocaine (Emla) 2.5-2.5 % cream, , Topical, PRN    ceFEPIme (Maxipime) 1 g in sodium chloride 0.9% 100 mL IVPB-MBP, 1 g, Intravenous, Q24H    Vancomycin: PHARMACY DOSING, 1 each, Intravenous, See Admin Instructions (RX holding)     bacitracin-polymyxin b (Polysporin) ophthalmic ointment 1 inch, 1 inch, Right Eye, TID    influenza virus trivalent high dose PF (Fluzone HD) 0.5 mL injection (ages >/= 65 years) 0.5 mL, 0.5 mL, Intramuscular, Prior to discharge    acetaminophen (Tylenol Extra Strength) tab 1,000 mg, 1,000 mg, Oral, q6h    lidocaine-menthol 4-1 % patch 2 patch, 2 patch, Transdermal, Daily    HYDROmorphone (Dilaudid) 1 MG/ML injection 0.2 mg, 0.2 mg, Intravenous, Q3H PRN **OR** HYDROmorphone (Dilaudid) 1 MG/ML injection 0.4 mg, 0.4 mg, Intravenous, Q3H PRN **OR** HYDROmorphone (Dilaudid) 1 MG/ML injection 0.8 mg, 0.8 mg, Intravenous, Q3H PRN    insulin degludec (Tresiba) 100 units/mL flextouch 4 Units, 4 Units, Subcutaneous, Nightly    cholecalciferol (Vitamin D3) tab 2,000 Units, 2,000 Units, Oral, Daily    ezetimibe (Zetia) tab 10 mg, 10 mg, Oral, Daily    famotidine (Pepcid) tab 20 mg, 20 mg, Oral, BID    lactulose (CHRONULAC) 10 GM/15ML solution 20 g, 20 g, Oral, QPM    levothyroxine (Synthroid) tab 150 mcg, 150 mcg, Oral, QAM AC    midodrine (ProAmatine) tab 5 mg, 5 mg, Oral, Daily PRN    ciprofloxacin (Ciloxan) 0.3 % ophthalmic solution 1 drop, 1 drop, Right Eye, TID    prednisoLONE (Pred Forte) 1 % ophthalmic suspension 1 drop, 1 drop, Right Eye, QID    sertraline (Zoloft) tab 25 mg, 25 mg, Oral, Daily    sevelamer carbonate (Renvela) tab 800 mg, 800 mg, Oral, TID AC    vitamin E (Alpha-E) cap 1,000 Units, 1,000 Units, Oral, Daily    HYDROcodone-acetaminophen (Norco)  MG per tab 1 tablet, 1 tablet, Oral, Q4H PRN    ondansetron (Zofran) 4 MG/2ML injection 4 mg, 4 mg, Intravenous, Q6H PRN    acetaminophen (Tylenol) tab 650 mg, 650 mg, Oral, Q6H PRN    heparin (Porcine) 5000 UNIT/ML injection 5,000 Units, 5,000 Units, Subcutaneous, Q8H RIKKI    glucose (Dex4) 15 GM/59ML oral liquid 15 g, 15 g, Oral, Q15 Min PRN **OR** glucose (Glutose) 40% oral gel 15 g, 15 g, Oral, Q15 Min PRN **OR** glucose-vitamin C (Dex-4) chewable tab 4  tablet, 4 tablet, Oral, Q15 Min PRN **OR** dextrose 50% injection 50 mL, 50 mL, Intravenous, Q15 Min PRN **OR** glucose (Dex4) 15 GM/59ML oral liquid 30 g, 30 g, Oral, Q15 Min PRN **OR** glucose (Glutose) 40% oral gel 30 g, 30 g, Oral, Q15 Min PRN **OR** glucose-vitamin C (Dex-4) chewable tab 8 tablet, 8 tablet, Oral, Q15 Min PRN    Physical Exam:  General: Alert, orientated x3.  Cooperative.  No apparent distress.  Vital Signs:  Blood pressure 121/72, pulse 58, temperature 97.8 °F (36.6 °C), temperature source Oral, resp. rate 16, weight 160 lb 8 oz (72.8 kg), SpO2 96%.   Temp (24hrs), Av.2 °F (36.8 °C), Min:97.8 °F (36.6 °C), Max:98.5 °F (36.9 °C)      HEENT: Exam is unremarkable.  Without scleral icterus.  Mucous membranes are moist. PERRLA.  Oropharynx is clear.  Lungs: Clear to auscultation bilaterally.  Cardiac: Regular rate   Abdomen:  Soft, non-distended, non-tender, with no rebound or guarding.    Extremities:  No lower extremity edema noted.  Without clubbing or cyanosis.    MS:  no midline/vertebral lower back pain with pressure or palpation or movement   Skin: Normal texture and turgor.  Neurologic: Cranial nerves are grossly intact.      Labs:  Lab Results   Component Value Date    CREATSERUM 4.84 10/04/2024    BUN 31 10/04/2024     10/04/2024    K 4.2 10/04/2024    CL 99 10/04/2024    CO2 26.0 10/04/2024    GLU 56 10/04/2024    CA 9.3 10/04/2024       Radiology:  CT spine :  CONCLUSION:  No significant interval change since 2024.  Bony destruction of the L5-S1 endplates again could represent discitis/osteomyelitis.  The bony destruction is similar in extent since 2024.  No definitive epidural collection is seen.    There is persistent air within the L5-S1 disc space.  Please see above for further details.     Assessment/Plan:    1.  Lumbosacral discitis  -s/p biopsy and culture initial on 9/10/24  -cultures held for 3 weeks and remained NG  -AFB and fungal also remain NG  -was  discharged on IV Cefepime and Vancomycin x 6 weeks, through 10/18  -now readmitted with worsening back pain and rising inflammatory markers  -of note, neurostimulator had also been turned off PTA  -repeat CT spine on 9/30 unchanged from previous, no abscess/epidural fluid collection noted   -antibiotics on hold starting 9/27, and now repeat IR biopsy on 10/2/24  -cultures NGTD from 10/2  -restarted on IV Cefepime and Vancomycin starting 10/2/24    2. ESRD  -on HD  -per renal    3. Hx of blastomycosis  -in 1970's      DISPO: Continue IV Cefepime and Vancomycin.  Do not think his LE pain is related to worsening discitis/OM but continue to follow repeat cultures sent 10/2.  Stable for DC from ID standpoint and can resume IV Cefepime 2gm with HD and IV Vancomycin with HD through 10/18 as originally planned.  Pt should follow up in office at [facility].   Will continue to follow while here.  D/w Dr Ibanez.      If you have any questions or concerns please call [facility] at [phone].     CYNTHIA Pabon  10/4/2024  9:20 AM

## 2024-10-04 NOTE — PROGRESS NOTES
[place]  Nephrology Progress Note    Gamaliel Boyer Patient Status:  Inpatient    1950 MRN [id number]   [facility] 3SW-A Attending Bahman Recinos, DO   Hosp Day # 4 PCP Nima Sorensen MD     Subjective:  No acute events         Current Facility-Administered Medications:     insulin aspart (NovoLOG) 100 Units/mL FlexPen 1-5 Units, 1-5 Units, Subcutaneous, TID CC    melatonin cap/tab 10 mg, 10 mg, Oral, Nightly    aspirin DR tab 81 mg, 81 mg, Oral, Daily    sodium chloride 0.9 % IV bolus 100 mL, 100 mL, Intravenous, Q30 Min PRN **AND** albumin human (Albumin) 25% injection 25 g, 25 g, Intravenous, PRN Dialysis    sodium chloride 0.9 % IV bolus 100 mL, 100 mL, Intravenous, Q30 Min PRN **AND** albumin human (Albumin) 25% injection 25 g, 25 g, Intravenous, PRN Dialysis    lidocaine-prilocaine (Emla) 2.5-2.5 % cream, , Topical, PRN    ceFEPIme (Maxipime) 1 g in sodium chloride 0.9% 100 mL IVPB-MBP, 1 g, Intravenous, Q24H    Vancomycin: PHARMACY DOSING, 1 each, Intravenous, See Admin Instructions (RX holding)    bacitracin-polymyxin b (Polysporin) ophthalmic ointment 1 inch, 1 inch, Right Eye, TID    influenza virus trivalent high dose PF (Fluzone HD) 0.5 mL injection (ages >/= 65 years) 0.5 mL, 0.5 mL, Intramuscular, Prior to discharge    acetaminophen (Tylenol Extra Strength) tab 1,000 mg, 1,000 mg, Oral, q6h    lidocaine-menthol 4-1 % patch 2 patch, 2 patch, Transdermal, Daily    HYDROmorphone (Dilaudid) 1 MG/ML injection 0.2 mg, 0.2 mg, Intravenous, Q3H PRN **OR** HYDROmorphone (Dilaudid) 1 MG/ML injection 0.4 mg, 0.4 mg, Intravenous, Q3H PRN **OR** HYDROmorphone (Dilaudid) 1 MG/ML injection 0.8 mg, 0.8 mg, Intravenous, Q3H PRN    insulin degludec (Tresiba) 100 units/mL flextouch 4 Units, 4 Units, Subcutaneous, Nightly    cholecalciferol (Vitamin D3) tab 2,000 Units, 2,000 Units, Oral, Daily    ezetimibe (Zetia) tab 10 mg, 10 mg, Oral, Daily    famotidine (Pepcid) tab 20 mg, 20 mg,  Oral, BID    lactulose (CHRONULAC) 10 GM/15ML solution 20 g, 20 g, Oral, QPM    levothyroxine (Synthroid) tab 150 mcg, 150 mcg, Oral, QAM AC    midodrine (ProAmatine) tab 5 mg, 5 mg, Oral, Daily PRN    ciprofloxacin (Ciloxan) 0.3 % ophthalmic solution 1 drop, 1 drop, Right Eye, TID    prednisoLONE (Pred Forte) 1 % ophthalmic suspension 1 drop, 1 drop, Right Eye, QID    sertraline (Zoloft) tab 25 mg, 25 mg, Oral, Daily    sevelamer carbonate (Renvela) tab 800 mg, 800 mg, Oral, TID AC    vitamin E (Alpha-E) cap 1,000 Units, 1,000 Units, Oral, Daily    HYDROcodone-acetaminophen (Norco)  MG per tab 1 tablet, 1 tablet, Oral, Q4H PRN    ondansetron (Zofran) 4 MG/2ML injection 4 mg, 4 mg, Intravenous, Q6H PRN    acetaminophen (Tylenol) tab 650 mg, 650 mg, Oral, Q6H PRN    heparin (Porcine) 5000 UNIT/ML injection 5,000 Units, 5,000 Units, Subcutaneous, Q8H RIKKI    glucose (Dex4) 15 GM/59ML oral liquid 15 g, 15 g, Oral, Q15 Min PRN **OR** glucose (Glutose) 40% oral gel 15 g, 15 g, Oral, Q15 Min PRN **OR** glucose-vitamin C (Dex-4) chewable tab 4 tablet, 4 tablet, Oral, Q15 Min PRN **OR** dextrose 50% injection 50 mL, 50 mL, Intravenous, Q15 Min PRN **OR** glucose (Dex4) 15 GM/59ML oral liquid 30 g, 30 g, Oral, Q15 Min PRN **OR** glucose (Glutose) 40% oral gel 30 g, 30 g, Oral, Q15 Min PRN **OR** glucose-vitamin C (Dex-4) chewable tab 8 tablet, 8 tablet, Oral, Q15 Min PRN      Objective:  Vital signs: Blood pressure 121/72, pulse 58, temperature 97.8 °F (36.6 °C), temperature source Oral, resp. rate 16, weight 160 lb 8 oz (72.8 kg), SpO2 96%.    General: No acute distress.    HEENT: Moist mucous membranes. EOM-I.   Respiratory: Clear to auscultation bilaterally.  No wheezes.   Cardiovascular: S1, S2.  Regular rate and rhythm.   Neurologic: No focal neurological deficits.  Musculoskeletal:  No swelling noted.  Integument: No lesions. No erythema.  Psychiatric: Appropriate mood and affect.    Recent Labs     10/02/24  2103  10/03/24  0542   WBC  --  5.2   HGB  --  11.5*   MCV  --  104.5*   PLT  --  138.0*   INR 1.20  --        Recent Labs     10/03/24  0518 10/04/24  0519   * 135*   K 4.4 4.2   CL 98 99   CO2 24.0 26.0   BUN 50* 31*   CREATSERUM 6.06* 4.84*   CA 9.5 9.3   MG 2.6  --        No results for input(s): \"ALT\", \"AST\", \"ALB\", \"AMYLASE\", \"LIPASE\", \"LDH\" in the last 72 hours.    Invalid input(s): \"ALPHOS\", \"TBIL\", \"DBIL\", \"TPROT\"    Assessment/Plan:  1. ESRD: He has end stage renal disease due to long-standing diabetes TTS schedule  - HD tomorrow  per routine ; UF as tolerated; midodrine w/ HD      2. Possible discitis/OM: Recent admission with c/f discitis, s/p biopsy with no growth of organisms.  Was discharged with IV antibiotics but then began to have worsening pain and inflammatory marker. Now re-admitted due to concern for worsening infection. Per ID and spine surgery       3. LUGO cirrhosis: follows with GI as outpatient. 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 Receives paracentesis every 3-5 weeks, last 9/25     4. Orthostatic hypotension: Continue midodrine with HD     5. Anemia: Due to ESRD. PAYAL for goal hemoglobin 10-11 g/dL, EPO held as hemoglobin at goal.    Thank you for allowing me to participate in this patient's care. Please feel free to call me with any questions or concerns.     Mo Rousseau MD  10/4/2024

## 2024-10-04 NOTE — PLAN OF CARE
Alert and Oriented x3-4, can be forgetful during the night time. On RA. VSS. Tele-NS. Denies pain at this time. Denies N/T. Diminished urine output d/t dialysis. Tolerating diet. Denies N/V. Call light within reach at this time.    Plan: IV Abx, HD Tues/Thurs/Sat, Cultures Pending, daily wound dressing changes

## 2024-10-04 NOTE — PLAN OF CARE
Patient discharged home into care of wife. Vitals stable, pain controlled. Dressings changed. Discharge instructions reviewed. Followup appts reviewed. All questions answered.

## 2024-10-04 NOTE — CM/SW NOTE
Informed by ID that pt is medically cleared for DC from their perspective.  Pt will continue IV abx with HD.  Spoke with Jane at [facility] ([phone], fax: [phone]) who requested updated scripts.  Scripts faxed as requested.  Updated RN.  / to remain available for support and/or discharge planning.     Jessica Avery, [facility]  Discharge Planner  [phone]

## 2024-10-04 NOTE — PROGRESS NOTES
[facility]   part of [facility]    Report of Consultation    Gamaliel Boyer Patient Status:  Inpatient    1950 MRN [id number]   Location [facility] 3SW-A Attending Bahman Recinos, DO   Hosp Day # 4 PCP Nima Sorensen MD     Date of Admission:  2024  Date of Consult:  10/3/2024  Reason for Consultation:   Type 1 DM, insulin pump management       24H Events/Subjective:   NAEO, received HD yesterday  Glucose of 56 on CMP and 58 on POC this AM after receiving 3 units of novolog at bedtime  Received dextrose and repeat glucose of 187     History of Present Illness:   Patient is a 73 year old male with PMHx significant for T1DM, 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  Patient was admitted directly for repeat CT spine and possible biopsy.  Endocrinology consulted regarding T1DM and insulin pump management ISO possible infection.   He had a recent admission for possible discitis in early September and was discharged home with 6-weeks of IV antibiotics.    On evaluation this a.m., patient notes blood glucose have been stable during these last few weeks.  He was noted to have high glucose overnight on 10/1/2024 and patient was switched to a basal bolus regimen of 4 units of Tresiba with NovoLog sliding scale.  After receiving Tresiba his pump was disconnected.   He underwent repeat CT of the lumbosacral spine with contrast and IR biopsy 10/2/2024.  He was started on vancomycin for osteomyelitis.    DM hx:   Diagnosed at age 12  Last A1c value was 6.6% done 2024.  He follows with Dr. Hetal Grande, LOV 2024   Current Regimen for diabetes:    Tandem  T-slim with Dexcom   Insulin Settings  Time Basal ICR ISF Active Insulin Time Target   Mn  0.1  1:75 5 hours 140   3:30  0.35       8 am    0.55 1:12      10A 0.85       11A 0.15 1:12      1P 0.2       3P 0.75       5P 0.675 1:10      6P 0.375       7P 0.35       11P 0.2  1:140     Total 8.725 Units per day           Current DM regimen:  Tresiba 4 units at bedtime with Novolog correction  Last dose of Tresiba was at 2132 on 10/2/2024       Past Medical History  Past Medical History:    Anemia    anxiety    back pain    Back problem    Blastomycosis    BPH (benign prostatic hyperplasia)    Depression    Diabetic retinopathy    laser surgery    End stage renal disease (HCC)    Esophageal reflux    occassion    Hearing impairment    hearing aids    HEMORRHOIDS    High blood pressure    High cholesterol    History of blood transfusion    History of renal dialysis    fistula on left arm    Hyperkalemia    hyperlipidemia    hypothyroidism    IMPOTENCE    Liver disease    2013 - pt states all resolved after liver and kidneys shut down after issue with pneumonia     Mood disturbance    Neuropathy    osteoarthritis    Osteoarthrosis, unspecified whether generalized or localized, unspecified site    osteoporosis    Polyneuropathy in diabetes(357.2)    Sepsis (HCC)    Sleep apnea, obstructive    AHI 54 SaO2 lizeth 74 % CPAP 12 HME//     Type I (juvenile type) diabetes mellitus without mention of complication, not stated as uncontrolled    since 1963- DR Mao follows    Unspecified essential hypertension    Visual impairment    wears glasses       Past Surgical History  Past Surgical History:   Procedure Laterality Date    Back surgery  2009    laminectomy  L1 -4  2/09  Dr. Eitan Guerrero [facility]    Back surgery  8-8-13    C4-C6 ACDF     Back surgery  9/8/16    L2-S1 Revision Decomp possible uninstrumented fusion 9/8/16    Back surgery  09/10/2018    Rev C3-C7 ACDF     Cataract Bilateral done in 2004    IOL's    Colonoscopy      2006    Colonoscopy  2/2009    normal    Colonoscopy N/A 8/23/2019    adenoma- repeat 1 yr (2 dya prep)    Colonoscopy,biopsy  2/20/09     Performed by ANAM RIVERS at [facility] [place]    Egd N/A 7/23/2021    EGD & COLONOSCOPY Surgeon: Kimberly, +vik, rpt EGD 3 months, poor prep rpt colon 3 years    Endoscopy, bowel pouch, biopsy      Injection, w/wo contrast, dx/therapeutic substance, epidural/subarachnoid; lumbar/sacral N/A 5/4/2016    Procedure: LUMBAR EPIDURAL;  Surgeon: Jayden Norton MD;  Location: [facility]    Injection, w/wo contrast, dx/therapeutic substance, epidural/subarachnoid; lumbar/sacral N/A 5/25/2016    Procedure: LUMBAR EPIDURAL;  Surgeon: Jayden Norton MD;  Location: [facility]    Injection, w/wo contrast, dx/therapeutic substance, epidural/subarachnoid; lumbar/sacral N/A 6/8/2016    Procedure: LUMBAR EPIDURAL;  Surgeon: Jayden Norton MD;  Location: [facility]    Knee replacement surgery  2/14/13    Left TKR by Dr. Lombardi    M-sedaj by  phys perfrmg svc 5+ yr N/A 5/4/2016    Procedure: LUMBAR EPIDURAL;  Surgeon: Jayden Norton MD;  Location: [facility]    M-sedaj by  phys perfrmg svc 5+ yr N/A 5/25/2016    Procedure: LUMBAR EPIDURAL;  Surgeon: Jayden Norton MD;  Location: [facility]    M-sedaj by  phys perfrmg svc 5+ yr N/A 6/8/2016    Procedure: LUMBAR EPIDURAL;  Surgeon: Jayden Norton MD;  Location: [facility]    Other surgical history      Bilateral median nerve release at elbow; 2000    Other surgical history      CTS release bilateral 2000    Other surgical history      Surgical repair fracture left hand 5th digit    Other surgical history      Surgery left heel ligament repair    Other surgical history      Laser surgery for bilateral retinopathy    Other surgical history      Surgery to left eye for \"weak muscle.\" 2007    Other surgical history      bilat knee repair 9/23/10    Other surgical history  11/2013    lung resection to remove abcess    Other surgical history   10/26/2020    Cystoscopy (Dr. Ribeiro)    Patient documented not to have experienced any of the following events  2/14/2014    Procedure: ;  Surgeon: Kin Hobbs MD;  Location: [facility]    Patient documented not to have experienced any of the following events N/A 5/4/2016    Procedure: LUMBAR EPIDURAL;  Surgeon: Jayden Norton MD;  Location: [facility]    Patient documented not to have experienced any of the following events N/A 5/25/2016    Procedure: LUMBAR EPIDURAL;  Surgeon: Jayden Norton MD;  Location: [facility]    Patient documented not to have experienced any of the following events N/A 6/8/2016    Procedure: LUMBAR EPIDURAL;  Surgeon: Jayden Norton MD;  Location: [facility]    Patient withough preoperative order for iv antibiotic surgical site infection prophylaxis.  2/14/2014    Procedure: ;  Surgeon: Kin Hobbs MD;  Location: [facility]    Patient withough preoperative order for iv antibiotic surgical site infection prophylaxis. N/A 5/4/2016    Procedure: LUMBAR EPIDURAL;  Surgeon: Jayden Norton MD;  Location: [facility]    Patient withough preoperative order for iv antibiotic surgical site infection prophylaxis. N/A 5/25/2016    Procedure: LUMBAR EPIDURAL;  Surgeon: Jayden Norton MD;  Location: [facility]    Patient withough preoperative order for iv antibiotic surgical site infection prophylaxis. N/A 6/8/2016    Procedure: LUMBAR EPIDURAL;  Surgeon: Jayden Norton MD;  Location: [facility]    Total knee replacement Left 2013    Upper gi endoscopy,diagnosis  4/14/16    retained gastric contents; Cesar    Upper gi endoscopy,remov f.b. N/A 2/14/2014    Procedure: ESOPHAGOGASTRODUODENOSCOPY, POSSIBLE BIOPSY, POSSIBLE POLYPECTOMY 38885;  Surgeon: Kin Hobbs MD;  Location: [facility]       Family History  Family History    Problem Relation Age of Onset    Hypertension Father     Lipids Father     Cancer Father     Hypertension Mother     Psychiatric Maternal Grandmother     Diabetes Paternal Grandfather         Type 2 DM    Heart Disorder Brother         CAD with MI at age 53 - passed away    Obesity Brother     Diabetes Brother         Type 2 DM    Hypertension Brother     Lipids Brother     Thyroid Disorder Neg        Social History  Social History     Socioeconomic History    Marital status:     Number of children: 1   Occupational History    Occupation: Retired--Printer   Tobacco Use    Smoking status: Former     Current packs/day: 0.00     Average packs/day: 3.5 packs/day for 20.0 years (70.0 ttl pk-yrs)     Types: Cigarettes     Start date: 1966     Quit date: 1986     Years since quittin.7    Smokeless tobacco: Never    Tobacco comments:     Quit 25 years ago   Vaping Use    Vaping status: Never Used   Substance and Sexual Activity    Alcohol use: Not Currently     Comment: very rarely    Drug use: No    Sexual activity: Yes     Partners: Female   Other Topics Concern     Service No    Blood Transfusions No    Caffeine Concern No     Comment: coffee 1 cup per day    Sleep Concern No    Exercise Yes     Comment: PT/OT 1x per week    Seat Belt Yes     Social Determinants of Health     Financial Resource Strain: Low Risk  (2024)    Received from [facility]    Financial Resource Strain     In the past year, have you or any family members you live with been unable to get any of the following when it was really needed? Check all that apply.: None   Food Insecurity: No Food Insecurity (2024)    Food Insecurity     Food Insecurity: Never true   Transportation Needs: No Transportation Needs (2024)    Transportation Needs     Lack of Transportation: No   Social Connections: Low Risk  (2024)    Received from [facility]    Social Connections     How often do you see  or talk to people that you care about and feel close to? (For example: talking to friends on the phone, visiting friends or family, going to Jewish or club meetings): 5 or more times a week   Housing Stability: Low Risk  (9/30/2024)    Housing Stability     Housing Instability: No           Current Medications:  Current Facility-Administered Medications   Medication Dose Route Frequency    insulin aspart (NovoLOG) 100 Units/mL FlexPen 1-5 Units  1-5 Units Subcutaneous TID CC    melatonin cap/tab 10 mg  10 mg Oral Nightly    aspirin DR tab 81 mg  81 mg Oral Daily    sodium chloride 0.9 % IV bolus 100 mL  100 mL Intravenous Q30 Min PRN    And    albumin human (Albumin) 25% injection 25 g  25 g Intravenous PRN Dialysis    sodium chloride 0.9 % IV bolus 100 mL  100 mL Intravenous Q30 Min PRN    And    albumin human (Albumin) 25% injection 25 g  25 g Intravenous PRN Dialysis    lidocaine-prilocaine (Emla) 2.5-2.5 % cream   Topical PRN    ceFEPIme (Maxipime) 1 g in sodium chloride 0.9% 100 mL IVPB-MBP  1 g Intravenous Q24H    Vancomycin: PHARMACY DOSING  1 each Intravenous See Admin Instructions (RX holding)    bacitracin-polymyxin b (Polysporin) ophthalmic ointment 1 inch  1 inch Right Eye TID    influenza virus trivalent high dose PF (Fluzone HD) 0.5 mL injection (ages >/= 65 years) 0.5 mL  0.5 mL Intramuscular Prior to discharge    acetaminophen (Tylenol Extra Strength) tab 1,000 mg  1,000 mg Oral q6h    lidocaine-menthol 4-1 % patch 2 patch  2 patch Transdermal Daily    HYDROmorphone (Dilaudid) 1 MG/ML injection 0.2 mg  0.2 mg Intravenous Q3H PRN    Or    HYDROmorphone (Dilaudid) 1 MG/ML injection 0.4 mg  0.4 mg Intravenous Q3H PRN    Or    HYDROmorphone (Dilaudid) 1 MG/ML injection 0.8 mg  0.8 mg Intravenous Q3H PRN    insulin degludec (Tresiba) 100 units/mL flextouch 4 Units  4 Units Subcutaneous Nightly    cholecalciferol (Vitamin D3) tab 2,000 Units  2,000 Units Oral Daily    ezetimibe (Zetia) tab 10 mg  10 mg  Oral Daily    famotidine (Pepcid) tab 20 mg  20 mg Oral BID    lactulose (CHRONULAC) 10 GM/15ML solution 20 g  20 g Oral QPM    levothyroxine (Synthroid) tab 150 mcg  150 mcg Oral QAM AC    midodrine (ProAmatine) tab 5 mg  5 mg Oral Daily PRN    ciprofloxacin (Ciloxan) 0.3 % ophthalmic solution 1 drop  1 drop Right Eye TID    prednisoLONE (Pred Forte) 1 % ophthalmic suspension 1 drop  1 drop Right Eye QID    sertraline (Zoloft) tab 25 mg  25 mg Oral Daily    sevelamer carbonate (Renvela) tab 800 mg  800 mg Oral TID AC    vitamin E (Alpha-E) cap 1,000 Units  1,000 Units Oral Daily    HYDROcodone-acetaminophen (Norco)  MG per tab 1 tablet  1 tablet Oral Q4H PRN    ondansetron (Zofran) 4 MG/2ML injection 4 mg  4 mg Intravenous Q6H PRN    acetaminophen (Tylenol) tab 650 mg  650 mg Oral Q6H PRN    heparin (Porcine) 5000 UNIT/ML injection 5,000 Units  5,000 Units Subcutaneous Q8H RIKKI    glucose (Dex4) 15 GM/59ML oral liquid 15 g  15 g Oral Q15 Min PRN    Or    glucose (Glutose) 40% oral gel 15 g  15 g Oral Q15 Min PRN    Or    glucose-vitamin C (Dex-4) chewable tab 4 tablet  4 tablet Oral Q15 Min PRN    Or    dextrose 50% injection 50 mL  50 mL Intravenous Q15 Min PRN    Or    glucose (Dex4) 15 GM/59ML oral liquid 30 g  30 g Oral Q15 Min PRN    Or    glucose (Glutose) 40% oral gel 30 g  30 g Oral Q15 Min PRN    Or    glucose-vitamin C (Dex-4) chewable tab 8 tablet  8 tablet Oral Q15 Min PRN     Medications Prior to Admission   Medication Sig    bacitracin-polymyxin b Ophthalmic Ointment Place 1 inch into the right eye in the morning, at noon, and at bedtime.    moxifloxacin 0.5 % Ophthalmic Solution Place 1 drop into the right eye 3 (three) times daily.    prednisoLONE 1 % Ophthalmic Suspension Apply 1 drop to eye 4 (four) times daily.    SSD 1 % External Cream Apply 1 Application topically daily.    Melatonin 3 MG Oral Cap Take by mouth.    Probiotic Product (PROBIOTIC BLEND OR) Take 1 tablet by mouth daily.     midodrine 5 MG Oral Tab Take 1 tablet (5 mg total) by mouth As Directed. Before each dialysis    sertraline 25 MG Oral Tab Take 1 tablet (25 mg total) by mouth daily.    cholecalciferol 50 MCG (2000 UT) Oral Tab Take 1 tablet (2,000 Units total) by mouth daily.    vitamin E 400 UNITS Oral Cap Take 1,000 Units by mouth daily.    Omega-3 Fatty Acids (FISH OIL) 1200 MG Oral Cap Take 1,200 mg by mouth daily.    insulin lispro 100 UNIT/ML Injection Solution INJECT 50 UNITS BY INSULIN PUMP ROUTE DAILY. USE VIA INSULIN PUMP. MDD 50 UNITS    aspirin 81 MG Oral Tab EC Take 1 tablet (81 mg total) by mouth daily.    levothyroxine 150 MCG Oral Tab Take 1 tablet (150 mcg total) by mouth daily.    renal vitamin Oral Tab Take 1 tablet by mouth daily.    lactulose 10 GM/15ML Oral Solution Take 30 mL (20 g total) by mouth every evening.    Sevelamer 800 MG Oral Tab Take 1 tablet (800 mg total) by mouth 3 (three) times daily before meals. and 1 before snacks    famotidine 20 MG Oral Tab Take 1 tablet (20 mg total) by mouth 2 (two) times daily.    EZETIMIBE 10 MG Oral Tab TAKE ONE TABLET BY MOUTH ONE TIME DAILY (Patient taking differently: Take 1 tablet (10 mg total) by mouth every morning.)    Insulin Glargine, 1 Unit Dial, (TOUJEO SOLOSTAR) 300 UNIT/ML Subcutaneous Solution Pen-injector Inject 6 Units into the skin as needed (If insulin pump not working).    GLUCAGON EMERGENCY 1 MG Injection Kit Inject 1 mg into the skin once as needed.       Allergies  Allergies   Allergen Reactions    Antihistamine & Nasal Deconges [Fexofenadine-Pseudoephedrine] OTHER (SEE COMMENTS)     Altered mental status    Adhesive Tape ITCHING    Albuterol DIZZINESS and OTHER (SEE COMMENTS)     Albuterol Inhalation. Lightheadedness    Benadryl [Diphenhydramine] RESTLESSNESS    Depakote [Valproic Acid] DIZZINESS    Fluconazole OTHER (SEE COMMENTS)     Kidney labs abnormal    Mirtazapine RESTLESSNESS    Quetiapine RESTLESSNESS    Wellbutrin [Bupropion] RASH     Gabapentin OTHER (SEE COMMENTS)     Severe jerking motions    Latex RASH       Review of Systems:   A 10 point review of systems is completed with pertinent positives and negatives noted in HPI.    Physical Exam:   Vital Signs:  Blood pressure 121/72, pulse 58, temperature 97.8 °F (36.6 °C), temperature source Oral, resp. rate 16, weight 160 lb 8 oz (72.8 kg), SpO2 96%.     General: No acute distress. Resting in bed.  HEENT: Moist mucous membranes. EOM-I.    Neck: No lymphadenopathy.  No thyromegaly  Respiratory: Clear to auscultation bilaterally.  On O2  Cardiovascular: S1, S2.  Regular rate and rhythm   Abdomen: Soft, nontender, nondistended.    Neurologic: No focal neurological deficits.   Integument: No lesions. No erythema.  Psychiatric: Appropriate mood and affect.    Results:     Laboratory Data:  Lab Results   Component Value Date    WBC 5.2 10/03/2024    HGB 11.5 (L) 10/03/2024    HCT 37.3 (L) 10/03/2024    .0 (L) 10/03/2024    CREATSERUM 4.84 (H) 10/04/2024    BUN 31 (H) 10/04/2024     (L) 10/04/2024    K 4.2 10/04/2024    CL 99 10/04/2024    CO2 26.0 10/04/2024    GLU 56 (L) 10/04/2024    CA 9.3 10/04/2024    ALB 4.0 09/06/2024    ALKPHO 345 (H) 09/06/2024    TP 7.4 09/06/2024    AST 21 09/06/2024    ALT 16 09/06/2024    PTT 46.3 (H) 09/05/2024    INR 1.20 10/02/2024    PTP 15.3 (H) 10/02/2024    T4F 0.7 (L) 05/10/2023    TSH 9.820 (H) 05/10/2023    LIP 92 02/27/2023    LIP 27 02/27/2023     (H) 12/17/2022    PSA 0.519 06/03/2019    DDIMER 3.01 (H) 10/30/2013    ESRML 48 (H) 10/01/2024    CRP 1.30 (H) 10/01/2024    BNP 44 11/07/2013    MG 2.6 10/03/2024    PHOS 4.0 05/05/2023    TROP <0.045 07/29/2021     (H) 12/17/2022    CKMB 111.9 (H) 12/04/2013    MASSIMO NEGATIVE 12/03/2013    B12 >2,000 (H) 12/16/2022    POCGLU 250 (H) 12/09/2013         .last  Recent Labs     10/11/21  1339 12/16/22  1159 05/10/23  0513   T4F  --  0.8 0.7*   TSH 1.470 9.070* 9.820*           Impression:      Patient Active Problem List   Diagnosis    Restless leg    Secondary hyperparathyroidism (HCC)    S/P total knee arthroplasty    Ulcerated, foot, right, with fat layer exposed (HCC)    PRANEETH on CPAP    Insulin pump status    Type 1 diabetes, uncontrolled, with neuropathy    Essential hypertension    Hypothyroidism (acquired)    Uncontrolled type 1 diabetes mellitus with hyperglycemia, with long-term current use of insulin (HCC)    Onychomycosis    Lumbar stenosis with neurogenic claudication    DDD (degenerative disc disease), lumbar    Long-term insulin use ([facility])    Uncontrolled type 1 diabetes mellitus with proliferative retinopathy without macular edema, with long-term current use of insulin    Uncontrolled type 1 diabetes mellitus with diabetic nephropathy, with long-term current use of insulin    Pulmonary hypertension (HCC)    Uncontrolled type 1 diabetes mellitus with stage 3 chronic kidney disease    Neuropathy    Primary hypertension    Hyperlipidemia LDL goal <100    Hypoglycemia unawareness in type 1 diabetes mellitus ([facility])    Sensory hearing loss, bilateral    Moderate episode of recurrent major depressive disorder ([facility])    Controlled type 1 diabetes mellitus with complication, with long-term current use of insulin ([facility])    Myofascial pain    Charcot foot due to diabetes mellitus ([facility])    Other chronic pain    Chronic mastoiditis of right side    Cervical myelopathy ([facility])    Pulmonary emphysema, unspecified emphysema type ([facility])    Difficulty walking    S/P insertion of spinal cord stimulator    Hyperglycemia    Stage 4 chronic kidney disease (HCC)    Urinary retention    Anemia in stage 4 chronic kidney disease ([facility])    Arthritis of facet joint of lumbar spine    Orthostatic hypotension    Type 1 diabetes mellitus with complication, with long term current use of insulin pump ([facility])    Esophagitis    Platelets decreased (HCC)    Jerking movements of extremities    Diabetic complication ([facility])    Acute  renal failure (HCC)    Acute renal failure, unspecified acute renal failure type (HCC)    Hyponatremia    Anemia in ESRD (end-stage renal disease) (HCC)    ESRD on hemodialysis (HCC)    Hypokalemia    Thrombocytopenia (HCC)    Altered mental status, unspecified altered mental status type    Rash    Diabetes mellitus due to underlying condition with chronic kidney disease on chronic dialysis, with long-term current use of insulin (HCC)    Other fatigue    Acute renal failure superimposed on chronic kidney disease, unspecified CKD stage, unspecified acute renal failure type    Acute on chronic congestive heart failure, unspecified heart failure type ([facility])    Urticaria    Cellulitis of left upper extremity    Anemia    Acute kidney injury ([facility])    Syncope and collapse    Abnormal movement    Atrophy of muscle of hand    Acute encephalopathy    Type 2 diabetes mellitus with hyperglycemia, with long-term current use of insulin ([facility])    Fall, initial encounter    Hypoglycemia    Hyperammonemia ([facility])    Choreiform movement    Myoclonus    Groin abscess    ESRD (end stage renal disease) on dialysis ([facility])    Type 1 diabetes mellitus with hyperglycemia ([facility])    Multiple falls    Contusion of coccyx, initial encounter    Weakness generalized    Other ascites    Anemia in chronic kidney disease, on chronic dialysis ([facility])    Discitis of lumbar region    Osteomyelitis ([facility])    ESRD (end stage renal disease) ([facility])       #Type 1 Diabetes  #Osteomyelitis   Diagnosed with T1DM at age 12  Last A1c value was 6.6% done 9/6/2024.  He follows with Dr. Hetal Grande, LOV 9/27/2024  Insulin pump settings as above  Current regimen of Tresiba 4 units at bedtime with Novolog SS, last dose of Tresiba at 2033 on 10/3  I discussed with RN this AM and since patient with sundowning it may be safer to keep him on a basal/bolus regimen with plan to restart pump at discharge, when wife can help with management.     Recommendations:   We discussed  that although he has his dexcom, we will still check accucheck QID per hospital protocol  Pt to continue Tresiba 4 units at bedtime. Reduce Novolog correction to TID with meals to avoid AM hypoglycemia  Accucheks QID, diabetic diet   Upon discharge, pt would need to restart the pump around 24 hours after last Tresiba dose     Patient verbalized understanding of above and all questions were answered    - Staff updated on plan and confirmed orders     Endocrine service will sign off at this time. Please feel free to consult if further questions arise.   Endocrine coverage M-F 8AM-430PM. Overnight coverage per primary service. Weekend coverage per hospitalist/primary team.     Thank you for allowing me to participate in the care of your patient.    Anabel Royal,   10/4/2024

## 2024-10-04 NOTE — DISCHARGE SUMMARY
[facility] Internal Medicine Hospitalist Discharge Summary     Patient ID:  Gamaliel Boyer  73 year old  12/27/1950    Admit date: 9/30/2024    Discharge date and time: 10/4/2024    Attending Physician: Gigi Gutierrez DO     Primary Care Physician: Nima Sorensen MD     Discharge Diagnoses:   Lumbosacral discitis   ESRD on HD  DM 1 on insulin pump  LUGO cirrhosis   Hypothyroidism   Depression  Chronic LE wounds     Please note that only IHP DMG and EMG patients enrolled in the Medicare ACO, [facility] ACO and [facility] HMOs will be handled by the [facility] Care Management team.  For all other patients, please follow usual protocol for discharge care transition.    Discharge Condition: stable    Disposition:  Home with home care     Important Follow up:  - PCP: within 1 week  - Consults: Renal, ID, ortho spine     Follow Up Items:  None    Hospital Course:      73 yr old male with PMH sig for ESRD on HD, DM 1 on insulin pump, HLD, HTN, LUGO cirrhosis, and spinal stenosis s/p spinal cord stimulator who presents as a direct admission due to concern for osteomyelitis.     Lumbosacral discitis   - s/p biopsy and cultures 9/10/24, neg  - s/p IV cefepime and vanco with HD, until 9/28/24  - CT L Spine: no significant change since 9/6 study, bony destruction of L5/S1 end plates could represent discitis/OM, similar in extent since 9/6, no epidural collection noted  - ID and spine c/s appreciated, s/p bx of L5-S1, pending cultures  - ID started Cefepime/Vanc pending final cultures, these were started after bx was completed  - PRN pain medications adjusted     # ESRD on HD  - renal c/s appreciated  - resume HD per renal     # DM type 1 on insulin pump  - consult endo for return to insulin pump     # LUGO cirrhosis   - compensated   - cont lactulose      # Hypothyroidism   - cont levothyroxine      # Major depression, recurrent   - cont zoloft      # LE wounds, chronic   -  follows with wound clinic  - wound RN c/s    Stable for discharge home with home care and IV abx.    Consults: IP CONSULT TO ORTHOPEDIC SURGERY  IP CONSULT TO NEPHROLOGY  IP CONSULT TO HOSPITALIST  IP CONSULT TO INFECTIOUS DISEASE  CONSULT TO WOUND OSTOMY  IP CONSULT TO SOCIAL WORK  IP CONSULT TO PHYSICIAN  IP CONSULT TO PHARMACY    Operative Procedures:  CT guided bone bx      Patient instructions:      I as the attending physician reconciled the current and discharge medications on day of discharge.     Current Discharge Medication List        START taking these medications    Details   ceFEPIme 2 g in sodium chloride 100 mL Inject 2 g into the vein 3 (three) times a week for 14 days. Inject 2gm three times a week with HD through 10/18/24      Vancomycin HCl in NaCl 1-0.9 GM/250ML-% Intravenous Solution Inject 250 mL (1 g total) into the vein 3 (three) times a week for 14 days. IV Vancomycin with HD three times a week through 10/18/24      HYDROcodone-acetaminophen  MG Oral Tab Take 1 tablet by mouth every 4 (four) hours as needed for Pain.           CONTINUE these medications which have NOT CHANGED    Details   bacitracin-polymyxin b Ophthalmic Ointment Place 1 inch into the right eye in the morning, at noon, and at bedtime.      moxifloxacin 0.5 % Ophthalmic Solution Place 1 drop into the right eye 3 (three) times daily.      prednisoLONE 1 % Ophthalmic Suspension Apply 1 drop to eye 4 (four) times daily.      SSD 1 % External Cream Apply 1 Application topically daily.      Melatonin 3 MG Oral Cap Take by mouth.      Probiotic Product (PROBIOTIC BLEND OR) Take 1 tablet by mouth daily.      midodrine 5 MG Oral Tab Take 1 tablet (5 mg total) by mouth As Directed. Before each dialysis      sertraline 25 MG Oral Tab Take 1 tablet (25 mg total) by mouth daily.      cholecalciferol 50 MCG (2000 UT) Oral Tab Take 1 tablet (2,000 Units total) by mouth daily.      vitamin E 400 UNITS Oral Cap Take 1,000 Units by  mouth daily.      Omega-3 Fatty Acids (FISH OIL) 1200 MG Oral Cap Take 1,200 mg by mouth daily.      insulin lispro 100 UNIT/ML Injection Solution INJECT 50 UNITS BY INSULIN PUMP ROUTE DAILY. USE VIA INSULIN PUMP. MDD 50 UNITS      aspirin 81 MG Oral Tab EC Take 1 tablet (81 mg total) by mouth daily.      levothyroxine 150 MCG Oral Tab Take 1 tablet (150 mcg total) by mouth daily.      renal vitamin Oral Tab Take 1 tablet by mouth daily.      lactulose 10 GM/15ML Oral Solution Take 30 mL (20 g total) by mouth every evening.      Sevelamer 800 MG Oral Tab Take 1 tablet (800 mg total) by mouth 3 (three) times daily before meals. and 1 before snacks      famotidine 20 MG Oral Tab Take 1 tablet (20 mg total) by mouth 2 (two) times daily.      EZETIMIBE 10 MG Oral Tab TAKE ONE TABLET BY MOUTH ONE TIME DAILY      Insulin Glargine, 1 Unit Dial, (TOUJEO SOLOSTAR) 300 UNIT/ML Subcutaneous Solution Pen-injector Inject 6 Units into the skin as needed (If insulin pump not working).      GLUCAGON EMERGENCY 1 MG Injection Kit Inject 1 mg into the skin once as needed.             Activity: activity as tolerated  Diet: renal diet  Wound Care: as directed  Code Status: Full Code      Exam on day of discharge:     Vitals:    10/04/24 1128   BP: 99/54   Pulse: 58   Resp: 16   Temp: 97.6 °F (36.4 °C)       Gen: No acute distress, alert and oriented x3, no focal neurologic deficits.  Chronically ill appearing male.    HEENT:  EOMI, PERRLA, OP clear, MMM  Pulm: Lungs clear bilaterally, normal respiratory effort  CV: Heart with regular rate and rhythm, no murmur.  Normal PMI.    Abd: Abdomen soft, nontender, nondistended, no organomegaly, bowel sounds present  MSK: Full range of motion in extremities, no clubbing, no cyanosis.  LUE AVF.  Skin: +LE wounds  Neuro:  Grossly intact, no focal deficits    Total time coordinating care for discharge: Greater than 30 minutes    Gigi Gutierrez DO  [facility] Hospitalist

## 2024-10-04 NOTE — PHYSICAL THERAPY NOTE
PHYSICAL THERAPY EVALUATION - INPATIENT     Room Number: 385/385-A  Evaluation Date: 10/4/2024  Type of Evaluation: Initial  Physician Order: PT Eval and Treat    Presenting Problem: lumbosacral discitis  Co-Morbidities : ESRD on HD, DM, cirrhosis  Reason for Therapy: Mobility Dysfunction and Discharge Planning  Recent admission:  9/5-9/12/24: s/p fall, concern for OM/discitis (from home and DC home)    PHYSICAL THERAPY ASSESSMENT   Patient is a 73 year old male admitted 9/30/2024 for direct admission for repeat CT spine and possible biopsy with concern for OM/discitis. Pt s/p CT biopsy L5-S1 10/2/24. Prior to admission, patient's baseline is supervision for short distance ambulation with RW.  Patient is currently functioning near baseline with bed mobility, transfers, and gait.  Patient is requiring contact guard assist as a result of the following impairments: pain, impaired standing balance, and decreased muscular endurance.  Physical Therapy will continue to follow for duration of hospitalization.    Patient will benefit from continued skilled PT Services at discharge to promote prior level of function and safety with additional support and return home with home health PT.    PLAN DURING HOSPITALIZATION  Nursing Mobility Recommendation : 1 Assist  PT Device Recommendation: Rolling walker  PT Treatment Plan: Bed mobility;Endurance;Energy conservation;Patient education;Gait training;Balance training;Transfer training;Strengthening  Rehab Potential : Fair  Frequency (Obs): 3-5x/week     CURRENT GOALS    Goal #1 Patient is able to demonstrate supine - sit EOB @ level: supervision     Goal #2 Patient is able to demonstrate transfers EOB to/from BSC at assistance level: supervision     Goal #3 Patient is able to ambulate 75 feet with assist device: walker - rolling at assistance level: supervision     Goal #4    Goal #5    Goal #6    Goal Comments: Goals established on 10/4/2024      HOME SITUATION  Type of Home:  House  Home Layout: One level                     Lives With: Spouse    Drives: No   Patient Regularly Uses: Rolling walker;Wheelchair      Prior Level of [place]: Pt lives with spouse in single story home. Pt ambulates short distance with RW. Pt also uses W/C in the community and in the home, as needed.     SUBJECTIVE  \"My leg still really hurts.\"     OBJECTIVE  Precautions: Limb alert - left  Fall Risk: High fall risk    WEIGHT BEARING RESTRICTION     PAIN ASSESSMENT  Ratin  Location: L LE  Management Techniques: Activity promotion;Repositioning    COGNITION  Overall Cognitive Status:  WFL - within functional limits    RANGE OF MOTION AND STRENGTH ASSESSMENT  Upper extremity ROM and strength are within functional limits     Lower extremity ROM is within functional limits     Lower extremity strength is within functional limits except for the following:    Right Knee extension  3+/5  Left Knee extension  3+/5    BALANCE  Static Sitting: Good  Dynamic Sitting: Good  Static Standing: Fair -  Dynamic Standing: Poor +    ADDITIONAL TESTS                                    ACTIVITY TOLERANCE                         O2 WALK       NEUROLOGICAL FINDINGS                        AM-PAC '6-Clicks' INPATIENT SHORT FORM - BASIC MOBILITY  How much difficulty does the patient currently have...  Patient Difficulty: Turning over in bed (including adjusting bedclothes, sheets and blankets)?: A Little   Patient Difficulty: Sitting down on and standing up from a chair with arms (e.g., wheelchair, bedside commode, etc.): A Little   Patient Difficulty: Moving from lying on back to sitting on the side of the bed?: A Little   How much help from another person does the patient currently need...   Help from Another: Moving to and from a bed to a chair (including a wheelchair)?: A Little   Help from Another: Need to walk in hospital room?: A Little   Help from Another: Climbing 3-5 steps with a railing?: A Little     AM-PAC  Score:  Raw Score: 18   Approx Degree of Impairment: 46.58%   Standardized Score (AM-PAC Scale): 43.63   CMS Modifier (G-Code): CK    FUNCTIONAL ABILITY STATUS  Gait Assessment   Functional Mobility/Gait Assessment  Gait Assistance: Contact guard assist  Distance (ft): 40  Assistive Device: Rolling walker    Skilled Therapy Provided   VC for transfer set up with chair.   Sit-stand with RW and CGA for safety.   Ambulatory with slow adelso, flat foot initial contact, and excessive kyphosis.   VC for sequencing with RW.   Pain limiting ambulation distance.   Returned to bedside chair.   RW height adjusted.   Reviewed seated LE there-ex.     Bed Mobility:  Rolling: NT  Supine to sit: NT   Sit to supine: NT     Transfer Mobility:  Sit to stand: CGA   Stand to sit: CGA  Gait = CGA    Therapist's Comments:  Reviewed activity recommendations.     Exercise/Education Provided:  Bed mobility  Energy conservation  Functional activity tolerated  Gait training  Posture  Strengthening  Transfer training    Patient End of Session: Up in chair;Needs met;Call light within reach;RN aware of session/findings;All patient questions and concerns addressed;Hospital anti-slip socks;Discussed recommendations with /      Patient Evaluation Complexity Level:  History Moderate - 1 or 2 personal factors and/or co-morbidities   Examination of body systems Low -  addressing 1-2 elements   Clinical Presentation Low- Stable   Clinical Decision Making Low Complexity       PT Session Time: 25 minutes  Gait Training: 10 minutes  Therapeutic Activity:  minutes  Neuromuscular Re-education:  minutes  Therapeutic Exercise:  minutes

## 2024-10-04 NOTE — CM/SW NOTE
Await biopsy/culture results for DC.  Pt has been receiving IV abx with HD prior to this admission.  Plan for [facility] to resume C services at [place].  PT eval pending.  Will follow for their assessment.  / to remain available for support and/or discharge planning.     Jessica Avery, [facility]  Discharge Planner  [phone]

## 2024-10-04 NOTE — OCCUPATIONAL THERAPY NOTE
OT order received, chart reviewed. OT role and assessment purpose was explained. Patient politely refused. Patient described that he has assistance for some aspects of ADLs at home from his spouse. He also reported that he has HH nursing, HHPT, and HHOT.  Patient reported visual impairment. OT suggested patient consider life alert or smartwatch with fall detection. Understanding voiced.Patient denied new ADL concerns at this time. Was left in chair with family members present and call light in reach. RN updated.

## 2024-10-11 RX ORDER — SODIUM CHLORIDE, SODIUM LACTATE, POTASSIUM CHLORIDE, CALCIUM CHLORIDE 600; 310; 30; 20 MG/100ML; MG/100ML; MG/100ML; MG/100ML
INJECTION, SOLUTION INTRAVENOUS CONTINUOUS
Status: CANCELLED | OUTPATIENT
Start: 2024-10-11

## 2024-10-21 ENCOUNTER — OFFICE VISIT (OUTPATIENT)
Dept: NEPHROLOGY | Facility: CLINIC | Age: 74
End: 2024-10-21
Payer: MEDICARE

## 2024-10-21 VITALS — DIASTOLIC BLOOD PRESSURE: 72 MMHG | SYSTOLIC BLOOD PRESSURE: 108 MMHG | BODY MASS INDEX: 26 KG/M2 | WEIGHT: 163.25 LBS

## 2024-10-21 DIAGNOSIS — N18.6 ESRD (END STAGE RENAL DISEASE) (HCC): Primary | ICD-10-CM

## 2024-10-21 NOTE — PROGRESS NOTES
Nephrology Progress Note      ASSESSMENT/PLAN:      1) ESRD- due to type 1 DM > 60 yrs with gradual progression recently reaching ESRD and doing reasonably well at [facility]. Meeting targets for dialysis adequacy, phosphorus and anemia management. PLAN- to continue HD TTS    2) HTN- longstanding refractory HTN; BP's recently lower after starting dialysis as expected- off meds currently (on midodrine pre-HD)    3) Anemia- due to CKD; continue EPO with HD.     4) DM 1- A1C 6.7 on insulin pump (Dr. Grande)    5) Back pain / sciatica- ? Related to recent suspected discitis / OM s/p biopsy x 2 (both neg)- to see pain specialist    6) Steal syndrome s/p AVF revision (Dr. Og)    7) Ascites- due to ESRD + large fluid gains- receiving paracenteses q3 weeks (1.5-2 L)       HPI:   Gamaliel Boyer is a 73 year old male who presents for follow-up of   Chief Complaint   Patient presents with    Dialysis    Hypertension    Anemia       Presents for follow-up of chronic kidney disease; is doing well without complaints over the past year without hospitalizations, major illnesses or procedures.  Has experienced increasing fatigue and takes naps daily; chief complaint though is of chronic back pain due to spinal stenosis and has undergone 3 previous surgical procedures as well as multiple injections.  Was told that he is no longer a surgical candidate and to follow-up with his pain specialist.      HISTORY:  Past Medical History:    Anemia    anxiety    back pain    Back problem    Blastomycosis    BPH (benign prostatic hyperplasia)    Depression    Diabetic retinopathy    laser surgery    Discitis, unspecified, lumbosacral region    End stage renal disease (HCC)    Esophageal reflux    occassion    Hearing impairment    hearing aids    HEMORRHOIDS    High blood pressure    High cholesterol    History of blood transfusion    History of renal dialysis    fistula on left arm, Tue, Thurs, Sat    Hyperkalemia    hyperlipidemia     hypothyroidism    IMPOTENCE    Liver disease    2013 - pt states all resolved after liver and kidneys shut down after issue with pneumonia     Mood disturbance    Neuropathy    osteoarthritis    Osteoarthrosis, unspecified whether generalized or localized, unspecified site    osteoporosis    Polyneuropathy in diabetes(357.2)    Sepsis (HCC)    Sleep apnea, obstructive    AHI 54 SaO2 lizeth 74 % CPAP 12 HME//     Type I (juvenile type) diabetes mellitus without mention of complication, not stated as uncontrolled    since 1963- DR Mao follows    Unspecified essential hypertension    Visual impairment    wears glasses      Past Surgical History:   Procedure Laterality Date    Back surgery  2009    laminectomy  L1 -4  2/09  Dr. Eitan Guerrero [facility]    Back surgery  8-8-13    C4-C6 ACDF     Back surgery  9/8/16    L2-S1 Revision Decomp possible uninstrumented fusion 9/8/16    Back surgery  09/10/2018    Rev C3-C7 ACDF     Cataract Bilateral done in 2004    IOL's    Colonoscopy      2006    Colonoscopy  2/2009    normal    Colonoscopy N/A 8/23/2019    adenoma- repeat 1 yr (2 dya prep)    Colonoscopy,biopsy  2/20/09    Performed by ANAM RIVERS at [facility]    Egd N/A 7/23/2021    EGD & COLONOSCOPY Surgeon: Kimberly, +vik, rpt EGD 3 months, poor prep rpt colon 3 years    Endoscopy, bowel pouch, biopsy      Injection, w/wo contrast, dx/therapeutic substance, epidural/subarachnoid; lumbar/sacral N/A 5/4/2016    Procedure: LUMBAR EPIDURAL;  Surgeon: Jayden Norton MD;  Location: [facility] FOR PAIN MANAGEMENT    Injection, w/wo contrast, dx/therapeutic substance, epidural/subarachnoid; lumbar/sacral N/A 5/25/2016    Procedure: LUMBAR EPIDURAL;  Surgeon: Jayden Norton MD;  Location: [facility] PAIN [facility]    Injection, w/wo contrast, dx/therapeutic substance, epidural/subarachnoid; lumbar/sacral N/A 6/8/2016    Procedure: LUMBAR EPIDURAL;  Surgeon: Jayden Norton MD;  Location: [facility]  FOR PAIN MANAGEMENT    Knee replacement surgery  2/14/13    Left TKR by Dr. Lombardi    M-sedaj by  phys perfrmg sv 5+ yr N/A 5/4/2016    Procedure: LUMBAR EPIDURAL;  Surgeon: Jayden Norton MD;  Location: [facility] FOR PAIN MANAGEMENT    M-sedaj by  phys perfrmg sv 5+ yr N/A 5/25/2016    Procedure: LUMBAR EPIDURAL;  Surgeon: Jayden Norton MD;  Location: [facility] FOR PAIN MANAGEMENT    M-sedaj by  phys perfrmg sv 5+ yr N/A 6/8/2016    Procedure: LUMBAR EPIDURAL;  Surgeon: Jayden Norton MD;  Location: [facility] FOR PAIN [facility]    Other surgical history      Bilateral median nerve release at elbow; 2000    Other surgical history      CTS release bilateral 2000    Other surgical history      Surgical repair fracture left hand 5th digit    Other surgical history      Surgery left heel ligament repair    Other surgical history      Laser surgery for bilateral retinopathy    Other surgical history      Surgery to left eye for \"weak muscle.\" 2007    Other surgical history      bilat knee repair 9/23/10    Other surgical history  11/2013    lung resection to remove abcess    Other surgical history  10/26/2020    Cystoscopy (Dr. Ribeiro)    Patient documented not to have experienced any of the following events  2/14/2014    Procedure: ;  Surgeon: Kin Hobbs MD;  Location: [facility]    Patient documented not to have experienced any of the following events N/A 5/4/2016    Procedure: LUMBAR EPIDURAL;  Surgeon: Jayden Norton MD;  Location: [facility] FOR PAIN MANA[facility]    Patient documented not to have experienced any of the following events N/A 5/25/2016    Procedure: LUMBAR EPIDURAL;  Surgeon: Jayden Norton MD;  Location: [facility] FOR PAIN [facility]    Patient documented not to have experienced any of the following events N/A 6/8/2016    Procedure: LUMBAR EPIDURAL;  Surgeon: Jayden Norton MD;  Location: [facility] FOR PAIN [facility]    Patient withough preoperative order for  iv antibiotic surgical site infection prophylaxis.  2014    Procedure: ;  Surgeon: Kin Hobbs MD;  Location: [facility]    Patient withough preoperative order for iv antibiotic surgical site infection prophylaxis. N/A 2016    Procedure: LUMBAR EPIDURAL;  Surgeon: Jayden Norton MD;  Location: [facility] PAIN MANAGEMENT    Patient withough preoperative order for iv antibiotic surgical site infection prophylaxis. N/A 2016    Procedure: LUMBAR EPIDURAL;  Surgeon: Jayden Norton MD;  Location: [facility] PAIN MANAGEMENT    Patient withough preoperative order for iv antibiotic surgical site infection prophylaxis. N/A 2016    Procedure: LUMBAR EPIDURAL;  Surgeon: Jayden Norton MD;  Location: [facility] PAIN [facility]    Total knee replacement Left     Upper gi endoscopy,diagnosis  16    retained gastric contents; Edward    Upper gi endoscopy,remov f.b. N/A 2014    Procedure: ESOPHAGOGASTRODUODENOSCOPY, POSSIBLE BIOPSY, POSSIBLE POLYPECTOMY 14678;  Surgeon: Kin Hobbs MD;  Location: [facility]      Family History   Problem Relation Age of Onset    Hypertension Father     Lipids Father     Cancer Father     Hypertension Mother     Psychiatric Maternal Grandmother     Diabetes Paternal Grandfather         Type 2 DM    Heart Disorder Brother         CAD with MI at age 53 - passed away    Obesity Brother     Diabetes Brother         Type 2 DM    Hypertension Brother     Lipids Brother     Thyroid Disorder Neg       Social History:   Social History     Socioeconomic History    Marital status:     Number of children: 1   Occupational History    Occupation: Retired--Printer   Tobacco Use    Smoking status: Former     Current packs/day: 0.00     Average packs/day: 3.5 packs/day for 20.0 years (70.0 ttl pk-yrs)     Types: Cigarettes     Start date: 1966     Quit date: 1986     Years since quittin.8    Smokeless tobacco:  Never    Tobacco comments:     Quit 25 years ago   Vaping Use    Vaping status: Never Used   Substance and Sexual Activity    Alcohol use: Not Currently     Comment: very rarely    Drug use: No    Sexual activity: Yes     Partners: Female   Other Topics Concern     Service No    Blood Transfusions No    Caffeine Concern No     Comment: coffee 1 cup per day    Sleep Concern No    Exercise Yes     Comment: PT/OT 1x per week    Seat Belt Yes     Social Drivers of Health     Financial Resource Strain: Low Risk  (4/19/2024)    Received from [facility]    Financial Resource Strain     In the past year, have you or any family members you live with been unable to get any of the following when it was really needed? Check all that apply.: None   Food Insecurity: No Food Insecurity (9/30/2024)    Food Insecurity     Food Insecurity: Never true   Transportation Needs: No Transportation Needs (9/30/2024)    Transportation Needs     Lack of Transportation: No   Social Connections: Low Risk  (4/19/2024)    Received from [facility]    Social Connections     How often do you see or talk to people that you care about and feel close to? (For example: talking to friends on the phone, visiting friends or family, going to Baptism or club meetings): 5 or more times a week   Housing Stability: Low Risk  (9/30/2024)    Housing Stability     Housing Instability: No        Medications (Active prior to today's visit):  Current Outpatient Medications   Medication Sig Dispense Refill    HYDROcodone-acetaminophen  MG Oral Tab Take 1 tablet by mouth every 4 (four) hours as needed for Pain. 20 tablet 0    bacitracin-polymyxin b Ophthalmic Ointment Place 1 inch into the right eye in the morning, at noon, and at bedtime.      moxifloxacin 0.5 % Ophthalmic Solution Place 1 drop into the right eye 3 (three) times daily.      prednisoLONE 1 % Ophthalmic Suspension Apply 1 drop to eye 4 (four) times daily.      SSD 1  % External Cream Apply 1 Application topically daily.      Melatonin 3 MG Oral Cap Take 3.3333 capsules (10 mg total) by mouth nightly.      Probiotic Product (PROBIOTIC BLEND OR) Take 1 tablet by mouth daily.      midodrine 5 MG Oral Tab Take 2 tablets (10 mg total) by mouth As Directed. Before each dialysis      sertraline 25 MG Oral Tab Take 1 tablet (25 mg total) by mouth daily.      Insulin Glargine, 1 Unit Dial, (TOUJEO SOLOSTAR) 300 UNIT/ML Subcutaneous Solution Pen-injector Inject 6 Units into the skin as needed (If insulin pump not working).      cholecalciferol 50 MCG (2000 UT) Oral Tab Take 1 tablet (2,000 Units total) by mouth daily.      vitamin E 400 UNITS Oral Cap Take 1,000 Units by mouth daily.      Omega-3 Fatty Acids (FISH OIL) 1200 MG Oral Cap Take 1,200 mg by mouth daily.      insulin lispro 100 UNIT/ML Injection Solution Basal settings: 12 MN - 3:30 am: 0.100  3:30 am - 8 am: 0.350  8 am - 10 am: 0.550   10 am - 11 am: 0.850  11 am - 1 pm: 0.150  1 pm - 3 pm: 0.200  3 pm - 5 pm: 0.750  5 pm - 6 pm: 0.675  6 pm - 7 pm: 0.375   7 pm - 11 pm 0.350  11 pm - 12 MN: 0.200    Total daily dose: 8.725 Units per day    Bolus ratios: Bkfst: 1 Units for every 12 gms of carbs   Lunch: 1 Units for every 12 gms of carbs   Dinner: 1 Units for every 10 gms of carbs     Correction factor: 1 Units for every 75 mg/dl above a target glucose of 140 mg/dl (110 if in Control IQ mode) but at bedtime, 1 Unit for every 140      aspirin 81 MG Oral Tab EC Take 1 tablet (81 mg total) by mouth daily.      levothyroxine 150 MCG Oral Tab Take 1 tablet (150 mcg total) by mouth daily.      renal vitamin Oral Tab Take 1 tablet by mouth daily. 90 tablet 1    lactulose 10 GM/15ML Oral Solution Take 30 mL (20 g total) by mouth every evening.      Sevelamer 800 MG Oral Tab Take 1 tablet (800 mg total) by mouth 3 (three) times daily before meals. and 1 before snacks      famotidine 20 MG Oral Tab Take 1 tablet (20 mg total) by mouth  at bedtime.      GLUCAGON EMERGENCY 1 MG Injection Kit Inject 1 mg into the skin once as needed. 1 kit 1    EZETIMIBE 10 MG Oral Tab TAKE ONE TABLET BY MOUTH ONE TIME DAILY (Patient taking differently: Take 1 tablet (10 mg total) by mouth before evening meal.) 90 tablet 3       Allergies:  Allergies   Allergen Reactions    Antihistamine & Nasal Deconges [Fexofenadine-Pseudoephedrine] OTHER (SEE COMMENTS)     Altered mental status    Adhesive Tape ITCHING    Albuterol DIZZINESS and OTHER (SEE COMMENTS)     Albuterol Inhalation. Lightheadedness    Benadryl [Diphenhydramine] RESTLESSNESS    Depakote [Valproic Acid] DIZZINESS    Fluconazole OTHER (SEE COMMENTS)     Kidney labs abnormal    Mirtazapine RESTLESSNESS    Quetiapine RESTLESSNESS    Wellbutrin [Bupropion] RASH    Gabapentin OTHER (SEE COMMENTS)     Severe jerking motions    Latex RASH       ROS:     Denies fever/chills  Denies wt loss/gain  Denies HA or visual changes  Denies CP or palpitations  Denies SOB/cough/hemoptysis  Denies abd or flank pain  Denies N/V/D  Denies change in urinary habits or gross hematuria  Denies LE edema  Denies skin rashes/myalgias/arthralgias      PHYSICAL EXAM:   /72   Wt 163 lb 4 oz (74 kg)   BMI 26.35 kg/m²   Wt Readings from Last 3 Encounters:   10/21/24 163 lb 4 oz (74 kg)   09/30/24 160 lb 8 oz (72.8 kg)   10/11/24 160 lb (72.6 kg)     General: Alert and oriented in no apparent distress.  HEENT: No scleral icterus, MMM  Neck: Supple, no KALPANA or thyromegaly  Cardiac: Regular rate and rhythm, S1, S2 normal, no murmur or rub  Lungs: Clear without wheezes, rales, rhonchi.    Abdomen: Soft, non-tender. + bowel sounds, no palpable organomegaly  Extremities: Without clubbing, cyanosis or edema.  Neurologic: Alert and oriented, normal affect, cranial nerves grossly intact, moving all extremities  Skin: Warm and dry, no rashes      Rojelio Carter MD  10/21/2024  444 PM

## 2024-10-23 ENCOUNTER — HOSPITAL ENCOUNTER (OUTPATIENT)
Dept: WOUND CARE | Age: 74
Discharge: STILL ADMITTED OR ADMINISTRATIVE | End: 2024-10-23
Attending: PODIATRIST | Admitting: PODIATRIST

## 2024-10-23 VITALS — TEMPERATURE: 96.8 F

## 2024-10-23 DIAGNOSIS — E11.621 DIABETIC ULCER OF LEFT GREAT TOE  (CMD): Primary | ICD-10-CM

## 2024-10-23 DIAGNOSIS — G62.9 PERIPHERAL POLYNEUROPATHY: ICD-10-CM

## 2024-10-23 DIAGNOSIS — I73.9 PAD (PERIPHERAL ARTERY DISEASE) (CMD): ICD-10-CM

## 2024-10-23 DIAGNOSIS — L97.529 DIABETIC ULCER OF LEFT GREAT TOE  (CMD): Primary | ICD-10-CM

## 2024-10-23 DIAGNOSIS — I73.9 PVD (PERIPHERAL VASCULAR DISEASE) (CMD): ICD-10-CM

## 2024-10-23 PROCEDURE — 11042 DBRDMT SUBQ TIS 1ST 20SQCM/<: CPT

## 2024-10-23 ASSESSMENT — PAIN SCALES - GENERAL: PAINLEVEL_OUTOF10: 0

## 2024-10-25 ENCOUNTER — HOSPITAL ENCOUNTER (OUTPATIENT)
Facility: HOSPITAL | Age: 74
Setting detail: HOSPITAL OUTPATIENT SURGERY
Discharge: HOME | End: 2024-10-25
Attending: INTERNAL MEDICINE | Admitting: INTERNAL MEDICINE
Payer: MEDICARE

## 2024-10-25 ENCOUNTER — ANESTHESIA EVENT (OUTPATIENT)
Dept: ENDOSCOPY | Facility: HOSPITAL | Age: 74
End: 2024-10-25
Payer: MEDICARE

## 2024-10-25 ENCOUNTER — ANESTHESIA (OUTPATIENT)
Dept: ENDOSCOPY | Facility: HOSPITAL | Age: 74
End: 2024-10-25
Payer: MEDICARE

## 2024-10-25 VITALS
BODY MASS INDEX: 25.71 KG/M2 | HEART RATE: 75 BPM | OXYGEN SATURATION: 91 % | SYSTOLIC BLOOD PRESSURE: 101 MMHG | WEIGHT: 160 LBS | DIASTOLIC BLOOD PRESSURE: 53 MMHG | RESPIRATION RATE: 18 BRPM | TEMPERATURE: 98 F | HEIGHT: 66 IN

## 2024-10-25 LAB
ANION GAP SERPL CALC-SCNC: 10 MMOL/L (ref 0–18)
BUN BLD-MCNC: 32 MG/DL (ref 9–23)
CALCIUM BLD-MCNC: 9.8 MG/DL (ref 8.7–10.4)
CHLORIDE SERPL-SCNC: 95 MMOL/L (ref 98–112)
CO2 SERPL-SCNC: 30 MMOL/L (ref 21–32)
CREAT BLD-MCNC: 3.83 MG/DL
EGFRCR SERPLBLD CKD-EPI 2021: 16 ML/MIN/1.73M2 (ref 60–?)
GLUCOSE BLD-MCNC: 118 MG/DL (ref 70–99)
GLUCOSE BLD-MCNC: 122 MG/DL (ref 70–99)
OSMOLALITY SERPL CALC.SUM OF ELEC: 288 MOSM/KG (ref 275–295)
POTASSIUM SERPL-SCNC: 3.8 MMOL/L (ref 3.5–5.1)
SODIUM SERPL-SCNC: 135 MMOL/L (ref 136–145)

## 2024-10-25 PROCEDURE — 80048 BASIC METABOLIC PNL TOTAL CA: CPT | Performed by: ANESTHESIOLOGY

## 2024-10-25 PROCEDURE — 82962 GLUCOSE BLOOD TEST: CPT

## 2024-10-25 PROCEDURE — 0DJ08ZZ INSPECTION OF UPPER INTESTINAL TRACT, VIA NATURAL OR ARTIFICIAL OPENING ENDOSCOPIC: ICD-10-PCS | Performed by: INTERNAL MEDICINE

## 2024-10-25 RX ORDER — NICOTINE POLACRILEX 4 MG
30 LOZENGE BUCCAL
Status: DISCONTINUED | OUTPATIENT
Start: 2024-10-25 | End: 2024-10-25

## 2024-10-25 RX ORDER — LIDOCAINE HYDROCHLORIDE 20 MG/ML
INJECTION, SOLUTION EPIDURAL; INFILTRATION; INTRACAUDAL; PERINEURAL AS NEEDED
Status: DISCONTINUED | OUTPATIENT
Start: 2024-10-25 | End: 2024-10-25 | Stop reason: SURG

## 2024-10-25 RX ORDER — EPHEDRINE SULFATE 50 MG/ML
INJECTION INTRAVENOUS AS NEEDED
Status: DISCONTINUED | OUTPATIENT
Start: 2024-10-25 | End: 2024-10-25 | Stop reason: SURG

## 2024-10-25 RX ORDER — DEXTROSE MONOHYDRATE 25 G/50ML
50 INJECTION, SOLUTION INTRAVENOUS
Status: DISCONTINUED | OUTPATIENT
Start: 2024-10-25 | End: 2024-10-25

## 2024-10-25 RX ORDER — SODIUM CHLORIDE 9 MG/ML
INJECTION, SOLUTION INTRAVENOUS CONTINUOUS
Status: DISCONTINUED | OUTPATIENT
Start: 2024-10-25 | End: 2024-10-25

## 2024-10-25 RX ORDER — NICOTINE POLACRILEX 4 MG
15 LOZENGE BUCCAL
Status: DISCONTINUED | OUTPATIENT
Start: 2024-10-25 | End: 2024-10-25

## 2024-10-25 RX ADMIN — EPHEDRINE SULFATE 20 MG: 50 INJECTION INTRAVENOUS at 10:11:00

## 2024-10-25 RX ADMIN — LIDOCAINE HYDROCHLORIDE 50 MG: 20 INJECTION, SOLUTION EPIDURAL; INFILTRATION; INTRACAUDAL; PERINEURAL at 09:50:00

## 2024-10-25 RX ADMIN — EPHEDRINE SULFATE 10 MG: 50 INJECTION INTRAVENOUS at 10:06:00

## 2024-10-25 NOTE — ANESTHESIA PREPROCEDURE EVALUATION
PRE-OP EVALUATION    Patient Name: Gamaliel Boyer    Admit Diagnosis: ESOPHAGEAL DYSPHAGIA; ALTERED BOWEL FUNCTION    Pre-op Diagnosis: ESOPHAGEAL DYSPHAGIA; ALTERED BOWEL FUNCTION    ESOPHAGOGASTRODUODENOSCOPY (EGD)    Anesthesia Procedure: ESOPHAGOGASTRODUODENOSCOPY (EGD)    Surgeons and Role:     * Michele Peoples MD - Primary    Pre-op vitals reviewed.  Temp: 98.3 °F (36.8 °C)  Pulse: 58  Resp: 18  BP: 102/65  SpO2: 98 %  Body mass index is 25.82 kg/m².    Current medications reviewed.  Hospital Medications:   glucose (Dex4) 15 GM/59ML oral liquid 15 g  15 g Oral Q15 Min PRN    Or    glucose (Glutose) 40% oral gel 15 g  15 g Oral Q15 Min PRN    Or    glucose-vitamin C (Dex-4) chewable tab 4 tablet  4 tablet Oral Q15 Min PRN    Or    dextrose 50% injection 50 mL  50 mL Intravenous Q15 Min PRN    Or    glucose (Dex4) 15 GM/59ML oral liquid 30 g  30 g Oral Q15 Min PRN    Or    glucose (Glutose) 40% oral gel 30 g  30 g Oral Q15 Min PRN    Or    glucose-vitamin C (Dex-4) chewable tab 8 tablet  8 tablet Oral Q15 Min PRN    sodium chloride 0.9% infusion   Intravenous Continuous       Outpatient Medications:   Prescriptions Prior to Admission[1]    Allergies: Antihistamine & nasal deconges [fexofenadine-pseudoephedrine], Adhesive tape, Albuterol, Benadryl [diphenhydramine], Depakote [valproic acid], Fluconazole, Mirtazapine, Quetiapine, Wellbutrin [bupropion], Gabapentin, and Latex      Anesthesia Evaluation    Patient summary reviewed.    Anesthetic Complications  (-) history of anesthetic complications         GI/Hepatic/Renal      (+) GERD       (+) chronic renal disease and ESRD and hemodialysis  (+) liver disease                 Cardiovascular        Exercise tolerance: good     MET: >4      (+) hypertension   (+) hyperlipidemia                  (+) CHF                Endo/Other  Comment: Secondary hyperparathyroidism    (+) diabetes and poorly controlled, type 1, using insulin  (+) hypothyroidism    (+)  anemia        (+) thrombocytopenia    (+) arthritis       Pulmonary        (+) COPD            (+) sleep apnea and CPAP      Neuro/Psych  Comment: Choreiform movement  Restless leg    (+) depression           (+) neuromuscular disease             Patient Active Problem List:     Restless leg     Secondary hyperparathyroidism (HCC)     S/P total knee arthroplasty     Ulcerated, foot, right, with fat layer exposed (HCC)     PRANEETH on CPAP     Insulin pump status     Type 1 diabetes, uncontrolled, with neuropathy     Essential hypertension     Hypothyroidism (acquired)     Uncontrolled type 1 diabetes mellitus with hyperglycemia, with long-term current use of insulin (HCC)     Onychomycosis     Lumbar stenosis with neurogenic claudication     DDD (degenerative disc disease), lumbar     Long-term insulin use (HCC)     Uncontrolled type 1 diabetes mellitus with proliferative retinopathy without macular edema, with long-term current use of insulin     Uncontrolled type 1 diabetes mellitus with diabetic nephropathy, with long-term current use of insulin     Pulmonary hypertension (HCC)     Uncontrolled type 1 diabetes mellitus with stage 3 chronic kidney disease     Neuropathy     Primary hypertension     Hyperlipidemia LDL goal <100     Hypoglycemia unawareness in type 1 diabetes mellitus (HCC)     Sensory hearing loss, bilateral     Moderate episode of recurrent major depressive disorder (HCC)     Controlled type 1 diabetes mellitus with complication, with long-term current use of insulin (HCC)     Myofascial pain     Charcot foot due to diabetes mellitus (HCC)     Other chronic pain     Chronic mastoiditis of right side     Cervical myelopathy (HCC)     Pulmonary emphysema, unspecified emphysema type (HCC)     Difficulty walking     S/P insertion of spinal cord stimulator     Hyperglycemia     Stage 4 chronic kidney disease (HCC)     Urinary retention     Anemia in stage 4 chronic kidney disease (HCC)     Arthritis of  facet joint of lumbar spine     Orthostatic hypotension     Type 1 diabetes mellitus with complication, with long term current use of insulin pump (HCC)     Esophagitis     Platelets decreased (HCC)     Jerking movements of extremities     Diabetic complication (HCC)     Acute renal failure (HCC)     Acute renal failure, unspecified acute renal failure type (HCC)     Hyponatremia     Anemia in ESRD (end-stage renal disease) ([facility])     ESRD on hemodialysis ([facility])     Hypokalemia     Thrombocytopenia (HCC)     Altered mental status, unspecified altered mental status type     Rash     Diabetes mellitus due to underlying condition with chronic kidney disease on chronic dialysis, with long-term current use of insulin ([facility])     Other fatigue     Acute renal failure superimposed on chronic kidney disease, unspecified CKD stage, unspecified acute renal failure type     Acute on chronic congestive heart failure, unspecified heart failure type ([facility])     Urticaria     Cellulitis of left upper extremity     Anemia     Acute kidney injury ([facility])     Syncope and collapse     Abnormal movement     Atrophy of muscle of hand     Acute encephalopathy     Type 2 diabetes mellitus with hyperglycemia, with long-term current use of insulin ([facility])     Fall, initial encounter     Hypoglycemia     Hyperammonemia ([facility])     Choreiform movement     Myoclonus     Groin abscess     ESRD (end stage renal disease) on dialysis ([facility])     Type 1 diabetes mellitus with hyperglycemia (HCC)     Multiple falls     Contusion of coccyx, initial encounter     Weakness generalized     Other ascites     Anemia in chronic kidney disease, on chronic dialysis ([facility])     Discitis of lumbar region     Osteomyelitis (HCC)     ESRD (end stage renal disease) ([facility])           Past Surgical History:   Procedure Laterality Date    Back surgery  2009    laminectomy  L1 -4  2/09  Dr. Eitan Guerrero [facility]    Back surgery  8-8-13    C4-C6 ACDF     Back surgery  9/8/16    L2-S1  Revision Decomp possible uninstrumented fusion 9/8/16    Back surgery  09/10/2018    Rev C3-C7 ACDF     Cataract Bilateral done in 2004    IOL's    Colonoscopy      2006    Colonoscopy  2/2009    normal    Colonoscopy N/A 8/23/2019    adenoma- repeat 1 yr (2 dya prep)    Colonoscopy,biopsy  2/20/09    Performed by ANAM RIVERS at [facility] [place]    Egd N/A 7/23/2021    EGD & COLONOSCOPY Surgeon: Kimberly, +vik, rpt EGD 3 months, poor prep rpt colon 3 years    Endoscopy, bowel pouch, biopsy      Injection, w/wo contrast, dx/therapeutic substance, epidural/subarachnoid; lumbar/sacral N/A 5/4/2016    Procedure: LUMBAR EPIDURAL;  Surgeon: Jayden Norton MD;  Location: [facility]    Injection, w/wo contrast, dx/therapeutic substance, epidural/subarachnoid; lumbar/sacral N/A 5/25/2016    Procedure: LUMBAR EPIDURAL;  Surgeon: Jayden Norton MD;  Location: [facility]    Injection, w/wo contrast, dx/therapeutic substance, epidural/subarachnoid; lumbar/sacral N/A 6/8/2016    Procedure: LUMBAR EPIDURAL;  Surgeon: Jayden Norton MD;  Location: [facility]    Knee replacement surgery  2/14/13    Left TKR by Dr. Lombardi    M-sedaj by  phys perfrmg svc 5+ yr N/A 5/4/2016    Procedure: LUMBAR EPIDURAL;  Surgeon: Jayden Norton MD;  Location: [facility]    M-sedaj by  phys perfrmg svc 5+ yr N/A 5/25/2016    Procedure: LUMBAR EPIDURAL;  Surgeon: Jayden Norton MD;  Location: [facility]    M-sedaj by  phys perfrmg svc 5+ yr N/A 6/8/2016    Procedure: LUMBAR EPIDURAL;  Surgeon: Jayden Norton MD;  Location: [facility]    Other surgical history      Bilateral median nerve release at elbow; 2000    Other surgical history      CTS release bilateral 2000    Other surgical history      Surgical repair fracture left hand 5th digit    Other surgical history      Surgery left heel ligament repair     Other surgical history      Laser surgery for bilateral retinopathy    Other surgical history      Surgery to left eye for \"weak muscle.\" 2007    Other surgical history      bilat knee repair 9/23/10    Other surgical history  11/2013    lung resection to remove abcess    Other surgical history  10/26/2020    Cystoscopy (Dr. Ribeiro)    Patient documented not to have experienced any of the following events  2/14/2014    Procedure: ;  Surgeon: Kin Hobbs MD;  Location: [facility]    Patient documented not to have experienced any of the following events N/A 5/4/2016    Procedure: LUMBAR EPIDURAL;  Surgeon: Jayden Norton MD;  Location: [facility]    Patient documented not to have experienced any of the following events N/A 5/25/2016    Procedure: LUMBAR EPIDURAL;  Surgeon: Jayden Norton MD;  Location: [facility]    Patient documented not to have experienced any of the following events N/A 6/8/2016    Procedure: LUMBAR EPIDURAL;  Surgeon: Jayden Norton MD;  Location: [facility]    Patient withough preoperative order for iv antibiotic surgical site infection prophylaxis.  2/14/2014    Procedure: ;  Surgeon: Kin Hobbs MD;  Location: [facility]    Patient withough preoperative order for iv antibiotic surgical site infection prophylaxis. N/A 5/4/2016    Procedure: LUMBAR EPIDURAL;  Surgeon: Jayden Norton MD;  Location: [facility]    Patient withough preoperative order for iv antibiotic surgical site infection prophylaxis. N/A 5/25/2016    Procedure: LUMBAR EPIDURAL;  Surgeon: Jayden Norton MD;  Location: [facility]    Patient withough preoperative order for iv antibiotic surgical site infection prophylaxis. N/A 6/8/2016    Procedure: LUMBAR EPIDURAL;  Surgeon: Jayden Norton MD;  Location: [facility]    Total knee replacement Left 2013    Upper gi  endoscopy,diagnosis  16    retained gastric contents; Cesar    Upper gi endoscopy,remov f.b. N/A 2014    Procedure: ESOPHAGOGASTRODUODENOSCOPY, POSSIBLE BIOPSY, POSSIBLE POLYPECTOMY 57526;  Surgeon: Kin Hobbs MD;  Location: [facility]     Social History     Socioeconomic History    Marital status:     Number of children: 1   Occupational History    Occupation: Retired--Printer   Tobacco Use    Smoking status: Former     Current packs/day: 0.00     Average packs/day: 3.5 packs/day for 20.0 years (70.0 ttl pk-yrs)     Types: Cigarettes     Start date: 1966     Quit date: 1986     Years since quittin.8    Smokeless tobacco: Never    Tobacco comments:     Quit 25 years ago   Vaping Use    Vaping status: Never Used   Substance and Sexual Activity    Alcohol use: Not Currently     Comment: very rarely    Drug use: No    Sexual activity: Yes     Partners: Female   Other Topics Concern     Service No    Blood Transfusions No    Caffeine Concern No     Comment: coffee 1 cup per day    Sleep Concern No    Exercise Yes     Comment: PT/OT 1x per week    Seat Belt Yes     History   Drug Use No     Available pre-op labs reviewed.  Lab Results   Component Value Date    WBC 5.2 10/03/2024    RBC 3.57 (L) 10/03/2024    HGB 11.5 (L) 10/03/2024    HCT 37.3 (L) 10/03/2024    .5 (H) 10/03/2024    MCH 32.2 10/03/2024    MCHC 30.8 (L) 10/03/2024    RDW 20.7 10/03/2024    .0 (L) 10/03/2024     Lab Results   Component Value Date     (L) 10/04/2024    K 4.2 10/04/2024    CL 99 10/04/2024    CO2 26.0 10/04/2024    BUN 31 (H) 10/04/2024    CREATSERUM 4.84 (H) 10/04/2024    GLU 56 (L) 10/04/2024    CA 9.3 10/04/2024            Airway      Mallampati: III  Mouth opening: 3 FB  TM distance: 4 - 6 cm  Neck ROM: full Cardiovascular    Cardiovascular exam normal.  Rhythm: regular  Rate: normal     Dental      Dental appliance(s): partials       Pulmonary    Pulmonary  exam normal.                 Other findings              ASA: 4   Plan: MAC  NPO status verified and patient meets guidelines.    Post-procedure pain management plan discussed with surgeon and patient.    Comment: I explained the intrinsic risks of MAC anesthesia to Gamaliel Boyer including intraoperative awareness/recall, PONV, post-operative pain/discomfort, risk of aspiration, insertion of naso- or oropharyngeal airways, conversion to general anesthesia, dental injury, pressure/nerve injuries from positioning, and other serious but rare complications (life-threatening cardiopulmonary events). All questions were answered and patient demonstrated understanding of realistic expectations and risks of undergoing anesthesia. Informed consent was signed by patient.    Plan/risks discussed with: patient and spouse                Present on Admission:  **None**             [1]   Medications Prior to Admission   Medication Sig Dispense Refill Last Dose/Taking    [] ceFEPIme 2 g in sodium chloride 100 mL Inject 2 g into the vein 3 (three) times a week for 14 days. Inject 2gm three times a week with HD through 10/18/24 5 each 0 Taking    [] Vancomycin HCl in NaCl 1-0.9 GM/250ML-% Intravenous Solution Inject 250 mL (1 g total) into the vein 3 (three) times a week for 14 days. IV Vancomycin with HD three times a week through 10/18/24 5000 mL 0 Taking    HYDROcodone-acetaminophen  MG Oral Tab Take 1 tablet by mouth every 4 (four) hours as needed for Pain. 20 tablet 0 10/24/2024    bacitracin-polymyxin b Ophthalmic Ointment Place 1 inch into the right eye in the morning, at noon, and at bedtime.   10/24/2024    moxifloxacin 0.5 % Ophthalmic Solution Place 1 drop into the right eye 3 (three) times daily.   10/24/2024    prednisoLONE 1 % Ophthalmic Suspension Apply 1 drop to eye 4 (four) times daily.   10/24/2024    Melatonin 3 MG Oral Cap Take 3.3333 capsules (10 mg total) by mouth nightly.   Taking     Probiotic Product (PROBIOTIC BLEND OR) Take 1 tablet by mouth daily.   10/24/2024    midodrine 5 MG Oral Tab Take 2 tablets (10 mg total) by mouth As Directed. Before each dialysis   10/24/2024    sertraline 25 MG Oral Tab Take 1 tablet (25 mg total) by mouth daily.   Taking    cholecalciferol 50 MCG (2000 UT) Oral Tab Take 1 tablet (2,000 Units total) by mouth daily.   10/24/2024    vitamin E 400 UNITS Oral Cap Take 1,000 Units by mouth daily.   10/24/2024    Omega-3 Fatty Acids (FISH OIL) 1200 MG Oral Cap Take 1,200 mg by mouth daily.   10/24/2024    insulin lispro 100 UNIT/ML Injection Solution Basal settings: 12 MN - 3:30 am: 0.100  3:30 am - 8 am: 0.350  8 am - 10 am: 0.550   10 am - 11 am: 0.850  11 am - 1 pm: 0.150  1 pm - 3 pm: 0.200  3 pm - 5 pm: 0.750  5 pm - 6 pm: 0.675  6 pm - 7 pm: 0.375   7 pm - 11 pm 0.350  11 pm - 12 MN: 0.200    Total daily dose: 8.725 Units per day    Bolus ratios: Bkfst: 1 Units for every 12 gms of carbs   Lunch: 1 Units for every 12 gms of carbs   Dinner: 1 Units for every 10 gms of carbs     Correction factor: 1 Units for every 75 mg/dl above a target glucose of 140 mg/dl (110 if in Control IQ mode) but at bedtime, 1 Unit for every 140   Taking    aspirin 81 MG Oral Tab EC Take 1 tablet (81 mg total) by mouth daily.   10/24/2024    levothyroxine 150 MCG Oral Tab Take 1 tablet (150 mcg total) by mouth daily.   10/25/2024 at  6:00 AM    renal vitamin Oral Tab Take 1 tablet by mouth daily. 90 tablet 1 10/24/2024    lactulose 10 GM/15ML Oral Solution Take 30 mL (20 g total) by mouth every evening.   Taking    Sevelamer 800 MG Oral Tab Take 1 tablet (800 mg total) by mouth 3 (three) times daily before meals. and 1 before snacks   10/24/2024    famotidine 20 MG Oral Tab Take 1 tablet (20 mg total) by mouth at bedtime.   10/24/2024    EZETIMIBE 10 MG Oral Tab TAKE ONE TABLET BY MOUTH ONE TIME DAILY (Patient taking differently: Take 1 tablet (10 mg total) by mouth before evening meal.)  90 tablet 3 10/24/2024    SSD 1 % External Cream Apply 1 Application topically daily.       Insulin Glargine, 1 Unit Dial, (TOUJEO SOLOSTAR) 300 UNIT/ML Subcutaneous Solution Pen-injector Inject 6 Units into the skin as needed (If insulin pump not working).       GLUCAGON EMERGENCY 1 MG Injection Kit Inject 1 mg into the skin once as needed. 1 kit 1

## 2024-10-25 NOTE — H&P
[facility] GASTROENTEROLOGY    REFERRING PHYSICIAN: Dr. Edu Alston JOHN Boyer is a 73 year old male.  Dysphagia    See Surjit note reviewed from 9/20/24    PROCEDURE:EGD    Allergies: Antihistamine & nasal deconges [fexofenadine-pseudoephedrine], Adhesive tape, Albuterol, Benadryl [diphenhydramine], Depakote [valproic acid], Fluconazole, Mirtazapine, Quetiapine, Wellbutrin [bupropion], Gabapentin, and Latex  No current outpatient medications on file.     Past Medical History:    Anemia    anxiety    back pain    Back problem    Blastomycosis    BPH (benign prostatic hyperplasia)    Depression    Diabetic retinopathy    laser surgery    Discitis, unspecified, lumbosacral region    End stage renal disease (HCC)    Esophageal reflux    occassion    Hearing impairment    hearing aids    HEMORRHOIDS    High blood pressure    High cholesterol    History of blood transfusion    History of renal dialysis    fistula on left arm, Tue, Thurs, Sat    Hyperkalemia    hyperlipidemia    hypothyroidism    IMPOTENCE    Liver disease    2013 - pt states all resolved after liver and kidneys shut down after issue with pneumonia     Mood disturbance    Neuropathy    osteoarthritis    Osteoarthrosis, unspecified whether generalized or localized, unspecified site    osteoporosis    Polyneuropathy in diabetes(357.2)    Sepsis (HCC)    Sleep apnea, obstructive    AHI 54 SaO2 lizeth 74 % CPAP 12 HME//     Type I (juvenile type) diabetes mellitus without mention of complication, not stated as uncontrolled    since 1963- DR Mao follows    Unspecified essential hypertension    Visual impairment    wears glasses     Past Surgical History:   Procedure Laterality Date    Back surgery  2009    laminectomy  L1 -4  2/09  Dr. Eitan Guerrero [facility]    Back surgery  8-8-13    C4-C6 ACDF     Back surgery  9/8/16    L2-S1 Revision Decomp possible uninstrumented fusion 9/8/16    Back surgery  09/10/2018    Rev C3-C7 ACDF     Cataract  Bilateral done in 2004    IOL's    Colonoscopy      2006    Colonoscopy  2/2009    normal    Colonoscopy N/A 8/23/2019    adenoma- repeat 1 yr (2 dya prep)    Colonoscopy,biopsy  2/20/09    Performed by ANAM RIVERS at [facility] [place]    Egd N/A 7/23/2021    EGD & COLONOSCOPY Surgeon: Kimberly, +vik, rpt EGD 3 months, poor prep rpt colon 3 years    Endoscopy, bowel pouch, biopsy      Injection, w/wo contrast, dx/therapeutic substance, epidural/subarachnoid; lumbar/sacral N/A 5/4/2016    Procedure: LUMBAR EPIDURAL;  Surgeon: Jayden Norton MD;  Location: [facility]    Injection, w/wo contrast, dx/therapeutic substance, epidural/subarachnoid; lumbar/sacral N/A 5/25/2016    Procedure: LUMBAR EPIDURAL;  Surgeon: Jayden Norton MD;  Location: [facility]    Injection, w/wo contrast, dx/therapeutic substance, epidural/subarachnoid; lumbar/sacral N/A 6/8/2016    Procedure: LUMBAR EPIDURAL;  Surgeon: Jayden Norton MD;  Location: [facility]    Knee replacement surgery  2/14/13    Left TKR by Dr. Lombardi    M-sedaj by  phys perfrmg svc 5+ yr N/A 5/4/2016    Procedure: LUMBAR EPIDURAL;  Surgeon: Jayden Norton MD;  Location: [facility]    M-seda by  phys perfrmg svc 5+ yr N/A 5/25/2016    Procedure: LUMBAR EPIDURAL;  Surgeon: Jayden Norton MD;  Location: [facility]    M-seda by  phys perfrmg svc 5+ yr N/A 6/8/2016    Procedure: LUMBAR EPIDURAL;  Surgeon: Jayden Norton MD;  Location: [facility]    Other surgical history      Bilateral median nerve release at elbow; 2000    Other surgical history      CTS release bilateral 2000    Other surgical history      Surgical repair fracture left hand 5th digit    Other surgical history      Surgery left heel ligament repair    Other surgical history      Laser surgery for bilateral retinopathy    Other surgical history      Surgery to  left eye for \"weak muscle.\" 2007    Other surgical history      bilat knee repair 9/23/10    Other surgical history  11/2013    lung resection to remove abcess    Other surgical history  10/26/2020    Cystoscopy (Dr. Ribeiro)    Patient documented not to have experienced any of the following events  2/14/2014    Procedure: ;  Surgeon: Kin Hobbs MD;  Location: [facility]    Patient documented not to have experienced any of the following events N/A 5/4/2016    Procedure: LUMBAR EPIDURAL;  Surgeon: Jayden Norton MD;  Location: [facility]    Patient documented not to have experienced any of the following events N/A 5/25/2016    Procedure: LUMBAR EPIDURAL;  Surgeon: Jayden Norton MD;  Location: [facility]    Patient documented not to have experienced any of the following events N/A 6/8/2016    Procedure: LUMBAR EPIDURAL;  Surgeon: Jayden Norton MD;  Location: [facility]    Patient withough preoperative order for iv antibiotic surgical site infection prophylaxis.  2/14/2014    Procedure: ;  Surgeon: Kin Hobbs MD;  Location: [facility]    Patient withough preoperative order for iv antibiotic surgical site infection prophylaxis. N/A 5/4/2016    Procedure: LUMBAR EPIDURAL;  Surgeon: Jayden Norton MD;  Location: [facility]    Patient withough preoperative order for iv antibiotic surgical site infection prophylaxis. N/A 5/25/2016    Procedure: LUMBAR EPIDURAL;  Surgeon: Jayden Norton MD;  Location: [facility]    Patient withough preoperative order for iv antibiotic surgical site infection prophylaxis. N/A 6/8/2016    Procedure: LUMBAR EPIDURAL;  Surgeon: Jayden Norton MD;  Location: [facility]    Total knee replacement Left 2013    Upper gi endoscopy,diagnosis  4/14/16    retained gastric contents; Cesar    Upper gi endoscopy,remov f.b. N/A 2/14/2014     Procedure: ESOPHAGOGASTRODUODENOSCOPY, POSSIBLE BIOPSY, POSSIBLE POLYPECTOMY 76832;  Surgeon: Kin Hobbs MD;  Location: [facility]     Social History     Socioeconomic History    Marital status:     Number of children: 1   Occupational History    Occupation: Retired--Printer   Tobacco Use    Smoking status: Former     Current packs/day: 0.00     Average packs/day: 3.5 packs/day for 20.0 years (70.0 ttl pk-yrs)     Types: Cigarettes     Start date: 1966     Quit date: 1986     Years since quittin.8    Smokeless tobacco: Never    Tobacco comments:     Quit 25 years ago   Vaping Use    Vaping status: Never Used   Substance and Sexual Activity    Alcohol use: Not Currently     Comment: very rarely    Drug use: No    Sexual activity: Yes     Partners: Female   Other Topics Concern     Service No    Blood Transfusions No    Caffeine Concern No     Comment: coffee 1 cup per day    Sleep Concern No    Exercise Yes     Comment: PT/OT 1x per week    Seat Belt Yes     Social Drivers of Health     Financial Resource Strain: Low Risk  (2024)    Received from [facility]    Financial Resource Strain     In the past year, have you or any family members you live with been unable to get any of the following when it was really needed? Check all that apply.: None   Food Insecurity: No Food Insecurity (2024)    Food Insecurity     Food Insecurity: Never true   Transportation Needs: No Transportation Needs (2024)    Transportation Needs     Lack of Transportation: No   Social Connections: Low Risk  (2024)    Received from [facility]    Social Connections     How often do you see or talk to people that you care about and feel close to? (For example: talking to friends on the phone, visiting friends or family, going to Sikh or club meetings): 5 or more times a week   Housing Stability: Low Risk  (2024)    Housing Stability     Housing  Instability: No         Exam:  /65 (BP Location: Right arm)   Pulse 58   Temp 98.3 °F (36.8 °C) (Temporal)   Resp 18   Ht 5' 6\" (1.676 m)   Wt 160 lb (72.6 kg)   SpO2 98%   BMI 25.82 kg/m²  - Body mass index is 25.82 kg/m²., A+0x3, HEENT: non-icteric, LUNGS: CTA, HEART: RRR, ABDOMEN:+BS, soft, non-tender, no guarding, EXTREM: no peripheral edema      ASSESSMENT AND PLAN:  Gamaliel Boyer is a 73 year old male.  Dysphagia    1.  EGD    I have discussed the risks and benefits and alternatives with the patient/family.  They understand and agree to proceed with the plan of care.    I have reviewed the History and Physical performed.  I have examined this patient today and any changes are documented above.     Michele Peoples MD  10/25/2024  9:39 AM

## 2024-10-25 NOTE — OPERATIVE REPORT
[facility]    Gamaliel Boyer Patient Status:  Hospital Outpatient Surgery    1950 MRN [id number]   Location [facility] ENDOSCOPY [facility] Attending Michele Peoples MD   Hosp Day # 0 PCP Nima Sorensen MD         PATIENT NAME: Gamaliel Boyer  DATE OF OPERATION: 10/25/2024    PREOPERATIVE DIAGNOSIS:  Dysphagia  Cirrhosis  Declines CRC screening colonoscopy  POSTOPERATIVE DIAGNOSIS:  Normal EGD.  No varices    PROCEDURE PERFORMED: Upper endoscopy with MAC sedation  SURGEON: Michele Peoples MD   MEDICATIONS: MAC IV in divided doses under the supervision of Anesthesia.  PROCEDURE AND FINDINGS: The patient was placed into the left lateral decubitus position after informed consent was obtained.  All questions were answered.  MAC IV sedation was administered.  The Olympus video gastroscope was introduced into the mouth and passed to the third portion of the duodenum.  The entire examined esophagus was normal.  The entire examined stomach,  including retroflexion view, was normal.  The entire examined duodenum was normal.   The gastroscope was removed from the patient.  The procedure was completed. There were no implants placed nor significant blood loss.  The patient tolerated the procedure well. There were no immediate post procedure complications.  Moderate sedation time:  None.  Deep sedation provided by anesthesia    RECOMMENDATIONS:  Follow up 6 months office visit    Michele Peoples MD

## 2024-10-25 NOTE — DISCHARGE INSTRUCTIONS
[facility]    Procedure(s): Upper Endoscopy    Findings:    1.  Normal endoscopy.  2.  Normal esophagus    Disposition: home    Patient Instructions:     1.  Call when ready for colonoscopy.  2.  Office visit in 6 months      Michele Peoples MD    Home Care Instructions Gastroscopy with Sedation    Diet:  - Resume your regular diet as tolerated unless otherwise instructed.  - Start with light meals to minimize bloating.  - Do not drink alcohol today.    Medication:  - If you have questions about resuming your normal medications, please contact your Primary Care Physician.    Activities:  - Take it easy today. Do not return to work today.  - Do not drive today.  - Do not operate any machinery today (including kitchen equipment).    Gastroscopy:  - You may have a sore throat for 2-3 days following the exam. This is normal. Gargling with warm salt water (1/2 tsp salt to 1 glass warm water) or using throat lozenges will help.  - If you experience any sharp pain in your neck, abdomen or chest, vomiting of blood, oral temperature over 100 degrees Fahrenheit, light-headedness or dizziness, or any other problems, contact your doctor.    **If unable to reach your doctor, please go to the [facility] Emergency Room**    - Your referring physician will receive a full report of your examination.  - If you do not hear from your doctor's office within two weeks of your biopsy, please call them for your results.

## 2024-10-25 NOTE — ANESTHESIA POSTPROCEDURE EVALUATION
[facility]    Gamalile Boyer Patient Status:  Hospital Outpatient Surgery   Age/Gender 73 year old male MRN [id number]   Location [facility] ENDOSCOPY [facility]IN [facility] Attending Michele Peoples MD   Hosp Day # 0 PCP Nima Sorensen MD       Anesthesia Post-op Note    ESOPHAGOGASTRODUODENOSCOPY (EGD)    Procedure Summary       Date: 10/25/24 Room / Location:  ENDOSCOPY 03 /  ENDOSCOPY    Anesthesia Start: 0946 Anesthesia Stop: 1001    Procedure: ESOPHAGOGASTRODUODENOSCOPY (EGD) Diagnosis: (NORMAL)    Surgeons: Michele Peoples MD Anesthesiologist: Christopher Cagle MD    Anesthesia Type: MAC ASA Status: 4            Anesthesia Type: MAC    Vitals Value Taken Time   /61 10/25/24 1001   Temp   10/25/24 1001   Pulse 87 10/25/24 1001   Resp 14 10/25/24 1001   SpO2 98 10/25/24 1001       Patient Location: Endoscopy    Anesthesia Type: MAC    Airway Patency: patent    Postop Pain Control: adequate    Mental Status: mildly sedated but able to meaningfully participate in the post-anesthesia evaluation    Nausea/Vomiting: none    Cardiopulmonary/Hydration status: stable euvolemic    Complications: no apparent anesthesia related complications    Postop vital signs: stable    Dental Exam: Unchanged from Preop

## 2024-11-02 NOTE — PLAN OF CARE
Pt A/O x4. VSS. Afebrile. Pt denies pain throughout day. Pt c/o itching to LUE. PRN benadryl given per MAR. All other medications admin per MAR. Pt has discoloration to skin in [facility]. MD aware. No new orders. Pt's blood glucose elevated today at 342. MD notified and pt placed on sliding scale insulin. Insulin coverage provided. Pt has AV fistula in [facility]. Not in use at this time. Pt has permacath for HD on R side. Pt cleared for discharge by all involved. IV removed prior to discharge. Fall and safety precautions in place. Call light within reach.
normal

## 2024-11-04 NOTE — TELEPHONE ENCOUNTER
Spoke to [facility]; has appt pending; will obtain BMP; continue current BP meds- vera acosta 19:09

## 2024-11-13 ENCOUNTER — HOSPITAL ENCOUNTER (OUTPATIENT)
Dept: WOUND CARE | Age: 74
Discharge: STILL ADMITTED OR ADMINISTRATIVE | End: 2024-11-13
Attending: PODIATRIST | Admitting: PODIATRIST

## 2024-11-13 VITALS — TEMPERATURE: 97 F

## 2024-11-13 DIAGNOSIS — L97.929 VENOUS ULCER OF LEFT LEG  (CMD): ICD-10-CM

## 2024-11-13 DIAGNOSIS — I73.9 PAD (PERIPHERAL ARTERY DISEASE) (CMD): ICD-10-CM

## 2024-11-13 DIAGNOSIS — L97.529 DIABETIC ULCER OF LEFT GREAT TOE  (CMD): Primary | ICD-10-CM

## 2024-11-13 DIAGNOSIS — I73.9 PVD (PERIPHERAL VASCULAR DISEASE) (CMD): ICD-10-CM

## 2024-11-13 DIAGNOSIS — E11.621 DIABETIC ULCER OF LEFT GREAT TOE  (CMD): Primary | ICD-10-CM

## 2024-11-13 DIAGNOSIS — E11.621 DIABETIC ULCER OF RIGHT GREAT TOE  (CMD): ICD-10-CM

## 2024-11-13 DIAGNOSIS — L97.519 DIABETIC ULCER OF RIGHT GREAT TOE  (CMD): ICD-10-CM

## 2024-11-13 DIAGNOSIS — I83.029 VENOUS ULCER OF LEFT LEG  (CMD): ICD-10-CM

## 2024-11-13 PROCEDURE — 11042 DBRDMT SUBQ TIS 1ST 20SQCM/<: CPT

## 2024-11-13 ASSESSMENT — PAIN SCALES - GENERAL: PAINLEVEL_OUTOF10: 0

## 2024-12-04 ENCOUNTER — HOSPITAL ENCOUNTER (OUTPATIENT)
Dept: WOUND CARE | Age: 74
Discharge: STILL ADMITTED OR ADMINISTRATIVE | End: 2024-12-04
Attending: PODIATRIST | Admitting: PODIATRIST

## 2024-12-04 VITALS — TEMPERATURE: 96.4 F

## 2024-12-04 DIAGNOSIS — Z89.412 HISTORY OF PARTIAL RAY AMPUTATION OF LEFT GREAT TOE  (CMD): ICD-10-CM

## 2024-12-04 DIAGNOSIS — L97.529 DIABETIC ULCER OF LEFT GREAT TOE  (CMD): ICD-10-CM

## 2024-12-04 DIAGNOSIS — G62.9 PERIPHERAL POLYNEUROPATHY: ICD-10-CM

## 2024-12-04 DIAGNOSIS — L97.912 SKIN ULCER OF RIGHT LOWER LEG WITH FAT LAYER EXPOSED  (CMD): ICD-10-CM

## 2024-12-04 DIAGNOSIS — E11.621 DIABETIC ULCER OF LEFT GREAT TOE  (CMD): ICD-10-CM

## 2024-12-04 DIAGNOSIS — L97.521 DIABETIC ULCER OF TOE OF LEFT FOOT ASSOCIATED WITH DIABETES MELLITUS DUE TO UNDERLYING CONDITION, LIMITED TO BREAKDOWN OF SKIN  (CMD): ICD-10-CM

## 2024-12-04 DIAGNOSIS — I73.9 PAD (PERIPHERAL ARTERY DISEASE) (CMD): Primary | ICD-10-CM

## 2024-12-04 DIAGNOSIS — L97.511 ULCER OF RIGHT FOOT, LIMITED TO BREAKDOWN OF SKIN  (CMD): ICD-10-CM

## 2024-12-04 DIAGNOSIS — I89.0 LYMPHEDEMA OF BOTH LOWER EXTREMITIES: ICD-10-CM

## 2024-12-04 DIAGNOSIS — E08.621 DIABETIC ULCER OF TOE OF LEFT FOOT ASSOCIATED WITH DIABETES MELLITUS DUE TO UNDERLYING CONDITION, LIMITED TO BREAKDOWN OF SKIN  (CMD): ICD-10-CM

## 2024-12-04 PROCEDURE — 11042 DBRDMT SUBQ TIS 1ST 20SQCM/<: CPT

## 2024-12-04 ASSESSMENT — PAIN SCALES - GENERAL: PAINLEVEL_OUTOF10: 0

## 2024-12-11 RX ORDER — MINERAL OIL, PETROLATUM 425; 568 MG/G; MG/G
1 OINTMENT OPHTHALMIC 3 TIMES DAILY
Status: ON HOLD | COMMUNITY
End: 2025-01-06 | Stop reason: CLARIF

## 2024-12-18 ENCOUNTER — HOSPITAL ENCOUNTER (OUTPATIENT)
Dept: WOUND CARE | Age: 74
Discharge: STILL ADMITTED OR ADMINISTRATIVE | End: 2024-12-18
Attending: PODIATRIST | Admitting: PODIATRIST

## 2024-12-18 DIAGNOSIS — Z89.412 HISTORY OF PARTIAL RAY AMPUTATION OF LEFT GREAT TOE  (CMD): ICD-10-CM

## 2024-12-18 DIAGNOSIS — G62.9 PERIPHERAL POLYNEUROPATHY: ICD-10-CM

## 2024-12-18 DIAGNOSIS — L97.529 DIABETIC ULCER OF LEFT GREAT TOE  (CMD): ICD-10-CM

## 2024-12-18 DIAGNOSIS — E11.621 DIABETIC ULCER OF LEFT GREAT TOE  (CMD): ICD-10-CM

## 2024-12-18 DIAGNOSIS — I73.9 PAD (PERIPHERAL ARTERY DISEASE) (CMD): Primary | ICD-10-CM

## 2024-12-18 PROCEDURE — 11042 DBRDMT SUBQ TIS 1ST 20SQCM/<: CPT

## 2024-12-26 ENCOUNTER — ANESTHESIA EVENT (OUTPATIENT)
Dept: SURGERY | Facility: HOSPITAL | Age: 74
End: 2024-12-26
Payer: MEDICARE

## 2025-01-06 ENCOUNTER — HOSPITAL ENCOUNTER (INPATIENT)
Facility: HOSPITAL | Age: 75
LOS: 3 days | Discharge: HOME HEALTH | End: 2025-01-09
Attending: ORTHOPAEDIC SURGERY | Admitting: ORTHOPAEDIC SURGERY
Payer: MEDICARE

## 2025-01-06 ENCOUNTER — APPOINTMENT (OUTPATIENT)
Dept: GENERAL RADIOLOGY | Facility: HOSPITAL | Age: 75
End: 2025-01-06
Attending: ORTHOPAEDIC SURGERY
Payer: MEDICARE

## 2025-01-06 ENCOUNTER — ANESTHESIA (OUTPATIENT)
Dept: SURGERY | Facility: HOSPITAL | Age: 75
End: 2025-01-06
Payer: MEDICARE

## 2025-01-06 DIAGNOSIS — M48.061 LUMBAR STENOSIS: Primary | ICD-10-CM

## 2025-01-06 DIAGNOSIS — M46.26 INFECTION OF LUMBAR SPINE (HCC): ICD-10-CM

## 2025-01-06 LAB
ALBUMIN SERPL-MCNC: 4.1 G/DL (ref 3.2–4.8)
ALBUMIN/GLOB SERPL: 1.3 {RATIO} (ref 1–2)
ALP LIVER SERPL-CCNC: 186 U/L
ALT SERPL-CCNC: 15 U/L
ANION GAP SERPL CALC-SCNC: 11 MMOL/L (ref 0–18)
ANTIBODY SCREEN: NEGATIVE
APTT PPP: 36.7 SECONDS (ref 23–36)
AST SERPL-CCNC: 22 U/L (ref ?–34)
BASOPHILS # BLD AUTO: 0.02 X10(3) UL (ref 0–0.2)
BASOPHILS NFR BLD AUTO: 0.4 %
BILIRUB SERPL-MCNC: 0.4 MG/DL (ref 0.2–1.1)
BUN BLD-MCNC: 41 MG/DL (ref 9–23)
CALCIUM BLD-MCNC: 9.5 MG/DL (ref 8.7–10.4)
CHLORIDE SERPL-SCNC: 96 MMOL/L (ref 98–112)
CO2 SERPL-SCNC: 27 MMOL/L (ref 21–32)
CREAT BLD-MCNC: 4.9 MG/DL
EGFRCR SERPLBLD CKD-EPI 2021: 12 ML/MIN/1.73M2 (ref 60–?)
EOSINOPHIL # BLD AUTO: 0.09 X10(3) UL (ref 0–0.7)
EOSINOPHIL NFR BLD AUTO: 1.8 %
ERYTHROCYTE [DISTWIDTH] IN BLOOD BY AUTOMATED COUNT: 15.2 %
EST. AVERAGE GLUCOSE BLD GHB EST-MCNC: 151 MG/DL (ref 68–126)
GLOBULIN PLAS-MCNC: 3.2 G/DL (ref 2–3.5)
GLUCOSE BLD-MCNC: 155 MG/DL (ref 70–99)
GLUCOSE BLD-MCNC: 169 MG/DL (ref 70–99)
GLUCOSE BLD-MCNC: 172 MG/DL (ref 70–99)
GLUCOSE BLD-MCNC: 236 MG/DL (ref 70–99)
GLUCOSE BLD-MCNC: 241 MG/DL (ref 70–99)
GLUCOSE BLD-MCNC: 259 MG/DL (ref 70–99)
HBA1C MFR BLD: 6.9 % (ref ?–5.7)
HCT VFR BLD AUTO: 36.4 %
HGB BLD-MCNC: 11.7 G/DL
IMM GRANULOCYTES # BLD AUTO: 0.02 X10(3) UL (ref 0–1)
IMM GRANULOCYTES NFR BLD: 0.4 %
INR BLD: 1.11 (ref 0.8–1.2)
LYMPHOCYTES # BLD AUTO: 0.55 X10(3) UL (ref 1–4)
LYMPHOCYTES NFR BLD AUTO: 11.3 %
MCH RBC QN AUTO: 33.1 PG (ref 26–34)
MCHC RBC AUTO-ENTMCNC: 32.1 G/DL (ref 31–37)
MCV RBC AUTO: 102.8 FL
MONOCYTES # BLD AUTO: 0.49 X10(3) UL (ref 0.1–1)
MONOCYTES NFR BLD AUTO: 10 %
NEUTROPHILS # BLD AUTO: 3.71 X10 (3) UL (ref 1.5–7.7)
NEUTROPHILS # BLD AUTO: 3.71 X10(3) UL (ref 1.5–7.7)
NEUTROPHILS NFR BLD AUTO: 76.1 %
OSMOLALITY SERPL CALC.SUM OF ELEC: 291 MOSM/KG (ref 275–295)
PLATELET # BLD AUTO: 139 10(3)UL (ref 150–450)
POTASSIUM SERPL-SCNC: 3.9 MMOL/L (ref 3.5–5.1)
PROT SERPL-MCNC: 7.3 G/DL (ref 5.7–8.2)
PROTHROMBIN TIME: 14.4 SECONDS (ref 11.6–14.8)
RBC # BLD AUTO: 3.54 X10(6)UL
RH BLOOD TYPE: POSITIVE
SODIUM SERPL-SCNC: 134 MMOL/L (ref 136–145)
WBC # BLD AUTO: 4.9 X10(3) UL (ref 4–11)

## 2025-01-06 PROCEDURE — 3E0U0GB INTRODUCTION OF RECOMBINANT BONE MORPHOGENETIC PROTEIN INTO JOINTS, OPEN APPROACH: ICD-10-PCS | Performed by: ORTHOPAEDIC SURGERY

## 2025-01-06 PROCEDURE — 0SB30ZX EXCISION OF LUMBOSACRAL JOINT, OPEN APPROACH, DIAGNOSTIC: ICD-10-PCS | Performed by: ORTHOPAEDIC SURGERY

## 2025-01-06 PROCEDURE — 4A11X4G MONITORING OF PERIPHERAL NERVOUS ELECTRICAL ACTIVITY, INTRAOPERATIVE, EXTERNAL APPROACH: ICD-10-PCS | Performed by: ORTHOPAEDIC SURGERY

## 2025-01-06 PROCEDURE — 0SG30AJ FUSION OF LUMBOSACRAL JOINT WITH INTERBODY FUSION DEVICE, POSTERIOR APPROACH, ANTERIOR COLUMN, OPEN APPROACH: ICD-10-PCS | Performed by: ORTHOPAEDIC SURGERY

## 2025-01-06 PROCEDURE — 5A09357 ASSISTANCE WITH RESPIRATORY VENTILATION, LESS THAN 24 CONSECUTIVE HOURS, CONTINUOUS POSITIVE AIRWAY PRESSURE: ICD-10-PCS | Performed by: ORTHOPAEDIC SURGERY

## 2025-01-06 PROCEDURE — 0SG3071 FUSION OF LUMBOSACRAL JOINT WITH AUTOLOGOUS TISSUE SUBSTITUTE, POSTERIOR APPROACH, POSTERIOR COLUMN, OPEN APPROACH: ICD-10-PCS | Performed by: ORTHOPAEDIC SURGERY

## 2025-01-06 PROCEDURE — 01NB0ZZ RELEASE LUMBAR NERVE, OPEN APPROACH: ICD-10-PCS | Performed by: ORTHOPAEDIC SURGERY

## 2025-01-06 PROCEDURE — 76000 FLUOROSCOPY <1 HR PHYS/QHP: CPT | Performed by: ORTHOPAEDIC SURGERY

## 2025-01-06 RX ORDER — NICOTINE POLACRILEX 4 MG
30 LOZENGE BUCCAL
Status: DISCONTINUED | OUTPATIENT
Start: 2025-01-06 | End: 2025-01-06 | Stop reason: HOSPADM

## 2025-01-06 RX ORDER — NICOTINE POLACRILEX 4 MG
15 LOZENGE BUCCAL
Status: DISCONTINUED | OUTPATIENT
Start: 2025-01-06 | End: 2025-01-06 | Stop reason: HOSPADM

## 2025-01-06 RX ORDER — MIDODRINE HYDROCHLORIDE 5 MG/1
10 TABLET ORAL
Status: DISCONTINUED | OUTPATIENT
Start: 2025-01-08 | End: 2025-01-07 | Stop reason: DRUGHIGH

## 2025-01-06 RX ORDER — DOBUTAMINE HYDROCHLORIDE 200 MG/100ML
INJECTION INTRAVENOUS CONTINUOUS PRN
Status: DISCONTINUED | OUTPATIENT
Start: 2025-01-06 | End: 2025-01-06 | Stop reason: SURG

## 2025-01-06 RX ORDER — CARBOXYMETHYLCELLULOSE SODIUM 10 MG/ML
1 GEL OPHTHALMIC 4 TIMES DAILY
COMMUNITY

## 2025-01-06 RX ORDER — BUPIVACAINE HYDROCHLORIDE 2.5 MG/ML
INJECTION, SOLUTION EPIDURAL; INFILTRATION; INTRACAUDAL AS NEEDED
Status: DISCONTINUED | OUTPATIENT
Start: 2025-01-06 | End: 2025-01-06 | Stop reason: HOSPADM

## 2025-01-06 RX ORDER — TRANEXAMIC ACID 10 MG/ML
INJECTION, SOLUTION INTRAVENOUS AS NEEDED
Status: DISCONTINUED | OUTPATIENT
Start: 2025-01-06 | End: 2025-01-06 | Stop reason: SURG

## 2025-01-06 RX ORDER — HYDROCODONE BITARTRATE AND ACETAMINOPHEN 5; 325 MG/1; MG/1
1 TABLET ORAL
COMMUNITY

## 2025-01-06 RX ORDER — HYDROMORPHONE HYDROCHLORIDE 1 MG/ML
0.6 INJECTION, SOLUTION INTRAMUSCULAR; INTRAVENOUS; SUBCUTANEOUS EVERY 5 MIN PRN
Status: DISCONTINUED | OUTPATIENT
Start: 2025-01-06 | End: 2025-01-06 | Stop reason: HOSPADM

## 2025-01-06 RX ORDER — OXYCODONE HYDROCHLORIDE 5 MG/1
5 TABLET ORAL ONCE AS NEEDED
Status: DISCONTINUED | OUTPATIENT
Start: 2025-01-06 | End: 2025-01-06 | Stop reason: HOSPADM

## 2025-01-06 RX ORDER — INSULIN ASPART 100 [IU]/ML
INJECTION, SOLUTION INTRAVENOUS; SUBCUTANEOUS ONCE
Status: COMPLETED | OUTPATIENT
Start: 2025-01-06 | End: 2025-01-06

## 2025-01-06 RX ORDER — DIPHENHYDRAMINE HYDROCHLORIDE 50 MG/ML
25 INJECTION INTRAMUSCULAR; INTRAVENOUS EVERY 4 HOURS PRN
Status: DISCONTINUED | OUTPATIENT
Start: 2025-01-06 | End: 2025-01-09

## 2025-01-06 RX ORDER — AMOXICILLIN 500 MG
1200 CAPSULE ORAL DAILY
Status: DISCONTINUED | OUTPATIENT
Start: 2025-01-06 | End: 2025-01-06 | Stop reason: RX

## 2025-01-06 RX ORDER — LACTULOSE 10 G/15ML
20 SOLUTION ORAL; RECTAL DAILY
COMMUNITY
Start: 2024-11-18

## 2025-01-06 RX ORDER — SODIUM CHLORIDE 9 MG/ML
INJECTION, SOLUTION INTRAVENOUS CONTINUOUS
Status: DISCONTINUED | OUTPATIENT
Start: 2025-01-06 | End: 2025-01-07

## 2025-01-06 RX ORDER — VANCOMYCIN HYDROCHLORIDE 1 G/20ML
INJECTION, POWDER, LYOPHILIZED, FOR SOLUTION INTRAVENOUS AS NEEDED
Status: DISCONTINUED | OUTPATIENT
Start: 2025-01-06 | End: 2025-01-06 | Stop reason: HOSPADM

## 2025-01-06 RX ORDER — DEXTROSE MONOHYDRATE 25 G/50ML
50 INJECTION, SOLUTION INTRAVENOUS
Status: DISCONTINUED | OUTPATIENT
Start: 2025-01-06 | End: 2025-01-06 | Stop reason: HOSPADM

## 2025-01-06 RX ORDER — CHOLECALCIFEROL (VITAMIN D3) 50 MCG
2000 TABLET ORAL DAILY
Status: DISCONTINUED | OUTPATIENT
Start: 2025-01-06 | End: 2025-01-09

## 2025-01-06 RX ORDER — ONDANSETRON 2 MG/ML
INJECTION INTRAMUSCULAR; INTRAVENOUS AS NEEDED
Status: DISCONTINUED | OUTPATIENT
Start: 2025-01-06 | End: 2025-01-06 | Stop reason: SURG

## 2025-01-06 RX ORDER — DIPHENHYDRAMINE HCL 25 MG
25 CAPSULE ORAL EVERY 4 HOURS PRN
Status: DISCONTINUED | OUTPATIENT
Start: 2025-01-06 | End: 2025-01-09

## 2025-01-06 RX ORDER — HYDROMORPHONE HYDROCHLORIDE 1 MG/ML
0.4 INJECTION, SOLUTION INTRAMUSCULAR; INTRAVENOUS; SUBCUTANEOUS EVERY 2 HOUR PRN
Status: DISCONTINUED | OUTPATIENT
Start: 2025-01-06 | End: 2025-01-09

## 2025-01-06 RX ORDER — OXYCODONE HYDROCHLORIDE 5 MG/1
5 TABLET ORAL EVERY 4 HOURS PRN
Status: DISCONTINUED | OUTPATIENT
Start: 2025-01-06 | End: 2025-01-07

## 2025-01-06 RX ORDER — SERTRALINE HYDROCHLORIDE 25 MG/1
25 TABLET, FILM COATED ORAL DAILY
Status: DISCONTINUED | OUTPATIENT
Start: 2025-01-07 | End: 2025-01-09

## 2025-01-06 RX ORDER — ONDANSETRON 2 MG/ML
4 INJECTION INTRAMUSCULAR; INTRAVENOUS EVERY 6 HOURS PRN
Status: DISCONTINUED | OUTPATIENT
Start: 2025-01-06 | End: 2025-01-09

## 2025-01-06 RX ORDER — DOCUSATE SODIUM 100 MG/1
100 CAPSULE, LIQUID FILLED ORAL 2 TIMES DAILY
COMMUNITY

## 2025-01-06 RX ORDER — MULTIVIT WITH MINERALS/LUTEIN
3000 TABLET ORAL DAILY
COMMUNITY
Start: 2024-12-23 | End: 2025-01-09

## 2025-01-06 RX ORDER — HYDROMORPHONE HYDROCHLORIDE 1 MG/ML
0.2 INJECTION, SOLUTION INTRAMUSCULAR; INTRAVENOUS; SUBCUTANEOUS EVERY 2 HOUR PRN
Status: DISCONTINUED | OUTPATIENT
Start: 2025-01-06 | End: 2025-01-09

## 2025-01-06 RX ORDER — INSULIN ASPART 100 [IU]/ML
INJECTION, SOLUTION INTRAVENOUS; SUBCUTANEOUS
Status: COMPLETED
Start: 2025-01-06 | End: 2025-01-06

## 2025-01-06 RX ORDER — DIAZEPAM 2 MG/1
2 TABLET ORAL EVERY 6 HOURS PRN
Status: DISCONTINUED | OUTPATIENT
Start: 2025-01-06 | End: 2025-01-09

## 2025-01-06 RX ORDER — DEXTROSE MONOHYDRATE 25 G/50ML
50 INJECTION, SOLUTION INTRAVENOUS
Status: DISCONTINUED | OUTPATIENT
Start: 2025-01-06 | End: 2025-01-09

## 2025-01-06 RX ORDER — METHOCARBAMOL 100 MG/ML
INJECTION, SOLUTION INTRAMUSCULAR; INTRAVENOUS
Status: DISCONTINUED
Start: 2025-01-06 | End: 2025-01-06 | Stop reason: WASHOUT

## 2025-01-06 RX ORDER — LIDOCAINE HYDROCHLORIDE 10 MG/ML
INJECTION, SOLUTION EPIDURAL; INFILTRATION; INTRACAUDAL; PERINEURAL AS NEEDED
Status: DISCONTINUED | OUTPATIENT
Start: 2025-01-06 | End: 2025-01-06 | Stop reason: SURG

## 2025-01-06 RX ORDER — METOCLOPRAMIDE HYDROCHLORIDE 5 MG/ML
5 INJECTION INTRAMUSCULAR; INTRAVENOUS EVERY 8 HOURS PRN
Status: DISCONTINUED | OUTPATIENT
Start: 2025-01-06 | End: 2025-01-09

## 2025-01-06 RX ORDER — SEVELAMER CARBONATE 800 MG/1
800 TABLET, FILM COATED ORAL
Status: DISCONTINUED | OUTPATIENT
Start: 2025-01-06 | End: 2025-01-09

## 2025-01-06 RX ORDER — KETAMINE HYDROCHLORIDE 50 MG/ML
INJECTION, SOLUTION INTRAMUSCULAR; INTRAVENOUS AS NEEDED
Status: DISCONTINUED | OUTPATIENT
Start: 2025-01-06 | End: 2025-01-06 | Stop reason: SURG

## 2025-01-06 RX ORDER — ACETAMINOPHEN 500 MG
1000 TABLET ORAL ONCE
Status: DISCONTINUED | OUTPATIENT
Start: 2025-01-06 | End: 2025-01-06 | Stop reason: HOSPADM

## 2025-01-06 RX ORDER — CYCLOBENZAPRINE HCL 5 MG
2.5 TABLET ORAL EVERY 6 HOURS PRN
Status: DISCONTINUED | OUTPATIENT
Start: 2025-01-06 | End: 2025-01-09

## 2025-01-06 RX ORDER — LIDOCAINE HYDROCHLORIDE AND EPINEPHRINE 10; 10 MG/ML; UG/ML
INJECTION, SOLUTION INFILTRATION; PERINEURAL AS NEEDED
Status: DISCONTINUED | OUTPATIENT
Start: 2025-01-06 | End: 2025-01-06 | Stop reason: HOSPADM

## 2025-01-06 RX ORDER — NICOTINE POLACRILEX 4 MG
15 LOZENGE BUCCAL
Status: DISCONTINUED | OUTPATIENT
Start: 2025-01-06 | End: 2025-01-09

## 2025-01-06 RX ORDER — SENNOSIDES 8.6 MG
17.2 TABLET ORAL NIGHTLY
Status: DISCONTINUED | OUTPATIENT
Start: 2025-01-06 | End: 2025-01-09

## 2025-01-06 RX ORDER — ASCORBIC ACID, THIAMINE, RIBOFLAVIN, NIACINAMIDE, PYRIDOXINE, FOLIC ACID, COBALAMIN, BIOTIN, PANTOTHENIC ACID 100; 1.5; 1.7; 20; 10; 1; 6; 300; 1 MG/1; MG/1; MG/1; MG/1; MG/1; MG/1; UG/1; UG/1; MG/1
1 TABLET, COATED ORAL DAILY
Status: DISCONTINUED | OUTPATIENT
Start: 2025-01-06 | End: 2025-01-09

## 2025-01-06 RX ORDER — OXYCODONE AND ACETAMINOPHEN 5; 325 MG/1; MG/1
1 TABLET ORAL EVERY 4 HOURS PRN
COMMUNITY

## 2025-01-06 RX ORDER — SODIUM CHLORIDE, SODIUM LACTATE, POTASSIUM CHLORIDE, CALCIUM CHLORIDE 600; 310; 30; 20 MG/100ML; MG/100ML; MG/100ML; MG/100ML
INJECTION, SOLUTION INTRAVENOUS CONTINUOUS
Status: DISCONTINUED | OUTPATIENT
Start: 2025-01-06 | End: 2025-01-06 | Stop reason: HOSPADM

## 2025-01-06 RX ORDER — NICOTINE POLACRILEX 4 MG
30 LOZENGE BUCCAL
Status: DISCONTINUED | OUTPATIENT
Start: 2025-01-06 | End: 2025-01-09

## 2025-01-06 RX ORDER — ROCURONIUM BROMIDE 10 MG/ML
INJECTION, SOLUTION INTRAVENOUS AS NEEDED
Status: DISCONTINUED | OUTPATIENT
Start: 2025-01-06 | End: 2025-01-06 | Stop reason: SURG

## 2025-01-06 RX ORDER — ONDANSETRON 2 MG/ML
4 INJECTION INTRAMUSCULAR; INTRAVENOUS EVERY 6 HOURS PRN
Status: DISCONTINUED | OUTPATIENT
Start: 2025-01-06 | End: 2025-01-06 | Stop reason: HOSPADM

## 2025-01-06 RX ORDER — HYDROMORPHONE HYDROCHLORIDE 1 MG/ML
0.4 INJECTION, SOLUTION INTRAMUSCULAR; INTRAVENOUS; SUBCUTANEOUS EVERY 5 MIN PRN
Status: DISCONTINUED | OUTPATIENT
Start: 2025-01-06 | End: 2025-01-06 | Stop reason: HOSPADM

## 2025-01-06 RX ORDER — ASCORBIC ACID, THIAMINE, RIBOFLAVIN, NIACINAMIDE, PYRIDOXINE, FOLIC ACID, COBALAMIN, BIOTIN, PANTOTHENIC ACID 100; 1.5; 1.7; 20; 10; 1; 6; 300; 1 MG/1; MG/1; MG/1; MG/1; MG/1; MG/1; UG/1; UG/1; MG/1
1 TABLET, COATED ORAL DAILY
COMMUNITY

## 2025-01-06 RX ORDER — HYDROMORPHONE HYDROCHLORIDE 1 MG/ML
0.2 INJECTION, SOLUTION INTRAMUSCULAR; INTRAVENOUS; SUBCUTANEOUS EVERY 5 MIN PRN
Status: DISCONTINUED | OUTPATIENT
Start: 2025-01-06 | End: 2025-01-06 | Stop reason: HOSPADM

## 2025-01-06 RX ORDER — NALOXONE HYDROCHLORIDE 0.4 MG/ML
0.08 INJECTION, SOLUTION INTRAMUSCULAR; INTRAVENOUS; SUBCUTANEOUS AS NEEDED
Status: DISCONTINUED | OUTPATIENT
Start: 2025-01-06 | End: 2025-01-06 | Stop reason: HOSPADM

## 2025-01-06 RX ORDER — INSULIN ASPART 100 [IU]/ML
INJECTION, SOLUTION INTRAVENOUS; SUBCUTANEOUS ONCE
Status: DISCONTINUED | OUTPATIENT
Start: 2025-01-06 | End: 2025-01-06

## 2025-01-06 RX ORDER — CYCLOBENZAPRINE HCL 10 MG
1 TABLET ORAL 3 TIMES DAILY PRN
COMMUNITY

## 2025-01-06 RX ORDER — ACETAMINOPHEN 10 MG/ML
INJECTION, SOLUTION INTRAVENOUS AS NEEDED
Status: DISCONTINUED | OUTPATIENT
Start: 2025-01-06 | End: 2025-01-06 | Stop reason: SURG

## 2025-01-06 RX ORDER — EZETIMIBE 10 MG/1
10 TABLET ORAL
Status: DISCONTINUED | OUTPATIENT
Start: 2025-01-06 | End: 2025-01-09

## 2025-01-06 RX ADMIN — ONDANSETRON 4 MG: 2 INJECTION INTRAMUSCULAR; INTRAVENOUS at 12:37:00

## 2025-01-06 RX ADMIN — ROCURONIUM BROMIDE 50 MG: 10 INJECTION, SOLUTION INTRAVENOUS at 11:05:00

## 2025-01-06 RX ADMIN — DOBUTAMINE HYDROCHLORIDE 5 MCG/KG/MIN: 200 INJECTION INTRAVENOUS at 11:55:00

## 2025-01-06 RX ADMIN — KETAMINE HYDROCHLORIDE 25 MG: 50 INJECTION, SOLUTION INTRAMUSCULAR; INTRAVENOUS at 10:38:00

## 2025-01-06 RX ADMIN — LIDOCAINE HYDROCHLORIDE 50 MG: 10 INJECTION, SOLUTION EPIDURAL; INFILTRATION; INTRACAUDAL; PERINEURAL at 10:34:00

## 2025-01-06 RX ADMIN — TRANEXAMIC ACID 1000 MG: 10 INJECTION, SOLUTION INTRAVENOUS at 10:46:00

## 2025-01-06 RX ADMIN — SODIUM CHLORIDE: 9 INJECTION, SOLUTION INTRAVENOUS at 10:30:00

## 2025-01-06 RX ADMIN — SODIUM CHLORIDE: 9 INJECTION, SOLUTION INTRAVENOUS at 13:13:00

## 2025-01-06 RX ADMIN — ACETAMINOPHEN 1000 MG: 10 INJECTION, SOLUTION INTRAVENOUS at 12:35:00

## 2025-01-06 NOTE — ANESTHESIA PREPROCEDURE EVALUATION
PRE-OP EVALUATION    Patient Name: Gamaliel Boyer    Admit Diagnosis: INFECTION OF LUMBAR SPINE, LUMBAR STENOSIS    Pre-op Diagnosis: INFECTION OF LUMBAR SPINE, LUMBAR STENOSIS    LUMBAR 5-SACRAL 1 TRANSFORAMINAL LUMBAR INTERBODY FUSION WITH INSTRUMENTATION, POSSIBLE LUMBAR 4, PELVIC SCREWS OPEN BIOPSY LUMBAR 5-SACRAL 1, IRRIGATION AND DEBRIDMENT LUMBAR 5-SACRAL 1    Anesthesia Procedure: LUMBAR 5-SACRAL 1 TRANSFORAMINAL LUMBAR INTERBODY FUSION WITH INSTRUMENTATION, POSSIBLE LUMBAR 4, PELVIC SCREWS OPEN BIOPSY LUMBAR 5-SACRAL 1, IRRIGATION AND DEBRIDMENT LUMBAR 5-SACRAL 1 (Spine Lumbar)  . (Spine Lumbar)  INTRAOPERATIVE NEURO MONITORING    Surgeons and Role:     * Ricardo Benson MD - Primary    Pre-op vitals reviewed.  Temp: 97.5 °F (36.4 °C)  Pulse: 60  Resp: 16  BP: 110/70  SpO2: 94 %  Body mass index is 24.53 kg/m².    Current medications reviewed.  Hospital Medications:   acetaminophen (Tylenol Extra Strength) tab 1,000 mg  1,000 mg Oral Once    glucose (Dex4) 15 GM/59ML oral liquid 15 g  15 g Oral Q15 Min PRN    Or    glucose (Glutose) 40% oral gel 15 g  15 g Oral Q15 Min PRN    Or    glucose-vitamin C (Dex-4) chewable tab 4 tablet  4 tablet Oral Q15 Min PRN    Or    dextrose 50% injection 50 mL  50 mL Intravenous Q15 Min PRN    Or    glucose (Dex4) 15 GM/59ML oral liquid 30 g  30 g Oral Q15 Min PRN    Or    glucose (Glutose) 40% oral gel 30 g  30 g Oral Q15 Min PRN    Or    glucose-vitamin C (Dex-4) chewable tab 8 tablet  8 tablet Oral Q15 Min PRN    sodium chloride 0.9% infusion   Intravenous Continuous    ceFAZolin (Ancef) 2g in 10mL IV syringe premix  2 g Intravenous Once       Outpatient Medications:   Prescriptions Prior to Admission[1]    Allergies: Antihistamine & nasal deconges [fexofenadine-pseudoephedrine], Adhesive tape, Albuterol, Benadryl [diphenhydramine], Depakote [valproic acid], Fluconazole, Mirtazapine, Quetiapine, Wellbutrin [bupropion], Gabapentin, and Latex      Anesthesia  Evaluation    Patient summary reviewed.    Anesthetic Complications  (-) history of anesthetic complications         GI/Hepatic/Renal      (+) GERD       (+) chronic renal disease and ESRD and hemodialysis  (+) liver disease (LUGO cirrhosis)                 Cardiovascular        Exercise tolerance: poor           (+) hypertension                     (+) CHF                Endo/Other      (+) diabetes  type 1,       (+) anemia                   Pulmonary        (+) COPD            (+) sleep apnea       Neuro/Psych      (+) depression                                Past Surgical History:   Procedure Laterality Date    Back surgery  2009    laminectomy  L1 -4  2/09  Dr. Eitan Guerrero [facility]    Back surgery  8-8-13    C4-C6 ACDF     Back surgery  9/8/16    L2-S1 Revision Decomp possible uninstrumented fusion 9/8/16    Back surgery  09/10/2018    Rev C3-C7 ACDF     Cataract Bilateral done in 2004    IOL's    Colonoscopy      2006    Colonoscopy  2/2009    normal    Colonoscopy N/A 8/23/2019    adenoma- repeat 1 yr (2 dya prep)    Colonoscopy,biopsy  2/20/09    Performed by ANAM RIVERS at [facility] SURGICAL [place]    Egd N/A 7/23/2021    EGD & COLONOSCOPY Surgeon: Kimberly, +vik, rpt EGD 3 months, poor prep rpt colon 3 years    Endoscopy, bowel pouch, biopsy      Injection, w/wo contrast, dx/therapeutic substance, epidural/subarachnoid; lumbar/sacral N/A 5/4/2016    Procedure: LUMBAR EPIDURAL;  Surgeon: Jayden Norton MD;  Location: [facility]    Injection, w/wo contrast, dx/therapeutic substance, epidural/subarachnoid; lumbar/sacral N/A 5/25/2016    Procedure: LUMBAR EPIDURAL;  Surgeon: Jayden Norton MD;  Location: [facility]    Injection, w/wo contrast, dx/therapeutic substance, epidural/subarachnoid; lumbar/sacral N/A 6/8/2016    Procedure: LUMBAR EPIDURAL;  Surgeon: Jayden Norton MD;  Location: [facility]    Knee replacement surgery  2/14/13    Left  TKR by Dr. Lombardi    [facility]A 5/4/2016    Procedure: LUMBAR EPIDURAL;  Surgeon: Jayden Norton MD;  Location: [facility]    [facility] N/A 5/25/2016    Procedure: LUMBAR EPIDURAL;  Surgeon: Jayden Norton MD;  Location: [facility]    [facility] N/A 6/8/2016    Procedure: LUMBAR EPIDURAL;  Surgeon: Jayden Norton MD;  Location: [facility]    Other surgical history      Bilateral median nerve release at elbow; 2000    Other surgical history      CTS release bilateral 2000    Other surgical history      Surgical repair fracture left hand 5th digit    Other surgical history      Surgery left heel ligament repair    Other surgical history      Laser surgery for bilateral retinopathy    Other surgical history      Surgery to left eye for \"weak muscle.\" 2007    Other surgical history      bilat knee repair 9/23/10    Other surgical history  11/2013    lung resection to remove abcess    Other surgical history  10/26/2020    Cystoscopy (Dr. Ribeiro)    Patient documented not to have experienced any of the following events  2/14/2014    Procedure: ;  Surgeon: Kin Hobbs MD;  Location: [facility]    Patient documented not to have experienced any of the following events N/A 5/4/2016    Procedure: LUMBAR EPIDURAL;  Surgeon: Jayden Norton MD;  Location: [facility]    Patient documented not to have experienced any of the following events N/A 5/25/2016    Procedure: LUMBAR EPIDURAL;  Surgeon: Jayden Norton MD;  Location: [facility]    Patient documented not to have experienced any of the following events N/A 6/8/2016    Procedure: LUMBAR EPIDURAL;  Surgeon: Jayden Norton MD;  Location: [facility]    Patient withough preoperative order for iv antibiotic surgical site infection prophylaxis.  2/14/2014     Procedure: ;  Surgeon: Kin Hobbs MD;  Location: [facility]    Patient withough preoperative order for iv antibiotic surgical site infection prophylaxis. N/A 2016    Procedure: LUMBAR EPIDURAL;  Surgeon: Jayden Norton MD;  Location: [facility]    Patient withough preoperative order for iv antibiotic surgical site infection prophylaxis. N/A 2016    Procedure: LUMBAR EPIDURAL;  Surgeon: Jayden Norton MD;  Location: [facility]    Patient withough preoperative order for iv antibiotic surgical site infection prophylaxis. N/A 2016    Procedure: LUMBAR EPIDURAL;  Surgeon: Jayden Norton MD;  [place] [facility]    [place] replacement Left     Upper gi endoscopy,diagnosis  16    retained gastric contents; [place]    Upper gi endoscopy,remov f.b. N/A 2014    Procedure: ESOPHAGOGASTRODUODENOSCOPY, POSSIBLE BIOPSY, POSSIBLE POLYPECTOMY 64039;  Surgeon: Kin Hobbs MD;  Location: [facility]     Social History     Socioeconomic History    Marital status:     Number of children: 1   Occupational History    Occupation: Retired--Printer   Tobacco Use    Smoking status: Former     Current packs/day: 0.00     Average packs/day: 3.5 packs/day for 20.0 years (70.0 ttl pk-yrs)     Types: Cigarettes     Start date: 1966     Quit date: 1986     Years since quittin.0    Smokeless tobacco: Never    Tobacco comments:     Quit 25 years ago   Vaping Use    Vaping status: Never Used   Substance and Sexual Activity    Alcohol use: Not Currently     Comment: very rarely    Drug use: Yes     Types: Cannabis     Comment: gummys    Sexual activity: Yes     Partners: Female   Other Topics Concern     Service No    Blood Transfusions No    Caffeine Concern No     Comment: coffee 1 cup per day    Sleep Concern No    Exercise Yes     Comment: PT/OT 1x per week    Seat Belt Yes     History    Drug Use    Types: Cannabis     Comment: gummys     Available pre-op labs reviewed.  Lab Results   Component Value Date    WBC 4.9 01/06/2025    RBC 3.54 (L) 01/06/2025    HGB 11.7 (L) 01/06/2025    HCT 36.4 (L) 01/06/2025    .8 (H) 01/06/2025    MCH 33.1 01/06/2025    MCHC 32.1 01/06/2025    RDW 15.2 01/06/2025    .0 (L) 01/06/2025     Lab Results   Component Value Date     (L) 10/25/2024    K 3.8 10/25/2024    CL 95 (L) 10/25/2024    CO2 30.0 10/25/2024    BUN 32 (H) 10/25/2024    CREATSERUM 3.83 (H) 10/25/2024     (H) 10/25/2024    CA 9.8 10/25/2024            Airway      Mallampati: II  Mouth opening: >3 FB  TM distance: 4 - 6 cm  Neck ROM: full Cardiovascular    Cardiovascular exam normal.         Dental             Pulmonary    Pulmonary exam normal.                 Other findings              ASA: 3   Plan: general  NPO status verified and patient meets guidelines.          Plan/risks discussed with: patient                Present on Admission:  **None**             [1]   Medications Prior to Admission   Medication Sig Dispense Refill Last Dose/Taking    Cholecalciferol (VITAMIN D3) 25 MCG (1000 UT) Oral Cap Take 1 tablet by mouth daily.   1/5/2025 at  8:00 AM    Calcium Carbonate (CALCIUM 500 OR) Take 1,000 mg by mouth daily.   1/5/2025 at  8:00 AM    Ascorbic Acid (VITAMIN C) 1000 MG Oral Tab Take 3 tablets (3,000 mg total) by mouth daily.   1/5/2025 at  8:00 AM    HYDROcodone-acetaminophen 5-325 MG Oral Tab Take 1 tablet by mouth every 4 to 6 hours.   1/6/2025 at  4:30 AM    ENULOSE 10 GM/15ML Oral Solution Take 30 mL (20 g total) by mouth daily.   1/5/2025 at  4:30 PM    carboxymethylcellulose (REFRESH CELLUVISC) 1 % Ophthalmic Gel Place 1 drop into both eyes 4 (four) times daily.   1/5/2025 at  7:00 PM    multivitamin Oral Tab Take 1 tablet by mouth daily. Dialyvite   1/5/2025 at  7:00 PM    docusate sodium 100 MG Oral Cap Take 1 capsule (100 mg total) by mouth 2 (two) times  daily.   1/6/2025 at  4:30 AM    SSD 1 % External Cream Apply 1 Application topically daily.   1/6/2025 at  4:00 AM    Melatonin 10 MG Oral Tab Take 10 mg by mouth nightly.   1/5/2025 at  2:10 AM    Probiotic Product (PROBIOTIC BLEND OR) Take 1 tablet by mouth daily.   12/23/2024    midodrine 10 MG Oral Tab Take 1 tablet (10 mg total) by mouth 3 (three) times a week. Before each dialysis   1/4/2025    sertraline 25 MG Oral Tab Take 1 tablet (25 mg total) by mouth daily.   1/6/2025 Morning    Insulin Glargine, 1 Unit Dial, (TOUJEO SOLOSTAR) 300 UNIT/ML Subcutaneous Solution Pen-injector Inject 6 Units into the skin as needed (If insulin pump not working).   1/5/2025 at  9:30 PM    cholecalciferol 50 MCG (2000 UT) Oral Tab Take 1 tablet (2,000 Units total) by mouth daily.   Past Week    vitamin E 1000 UNITS Oral Cap Take 1 capsule (1,000 Units total) by mouth daily.   12/23/2024    Omega-3 Fatty Acids (FISH OIL) 1200 MG Oral Cap Take 1,200 mg by mouth daily.   12/23/2024    insulin lispro 100 UNIT/ML Injection Solution Basal settings: 12 MN - 3:30 am: 0.100  3:30 am - 8 am: 0.350  8 am - 10 am: 0.550   10 am - 11 am: 0.850  11 am - 1 pm: 0.150  1 pm - 3 pm: 0.200  3 pm - 5 pm: 0.750  5 pm - 6 pm: 0.675  6 pm - 7 pm: 0.375   7 pm - 11 pm 0.350  11 pm - 12 MN: 0.200    Total daily dose: 8.725 Units per day    Bolus ratios: Bkfst: 1 Units for every 12 gms of carbs   Lunch: 1 Units for every 12 gms of carbs   Dinner: 1 Units for every 10 gms of carbs     Correction factor: 1 Units for every 75 mg/dl above a target glucose of 140 mg/dl (110 if in Control IQ mode) but at bedtime, 1 Unit for every 140   1/5/2025 at  9:00 PM    aspirin 81 MG Oral Tab EC Take 1 tablet (81 mg total) by mouth daily.   1/5/2025 at 10:00 AM    levothyroxine 175 MCG Oral Tab Take 1 tablet (175 mcg total) by mouth daily.   1/6/2025 at  4:30 AM    Sevelamer 800 MG Oral Tab Take 1 tablet (800 mg total) by mouth 3 (three) times daily before meals. and  1 before snacks   1/5/2025 at 10:30 PM    GLUCAGON EMERGENCY 1 MG Injection Kit Inject 1 mg into the skin once as needed. 1 kit 1 Taking    EZETIMIBE 10 MG Oral Tab TAKE ONE TABLET BY MOUTH ONE TIME DAILY (Patient taking differently: Take 1 tablet (10 mg total) by mouth before evening meal.) 90 tablet 3 Taking Differently    cyclobenzaprine 10 MG Oral Tab Take 1 tablet (10 mg total) by mouth 3 (three) times daily as needed for Muscle spasms. Post op       oxyCODONE-acetaminophen 5-325 MG Oral Tab Take 1 tablet by mouth every 4 (four) hours as needed for Pain. Post op

## 2025-01-06 NOTE — ANESTHESIA POSTPROCEDURE EVALUATION
[facility]    Gamaliel Boyer Patient Status:  Inpatient   Age/Gender 74 year old male MRN [id number]   Location [facility] SURGERY Attending Ricardo Benson MD   Hosp Day # 0 PCP Nima Sorensen MD       Anesthesia Post-op Note    LUMBAR 5-SACRAL 1 TRANSFORAMINAL LUMBAR INTERBODY FUSION WITH INSTRUMENTATION, BIOPSY LUMBAR 5-SACRAL 1, IRRIGATION AND DEBRIDMENT LUMBAR 5-SACRAL 1    Procedure Summary       Date: 01/06/25 Room / Location:  MAIN OR 11 / [facility] MAIN OR    Anesthesia Start: 1029 Anesthesia Stop: 1313    Procedures:       LUMBAR 5-SACRAL 1 TRANSFORAMINAL LUMBAR INTERBODY FUSION WITH INSTRUMENTATION, BIOPSY LUMBAR 5-SACRAL 1, IRRIGATION AND DEBRIDMENT LUMBAR 5-SACRAL 1 (Spine Lumbar)      . (Spine Lumbar)      INTRAOPERATIVE NEURO MONITORING Diagnosis: (INFECTION OF LUMBAR SPINE, LUMBAR STENOSIS)    Surgeons: Ricardo Benson MD Anesthesiologist: Babar Kyle MD    Anesthesia Type: general ASA Status: 3            Anesthesia Type: general    Vitals Value Taken Time   BP 99/54 01/06/25 1332   Temp 97.9 01/06/25 1335   Pulse 58 01/06/25 1335   Resp 14 01/06/25 1335   SpO2 100 % 01/06/25 1335   Vitals shown include unfiled device data.    Patient Location: PACU    Anesthesia Type: general    Airway Patency: patent    Postop Pain Control: adequate    Mental Status: mildly sedated but able to meaningfully participate in the post-anesthesia evaluation    Nausea/Vomiting: none    Cardiopulmonary/Hydration status: Other: (Weaning perioperative pressors off on arrival to PACU, will continue to monitor.)    Complications: no apparent anesthesia related complications    Postop vital signs: stable    Dental Exam: Unchanged from Preop    Patient to be discharged from PACU when criteria met.

## 2025-01-06 NOTE — OPERATIVE REPORT
Operative Note     Patient Name:Gamaliel Boyer  Date: 1/6/2025  Preoperative Diagnosis:   Lumbar spondylodiscitis L5-S1  Erosive destruction L5 and S1    Postoperative Diagnosis: Same  Primary Surgeon: Ricardo Benson MD  Assistant: Priscila MARIE    Procedures: L5-S1 TLIF  - Lumbar decompression/TLIF      CPT 46383    - Posterior Instrumentation L5-S1                      CPT 65374  - Interbody biomechanical device      CPT 22853 x 1      Anesthesia: General Endotracheal Anesthesia  Estimated Blood Loss: 150cc  Drains: None  Implants: Nuvasive Relign 7.5x50 Globus Altera  Bone Graft: BMP small, osteocel 10cc, crushed cancellous 15cc  Specimen: None  Condition: Stable  Complications: None      Surgical Indications:Gamaliel Boyer is a  74 year old malewho presented with a history of lumbar disease with symptoms refractory to nonsurgical care.  The workup including lumbar radiographs and MRI. The option of surgery was discussed with the patient.  Risks, benefits, and alternatives of surgery were discussed with the patient, which include but are not limited to bleeding, infection, DVT/PE, dural tears, neurologic injury, worsening of neurological status, risk of instability, need for subsequent surgery, fracture, failure of fusion, hardware failure, risks associated with anesthesia, including death, which were all reviewed with the patient and she consented to proceed with surgery.     Surgical Procedure:              The patient was met in the preop holding area, we reviewed elements of the patient's history and physical examination along with the planned surgical procedure. The patient was in agreement and the surgical site was marked with indelible ink.  The patient was administered prophylactic antibiotics per SCIP guidelines.  After successful induction of general endotracheal anesthesia sequential compression devices were applied to bilateral lower extremities.  The patient was then placed in the prone  position on the Epi spine frame.  The patient's orbits, peripheral nerves, and bony prominences were padded and protected.  The back was then prepped and draped in the usual sterile fashion.  A safety timeout was performed identifying the patient by name, MRN, and date of birth; the nature of the procedure, surgical site, and concerns were addressed with the operating room staff.  All were in agreement and we proceeded with the procedure.                 AP and lateral flouroscopic images were taken throughout the procedure to aid in instrumentation placement. Perfect APs were used to keri out the lateral border of the pedicles bilaterally at L5-S1. Skin incision was made 1cm lateral to this marks. Electocautery was used to dissect through the subcutaneous tissue and the fascia. Blunt finger dissection was used to develop the plane between the multifidus and remaining paraspinal muscles until the facet joint, pars and transverse process was palpated. Pedicle screws were placed using standard fluoroscopically guided pedicle screw technique.  Jamshidis' were placed at the lateral border of the pedicle and inserted to a depth of 25-30mm using AP flouro imaging. Kwires were then inserted. This was completed for all the remaining pedicles bilaterally. Lateral flouro image was taken to ensure appropriate wire trajectory. Pedicles screws of appropriate diameter and length (as assessed using pre operative imaging) were then placed at each level and positioned confirmed with AP/Lat flouro images and EMG probing of screw heads. All screws simulated well on jamshidi monitoring. Prior placement of screw, a 10cc syringe with needle was placed over 1 of the cannulated pedicles and 10cc of bone marrow aspirate was obtained. This was mixed with our local bone graft and allograft to improve osteogenic potential.      With the appropriate screws placed, attention was directed to the TLIF at L5-S1 on the left. A table mounted  retractor was secured to the ipsilateral screws and secured to the table for rigidity. A medial blade was used after elevation of the multifidus off the lamina. The inferior facet and superior and medial aspect of the superior articular facet was osteotomized. Bone was removed, milled and saved for later bone grafting.   The exiting nerve root was exposed and decompressed. The ligamentum flavum was removed exposing the dura medially and decompressing the traversing nerve root. The thecal sac and nerve roots were then protected with neurosponges, and gently retracted to reveal the annulus of the intervertebral disk. An annulotomy was performed.  With the sequential use of pituitary rongeurs, disc nick, a variety of curettes, and rasps, a thorough discectomy ensued with care not to violate the subchondral endplates of the adjacent vertebral bodies. There was a lot of irregularity of the endplates seen. The bone was soft. A full open biopsy of the disc tissue was conducted and disc tissue was sent to pathology. There was no dary pus seen.      With the endplates prepared, the disc space was then appropriately sized with sequential trialing under fluoroscopic control. There was a lot of motion across the disc space consistent with instability. The integrity of the anterior annulus was inspected and then biologic allograft and local bone graft was delivered to the anterior disc space with a small BMP sponge. An appropriate corresponding interbody implant was then selected, packed with bone graft on the back table, and then implanted under fluoroscopic control. The remaining disk space was then backfilled with bone graft. The wound was then copiously irrigated. 100mg of vanco powder was used in the wound,  On the contralateral side to the approach, a retractor was placed. The soft tissue was cleaned off the facet joint with a bovie. The facet joint was burred down aggressively and bone graft packed into the area  to  promote posterior facet joint fusion    2 rods of appropriate length were selected and contoured to shape and reduced to the tulip heads. The caps were then tightened to the screw 's predetermined specification utilizing a torque . The construct was the evaluated fluoroscopically and determined to be appropriate.                   All bleeders were controlled using bipolar electrocautery and floseal.  There was no active bleeding.  Closure was done in layers with interrupted 0 Vicryl for the fascia, 2-0 Vicryl for the subcutaneous tissue.  The subcutaneous layer was injected with 0.375% Marcaine and the skin was closed with a Monocryl suture.  Dermabond and a sterile dressing were then applied.  The patient was woken from anesthesia and transferred to the PACU in stable condition.                   A physician assistant was necessary during the case to provide positioning, exposure, hemostasis, safety and retraction and to manage wound suction so that I could operate with two hands. The PA aided in screw targeting on their side and caitie insertion     Ricardo Benson MD  Division of Spine Surgery  [facility]

## 2025-01-06 NOTE — ANESTHESIA PROCEDURE NOTES
Airway  Date/Time: 1/6/2025 10:36 AM  Urgency: elective      General Information and Staff    Patient location during procedure: OR  Anesthesiologist: Babar Kyle MD  Resident/CRNA: Gamaliel Mercado CRNA  Performed: CRNA   Performed by: Gamaliel Mercado CRNA  Authorized by: Babar Kyle MD      Indications and Patient Condition  Indications for airway management: anesthesia  Sedation level: deep  Preoxygenated: yes  Patient position: sniffing  Mask difficulty assessment: 1 - vent by mask    Final Airway Details  Final airway type: endotracheal airway      Successful airway: ETT  Cuffed: yes   Successful intubation technique: Video laryngoscopy  Endotracheal tube insertion site: oral  Blade: GlideScope  Blade size: #3  ETT size (mm): 7.5    Cormack-Lehane Classification: grade I - full view of glottis  Placement verified by: capnometry   Measured from: lips  ETT to lips (cm): 22  Number of attempts at approach: 1

## 2025-01-06 NOTE — H&P
Patient was seen today, 1/6/24, by Dr. Benson and wishes to proceed with surgical intervention. No changes since previous visit.   Agree with below:   Gamaliel Boyer is a 73 year old male.   Patient presents with:  Pre-Op Exam: 01/06-lumbar 5-sacral 1 transforaminal lumbar interbody fusion with possible lumbar 4 pelvic screws, lumbar 5-sacral 1 open biopsy, irrigation, and debridement lumbar [facility]    HPI:   74 yo gentleman here to have preop.   Patient presents with:  Pre-Op Exam: 01/06-lumbar 5-sacral 1 transforaminal lumbar interbody fusion with possible lumbar 4 pelvic screws, lumbar 5-sacral 1 open biopsy, irrigation, and debridement lumbar 5-sacral 1[facility]  Has chronic pain and imaging suggesting infection at L5-S1.    Allergies:    Adhesive Tape ITCHING  Albuterol DIZZINESS, OTHER (SEE COMMENTS)  Comment:Albuterol Inhalation. Lightheadedness  Benadryl Allergy FACE FLUSHING  Depakote [Valproic * DIZZINESS  Diphenhydramine RESTLESSNESS  Lorazepam UNKNOWN  Mirtazapine RESTLESSNESS  Seroquel [Quetiapin* RESTLESSNESS  Wellbutrin [Bupropi* RASH  Zinc Oxide ITCHING  Fexofenadine-Pseudo* OTHER (SEE COMMENTS)  Comment:Altered mental status  Fluconazole OTHER (SEE COMMENTS)  Comment:Kidney labs abnormal  Latex RASH  Vancomycin RASH   Current Meds:  Current Outpatient Medications   Medication Sig Dispense Refill   B Complex-C-Zn-Folic Acid (DIALYVITE/ZINC) Oral Tab Take 1 tablet by mouth daily.   levothyroxine 175 MCG Oral Tab Take 1 tablet (175 mcg total) by mouth before breakfast. 90 tablet 1   lactulose (ENULOSE) 10 GM/15ML Oral Solution take 2 tablespoonsful daily 1892 mL 3   Methoxy PEG-Epoetin Beta (MIRCERA IJ) 50 mcg.   midodrine 5 MG Oral Tab Take 5 mg by mouth.   CONTOUR NEXT TEST In Vitro Strip Check blood glucoses 4 times per day 400 strip 1   Cholecalciferol (VITAMIN D3) 1.25 MG (08284 UT) Oral Cap Take by mouth.   melatonin 3 MG Oral Tab Take 3 mg by  mouth nightly.   PEG 3350-KCl-Na Bicarb-NaCl 420 g Oral Recon Soln Take as directed 2 each 0   ezetimibe 10 MG Oral Tab Take 1 tablet (10 mg total) by mouth daily. 90 tablet 3   lidocaine-prilocaine 2.5-2.5 % External Cream Apply topically once daily prior to dialysis 30 g 2   prednisoLONE 1 % Ophthalmic Suspension Place 1 drop into the right eye 4 (four) times daily. 5 mL 1   moxifloxacin 0.5 % Ophthalmic Solution 1 drop to od, t.I.d. 3 mL 1   bacitracin-polymyxin b Ophthalmic Ointment Apply 1 inch to od , 2-3 times a day 3.5 g 1   Glucagon, rDNA, (GLUCAGON EMERGENCY) 1 MG Injection Kit Inject 1 mg into the skin as needed. 1 kit 2   Insulin Lispro 100 UNIT/ML Injection Solution Inject 50 Units into the skin daily. E10.65 Via insulin pump. MDD of 50 units Pt is on tandem insulin pump 50 mL 3   NYSTATIN 400686 UNIT/GM External Powder Apply twice daily up to 2-4 weeks as needed. 30 g 0   silver sulfADIAZINE 1 % External Cream Apply 1 Application topically daily.   HYDROcodone-acetaminophen 5-325 MG Oral Tab Take 1 tablet by mouth every 8 (eight) hours as needed for Pain. 90 tablet 0   Insulin Glargine, 2 Unit Dial, (TOUJEO MAX SOLOSTAR) 300 UNIT/ML Subcutaneous Solution Pen-injector Inject into the skin.   sertraline 25 MG Oral Tab One po q evening with dinner 90 tablet 3   ipratropium 0.03 % Nasal Solution 2 sprays by Nasal route every 12 (twelve) hours. 30 mL 1   aspirin 81 MG Oral Tab EC Take 81 mg by mouth daily.   Sevelamer 800 MG Oral Tab Take 800 mg by mouth 3 (three) times daily with meals.   famotidine 20 MG Oral Tab Take 1 tablet (20 mg total) by mouth 2 (two) times daily. 180 tablet 3   Insulin Pen Needle (PEN NEEDLES) 31G X 6 MM Does not apply Misc Use to inject Tujeo daily 90 each 1   Insulin Syringe 29G X 1/2\" 0.3 ML Does not apply Misc Use with meals incase of pump failure. 3 times daily 100 each 1   vitamin E 1000 UNITS Oral Cap Take 1,000 Units by mouth daily.   PREVIDENT 5000 DRY MOUTH 1.1 % Dental  Gel APPLY 2 TO 3 TIMES DAILY AS NEEDED FOR DRY MOUTH. Strength: 1.1 % 100 g 2   omega-3 fatty acids 1000 MG Oral Cap Take 1,000 mg by mouth daily.       ROS:   GENERAL HEALTH: feels well otherwise  SKIN: denies any unusual skin lesions or rashes  EYES: no visual complaints or deficits  HEENT: denies nasal congestion, sinus pain or sore throat; hearing loss negative  RESPIRATORY: denies shortness of breath, wheezing or cough   CARDIOVASCULAR: denies chest pain or RM; no palpitations   GI: denies nausea, vomiting, constipation, diarrhea; no rectal bleeding; no heartburn  : no dysuira, hematuria, urgency, frequency  MUSCULOSKELETAL: no joint complaints upper or lower extremities  NEURO: no sensory or motor complaint  PSYCHE: no symptoms of depression or anxiety  ENDOCRINE: denies excessive thirst or urination; denies unexpected wt gain or wt loss  ALLERGY/IMM.: denies food or seasonal allergies  Past Medical History:   Diagnosis Date   ANXIETY   BACK PAIN   Blastomycosis 1984   BPH (benign prostatic hyperplasia)   DEPRESSION   DIABETES dx at age 12   Type 1 DM   Diabetic retinopathy   laser surgery   Disorder of kidney and ureter   Kidneys shut down but started working again. No dialysis. Stage 3   Esophageal reflux   occassion   Hearing impairment   hearing aids   HEMORRHOIDS   History of blood transfusion 2013   Hospitalization or health care facility admission within last 6 months   this was in 2013   HYPERLIPIDEMIA   HYPERTENSION   HYPOTHYROIDISM   IMPOTENCE   Kidney disease   ARF-2013   Liver disease   2013 - pt states all resolved after liver and kidneys shut down after issue with pneumonia   Neuropathy   Obstructive sleep apnea 09/27/2023   [place] HST AHI 22.7   OSTEOARTHRITIS   Osteoarthrosis, unspecified whether generalized or localized, unspecified site   OSTEOPOROSIS   OTHER DISEASES   in 2013-pt had pneumonia, sepsis, dialysis, peg tube, vent, now resolved.   Other disorders of plasma-protein metabolism,  not elsewhere classified 07/03/2023   Polyneuropathy in diabetes(357.2)   SLEEP APNEA   Uses his CPAP machine consistently   Sleep apnea, obstructive SPLIT NIGHT 5-12-17   AHI 54 SaO2 lizeth 74 % CPAP 12 HME//   Type I (juvenile type) diabetes mellitus without mention of complication, not stated as uncontrolled   since 1963- DR Mao follows   Unspecified disorder of thyroid   Unspecified essential hypertension   Visual impairment   wears glasses     Past Surgical History:   Procedure Laterality Date   BACK SURGERY 2009   laminectomy L1 -4 2/09 Dr. Eitan Guerrero [facility]   BACK SURGERY 08/08/2013   C4-C6 ACDF   BACK SURGERY 09/08/2016   L2-S1 Revision Decomp possible uninstrumented fusion 9/8/16   BACK SURGERY 09/10/2018   Rev C3-C7 ACDF   CATARACT Bilateral done in 2004   IOL's   COLONOSCOPY   2006   COLONOSCOPY 02/2009   normal   COLONOSCOPY N/A 08/23/2019   adenoma- repeat 1 yr (2 dya prep)   COLONOSCOPY,BIOPSY 02/20/2009   Performed by ANAM RIVERS at [facility] [place] [place]   EGD N/A 07/23/2021   EGD & COLONOSCOPY Surgeon: Kimberly, +vik, rpt EGD 3 months, poor prep rpt colon 3 years   ENDOSCOPY, BOWEL POUCH, BIOPSY   INJECTION, W/WO CONTRAST, DX/THERAPEUTIC SUBSTANCE, EPIDURAL/SUBARACHNOID; LUMBAR/SACRAL N/A 05/04/2016   Procedure: LUMBAR EPIDURAL; Surgeon: Jayden Norton MD; Location: [facility]   INJECTION, W/WO CONTRAST, DX/THERAPEUTIC SUBSTANCE, EPIDURAL/SUBARACHNOID; LUMBAR/SACRAL N/A 05/25/2016   Procedure: LUMBAR EPIDURAL; Surgeon: Jayden Norton MD; Location: [facility]   INJECTION, W/WO CONTRAST, DX/THERAPEUTIC SUBSTANCE, EPIDURAL/SUBARACHNOID; LUMBAR/SACRAL N/A 06/08/2016   Procedure: LUMBAR EPIDURAL; Surgeon: Jayden Norton MD; Location: [facility]   KNEE REPLACEMENT SURGERY 02/14/2013   Left TKR by Dr. Lombardi M-SEDAJ BY  PHYS [facility] SV 5+ YR N/A 05/04/2016   Procedure: LUMBAR EPIDURAL; Surgeon: Jayden Norton MD; [facility]  [facility]   [facility] N[facility]A 05/25/2016   Procedure: LUMBAR EPIDURAL; Surgeon: Jayden Norton MD; Location: [facility]   M-SEDAJ BY  PHYS [facility] 5+ YR N/A 06/08/2016   Procedure: LUMBAR EPIDURAL; Surgeon: Jayden Norton MD; Location: [facility]   OTHER SURGICAL HISTORY   Bilateral median nerve release at elbow; 2000   OTHER SURGICAL HISTORY   CTS release bilateral 2000   OTHER SURGICAL HISTORY   Surgical repair fracture left hand 5th digit   OTHER SURGICAL HISTORY   Surgery left heel ligament repair   OTHER SURGICAL HISTORY   Laser surgery for bilateral retinopathy   OTHER SURGICAL HISTORY   Surgery to left eye for \"weak muscle.\" 2007   OTHER SURGICAL HISTORY   bilat knee repair 9/23/10   OTHER SURGICAL HISTORY 11/2013   lung resection to remove abcess   OTHER SURGICAL HISTORY 10/26/2020   Cystoscopy (Dr. Ribeiro)   PATIENT DOCUMENTED NOT TO HAVE EXPERIENCED ANY OF THE FOLLOWING EVENTS 02/14/2014   Procedure: ; Surgeon: Kin Hobbs MD; Location: [facility]   PATIENT DOCUMENTED NOT TO HAVE EXPERIENCED ANY OF THE FOLLOWING EVENTS N/A 05/04/2016   Procedure: LUMBAR EPIDURAL; Surgeon: Jayden Norton MD; Location: [facility]   PATIENT DOCUMENTED NOT TO HAVE EXPERIENCED ANY OF THE FOLLOWING EVENTS N/A 05/25/2016   Procedure: LUMBAR EPIDURAL; Surgeon: Jayden Norton MD; Location: [facility]   PATIENT DOCUMENTED NOT TO HAVE EXPERIENCED ANY OF THE FOLLOWING EVENTS N/A 06/08/2016   Procedure: LUMBAR EPIDURAL; Surgeon: Jayden Norton MD; Location: [facility]   PATIENT WITHOUGH PREOPERATIVE ORDER FOR IV ANTIBIOTIC SURGICAL SITE INFECTION PROPHYLAXIS. 02/14/2014   Procedure: ; Surgeon: Kin Hobbs MD; Location: [facility]   PATIENT WITHOUGH PREOPERATIVE ORDER FOR IV ANTIBIOTIC SURGICAL SITE INFECTION PROPHYLAXIS. N/A 05/04/2016   Procedure:  LUMBAR EPIDURAL; Surgeon: Jayden Norton MD; Location: [facility]   PATIENT WITHOUGH PREOPERATIVE ORDER FOR IV ANTIBIOTIC SURGICAL SITE INFECTION PROPHYLAXIS. N/A 2016   Procedure: LUMBAR EPIDURAL; Surgeon: Jayden Norton MD; Location: [facility]   PATIENT WITHOUGH PREOPERATIVE ORDER FOR IV ANTIBIOTIC SURGICAL SITE INFECTION PROPHYLAXIS. N/A 2016   Procedure: LUMBAR EPIDURAL; Surgeon: Jayden Norton MD; Location: [facility]   TOTAL KNEE REPLACEMENT Left 2013   UPPER GI ENDOSCOPY PERFORMED 10/25/2024   Normal No varices   UPPER GI ENDOSCOPY,DIAGNOSIS 2016   retained gastric contents; Edward   UPPER GI ENDOSCOPY,REMOV F.B. N/A 2014   Procedure: ESOPHAGOGASTRODUODENOSCOPY, POSSIBLE BIOPSY, POSSIBLE POLYPECTOMY 58566; Surgeon: Kin Hobbs MD; [facility]     Family History   Problem Relation Age of Onset   Hypertension Mother   Hypertension Father   Lipids Father   Cancer Father   Heart Disorder Brother   CAD with MI at age 53 - passed away   Obesity Brother   Diabetes Brother   Type 2 DM   Hypertension Brother   Lipids Brother   Psychiatric Maternal Grandmother   Diabetes Paternal Grandfather   Type 2 DM   Thyroid Disorder Neg     Social History  Socioeconomic History  Marital status:   Spouse name: Not on file  Number of children: 1  Years of education: Not on file  Highest education level: Not on file  Occupational History  Occupation: Retired--Printer  Tobacco Use  Smoking status: Former  Packs/day: 0.00  Years: 3.5 packs/day for 20.0 years (70.0 ttl pk-yrs)  Types: Cigarettes  Start date: 1966  Quit date: 1986  Years since quittin.9  Smokeless tobacco: Never  Tobacco comments: Quit 25 years ago  Vaping Use  Vaping status: Never Used  Substance and Sexual Activity  Alcohol use: Yes  Comment: will have 1 serving per month  Drug use: No  Sexual activity: Yes  Partners: Female  Other  Topics  Concerns:   Service: No  Blood Transfusions: No  Caffeine Concern: Yes  coffee 2 cups per day  Occupational Exposure: Not Asked  Hobby Hazards: Not Asked  Sleep Concern: No  Stress Concern: Not Asked  Weight Concern: Not Asked  Special Diet: Not Asked  Back Care: Not Asked  Exercise: Yes  walking  Bike Helmet: Not Asked  Seat Belt: Yes  Self-Exams: Not Asked  Social History Narrative  Not on file    Social Determinants of Health  Financial Resource Strain: Low Risk (4/19/2024)  Received from [employer] [facility]  Financial Resource Strain  In the past year, have you or any family members you live with been unable to get any of the following when it was really needed? Check all that apply.: None  Food Insecurity: No Food Insecurity (9/30/2024)  Received from [facility] [employer] [facility]  Food Insecurity  Recently, have there been times that your food ran out and you didn't have money to get more?: Never true  Transportation Needs: No Transportation Needs (9/30/2024)  Received from [facility] [employer] [facility]  Transportation Needs  Currently, has lack of transportation kept you from getting where you want or need to go? (For ex: to medical appointments, picking up medications, groceries, or work)?: No  Car Seat: Not on file  Physical Activity: Not on file  Stress: Not on file  Social Connections: Low Risk (4/19/2024)  [facility] [facility]  Social Connections  How often do you see or talk to people that you care about and feel close to? (For example: talking to friends on the phone, visiting friends or family, going to Hindu or club meetings): 5 or more times a week  Intimate Partner Violence: Low Risk (4/19/2024)  [facility] [facility]  Interpersonal Safety  How often does anyone, including family and friends, physically hurt you? : Never  How often does anyone, including family  and friends, insult or talk down to you? : Never  How often does anyone, including family and friends, threaten you with harm? : Never  How often does anyone, including family and friends, scream or curse at you? : Never  Housing Stability: Low Risk (9/30/2024)  [facility] from [facility]  Housing Stability  In the past 12 months, was there a time when you did not have a steady place to sleep or slept in a shelter?: No  Housing Instability Emergency: Not on file  Crib or [place]: Not on file    PHYSICAL EXAM:   Blood pressure 112/62, pulse 61, height 5' 6\" (1.676 m), weight 163 lb (73.9 kg), SpO2 99%.  GENERAL: well developed, well nourished, in no apparent distress  SKIN: no rashes, no suspicious lesions  EYES: PERRLA, EOMI, sclera anicteric, conjunctiva normal; fundi normal, there is no nystagmus  HEENT: normocephalic; normal nose, pharynx and TM's  NECK: supple; FROM; no JVD, no TMG, no carotid bruits  RESPIRATORY: clear to percussion and auscultation, no rales, ronchi, wheezing  CARDIOVASCULAR: S1, S2 normal, RRR; no S3, no S4; no click; murmur negative  ABDOMEN: normal active BS+, soft, nondistended; no HSM; no masses; no bruits; no masses; nontender, no G/R/R   LYMPHATIC: no lymphadenopathy  MUSCULOSKELETAL: no acute synovitis upper or lower extremity  EXTREMITIES: no cyanosis, clubbing or edema, peripheral pulses intact  NEUROLOGIC: intact; no sensorimotor deficit; reflexes normal  PSYCHIATRIC: alert and oriented x 3; affect appropriate    Impression    CONCLUSION: No significant interval change since 9/6/2024. Bony destruction of the L5-S1 endplates again could represent discitis/osteomyelitis. The bony destruction is similar in extent since 9/6/2024. No definitive epidural collection is seen.   There is persistent air within the L5-S1 disc space. Please see above for further details.    LOCATION: [id number]    Dictated by (CST): Stromberg, LeRoy, MD on 9/30/2024 at 9:27 PM    Finalized by (CST): Stromberg, LeRoy, MD on 9/30/2024 at 9:36 PM   ASSESSMENT/ PLAN:   1. Pre-operative examination  -good candidate for planned procedure.   - CBC WITH DIFFERENTIAL WITH PLATELET; Future  - COMPREHENSIVE METABOLIC PANEL; Future  - PROTHROMBIN TIME (PT) AND PARTIAL THROMBOPLASTIN TIME (PTT); Future  - URINALYSIS, COMPLETE WITH MICROSCOPIC EXAMINATION WITH REFLEX TO URINE CULTURE, ROUTINE; Future  - ELECTROCARDIOGRAM, COMPLETE  - XR CHEST PA + LAT CHEST (CPT=71046); Future  - MRSA / MSSA PRE-OP; Future    2. Bruising  - check labs.   - CBC WITH DIFFERENTIAL WITH PLATELET; Future  - PROTHROMBIN TIME (PT) AND PARTIAL THROMBOPLASTIN TIME (PTT); Future    3. Hemodialysis status (HCC)  - -No symptoms currently  -Follows with neprhology  -Continue current treatment  -Monitor   - CBC WITH DIFFERENTIAL WITH PLATELET; Future  - COMPREHENSIVE METABOLIC PANEL; Future  - PROTHROMBIN TIME (PT) AND PARTIAL THROMBOPLASTIN TIME (PTT); Future    4. ESRD on dialysis ([facility])  - blood pressure control  - routine lab follow up.  - low salt diet  - consider renal eval     5. Controlled type 1 diabetes mellitus with stage 5 chronic kidney disease not on chronic dialysis (HCC)  -controlled. A1C Goal. CPM and monitor.     6. Infection of lumbar spine (HCC)  -needs to have procedure for biopsy  - MRSA / MSSA PRE-OP; Future

## 2025-01-06 NOTE — BRIEF OP NOTE
Pre-Operative Diagnosis: INFECTION OF LUMBAR SPINE, LUMBAR STENOSIS     Post-Operative Diagnosis: INFECTION OF LUMBAR SPINE, LUMBAR STENOSIS      Procedure Performed:   LUMBAR 5-SACRAL 1 TRANSFORAMINAL LUMBAR INTERBODY FUSION WITH INSTRUMENTATION, BIOPSY LUMBAR 5-SACRAL 1, IRRIGATION AND DEBRIDMENT LUMBAR 5-SACRAL 1    Surgeons and Role:     * Ricardo Benson MD - Primary    Assistant(s):  PA: Priscila Varela PA-C     Surgical Findings: Above     Specimen: none     Estimated Blood Loss: Blood Output: 120 mL (1/6/2025 12:43 PM)      Dictation Number:      Priscila Varela PA-C  1/6/2025  12:48 PM

## 2025-01-06 NOTE — ANESTHESIA PROCEDURE NOTES
Peripheral IV  Date/Time: 1/6/2025 10:45 AM  Inserted by: Babar Kyle MD    Placement  Needle size: 18 G  Laterality: right  Location: wrist  Local anesthetic: none  Site prep: alcohol  Technique: anatomical landmarks  Attempts: 1

## 2025-01-07 PROBLEM — M46.47 DISCITIS OF LUMBOSACRAL REGION: Status: ACTIVE | Noted: 2024-09-30

## 2025-01-07 LAB
GLUCOSE BLD-MCNC: 183 MG/DL (ref 70–99)
GLUCOSE BLD-MCNC: 247 MG/DL (ref 70–99)
GLUCOSE BLD-MCNC: 295 MG/DL (ref 70–99)
GLUCOSE BLD-MCNC: 346 MG/DL (ref 70–99)
HBV SURFACE AG SER-ACNC: <0.1 [IU]/L
HBV SURFACE AG SERPL QL IA: NONREACTIVE
HCT VFR BLD AUTO: 36.9 %
HGB BLD-MCNC: 11.8 G/DL

## 2025-01-07 PROCEDURE — 5A1D70Z PERFORMANCE OF URINARY FILTRATION, INTERMITTENT, LESS THAN 6 HOURS PER DAY: ICD-10-PCS | Performed by: INTERNAL MEDICINE

## 2025-01-07 PROCEDURE — 99223 1ST HOSP IP/OBS HIGH 75: CPT | Performed by: INTERNAL MEDICINE

## 2025-01-07 RX ORDER — MIDODRINE HYDROCHLORIDE 5 MG/1
10 TABLET ORAL
Status: DISCONTINUED | OUTPATIENT
Start: 2025-01-07 | End: 2025-01-09

## 2025-01-07 RX ORDER — OXYCODONE AND ACETAMINOPHEN 5; 325 MG/1; MG/1
2 TABLET ORAL EVERY 4 HOURS PRN
Status: DISCONTINUED | OUTPATIENT
Start: 2025-01-07 | End: 2025-01-09

## 2025-01-07 RX ORDER — OXYCODONE AND ACETAMINOPHEN 5; 325 MG/1; MG/1
1 TABLET ORAL EVERY 4 HOURS PRN
Status: DISCONTINUED | OUTPATIENT
Start: 2025-01-07 | End: 2025-01-09

## 2025-01-07 NOTE — CONSULTS
[facility]  Report of Inpatient Wound Care Consultation    Gamaliel Boyer Patient Status:  Inpatient    1950 MRN [id number]   Location [facility] 3SW-A Attending Ricardo Benson MD   Hosp Day # 1 PCP Nima Sorensen MD     Reason for Consultation:  B feet     History of Present Illness:  Gamaliel Boyer is a a(n) 74 year old male.  Patient with multiple comorbidities.    SUBJECTIVE:  I have had these for a long time.       History:  Past Medical History:    Anemia    anxiety    Anxiety state    back pain    Back problem    Blastomycosis    Blind    BPH (benign prostatic hyperplasia)    Depression    Diabetic retinopathy    laser surgery    Dialysis patient (HCC)    Tues/Th/Sat    Discitis, unspecified, lumbosacral region    Disorder of thyroid    End stage renal disease (HCC)    Esophageal reflux    occassion    Hearing impairment    hearing aids bilateral    HEMORRHOIDS    High cholesterol    History of blood transfusion    History of renal dialysis    fistula on left arm, e, Thurs, Sat    Hyperkalemia    hyperlipidemia    hypothyroidism    IMPOTENCE    Liver disease    2013 - pt states all resolved after liver and kidneys shut down after issue with pneumonia     Mood disturbance    Muscle weakness    hands and legs    Neuropathy    osteoarthritis    Osteoarthrosis, unspecified whether generalized or localized, unspecified site    osteoporosis    Polyneuropathy in diabetes(357.2)    Renal disorder    dialysis    Sepsis (HCC)    Sleep apnea    cpap    Sleep apnea, obstructive    AHI 54 SaO2 lizeth 74 % CPAP 12 HME//     Type I (juvenile type) diabetes mellitus without mention of complication, not stated as uncontrolled    since - DR Mao follows    Unspecified essential hypertension    Visual impairment    wears glasses     Past Surgical History:   Procedure Laterality Date    Back surgery      laminectomy  L1 -4    Dr. Eitan Guerrero [facility]    Back surgery  13    C4-C6  ACDF     Back surgery  9/8/16    L2-S1 Revision Decomp possible uninstrumented fusion 9/8/16    Back surgery  09/10/2018    Rev C3-C7 ACDF     Cataract Bilateral done in 2004    IOL's    Colonoscopy      2006    Colonoscopy  2/2009    normal    Colonoscopy N/A 8/23/2019    adenoma- repeat 1 yr (2 dya prep)    Colonoscopy,biopsy  2/20/09    Performed by ANAM RIVERS at [facility], [facility]    Egd N/A 7/23/2021    EGD & COLONOSCOPY Surgeon: Kimberly, +vik, rpt EGD 3 months, poor prep rpt colon 3 years    Endoscopy, bowel pouch, biopsy      Injection, w/wo contrast, dx/therapeutic substance, epidural/subarachnoid; lumbar/sacral N/A 5/4/2016    Procedure: LUMBAR EPIDURAL;  Surgeon: Jayden Norton MD;  Location: [facility]    Injection, w/wo contrast, dx/therapeutic substance, epidural/subarachnoid; lumbar/sacral N/A 5/25/2016    Procedure: LUMBAR EPIDURAL;  Surgeon: Jayden Norton MD;  Location: [facility]    Injection, w/wo contrast, dx/therapeutic substance, epidural/subarachnoid; lumbar/sacral N/A 6/8/2016    Procedure: LUMBAR EPIDURAL;  Surgeon: Jayden Norton MD;  Location: [facility]    Knee replacement surgery  2/14/13    Left TKR by Dr. Lombardi    M-sedaj by  phys perfrmg svc 5+ yr N/A 5/4/2016    Procedure: LUMBAR EPIDURAL;  Surgeon: Jayden Norton MD;  Location: [facility]    M-sedaj by  phys perfrmg svc 5+ yr N/A 5/25/2016    Procedure: LUMBAR EPIDURAL;  Surgeon: Jayden Norton MD;  Location: [facility]    M-sedaj by  phys perfrmg svc 5+ yr N/A 6/8/2016    Procedure: LUMBAR EPIDURAL;  Surgeon: Jayden Norton MD;  Location: [facility]    Other surgical history      Bilateral median nerve release at elbow; 2000    Other surgical history      CTS release bilateral 2000    Other surgical history      Surgical repair fracture left hand 5th digit    Other surgical history       Surgery left heel ligament repair    Other surgical history      Laser surgery for bilateral retinopathy    Other surgical history      Surgery to left eye for \"weak muscle.\" 2007    Other surgical history      bilat knee repair 9/23/10    Other surgical history  11/2013    lung resection to remove abcess    Other surgical history  10/26/2020    Cystoscopy (Dr. Ribeiro)    Patient documented not to have experienced any of the following events  2/14/2014    Procedure: ;  Surgeon: Kin Hobbs MD;  Location: [facility]    Patient documented not to have experienced any of the following events N/A 5/4/2016    Procedure: LUMBAR EPIDURAL;  Surgeon: Jayden Norton MD;  Location: [facility]    Patient documented not to have experienced any of the following events N/A 5/25/2016    Procedure: LUMBAR EPIDURAL;  Surgeon: Jayden Norton MD;  Location: [facility]    Patient documented not to have experienced any of the following events N/A 6/8/2016    Procedure: LUMBAR EPIDURAL;  Surgeon: Jayden Norton MD;  Location: [facility]    Patient withough preoperative order for iv antibiotic surgical site infection prophylaxis.  2/14/2014    Procedure: ;  Surgeon: Kin Hobbs MD;  Location: [facility]    Patient withough preoperative order for iv antibiotic surgical site infection prophylaxis. N/A 5/4/2016    Procedure: LUMBAR EPIDURAL;  Surgeon: Jayden Norton MD;  Location: [facility]    Patient withough preoperative order for iv antibiotic surgical site infection prophylaxis. N/A 5/25/2016    Procedure: LUMBAR EPIDURAL;  Surgeon: Jayden Norton MD;  Location: [facility]    Patient withough preoperative order for iv antibiotic surgical site infection prophylaxis. N/A 6/8/2016    Procedure: LUMBAR EPIDURAL;  Surgeon: Jayden Norton MD;  Location: [facility]    Total knee  replacement Left 2013    Upper gi endoscopy,diagnosis  4/14/16    retained gastric contents; Cesar    Upper gi endoscopy,remov f.b. N/A 2/14/2014    Procedure: ESOPHAGOGASTRODUODENOSCOPY, POSSIBLE BIOPSY, POSSIBLE POLYPECTOMY 16096;  Surgeon: Kin Hobbs MD;  Location: [facility]      reports that he quit smoking about 39 years ago. His smoking use included cigarettes. He started smoking about 59 years ago. He has a 70 pack-year smoking history. He has never used smokeless tobacco. He reports that he does not currently use alcohol. He reports current drug use. Drug: Cannabis.      Allergies:  @ALLERGY    Laboratory Data:    Recent Labs   Lab 01/06/25  0853 01/06/25  1313 01/06/25  1825 01/06/25  2303 01/07/25  0539 01/07/25  0546   WBC 4.9  --   --   --   --   --    HGB 11.7*  --   --   --   --  11.8*   HCT 36.4*  --   --   --   --  36.9*   .0*  --   --   --   --   --    CREATSERUM 4.90*  --   --   --   --   --    BUN 41*  --   --   --   --   --    *  --   --   --   --   --    CA 9.5  --   --   --   --   --    ALB 4.1  --   --   --   --   --    TP 7.3  --   --   --   --   --    PTT 36.7*  --   --   --   --   --    INR 1.11  --   --   --   --   --    PGLU  --    < > 172* 236* 295*  --     < > = values in this interval not displayed.         ASSESSMENT:  Wound 01/06/25 Tibial Right (Active)   Date First Assessed/Time First Assessed: 01/06/25 2119   Location: Tibial  Wound Location Orientation: Right      Assessments 1/7/2025  9:58 AM   Wound Image     Wound Length (cm) 1.3 cm   Wound Width (cm) 2 cm   Wound Surface Area (cm^2) 2.6 cm^2   Wound Depth (cm) 0 cm   Wound Volume (cm^3) 0 cm^3   Margins Well-defined edges;Flat and Intact   Viv-wound Assessment Clean;Dry   Wound Granulation Tissue Red   Wound Bed Granulation (%) 50 %   Wound Bed Epithelium (%) 50 %   Wound Bed Slough (%) 0 %   Wound Bed Eschar (%) 0 %   Wound Bed Fibrin (%) 0 %   State of Healing Epithelialized   Wound Odor  None   Tunneling? No   Undermining? No   Sinus Tracts? No       Wound 01/06/25 Foot Dorsal;Right (Active)   Date First Assessed/Time First Assessed: 01/06/25 2125   Location: Foot  Wound Location Orientation: Dorsal;Right      Assessments 1/7/2025 10:11 AM   Wound Image     Viv-wound Assessment Clean;Dry   Wound Bed Eschar (%) 0 %   Wound Bed Fibrin (%) 0 %   Wound Odor None   Tunneling? No   Undermining? No       Wound 01/06/25 Toe (Comment which one) Dorsal;Left (Active)   Date First Assessed/Time First Assessed: 01/06/25 2127   Location: Toe (Comment which one)  Wound Location Orientation: (c) Dorsal;Left      Assessments 1/7/2025 10:07 AM   Wound Image     Wound Length (cm) 0.9 cm   Wound Width (cm) 0.5 cm   Wound Surface Area (cm^2) 0.45 cm^2   Wound Depth (cm) 0 cm   Wound Volume (cm^3) 0 cm^3   Margins Well-defined edges   Viv-wound Assessment Clean;Dry   Wound Bed Granulation (%) 0 %   Wound Bed Epithelium (%) 0 %   Wound Bed Slough (%) 0 %   Wound Bed Eschar (%) 100 %   Wound Odor None       Wound 01/06/25 Toe (Comment which one) Dorsal;Left (Active)   Date First Assessed/Time First Assessed: 01/06/25 2128   Location: Toe (Comment which one)  Wound Location Orientation: (c) Dorsal;Left      Assessments 1/7/2025 10:05 AM   Wound Image     Drainage Amount None   Wound Length (cm) 0.7 cm   Wound Width (cm) 0.5 cm   Wound Surface Area (cm^2) 0.35 cm^2   Wound Depth (cm) 0 cm   Wound Volume (cm^3) 0 cm^3       Wound 01/06/25 Toe (Comment which one) Dorsal;Left (Active)   Date First Assessed/Time First Assessed: 01/06/25 2130   Location: Toe (Comment which one)  Wound Location Orientation: (c) Dorsal;Left      Assessments 1/7/2025 10:02 AM   Wound Image     Drainage Amount None   Dressing Changed Changed   Wound Length (cm) 0.7 cm   Wound Width (cm) 0.8 cm   Wound Surface Area (cm^2) 0.56 cm^2   Wound Depth (cm) 0 cm   Wound Volume (cm^3) 0 cm^3   Viv-wound Assessment Clean;Dry   Wound Bed Granulation (%) 0 %    Wound Bed Epithelium (%) 0 %   Wound Bed Slough (%) 0 %   Wound Bed Eschar (%) 100 %   Wound Odor None   Tunneling? No   Undermining? No   Sinus Tracts? No       Wound 01/07/25 Foot Left;Medial (Active)   Date First Assessed/Time First Assessed: 01/07/25 1009   Location: Foot  Wound Location Orientation: Left;Medial      Assessments 1/7/2025 10:10 AM   Wound Image     Wound Length (cm) 0.4 cm   Wound Width (cm) 1.5 cm   Wound Surface Area (cm^2) 0.6 cm^2   Wound Depth (cm) 0 cm   Wound Volume (cm^3) 0 cm^3   Margins Epibole (Rolled edges)   Viv-wound Assessment Clean;Dry   Wound Bed Granulation (%) 0 %   Wound Bed Epithelium (%) 0 %   Wound Bed Slough (%) 0 %   Wound Bed Eschar (%) 100 %      B feet warm, +dorsal pedal pulse.      Wound Cleaning and Dressings:  Showering directions: May shower and/or cleanse wound with mild soap and water  Wound cleansing:  Cleanse with normal saline or wound cleanser  Wound cleaning frequency: Every 48 hours    Care Summary:  Care Summary: Discussed Plan of Care at beside with patient. Patient verbally acknowledges understanding of all instructions and all questions were answered.      Thank you for this consultation and for allowing me to participate in the care of your patient.      Time Spent 45 Minutes.    Thank you,  Dorys Hubbard, PT, MPT  Wound Care Clinician  [facility] [facility]  1/7/2025  10:40 AM

## 2025-01-07 NOTE — CONSULTS
[facility]  Report of Consultation    Gamaliel Boyer Patient Status:  Inpatient    1950 MRN [id number]   Location [facility] 3SW-A Attending Ricardo Benson MD   Hosp Day # 1 PCP Nima Sorensen MD       Assessment / Plan:    1) ESRD- due to type 1 DM > 60 yrs; lytes / volume OK. HD today per usual routine    2) Discits / OM L5-S1- s/p biopsy / irrigation / debridement + fusion     3) HTN- longstanding, refractory / labile- recently off BP meds    4) Anemia- due to CKD; hold EPO with hgb > 11    5) DM 1- A1C < 7 on insulin pump (Dr. Grande)    6) Steal syndrome s/p AVF revision    7) Ascites- due to ESRD / fluid gains / ? LUGO- receiving paracentesis q3 weeks (1.5-2 L)      Reason for Consultation:  ESRD    History of Present Illness:  Gamaliel Boyer is a a(n) 74 year old male. Dictation to follow    History:  Past Medical History:    Anemia    anxiety    Anxiety state    back pain    Back problem    Blastomycosis    Blind    BPH (benign prostatic hyperplasia)    Depression    Diabetic retinopathy    laser surgery    Dialysis patient (HCC)    Tues/Thurs/Sat    Discitis, unspecified, lumbosacral region    Disorder of thyroid    End stage renal disease (HCC)    Esophageal reflux    occassion    Hearing impairment    hearing aids bilateral    HEMORRHOIDS    High cholesterol    History of blood transfusion    History of renal dialysis    fistula on left arm, Tue, Th, Sat    Hyperkalemia    hyperlipidemia    hypothyroidism    IMPOTENCE    Liver disease    2013 - pt states all resolved after liver and kidneys shut down after issue with pneumonia     Mood disturbance    Muscle weakness    hands and legs    Neuropathy    osteoarthritis    Osteoarthrosis, unspecified whether generalized or localized, unspecified site    osteoporosis    Polyneuropathy in diabetes(357.2)    Renal disorder    dialysis    Sepsis (HCC)    Sleep apnea    cpap    Sleep apnea, obstructive    AHI 54 SaO2 lizeth 74 %  CPAP 12 HME//     Type I (juvenile type) diabetes mellitus without mention of complication, not stated as uncontrolled    since 1963- DR Mao follows    Unspecified essential hypertension    Visual impairment    wears glasses     Past Surgical History:   Procedure Laterality Date    Back surgery  2009    laminectomy  L1 -4  2/09  Dr. Eitan Guerrero [facility]    Back surgery  8-8-13    C4-C6 ACDF     Back surgery  9/8/16    L2-S1 Revision Decomp possible uninstrumented fusion 9/8/16    Back surgery  09/10/2018    Rev C3-C7 ACDF     Cataract Bilateral done in 2004    IOL's    Colonoscopy      2006    Colonoscopy  2/2009    normal    Colonoscopy N/A 8/23/2019    adenoma- repeat 1 yr (2 dya prep)    Colonoscopy,biopsy  2/20/09    [place]formed [place] ANAM RIVERS [place] [facility] [place]    Egd N/A 7/23/2021    EGD & COLONOSCOPY Surgeon: Kimberly, +vik, rpt EGD 3 months, poor prep rpt colon 3 years    Endoscopy, bowel pouch, biopsy      Injection, w/wo contrast, dx/therapeutic substance, epidural/subarachnoid; lumbar/sacral N/A 5/4/2016    Procedure: LUMBAR EPIDURAL;  Surgeon: Jayden Norton MD;  Location: [facility]    Injection, w/wo contrast, dx/therapeutic substance, epidural/subarachnoid; lumbar/sacral N/A 5/25/2016    Procedure: LUMBAR EPIDURAL;  Surgeon: Jayden Norton MD;  Location: [facility]    Injection, w/wo contrast, dx/therapeutic substance, epidural/subarachnoid; lumbar/sacral N/A 6/8/2016    Procedure: LUMBAR EPIDURAL;  Surgeon: Jayden Norton MD;  Location: [facility]    Knee replacement surgery  2/14/13    Left TKR by Dr. Lombardi    M-sedaj by  phys perfrmg svc 5+ yr N/A 5/4/2016    Procedure: LUMBAR EPIDURAL;  Surgeon: Jayden Norton MD;  Location: [facility]    M-sedaj by  phys perfrmg svc 5+ yr N/A 5/25/2016    Procedure: LUMBAR EPIDURAL;  Surgeon: Jayden Norton MD;  Location: [facility]     M-sedaj by  phys perfrmg svc 5+ yr N/A 6/8/2016    Procedure: LUMBAR EPIDURAL;  Surgeon: Jayden Norton MD;  Location: [facility]    Other surgical history      Bilateral median nerve release at elbow; 2000    Other surgical history      CTS release bilateral 2000    Other surgical history      Surgical repair fracture left hand 5th digit    Other surgical history      Surgery left heel ligament repair    Other surgical history      Laser surgery for bilateral retinopathy    Other surgical history      Surgery to left eye for \"weak muscle.\" 2007    Other surgical history      bilat knee repair 9/23/10    Other surgical history  11/2013    lung resection to remove abcess    Other surgical history  10/26/2020    Cystoscopy (Dr. Ribeiro)    Patient documented not to have experienced any of the following events  2/14/2014    Procedure: ;  Surgeon: Kin Hobbs MD;  Location: [facility]    Patient documented not to have experienced any of the following events N/A 5/4/2016    Procedure: LUMBAR EPIDURAL;  Surgeon: Jayden Norton MD;  Location: [facility]    Patient documented not to have experienced any of the following events N/A 5/25/2016    Procedure: LUMBAR EPIDURAL;  Surgeon: Jayden Norton MD;  Location: [facility]    Patient documented not to have experienced any of the following events N/A 6/8/2016    Procedure: LUMBAR EPIDURAL;  Surgeon: Jayden Norton MD;  Location: [facility]    Patient withough preoperative order for iv antibiotic surgical site infection prophylaxis.  2/14/2014    Procedure: ;  Surgeon: Kin Hobbs MD;  Location: [facility]    Patient withough preoperative order for iv antibiotic surgical site infection prophylaxis. N/A 5/4/2016    Procedure: LUMBAR EPIDURAL;  Surgeon: Jayden Norton MD;  Location: [facility]    Patient withough preoperative order for iv  antibiotic surgical site infection prophylaxis. N/A 5/25/2016    Procedure: LUMBAR EPIDURAL;  Surgeon: Jayden Norton MD;  Location: [facility]    Patient withough preoperative order for iv antibiotic surgical site infection prophylaxis. N/A 6/8/2016    Procedure: LUMBAR EPIDURAL;  Surgeon: Jayden Norton MD;  Location: [facility]    Total knee replacement Left 2013    Upper gi endoscopy,diagnosis  4/14/16    retained gastric contents; [facility]    Upper gi endoscopy,remov f.b. N/A 2/14/2014    Procedure: ESOPHAGOGASTRODUODENOSCOPY, POSSIBLE BIOPSY, POSSIBLE POLYPECTOMY 70749;  Surgeon: Kin Hobbs MD;  [place] [facility] [place]     Family History   Problem Relation Age of Onset    Hypertension Father     Lipids Father     Cancer Father     Hypertension Mother     Psychiatric Maternal Grandmother     Diabetes Paternal Grandfather         Type 2 DM    Heart Disorder Brother         CAD with MI at age 53 - passed away    Obesity Brother     Diabetes Brother         Type 2 DM    Hypertension Brother     Lipids Brother     Thyroid Disorder Neg      Denies family history of kidney disease.    reports that he quit smoking about 39 years ago. His smoking use included cigarettes. He started smoking about 59 years ago. He has a 70 pack-year smoking history. He has never used smokeless tobacco. He reports that he does not currently use alcohol. He reports current drug use. Drug: Cannabis.    Allergies:  Allergies[1]    Medications:    Current Facility-Administered Medications:     oxyCODONE-acetaminophen (Percocet) 5-325 MG per tab 1 tablet, 1 tablet, Oral, Q4H PRN **OR** oxyCODONE-acetaminophen (Percocet) 5-325 MG per tab 2 tablet, 2 tablet, Oral, Q4H PRN    sodium chloride 0.9% infusion, , Intravenous, Continuous    sodium chloride 0.9 % IV bolus 500 mL, 500 mL, Intravenous, Once PRN    sennosides (Senokot) tab 17.2 mg, 17.2 mg, Oral, Nightly    ondansetron (Zofran) 4  MG/2ML injection 4 mg, 4 mg, Intravenous, Q6H PRN    metoclopramide (Reglan) 5 mg/mL injection 5 mg, 5 mg, Intravenous, Q8H PRN    diphenhydrAMINE (Benadryl) cap/tab 25 mg, 25 mg, Oral, Q4H PRN **OR** diphenhydrAMINE (Benadryl) 50 mg/mL  injection 25 mg, 25 mg, Intravenous, Q4H PRN    benzocaine-menthol (Cepacol) lozenge 1 lozenge, 1 lozenge, Buccal, Q15 Min PRN    HYDROmorphone (Dilaudid) 1 MG/ML injection 0.2 mg, 0.2 mg, Intravenous, Q2H PRN **OR** HYDROmorphone (Dilaudid) 1 MG/ML injection 0.4 mg, 0.4 mg, Intravenous, Q2H PRN    cyclobenzaprine (Flexeril) tab 2.5 mg, 2.5 mg, Oral, Q6H PRN    diazePAM (Valium) tab 2 mg, 2 mg, Oral, Q6H PRN    norepinephrine (Levophed) 4 mg/250mL infusion premix, 0.5-8 mcg/min, Intravenous, Continuous    glucose (Dex4) 15 GM/59ML oral liquid 15 g, 15 g, Oral, Q15 Min PRN **OR** glucose (Glutose) 40% oral gel 15 g, 15 g, Oral, Q15 Min PRN **OR** glucose-vitamin C (Dex-4) chewable tab 4 tablet, 4 tablet, Oral, Q15 Min PRN **OR** dextrose 50% injection 50 mL, 50 mL, Intravenous, Q15 Min PRN **OR** glucose (Dex4) 15 GM/59ML oral liquid 30 g, 30 g, Oral, Q15 Min PRN **OR** glucose (Glutose) 40% oral gel 30 g, 30 g, Oral, Q15 Min PRN **OR** glucose-vitamin C (Dex-4) chewable tab 8 tablet, 8 tablet, Oral, Q15 Min PRN    insulin aspart (NovoLOG) 100 Units/mL FlexPen 1-10 Units, 1-10 Units, Subcutaneous, TID AC and HS    cholecalciferol (Vitamin D3) tab 2,000 Units, 2,000 Units, Oral, Daily    levothyroxine (Synthroid) tab 175 mcg, 175 mcg, Oral, QAM AC    melatonin cap/tab 10 mg, 10 mg, Oral, Nightly    [START ON 1/8/2025] midodrine (ProAmatine) tab 10 mg, 10 mg, Oral, Once per day on Monday Wednesday Friday    multivitamin (Dialyvite - Renal) tab 1 tablet, 1 tablet, Oral, Daily    sertraline (Zoloft) tab 25 mg, 25 mg, Oral, Daily    sevelamer carbonate (Renvela) tab 800 mg, 800 mg, Oral, TID AC    ezetimibe (Zetia) tab 10 mg, 10 mg, Oral, Daily before evening meal  No current outpatient  medications on file.       Review of Systems:  Please see HPI for pertinent positives. 10 point review of systems otherwise reviewed and negative.     Physical Exam:  BP 99/68 (BP Location: Right arm)   Pulse 71   Temp 97.5 °F (36.4 °C) (Oral)   Resp 18   Ht 5' 6\" (1.676 m)   Wt 152 lb (68.9 kg)   SpO2 100%   BMI 24.53 kg/m²   Temp (24hrs), Av.7 °F (36.5 °C), Min:97.2 °F (36.2 °C), Max:98.4 °F (36.9 °C)       Intake/Output Summary (Last 24 hours) at 2025 0904  Last data filed at 2025 1313  Gross per 24 hour   Intake 1200 ml   Output 120 ml   Net 1080 ml     Wt Readings from Last 3 Encounters:   25 152 lb (68.9 kg)   10/25/24 160 lb (72.6 kg)   10/21/24 163 lb 4 oz (74 kg)     General: awake alert  HEENT: No scleral icterus, MMM  Neck: Supple, no KALPANA or thyromegaly  Cardiac: Regular rate and rhythm, S1, S2 normal, no murmur, rub, or gallop  Lungs: Decreased breath sounds at the bases bilaterally.   Abdomen: Soft, non-tender. + bowel sounds, no palpable organomegaly  Extremities: Without clubbing, cyanosis; no edema  Neurologic: Cranial nerves grossly intact, moving all extremities  Skin: Warm and dry, no rashes      Laboratory Data:  Lab Results   Component Value Date    HGB 11.8 2025    HCT 36.9 2025    PGLU 295 2025       Imaging:  All imaging studies reviewed.      Thank you for allowing me to participate in the care of your patient.    Rojelio Carter MD  2025  9:04 AM         [1]   Allergies  Allergen Reactions    Antihistamine & Nasal Deconges [Fexofenadine-Pseudoephedrine] OTHER (SEE COMMENTS)     Altered mental status    Adhesive Tape ITCHING     Silk tape    Albuterol DIZZINESS and OTHER (SEE COMMENTS)     Albuterol Inhalation. Lightheadedness    Benadryl [Diphenhydramine] RESTLESSNESS    Depakote [Valproic Acid] DIZZINESS    Fluconazole OTHER (SEE COMMENTS)     Kidney labs abnormal    Mirtazapine RESTLESSNESS    Quetiapine RESTLESSNESS    Wellbutrin [Bupropion]  RASH    Gabapentin OTHER (SEE COMMENTS)     Severe jerking motions    Lorazepam UNKNOWN    Latex RASH    Vancomycin RASH    Zinc Oxide ITCHING

## 2025-01-07 NOTE — PROGRESS NOTES
[facility]   part of [facility]    Progress Note    Gamaliel Boyer Patient Status:  Inpatient    1950 MRN [id number]   Location [facility] 3SW-A Attending Ricardo Benson MD   Hosp Day # 1 PCP Nima Sorensen MD       S: Patient notes moderate back pain and continued severe left lower extremity pain that was present prior to surgery. He is requesting a change in his medications as he was taking Norco Q4 prior to surgery and feels his pain is not controlled. He has not yet worked with PT.     Inspection:  Awake alert No acute distress. No difficulty breathing     Blood pressure 106/58, pulse 66, temperature 97.6 °F (36.4 °C), temperature source Oral, resp. rate 16, height 5' 6\" (1.676 m), weight 152 lb (68.9 kg), SpO2 99%.    Recent Labs   Lab 25  0853 25  0546   RBC 3.54*  --    HGB 11.7* 11.8*   HCT 36.4* 36.9*   .8*  --    MCH 33.1  --    MCHC 32.1  --    RDW 15.2  --    NEPRELIM 3.71  --    WBC 4.9  --    .0*  --        Lumbar/Sacral Integument: Incision/ incisions;  Dressing in place and dry    Strength: Strength of bilateral lower extremities:     Left Right    EHL * 5/5    DF 2/5 2/5    PF 5/5 5/5    Quads 5/5 5/5    IP 5/5 5/5  Sensation: No sensory deficits noted on bilateral lower extremities    *Not testable due to great toe amputation        A/P: POD # 1 s/p L5-S1 TLIF with biopsy    P: Patient is doing well but pain is not well controlled. Changed patient to percocet 5-10 mg panel to help with pain control. Patient is to work with PT today. Patient to wear brace when OOB.     Priscila Varela PA-C   25

## 2025-01-07 NOTE — PLAN OF CARE
Post-op day 1. Dressing is clean, dry, and intact. Alert and oriented x4. Room air. PRANEETH with CPAP. Blind in left eye. Bilateral hearing aides. Upper dentures. Wants door open at all times. Renal/1800 carb diet with accuchecks. Patient currently is scheduled for dialysis on Tuesday, Thursday, and Saturday. Dialysis completed today with 1500 removed. Physical and occupational therapy evaluations completed. LSO when out of bed. Up with 1-2 assist and walker. Wound care assessed patient per orders. Spouse at bedside for support.

## 2025-01-07 NOTE — OCCUPATIONAL THERAPY NOTE
OCCUPATIONAL THERAPY ORTHO EVALUATION - INPATIENT     Room Number: 371/371-A  Evaluation Date: 1/7/2025  Type of Evaluation: Initial  Presenting Problem: s/p L5-S1 TLIF with I&D on 1/6    Physician Order: IP Consult to Occupational Therapy  Reason for Therapy: ADL/IADL Dysfunction and Discharge Planning    OCCUPATIONAL THERAPY ASSESSMENT   Patient is currently functioning below baseline with toileting, bathing, upper body dressing, lower body dressing, brace management, bed mobility, and transfers. Prior to admission, patient's baseline is using a RW for mobility with short distances and assist from his wife with ADL as needed.  Patient is requiring minimum assistance as a result of the following impairments: decreased functional strength, decreased functional reach, decreased endurance, pain, and impaired standing balance. Occupational Therapy will continue to follow for duration of hospitalization.    Patient will benefit from continued skilled OT Services to promote return to prior level of function and safety with continuous assistance and gradual rehabilitative therapy    History Related to Current Admission: Patient is a 74 year old male admitted on 1/6/2025 with Presenting Problem: s/p L5-S1 TLIF with I&D on 1/6. Co-Morbidities : HTN, DM, ESRD on HD, TKA, pulm HTN, neuropathy, charcot foot, emphysema, spinal cord stimulator, CKD, orthostatic hypotension, myoclonus    Recommendations for nursing staff:   Transfers: min A with RW  Toileting location: toilet     EVALUATION SESSION    Patient Start of Session: Pt was found lying in his bed.     FUNCTIONAL TRANSFER ASSESSMENT  Sit to Stand: Edge of Bed  Edge of Bed: Minimal Assist  Toilet Transfer: Minimal Assist    BED MOBILITY  Rolling: Supervision  Supine to Sit : Minimal Assist  Sit to Supine (OT): Supervision    BALANCE ASSESSMENT     FUNCTIONAL ADL ASSESSMENT  Toileting Seated: Supervision  Toileting Standing: Minimal Assist    ACTIVITY TOLERANCE:                           O2 SATURATIONS       Cognition  WFL    Upper extremity  WFL    EDUCATION PROVIDED  Patient Education : Role of Occupational Therapy; Plan of Care; Discharge Recommendations; Functional Transfer Techniques; Fall Prevention; Surgical Precautions; Bracing Education Provided  Patient's Response to Education: Verbalized Understanding    Equipment used: RW, gait belt   Demonstrates functional use, Would benefit from additional trial     Therapist comments: supine>sit EOB via log rolling>don LSO with assist>stand to RW>walk to bathroom>toileting>stand to RW>walk to sit EOB>doff LSO>supine via log rolling as requested.     Patient End of Session: In bed;Needs met;Call light within reach;RN aware of session/findings;All patient questions and concerns addressed;Hospital anti-slip socks;SCDs in place;Alarm set    OCCUPATIONAL PROFILE    HOME SITUATION  Type of Home: House  Home Layout: One level  Lives With: Spouse    Toilet and Equipment: Standard height toilet  Shower/Tub and Equipment: Walk-in shower;Shower chair  Other Equipment: Other (Comment) (RW, W/C)    Occupation/Status: Retired  Hand Dominance: Right  Drives: No  Patient Regularly Uses: Glasses;Rolling walker;Wheelchair    Prior Level of Function: Pt lives with spouse in single story home. Pt ambulates short distance with RW. Pt also uses W/C in the community and in the home, as needed. Pt's wife assists with ADL as needed.     SUBJECTIVE   \"I have been trying to sleep.\"     PAIN ASSESSMENT  Ratin  Location: back  Management Techniques: Activity promotion;Relaxation;Repositioning    OBJECTIVE  Precautions: Spine;Lumbar brace;Bed/chair alarm;Low vision;Hard of hearing;Limb alert - left;Other (Comment) (B surgical shoes)  Fall Risk: Standard fall risk    WEIGHT BEARING RESTRICTION       ASSESSMENTS    AM-PAC '6-Clicks' Inpatient Daily Activity Short Form  -   Putting on and taking off regular lower body clothing?: A Lot  -   Bathing (including  washing, rinsing, drying)?: A Lot  -   Toileting, which includes using toilet, bedpan or urinal? : A Little  -   Putting on and taking off regular upper body clothing?: A Little  -   Taking care of personal grooming such as brushing teeth?: A Little  -   Eating meals?: None    AM-PAC Score:  Score: 17  Approx Degree of Impairment: 50.11%  Standardized Score (AM-PAC Scale): 37.26    PLAN  OT Device Recommendations: TBD  OT Treatment Plan: Balance activities;Energy conservation/work simplification techniques;ADL training;Functional transfer training;Endurance training;Patient/Family education;Patient/Family training;Equipment eval/education;Compensatory technique education;Continued evaluation  Rehab Potential : Fair  Frequency: Daily  Number of Visits to Meet Established Goals: 3    ADL Goals  Patient will perform toileting with min A and AE PRN  Patient will perform LB dressing with min A and AE PRN    Functional Transfer Goals  Patient will perform bed mobility supine to sit with supervision  Patient will perform bed mobility sit to supine with supervision  Patient will perform toilet transfer with supervision    Additional Goals:  Patient will state spine precautions and maintain during ADL  Pt will independently don/doff LSO while sitting EOB.     Patient Evaluation Complexity Level:   Occupational Profile/Medical History LOW - Brief history including review of medical or therapy records    Specific performance deficits impacting engagement in ADL/IADL LOW  1 - 3 performance deficits    Client Assessment/Performance Deficits LOW - No comorbidities nor modifications of tasks    Clinical Decision Making LOW - Analysis of occupational profile, problem-focused assessments, limited treatment options    Overall Complexity LOW     OT Session Time: 30 minutes  Self-Care Home Management: 15 minutes

## 2025-01-07 NOTE — PROGRESS NOTES
[facility]   part of [facility] Hospitalist Progress Note     Gamaliel Boyer Patient Status:  Inpatient    1950 MRN [id number]   Location [facility] 3SW-A Attending Ricardo Benson MD   Hosp Day # 1 PCP Nima Sorensen MD     Reason for consult:   Medical co management     Requested by:   Dr Benson    Subjective:     Patient seen and examined.   BP better in the low 100s/90s which is normal for pt  Slept well overnight  Having left leg barbara, he states unchanged since before surgery    Objective:    Review of Systems:   10 point ROS completed and was negative, except for pertinent positive and negatives stated in subjective.    Vital signs:  Temp:  [97.2 °F (36.2 °C)-98.4 °F (36.9 °C)] 97.9 °F (36.6 °C)  Pulse:  [51-66] 66  Resp:  [0-31] 18  BP: ()/(40-70) 106/58  SpO2:  [93 %-100 %] 99 %    Physical Exam:    General: No acute distress.   HEENT:  EOMI, PERRLA, OP clear  Respiratory: Clear to auscultation bilaterally. No wheezes. No rhonchi.  Cardiovascular: S1, S2. Regular rate and rhythm. No murmurs.  Abdomen: Soft, nontender, nondistended.  Positive bowel sounds. No rebound or guarding.  Extremities: No edema.  Neuro:  Grossly non focal, no motor deficits.        Diagnostic Data:    Labs:  Recent Labs   Lab 25  0853   WBC 4.9   HGB 11.7*   .8*   .0*   INR 1.11       Recent Labs   Lab 25  0853   *   BUN 41*   CREATSERUM 4.90*   CA 9.5   ALB 4.1   *   K 3.9   CL 96*   CO2 27.0   ALKPHO 186*   AST 22   ALT 15   BILT 0.4   TP 7.3       Estimated Creatinine Clearance: 11.8 mL/min (A) (based on SCr of 4.9 mg/dL (H)).    Recent Labs   Lab 25  0853   PTP 14.4   INR 1.11            COVID-19 Lab Results    COVID-19  Lab Results   Component Value Date    COVID19 Not Detected 10/20/2023    COVID19 Not Detected 10/16/2023    COVID19 Not Detected 2023       Pro-Calcitonin  No results for input(s): \"PCT\" in the last 168  hours.    Cardiac  No results for input(s): \"TROP\", \"PBNP\" in the last 168 hours.    Creatinine Kinase  No results for input(s): \"CK\" in the last 168 hours.    Inflammatory Markers  No results for input(s): \"CRP\", \"KIMBERLY\", \"LDH\", \"DDIMER\" in the last 168 hours.    Imaging: Imaging data reviewed in Epic.    Medications:    sennosides  17.2 mg Oral Nightly    insulin aspart  1-10 Units Subcutaneous TID AC and HS    cholecalciferol  2,000 Units Oral Daily    levothyroxine  175 mcg Oral QAM AC    melatonin  10 mg Oral Nightly    [START ON 1/8/2025] midodrine  10 mg Oral Once per day on Monday Wednesday Friday    multivitamin  1 tablet Oral Daily    sertraline  25 mg Oral Daily    sevelamer carbonate  800 mg Oral TID AC    ezetimibe  10 mg Oral Daily before evening meal       Assessment & Plan:     Gamaliel Boyer is a 74 year old male with PMH significant for BPH, depression, anxiety, DM2, ESRD on HD, GERD, DL, hypothyroidism, PRANEETH, HTN who presents for 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      Lumbar stenosis sp L5-S1 TLIF 1/6/25  Post op pain  Main management per orthopedic service, including pain control, wound care, DVT prophylaxis, and disposition  Encourage early ambulation  Encourage I-S use  PT/OT  Post op labs     Post op hypotension  -likely effect of anesthesia and pain meds  -monitor BP closely  -250 ml bolus given last night, in light of ESRD status , pt states he makes little urine  -if BP cannot be maintained >> low threshold to transfer to ICU for vasopressor support  -midodrine on HD days     ESRD on HD t/th/sa  -Renal consulted >> plan for HD today  -sees Dr Carter     Anemia  -hgb 11.7 >> 11.8 post op   -pt 139  -monitor output from drain  -repeat labs in the morning     DL  -statin     DM2  -hold home meds  -ISS +accuchecks     HTN  -hypotensive currently >> all BP meds on hold     PRANEETH  -cpap at night     Hypothyroidism  -synthroid     Depression  Anxiety  -resume home  meds     BPH  -flomax      DISPO:  Will cont to follow along       Thank you for allowing me to participate in the care of this patient.  I will be following the patient while she is in the hospital.           Christine Brian MD  [facility] Hospitalist  Pager [phone]  Answering Service number: [phone]

## 2025-01-07 NOTE — PLAN OF CARE
Post-op day 0. Dressing is clean, dry, and intact. Alert and oriented x4. Weaned to room air. PRANEETH with CPAP. Educated on incentive spirometer and CPAP with respiratory. Blind in left eye. Bilateral hearing aides. Upper dentures. Wants door open at all times. Renal/1800 carb diet with accuchecks. Notified attending at time of admission of low blood pressures. Orders provided. Consult placed with Dr. Carter for dialysis orders. Patient currently is scheduled for dialysis on Tuesday, Thursday, and Saturday. Physical and occupational therapy evaluations pending. LSO when out of bed. Up with 1-2 assist and walker. Unable to complete two person skin check with handoff communicated for night shift RN and PCT to complete. Updated spouse of orders to continue monitoring of blood pressures with communication provided to attending if change of status should occur.

## 2025-01-07 NOTE — PHYSICAL THERAPY NOTE
PHYSICAL THERAPY EVALUATION - INPATIENT     Room Number: 371/371-A  Evaluation Date: 1/7/2025  Type of Evaluation: Initial  Physician Order: PT Eval and Treat    Presenting Problem: s/p L5-S1 TLIF 1/6/25  Co-Morbidities : HTN, DM, ESRD on HD, TKA, pulm HTN, neuropathy, charcot foot, emphysema, spinal cord stimulator, CKD, orthostatic hypotension, myoclonus  Reason for Therapy: Mobility Dysfunction and Discharge Planning    PHYSICAL THERAPY ASSESSMENT   Patient is a 74 year old male admitted 1/6/2025 for elective L5-S1 TLIF.  Prior to admission, patient's baseline is supervision.  Patient is currently functioning below baseline with bed mobility, transfers, gait, and standing prolonged periods.  Patient is requiring moderate assist as a result of the following impairments: decreased functional strength, pain, impaired standing balance, impaired motor planning, and limited C/S ROM.  Physical Therapy will continue to follow for duration of hospitalization.    Patient will benefit from continued skilled PT Services to promote return to prior level of function and safety with continuous assistance and gradual rehabilitative therapy .    PLAN DURING HOSPITALIZATION  Nursing Mobility Recommendation : 1 Assist  PT Device Recommendation: Rolling walker  PT Treatment Plan: Bed mobility;Endurance;Energy conservation;Patient education;Gait training;Transfer training;Stoop training;Strengthening;Balance training  Rehab Potential : Good  Frequency (Obs): Daily     CURRENT GOALS    Goal #1 Patient is able to demonstrate supine - sit EOB @ level: minimum assistance     Goal #2 Patient is able to demonstrate transfers EOB to/from BSC at assistance level: minimum assistance     Goal #3 Patient is able to ambulate 75 feet with assist device: walker - rolling at assistance level: minimum assistance     Goal #4 Assess safety with stoop negotiation, as appropriate.    Goal #5    Goal #6    Goal Comments: Goals established on  2025        HOME SITUATION  Type of Home: House  Home Layout: One level  Stairs to Enter : 1                  Lives With: Spouse    Drives: No   Patient Regularly Uses: Glasses;Rolling walker;Wheelchair      Prior Level of [place]: Pt lives with spouse in single story home with 1 stoop to enter. Pt reports he is mostly independent with ADL and mobility. Pt ambulates with RW. Pt uses a transport chair in the community/ for dialysis. Pt reports his wife helps with ADl as needed but not regularly. Pt reports he does not wear surgical shoes in the house.    SUBJECTIVE  \"I'm always bent over. My home therapist yells at me.\"     OBJECTIVE  Precautions: Spine;Lumbar brace;Bed/chair alarm;Limb alert - left (B surgical shoes)  Fall Risk: Standard fall risk    WEIGHT BEARING RESTRICTION     PAIN ASSESSMENT  Ratin  Location: L LE  Management Techniques: Activity promotion;Repositioning    COGNITION  Orientation Level:  oriented to place, oriented to situation, and oriented to person  Memory:  decreased short term memory  Following Commands:  follows one step commands with increased time and follows one step commands with repetition  Motor Planning: impaired  Safety Judgement:  decreased awareness of need for assistance and decreased awareness of need for safety  Awareness of Errors:  decreased awareness of errors     RANGE OF MOTION AND STRENGTH ASSESSMENT  Upper extremity ROM and strength are within functional limits     Lower extremity ROM is within functional limits     Lower extremity strength is within functional limits except for the following:    Right Knee extension  3+/5  Left Knee extension  3+/5    BALANCE  Static Sitting: Fair  Dynamic Sitting: Fair -  Static Standing: Poor +  Dynamic Standing: Poor    ADDITIONAL TESTS                                    ACTIVITY TOLERANCE                         O2 WALK       NEUROLOGICAL FINDINGS                        AM-PAC '6-Clicks' INPATIENT SHORT FORM - BASIC  MOBILITY  How much difficulty does the patient currently have...  Patient Difficulty: Turning over in bed (including adjusting bedclothes, sheets and blankets)?: A Little   Patient Difficulty: Sitting down on and standing up from a chair with arms (e.g., wheelchair, bedside commode, etc.): A Little   Patient Difficulty: Moving from lying on back to sitting on the side of the bed?: A Little   How much help from another person does the patient currently need...   Help from Another: Moving to and from a bed to a chair (including a wheelchair)?: A Lot   Help from Another: Need to walk in hospital room?: A Lot   Help from Another: Climbing 3-5 steps with a railing?: A Lot     AM-PAC Score:  Raw Score: 15   Approx Degree of Impairment: 57.7%   Standardized Score (AM-PAC Scale): 39.45   CMS Modifier (G-Code): CK    FUNCTIONAL ABILITY STATUS  Gait Assessment   Functional Mobility/Gait Assessment  Gait Assistance: Moderate assistance  Distance (ft): 20  Assistive Device: Rolling walker    Skilled Therapy Provided   Received up in chair with LSO donned.   Assisted with donning B surgical shoes.   Reports severe L LE pain.   Sit-stand to RW with CGA for safety.   Ambulatory with MOD assist for balance/walker management.   Ambulates with excessive kyphosis, excessive UE use, excessive B knee flexion, and flat foot initial contact.   Constant VC for posture and RW sequencing.   Returned to room sitting up in bedside chair.   Assisted with repositioning in chair.     Bed Mobility:  Rolling: NT  Supine to sit: NT   Sit to supine: NT     Transfer Mobility:  Sit to stand: CGA   Stand to sit: MIN  Gait = MOD    Therapist's Comments: Reviewed spine precautions, brace use, log roll, and activity recommendations.     Exercise/Education Provided:  Bed mobility  Energy conservation  Functional activity tolerated  Gait training  Posture  Strengthening  Transfer training    Patient End of Session: Up in chair;Needs met;Call light within  reach;RN aware of session/findings;All patient questions and concerns addressed;Hospital anti-slip socks;Alarm set;Discussed recommendations with /      Patient Evaluation Complexity Level:  History High - 3 or more personal factors and/or co-morbidities   Examination of body systems Moderate - addressing a total of 3 or more elements   Clinical Presentation  Moderate - Evolving   Clinical Decision Making Moderate Complexity       PT Session Time: 25 minutes  Gait Training: 10 minutes  Therapeutic Activity:  minutes  Neuromuscular Re-education:  minutes  Therapeutic Exercise:  minutes

## 2025-01-07 NOTE — CONSULTS
KELLIE  HOSPITALIST  CONSULT     Gamaliel Boyer Patient Status:  Inpatient    1950 MRN [id number]   Location [facility] 3SW-A Attending Ricardo Benson MD   Hosp Day # 0 PCP Nima Sorensen MD     Reason for consult:   Medical co management    Requested by:   Dr Benson    History of Present Illness: Gamaliel Boyer is a 74 year old male with PMH significant for BPH, depression, anxiety, DM2, ESRD on HD, GERD, DL, hypothyroidism, PRANEETH, HTN who presents for 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  Patient tolerated procedure without any apparent complications.  However postoperatively patient was hypotensive.  In the postop.  Given Levophed.  Due to ESRD and minimal urine output patient was not bolused any fluids.  He was transferred to the orthopedics for her blood pressures were found to be 81/40.  Patient was placed in reverse Trendelenburg, given to 250 cc bolus and blood pressures improved to 109/55.  Patient alert and oriented, wife at bedside.  Denies any apparent pain nausea vomiting chest pain abdominal pain dizziness or lightheadedness.  We are being asked to see patient in consultation for medical comanagement.  Low threshold to transfer to ICU given low blood pressure.    Past Medical History:  Past Medical History:    Anemia    anxiety    Anxiety state    back pain    Back problem    Blastomycosis    Blind    BPH (benign prostatic hyperplasia)    Depression    Diabetic retinopathy    laser surgery    Dialysis patient (HCC)    Tues/Th/Sat    Discitis, unspecified, lumbosacral region    Disorder of thyroid    End stage renal disease (HCC)    Esophageal reflux    occassion    Hearing impairment    hearing aids bilateral    HEMORRHOIDS    High cholesterol    History of blood transfusion    History of renal dialysis    fistula on left arm, e, Thurs, Sat    Hyperkalemia    hyperlipidemia    hypothyroidism    IMPOTENCE    Liver disease    2013 - pt states all  resolved after liver and kidneys shut down after issue with pneumonia     Mood disturbance    Muscle weakness    hands and legs    Neuropathy    osteoarthritis    Osteoarthrosis, unspecified whether generalized or localized, unspecified site    osteoporosis    Polyneuropathy in diabetes(357.2)    Renal disorder    dialysis    Sepsis (HCC)    Sleep apnea    cpap    Sleep apnea, obstructive    AHI 54 SaO2 lizeth 74 % CPAP 12 HME//     Type I (juvenile type) diabetes mellitus without mention of complication, not stated as uncontrolled    since 1963- DR Mao follows    Unspecified essential hypertension    Visual impairment    wears glasses        Past Surgical History:   Past Surgical History:   Procedure Laterality Date    Back surgery  2009    laminectomy  L1 -4  2/09  Dr. Eitan Guerrero [facility]    Back surgery  8-8-13    C4-C6 ACDF     Back surgery  9/8/16    L2-S1 Revision Decomp possible uninstrumented fusion 9/8/16    Back surgery  09/10/2018    Rev C3-C7 ACDF     Cataract Bilateral done in 2004    IOL's    Colonoscopy      2006    Colonoscopy  2/2009    normal    Colonoscopy N/A 8/23/2019    adenoma- repeat 1 yr (2 dya prep)    Colonoscopy,biopsy  2/20/09    Performed by ANAM RIVERS at [facility] SURG[facility]    Egd N/A 7/23/2021    EGD & COLONOSCOPY Surgeon: Kimberly, +vik, rpt EGD 3 months, poor prep rpt colon 3 years    Endoscopy, bowel pouch, biopsy      Injection, w/wo contrast, dx/therapeutic substance, epidural/subarachnoid; lumbar/sacral N/A 5/4/2016    Procedure: LUMBAR EPIDURAL;  Surgeon: Jayden Norton MD;  Location: [facility] FOR PAIN MANAGEMENT    Injection, w/wo contrast, dx/therapeutic substance, epidural/subarachnoid; lumbar/sacral N/A 5/25/2016    Procedure: LUMBAR EPIDURAL;  Surgeon: Jayden Norton MD;  Location: [facility] PAIN MANAGEMENT    Injection, w/wo contrast, dx/therapeutic substance, epidural/subarachnoid; lumbar/sacral N/A 6/8/2016    Procedure: LUMBAR EPIDURAL;   Surgeon: Jayden Norton MD;  Location: [facility] FOR PAIN [facility]    Knee replacement surgery  2/14/13    Left TKR by Dr. Lombardi    M-sedaj by  phys perfrmg svc 5+ yr N/A 5/4/2016    Procedure: LUMBAR EPIDURAL;  Surgeon: Jayden Norton MD;  Location: [facility] PAIN [facility]NA[facility]    M-sedaj by  phys perfrmg svc 5+ yr N/A 5/25/2016    Procedure: LUMBAR EPIDURAL;  Surgeon: Jayden Norton MD;  Location: [facility] PAIN [facility]    M-sedaj by  phys perfrmg svc 5+ yr N/A 6/8/2016    Procedure: LUMBAR EPIDURAL;  Surgeon: Jayden Norton MD;  Location: [facility] PAIN MANA[facility]    Other surgical history      Bilateral median nerve release at elbow; 2000    Other surgical history      CTS release bilateral 2000    Other surgical history      Surgical repair fracture left hand 5th digit    Other surgical history      Surgery left heel ligament repair    Other surgical history      Laser surgery for bilateral retinopathy    Other surgical history      Surgery to left eye for \"weak muscle.\" 2007    Other surgical history      bilat knee repair 9/23/10    Other surgical history  11/2013    lung resection to remove abcess    Other surgical history  10/26/2020    Cystoscopy (Dr. Ribeiro)    Patient documented not to have experienced any of the following events  2/14/2014    Procedure: ;  Surgeon: Kin Hobbs MD;  Location: [facility]    Patient documented not to have experienced any of the following events N/A 5/4/2016    Procedure: LUMBAR EPIDURAL;  Surgeon: Jayden Norton MD;  Location: [facility]IN [facility]    Patient documented not to have experienced any of the following events N/A 5/25/2016    Procedure: LUMBAR EPIDURAL;  Surgeon: Jayden Norton MD;  Location: [facility]IN [facility]    Patient documented not to have experienced any of the following events N/A 6/8/2016    Procedure: LUMBAR EPIDURAL;  Surgeon: Jayden Norton MD;  Location: [facility] FOR PAIN  MANAGEMENT    Patient withough preoperative order for iv antibiotic surgical site infection prophylaxis.  2/14/2014    Procedure: ;  Surgeon: Kin Hobbs MD;  Location: [facility]    Patient withough preoperative order for iv antibiotic surgical site infection prophylaxis. N/A 5/4/2016    Procedure: LUMBAR EPIDURAL;  Surgeon: Jayden Norton MD;  Location: [facility] PAIN MANAGEMENT    Patient withough preoperative order for iv antibiotic surgical site infection prophylaxis. N/A 5/25/2016    Procedure: LUMBAR EPIDURAL;  Surgeon: Jayden Norton MD;  Location: [facility] PAIN MANAGEMENT    Patient withough preoperative order for iv antibiotic surgical site infection prophylaxis. N/A 6/8/2016    Procedure: LUMBAR EPIDURAL;  Surgeon: Jayden Norton MD;  Location: [facility]    Total knee replacement Left 2013    Upper gi endoscopy,diagnosis  4/14/16    retained gastric contents; [facility]    Upper gi endoscopy,remov f.b. N/A 2/14/2014    Procedure: ESOPHAGOGASTRODUODENOSCOPY, POSSIBLE BIOPSY, POSSIBLE POLYPECTOMY 54740;  Surgeon: Kin Hobbs MD;  Location: [facility]       Social History:  reports that he quit smoking about 39 years ago. His smoking use included cigarettes. He started smoking about 59 years ago. He has a 70 pack-year smoking history. He has never used smokeless tobacco. He reports that he does not currently use alcohol. He reports current drug use. Drug: Cannabis.    Family History:   Family History   Problem Relation Age of Onset    Hypertension Father     Lipids Father     Cancer Father     Hypertension Mother     Psychiatric Maternal Grandmother     Diabetes Paternal Grandfather         Type 2 DM    Heart Disorder Brother         CAD with MI at age 53 - passed away    Obesity Brother     Diabetes Brother         Type 2 DM    Hypertension Brother     Lipids Brother     Thyroid Disorder Neg         Allergies: Allergies[1]    Medications:   Medications Ordered Prior to Encounter[2]    Review of Systems:   A comprehensive 14 point review of systems was completed.    Pertinent positives and negatives noted in the HPI.    Physical Exam:    BP (!) 81/40 (BP Location: Right arm)   Pulse 55   Temp 97.6 °F (36.4 °C) (Oral)   Resp 18   Ht 5' 6\" (1.676 m)   Wt 152 lb (68.9 kg)   SpO2 100%   BMI 24.53 kg/m²   General: No acute distress. Alert and oriented x 3.  HEENT: Normocephalic atraumatic. Moist mucous membranes. EOM-I. PERRLA. Anicteric.  Neck: No lymphadenopathy. No JVD. No carotid bruits.  Respiratory: Clear to auscultation bilaterally. No wheezes. No rhonchi.  Cardiovascular: S1, S2. Regular rate and rhythm. No murmurs, rubs or gallops. Equal pulses.   Chest and Back: No tenderness or deformity.  Abdomen: Soft, nontender, nondistended.  Positive bowel sounds. No rebound, guarding or organomegaly.  Neurologic: No focal neurological deficits. CNII-XII grossly intact.  Musculoskeletal: Moves all extremities.  Extremities: No edema or cyanosis.  Integument: No rashes or lesions.   Psychiatric: Appropriate mood and affect.      Diagnostic Data:      Labs:  Recent Labs   Lab 01/06/25  0853   WBC 4.9   HGB 11.7*   .8*   .0*   INR 1.11       Recent Labs   Lab 01/06/25  0853   *   BUN 41*   CREATSERUM 4.90*   CA 9.5   ALB 4.1   *   K 3.9   CL 96*   CO2 27.0   ALKPHO 186*   AST 22   ALT 15   BILT 0.4   TP 7.3       Recent Labs   Lab 01/06/25  0853   PTP 14.4   INR 1.11       COVID-19 Lab Results    COVID-19  Lab Results   Component Value Date    COVID19 Not Detected 10/20/2023    COVID19 Not Detected 10/16/2023    COVID19 Not Detected 02/27/2023       Pro-Calcitonin  No results for input(s): \"PCT\" in the last 168 hours.    Cardiac  No results for input(s): \"TROP\", \"PBNP\" in the last 168 hours.    Creatinine Kinase  No results for input(s): \"CK\" in the last 168 hours.    Inflammatory Markers  No results for input(s): \"CRP\", \"KIMBERLY\",  \"LDH\", \"DDIMER\" in the last 168 hours.    Imaging: Imaging data reviewed in Epic.      ASSESSMENT / PLAN:    Gamaliel Boyer is a 74 year old male with PMH significant for BPH, depression, anxiety, DM2, ESRD on HD, GERD, DL, hypothyroidism, PRANEETH, HTN who presents for 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     Lumbar stenosis sp L5-S1 TLIF 1/6/25  Post op pain  Main management per orthopedic service, including pain control, wound care, DVT prophylaxis, and disposition  Encourage early ambulation  Encourage I-S use  PT/OT  Post op labs    Post op hypotension  -likely effect of anesthesia and pain meds  -monitor BP closely  -250 ml bolus given, in light of ESRD status , pt states he makes little urine  -if BP cannot be maintained >> low threshold to transfer to ICU for vasopressor support    ESRD on HD t/th/sa  -Renal consulted >> plan for HD tomorrow     Anemia  -hgb 11.7  -pt 139  -monitor output from drain  -repeat labs in the morning    DL  -statin    DM2  -hold home meds  -ISS +accuchecks    HTN  -hypotensive currently >> all meds on hold    PRANEETH  -cpap at night    Hypothyroidism  -synthroid    Depression  Anxiety  -resume home meds    BPH  -flomax        Thank you for allowing me to participate in the care of this patient.  I will be following the patient while she is in the hospital.        Christine Eddy Hospitalist  Pager [phone]  Answering Service number: [phone]      **Certification      PHYSICIAN Certification of Need for Inpatient Hospitalization - Initial Certification    Patient will require inpatient services that will reasonably be expected to span two midnight's based on the clinical documentation in H+P.   Based on patients current state of illness, I anticipate that, after discharge, patient will require TBD.            [1]   Allergies  Allergen Reactions    Antihistamine & Nasal Deconges [Fexofenadine-Pseudoephedrine] OTHER (SEE COMMENTS)     Altered  mental status    Adhesive Tape ITCHING     Silk tape    Albuterol DIZZINESS and OTHER (SEE COMMENTS)     Albuterol Inhalation. Lightheadedness    Benadryl [Diphenhydramine] RESTLESSNESS    Depakote [Valproic Acid] DIZZINESS    Fluconazole OTHER (SEE COMMENTS)     Kidney labs abnormal    Mirtazapine RESTLESSNESS    Quetiapine RESTLESSNESS    Wellbutrin [Bupropion] RASH    Gabapentin OTHER (SEE COMMENTS)     Severe jerking motions    Lorazepam UNKNOWN    Latex RASH    Vancomycin RASH    Zinc Oxide ITCHING   [2]   No current facility-administered medications on file prior to encounter.     Current Outpatient Medications on File Prior to Encounter   Medication Sig Dispense Refill    Cholecalciferol (VITAMIN D3) 25 MCG (1000 UT) Oral Cap Take 1 tablet by mouth daily.      Calcium Carbonate (CALCIUM 500 OR) Take 1,000 mg by mouth daily.      Ascorbic Acid (VITAMIN C) 1000 MG Oral Tab Take 3 tablets (3,000 mg total) by mouth daily.      HYDROcodone-acetaminophen 5-325 MG Oral Tab Take 1 tablet by mouth every 4 to 6 hours.      ENULOSE 10 GM/15ML Oral Solution Take 30 mL (20 g total) by mouth daily.      carboxymethylcellulose (REFRESH CELLUVISC) 1 % Ophthalmic Gel Place 1 drop into both eyes 4 (four) times daily.      multivitamin Oral Tab Take 1 tablet by mouth daily. Dialyvite      docusate sodium 100 MG Oral Cap Take 1 capsule (100 mg total) by mouth 2 (two) times daily.      SSD 1 % External Cream Apply 1 Application topically daily.      Melatonin 10 MG Oral Tab Take 10 mg by mouth nightly.      Probiotic Product (PROBIOTIC BLEND OR) Take 1 tablet by mouth daily.      midodrine 10 MG Oral Tab Take 1 tablet (10 mg total) by mouth 3 (three) times a week. Before each dialysis      sertraline 25 MG Oral Tab Take 1 tablet (25 mg total) by mouth daily.      Insulin Glargine, 1 Unit Dial, (TOUJEO SOLOSTAR) 300 UNIT/ML Subcutaneous Solution Pen-injector Inject 6 Units into the skin as needed (If insulin pump not working).       cholecalciferol 50 MCG (2000 UT) Oral Tab Take 1 tablet (2,000 Units total) by mouth daily.      vitamin E 1000 UNITS Oral Cap Take 1 capsule (1,000 Units total) by mouth daily.      Omega-3 Fatty Acids (FISH OIL) 1200 MG Oral Cap Take 1,200 mg by mouth daily.      insulin lispro 100 UNIT/ML Injection Solution Basal settings: 12 MN - 3:30 am: 0.100  3:30 am - 8 am: 0.350  8 am - 10 am: 0.550   10 am - 11 am: 0.850  11 am - 1 pm: 0.150  1 pm - 3 pm: 0.200  3 pm - 5 pm: 0.750  5 pm - 6 pm: 0.675  6 pm - 7 pm: 0.375   7 pm - 11 pm 0.350  11 pm - 12 MN: 0.200    Total daily dose: 8.725 Units per day    Bolus ratios: Bkfst: 1 Units for every 12 gms of carbs   Lunch: 1 Units for every 12 gms of carbs   Dinner: 1 Units for every 10 gms of carbs     Correction factor: 1 Units for every 75 mg/dl above a target glucose of 140 mg/dl (110 if in Control IQ mode) but at bedtime, 1 Unit for every 140      aspirin 81 MG Oral Tab EC Take 1 tablet (81 mg total) by mouth daily.      levothyroxine 175 MCG Oral Tab Take 1 tablet (175 mcg total) by mouth daily.      Sevelamer 800 MG Oral Tab Take 1 tablet (800 mg total) by mouth 3 (three) times daily before meals. and 1 before snacks      GLUCAGON EMERGENCY 1 MG Injection Kit Inject 1 mg into the skin once as needed. 1 kit 1    EZETIMIBE 10 MG Oral Tab TAKE ONE TABLET BY MOUTH ONE TIME DAILY (Patient taking differently: Take 1 tablet (10 mg total) by mouth before evening meal.) 90 tablet 3    cyclobenzaprine 10 MG Oral Tab Take 1 tablet (10 mg total) by mouth 3 (three) times daily as needed for Muscle spasms. Post op      oxyCODONE-acetaminophen 5-325 MG Oral Tab Take 1 tablet by mouth every 4 (four) hours as needed for Pain. Post op

## 2025-01-07 NOTE — PLAN OF CARE
Patient A&Ox4. RA. VSS. Tele. Back dressing C/D/I. Pain managed with PRN pain medications. Skin check completed with Jean-Paul ELMORE. Wound consult for right and left foot wounds. Patient c/o nausea, relieved by IV Zofran PRN. LSO Brace when OOB. PT/OT to see. Fall precautions in place.Call light in reach.

## 2025-01-07 NOTE — CM/SW NOTE
Care Progression Note:  Length of stay: 1  GMLOS: 6.3  Avoidable Delays:   Code Status: FULL    Acute Medical Issue/Factors:   Scheduled L5-S1 TLIF      Discharge Barriers: C/O severe pain to L lower extremity. Adjusting pain medication. Needs TSLO out of bed.     Expected discharge date: 1-2 days pending pain mgmt.      Expected next site of care: TBD. From home with spouse[facility] .     / available for discharge planning.

## 2025-01-08 LAB
GLUCOSE BLD-MCNC: 240 MG/DL (ref 70–99)
GLUCOSE BLD-MCNC: 283 MG/DL (ref 70–99)
GLUCOSE BLD-MCNC: 328 MG/DL (ref 70–99)
GLUCOSE BLD-MCNC: 407 MG/DL (ref 70–99)

## 2025-01-08 PROCEDURE — 99232 SBSQ HOSP IP/OBS MODERATE 35: CPT | Performed by: INTERNAL MEDICINE

## 2025-01-08 RX ORDER — MIDODRINE HYDROCHLORIDE 5 MG/1
10 TABLET ORAL ONCE
Status: COMPLETED | OUTPATIENT
Start: 2025-01-08 | End: 2025-01-08

## 2025-01-08 RX ORDER — LACTULOSE 10 G/15ML
20 SOLUTION ORAL DAILY
Status: DISCONTINUED | OUTPATIENT
Start: 2025-01-08 | End: 2025-01-09

## 2025-01-08 RX ORDER — INSULIN DEGLUDEC 100 U/ML
6 INJECTION, SOLUTION SUBCUTANEOUS NIGHTLY
Status: DISCONTINUED | OUTPATIENT
Start: 2025-01-08 | End: 2025-01-09

## 2025-01-08 NOTE — PROGRESS NOTES
[place]   part of [facility] Hospitalist Progress Note     Gamaliel Boyer Patient Status:  Inpatient    1950 MRN [id number]   Location [facility] 3SW-A Attending Ricardo Benson MD   Hosp Day # 2 PCP Nima Sorensen MD     Reason for consult:   Medical co management     Requested by:   Dr Benson    Subjective:     Feels well, pain improved today.    Objective:    Review of Systems:   10 point ROS completed and was negative, except for pertinent positive and negatives stated in subjective.    Vital signs:  Temp:  [97.6 °F (36.4 °C)-98.6 °F (37 °C)] 98.6 °F (37 °C)  Pulse:  [64-77] 77  Resp:  [18] 18  BP: ()/(43-57) 106/57  SpO2:  [90 %-98 %] 90 %    Physical Exam:    General: No acute distress.   HEENT:  EOMI, PERRLA, OP clear  Respiratory: Clear to auscultation bilaterally. No wheezes. No rhonchi.  Cardiovascular: S1, S2. Regular rate and rhythm. No murmurs.  Abdomen: Soft, nontender, nondistended.  Positive bowel sounds. No rebound or guarding.  Extremities: No edema.  Neuro:  Grossly non focal, no motor deficits.        Diagnostic Data:    Labs:  Recent Labs   Lab 25  0853 25  0546   WBC 4.9  --    HGB 11.7* 11.8*   .8*  --    .0*  --    INR 1.11  --        Recent Labs   Lab 25  0853   *   BUN 41*   CREATSERUM 4.90*   CA 9.5   ALB 4.1   *   K 3.9   CL 96*   CO2 27.0   ALKPHO 186*   AST 22   ALT 15   BILT 0.4   TP 7.3       Estimated Creatinine Clearance: 11.8 mL/min (A) (based on SCr of 4.9 mg/dL (H)).    Recent Labs   Lab 25  0853   PTP 14.4   INR 1.11            COVID-19 Lab Results    COVID-19  Lab Results   Component Value Date    COVID19 Not Detected 10/20/2023    COVID19 Not Detected 10/16/2023    COVID19 Not Detected 2023       Pro-Calcitonin  No results for input(s): \"PCT\" in the last 168 hours.    Cardiac  No results for input(s): \"TROP\", \"PBNP\" in the last 168 hours.    Creatinine  Kinase  No results for input(s): \"CK\" in the last 168 hours.    Inflammatory Markers  No results for input(s): \"CRP\", \"KIMBERLY\", \"LDH\", \"DDIMER\" in the last 168 hours.    Imaging: Imaging data reviewed in Epic.    Medications:    midodrine  10 mg Oral Q Tu, Th and Sa    sennosides  17.2 mg Oral Nightly    insulin aspart  1-10 Units Subcutaneous TID AC and HS    cholecalciferol  2,000 Units Oral Daily    levothyroxine  175 mcg Oral QAM AC    melatonin  10 mg Oral Nightly    multivitamin  1 tablet Oral Daily    sertraline  25 mg Oral Daily    sevelamer carbonate  800 mg Oral TID AC    ezetimibe  10 mg Oral Daily before evening meal       Assessment & Plan:     Gamaliel Boyer is a 74 year old male with PMH significant for BPH, depression, anxiety, DM2, ESRD on HD, GERD, DL, hypothyroidism, PRANEETH, HTN who presents for 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      Lumbar stenosis sp L5-S1 TLIF 1/6/25  - pt/ot, prn pain meds, bowel regimen     HTN  - bp labile, had some postop hypotension but is asymptomatic. Off of bp meds     ESRD on HD t/th/sa  -Renal consulted >> next HD Thurs  -sees Dr Emma PAK  -statin     DM2  -glc rising today. Will increase ssi and add long-acting     PRANEETH  -cpap at night     Hypothyroidism  -synthroid     Depression  Anxiety  -resume home meds     BPH  -flomax      DISPO:  Will cont to follow along       Judit Guadarrama MD  [place] Hospitalist  Pager [phone]  Answering Service number: [phone]

## 2025-01-08 NOTE — CM/SW NOTE
01/08/25 1100   CM/SW Referral Data   Referral Source Social Work (self-referral)   Reason for Referral Discharge planning   Informant EMR;Clinical Staff Member   Patient Info   Patient's Current Mental Status at Time of Assessment Alert;Oriented   Patient lives with Spouse/Significant other   Patient Status Prior to Admission   Services in place prior to admission Dialysis   Dialysis Clinic [facility]   Scheduled Dialysis days T-TH-SAT   Discharge Needs   Anticipated D/C needs Home health care       Patient is a 75 y/o man admitted s/p TLIF.  The[facility] but patient improved with therapy today and home with HHC appears appropriate.  Pt has history of [facility].  Referrals sent to  providers, including [facility] via [facility].  Await responses for further DC planning.  / to remain available for support and/or discharge planning.     Jessica Avery, [facility]  Discharge Planner  [phone]

## 2025-01-08 NOTE — DISCHARGE INSTRUCTIONS
Sometimes managing your health at home requires assistance.  The [facility] team has recognized your preference to use [facility].  They can be reached by phone at [phone].  The fax number for your reference is [phone].. A representative from the home health agency will contact you or your family to schedule your first visit.

## 2025-01-08 NOTE — PROGRESS NOTES
[facility]   [facility]    Progress Note    Gamaliel Boyer Patient Status:  Inpatient    1950 MRN [id number]   Location [facility] 3SW-A Attending Ricardo Benson MD   Hosp Day # 2 PCP Nima Sorensen MD       S: Mild back pain with no significant radicular symptoms at this time. He states that his pain is much better today compared to yesterday. He said he worked with PT today and it went well. He states that he has dialysis tomorrow.     Inspection:  Awake alert No acute distress. No difficulty breathing     Blood pressure 97/45, pulse 68, temperature 98.9 °F (37.2 °C), temperature source Oral, resp. rate 18, height 5' 6\" (1.676 m), weight 152 lb (68.9 kg), SpO2 99%.    Recent Labs   Lab 25  0853 25  0546   RBC 3.54*  --    HGB 11.7* 11.8*   HCT 36.4* 36.9*   .8*  --    MCH 33.1  --    MCHC 32.1  --    RDW 15.2  --    NEPRELIM 3.71  --    WBC 4.9  --    .0*  --        Lumbar/Sacral Integument: Incision/ incisions;  Dressing in place and dry    Strength: Strength of bilateral lower extremities:                                            Left        Right                              EHL       *             5/5                              DF         2/5         2/5                              PF         5/5         5/5                              Quads   5/5         5/5                              IP          5/5         5/5  Sensation: No sensory deficits noted on bilateral lower extremities     *Not testable due to great toe amputation        A/P: POD # 2 s/p L5-S1 TLIF with biopsy    P: Patient is doing well and his pain is well controlled. Cleared from a spine perspective for discharge once cleared by other services. May resume any anticoag POD 5. Remove dressing POD 5 and leave open to air if not draining or change daily if still draining.     Priscila Varela PA-C   25

## 2025-01-08 NOTE — PHYSICAL THERAPY NOTE
PHYSICAL THERAPY TREATMENT NOTE - INPATIENT    Room Number: 371/371-A     Session: 1     Number of Visits to Meet Established Goals: 4    Presenting Problem: s/p L5-S1 TLIF 1/6/25  Co-Morbidities : HTN, DM, ESRD on HD, TKA, pulm HTN, neuropathy, charcot foot, emphysema, spinal cord stimulator, CKD, orthostatic hypotension, myoclonus    PHYSICAL THERAPY ASSESSMENT   Patient demonstrates good  progress this session, goals  remain in progress.      Patient is requiring contact guard assist as a result of the following impairments: decreased functional strength, decreased endurance/aerobic capacity, and pain.     Patient continues to function near baseline with bed mobility, transfers, and gait.  Next session anticipate patient to progress standing prolonged periods.  Physical Therapy will continue to follow patient for duration of hospitalization.    Patient continues to benefit from continued skilled PT services: at discharge to promote prior level of function and safety with additional support and return home with home health PT.    PLAN DURING HOSPITALIZATION  Nursing Mobility Recommendation : 1 Assist  PT Device Recommendation: Rolling walker  PT Treatment Plan: Bed mobility;Endurance;Energy conservation;Patient education;Gait training;Transfer training;Stoop training;Strengthening;Balance training  Frequency (Obs): Daily     CURRENT GOALS     Goal #1 Patient is able to demonstrate supine - sit EOB @ level: minimum assistance      Goal #2 Patient is able to demonstrate transfers EOB to/from BSC at assistance level: minimum assistance      Goal #3 Patient is able to ambulate 75 feet with assist device: walker - rolling at assistance level: minimum assistance      Goal #4 Assess safety with stoop negotiation, as appropriate.    Goal #5     Goal #6     Goal Comments: Goals established on 1/7/2025 1/8/2025 all goals ongoing    SUBJECTIVE      OBJECTIVE  Precautions: Spine;Lumbar brace;Bed/chair alarm;Low  vision;Hard of hearing;Limb alert - left;Other (Comment) (B surgical shoes)    WEIGHT BEARING RESTRICTION     PAIN ASSESSMENT   Ratin  Location: L LE  Management Techniques: Activity promotion;Repositioning    BALANCE                                                                                                                       Static Sitting: Fair  Dynamic Sitting: Fair -           Static Standing: Poor +  Dynamic Standing: Poor    ACTIVITY TOLERANCE                         O2 WALK       AM-PAC '6-Clicks' INPATIENT SHORT FORM - BASIC MOBILITY  How much difficulty does the patient currently have...  Patient Difficulty: Turning over in bed (including adjusting bedclothes, sheets and blankets)?: A Little   Patient Difficulty: Sitting down on and standing up from a chair with arms (e.g., wheelchair, bedside commode, etc.): A Little   Patient Difficulty: Moving from lying on back to sitting on the side of the bed?: A Little   How much help from another person does the patient currently need...   Help from Another: Moving to and from a bed to a chair (including a wheelchair)?: A Little   Help from Another: Need to walk in hospital room?: A Little   Help from Another: Climbing 3-5 steps with a railing?: A Lot     AM-PAC Score:  Raw Score: 17   Approx Degree of Impairment: 50.57%   Standardized Score (AM-PAC Scale): 42.13   CMS Modifier (G-Code): CK    FUNCTIONAL ABILITY STATUS  Gait Assessment   Functional Mobility/Gait Assessment  Gait Assistance: Contact guard assist  Distance (ft): 60  Assistive Device: Rolling walker  Pattern: Shuffle;R Foot drag;L Foot drag (keeps bilateral knees in flexion)    Skilled Therapy Provided    Bed Mobility:  Rolling: SBA   Supine<>Sit: Min A   Sit<>Supine: NT     Transfer Mobility:  Sit<>Stand: CGA   Stand<>Sit: CGA   Gait: CGA with RW    Therapist's Comments: spouse present for education - LSO donned at bedside.        THERAPEUTIC EXERCISES  Lower Extremity Ankle pumps      Upper Extremity  - open/close     Position Sitting     Repetitions   10   Sets   1     Patient End of Session: Up in chair;RN aware of session/findings;Call light within reach;Needs met;All patient questions and concerns addressed;Hospital anti-slip socks    PT Session Time: 15 minutes  Gait Training: 15 minutes  Therapeutic Activity: 0 minutes  Therapeutic Exercise: 0 minutes   Neuromuscular Re-education: 0 minutes

## 2025-01-08 NOTE — OCCUPATIONAL THERAPY NOTE
OCCUPATIONAL THERAPY TREATMENT NOTE - INPATIENT     Room Number: 371/371-A  Session: 1   Number of Visits to Meet Established Goals: 3    Presenting Problem: s/p L5-S1 TLIF with I&D on 1/6    History Related to Current Admission: Patient is a 74 year old male admitted on 1/6/2025 with Presenting Problem: s/p L5-S1 TLIF with I&D on 1/6. Co-Morbidities : HTN, DM, ESRD on HD, TKA, pulm HTN, neuropathy, charcot foot, emphysema, spinal cord stimulator, CKD, orthostatic hypotension, myoclonus    Prior Level of Function: Pt lives with spouse in single story home. Pt ambulates short distance with RW. Pt also uses W/C in the community and in the home, as needed. Pt's wife assists with ADL as needed.        ASSESSMENT   Patient demonstrates fair progress this session, goals remain in progress.    Patient continues to function near baseline with toileting, bathing, upper body dressing, lower body dressing, brace management, bed mobility, and transfers.   Contributing factors to remaining limitations include decreased functional strength, decreased endurance, and pain.  Next session anticipate patient to progress toileting, bathing, upper body dressing, lower body dressing, brace management, bed mobility, and transfers.  Occupational Therapy will continue to follow patient for duration of hospitalization.    Patient continues to benefit from continued skilled OT services: at discharge to promote prior level of function.  Anticipate patient will return home with home health OT.         WEIGHT BEARING RESTRICTION       Recommendations for nursing staff:   Transfers: min-CGA with RW  Toileting location: toilet     TREATMENT SESSION:  Patient Start of Session: Pt was found sitting in his chair with his wife at the bedside.     FUNCTIONAL TRANSFER ASSESSMENT  Sit to Stand: Chair  Edge of Bed: Contact Guard Assist  Toilet Transfer: Minimal Assist    BED MOBILITY  Rolling: Supervision  Supine to Sit : Minimal Assist  Sit to Supine  (OT): Minimal Assist    BALANCE ASSESSMENT     FUNCTIONAL ADL ASSESSMENT  LB Dressing Seated: Maximum Assist  Toileting Seated: Supervision  Toileting Standing: Minimal Assist    ACTIVITY TOLERANCE:            BP: 135/87  BP Location: Right arm  BP Method: Automatic  Patient Position: Sitting    O2 SATURATIONS       EDUCATION PROVIDED  Patient Education : Role of Occupational Therapy; Plan of Care; Discharge Recommendations; Functional Transfer Techniques; Surgical Precautions; Bracing Education Provided  Patient's Response to Education: Verbalized Understanding  Family/Caregiver Education : Role of Occupational Therapy; Plan of Care; Discharge Recommendations; Fall Prevention; Surgical Precautions; Bracing Education Provided  Family/Caregiver's Response to Education: Verbalized Understanding    Equipment used: RW, gait belt   Demonstrates functional use, Would benefit from additional trial        Therapist comments: sitting in chair>LB dressing to don B post-op shoes>adjustment needed of LSO as it was placed too high>stand to RW>walk in hallway>walk to sit EOB>doff LSO with education with wife present and practicing>supine via log rolling       Patient End of Session: In bed;Needs met;Call light within reach;RN aware of session/findings;All patient questions and concerns addressed;Hospital anti-slip socks;SCDs in place;Alarm set;Family present;Ice applied    SUBJECTIVE  \"He should wear the shoes.\" Per pt's wife     PAIN ASSESSMENT  Ratin  Location: back  Management Techniques: Activity promotion;Relaxation;Repositioning;Ice     OBJECTIVE  Precautions: Spine;Lumbar brace;Bed/chair alarm;Low vision;Hard of hearing;Limb alert - left;Other (Comment) (B surgical shoes)    AM-PAC ‘6-Clicks’ Inpatient Daily Activity Short Form  -   Putting on and taking off regular lower body clothing?: A Lot  -   Bathing (including washing, rinsing, drying)?: A Lot  -   Toileting, which includes using toilet, bedpan or urinal? : A  Little  -   Putting on and taking off regular upper body clothing?: A Little  -   Taking care of personal grooming such as brushing teeth?: None  -   Eating meals?: None    AM-PAC Score:  Score: 18  Approx Degree of Impairment: 46.65%  Standardized Score (AM-PAC Scale): 38.66    PLAN  OT Device Recommendations: TBD  OT Treatment Plan: Balance activities;Energy conservation/work simplification techniques;ADL training;Functional transfer training;Endurance training;Patient/Family education;Patient/Family training;Equipment eval/education;Compensatory technique education;Continued evaluation  Rehab Potential : Fair  Frequency: Daily    OT Goals: progressing as of 1/8/2025, LS    ADL Goals  Patient will perform toileting with min A and AE PRN  Patient will perform LB dressing with min A and AE PRN     Functional Transfer Goals  Patient will perform bed mobility supine to sit with supervision  Patient will perform bed mobility sit to supine with supervision  Patient will perform toilet transfer with supervision     Additional Goals:  Patient will state spine precautions and maintain during ADL  Pt will independently don/doff LSO while sitting EOB.       OT Session Time: 30 minutes  Therapeutic Activity: 30 minutes

## 2025-01-08 NOTE — PLAN OF CARE
A&O x4. VSS on RA. PRANEETH with cpap overnight. . Pain controlled with PO pain medication. Ambulating with bilat post op shoes and LSO brace when OOB. Mostly anuric. Declines SCDs. Ioban and gauze dressing to low back C/D/I. L limb restriction. Reviewed POC, pain management, IS use, and fall precautions with pt. Bed alarm on w/bed in lowest position. Pt reminded to use call light. Verbalized understanding.     0440 - Dr Rousseau paged for low BP 71/46, see MAR for new orders. Hospitalist made aware

## 2025-01-08 NOTE — PLAN OF CARE
A&O x4. VSS on RA.BG monitored. PRANEETH with cpap overnight. . Pain controlled with PO pain medication. Ambulating with bilat post op shoes and LSO brace when OOB. Mostly anuric. Declines SCDs. Ioban and gauze dressing to low back C/D/I. L limb restriction. [facility] notified of orders for dialysis. Reviewed POC, pain management, IS use, and fall precautions with pt. Bed alarm on w/bed in lowest position. Pt reminded to use call light. Verbalized understanding.

## 2025-01-08 NOTE — CM/SW NOTE
01/08/25 1151   Choice of Post-Acute Provider   Informed patient of right to choose their preferred provider Yes   List of appropriate post-acute services provided to patient/family with quality data Yes   Patient/family choice [facility]   Information given to Patient;Spouse/Significant other   [facility] financial disclosure given Yes       Met with pt/spouse at bedside to discuss DC planning.  Pt has long history with [facility] and they confirmed they wish to continue with this agency.  No other DC needs/concerns at this time.  Pt's wife anticipates DC tomorrow after HD.  She is planning to provide transport.    [facility] reserved in [facility].  / to remain available for support and/or discharge planning.     Jessica Avery, [facility]  Discharge Planner  [phone]

## 2025-01-08 NOTE — PROGRESS NOTES
[facility]  Nephrology Progress Note    Gamaliel Boyer Attending:  Ricardo Benson MD       Assessment and Plan:    1) ESRD- due to type 1 DM > 60 yrs; lytes / volume OK. Next HD Thurs per usual routine     2) Discits / OM L5-S1- s/p biopsy / irrigation / debridement + fusion      3) HTN- longstanding, refractory / labile- recently off BP meds     4) Anemia- due to CKD; hold EPO with hgb > 11     5) DM 1- A1C < 7 on insulin pump (Dr. Grande)     6) Steal syndrome s/p AVF revision     7) Ascites- due to ESRD / fluid gains / ? LUGO- receiving paracentesis q3 weeks (1.5-2 L)      Subjective:  Awake alert notes reviewed    Physical Exam:   BP (!) 84/45   Pulse 68   Temp 98.6 °F (37 °C) (Oral)   Resp 18   Ht 5' 6\" (1.676 m)   Wt 152 lb (68.9 kg)   SpO2 96%   BMI 24.53 kg/m²   Temp (24hrs), Av.1 °F (36.7 °C), Min:97.6 °F (36.4 °C), Max:98.6 °F (37 °C)       Intake/Output Summary (Last 24 hours) at 2025 0758  Last data filed at 2025 1632  Gross per 24 hour   Intake --   Output 1500 ml   Net -1500 ml     Wt Readings from Last 3 Encounters:   25 152 lb (68.9 kg)   10/25/24 160 lb (72.6 kg)   10/21/24 163 lb 4 oz (74 kg)     General: awake alert  HEENT: No scleral icterus, MMM  Neck: Supple, no KALPANA or thyromegaly  Cardiac: Regular rate and rhythm, S1, S2 normal, no murmur or tub  Lungs: Decreased BS at bases bilaterally   Abdomen: Soft, non-tender. + bowel sounds, no palpable organomegaly  Extremities: Without clubbing, cyanosis; no edema  Neurologic: Cranial nerves grossly intact, moving all extremities  Skin: Warm and dry, no rashes       Labs:   Lab Results   Component Value Date    PGLU 283 2025       Imaging:  All imaging studies reviewed.    Meds:   Current Facility-Administered Medications   Medication Dose Route Frequency    oxyCODONE-acetaminophen (Percocet) 5-325 MG per tab 1 tablet  1 tablet Oral Q4H PRN    Or    oxyCODONE-acetaminophen (Percocet) 5-325 MG per tab 2 tablet   2 tablet Oral Q4H PRN    midodrine (ProAmatine) tab 10 mg  10 mg Oral Q Tu, Th and Sa    sennosides (Senokot) tab 17.2 mg  17.2 mg Oral Nightly    ondansetron (Zofran) 4 MG/2ML injection 4 mg  4 mg Intravenous Q6H PRN    metoclopramide (Reglan) 5 mg/mL injection 5 mg  5 mg Intravenous Q8H PRN    diphenhydrAMINE (Benadryl) cap/tab 25 mg  25 mg Oral Q4H PRN    Or    diphenhydrAMINE (Benadryl) 50 mg/mL  injection 25 mg  25 mg Intravenous Q4H PRN    benzocaine-menthol (Cepacol) lozenge 1 lozenge  1 lozenge Buccal Q15 Min PRN    HYDROmorphone (Dilaudid) 1 MG/ML injection 0.2 mg  0.2 mg Intravenous Q2H PRN    Or    HYDROmorphone (Dilaudid) 1 MG/ML injection 0.4 mg  0.4 mg Intravenous Q2H PRN    cyclobenzaprine (Flexeril) tab 2.5 mg  2.5 mg Oral Q6H PRN    diazePAM (Valium) tab 2 mg  2 mg Oral Q6H PRN    glucose (Dex4) 15 GM/59ML oral liquid 15 g  15 g Oral Q15 Min PRN    Or    glucose (Glutose) 40% oral gel 15 g  15 g Oral Q15 Min PRN    Or    glucose-vitamin C (Dex-4) chewable tab 4 tablet  4 tablet Oral Q15 Min PRN    Or    dextrose 50% injection 50 mL  50 mL Intravenous Q15 Min PRN    Or    glucose (Dex4) 15 GM/59ML oral liquid 30 g  30 g Oral Q15 Min PRN    Or    glucose (Glutose) 40% oral gel 30 g  30 g Oral Q15 Min PRN    Or    glucose-vitamin C (Dex-4) chewable tab 8 tablet  8 tablet Oral Q15 Min PRN    insulin aspart (NovoLOG) 100 Units/mL FlexPen 1-10 Units  1-10 Units Subcutaneous TID AC and HS    cholecalciferol (Vitamin D3) tab 2,000 Units  2,000 Units Oral Daily    levothyroxine (Synthroid) tab 175 mcg  175 mcg Oral QAM AC    melatonin cap/tab 10 mg  10 mg Oral Nightly    multivitamin (Dialyvite - Renal) tab 1 tablet  1 tablet Oral Daily    sertraline (Zoloft) tab 25 mg  25 mg Oral Daily    sevelamer carbonate (Renvela) tab 800 mg  800 mg Oral TID AC    ezetimibe (Zetia) tab 10 mg  10 mg Oral Daily before evening meal         Questions/concerns were discussed with patient and/or family by  bedside.          Rojelio Carter MD  1/8/2025  7:58 AM

## 2025-01-09 VITALS
OXYGEN SATURATION: 95 % | DIASTOLIC BLOOD PRESSURE: 58 MMHG | TEMPERATURE: 99 F | SYSTOLIC BLOOD PRESSURE: 102 MMHG | WEIGHT: 152 LBS | HEIGHT: 66 IN | HEART RATE: 64 BPM | BODY MASS INDEX: 24.43 KG/M2 | RESPIRATION RATE: 16 BRPM

## 2025-01-09 LAB
BASE EXCESS BLD CALC-SCNC: 2 MMOL/L
CA-I BLD-SCNC: 1.08 MMOL/L (ref 1.12–1.32)
CO2 BLD-SCNC: 29 MMOL/L (ref 22–32)
GLUCOSE BLD-MCNC: 113 MG/DL (ref 70–99)
GLUCOSE BLD-MCNC: 146 MG/DL (ref 70–99)
GLUCOSE BLD-MCNC: 169 MG/DL (ref 70–99)
HCO3 BLD-SCNC: 27.1 MEQ/L
HCT VFR BLD CALC: 39 %
PCO2 BLD: 45.3 MMHG
PH BLD: 7.39 [PH]
PO2 BLD: 39 MMHG
POTASSIUM BLD-SCNC: 4.5 MMOL/L (ref 3.6–5.1)
SAO2 % BLD: 72 %
SODIUM BLD-SCNC: 134 MMOL/L (ref 136–145)

## 2025-01-09 PROCEDURE — 99232 SBSQ HOSP IP/OBS MODERATE 35: CPT | Performed by: INTERNAL MEDICINE

## 2025-01-09 RX ORDER — ASPIRIN 81 MG/1
81 TABLET ORAL DAILY
Status: SHIPPED | COMMUNITY
Start: 2025-01-09 | End: 2025-01-09

## 2025-01-09 RX ORDER — ASPIRIN 81 MG/1
81 TABLET ORAL DAILY
Status: SHIPPED | COMMUNITY
Start: 2025-01-11

## 2025-01-09 NOTE — PLAN OF CARE
A&Ox4 VSS on RA; CPAP in use overnight. Pain managed with PO medication. Dressing to lower back C/D/I. Post op shoes and brace in place during ambulation. PT/OT. Dialysis scheduled for later today.

## 2025-01-09 NOTE — PROGRESS NOTES
[facility]  Nephrology Progress Note    Gamaliel Boyer Attending:  Ricardo Benson MD       Assessment and Plan:    1) ESRD- due to type 1 DM > 60 yrs; lytes / volume OK. HD today per usual routine     2) Discits / OM L5-S1- s/p biopsy / irrigation / debridement + fusion      3) HTN- longstanding, refractory / labile- recently off BP meds     4) Anemia- due to CKD; hold EPO with hgb > 11     5) DM 1- A1C < 7 on insulin pump (Dr. Grande)     6) Steal syndrome s/p AVF revision     7) Ascites- due to ESRD / fluid gains / ? LUGO- receiving paracentesis q3 weeks (1.5-2 L)      Subjective:  Awake alert notes reviewed    Physical Exam:   BP (!) 86/50 (BP Location: Right arm)   Pulse 50   Temp 98 °F (36.7 °C) (Oral)   Resp 16   Ht 5' 6\" (1.676 m)   Wt 152 lb (68.9 kg)   SpO2 99%   BMI 24.53 kg/m²   Temp (24hrs), Av.6 °F (37 °C), Min:98 °F (36.7 °C), Max:98.9 °F (37.2 °C)       Intake/Output Summary (Last 24 hours) at 2025 0955  Last data filed at 2025 0915  Gross per 24 hour   Intake 780 ml   Output --   Net 780 ml     Wt Readings from Last 3 Encounters:   25 152 lb (68.9 kg)   10/25/24 160 lb (72.6 kg)   10/21/24 163 lb 4 oz (74 kg)     General: awake alert  HEENT: No scleral icterus, MMM  Neck: Supple, no KALPANA or thyromegaly  Cardiac: Regular rate and rhythm, S1, S2 normal, no murmur or tub  Lungs: Decreased BS at bases bilaterally   Abdomen: Soft, non-tender. + bowel sounds, no palpable organomegaly  Extremities: Without clubbing, cyanosis; no edema  Neurologic: Cranial nerves grossly intact, moving all extremities  Skin: Warm and dry, no rashes       Labs:   Lab Results   Component Value Date    PGLU 169 2025       Imaging:  All imaging studies reviewed.    Meds:   Current Facility-Administered Medications   Medication Dose Route Frequency    insulin aspart (NovoLOG) 100 Units/mL FlexPen 2-10 Units  2-10 Units Subcutaneous TID AC and HS    insulin degludec (Tresiba) 100 units/mL  flextouch 6 Units  6 Units Subcutaneous Nightly    lactulose (CHRONULAC) 10 GM/15ML solution 20 g  20 g Oral Daily    oxyCODONE-acetaminophen (Percocet) 5-325 MG per tab 1 tablet  1 tablet Oral Q4H PRN    Or    oxyCODONE-acetaminophen (Percocet) 5-325 MG per tab 2 tablet  2 tablet Oral Q4H PRN    midodrine (ProAmatine) tab 10 mg  10 mg Oral Q Tu, Th and Sa    sennosides (Senokot) tab 17.2 mg  17.2 mg Oral Nightly    ondansetron (Zofran) 4 MG/2ML injection 4 mg  4 mg Intravenous Q6H PRN    metoclopramide (Reglan) 5 mg/mL injection 5 mg  5 mg Intravenous Q8H PRN    diphenhydrAMINE (Benadryl) cap/tab 25 mg  25 mg Oral Q4H PRN    Or    diphenhydrAMINE (Benadryl) 50 mg/mL  injection 25 mg  25 mg Intravenous Q4H PRN    benzocaine-menthol (Cepacol) lozenge 1 lozenge  1 lozenge Buccal Q15 Min PRN    HYDROmorphone (Dilaudid) 1 MG/ML injection 0.2 mg  0.2 mg Intravenous Q2H PRN    Or    HYDROmorphone (Dilaudid) 1 MG/ML injection 0.4 mg  0.4 mg Intravenous Q2H PRN    cyclobenzaprine (Flexeril) tab 2.5 mg  2.5 mg Oral Q6H PRN    diazePAM (Valium) tab 2 mg  2 mg Oral Q6H PRN    glucose (Dex4) 15 GM/59ML oral liquid 15 g  15 g Oral Q15 Min PRN    Or    glucose (Glutose) 40% oral gel 15 g  15 g Oral Q15 Min PRN    Or    glucose-vitamin C (Dex-4) chewable tab 4 tablet  4 tablet Oral Q15 Min PRN    Or    dextrose 50% injection 50 mL  50 mL Intravenous Q15 Min PRN    Or    glucose (Dex4) 15 GM/59ML oral liquid 30 g  30 g Oral Q15 Min PRN    Or    glucose (Glutose) 40% oral gel 30 g  30 g Oral Q15 Min PRN    Or    glucose-vitamin C (Dex-4) chewable tab 8 tablet  8 tablet Oral Q15 Min PRN    cholecalciferol (Vitamin D3) tab 2,000 Units  2,000 Units Oral Daily    levothyroxine (Synthroid) tab 175 mcg  175 mcg Oral QAM AC    melatonin cap/tab 10 mg  10 mg Oral Nightly    multivitamin (Dialyvite - Renal) tab 1 tablet  1 tablet Oral Daily    sertraline (Zoloft) tab 25 mg  25 mg Oral Daily    sevelamer carbonate (Renvela) tab 800 mg  800 mg  Oral TID AC    ezetimibe (Zetia) tab 10 mg  10 mg Oral Daily before evening meal         Questions/concerns were discussed with patient and/or family by bedside.          Rojelio Carter MD  1/9/2025  955 AM

## 2025-01-09 NOTE — CM/SW NOTE
01/08/25 1100   Discharge disposition   Expected discharge disposition Home-Health   Post Acute Care Provider   ([facility])   Discharge transportation Private car     Notified in rounds that pt discharging today after HD.  Sent updates and message to notify [facility] of discharge via Aidin    / to remain available for support and/or discharge planning.     Germania Aceves MBA MSN, RN CTL/  [id number]

## 2025-01-09 NOTE — PROGRESS NOTES
[facility]   part of [facility] Hospitalist Progress Note     Gamaliel Boyer Patient Status:  Inpatient    1950 MRN [id number]   Location [facility] 3SW-A Attending Ricardo Benson MD   Hosp Day # 3 PCP Nima Sorensen MD     Reason for consult:   Medical co management     Requested by:   Dr Benson    Subjective:     Having some pain but states he has chronic daily discomfort as well, undergoing HD    Objective:    Review of Systems:   10 point ROS completed and was negative, except for pertinent positive and negatives stated in subjective.    Vital signs:  Temp:  [98 °F (36.7 °C)-98.9 °F (37.2 °C)] 98.5 °F (36.9 °C)  Pulse:  [50-64] 64  Resp:  [16-18] 16  BP: ()/(43-58) 102/58  SpO2:  [94 %-100 %] 95 %    Physical Exam:    General: No acute distress.   HEENT:  EOMI, PERRLA, OP clear  Respiratory: Clear to auscultation bilaterally. No wheezes. No rhonchi.  Cardiovascular: S1, S2. Regular rate and rhythm. No murmurs.  Abdomen: Soft, nontender, nondistended.  Positive bowel sounds. No rebound or guarding.  Extremities: No edema.  Neuro:  Grossly non focal, no motor deficits.        Diagnostic Data:    Labs:  Recent Labs   Lab 25  0853 25  0546   WBC 4.9  --    HGB 11.7* 11.8*   .8*  --    .0*  --    INR 1.11  --        Recent Labs   Lab 25  0853   *   BUN 41*   CREATSERUM 4.90*   CA 9.5   ALB 4.1   *   K 3.9   CL 96*   CO2 27.0   ALKPHO 186*   AST 22   ALT 15   BILT 0.4   TP 7.3       Estimated Creatinine Clearance: 11.8 mL/min (A) (based on SCr of 4.9 mg/dL (H)).    Recent Labs   Lab 25  0853   PTP 14.4   INR 1.11            COVID-19 Lab Results    COVID-19  Lab Results   Component Value Date    COVID19 Not Detected 10/20/2023    COVID19 Not Detected 10/16/2023    COVID19 Not Detected 2023       Pro-Calcitonin  No results for input(s): \"PCT\" in the last 168 hours.    Cardiac  No results for input(s):  \"TROP\", \"PBNP\" in the last 168 hours.    Creatinine Kinase  No results for input(s): \"CK\" in the last 168 hours.    Inflammatory Markers  No results for input(s): \"CRP\", \"KIMBERLY\", \"LDH\", \"DDIMER\" in the last 168 hours.    Imaging: Imaging data reviewed in Epic.    Medications:    insulin aspart  2-10 Units Subcutaneous TID AC and HS    insulin degludec  6 Units Subcutaneous Nightly    lactulose  20 g Oral Daily    midodrine  10 mg Oral Q Tu, Th and Sa    sennosides  17.2 mg Oral Nightly    cholecalciferol  2,000 Units Oral Daily    levothyroxine  175 mcg Oral QAM AC    melatonin  10 mg Oral Nightly    multivitamin  1 tablet Oral Daily    sertraline  25 mg Oral Daily    sevelamer carbonate  800 mg Oral TID AC    ezetimibe  10 mg Oral Daily before evening meal       Assessment & Plan:     Gamaliel Boyer is a 74 year old male with PMH significant for BPH, depression, anxiety, DM2, ESRD on HD, GERD, DL, hypothyroidism, PRANEETH, HTN who presents for 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      Lumbar stenosis sp L5-S1 TLIF 1/6/25  - pt/ot, prn pain meds, bowel regimen     HTN  - bp labile, had some postop hypotension but is asymptomatic. Off of bp meds     ESRD on HD t/th/sa  -Renal consulted, had hd today  -sees Dr Emma PAK  -statin     DM2  On long acting and ssi here. Glc better today. Going back on insulin pump when he returns home     PRANEETH  -cpap at night     Hypothyroidism  -synthroid     Depression  Anxiety  -resume home meds     BPH  -flomax     Okay with dc home today       Judit Guadarrama MD  [facility]  Pager [phone]  Answering Service number: [phone]

## 2025-01-09 NOTE — PLAN OF CARE
Post-op day 3. Dressing is clean, dry, and intact. Alert and oriented x4. Room air. PRANEETH with CPAP. Blind in left eye. Bilateral hearing aides. Upper dentures. Wants door open at all times. Renal/1800 carb diet with accuchecks. Patient currently is scheduled for dialysis on Tuesday, Thursday, and Saturday. Dialysis completed today with 1500 removed. Physical and occupational therapy per orders. LSO when out of bed. Up with 1-2 assist and walker. Wound care per orders. Spouse at bedside for support. Deemed appropriate for discharge home with [facility].

## 2025-01-13 NOTE — DISCHARGE SUMMARY
[facility]  Discharge Summary    Gamaliel Boyer Patient Status:  Inpatient    1950 MRN [id number]   Location [facility] 3SW-A Attending No att. providers found   Hosp Day # 3 PCP Nima Sorensen MD     Date of Admission: 2025    Date of Discharge: 2025  3:55 PM    Admitting Diagnosis: INFECTION OF LUMBAR SPINE, LUMBAR STENOSIS  Lumbar stenosis    Discharge Diagnosis:   Patient Active Problem List   Diagnosis    Restless leg    Secondary hyperparathyroidism (HCC)    S/P total knee arthroplasty    Ulcerated, foot, right, with fat layer exposed (HCC)    PRANEETH on CPAP    Insulin pump status    Type 1 diabetes, uncontrolled, with neuropathy    Essential hypertension    Hypothyroidism (acquired)    Uncontrolled type 1 diabetes mellitus with hyperglycemia, with long-term current use of insulin (HCC)    Onychomycosis    Lumbar stenosis with neurogenic claudication    DDD (degenerative disc disease), lumbar    Long-term insulin use (HCC)    Uncontrolled type 1 diabetes mellitus with proliferative retinopathy without macular edema, with long-term current use of insulin    Uncontrolled type 1 diabetes mellitus with diabetic nephropathy, with long-term current use of insulin    Pulmonary hypertension (HCC)    Uncontrolled type 1 diabetes mellitus with stage 3 chronic kidney disease    Neuropathy    Primary hypertension    Hyperlipidemia LDL goal <100    Hypoglycemia unawareness in type 1 diabetes mellitus (HCC)    Sensory hearing loss, bilateral    Moderate episode of recurrent major depressive disorder (HCC)    Controlled type 1 diabetes mellitus with complication, with long-term current use of insulin (HCC)    Myofascial pain    Charcot foot due to diabetes mellitus (HCC)    Other chronic pain    Chronic mastoiditis of right side    Cervical myelopathy (HCC)    Pulmonary emphysema, unspecified emphysema type (HCC)    Difficulty walking    S/P insertion of spinal cord stimulator    Hyperglycemia     Stage 4 chronic kidney disease (HCC)    Urinary retention    Anemia in stage 4 chronic kidney disease (HCC)    Arthritis of facet joint of lumbar spine    Orthostatic hypotension    Type 1 diabetes mellitus with complication, with long term current use of insulin pump ([facility])    Esophagitis    Platelets decreased ([facility])    Jerking movements of extremities    Diabetic complication (HCC)    Acute renal failure (HCC)    Acute renal failure, unspecified acute renal failure type (HCC)    Hyponatremia    Anemia in ESRD (end-stage renal disease) ([facility])    ESRD on hemodialysis ([facility])    Hypokalemia    Thrombocytopenia (HCC)    Altered mental status, unspecified altered mental status type    Rash    Diabetes mellitus due to underlying condition with chronic kidney disease on chronic dialysis, with long-term current use of insulin ([facility])    Other fatigue    Acute renal failure superimposed on chronic kidney disease, unspecified CKD stage, unspecified acute renal failure type    Acute on chronic congestive heart failure, unspecified heart failure type ([facility])    Urticaria    Cellulitis of left upper extremity    Anemia    Acute kidney injury ([facility])    Syncope and collapse    Abnormal movement    Atrophy of muscle of hand    Acute encephalopathy    Type 2 diabetes mellitus with hyperglycemia, with long-term current use of insulin ([facility])    Fall, initial encounter    Hypoglycemia    Hyperammonemia ([facility])    Choreiform movement    Myoclonus    Groin abscess    ESRD (end stage renal disease) on dialysis ([facility])    Type 1 diabetes mellitus with hyperglycemia ([facility])    Multiple falls    Contusion of coccyx, initial encounter    Weakness generalized    Other ascites    Anemia in chronic kidney disease, on chronic dialysis ([facility])    Discitis of lumbosacral region    Osteomyelitis (HCC)    ESRD (end stage renal disease) ([facility])    Lumbar stenosis       Hospital course:  The patient was admitted on above date to undergo elective surgical intervention  understanding risks and benefits to surgery after failing conservative care.  Patient underwent   Surgical Procedures       Case IDs Date Procedure Surgeon Location Status    [id number] 1/6/25 LUMBAR 5-SACRAL 1 TRANSFORAMINAL LUMBAR INTERBODY FUSION WITH INSTRUMENTATION, BIOPSY LUMBAR 5-SACRAL 1, IRRIGATION AND DEBRIDMENT LUMBAR 5-SACRAL 1 Ricardo Benson MD [facility]          Afterward, pt was brought to the PACU in stable condition.  After the recovery room the patient was transferred to the floor using standard protocol orders.  Once on the floor the patient was followed by spine service and medical services as well as appropriate ancillary consultations throughout the hospital stay.  The patient participated in Physical and Occupational Therapy making steady progressive gains.  Once deemed stable by all services the patient was discharged on the above date.  Standard discharge instructions were given and they were asked to follow up in clinic in 2 weeks.     Disposition: Home Health Care Services      Discharge Medications:   Discharge Medication List as of 1/9/2025  3:08 PM        CONTINUE these medications which have CHANGED    Details   aspirin 81 MG Oral Tab EC Take 1 tablet (81 mg total) by mouth daily. RESUME 1/11, Historical           CONTINUE these medications which have NOT CHANGED    Details   Cholecalciferol (VITAMIN D3) 25 MCG (1000 UT) Oral Cap Take 1 tablet by mouth daily., Historical      Calcium Carbonate (CALCIUM 500 OR) Take 1,000 mg by mouth daily., Historical      HYDROcodone-acetaminophen 5-325 MG Oral Tab Take 1 tablet by mouth every 4 to 6 hours., Historical      ENULOSE 10 GM/15ML Oral Solution Take 30 mL (20 g total) by mouth daily., Historical, LIZ      carboxymethylcellulose (REFRESH CELLUVISC) 1 % Ophthalmic Gel Place 1 drop into both eyes 4 (four) times daily., Historical      multivitamin Oral Tab Take 1 tablet by mouth daily. Dialyvite, Historical      docusate sodium 100 MG Oral  Cap Take 1 capsule (100 mg total) by mouth 2 (two) times daily., Historical      SSD 1 % External Cream Apply 1 Application topically daily., Historical, LIZ      Melatonin 10 MG Oral Tab Take 10 mg by mouth nightly., Historical      Probiotic Product (PROBIOTIC BLEND OR) Take 1 tablet by mouth daily., Historical      midodrine 10 MG Oral Tab Take 1 tablet (10 mg total) by mouth 3 (three) times a week. Before each dialysis, Historical      sertraline 25 MG Oral Tab Take 1 tablet (25 mg total) by mouth daily., Historical      Insulin Glargine, 1 Unit Dial, (TOUJEO SOLOSTAR) 300 UNIT/ML Subcutaneous Solution Pen-injector Inject 6 Units into the skin as needed (If insulin pump not working)., HistoricalMay substitute if not on insurance formulary      cholecalciferol 50 MCG (2000 UT) Oral Tab Take 1 tablet (2,000 Units total) by mouth daily., Historical      vitamin E 1000 UNITS Oral Cap Take 1 capsule (1,000 Units total) by mouth daily., Historical      Omega-3 Fatty Acids (FISH OIL) 1200 MG Oral Cap Take 1,200 mg by mouth daily., Historical      insulin lispro 100 UNIT/ML Injection Solution Basal settings: 12 MN - 3:30 am: 0.100  3:30 am - 8 am: 0.350  8 am - 10 am: 0.550   10 am - 11 am: 0.850  11 am - 1 pm: 0.150  1 pm - 3 pm: 0.200  3 pm - 5 pm: 0.750  5 pm - 6 pm: 0.675  6 pm - 7 pm: 0.375   7 pm - 11 pm 0.350  11 pm - 12 MN: 0.200    T otal daily dose: 8.725 Units per day    Bolus ratios: Bkfst: 1 Units for every 12 gms of carbs   Lunch: 1 Units for every 12 gms of carbs   Dinner: 1 Units for every 10 gms of carbs     Correction factor: 1 Units for every 75 mg/dl above a target glucose  of 140 mg/dl (110 if in Control IQ mode) but at bedtime, 1 Unit for every 140, Historical      levothyroxine 175 MCG Oral Tab Take 1 tablet (175 mcg total) by mouth daily., Historical      Sevelamer 800 MG Oral Tab Take 1 tablet (800 mg total) by mouth 3 (three) times daily before meals. and 1 before snacks, Historical       GLUCAGON EMERGENCY 1 MG Injection Kit Inject 1 mg into the skin once as needed., Normal, Disp-1 kit, R-1      EZETIMIBE 10 MG Oral Tab TAKE ONE TABLET BY MOUTH ONE TIME DAILY, Normal, Disp-90 tablet, R-3      cyclobenzaprine 10 MG Oral Tab Take 1 tablet (10 mg total) by mouth 3 (three) times daily as needed for Muscle spasms. Post op, Historical      oxyCODONE-acetaminophen 5-325 MG Oral Tab Take 1 tablet by mouth every 4 (four) hours as needed for Pain. Post op, Historical           STOP taking these medications       Ascorbic Acid (VITAMIN C) 1000 MG Oral Tab              Ricardo Benson MD  1/13/2025  7:52 AM

## 2025-02-05 ENCOUNTER — HOSPITAL ENCOUNTER (OUTPATIENT)
Dept: WOUND CARE | Age: 75
Discharge: STILL ADMITTED OR ADMINISTRATIVE | End: 2025-02-05
Attending: PODIATRIST | Admitting: PODIATRIST

## 2025-02-05 VITALS — TEMPERATURE: 98.1 F

## 2025-02-05 DIAGNOSIS — E11.621 DIABETIC ULCER OF RIGHT MIDFOOT ASSOCIATED WITH TYPE 2 DIABETES MELLITUS, WITH MUSCLE INVOLVEMENT WITHOUT EVIDENCE OF NECROSIS  (CMD): Primary | ICD-10-CM

## 2025-02-05 DIAGNOSIS — I73.9 PAD (PERIPHERAL ARTERY DISEASE) (CMD): ICD-10-CM

## 2025-02-05 DIAGNOSIS — L97.511 ULCER OF RIGHT FOOT, LIMITED TO BREAKDOWN OF SKIN  (CMD): ICD-10-CM

## 2025-02-05 DIAGNOSIS — E11.621 DIABETIC ULCER OF LEFT GREAT TOE  (CMD): ICD-10-CM

## 2025-02-05 DIAGNOSIS — L97.415 DIABETIC ULCER OF RIGHT MIDFOOT ASSOCIATED WITH TYPE 2 DIABETES MELLITUS, WITH MUSCLE INVOLVEMENT WITHOUT EVIDENCE OF NECROSIS  (CMD): Primary | ICD-10-CM

## 2025-02-05 DIAGNOSIS — L97.529 DIABETIC ULCER OF LEFT GREAT TOE  (CMD): ICD-10-CM

## 2025-02-05 PROCEDURE — 11042 DBRDMT SUBQ TIS 1ST 20SQCM/<: CPT

## 2025-02-05 ASSESSMENT — PAIN SCALES - GENERAL: PAINLEVEL_OUTOF10: 0

## 2025-02-19 ENCOUNTER — HOSPITAL ENCOUNTER (OUTPATIENT)
Dept: WOUND CARE | Age: 75
Discharge: STILL ADMITTED OR ADMINISTRATIVE | End: 2025-02-19
Attending: PODIATRIST | Admitting: PODIATRIST

## 2025-02-19 VITALS — TEMPERATURE: 97 F

## 2025-02-19 DIAGNOSIS — I73.9 PAD (PERIPHERAL ARTERY DISEASE) (CMD): ICD-10-CM

## 2025-02-19 DIAGNOSIS — E11.621 DIABETIC ULCER OF LEFT GREAT TOE  (CMD): ICD-10-CM

## 2025-02-19 DIAGNOSIS — L97.511 ULCER OF RIGHT FOOT, LIMITED TO BREAKDOWN OF SKIN  (CMD): ICD-10-CM

## 2025-02-19 DIAGNOSIS — L97.529 DIABETIC ULCER OF LEFT GREAT TOE  (CMD): Primary | ICD-10-CM

## 2025-02-19 DIAGNOSIS — L97.529 DIABETIC ULCER OF LEFT GREAT TOE  (CMD): ICD-10-CM

## 2025-02-19 DIAGNOSIS — E11.621 DIABETIC ULCER OF LEFT GREAT TOE  (CMD): Primary | ICD-10-CM

## 2025-02-19 PROCEDURE — 11042 DBRDMT SUBQ TIS 1ST 20SQCM/<: CPT

## 2025-02-19 ASSESSMENT — PAIN SCALES - GENERAL: PAINLEVEL_OUTOF10: 0

## 2025-02-22 ASSESSMENT — ENCOUNTER SYMPTOMS
GASTROINTESTINAL NEGATIVE: 1
EYES NEGATIVE: 1
PSYCHIATRIC NEGATIVE: 1
RESPIRATORY NEGATIVE: 1
CONSTITUTIONAL NEGATIVE: 1
NEUROLOGICAL NEGATIVE: 1

## 2025-03-05 ENCOUNTER — HOSPITAL ENCOUNTER (OUTPATIENT)
Dept: GENERAL RADIOLOGY | Age: 75
Discharge: HOME | End: 2025-03-05

## 2025-03-05 ENCOUNTER — HOSPITAL ENCOUNTER (OUTPATIENT)
Dept: WOUND CARE | Age: 75
Discharge: STILL ADMITTED OR ADMINISTRATIVE | End: 2025-03-05
Attending: PODIATRIST | Admitting: PODIATRIST

## 2025-03-05 VITALS — TEMPERATURE: 97.3 F

## 2025-03-05 DIAGNOSIS — L97.529 DIABETIC ULCER OF LEFT GREAT TOE  (CMD): ICD-10-CM

## 2025-03-05 DIAGNOSIS — E11.621 DIABETIC ULCER OF LEFT GREAT TOE  (CMD): ICD-10-CM

## 2025-03-05 DIAGNOSIS — M86.172 ACUTE OSTEOMYELITIS OF LEFT FOOT  (CMD): Primary | ICD-10-CM

## 2025-03-05 DIAGNOSIS — L97.511 ULCER OF RIGHT FOOT, LIMITED TO BREAKDOWN OF SKIN  (CMD): ICD-10-CM

## 2025-03-05 DIAGNOSIS — I73.9 PAD (PERIPHERAL ARTERY DISEASE) (CMD): ICD-10-CM

## 2025-03-05 DIAGNOSIS — M86.172 ACUTE OSTEOMYELITIS OF LEFT FOOT  (CMD): ICD-10-CM

## 2025-03-05 PROCEDURE — 73630 X-RAY EXAM OF FOOT: CPT

## 2025-03-05 PROCEDURE — 11042 DBRDMT SUBQ TIS 1ST 20SQCM/<: CPT

## 2025-03-05 PROCEDURE — 11043 DBRDMT MUSC&/FSCA 1ST 20/<: CPT

## 2025-03-05 ASSESSMENT — PAIN SCALES - GENERAL: PAINLEVEL_OUTOF10: 0

## 2025-03-06 VITALS — BODY MASS INDEX: 24.59 KG/M2 | WEIGHT: 153 LBS | HEIGHT: 66 IN

## 2025-03-06 ASSESSMENT — ENCOUNTER SYMPTOMS
GASTROINTESTINAL NEGATIVE: 1
PSYCHIATRIC NEGATIVE: 1
EYES NEGATIVE: 1
RESPIRATORY NEGATIVE: 1
CONSTITUTIONAL NEGATIVE: 1
NEUROLOGICAL NEGATIVE: 1

## 2025-03-06 NOTE — PAT NURSING NOTE
PreOp Instructions     You are scheduled for: a Peripheral Procedure     Date of Procedure: 03/12/25     Diet Instructions: Do not eat or drink anything after midnight including gum, mints, candy, etc the night before your procedure.     Medications: Take Aspirin 81 mg x 4 tablets the day of your procedure. Medications you are allowed to take can be taken with a sip of water the morning of your procedure.     Medications to Stop: DO NOT TAKE any herbal supplements and vitamins the morning of your procedure.     Diabetic instructions: Place your insulin pump at basal rate prior to your procedure. The Dexcom G7 glucose monitor does not need to be removed for the procedure, it will be covered during the procedure.     Sleep Apnea: You have sleep apnea, please bring your mask and tubing with you.     Skin Prep : Shower with antibacterial soap using a clean washcloth, prior to procedure. Once dried off, no lotions/powders/creams/ointments, etc., Do not shave the procedure area, this will be completed at the hospital during the preparation phase.     Arrival Time: The day prior to your procedure you will receive a phone call between 3:00 pm - 6:00 pm with your arrival time. If you haven't received a phone call, please check your voicemail messages., If you did not receive a voice mail and it is after 6:00 pm, please call the nursing supervisor at [phone].    Driving After Procedure: Sedation will be given so you WILL NOT be able to drive home. You will need a responsible adult  to drive you home. You can NOT take uber or taxi unless approved by your physician in advance.     Discharge Teaching: Your nurse will give you specific instructions before discharge, Most people can resume normal activities in 2-3 days, Any questions, please call the physician's office

## 2025-03-12 ENCOUNTER — APPOINTMENT (OUTPATIENT)
Dept: WOUND CARE | Age: 75
End: 2025-03-12
Attending: PODIATRIST

## 2025-03-12 ENCOUNTER — HOSPITAL ENCOUNTER (OUTPATIENT)
Dept: INTERVENTIONAL RADIOLOGY/VASCULAR | Facility: HOSPITAL | Age: 75
Discharge: HOME | End: 2025-03-12
Attending: SURGERY
Payer: MEDICARE

## 2025-03-12 NOTE — PAT NURSING NOTE
Called and spoke with pt regarding PAT call. He stated nothing has changed from last PAT call with regard to allergies, medications, or medical history. He stated he still had instructions from previous my chart message and will review. He had no questions at this time and v/u. He will await phone call this afternoon w/ TOA.

## 2025-03-13 ENCOUNTER — HOSPITAL ENCOUNTER (OUTPATIENT)
Dept: INTERVENTIONAL RADIOLOGY/VASCULAR | Facility: HOSPITAL | Age: 75
Discharge: HOME | End: 2025-03-13
Attending: SURGERY | Admitting: SURGERY
Payer: MEDICARE

## 2025-03-13 VITALS
HEART RATE: 60 BPM | OXYGEN SATURATION: 95 % | DIASTOLIC BLOOD PRESSURE: 58 MMHG | SYSTOLIC BLOOD PRESSURE: 105 MMHG | TEMPERATURE: 97 F | RESPIRATION RATE: 15 BRPM

## 2025-03-13 DIAGNOSIS — I70.244: ICD-10-CM

## 2025-03-13 LAB — GLUCOSE BLD-MCNC: 97 MG/DL (ref 70–99)

## 2025-03-13 PROCEDURE — 99152 MOD SED SAME PHYS/QHP 5/>YRS: CPT | Performed by: SURGERY

## 2025-03-13 PROCEDURE — 82962 GLUCOSE BLOOD TEST: CPT

## 2025-03-13 PROCEDURE — 99153 MOD SED SAME PHYS/QHP EA: CPT | Performed by: SURGERY

## 2025-03-13 PROCEDURE — B41G1ZZ FLUOROSCOPY OF LEFT LOWER EXTREMITY ARTERIES USING LOW OSMOLAR CONTRAST: ICD-10-PCS | Performed by: SURGERY

## 2025-03-13 PROCEDURE — 36246 INS CATH ABD/L-EXT ART 2ND: CPT | Performed by: SURGERY

## 2025-03-13 PROCEDURE — 75710 ARTERY X-RAYS ARM/LEG: CPT | Performed by: SURGERY

## 2025-03-13 RX ORDER — HEPARIN SODIUM 5000 [USP'U]/ML
INJECTION, SOLUTION INTRAVENOUS; SUBCUTANEOUS
Status: COMPLETED
Start: 2025-03-13 | End: 2025-03-13

## 2025-03-13 RX ORDER — MIDAZOLAM HYDROCHLORIDE 1 MG/ML
INJECTION INTRAMUSCULAR; INTRAVENOUS
Status: COMPLETED
Start: 2025-03-13 | End: 2025-03-13

## 2025-03-13 RX ORDER — IODIXANOL 320 MG/ML
100 INJECTION, SOLUTION INTRAVASCULAR
Status: COMPLETED | OUTPATIENT
Start: 2025-03-13 | End: 2025-03-13

## 2025-03-13 RX ORDER — SODIUM CHLORIDE 9 MG/ML
INJECTION, SOLUTION INTRAVENOUS CONTINUOUS
Status: DISCONTINUED | OUTPATIENT
Start: 2025-03-13 | End: 2025-03-13

## 2025-03-13 RX ORDER — LIDOCAINE HYDROCHLORIDE 10 MG/ML
INJECTION, SOLUTION EPIDURAL; INFILTRATION; INTRACAUDAL; PERINEURAL
Status: COMPLETED
Start: 2025-03-13 | End: 2025-03-13

## 2025-03-13 RX ADMIN — SODIUM CHLORIDE: 9 INJECTION, SOLUTION INTRAVENOUS at 14:15:00

## 2025-03-13 RX ADMIN — IODIXANOL 40 ML: 320 INJECTION, SOLUTION INTRAVASCULAR at 14:30:00

## 2025-03-13 NOTE — BRIEF OP NOTE
Pre-Operative Diagnosis: Atherosclerosis with ulcer left foot, limited mobility, contractures bilateral legs     Post-Operative Diagnosis: Atherosclerosis with ulcer left foot, limited mobility, contractures bilateral legs     Procedure Performed:   1.  Ultrasound-guided percutaneous access right common femoral artery  2.  Selection of left common femoral artery and angiogram left leg    Anesthesia: Moderate conscious sedation with 3 mg of Versed and 75 µm of fentanyl, start time 1353, finish 1418 for a total of 25 minutes    Assistant(s):        Surgical Findings:   1.  Left common femoral and profunda patent, left SFA with mild stenoses without critical narrowing, popliteal artery obscured by knee implant and contractures but contrast briskly fills past, peroneal patent to the ankle, posterior tibial patent to the foot and fills the plantar artery.  Anterior tibial artery is occluded shortly after its origin     Specimen: None     Estimated Blood Loss: 50 mL    John Hammonds MD  3/13/2025  2:23 PM

## 2025-03-13 NOTE — PROGRESS NOTES
Pt s/p PV angio with Mynx closure to site. Femstop in place over site for 2 hours per Dr Hammonds. VSS, Pt denies pain. Tolerated PO intake. After recovery complete, pt ambulated in room with no bleeding or hematoma noted. IV d/c'd. Discharge instructions given-pt verbalized understanding. Pt to lobby via WC and wife to drive home.

## 2025-03-13 NOTE — DISCHARGE INSTRUCTIONS
HOME CARE INSTRUCTIONS FOLLOWING CORONARY ANGIOGRAPHY,  PERIPHERAL ANGIOGRAPHY, ANGIOPLASTY (PTCA/PTA) OR INSERTION  OF STENT IN THE CORONARY, CAROTID, AND/OR PERIPHERAL ARTERIES      Activity:   DO NOT drive after the procedure. You may resume driving late the following day according to the nurse or physician’s instructions   Plan on resting and relaxing tonight and tomorrow   Resume your normal activity after 48 hours, or as instructed by your physician   Do not lift anything over 10 pounds for the next 24 hours   Avoid sexual activity for the next 24 hours   Avoid drinking alcohol for the next 24 hours   If the groin site was used, avoid repeated stair use and excessive walking for the next 24 hours   If the wrist was used, avoid bending/flexing of the wrist for the next 24 hours.       What is Normal?   A small lump at the procedure site associated with mild tenderness when touched   The procedure site may be bruised or discolored   There may be a small amount of drainage on the bandage    Special Instructions:   Drink plenty of fluids during the next 24 hours to “flush” the contrast from your system   The bandage is to remain in place for 24 hours   Keep the bandage clean and dry   DO NOT submerge the procedure site for 72 hours (no bath tubs or pools). This includes dishwashing/submersion of the wrist, if the wrist was used   After 24 hours, you must remove the bandage   You should shower after removing the bandage, and wash the procedure site gently with soap and water   If you choose to wear a bandage for a few days, make sure it remains clean and dry and that it is changed daily    When you should NOTIFY YOUR PHYSICIAN:   Bleeding can occur at the procedure site - both on the outside of the skin and/or beneath the surface of the skin   Swelling or a large lump at the procedure site can occur, which may be accompanied by moderate to severe pain. If either of the above occurs, lie down flat. Have someone  apply pressure to the procedure site with both hands, as instructed by the nurse. Hold pressure for 20 minutes and the bleeding should stop. Notify your physician of the occurrence. If the bleeding does not stop, call 911 and continue to apply pressure   If you experience signs of a fever, temperature >101 degrees, chills, infection (redness, swelling, thick yellow drainage, or a foul odor from the procedure site)   If you notice any numbness, tingling, or loss of feeling to your leg or foot for groin access   If you notice any numbness, tingling, or loss of feeling to your fingers or hand, if wrist access was utilized    Other:  - You may resume your present diet, unless otherwise specified by your physician.   - You may resume all of your medications as prescribed, unless otherwise directed by your physician. A list of your medications was provided to you at discharge.  - Please call your physician's office for a follow-up appointment. You should be seen in 2 weeks.  - Do not make any personal/business decisions and/or sign any legal documents for the next 24 hours.

## 2025-03-16 NOTE — H&P
Pre-op Diagnosis: Atherosclerosis with ulcer    The above referenced H&P was reviewed by John Hammonds MD on 3/16/2025, the patient was examined and no significant changes have occurred in the patient's condition since the H&P was performed.  I discussed with the patient and/or legal representative the potential benefits, risks and side effects of this procedure; the likelihood of the patient achieving goals; and potential problems that might occur during recuperation.  I discussed reasonable alternatives to the procedure, including risks, benefits and side effects related to the alternatives and risks related to not receiving this procedure.  We will proceed with procedure as planned.

## 2025-03-16 NOTE — OPERATIVE REPORT
Pre-Operative Diagnosis: Atherosclerosis with ulcer left foot, limited mobility, contractures bilateral legs     Post-Operative Diagnosis: Atherosclerosis with ulcer left foot, limited mobility, contractures bilateral legs     Procedure Performed:   1.  Ultrasound-guided percutaneous access right common femoral artery  2.  Selection of left common femoral artery and angiogram left leg     Anesthesia: Moderate conscious sedation with 3 mg of Versed and 75 µm of fentanyl, start time 1353, finish 1418 for a total of 25 minutes     Assistant(s):        Surgical Findings:   1.  Left common femoral and profunda patent, left SFA with mild stenoses without critical narrowing, popliteal artery obscured by knee implant and contractures but contrast briskly fills past, peroneal patent to the ankle, posterior tibial patent to the foot and fills the plantar artery.  Anterior tibial artery is occluded shortly after its origin     Specimen: None     Estimated Blood Loss: 50 mL    Brief history: This is a 74-year-old male with significant history of renal failure and mild contractures of both knees and hips of both lower extremities.  He has developed a wound on the left foot.  Will proceed with angiogram as described below.    Details of procedure: Patient was taken to the Cath Lab and prepped and draped in usual sterile fashion.  Moderate conscious sedation was initiated monitored by qualified nurse under my supervision.  Using ultrasound I percutaneously accessed the right common femoral artery with a micropuncture kit.  I then advanced a Glidewire advantage into the aorta and exchanged the micropuncture kit for a 5 [place] sheath.  Patient had a relatively steep bifurcation and I took the aortic bifurcation with a crossover catheter and did an angiogram of the left common iliac and external leg both of which were widely patent.  I then used a regular Glidewire to advance the wire into the common femoral artery and then exchanged  the crossover catheter for a 5 [place] glide catheter and advanced this to the level of the left common femoral artery.  From the position of the left common femoral artery I then did a diagnostic angiogram. Left common femoral and profunda patent, left SFA with mild stenoses without critical narrowing, popliteal artery obscured by knee implant and contractures but contrast briskly fills past, peroneal patent to the ankle, posterior tibial patent to the foot and fills the plantar artery.  Anterior tibial artery is occluded shortly after its origin.  As the patient inline flow into his foot and there were no severe stenoses I completed the diagnostic and then removed all devices.  Mynx closure device was placed and the patient was awakened and taken to recovery in stable condition.

## 2025-03-18 ENCOUNTER — CLINICAL ABSTRACT (OUTPATIENT)
Dept: HEALTH INFORMATION MANAGEMENT | Facility: OTHER | Age: 75
End: 2025-03-18

## 2025-03-19 ENCOUNTER — HOSPITAL ENCOUNTER (OUTPATIENT)
Dept: WOUND CARE | Age: 75
Discharge: STILL ADMITTED OR ADMINISTRATIVE | End: 2025-03-19
Attending: PODIATRIST | Admitting: PODIATRIST

## 2025-03-19 VITALS — TEMPERATURE: 97 F

## 2025-03-19 DIAGNOSIS — L97.529 DIABETIC ULCER OF LEFT GREAT TOE  (CMD): Primary | ICD-10-CM

## 2025-03-19 DIAGNOSIS — I73.9 PAD (PERIPHERAL ARTERY DISEASE) (CMD): ICD-10-CM

## 2025-03-19 DIAGNOSIS — E11.621 DIABETIC ULCER OF LEFT GREAT TOE  (CMD): ICD-10-CM

## 2025-03-19 DIAGNOSIS — L97.511 ULCER OF RIGHT FOOT, LIMITED TO BREAKDOWN OF SKIN  (CMD): ICD-10-CM

## 2025-03-19 DIAGNOSIS — L97.529 DIABETIC ULCER OF LEFT GREAT TOE  (CMD): ICD-10-CM

## 2025-03-19 DIAGNOSIS — E11.621 DIABETIC ULCER OF LEFT GREAT TOE  (CMD): Primary | ICD-10-CM

## 2025-03-19 PROCEDURE — 11104 PUNCH BX SKIN SINGLE LESION: CPT

## 2025-03-19 PROCEDURE — 11042 DBRDMT SUBQ TIS 1ST 20SQCM/<: CPT

## 2025-03-19 RX ORDER — SILVER SULFADIAZINE 10 MG/G
0.06 CREAM TOPICAL DAILY
Qty: 400 G | Refills: 5 | Status: SHIPPED | OUTPATIENT
Start: 2025-03-19

## 2025-03-19 ASSESSMENT — PAIN SCALES - GENERAL: PAINLEVEL_OUTOF10: 0

## 2025-03-21 DIAGNOSIS — M86.9 OSTEOMYELITIS (CMD): Primary | ICD-10-CM

## 2025-03-22 ASSESSMENT — ENCOUNTER SYMPTOMS
PSYCHIATRIC NEGATIVE: 1
RESPIRATORY NEGATIVE: 1
GASTROINTESTINAL NEGATIVE: 1
NEUROLOGICAL NEGATIVE: 1
EYES NEGATIVE: 1
CONSTITUTIONAL NEGATIVE: 1

## 2025-03-31 ENCOUNTER — APPOINTMENT (OUTPATIENT)
Dept: MRI IMAGING | Age: 75
End: 2025-03-31
Attending: SURGERY

## 2025-04-09 ENCOUNTER — HOSPITAL ENCOUNTER (OUTPATIENT)
Dept: WOUND CARE | Age: 75
Discharge: STILL ADMITTED OR ADMINISTRATIVE | End: 2025-04-09
Attending: PODIATRIST | Admitting: PODIATRIST

## 2025-04-09 VITALS — TEMPERATURE: 97.9 F

## 2025-04-09 DIAGNOSIS — E11.621 DIABETIC ULCER OF RIGHT GREAT TOE  (CMD): ICD-10-CM

## 2025-04-09 DIAGNOSIS — L97.529 DIABETIC ULCER OF LEFT GREAT TOE  (CMD): Primary | ICD-10-CM

## 2025-04-09 DIAGNOSIS — L97.511 ULCER OF RIGHT FOOT, LIMITED TO BREAKDOWN OF SKIN  (CMD): ICD-10-CM

## 2025-04-09 DIAGNOSIS — E11.621 DIABETIC ULCER OF LEFT GREAT TOE  (CMD): Primary | ICD-10-CM

## 2025-04-09 DIAGNOSIS — I73.9 PAD (PERIPHERAL ARTERY DISEASE) (CMD): ICD-10-CM

## 2025-04-09 DIAGNOSIS — E11.621 DIABETIC ULCER OF LEFT GREAT TOE  (CMD): ICD-10-CM

## 2025-04-09 DIAGNOSIS — L97.519 DIABETIC ULCER OF RIGHT GREAT TOE  (CMD): ICD-10-CM

## 2025-04-09 DIAGNOSIS — L97.529 DIABETIC ULCER OF LEFT GREAT TOE  (CMD): ICD-10-CM

## 2025-04-09 PROCEDURE — 99213 OFFICE O/P EST LOW 20 MIN: CPT | Performed by: SURGERY

## 2025-04-09 PROCEDURE — 11043 DBRDMT MUSC&/FSCA 1ST 20/<: CPT | Performed by: SURGERY

## 2025-04-09 PROCEDURE — 11042 DBRDMT SUBQ TIS 1ST 20SQCM/<: CPT

## 2025-04-09 ASSESSMENT — PAIN SCALES - GENERAL: PAINLEVEL_OUTOF10: 0

## 2025-04-09 ASSESSMENT — ENCOUNTER SYMPTOMS
EYES NEGATIVE: 1
PSYCHIATRIC NEGATIVE: 1
RESPIRATORY NEGATIVE: 1
CONSTITUTIONAL NEGATIVE: 1
GASTROINTESTINAL NEGATIVE: 1
NEUROLOGICAL NEGATIVE: 1

## 2025-04-18 ENCOUNTER — HOSPITAL ENCOUNTER (OUTPATIENT)
Dept: NUCLEAR MEDICINE | Age: 75
Discharge: HOME | End: 2025-04-18
Attending: SURGERY

## 2025-04-18 DIAGNOSIS — L97.529 DIABETIC ULCER OF LEFT GREAT TOE  (CMD): ICD-10-CM

## 2025-04-18 DIAGNOSIS — E11.621 DIABETIC ULCER OF LEFT GREAT TOE  (CMD): ICD-10-CM

## 2025-04-18 PROCEDURE — A9503 TC99M MEDRONATE: HCPCS | Performed by: SURGERY

## 2025-04-18 PROCEDURE — 10006150 HB RX 343: Performed by: SURGERY

## 2025-04-18 RX ORDER — TC 99M MEDRONATE 20 MG/10ML
24 INJECTION, POWDER, LYOPHILIZED, FOR SOLUTION INTRAVENOUS ONCE
Status: COMPLETED | OUTPATIENT
Start: 2025-04-18 | End: 2025-04-18

## 2025-04-18 RX ADMIN — TC 99M MEDRONATE 25.8 MILLICURIE: 20 INJECTION, POWDER, LYOPHILIZED, FOR SOLUTION INTRAVENOUS at 13:04

## 2025-04-21 ENCOUNTER — OFFICE VISIT (OUTPATIENT)
Dept: NEPHROLOGY | Facility: CLINIC | Age: 75
End: 2025-04-21
Payer: MEDICARE

## 2025-04-21 DIAGNOSIS — G62.9 NEUROPATHY: Primary | ICD-10-CM

## 2025-04-21 NOTE — PROGRESS NOTES
Nephrology Progress Note      ASSESSMENT/PLAN:      1) ESRD- due to type 1 DM > 60 yrs with gradual progression recently reaching ESRD and doing reasonably well at [facility]. Meeting targets for dialysis adequacy, phosphorus and anemia management. PLAN- to continue HD TTS    2) HTN- longstanding refractory HTN; BP's recently lower after starting dialysis as expected- off meds currently (on midodrine pre-HD)    3) Anemia- due to CKD; continue EPO with HD.     4) DM 1- A1C 6.3 on insulin pump (Dr. Grande)    5) Back pain / sciatica- due to bony destruction L5-S1- much better since back surgery 1/25    6) Steal syndrome s/p AVF revision (Dr. Og)    7) Ascites- due to ESRD + large fluid gains- receiving paracenteses q3 weeks (1.5-2 L)    8) Diabetic neuropathy- trial of lyrica 25 mg qTTS post HD       HPI:   Gamaliel Boyer is a 74 year old male who presents for follow-up of   Chief Complaint   Patient presents with    Dialysis       Presents for follow-up of chronic kidney disease; is doing significantly better recently particularly after his most recent back surgery in January with much improvement in his back pain and sciatica.  However, he is still dealing with peripheral neuropathy due to longstanding diabetes.    HISTORY:  Past Medical History:    Anemia    anxiety    Anxiety state    back pain    Back problem    Blastomycosis    Blind    BPH (benign prostatic hyperplasia)    Depression    Diabetic retinopathy    laser surgery    Dialysis patient    Tues/Thurs/Sat    Discitis, unspecified, lumbosacral region    Disorder of thyroid    End stage renal disease (HCC)    Esophageal reflux    occassion    Hearing impairment    hearing aids bilateral    HEMORRHOIDS    High cholesterol    History of blood transfusion    History of renal dialysis    fistula on left arm, Tue, Thurs, Sat    Hyperkalemia    hyperlipidemia    hypothyroidism    IMPOTENCE    Liver disease    2013 - pt states all resolved after liver  and kidneys shut down after issue with pneumonia     Mood disturbance    Muscle weakness    hands and legs    Neuropathy    osteoarthritis    Osteoarthrosis, unspecified whether generalized or localized, unspecified site    osteoporosis    Polyneuropathy in diabetes(357.2)    Renal disorder    dialysis    Sepsis (HCC)    Sleep apnea    cpap    Sleep apnea, obstructive    AHI 54 SaO2 lizeth 74 % CPAP 12 HME//     Type I (juvenile type) diabetes mellitus without mention of complication, not stated as uncontrolled    since 1963- DR Mao follows    Unspecified essential hypertension    Visual impairment    wears glasses      Past Surgical History:   Procedure Laterality Date    Back surgery  2009    laminectomy  L1 -4  2/09  [facility]    Back surgery  8-8-13    C4-C6 ACDF     Back surgery  9/8/16    L2-S1 Revision Decomp possible uninstrumented fusion 9/8/16    Back surgery  09/10/2018    Rev C3-C7 ACDF     Cataract Bilateral done in 2004    IOL's    Colonoscopy      2006    Colonoscopy  2/2009    normal    Colonoscopy N/A 8/23/2019    adenoma- repeat 1 yr (2 dya prep)    Colonoscopy,biopsy  2/20/09    Performed by ANAM RIVERS at [facility] SURGICAL [facility]    Egd N/A 7/23/2021    EGD & COLONOSCOPY Surgeon: Kimberly, +vik, rpt EGD 3 months, poor prep rpt colon 3 years    Endoscopy, bowel pouch, biopsy      Injection, w/wo contrast, dx/therapeutic substance, epidural/subarachnoid; lumbar/sacral N/A 5/4/2016    Procedure: LUMBAR EPIDURAL;  Surgeon: Jayden Norton MD;  Location: [facility]    Injection, w/wo contrast, dx/therapeutic substance, epidural/subarachnoid; lumbar/sacral N/A 5/25/2016    Procedure: LUMBAR EPIDURAL;  Surgeon: Jayden Norton MD;  Location: [facility]IN [facility]    Injection, w/wo contrast, dx/therapeutic substance, epidural/subarachnoid; lumbar/sacral N/A 6/8/2016    Procedure: LUMBAR EPIDURAL;  Surgeon: Jayden Norton MD;  Location: [facility] FOR  PAIN MANAGEMENT    Knee replacement surgery  2/14/13    Left TKR by Dr. Lombardi    [facility] N/A 5/4/2016    Procedure: LUMBAR EPIDURAL;  Surgeon: Jayden Norton MD;  Location: [facility]    M-sedaj by  phys [facility] 5+ yr N/A 5/25/2016    Procedure: LUMBAR EPIDURAL;  Surgeon: Jayden Norton MD;  Location: [facility]    [facility] N/A 6/8/2016    Procedure: LUMBAR EPIDURAL;  Surgeon: Jayden Norton MD;  Location: [facility]    Other surgical history      Bilateral median nerve release at elbow; 2000    Other surgical history      CTS release bilateral 2000    Other surgical history      Surgical repair fracture left hand 5th digit    Other surgical history      Surgery left heel ligament repair    Other surgical history      Laser surgery for bilateral retinopathy    Other surgical history      Surgery to left eye for \"weak muscle.\" 2007    Other surgical history      bilat knee repair 9/23/10    Other surgical history  11/2013    lung resection to remove abcess    Other surgical history  10/26/2020    Cystoscopy (Dr. Ribeiro)    Patient documented not to have experienced any of the following events  2/14/2014    Procedure: ;  Surgeon: Kin Hobbs MD;  Location: [facility]    Patient documented not to have experienced any of the following events N/A 5/4/2016    Procedure: LUMBAR EPIDURAL;  Surgeon: Jayden Norton MD;  Location: [facility]    Patient documented not to have experienced any of the following events N/A 5/25/2016    Procedure: LUMBAR EPIDURAL;  Surgeon: Jayden Norton MD;  Location: [facility]    Patient documented not to have experienced any of the following events N/A 6/8/2016    Procedure: LUMBAR EPIDURAL;  Surgeon: Jayden Norton MD;  Location: [facility]    Patient withough preoperative order for iv  antibiotic surgical site infection prophylaxis.  2014    Procedure: ;  Surgeon: Kin Hobbs MD;  Location: [facility]    Patient withough preoperative order for iv antibiotic surgical site infection prophylaxis. N/A 2016    Procedure: LUMBAR EPIDURAL;  Surgeon: Jayden Norton MD;  Location: [facility] PAIN MANAGEMENT    Patient withough preoperative order for iv antibiotic surgical site infection prophylaxis. N/A 2016    Procedure: LUMBAR EPIDURAL;  Surgeon: Jayden Norton MD;  Location: [facility] PAIN MANAGEMENT    Patient withough preoperative order for iv antibiotic surgical site infection prophylaxis. N/A 2016    Procedure: LUMBAR EPIDURAL;  Surgeon: Jayden Norton MD;  Location: [facility]    Total knee replacement Left     Upper gi endoscopy,diagnosis  16    retained gastric contents; Edward    Upper gi endoscopy,remov f.b. N/A 2014    Procedure: ESOPHAGOGASTRODUODENOSCOPY, POSSIBLE BIOPSY, POSSIBLE POLYPECTOMY 10222;  Surgeon: Kin Hobbs MD;  Location: [facility]      Family History   Problem Relation Age of Onset    Hypertension Father     Lipids Father     Cancer Father     Hypertension Mother     Psychiatric Maternal Grandmother     Diabetes Paternal Grandfather         Type 2 DM    Heart Disorder Brother         CAD with MI at age 53 - passed away    Obesity Brother     Diabetes Brother         Type 2 DM    Hypertension Brother     Lipids Brother     Thyroid Disorder Neg       Social History:   Social History     Socioeconomic History    Marital status:     Number of children: 1   Occupational History    Occupation: Retired--Printer   Tobacco Use    Smoking status: Former     Current packs/day: 0.00     Average packs/day: 3.5 packs/day for 20.0 years (70.0 ttl pk-yrs)     Types: Cigarettes     Start date: 1966     Quit date: 1986     Years since quittin.3    Smokeless tobacco: Never     Tobacco comments:     Quit 25 years ago   Vaping Use    Vaping status: Never Used   Substance and Sexual Activity    Alcohol use: Not Currently     Comment: very rarely    Drug use: Not Currently     Types: Cannabis     Comment: gummys    Sexual activity: Yes     Partners: Female   Other Topics Concern     Service No    Blood Transfusions No    Caffeine Concern No     Comment: coffee 1 cup per day    Sleep Concern No    Exercise Yes     Comment: PT/OT 1x per week    Seat Belt Yes     Social Drivers of Health     Food Insecurity: No Food Insecurity (1/6/2025)    Food Insecurity     Food Insecurity: Never true   Transportation Needs: No Transportation Needs (1/6/2025)    Transportation Needs     Lack of Transportation: No   Housing Stability: Low Risk  (1/6/2025)    Housing Stability     Housing Instability: No        Medications (Active prior to today's visit):  Current Outpatient Medications   Medication Sig Dispense Refill    aspirin 81 MG Oral Tab EC Take 1 tablet (81 mg total) by mouth daily. RESUME 1/11      HYDROcodone-acetaminophen 5-325 MG Oral Tab Take 1 tablet by mouth every 4 to 6 hours.      ENULOSE 10 GM/15ML Oral Solution Take 30 mL (20 g total) by mouth daily.      multivitamin Oral Tab Take 1 tablet by mouth daily. Dialyvite      docusate sodium 100 MG Oral Cap Take 1 capsule (100 mg total) by mouth 2 (two) times daily.      SSD 1 % External Cream Apply 1 Application topically daily.      Melatonin 10 MG Oral Tab Take 10 mg by mouth nightly.      Probiotic Product (PROBIOTIC BLEND OR) Take 1 tablet by mouth daily.      midodrine 10 MG Oral Tab Take 1 tablet (10 mg total) by mouth 3 (three) times a week. Before each dialysis      sertraline 25 MG Oral Tab Take 1 tablet (25 mg total) by mouth daily.      Insulin Glargine, 1 Unit Dial, (TOUJEO SOLOSTAR) 300 UNIT/ML Subcutaneous Solution Pen-injector Inject 6 Units into the skin as needed (If insulin pump not working).      cholecalciferol 50  MCG (2000 UT) Oral Tab Take 1 tablet (2,000 Units total) by mouth daily.      Vitamin E 400 units Oral Tab Take 400 Units by mouth daily.      Omega-3 Fatty Acids (FISH OIL) 1200 MG Oral Cap Take 1,200 mg by mouth daily.      insulin lispro 100 UNIT/ML Injection Solution Basal settings: 12 MN - 3:30 am: 0.100  3:30 am - 8 am: 0.350  8 am - 10 am: 0.550   10 am - 11 am: 0.850  11 am - 1 pm: 0.150  1 pm - 3 pm: 0.200  3 pm - 5 pm: 0.750  5 pm - 6 pm: 0.675  6 pm - 7 pm: 0.375   7 pm - 11 pm 0.350  11 pm - 12 MN: 0.200    Total daily dose: 8.725 Units per day    Bolus ratios: Bkfst: 1 Units for every 12 gms of carbs   Lunch: 1 Units for every 12 gms of carbs   Dinner: 1 Units for every 10 gms of carbs     Correction factor: 1 Units for every 75 mg/dl above a target glucose of 140 mg/dl (110 if in Control IQ mode) but at bedtime, 1 Unit for every 140      levothyroxine 175 MCG Oral Tab Take 1 tablet (175 mcg total) by mouth daily.      Sevelamer 800 MG Oral Tab Take 1 tablet (800 mg total) by mouth 3 (three) times daily before meals. and 1 before snacks      GLUCAGON EMERGENCY 1 MG Injection Kit Inject 1 mg into the skin once as needed. 1 kit 1    EZETIMIBE 10 MG Oral Tab TAKE ONE TABLET BY MOUTH ONE TIME DAILY (Patient taking differently: Take 1 tablet (10 mg total) by mouth before evening meal.) 90 tablet 3       Allergies:  Allergies   Allergen Reactions    Antihistamine & Nasal Deconges [Fexofenadine-Pseudoephedrine] OTHER (SEE COMMENTS)     Altered mental status    Adhesive Tape ITCHING     Silk tape    Albuterol DIZZINESS and OTHER (SEE COMMENTS)     Albuterol Inhalation. Lightheadedness    Benadryl [Diphenhydramine] RESTLESSNESS    Depakote [Valproic Acid] DIZZINESS    Fluconazole OTHER (SEE COMMENTS)     Kidney labs abnormal    Mirtazapine RESTLESSNESS    Quetiapine RESTLESSNESS    Wellbutrin [Bupropion] RASH    Gabapentin OTHER (SEE COMMENTS)     Severe jerking motions    Lorazepam UNKNOWN    Latex RASH     Vancomycin RASH    Zinc Oxide ITCHING       ROS:     Denies fever/chills  Denies wt loss/gain  Denies HA or visual changes  Denies CP or palpitations  Denies SOB/cough/hemoptysis  Denies abd or flank pain  Denies N/V/D  Denies change in urinary habits or gross hematuria  Denies LE edema  Denies skin rashes/myalgias/arthralgias      PHYSICAL EXAM:   There were no vitals taken for this visit.  Wt Readings from Last 3 Encounters:   03/06/25 153 lb (69.4 kg)   01/06/25 152 lb (68.9 kg)   10/25/24 160 lb (72.6 kg)     General: Alert and oriented in no apparent distress.  HEENT: No scleral icterus, MMM  Neck: Supple, no KALPANA or thyromegaly  Cardiac: Regular rate and rhythm, S1, S2 normal, no murmur or rub  Lungs: Clear without wheezes, rales, rhonchi.    Abdomen: Soft, non-tender. + bowel sounds, no palpable organomegaly  Extremities: Without clubbing, cyanosis or edema.  Neurologic: Alert and oriented, normal affect, cranial nerves grossly intact, moving all extremities  Skin: Warm and dry, no rashes      Rojelio Carter MD  4/21/2025  444 PM

## 2025-04-23 ENCOUNTER — APPOINTMENT (OUTPATIENT)
Dept: WOUND CARE | Age: 75
End: 2025-04-23
Attending: PODIATRIST

## 2025-04-23 ENCOUNTER — TELEPHONE (OUTPATIENT)
Dept: NEPHROLOGY | Facility: CLINIC | Age: 75
End: 2025-04-23

## 2025-04-23 NOTE — TELEPHONE ENCOUNTER
Pt had left a message wanting to clarify why Dr. Carter instructed the pt to take Lyrica 25mg tab Post HD. Pt was encouraged to please callback when he was able to further discuss the matter.

## 2025-04-30 ENCOUNTER — HOSPITAL ENCOUNTER (OUTPATIENT)
Dept: WOUND CARE | Age: 75
Discharge: STILL ADMITTED OR ADMINISTRATIVE | End: 2025-04-30
Attending: PODIATRIST | Admitting: PODIATRIST

## 2025-04-30 VITALS — TEMPERATURE: 96.3 F

## 2025-04-30 DIAGNOSIS — E11.621 DIABETIC ULCER OF LEFT GREAT TOE  (CMD): Primary | ICD-10-CM

## 2025-04-30 DIAGNOSIS — L97.511 ULCER OF RIGHT FOOT, LIMITED TO BREAKDOWN OF SKIN  (CMD): ICD-10-CM

## 2025-04-30 DIAGNOSIS — I73.9 PAD (PERIPHERAL ARTERY DISEASE) (CMD): ICD-10-CM

## 2025-04-30 DIAGNOSIS — L97.529 DIABETIC ULCER OF LEFT GREAT TOE  (CMD): Primary | ICD-10-CM

## 2025-04-30 RX ORDER — PREGABALIN 25 MG/1
25 CAPSULE ORAL
COMMUNITY

## 2025-04-30 ASSESSMENT — ENCOUNTER SYMPTOMS
EYES NEGATIVE: 1
GASTROINTESTINAL NEGATIVE: 1
CONSTITUTIONAL NEGATIVE: 1
NEUROLOGICAL NEGATIVE: 1
RESPIRATORY NEGATIVE: 1
PSYCHIATRIC NEGATIVE: 1

## 2025-04-30 ASSESSMENT — PAIN SCALES - GENERAL: PAINLEVEL_OUTOF10: 3

## 2025-05-21 ENCOUNTER — HOSPITAL ENCOUNTER (OUTPATIENT)
Dept: WOUND CARE | Age: 75
Discharge: STILL ADMITTED OR ADMINISTRATIVE | End: 2025-05-21
Attending: PODIATRIST | Admitting: PODIATRIST

## 2025-05-21 VITALS — TEMPERATURE: 97.3 F

## 2025-05-21 DIAGNOSIS — E11.621 DIABETIC ULCER OF LEFT GREAT TOE  (CMD): Primary | ICD-10-CM

## 2025-05-21 DIAGNOSIS — L97.511 ULCER OF RIGHT FOOT, LIMITED TO BREAKDOWN OF SKIN  (CMD): ICD-10-CM

## 2025-05-21 DIAGNOSIS — I73.9 PAD (PERIPHERAL ARTERY DISEASE) (CMD): ICD-10-CM

## 2025-05-21 DIAGNOSIS — L97.529 DIABETIC ULCER OF LEFT GREAT TOE  (CMD): Primary | ICD-10-CM

## 2025-05-21 PROCEDURE — 11042 DBRDMT SUBQ TIS 1ST 20SQCM/<: CPT

## 2025-05-21 ASSESSMENT — PAIN SCALES - GENERAL: PAINLEVEL_OUTOF10: 0

## 2025-05-23 ASSESSMENT — ENCOUNTER SYMPTOMS
EYES NEGATIVE: 1
PSYCHIATRIC NEGATIVE: 1
NEUROLOGICAL NEGATIVE: 1
CONSTITUTIONAL NEGATIVE: 1
RESPIRATORY NEGATIVE: 1
GASTROINTESTINAL NEGATIVE: 1

## 2025-06-02 ENCOUNTER — HOSPITAL ENCOUNTER (OUTPATIENT)
Dept: GENERAL RADIOLOGY | Age: 75
Discharge: HOME | End: 2025-06-02
Attending: PODIATRIST

## 2025-06-02 ENCOUNTER — HOSPITAL ENCOUNTER (OUTPATIENT)
Dept: WOUND CARE | Age: 75
Discharge: STILL ADMITTED OR ADMINISTRATIVE | End: 2025-06-02
Attending: PODIATRIST | Admitting: PODIATRIST

## 2025-06-02 VITALS — TEMPERATURE: 97.7 F

## 2025-06-02 DIAGNOSIS — L97.519 DIABETIC ULCER OF RIGHT GREAT TOE  (CMD): ICD-10-CM

## 2025-06-02 DIAGNOSIS — L97.529 DIABETIC ULCER OF LEFT GREAT TOE  (CMD): Primary | ICD-10-CM

## 2025-06-02 DIAGNOSIS — L97.919 VENOUS ULCER OF RIGHT LEG  (CMD): ICD-10-CM

## 2025-06-02 DIAGNOSIS — G62.9 PERIPHERAL POLYNEUROPATHY: ICD-10-CM

## 2025-06-02 DIAGNOSIS — I83.019 VENOUS ULCER OF RIGHT LEG  (CMD): ICD-10-CM

## 2025-06-02 DIAGNOSIS — Z89.412 HISTORY OF PARTIAL RAY AMPUTATION OF LEFT GREAT TOE  (CMD): ICD-10-CM

## 2025-06-02 DIAGNOSIS — E11.621 DIABETIC ULCER OF LEFT GREAT TOE  (CMD): ICD-10-CM

## 2025-06-02 DIAGNOSIS — L97.529 DIABETIC ULCER OF LEFT GREAT TOE  (CMD): ICD-10-CM

## 2025-06-02 DIAGNOSIS — I73.9 PAD (PERIPHERAL ARTERY DISEASE) (CMD): ICD-10-CM

## 2025-06-02 DIAGNOSIS — E11.621 DIABETIC ULCER OF RIGHT GREAT TOE  (CMD): ICD-10-CM

## 2025-06-02 DIAGNOSIS — I83.029 VENOUS ULCER OF LEFT LEG  (CMD): ICD-10-CM

## 2025-06-02 DIAGNOSIS — L97.929 VENOUS ULCER OF LEFT LEG  (CMD): ICD-10-CM

## 2025-06-02 DIAGNOSIS — E11.621 DIABETIC ULCER OF LEFT GREAT TOE  (CMD): Primary | ICD-10-CM

## 2025-06-02 PROCEDURE — 11042 DBRDMT SUBQ TIS 1ST 20SQCM/<: CPT

## 2025-06-02 PROCEDURE — 87070 CULTURE OTHR SPECIMN AEROBIC: CPT | Performed by: PODIATRIST

## 2025-06-02 PROCEDURE — 73630 X-RAY EXAM OF FOOT: CPT

## 2025-06-02 RX ORDER — DOXYCYCLINE HYCLATE 100 MG
100 TABLET ORAL 2 TIMES DAILY
Qty: 20 TABLET | Refills: 0 | Status: SHIPPED | OUTPATIENT
Start: 2025-06-02 | End: 2025-06-12

## 2025-06-02 ASSESSMENT — PAIN SCALES - GENERAL: PAINLEVEL_OUTOF10: 0

## 2025-06-06 LAB
BACTERIA SPEC ANAEROBE+AEROBE CULT: NORMAL
GRAM STN SPEC: NORMAL

## 2025-06-09 ENCOUNTER — HOSPITAL ENCOUNTER (OUTPATIENT)
Dept: WOUND CARE | Age: 75
Discharge: STILL ADMITTED OR ADMINISTRATIVE | End: 2025-06-09
Attending: PODIATRIST | Admitting: PODIATRIST

## 2025-06-09 DIAGNOSIS — I73.9 PAD (PERIPHERAL ARTERY DISEASE) (CMD): ICD-10-CM

## 2025-06-09 DIAGNOSIS — L97.415 DIABETIC ULCER OF RIGHT MIDFOOT ASSOCIATED WITH TYPE 2 DIABETES MELLITUS, WITH MUSCLE INVOLVEMENT WITHOUT EVIDENCE OF NECROSIS  (CMD): ICD-10-CM

## 2025-06-09 DIAGNOSIS — I83.029 VENOUS ULCER OF LEFT LEG  (CMD): ICD-10-CM

## 2025-06-09 DIAGNOSIS — E11.621 DIABETIC ULCER OF LEFT GREAT TOE  (CMD): Primary | ICD-10-CM

## 2025-06-09 DIAGNOSIS — Z89.412 HISTORY OF PARTIAL RAY AMPUTATION OF LEFT GREAT TOE  (CMD): ICD-10-CM

## 2025-06-09 DIAGNOSIS — E11.621 DIABETIC ULCER OF RIGHT MIDFOOT ASSOCIATED WITH TYPE 2 DIABETES MELLITUS, WITH MUSCLE INVOLVEMENT WITHOUT EVIDENCE OF NECROSIS  (CMD): ICD-10-CM

## 2025-06-09 DIAGNOSIS — L97.529 DIABETIC ULCER OF LEFT GREAT TOE  (CMD): Primary | ICD-10-CM

## 2025-06-09 DIAGNOSIS — L97.929 VENOUS ULCER OF LEFT LEG  (CMD): ICD-10-CM

## 2025-06-09 DIAGNOSIS — I83.019 VENOUS ULCER OF RIGHT LEG  (CMD): ICD-10-CM

## 2025-06-09 DIAGNOSIS — L97.919 VENOUS ULCER OF RIGHT LEG  (CMD): ICD-10-CM

## 2025-06-09 DIAGNOSIS — G62.9 PERIPHERAL POLYNEUROPATHY: ICD-10-CM

## 2025-06-09 PROCEDURE — 11042 DBRDMT SUBQ TIS 1ST 20SQCM/<: CPT

## 2025-06-23 ENCOUNTER — HOSPITAL ENCOUNTER (OUTPATIENT)
Dept: WOUND CARE | Age: 75
Discharge: STILL ADMITTED OR ADMINISTRATIVE | End: 2025-06-23
Attending: PODIATRIST | Admitting: PODIATRIST

## 2025-06-23 DIAGNOSIS — E11.621 DIABETIC ULCER OF LEFT GREAT TOE  (CMD): Primary | ICD-10-CM

## 2025-06-23 DIAGNOSIS — G62.9 PERIPHERAL POLYNEUROPATHY: ICD-10-CM

## 2025-06-23 DIAGNOSIS — L97.529 DIABETIC ULCER OF LEFT GREAT TOE  (CMD): Primary | ICD-10-CM

## 2025-06-23 DIAGNOSIS — I73.9 PAD (PERIPHERAL ARTERY DISEASE) (CMD): ICD-10-CM

## 2025-06-23 DIAGNOSIS — Z89.412 HISTORY OF PARTIAL RAY AMPUTATION OF LEFT GREAT TOE  (CMD): ICD-10-CM

## 2025-06-23 DIAGNOSIS — L97.511 ULCER OF RIGHT FOOT, LIMITED TO BREAKDOWN OF SKIN  (CMD): ICD-10-CM

## 2025-06-23 PROCEDURE — 11042 DBRDMT SUBQ TIS 1ST 20SQCM/<: CPT

## 2025-07-07 ENCOUNTER — HOSPITAL ENCOUNTER (OUTPATIENT)
Dept: WOUND CARE | Age: 75
Discharge: STILL ADMITTED OR ADMINISTRATIVE | End: 2025-07-07
Attending: PODIATRIST | Admitting: PODIATRIST

## 2025-07-07 VITALS — TEMPERATURE: 97 F

## 2025-07-07 DIAGNOSIS — I73.9 PAD (PERIPHERAL ARTERY DISEASE) (CMD): ICD-10-CM

## 2025-07-07 DIAGNOSIS — L97.511 ULCER OF RIGHT FOOT, LIMITED TO BREAKDOWN OF SKIN  (CMD): ICD-10-CM

## 2025-07-07 DIAGNOSIS — G62.9 PERIPHERAL POLYNEUROPATHY: ICD-10-CM

## 2025-07-07 DIAGNOSIS — L97.529 DIABETIC ULCER OF LEFT GREAT TOE  (CMD): Primary | ICD-10-CM

## 2025-07-07 DIAGNOSIS — L97.512 SKIN ULCER OF SECOND TOE OF RIGHT FOOT WITH FAT LAYER EXPOSED  (CMD): ICD-10-CM

## 2025-07-07 DIAGNOSIS — Z89.412 HISTORY OF PARTIAL RAY AMPUTATION OF LEFT GREAT TOE  (CMD): ICD-10-CM

## 2025-07-07 DIAGNOSIS — E11.621 DIABETIC ULCER OF LEFT GREAT TOE  (CMD): Primary | ICD-10-CM

## 2025-07-07 PROCEDURE — 11042 DBRDMT SUBQ TIS 1ST 20SQCM/<: CPT

## 2025-07-07 ASSESSMENT — PAIN SCALES - GENERAL: PAINLEVEL_OUTOF10: 0

## 2025-07-10 ENCOUNTER — TELEPHONE (OUTPATIENT)
Dept: NEPHROLOGY | Facility: CLINIC | Age: 75
End: 2025-07-10

## 2025-07-10 DIAGNOSIS — E10.21 DIABETIC NEPHROPATHY ASSOCIATED WITH TYPE 1 DIABETES MELLITUS (HCC): ICD-10-CM

## 2025-07-10 DIAGNOSIS — G62.9 NEUROPATHY: Primary | ICD-10-CM

## 2025-07-10 NOTE — TELEPHONE ENCOUNTER
Patient's wife called regarding referral to the pain medicine doctors. Wife is asking if Dr. Carter could put in a referral for them to see the pain medicine doctors (Dr. Ruiz Hopkins and Babar Hassan PA-C). Wife stated that the pain medicine doctors would not accept them for an appointment unless a referral is made.

## 2025-07-10 NOTE — TELEPHONE ENCOUNTER
Referral has been placed for Neurology. Most recent office note and medication list faxed to Dr. Hopkins's office. Called back patient's wife to inform her of the referral.

## 2025-07-16 ENCOUNTER — ORDER TRANSCRIPTION (OUTPATIENT)
Dept: ADMINISTRATIVE | Facility: HOSPITAL | Age: 75
End: 2025-07-16

## 2025-07-16 DIAGNOSIS — R29.898 LEG WEAKNESS, BILATERAL: ICD-10-CM

## 2025-07-16 DIAGNOSIS — M54.16 LUMBAR RADICULOPATHY: ICD-10-CM

## 2025-07-16 DIAGNOSIS — Z98.1 HISTORY OF LUMBAR FUSION: Primary | ICD-10-CM

## 2025-07-17 RX ORDER — METHYLPREDNISOLONE 4 MG/1
4 TABLET ORAL AS DIRECTED
COMMUNITY

## 2025-07-17 RX ORDER — CETIRIZINE HYDROCHLORIDE 10 MG/1
5 TABLET ORAL DAILY
COMMUNITY

## 2025-07-22 ENCOUNTER — HOSPITAL ENCOUNTER (OUTPATIENT)
Dept: GENERAL RADIOLOGY | Facility: HOSPITAL | Age: 75
Discharge: HOME | End: 2025-07-22

## 2025-07-22 ENCOUNTER — HOSPITAL ENCOUNTER (OUTPATIENT)
Dept: CT IMAGING | Facility: HOSPITAL | Age: 75
Discharge: HOME | End: 2025-07-22

## 2025-07-22 ENCOUNTER — NURSE ONLY (OUTPATIENT)
Dept: LAB | Facility: HOSPITAL | Age: 75
End: 2025-07-22

## 2025-07-22 VITALS
BODY MASS INDEX: 24.11 KG/M2 | HEART RATE: 46 BPM | SYSTOLIC BLOOD PRESSURE: 129 MMHG | OXYGEN SATURATION: 94 % | DIASTOLIC BLOOD PRESSURE: 68 MMHG | TEMPERATURE: 99 F | RESPIRATION RATE: 16 BRPM | HEIGHT: 66 IN | WEIGHT: 150 LBS

## 2025-07-22 VITALS
TEMPERATURE: 97 F | HEART RATE: 51 BPM | RESPIRATION RATE: 11 BRPM | SYSTOLIC BLOOD PRESSURE: 106 MMHG | OXYGEN SATURATION: 94 % | DIASTOLIC BLOOD PRESSURE: 48 MMHG

## 2025-07-22 DIAGNOSIS — M54.16 LUMBAR RADICULOPATHY: ICD-10-CM

## 2025-07-22 DIAGNOSIS — Z98.1 HISTORY OF LUMBAR FUSION: Primary | ICD-10-CM

## 2025-07-22 DIAGNOSIS — Z98.1 HISTORY OF LUMBAR FUSION: ICD-10-CM

## 2025-07-22 DIAGNOSIS — R29.898 LEG WEAKNESS, BILATERAL: ICD-10-CM

## 2025-07-22 LAB
ERYTHROCYTE [DISTWIDTH] IN BLOOD BY AUTOMATED COUNT: 15.2 %
GLUCOSE BLD-MCNC: 162 MG/DL (ref 70–99)
GLUCOSE BLD-MCNC: 188 MG/DL (ref 70–99)
HCT VFR BLD AUTO: 40.1 % (ref 39–53)
HGB BLD-MCNC: 12.5 G/DL (ref 13–17.5)
INR BLD: 1.12 (ref 0.8–1.2)
MCH RBC QN AUTO: 34 PG (ref 26–34)
MCHC RBC AUTO-ENTMCNC: 31.2 G/DL (ref 31–37)
MCV RBC AUTO: 109 FL (ref 80–100)
PLATELET # BLD AUTO: 162 10(3)UL (ref 150–450)
PROTHROMBIN TIME: 14.5 SECONDS (ref 11.6–14.8)
RBC # BLD AUTO: 3.68 X10(6)UL (ref 3.8–5.8)
WBC # BLD AUTO: 5.6 X10(3) UL (ref 4–11)

## 2025-07-22 PROCEDURE — 85027 COMPLETE CBC AUTOMATED: CPT

## 2025-07-22 PROCEDURE — 85610 PROTHROMBIN TIME: CPT

## 2025-07-22 PROCEDURE — 72132 CT LUMBAR SPINE W/DYE: CPT

## 2025-07-22 PROCEDURE — 36415 COLL VENOUS BLD VENIPUNCTURE: CPT

## 2025-07-22 PROCEDURE — 62304 MYELOGRAPHY LUMBAR INJECTION: CPT

## 2025-07-22 PROCEDURE — 82962 GLUCOSE BLOOD TEST: CPT

## 2025-07-22 RX ORDER — NICOTINE POLACRILEX 4 MG
30 LOZENGE BUCCAL
Status: CANCELLED | OUTPATIENT
Start: 2025-07-22

## 2025-07-22 RX ORDER — IOPAMIDOL 612 MG/ML
15 INJECTION, SOLUTION INTRATHECAL
Status: COMPLETED | OUTPATIENT
Start: 2025-07-22 | End: 2025-07-22

## 2025-07-22 RX ORDER — DEXTROSE MONOHYDRATE 25 G/50ML
50 INJECTION, SOLUTION INTRAVENOUS
Status: DISCONTINUED | OUTPATIENT
Start: 2025-07-22 | End: 2025-07-24

## 2025-07-22 RX ORDER — NICOTINE POLACRILEX 4 MG
30 LOZENGE BUCCAL
Status: DISCONTINUED | OUTPATIENT
Start: 2025-07-22 | End: 2025-07-24

## 2025-07-22 RX ORDER — NICOTINE POLACRILEX 4 MG
15 LOZENGE BUCCAL
Status: DISCONTINUED | OUTPATIENT
Start: 2025-07-22 | End: 2025-07-24

## 2025-07-22 RX ORDER — NICOTINE POLACRILEX 4 MG
15 LOZENGE BUCCAL
Status: CANCELLED | OUTPATIENT
Start: 2025-07-22

## 2025-07-22 RX ORDER — DEXTROSE MONOHYDRATE 25 G/50ML
50 INJECTION, SOLUTION INTRAVENOUS
Status: CANCELLED | OUTPATIENT
Start: 2025-07-22

## 2025-07-22 NOTE — DISCHARGE INSTRUCTIONS
Discharge/After Visit [facility] - Department of Radiology  Myelogram/Lumbar Puncture    Activity  After a Lumbar Puncture/Myelogram: Remain flat in bed until the morning after the procedure. You may get up to walk to the bathroom or sit up for brief periods to eat and safely swallow. Do not do any strenuous exercises or lift over 5 lbs. for 48 hours after the procedure.      After a Cervical Myelogram: Keep your head elevated 30° until the morning after the procedure - whether in a bed or a recliner.  You may get up to walk to the bathroom or sit up for brief periods to eat and safely swallow.  Do not do any strenuous exercises or lift over 5 lbs. for the next 48 hours.      Site Care  Keep a bandage on the puncture site for 24 hours after the procedure.  You may shower after 24 hours, but no soaking in a bathtub for 7 days.    Diet  Unless you are on a fluid restriction due to a medical condition, drink lots of fluid to prevent a headache after these procedures.  Resume your usual diet. A slow return to normal foods is an ideal way to minimize nausea.    Pain Management  You may develop a headache that will typically resolve on its own in 1 to 2 days. Lying down should help relieve this pain. You may use usual over-the-counter or prescription pain medications, as needed, if it is not contraindicated due to a medical condition.    Medications  You may resume your usual home medications. If you take blood thinners, you may resume them the day after your procedure. DO NOT take aspirin, Motrin, ibuprofen, or any medications containing NSAIDS (non-steroidal anti-inflammatory drugs) for 24 hours.    When to Seek Medical Advice  Call the provider that ordered this procedure with any questions and to discuss test results. Results may also be available in My Chart. You may also contact the Radiology Nurse at [phone] Monday-Friday 8-5 with any additional questions or concerns.    Dial [phone]  and ask the  to page the on-call Interventional Radiologist if any of these occur:  Experience a severe headache or severe pain.  There is a change in color or temperature of the area where the procedure was performed.  You develop increasing pain or shortness of breath.  Unusual drowsiness, weakness, or dizziness.  Unusual vomiting.   IF YOU FEEL YOU ARE EXPERIENCING AN EMERGENCY,   CALL 911 OR GO TO THE NEAREST EMERGENCY ROOM  4.2.24 MO/  Radiology

## 2025-07-28 ENCOUNTER — HOSPITAL ENCOUNTER (OUTPATIENT)
Dept: WOUND CARE | Age: 75
Discharge: STILL ADMITTED OR ADMINISTRATIVE | End: 2025-07-28
Attending: PODIATRIST | Admitting: PODIATRIST

## 2025-07-28 ENCOUNTER — OFFICE VISIT (OUTPATIENT)
Dept: PAIN CLINIC | Facility: CLINIC | Age: 75
End: 2025-07-28
Payer: MEDICARE

## 2025-07-28 VITALS — OXYGEN SATURATION: 95 % | HEART RATE: 56 BPM | DIASTOLIC BLOOD PRESSURE: 50 MMHG | SYSTOLIC BLOOD PRESSURE: 102 MMHG

## 2025-07-28 VITALS — TEMPERATURE: 97.9 F

## 2025-07-28 DIAGNOSIS — Z99.2 ESRD (END STAGE RENAL DISEASE) ON DIALYSIS (HCC): Primary | ICD-10-CM

## 2025-07-28 DIAGNOSIS — Z98.890 STATUS POST LUMBAR SURGERY: ICD-10-CM

## 2025-07-28 DIAGNOSIS — L97.529 DIABETIC ULCER OF LEFT GREAT TOE  (CMD): Primary | ICD-10-CM

## 2025-07-28 DIAGNOSIS — G62.9 NEUROPATHY: ICD-10-CM

## 2025-07-28 DIAGNOSIS — I73.9 PAD (PERIPHERAL ARTERY DISEASE) (CMD): ICD-10-CM

## 2025-07-28 DIAGNOSIS — G62.9 PERIPHERAL POLYNEUROPATHY: ICD-10-CM

## 2025-07-28 DIAGNOSIS — L97.919 VENOUS ULCER OF RIGHT LEG  (CMD): ICD-10-CM

## 2025-07-28 DIAGNOSIS — N18.6 ESRD (END STAGE RENAL DISEASE) ON DIALYSIS (HCC): Primary | ICD-10-CM

## 2025-07-28 DIAGNOSIS — M54.16 LUMBAR RADICULITIS: ICD-10-CM

## 2025-07-28 DIAGNOSIS — Z89.412 HISTORY OF PARTIAL RAY AMPUTATION OF LEFT GREAT TOE  (CMD): ICD-10-CM

## 2025-07-28 DIAGNOSIS — L97.929 VENOUS ULCER OF LEFT LEG  (CMD): ICD-10-CM

## 2025-07-28 DIAGNOSIS — I83.029 VENOUS ULCER OF LEFT LEG  (CMD): ICD-10-CM

## 2025-07-28 DIAGNOSIS — E11.621 DIABETIC ULCER OF LEFT GREAT TOE  (CMD): Primary | ICD-10-CM

## 2025-07-28 DIAGNOSIS — I83.019 VENOUS ULCER OF RIGHT LEG  (CMD): ICD-10-CM

## 2025-07-28 PROCEDURE — 11042 DBRDMT SUBQ TIS 1ST 20SQCM/<: CPT

## 2025-07-28 PROCEDURE — 99214 OFFICE O/P EST MOD 30 MIN: CPT | Performed by: NURSE PRACTITIONER

## 2025-07-28 PROCEDURE — 87186 SC STD MICRODIL/AGAR DIL: CPT | Performed by: PODIATRIST

## 2025-07-28 PROCEDURE — G2211 COMPLEX E/M VISIT ADD ON: HCPCS | Performed by: NURSE PRACTITIONER

## 2025-07-28 RX ORDER — DOXYCYCLINE HYCLATE 100 MG
100 TABLET ORAL 2 TIMES DAILY
Qty: 20 TABLET | Refills: 0 | Status: SHIPPED | OUTPATIENT
Start: 2025-07-28 | End: 2025-08-07

## 2025-07-28 RX ORDER — PREGABALIN 25 MG/1
25 CAPSULE ORAL DAILY
COMMUNITY

## 2025-07-28 ASSESSMENT — PAIN SCALES - GENERAL: PAINLEVEL_OUTOF10: 0

## 2025-07-28 NOTE — PROGRESS NOTES
Patient: Gamaliel Boyer  Medical Record Number: [id number]  Site: [facility] [place], [place]  Referring Physician: Dr. Carter  PCP: Dr. Sorensen    Thank you very much for requesting this consultation. I had the opportunity to evaluate and initiate care for your patient today, as per your request.    HISTORY OF CHIEF COMPLAINT:      Gamaliel Boyer is a 74 year old male, who complains of bilateral lower extremity neuropathy pain, muscle cramps    Patient is a 74-year-old with significant medical comorbidities including end-stage renal disease on dialysis, diabetes, liver cirrhosis resolved, neuropathy pain, lumbar fusion, spinal cord stimulator working with [facility] surgeon and [facility] pain clinic as well as palliative care    He presents for further recommendations for return of bilateral lower extremity neuropathy pain after surgery reporting that surgery did not resolve symptoms and his spinal cord stimulator is not effective as patient is  fracture    VAS Pain Score: 8 /10    Aggravating Factors: Relieving Factors:   All activities aggravate his legs but laying down sleeping increases pain worse Medications sometimes     Past Treatment Attempted/Patient’s Response:  Treatment up to this time has included:  Evaluation: [facility] surgeon, [facility], palliative care  NSAIDS: Not candidate due to end-stage renal disease  Narcotic use: Currently on hydrocodone 5/325 4 times daily through palliative care  Physical therapy: Completed after surgery  Spinal injections: Not applicable spinal cord stimulator implant  Others: Nevro spinal cord stimulator, pregabalin         Past Medical History[1]   Past Surgical History[2]   Family History[3]   Social Hx on file[4]   Current Medications:  Current Medications[5]     Functional Assessment: Patient reports that they are able to complete all of their ADL's such as eating, bathing, using the toilet, dressing and getting up from a bed or a chair  independently.    Work History:  The patient currently is on disability.    REVIEW OF SYSTEMS:   GENERAL HEALTH: No fevers, chills, recent weight loss or unremitting night pain.  SKIN: No history of skin rashes.  Chronic urticaria renal  EYES: No blurred vision, double vision or changes in vision.  HEENT: No swallowing hearing aid  RESPIRATORY: History of emphysema  CARDIOVASCULAR: No chest pain or palpitations.  History of hypertension  GI: Denies nausea, vomiting, constipation, diarrhea; no rectal bleeding; no heartburn, no melana.  History of liver cirrhosis and paracentesis followed by GI: Resolved  : End-stage renal on dialysis  NEURO: No balance problems, no seizure problems, history of depression  PSYCHE: no symptoms of depression or anxiety.  HEMATOLOGY: History of anemia due to CKD and low platelets  ENDOCRINE: denies excessive thirst or urination; denies unexpected wt gain or wt loss.  Patient on fluid restriction due to end-stage renal disease diabetes  MUSCULOSKELETAL: DENIES:, Muscle cramps, Joint pain, Swelling of joints, Fibromyalgia, Osteoporosis, Deformity, Limited Range of motion, Crepitation, Gout, Heel spurs lumbar fusion  ALLERGY/IMM.: denies food or seasonal allergies.  No history of cancer, infectious disease exposure.    Radiology/Lab Test Reviewed: Patient had CT myelogram and pending follow-up with surgeon next week   CBC:    Lab Results   Component Value Date    WBC 5.6 07/22/2025    WBC 4.9 01/06/2025    WBC 5.2 10/03/2024     Lab Results   Component Value Date    HEMOGLOBIN 11.8 (L) 02/28/2014    HEMOGLOBIN 11.0 (L) 02/19/2014    HEMOGLOBIN 11.1 (L) 10/11/2013     Lab Results   Component Value Date    .0 07/22/2025    .0 (L) 01/06/2025    .0 (L) 10/03/2024         PHYSICAL EXAMIMATION   PHYSICAL EXAMINATION: Gamaliel Boyer is a 74 year old  male who is observed sitting uncomfortably on a wheel chair in the exam room alert and oriented times three. He  looks older than his stated age.    Ht Readings from Last 1 Encounters:   07/17/25 66\"     Wt Readings from Last 1 Encounters:   07/17/25 150 lb (68 kg)     The patient is deconditioned. He moves independently from sitting to standing with apparent discomfort.   Fidgety in the room rubbing his legs    Inspection:  No acute distress    Seated strength testing 5 out of 5  Bilateral postop shoes  Patient unable to ambulate without walker in room reports walk at home with walker    HEAD/NECK: Head is normocephalic, neck supple  EYES: EOMI, GLORIA pupils about 2 mm round bilateral right scleral blood vessel noted  SKIN EXAM: Skin is intact, head, neck, trunk and arms/legs. No rashes, mottling or ulcerations.    ABDOMINAL EXAM: Abdomen is soft without masses palpated.    MUSCULOSKELETAL: Smooth, pain-free ROM to bilat hips, ankles, and knees.       Patient Education: Patient was advised to continue normal activities as tolerated and was advised against any form of bedrest, since recent guidelines promote and encourage physical activity for improvment of functionality and overall pain.  (Family Practice Vol. 16, No. 1, 50-94Â© [employer] 1999 ), Patient was given brochure,website information, and handouts..    Patient is currently on pain medications:  Yes  Reason pain medications are prescribed: Chronic pain  Pain medications are prescribed by: Other: Palliative care  [place] Prescription Monitoring review: Yes-Compliant  DIRE: N/A  Treatment decision: Patient elects to stay with current provider    MEDICAL DECISION MAKING:     Impression:     ICD-10-CM    1. ESRD (end stage renal disease) on dialysis (HCC)  N18.6     Z99.2       2. Neuropathy  G62.9       3. Lumbar radiculitis  M54.16       4. Status post lumbar surgery  Z98.890           Patient is a 74-year-old male who presents to the pain clinic at the request of Dr. Carter nephrology.  Patient has a very complex medical history including type 1 diabetes,  foot ulcers, end-stage renal failure on dialysis Tuesday Thursday Saturday, pulmonary hypertension, MDD, cervical myelopathy, pulmonary emphysema, hypertension, lumbar stenosis with neurogenic claudication history of lumbar fusion 6 months ago with return of symptoms down bilateral lower extremities into bilateral calves right greater than left.    Patient and his wife Ansley present to the clinic today for further treatment recommendations.  He is currently on palliative services through [facility] as his primary care is through [facility].  He is also a part of [facility] pain clinic and has had spinal cord stimulator placed.  He reports it is a Nevro device however patient reports he had a fall and lead was fractured and has not been able to utilize the device for relief of his symptoms.    His current regimen for pain is utilizing hydrocodone 5/325 max of 4 tabs per day through palliative care.  He attempts to space the medications out and will use them postdialysis as he understands that these medications are not dialyzed out.  He is reporting his current pain is an 8 out of 10 and his goal is least to get down to a 4 out of 10 so that he can have improved quality of life and activities of daily living.  He has not been back to discuss further with his palliative team.  He was encouraged to return to palliative and discuss a trial of changing medications, possibly increasing to 5th tablet/day to use as needed.  Patient denies any significant side effects from the medication and patient has stated to his wife that if he cannot manage his pain he does not feel that he would like to continue living in this level of discomfort.  Patient denies any SI today.  He is on sertraline for depression.    Patient and wife were provided education regarding morphine equivalency.  At this point in time patient is on 20 mg morphine equivalency daily which is not managing his current symptoms.  They were provided education  regarding opioid dependence, tolerance and typical short-term use of short acting opioid medications such as hydrocodone.  Due to patient's multiple medical comorbidities he may not be a candidate for higher doses of opioid medications however they were encouraged again to speak with the palliative team.  Occasionally Butrans patches, partial opioid agonist, and low doses have been utilized to help reduce tolerance and sealing effect of full opioid agonist medications    Patient did have a CT myelogram scan of his lumbar spine and he has a follow-up with his surgeon next week.  He was informed that his fusion does not appear to have healed at this time and there may be other surgical options that they would consider and they are looking forward to that appointment.  They [facility] to see if they can meet them at the spine surgeons office to see if there are any changes that can be made to his spinal cord stimulator to optimize the signal and pain relief.    He was also started on pregabalin 25 mg daily through nephrology.  Patient reports his most significant pain is at the end of the day and he was encouraged to discuss this with nephrology to see if he can time the medication for bedtime.    He was also complaining of cramps in his legs.  Uncertain if topical magnesium would be allowable and they were encouraged to speak with nephrology as well.    We did discuss the risks of chronic opioid use in a patient with multiple comorbidities.  The risk of respiratory depression, sedation, overdose is significant.  Patient's wife Ansley states she monitors his use of medication diligently.  She was asked if she had Narcan at home and she denied having this medication.  She was not aware of what this was.  I did provide education on Narcan and its use and we will send a prescription to the pharmacy.  Patient is not on any benzodiazepines.  He is on the pregabalin along with the  hydrocodone but otherwise no increasing sedative medications.    At this point patient and wife were informed that I do not have any additional options to help manage the neuropathy symptoms.    Plan:   > Narcan sent to pharmacy for emergency use: Patient and his wife provided education regarding working  > Encouraged patient and wife to follow-up with surgeon, nephro, nephrology and palliative care to optimize current treatment regimens as there are no further additional options that we can provide at this time and patient is a [facility] healthcare patient with a [facility] primary care, pain management and surgeon      The patient indicates understanding of these issues and agrees to the plan.      Thank you very much.             Id#[id number]       [1]   Past Medical History:   Anemia    anxiety    Anxiety state    back pain    Back problem    Blastomycosis    Blind    BPH (benign prostatic hyperplasia)    Depression    Diabetic retinopathy    laser surgery    Dialysis patient    Tues/Thurs/Sat    Discitis, unspecified, lumbosacral region    Disorder of thyroid    End stage renal disease (HCC)    Esophageal reflux    occassion    Hearing impairment    hearing aids bilateral    HEMORRHOIDS    High cholesterol    History of blood transfusion    History of renal dialysis    fistula on left arm, Tue, Thurs, Sat    Hyperkalemia    hyperlipidemia    hypothyroidism    IMPOTENCE    Liver disease    2013 - pt states all resolved after liver and kidneys shut down after issue with pneumonia     Mood disturbance    Muscle weakness    hands and legs    Neuropathy    osteoarthritis    Osteoarthrosis, unspecified whether generalized or localized, unspecified site    osteoporosis    Polyneuropathy in diabetes(357.2)    Renal disorder    dialysis    Sepsis (HCC)    Sleep apnea    cpap    Sleep apnea, obstructive    AHI 54 SaO2 lizeth 74 % CPAP 12 HME//     Type I (juvenile type) diabetes mellitus without mention of complication, not stated as  uncontrolled    since 1963- DR Mao follows    Unspecified essential hypertension    Visual impairment    wears glasses   [2]   Past Surgical History:  Procedure Laterality Date    Back surgery  2009    laminectomy  L1 -4  2/09  Dr. Eitan Guerrero [facility]    Back surgery  8-8-13    C4-C6 ACDF     Back surgery  9/8/16    L2-S1 Revision Decomp possible uninstrumented fusion 9/8/16    Back surgery  09/10/2018    Rev C3-C7 ACDF     Cataract Bilateral done in 2004    IOL's    Colonoscopy      2006    Colonoscopy  2/2009    normal    Colonoscopy N/A 8/23/2019    adenoma- repeat 1 yr (2 dya prep)    Colonoscopy,biopsy  2/20/09    Performed by ANAM RIVERS [facility] [place]    Egd N/A 7/23/2021    EGD & COLONOSCOPY Surgeon: Kimberly +vik, rpt EGD 3 months, poor prep rpt colon 3 years    Endoscopy, bowel pouch, biopsy      Injection, w/wo contrast, dx/therapeutic substance, epidural/subarachnoid; lumbar/sacral N/A 5/4/2016    Procedure: LUMBAR EPIDURAL;  Surgeon: Jayden Norton MD;  Location: [facility]    Injection, w/wo contrast, dx/therapeutic substance, epidural/subarachnoid; lumbar/sacral N/A 5/25/2016    Procedure: LUMBAR EPIDURAL;  Surgeon: Jayden Norton MD;  Location: [facility]    Injection, w/wo contrast, dx/therapeutic substance, epidural/subarachnoid; lumbar/sacral N/A 6/8/2016    Procedure: LUMBAR EPIDURAL;  Surgeon: Jayden Norton MD;  Location: [facility]    Knee replacement surgery  2/14/13    Left TKR by Dr. Lombardi    M-sedaj by  phys perfrmg svc 5+ yr N/A 5/4/2016    Procedure: LUMBAR EPIDURAL;  Surgeon: Jayden Norton MD;  Location: [facility]    M-sedaj by  phys perfrmg svc 5+ yr N/A 5/25/2016    Procedure: LUMBAR EPIDURAL;  Surgeon: Jayden Norton MD;  Location: [facility]    M-sedaj by  phys perfrmg svc 5+ yr N/A 6/8/2016    Procedure: LUMBAR EPIDURAL;  Surgeon: Errol  MD Jayden;  Location: [facility]    Other surgical history      Bilateral median nerve release at elbow; 2000    Other surgical history      CTS release bilateral 2000    Other surgical history      Surgical repair fracture left hand 5th digit    Other surgical history      Surgery left heel ligament repair    Other surgical history      Laser surgery for bilateral retinopathy    Other surgical history      Surgery to left eye for \"weak muscle.\" 2007    Other surgical history      bilat knee repair 9/23/10    Other surgical history  11/2013    lung resection to remove abcess    Other surgical history  10/26/2020    Cystoscopy (Dr. Ribeiro)    Patient documented not to have experienced any of the following events  2/14/2014    Procedure: ;  Surgeon: Kin Hobbs MD;  Location: [facility]    Patient documented not to have experienced any of the following events N/A 5/4/2016    Procedure: LUMBAR EPIDURAL;  Surgeon: Jayden Norton MD;  Location: [facility]    Patient documented not to have experienced any of the following events N/A 5/25/2016    Procedure: LUMBAR EPIDURAL;  Surgeon: Jayden Norton MD;  Location: [facility]    Patient documented not to have experienced any of the following events N/A 6/8/2016    Procedure: LUMBAR EPIDURAL;  Surgeon: Jayden Norton MD;  Location: [facility]    Patient withough preoperative order for iv antibiotic surgical site infection prophylaxis.  2/14/2014    Procedure: ;  Surgeon: Kin Hobbs MD;  Location: [facility]    Patient withough preoperative order for iv antibiotic surgical site infection prophylaxis. N/A 5/4/2016    Procedure: LUMBAR EPIDURAL;  Surgeon: Jayden Norton MD;  Location: [facility]    Patient withough preoperative order for iv antibiotic surgical site infection prophylaxis. N/A 5/25/2016    Procedure: LUMBAR EPIDURAL;   Surgeon: Jayden Norton MD;  Location: [facility]    Patient withough preoperative order for iv antibiotic surgical site infection prophylaxis. N/A 2016    Procedure: LUMBAR EPIDURAL;  Surgeon: Jayden Norton MD;  Location: [facility]    Total knee replacement Left     Upper gi endoscopy,diagnosis  16    retained gastric contents; Edward    Upper gi endoscopy,remov f.b. N/A 2014    Procedure: ESOPHAGOGASTRODUODENOSCOPY, POSSIBLE BIOPSY, POSSIBLE POLYPECTOMY 26305;  Surgeon: Kin Hobbs MD;  Location: [facility]   [3]   Family History  Problem Relation Age of Onset    Hypertension Father     Lipids Father     Cancer Father     Hypertension Mother     Psychiatric Maternal Grandmother     Diabetes Paternal Grandfather         Type 2 DM    Heart Disorder Brother         CAD with MI at age 53 - passed away    Obesity Brother     Diabetes Brother         Type 2 DM    Hypertension Brother     Lipids Brother     Thyroid Disorder Neg    [4]   Social History  Socioeconomic History    Marital status:     Number of children: 1   Occupational History    Occupation: Retired--Printer   Tobacco Use    Smoking status: Former     Current packs/day: 0.00     Average packs/day: 3.5 packs/day for 20.0 years (70.0 ttl pk-yrs)     Types: Cigarettes     Start date: 1966     Quit date: 1986     Years since quittin.5    Smokeless tobacco: Never    Tobacco comments:     Quit 25 years ago   Vaping Use    Vaping status: Never Used   Substance and Sexual Activity    Alcohol use: Not Currently     Comment: very rarely    Drug use: Not Currently     Types: Cannabis     Comment: gummys    Sexual activity: Yes     Partners: Female   Other Topics Concern     Service No    Blood Transfusions No    Caffeine Concern No     Comment: coffee 1 cup per day    Sleep Concern No    Exercise Yes     Comment: PT/OT 1x per week    Seat Belt Yes   [5]    Current Outpatient Medications   Medication Sig Dispense Refill    pregabalin 25 MG Oral Cap Take 1 capsule (25 mg total) by mouth in the morning.      cetirizine 10 MG Oral Tab Take 0.5 tablets (5 mg total) by mouth daily.      aspirin 81 MG Oral Tab EC Take 1 tablet (81 mg total) by mouth in the morning. RESUME 1/11.      HYDROcodone-acetaminophen 5-325 MG Oral Tab Take 1 tablet by mouth every 4 to 6 hours.      ENULOSE 10 GM/15ML Oral Solution Take 30 mL (20 g total) by mouth in the morning.      multivitamin Oral Tab Take 1 tablet by mouth in the morning. Dialyvite.      docusate sodium 100 MG Oral Cap Take 1 capsule (100 mg total) by mouth daily as needed.      SSD 1 % External Cream Apply 1 Application topically daily.      Probiotic Product (PROBIOTIC BLEND OR) Take 1 tablet by mouth in the morning.      midodrine 10 MG Oral Tab Take 1 tablet (10 mg total) by mouth 3 (three) times a week. Before each dialysis      sertraline 25 MG Oral Tab Take 1 tablet (25 mg total) by mouth in the morning.      Insulin Glargine, 1 Unit Dial, (TOUJEO SOLOSTAR) 300 UNIT/ML Subcutaneous Solution Pen-injector Inject 6 Units into the skin as needed (If insulin pump not working).      cholecalciferol 50 MCG (2000 UT) Oral Tab Take 1 tablet (2,000 Units total) by mouth in the morning.      Vitamin E 400 units Oral Tab Take 400 Units by mouth in the morning.      Omega-3 Fatty Acids (FISH OIL) 1200 MG Oral Cap Take 1,200 mg by mouth in the morning.      insulin lispro 100 UNIT/ML Injection Solution Basal settings: 12 MN - 3:30 am: 0.100  3:30 am - 8 am: 0.350  8 am - 10 am: 0.550   10 am - 11 am: 0.850  11 am - 1 pm: 0.150  1 pm - 3 pm: 0.200  3 pm - 5 pm: 0.750  5 pm - 6 pm: 0.675  6 pm - 7 pm: 0.375   7 pm - 11 pm 0.350  11 pm - 12 MN: 0.200    Total daily dose: 8.725 Units per day    Bolus ratios: Bkfst: 1 Units for every 12 gms of carbs   Lunch: 1 Units for every 12 gms of carbs   Dinner: 1 Units for every 10 gms of carbs      Correction factor: 1 Units for every 75 mg/dl above a target glucose of 140 mg/dl (110 if in Control IQ mode) but at bedtime, 1 Unit for every 140      levothyroxine 175 MCG Oral Tab Take 1 tablet (175 mcg total) by mouth in the morning.      Sevelamer 800 MG Oral Tab Take 1 tablet (800 mg total) by mouth in the morning and 1 tablet (800 mg total) at noon and 1 tablet (800 mg total) in the evening. Take before meals. and 1 before snacks.      GLUCAGON EMERGENCY 1 MG Injection Kit Inject 1 mg into the skin once as needed. 1 kit 1    EZETIMIBE 10 MG Oral Tab TAKE ONE TABLET BY MOUTH ONE TIME DAILY (Patient taking differently: Take 1 tablet (10 mg total) by mouth before evening meal.) 90 tablet 3    methylPREDNISolone 4 MG Oral Tablet Therapy Pack Take 1 tablet (4 mg total) by mouth As Directed. (Patient not taking: Reported on 7/28/2025)

## 2025-07-28 NOTE — PATIENT INSTRUCTIONS
Refill policies:    Allow 2-3 business days for refills; controlled substances may take longer.  Contact your pharmacy at least 5 days prior to running out of medication and have them send an electronic request or submit request through the “request refill” option in your [employer] account.  Refills are not addressed on weekends; covering physicians do not authorize routine medications on weekends.  No narcotics or controlled substances are refilled after noon on Fridays or by on call physicians.  By law, narcotics must be electronically prescribed.  A 30 day supply with no refills is the maximum allowed.  If your prescription is due for a refill, you may be due for a follow up appointment.  To best provide you care, patients receiving routine medications need to be seen at least once a year.  Patients receiving narcotic/controlled substance medications need to be seen at least once every 3 months.  In the event that your preferred pharmacy does not have the requested medication in stock (e.g. Backordered), it is your responsibility to find another pharmacy that has the requested medication available.  We will gladly send a new prescription to that pharmacy at your request.    Scheduling Tests:    If your physician has ordered radiology tests such as MRI or CT scans, please contact Central Scheduling at [phone] right away to schedule the test.  Once scheduled, the [facility] Centralized Referral Team will work with your insurance carrier to obtain pre-certification or prior authorization.  Depending on your insurance carrier, approval may take 3-10 days.  It is highly recommended patients assure they have received an authorization before having a test performed.  If test is done without insurance authorization, patient may be responsible for the entire amount billed.      Precertification and Prior Authorizations:  If your physician has recommended that you have a procedure or additional testing performed the [facility]  Centralized Referral Team will contact your insurance carrier to obtain pre-certification or prior authorization.    You are strongly encouraged to contact your insurance carrier to verify that your procedure/test has been approved and is a COVERED benefit.  Although the [facility] Centralized Referral Team does its due diligence, the insurance carrier gives the disclaimer that \"Although the procedure is authorized, this does not guarantee payment.\"    Ultimately the patient is responsible for payment.   Thank you for your understanding in this matter.  Paperwork Completion:  If you require FMLA or disability paperwork for your recovery, please make sure to either drop it off or have it faxed to our office at [phone]. Be sure the form has your name and date of birth on it.  The form will be faxed to our Forms Department and they will complete it for you.  There is a 25$ fee for all forms that need to be filled out.  Please be aware there is a 10-14 day turnaround time.  You will need to sign a release of information (IVONE) form if your paperwork does not come with one.  You may call the Forms Department with any questions at [phone].  Their fax number is [phone].

## 2025-07-28 NOTE — PROGRESS NOTES
Subjective:   Patient ID: Gamaliel Boyer is a 74 year old male.    Consult        History/Other:   Review of Systems  Current Medications[1]  Allergies:Allergies[2]    Objective:   Physical Exam  Constitutional:              Assessment & Plan:   No diagnosis found.    No orders of the defined types were placed in this encounter.      Meds This Visit:  Requested Prescriptions      No prescriptions requested or ordered in this encounter       Imaging & Referrals:  None        Location of Pain: lower back and legs    Date Pain Began: 6/2025          Work Related:   No        Receiving Work Comp/Disability:   No    Numeric Rating Scale:  Pain at Present:  8                                                                                                            (No Pain) 0  to  10 (Worst Pain)                 Minimum Pain:   4  Maximum Pain  10    Distribution of Pain:    bilateral    Quality of Pain:   burning, numbness, and sharp/stabbing    Origin of Pain:    Disease related    Aggravating Factors:    Other at bedtime    Past Treatments for Current Pain Condition:   Physical Therapy, NSAIDS, Surgery, and Other steroid isabella, Lyrica, Norco, CBD gummies    Prior diagnostic testing for your pain:  CT scan           [1]   Current Outpatient Medications   Medication Sig Dispense Refill    methylPREDNISolone 4 MG Oral Tablet Therapy Pack Take 1 tablet (4 mg total) by mouth As Directed.      cetirizine 10 MG Oral Tab Take 0.5 tablets (5 mg total) by mouth daily.      aspirin 81 MG Oral Tab EC Take 1 tablet (81 mg total) by mouth in the morning. RESUME 1/11.      HYDROcodone-acetaminophen 5-325 MG Oral Tab Take 1 tablet by mouth every 4 to 6 hours.      ENULOSE 10 GM/15ML Oral Solution Take 30 mL (20 g total) by mouth in the morning.      multivitamin Oral Tab Take 1 tablet by mouth in the morning. Dialyvite.      docusate sodium 100 MG Oral Cap Take 1 capsule (100 mg total) by mouth daily as needed.      SSD 1 % External  Cream Apply 1 Application topically daily.      Probiotic Product (PROBIOTIC BLEND OR) Take 1 tablet by mouth in the morning.      midodrine 10 MG Oral Tab Take 1 tablet (10 mg total) by mouth 3 (three) times a week. Before each dialysis      sertraline 25 MG Oral Tab Take 1 tablet (25 mg total) by mouth in the morning.      Insulin Glargine, 1 Unit Dial, (TOUJEO SOLOSTAR) 300 UNIT/ML Subcutaneous Solution Pen-injector Inject 6 Units into the skin as needed (If insulin pump not working).      cholecalciferol 50 MCG (2000 UT) Oral Tab Take 1 tablet (2,000 Units total) by mouth in the morning.      Vitamin E 400 units Oral Tab Take 400 Units by mouth in the morning.      Omega-3 Fatty Acids (FISH OIL) 1200 MG Oral Cap Take 1,200 mg by mouth in the morning.      insulin lispro 100 UNIT/ML Injection Solution Basal settings: 12 MN - 3:30 am: 0.100  3:30 am - 8 am: 0.350  8 am - 10 am: 0.550   10 am - 11 am: 0.850  11 am - 1 pm: 0.150  1 pm - 3 pm: 0.200  3 pm - 5 pm: 0.750  5 pm - 6 pm: 0.675  6 pm - 7 pm: 0.375   7 pm - 11 pm 0.350  11 pm - 12 MN: 0.200    Total daily dose: 8.725 Units per day    Bolus ratios: Bkfst: 1 Units for every 12 gms of carbs   Lunch: 1 Units for every 12 gms of carbs   Dinner: 1 Units for every 10 gms of carbs     Correction factor: 1 Units for every 75 mg/dl above a target glucose of 140 mg/dl (110 if in Control IQ mode) but at bedtime, 1 Unit for every 140      levothyroxine 175 MCG Oral Tab Take 1 tablet (175 mcg total) by mouth in the morning.      Sevelamer 800 MG Oral Tab Take 1 tablet (800 mg total) by mouth in the morning and 1 tablet (800 mg total) at noon and 1 tablet (800 mg total) in the evening. Take before meals. and 1 before snacks.      GLUCAGON EMERGENCY 1 MG Injection Kit Inject 1 mg into the skin once as needed. 1 kit 1    EZETIMIBE 10 MG Oral Tab TAKE ONE TABLET BY MOUTH ONE TIME DAILY (Patient taking differently: Take 1 tablet (10 mg total) by mouth before evening meal.) 90  tablet 3   [2]   Allergies  Allergen Reactions    Antihistamine & Nasal Deconges [Fexofenadine-Pseudoephedrine] OTHER (SEE COMMENTS)     Altered mental status    Adhesive Tape ITCHING     Silk tape    Albuterol DIZZINESS and OTHER (SEE COMMENTS)     Albuterol Inhalation. Lightheadedness    Benadryl [Diphenhydramine] RESTLESSNESS    Depakote [Valproic Acid] DIZZINESS    Fluconazole OTHER (SEE COMMENTS)     Kidney labs abnormal    Mirtazapine RESTLESSNESS    Quetiapine RESTLESSNESS    Wellbutrin [Bupropion] RASH    Gabapentin OTHER (SEE COMMENTS)     Severe jerking motions    Lorazepam UNKNOWN    Latex RASH    Vancomycin RASH    Zinc Oxide ITCHING

## 2025-08-01 LAB
BACTERIA SPEC ANAEROBE+AEROBE CULT: ABNORMAL
BACTERIA SPEC ANAEROBE+AEROBE CULT: ABNORMAL
GRAM STN SPEC: ABNORMAL

## 2025-08-11 ENCOUNTER — HOSPITAL ENCOUNTER (OUTPATIENT)
Dept: WOUND CARE | Age: 75
Discharge: STILL ADMITTED OR ADMINISTRATIVE | End: 2025-08-11
Attending: PODIATRIST | Admitting: PODIATRIST

## 2025-08-11 VITALS — TEMPERATURE: 96.8 F

## 2025-08-11 DIAGNOSIS — E11.621 DIABETIC ULCER OF LEFT GREAT TOE  (CMD): ICD-10-CM

## 2025-08-11 DIAGNOSIS — Z89.412 HISTORY OF PARTIAL RAY AMPUTATION OF LEFT GREAT TOE  (CMD): ICD-10-CM

## 2025-08-11 DIAGNOSIS — L97.919 VENOUS ULCER OF RIGHT LEG  (CMD): ICD-10-CM

## 2025-08-11 DIAGNOSIS — G62.9 PERIPHERAL POLYNEUROPATHY: ICD-10-CM

## 2025-08-11 DIAGNOSIS — L97.511 ULCER OF RIGHT FOOT, LIMITED TO BREAKDOWN OF SKIN  (CMD): ICD-10-CM

## 2025-08-11 DIAGNOSIS — I73.9 PAD (PERIPHERAL ARTERY DISEASE) (CMD): Primary | ICD-10-CM

## 2025-08-11 DIAGNOSIS — I83.019 VENOUS ULCER OF RIGHT LEG  (CMD): ICD-10-CM

## 2025-08-11 DIAGNOSIS — I83.029 VENOUS ULCER OF LEFT LEG  (CMD): ICD-10-CM

## 2025-08-11 DIAGNOSIS — L97.529 DIABETIC ULCER OF LEFT GREAT TOE  (CMD): ICD-10-CM

## 2025-08-11 DIAGNOSIS — L97.929 VENOUS ULCER OF LEFT LEG  (CMD): ICD-10-CM

## 2025-08-11 PROCEDURE — 11042 DBRDMT SUBQ TIS 1ST 20SQCM/<: CPT

## 2025-08-11 ASSESSMENT — PAIN SCALES - GENERAL: PAINLEVEL_OUTOF10: 0

## (undated) DIAGNOSIS — I12.9 HYPERTENSIVE CHRONIC KIDNEY DISEASE WITH STAGE 1 THROUGH STAGE 4 CHRONIC KIDNEY DISEASE, OR UNSPECIFIED CHRONIC KIDNEY DISEASE: ICD-10-CM

## (undated) DIAGNOSIS — N18.6 END STAGE RENAL DISEASE (HCC): Primary | ICD-10-CM

## (undated) DIAGNOSIS — Z11.59 ENCOUNTER FOR SCREENING FOR OTHER VIRAL DISEASES: ICD-10-CM

## (undated) DEVICE — GLOVE SURG 6 PROTEXIS LF CRM PF BEAD CUFF STRL PLISPRN 11.5

## (undated) DEVICE — ADHESIVE SKIN TOP FOR WND CLSR DERMBND ADV

## (undated) DEVICE — GAUZE SPONGES,12 PLY: Brand: CURITY

## (undated) DEVICE — GLOVE SURG 6.5 PROTEXIS LF CRM PF BEAD CUFF STRL PLISPRN

## (undated) DEVICE — DRAPE .75 SHT FNFLD 76X52IN SURG CNVRT STRL LF DISP TIBURON

## (undated) DEVICE — BANDAGE CMPR VELCLOSE 5YDX4IN ELASTIC VELCRO POLY YARN LYCRA

## (undated) DEVICE — STERILE POLYISOPRENE POWDER-FREE SURGICAL GLOVES: Brand: PROTEXIS

## (undated) DEVICE — PROXIMATE RH ROTATING HEAD SKIN STAPLERS (35 WIDE) CONTAINS 35 STAINLESS STEEL STAPLES: Brand: PROXIMATE

## (undated) DEVICE — KICKSTAND POST 5°: Brand: ACUMED

## (undated) DEVICE — .054" X 6" GUIDE WIRE: Brand: ACUMED

## (undated) DEVICE — GEL AQUASONIC 100 20GR

## (undated) DEVICE — SYSTEM IRRISEPT WND IRR 0.05%

## (undated) DEVICE — PAD SACRAL SPAN AID

## (undated) DEVICE — DRAPE TABLE COVER 44X90 TC-10

## (undated) DEVICE — SUTURE VICRYL 2-0 FS-1

## (undated) DEVICE — CONVERTORS STOCKINETTE: Brand: CONVERTORS

## (undated) DEVICE — CLOSURE EXOFIN 1.0ML

## (undated) DEVICE — DRAPE,EXTREMITY,89X128,STERILE: Brand: MEDLINE

## (undated) DEVICE — FLOSEAL HEMOSTATIC MATRIX, 5ML: Brand: FLOSEAL HEMOSTATIC MATRIX

## (undated) DEVICE — 3M™ BAIR HUGGER® UNDERBODY BLANKET, FULL ACCESS, 10 PER CASE 63500: Brand: BAIR HUGGER™

## (undated) DEVICE — Device

## (undated) DEVICE — NVM5 MULTIMODALITY KIT

## (undated) DEVICE — SOL  .9 1000ML BTL

## (undated) DEVICE — MARKER SKIN 2 TIP

## (undated) DEVICE — Device: Brand: INTELLICART™

## (undated) DEVICE — MAXCESS C MODULE

## (undated) DEVICE — LIGHT HANDLE

## (undated) DEVICE — SUT VICRYL 2-0 CT-1 J945H

## (undated) DEVICE — SUTURE SILK 2-0 SH

## (undated) DEVICE — TOWEL,OR,DSP,ST,BLUE,DLX,2/PK,40PK/CS: Brand: MEDLINE

## (undated) DEVICE — SUTURE VICRYL 2-0 FSL

## (undated) DEVICE — SUTURE VCRL SZ 3-0 L27IN ABSRB UD L36MM CT-1

## (undated) DEVICE — SUT PROLENE 6-0 C-1 M8726

## (undated) DEVICE — SUTURE PROL SZ 5-0 L24IN NONABSORB BLU

## (undated) DEVICE — STRL PENROSE DRAIN 18" X 1/4": Brand: CARDINAL HEALTH

## (undated) DEVICE — KIT COLLECTION 1ML REG FLOCK COPAN ESWAB PLASTIC WHT

## (undated) DEVICE — UNDYED BRAIDED (POLYGLACTIN 910), SYNTHETIC ABSORBABLE SUTURE: Brand: COATED VICRYL

## (undated) DEVICE — KIT HEMSTAT MTRX 8ML PORCINE GEL HUM THROM

## (undated) DEVICE — 3M™ MICROFOAM™ TAPE 1528-4: Brand: 3M™ MICROFOAM™

## (undated) DEVICE — 2.8MM QUICK RELEASE DRILL: Brand: ACUMED

## (undated) DEVICE — CV PACK-LF: Brand: MEDLINE INDUSTRIES, INC.

## (undated) DEVICE — DRILL SRG OIL CRTDG MAESTRO

## (undated) DEVICE — GLOVE SURG 7.5 PROTEXIS LF CRM PF SMTH BEAD CUFF STRL

## (undated) DEVICE — KIT CUSTOM ENDOPROCEDURE STERIS

## (undated) DEVICE — CLIP LIG M BLU TI HRT SHP WRE HORZ 600 PER BX

## (undated) DEVICE — SOLUTION IRRIG 1000ML 0.9% NACL USP BTL

## (undated) DEVICE — SUT VICRYL 3-0 CT-1 J258H

## (undated) DEVICE — REM POLYHESIVE ADULT PATIENT RETURN ELECTRODE: Brand: VALLEYLAB

## (undated) DEVICE — #10 STERILE BLADE: Brand: POLYMER COATED BLADES

## (undated) DEVICE — TOWEL OR BLU 16X26IN 4PK

## (undated) DEVICE — SUTURE ETHILON MTPS 3-0 PS2 18IN MONO NABSB BLK 1669H

## (undated) DEVICE — CONTAINER SPEC 4OZ 2.5X2.75IN OR POS INDCTR TMPR EVD LEAK

## (undated) DEVICE — #15 STERILE STAINLESS BLADE: Brand: STERILE STAINLESS BLADES

## (undated) DEVICE — BANDAGE CMPR ESMK 12FTX4IN ELASTIC STRL LF BLUE

## (undated) DEVICE — STANDARD HYPODERMIC NEEDLE,POLYPROPYLENE HUB: Brand: MONOJECT

## (undated) DEVICE — GLOVE SURG TRIUMPH SZ 71/2

## (undated) DEVICE — NEEDLE HPO 16GA 1.5IN REG WALL REG BVL LL HUB DEHP-FR STRL

## (undated) DEVICE — NEEDLE HPO 25GA 1.5IN REG WALL REG BVL LL HUB DEHP-FR STRL

## (undated) DEVICE — 3M™ STERI-STRIP™ REINFORCED ADHESIVE SKIN CLOSURES, R1547, 1/2 IN X 4 IN (12 MM X 100 MM), 6 STRIPS/ENVELOPE: Brand: 3M™ STERI-STRIP™

## (undated) DEVICE — V2 SPECIMEN COLLECTION MANIFOLD KIT: Brand: NEPTUNE

## (undated) DEVICE — SUTURE PROL 6-0 L24IN NONABSORBABLE BLU L13MM

## (undated) DEVICE — KIT VLV 5 PC AIR H2O SUCT BX ENDOGATOR CONN

## (undated) DEVICE — 1200CC GUARDIAN II: Brand: GUARDIAN

## (undated) DEVICE — LAMINECTOMY CDS: Brand: MEDLINE INDUSTRIES, INC.

## (undated) DEVICE — SLEEVE COMPR M KNEE LEN SGL USE KENDALL SCD

## (undated) DEVICE — SUTURE PROL SZ 7-0 L24IN NONABSORBABLE BLU .

## (undated) DEVICE — SKIN MARKER DUAL TIP WITH RULER CAP AND LABELS: Brand: DEVON

## (undated) DEVICE — DRAPE MINI C-ARM

## (undated) DEVICE — BIT DRL 10MM

## (undated) DEVICE — DRAPE EQUIPMENT MINI C ARM

## (undated) DEVICE — 2.8 MM X 5 IN QUICK RELEASE DRILL: Brand: ACUMED

## (undated) DEVICE — ELECTRODE PT RTN C30- LB 9FT CORD NONIRRITATE NONSENSITIZE

## (undated) DEVICE — 3M™ RED DOT™ MONITORING ELECTRODE WITH FOAM TAPE AND STICKY GEL, 50/BAG, 20/CASE, 72/PLT 2570: Brand: RED DOT™

## (undated) DEVICE — SUT PROLENE 7-0 BV-1 8702H

## (undated) DEVICE — ESSENTIAL SHOULDER SLING L

## (undated) DEVICE — TRANSPOSAL ULTRAFLEX DUO/QUAD ULTRA CART MANIFOLD

## (undated) DEVICE — SUT PROL 7-0 BV1 DA MULTIPK

## (undated) DEVICE — CUFF TRNQT RED 18IN 2 PORT BLDR POS LOCK CNCT REPRO LF

## (undated) DEVICE — DRAIN RELIAVAC W/DRN MED 1/8

## (undated) DEVICE — BANDAGE GAUZE BLK2 4.1YDX4.5IN 6 PLY OPEN WEAVE TIGHT FNSH

## (undated) DEVICE — GOWN AERO CHROME XXL

## (undated) DEVICE — CAUTERY BLADE 2IN INS E1455

## (undated) DEVICE — SUT ETHIBOND 3-0 SH X832H

## (undated) DEVICE — SUTURE POLYDEK 4-0 TIES 6-932

## (undated) DEVICE — KENDALL SCD EXPRESS SLEEVES, THIGH LENGTH, MEDIUM: Brand: KENDALL SCD

## (undated) DEVICE — GOWN SURG LG L3 NONREINFORCE SET IN SLV STRL LF DISP BLUE

## (undated) DEVICE — KENDALL SCD EXPRESS SLEEVES, KNEE LENGTH, MEDIUM: Brand: KENDALL SCD

## (undated) DEVICE — PACK LAP MINI

## (undated) DEVICE — SUTURE VCRL SZ 2-0 L36IN ABSRB UD L36MM CT-1

## (undated) DEVICE — SURGICAL GLOVE PI ORTHO 8

## (undated) DEVICE — SUTURE MONOCRYL 4-0 PS-2

## (undated) DEVICE — SPONGE GAUZE 4X4IN CTN 12 PLY STRL CURITY

## (undated) DEVICE — OCCLUSIVE GAUZE STRIP OVERWRAP,3% BISMUTH TRIBROMOPHENATE IN PETROLATUM BLEND: Brand: XEROFORM

## (undated) DEVICE — SUT POLYDEK 4-0 TIES 6-932

## (undated) DEVICE — SOLUTION  .9 500ML

## (undated) DEVICE — X-RAY DETECTABLE SPONGES,16 PLY: Brand: VISTEC

## (undated) DEVICE — BITEBLOCK ENDOSCP 60FR MAXI STRP

## (undated) DEVICE — SOLUTION IV 500ML 0.9% NACL FLX CONT

## (undated) DEVICE — UPPER EXTREMITY CDS-LF: Brand: MEDLINE INDUSTRIES, INC.

## (undated) DEVICE — SPONGE GZ 4XL4IN 100% COT 12 PLY TYP VII WVN

## (undated) DEVICE — TOWEL SURG OR 17X30IN BLUE

## (undated) DEVICE — GEL US 20GM NONIRRITATING TRNSMIT AQSNC 100

## (undated) DEVICE — PADDING CAST COTTON  4

## (undated) DEVICE — SLEEVE KENDALL SCD EXPRESS MED

## (undated) DEVICE — DRESSING PETRO 3X3IN NADH NONOCCLUSIVE IMPREGNATE KNIT CRD

## (undated) DEVICE — 3.0MM PRECISION NEURO (MATCH HEAD)

## (undated) DEVICE — GOWN SURG XL L3 NONREINFORCE SET IN SLV STRL LF DISP BLUE

## (undated) DEVICE — GOWN SURG AERO CHROME XXL

## (undated) DEVICE — PIN DSTRCT 14MM

## (undated) DEVICE — 2.0MM QUICK RELEASE DRILL: Brand: ACUMED

## (undated) DEVICE — BLADE SURG 15 STRL PRSNA + PLMR

## (undated) DEVICE — GLOVE SURG 6.5 PROTEXIS LF BLUE PF SMTH BEAD CUFF INTLK STRL

## (undated) DEVICE — 10FT COMBINED O2 DELIVERY/CO2 MONITORING. FILTER WITH MICROSTREAM TYPE LUER: Brand: DUAL ADULT NASAL CANNULA

## (undated) DEVICE — BANDAGE ROLL,100% COTTON, 6 PLY, LARGE: Brand: KERLIX

## (undated) DEVICE — SOLUTION  .9 1000ML BTL

## (undated) DEVICE — ZIMMER® STERILE DISPOSABLE TOURNIQUET CUFF WITH PLC, DUAL PORT, SINGLE BLADDER, 18 IN. (46 CM)

## (undated) DEVICE — BLADE SAW 25X9X.51MM MIC OSC SAGITTAL TPS STRL

## (undated) DEVICE — PAD ABD W7.5XL8IN GEN USE NONADHESIVE EXTRA

## (undated) DEVICE — GAMMEX® NON-LATEX PI TEXTURED SIZE 8, STERILE POLYISOPRENE POWDER-FREE SURGICAL GLOVE: Brand: GAMMEX

## (undated) NOTE — LETTER
[address]  [address]  Authorization for Surgical Operation and Procedure     Date:___________                                                                                                         Time:__________  I hereby authorize Surgeon(s):  Wili Santizo MD, my physician and his/her assistants (if applicable), which may include medical students, residents, and/or fellows, to perform the following surgical operation/ procedure and administer such anesthesia as may be determined necessary by my physician:  Operation/Procedure name (s) Procedure(s):  RIGHT GROIN ABSCESS IRRIGATION & DEBRIDEMENT on Gamaliel Boyer   2.   I recognize that during the surgical operation/procedure, unforeseen conditions may necessitate additional or different procedures than those listed above.  I, therefore, further authorize and request that the above-named surgeon, assistants, or designees perform such procedures as are, in their judgment, necessary and desirable.    3.   My surgeon/physician has discussed prior to my surgery the potential benefits, risks and side effects of this procedure; the likelihood of achieving goals; and potential problems that might occur during recuperation.  They also discussed reasonable alternatives to the procedure, including risks, benefits, and side effects related to the alternatives and risks related to not receiving this procedure.  I have had all my questions answered and I acknowledge that no guarantee has been made as to the result that may be obtained.    4.   Should the need arise during my operation/procedure, which includes change of level of care prior to discharge, I also consent to the administration of blood and/or blood products.  Further, I understand that despite careful testing and screening of blood or blood products by collecting agencies, I may still be subject to ill effects as a result of receiving a blood transfusion and/or blood  products.  The following are some, but not all, of the potential risks that can occur: fever and allergic reactions, hemolytic reactions, transmission of diseases such as Hepatitis, AIDS and Cytomegalovirus (CMV) and fluid overload.  In the event that I wish to have an autologous transfusion of my own blood, or a directed donor transfusion, I will discuss this with my physician.  Check only if Refusing Blood or Blood Products  I understand refusal of blood or blood products as deemed necessary by my physician may have serious consequences to my condition to include possible death. I hereby assume responsibility for my refusal and release the hospital, its personnel, and my physicians from any responsibility for the consequences of my refusal.          o  Refuse      5.   I authorize the use of any specimen, organs, tissues, body parts or foreign objects that may be removed from my body during the operation/procedure for diagnosis, research or teaching purposes and their subsequent disposal by hospital authorities.  I also authorize the release of specimen test results and/or written reports to my treating physician on the hospital medical staff or other referring or consulting physicians involved in my care, at the discretion of the Pathologist or my treating physician.    6.   I consent to the photographing or videotaping of the operations or procedures to be performed, including appropriate portions of my body for medical, scientific, or educational purposes, provided my identity is not revealed by the pictures or by descriptive texts accompanying them.  If the procedure has been photographed/videotaped, the surgeon will obtain the original picture, image, videotape or CD.  The hospital will not be responsible for storage, release or maintenance of the picture, image, tape or CD.    7.   I consent to the presence of a  or observers in the operating room as deemed necessary by my physician or  their designees.    8.   I recognize that in the event my procedure results in extended X-Ray/fluoroscopy time, I may develop a skin reaction.    9. If I have a Do Not Attempt Resuscitation (DNAR) order in place, that status will be suspended while in the operating room, procedural suite, and during the recovery period unless otherwise explicitly stated by me (or a person authorized to consent on my behalf). The surgeon or my attending physician will determine when the applicable recovery period ends for purposes of reinstating the DNAR order.  10. Patients having a sterilization procedure: I understand that if the procedure is successful the results will be permanent and it will therefore be impossible for me to inseminate, conceive, or bear children.  I also understand that the procedure is intended to result in sterility, although the result has not been guaranteed.   11. I acknowledge that my physician has explained sedation/analgesia administration to me including the risk and benefits I consent to the administration of sedation/analgesia as may be necessary or desirable in the judgment of my physician.    I CERTIFY THAT I HAVE READ AND FULLY UNDERSTAND THE ABOVE CONSENT TO OPERATION and/or OTHER PROCEDURE.    _________________________________________  __________________________________  Signature of Patient     Signature of Responsible Person         ___________________________________         Printed Name of Responsible Person           _________________________________                 Relationship to Patient  _________________________________________  ______________________________  Signature of Witness          Date  Time      Patient Name: Gamaliel LOEPZ Merlin     : 1950                 Printed: 2024     Medical Record #: [id number]                     Page [address] of [address] [place] [address]  [id number]    Consent for Anesthesia    I,  Gamaliel Boyer agree to be cared for by an anesthesiologist, who is specially trained to monitor me and give me medicine to put me to sleep or keep me comfortable during my procedure    I understand that my anesthesiologist is not an employee or agent of [facility] [employer]. He or she works for [employer].    As the patient asking for anesthesia services, I agree to:  Allow the anesthesiologist (anesthesia doctor) to give me medicine and do additional procedures as necessary. Some examples are: Starting or using an “IV” to give me medicine, fluids or blood during my procedure, and having a breathing tube placed to help me breathe when I’m asleep (intubation). In the event that my heart stops working properly, I understand that my anesthesiologist will make every effort to sustain my life, unless otherwise directed by [facility] Do Not Resuscitate documents.  Tell my anesthesia doctor before my procedure:  If I am pregnant.  The last time that I ate or drank.  All of the medicines I take (including prescriptions, herbal supplements, and pills I can buy without a prescription (including street drugs/illegal medications). Failure to inform my anesthesiologist about these medicines may increase my risk of anesthetic complications.  If I am allergic to anything or have had a reaction to anesthesia before.  I understand how the anesthesia medicine will help me (benefits).  I understand that with any type of anesthesia medicine there are risks:  The most common risks are: nausea, vomiting, sore throat, muscle soreness, damage to my eyes, mouth, or teeth (from breathing tube placement).  Rare risks include: remembering what happened during my procedure, allergic reactions to medications, injury to my airway, heart, lungs, vision, nerves, or muscles and in extremely rare instances death.  My doctor has explained to me other choices available to me for my care  (alternatives).  Pregnant Patients (“epidural”):  I understand that the risks of having an epidural (medicine given into my back to help control pain during labor), include itching, low blood pressure, difficulty urinating, headache or slowing of the baby’s heart. Very rare risks include infection, bleeding, seizure, irregular heart rhythms and nerve injury.  Regional Anesthesia (“spinal”, “epidural”, & “nerve blocks”):  I understand that rare but potential complications include headache, bleeding, infection, seizure, irregular heart rhythms, and nerve injury.    I can change my mind about having anesthesia services at any time before I get the medicine.    _____________________________________________________________________________  Patient (or Representative) Signature/Relationship to Patient  Date   Time    _____________________________________________________________________________   Name (if used)    Language/Organization   Time    _____________________________________________________________________________  Anesthesiologist Signature     Date   Time  I have discussed the procedure and information above with the patient (or patient’s representative) and answered their questions. The patient or their representative has agreed to have anesthesia services.    _____________________________________________________________________________  Witness        Date   Time  I have verified that the signature is that of the patient or patient’s representative, and that it was signed before the procedure  Patient Name: Gamaliel Almaguerthelmabernice     : 1950                 Printed: 2024     Medical Record #: [id number]                     Page 2 of 2

## (undated) NOTE — LETTER
[facility] [address], [address] [place] [address]  Consent for Procedure/Sedation    Date: 09/07/2022    Time: 1319      1. I hereby authorize Dr. Krystal Vargas, my physician and his/her assistants (if applicable), which may include medical students, residents, and/or fellows, to perform the following surgical operation/ procedure and administer such anesthesia as may be determined necessary by my physician:  Operation/Procedure name (s) Permanent dialysis catheter insertion on [address]  2. I recognize that during the surgical operation/procedure, unforeseen conditions may necessitate additional or different procedures than those listed above. I, therefore, further authorize and request that the above-named surgeon, assistants, or designees perform such procedures as are, in their judgment, necessary and desirable. 3.   My surgeon/physician has discussed prior to my surgery the potential benefits, risks and side effects of this procedure; the likelihood of achieving goals; and potential problems that might occur during recuperation. They also discussed reasonable alternatives to the procedure, including risks, benefits, and side effects related to the alternatives and risks related to not receiving this procedure. I have had all my questions answered and I acknowledge that no guarantee has been made as to the result that may be obtained. 4.   Should the need arise during my operation or immediate post-operative period, I also consent to the administration of blood and/or blood products. Further, I understand that despite careful testing and screening of blood or blood products by collecting agencies, I may still be subject to ill effects as a result of receiving a blood transfusion and/or blood products.   The following are some, but not all, of the potential risks that can occur: fever and allergic reactions, hemolytic reactions, transmission of diseases such as Hepatitis, AIDS and Cytomegalovirus (CMV) and fluid overload. In the event that I wish to have an autologous transfusion of my own blood, or a directed donor transfusion. I will discuss this with my physician. 5.   I authorize the use of any specimen, organs, tissues, body parts or foreign objects that may be removed from my body during the operation/procedure for diagnosis, research or teaching purposes and their subsequent disposal by hospital authorities. I also authorize the release of specimen test results and/or written reports to my treating physician on the hospital medical staff or other referring or consulting physicians involved in my care, at the discretion of the Pathologist or my treating physician. 6.   I consent to the photographing or videotaping of the operations or procedures to be performed, including appropriate portions of my body for medical, scientific, or educational purposes, provided my identity is not revealed by the pictures or by descriptive texts accompanying them. If the procedure has been photographed/videotaped, the surgeon will obtain the original picture, image, videotape or CD. The hospital will not be responsible for storage, release or maintenance of the picture, image, tape or CD.    7.   I consent to the presence of a  or observers in the operating room as deemed necessary by my physician or their designees. 8.   I recognize that in the event my procedure results in extended X-Ray/fluoroscopy time, I may develop a skin reaction. 9. If I have a Do Not Attempt Resuscitation (DNAR) order in place, that status will be suspended while in the operating room, procedural suite, and during the recovery period unless otherwise explicitly stated by me (or a person authorized to consent on my behalf). The surgeon or my attending physician will determine when the applicable recovery period ends for purposes of reinstating the DNAR order.   10. Patients having a sterilization procedure: I understand that if the procedure is successful the results will be permanent and it will therefore be impossible for me to inseminate, conceive, or bear children. I also understand that the procedure is intended to result in sterility, although the result has not been guaranteed. 11. I acknowledge that my physician has explained sedation/analgesia administration to me including the risk and benefits I consent to the administration of sedation/analgesia as may be necessary or desirable in the judgment of my physician.     I CERTIFY THAT I HAVE READ AND FULLY UNDERSTAND THE ABOVE CONSENT TO OPERATION and/or OTHER PROCEDURE.      _________________________________________  __________________________________  Signature of Patient     Signature of Responsible Person         ___________________________________         Printed Name of Responsible Person           _________________________________                 Relationship to Patient  _________________________________________  ______________________________  Signature of Witness          Date  Time      Patient Name: Aide Valera     : 1950                 Printed: 2022     Medical Record #: [id number]                     Page 1 of 1

## (undated) NOTE — LETTER
[facility]  Phone: [phone]  Right Fax: [phone]  [facility] 20 By:  Lazarus Huang RN Date: 9/4/18    Patient Name: José Miguel Bullock  Surgery Date: 9/10/2018    CSN: [id number]  Medical Record: [id number]

## (undated) NOTE — LETTER
[facility]  Phone: [phone]  Right Fax: [phone]  [facility] 20 By:  Karyn Vazquez Date: 9/7/18    Patient Name: Karsten Cole  Surgery Date: 9/10/2018    CSN: [id number]  Medical Record: [id number]

## (undated) NOTE — LETTER
Mukesh Harding M.D., F.A.C.S. Sylvester Quinn M.D., F.A.C.S. Okey Hashimoto M.D., Annelise Leyva M.D., F.A.C.SMonica Goel. Nanda Gutierrez M.D., F.A.C.S. ANGEL Cramer M. Abelardo Furth A.D. Darrow Sings, M.D., F.A.C.S. Justice Dwyer. Wing Hwang M.D., F.A.C.S. Medina Monroe M.D., F.A.C.S. Holley Delgadillo M.D., F.A.C.S. Edythe Jeans Giovanni Joy, M.D. F.A.C.SMonica Wells. Kathy Palacios M.D., F.A.C.S. Adelfo Miller M.D., F.A.C.S. Alison Ayala M.D., F.A.C.S., F.A.C.CMonica Ramirez M.D., F.A.C.S. Go Elias M.D., F.A.C.S. Edwin Chaparro M.D., F.A.C.SMonica [facility]. Tate Montenegro M.D., F.A.C.S. Frances Juarez M.D. Stephen Hummel M.D., Annelise Be. COLTEN Souza M.D. Gerson Jones M.D., F.A.C.S. Charmayne Roux. Mandy Simpson M.D., F.A.C.S. ANGEL Michaels M.D. Gwendel Cornish, M.D. PhD.  Adelaida Madison M.D. Shaniqua Diop M.D. CHELSIE Wooten. Kimo Vidal M.D. Laith Self M.D. Lali Cifuentes M.D. Layo Bowen M.D.   Darell Mcgowan D.O., F.A.C.S. Valentina Osorio M.D., F.A.C.S. Gregory Rueda M.D. Welcome to [facility] As you contemplate possible surgical treatment, it is very important to us that you understand fully what is being discussed, that all of your questions have been answered, and that your options for treatment have been fully explained. To that end, on the following page we will ask you some questions to make certain that you understand everything which has been explained to you. Included in this understanding is that there are both surgical and nonsurgical treatments available for you, that you have options regarding where your care is given, and what doctors are involved in your care. Included in these options would, of course, be the option to elect for no treatment whatsoever.  We especially want to be sure that you have had a chance to have all of your questions and concerns answered. If there are any issues which have not been adequately addressed, we ask you to bring them forward so that we can thoroughly address them. A patient who is fully informed and understands their condition and options for treatment, as well as potential adverse effects of treatment, is going to be a patient who receives the most benefit out of care rendered. Our goal in addition to providing excellent surgical care is to provide the necessary information to you and your family in order to make decisions which are appropriate relative to your own care. Please take the time necessary to read and answer the questions on the next page. Again, if you have any questions, bring them forward and we will certainly address them. Sincerely,    [facility].    _______________________  ____________________________________  Date:                                             Patient Signature  ________________________  Aiyana Milner  Witness Consent Form         Revised: 2015  Patient Name: Aiyana Milner     : 1950                 Printed: 6/15/13 9:43 AM     Medical Record #: [id number]                Page: 1 of  2        [facility] Supplemental Consent Form    A [facility] ([facility]) surgeon has met with me and explained the matter of my illness, and what treatments might be available to improve my condition. As a result of that conversation, I understand the following:    A [facility] surgeon met with me and explained, in detail, the nature of my condition for which surgery is being contemplated.  The procedure to be performed  Is: CREATION OF LEFT ARM ARTERIOVENOUS FISTULA            Yes _____ No _____    A [facility] surgeon has explained to me that there are alternatives to surgery which might include no surgery, medical therapy, or interventional treatment, among other options and the risks and benefits of the different treatment options:    Yes _____ No _____    A [facility] surgeon as explained to me that if I should so desire, he/she is willing to explain my case and the surgical and non-surgical options to family members: Yes _____ No _____    A [facility] surgeon has answered all of my questions regarding the topics we have discussed. I have been invited to ask more questions:  Yes _____ No _____    A [facility] surgeon has explained to me that if I seek other options or wish treatment at another facility in [place] or [place], or anywhere in the [place], that his/her office will assist me in making such accommodations:   Yes _____ No _____    A [facility] surgeon has explained to me, that death, risk of bleeding, stroke, multi-organ failure, heart attack or other complications are risks for the proposed surgical procedure: Yes _____ No _____    A [facility] surgeon has explained to me that I have the right to cancel or postpone the surgery at any time prior to the start of surgery: Yes _____ No _____    The nature and options for treatment for my condition have been explained to me, in detail, by a [facility] surgeon and all questions have been answered to my satisfaction. I understand that I am not required to undergo surgery, and further, that if I so desire, I could have surgery accomplished by another surgeon or at another institution. I understand and accept that which has been explained to me.  I am able to make my decisions knowingly and willfully based on the data.    ______________________   __________________________________  Date       Patient Signature  ______________________________ Aisha Sariah  Witness Consent Form   Patient Name: Dafne Harry: 12/27/1950                 Medical Record #: [id number]    Page: 2 of 2        Printed: November 25, 2022

## (undated) NOTE — LETTER
Rona Byrd M.D., F.A.C.S. Joel Lerner M.D., F.A.C.S. Mo Brice M.D., Tree Sky M.D., F.A.C.S. Cb [place]. Lisa Quinn M.D., F.A.C.S. Mildred Alford M.D. NORRIS Mei M.D., F.A.C.S. Pastor Barthel. Lori Reeves M.D., F.A.C.S. Lion Cesar M.D., F.A.C.S. Sahara Nolan M.D., F.A.C.S. Rasheed Ryder M.D. F.A.C.S. Jarad Reed. Avi Cortez M.D., F.A.C.S. Adelfo Ulloa M.D., F.A.C.S. Eligio Hardwick M.D., F.A.C.S., F.A.C.C. Eric Ramirez M.D., F.A.C.S. Go Bolanos M.D., F.A.C.S. Isabel Espinoza M.D., F.A.C.S. Cyndy Woods. Prachi Styles M.D., F.A.C.S. Boyd Corral M.D. Jose Garcia M.D., Tree Thrasher M.D. Lexa Drummond M.D., F.A.C.S. Cristian Jett. Beverley Alexander M.D., F.A.C.S. Elizabeth Harrell M.D. ANGEL Mead M.D. PhD.  Erick Emanuel M.D. Omkar Coon M.D. CHELSIE Stringer. Vern Elizondo M.D. Yony Gore M.D. Andrew Flores M.D. Jaquelin eVga M.D.   Caitlyn Craft D.O., JUDITH. Dayron Reyes M.D., F.A.C.S. Maldonado Mallory M.D. Welcome to [facility] As you contemplate possible surgical treatment, it is very important to us that you understand fully what is being discussed, that all of your questions have been answered, and that your options for treatment have been fully explained. To that end, on the following page we will ask you some questions to make certain that you understand everything which has been explained to you. Included in this understanding is that there are both surgical and nonsurgical treatments available for you, that you have options regarding where your care is given, and what doctors are involved in your care. Included in these options would, of course, be the option to elect for no treatment whatsoever.  We especially want to be sure that you have had a chance to have all of your questions and concerns answered. If there are any issues which have not been adequately addressed, we ask you to bring them forward so that we can thoroughly address them. A patient who is fully informed and understands their condition and options for treatment, as well as potential adverse effects of treatment, is going to be a patient who receives the most benefit out of care rendered. Our goal in addition to providing excellent surgical care is to provide the necessary information to you and your family in order to make decisions which are appropriate relative to your own care. Please take the time necessary to read and answer the questions on the next page. Again, if you have any questions, bring them forward and we will certainly address them. Sincerely,    [facility].    _______________________  ____________________________________  Date:                                             Patient Signature  ________________________  Marta Bergman  Witness Consent Form         Revised: 2015  Patient Name: Marta Bergman     : 1950                 Printed: 6/15/13 9:43 AM     Medical Record #: [id number]                Page: 1 of  2        CARDI[facility] SURGERY [facility] Supplemental Consent Form    A [facility] ([facility]) surgeon has met with me and explained the matter of my illness, and what treatments might be available to improve my condition. As a result of that conversation, I understand the following:    A [facility] surgeon met with me and explained, in detail, the nature of my condition for which surgery is being contemplated.  The procedure to be performed  Is: left distal revascularization and interval ligation            Yes _____ No _____    A [facility] surgeon has explained to me that there are alternatives to surgery which might include no surgery, medical therapy, or interventional treatment, among other options and the risks and benefits of the different treatment options:    Yes _____ No _____    A [facility] surgeon as explained to me that if I should so desire, he/she is willing to explain my case and the surgical and non-surgical options to family members: Yes _____ No _____    A [facility] surgeon has answered all of my questions regarding the topics we have discussed. I have been invited to ask more questions:  Yes _____ No _____    A [facility] surgeon has explained to me that if I seek other options or wish treatment at another facility in [place] or [place], or anywhere in the [place], that his/her office will assist me in making such accommodations:   Yes _____ No _____    A [facility] surgeon has explained to me, that death, risk of bleeding, stroke, multi-organ failure, heart attack or other complications are risks for the proposed surgical procedure: Yes _____ No _____    A [facility] surgeon has explained to me that I have the right to cancel or postpone the surgery at any time prior to the start of surgery: Yes _____ No _____    The nature and options for treatment for my condition have been explained to me, in detail, by a [facility] surgeon and all questions have been answered to my satisfaction. I understand that I am not required to undergo surgery, and further, that if I so desire, I could have surgery accomplished by another surgeon or at another institution. I understand and accept that which has been explained to me.  I am able to make my decisions knowingly and willfully based on the data.    ______________________   __________________________________  Date       Patient Signature  ______________________________ Marta Repine  Witness Consent Form   Patient Name: Maureen Brennan: 12/27/1950                 Medical Record #: [id number]    Page: 2 of 2        Printed: October 13, 2023

## (undated) NOTE — ED AVS SNAPSHOT
Edmundo Davison. MRN: [id number]    Department:  [facility] Emergency Department in [place]   Date of Visit:  1/15/2019           Disclosure     Insurance plans vary and the physician(s) referred by the ER may not be covered by your plan.  Please tell this physician (or your personal doctor if your instructions are to return to your personal doctor) about any new or lasting problems. The primary care or specialist physician will see patients referred from the [facility] Emergency Department.  Duglas Sidhu

## (undated) NOTE — LETTER
CONTINUOUS GLUCOSE MONITOR AND INSULIN PUMP AGREEMENT     CONTINUOUS GLUCOSE MONITOR (CGM) (If not signed, not applicable)     CGMs are not currently approved by the FDA for use in the hospital. If you choose to continue to wear your CGM in the hospital, we ask that you agree to the following:     ?   Allow finger stick blood glucose tests to be performed using hospital glucose monitor.   ?   Insulin dosing and hypoglycemia (low blood sugar) treatment will be provided based on hospital glucose monitor        results.   ?   Remove your CGM on or before the date it is due to be removed or as ordered by physician.   ?   Remove your CGM prior to any scheduled surgery and as instructed for procedures.   ?   Remove your CGM prior to Magnetic Resonance Imaging (MRI), Computerized Tomography (CT) or x-ray as it can      damage your CGM.   ?   Inform staff of any skin irritation, redness, or other problems with your skin or CGM.     __________________________________________________________ ________________________________   Patient/Guardian Signature                                                                                  Date/Time     __________________________________________________________   Print Guardian Name     __________________________________________________________ ________________________________   Staff/RN Signature                                                                                                 Date/Time      INSULIN PUMP (If not signed, not applicable)     For your safety and best possible care, we ask that you agree to the following. If you cannot agree to the following requirements of this agreement, or, if it is not possible for you to meet these requirements, we will provide and administer insulin based on your provider’s orders.     ?   Communicate your site changes, carbohydrate intake, and all boluses to your nurse.   ?   Provide your own insulin and pump supplies.   ?   Change  your infusion site at least every 3 days, and as needed for skin irritation or two consecutive glucose readings       greater than 240 mg/dL.   ?   Make no changes to your basal rates, carb ratios, or correction factor unless directed to do so by the physician        managing your insulin pump.   ?   Notify your nurse before you eat so that your blood glucose level can be obtained with hospital glucose monitor.   ?   Report symptoms of low blood sugar to your nurse immediately.   ?   Notify your nurse of any problems with your pump that you cannot correct.     I understand that my insulin pump may be discontinued and a different method of insulin delivery will be provided for any of the following reasons:     ?   Inability to safely perform diabetes self-management activities because of the condition for which I was hospitalized,        or side effects of my treatment.   ? Inability or inconsistency in performing the diabetes self-management activities described above.     __________________________________________________________ ________________________________   Patient/Guardian Signature                                                                                 Date/Time     __________________________________________________________   Print Guardian Name     __________________________________________________________ ________________________________   Staff/RN Signature                                                                                                 Date/Time       Patient Name: Gamaliel JOHN Boyer     : 1950                 Printed: 2024     Medical Record #: [id number]                                            Page         INSULIN PUMP NURSING CHECKLIST   ON ADMISSION     ? Document insulin pump in Medical Devices/Implants section of Epic (even if patient not wearing pump in hospital).   ? Use link in Insulin Pump BPA to print the CGM/Insulin Pump Agreement and  Checklists.    ? Apply patient label to Agreement and have patient read and sign it. Place on chart.   ? Add Insulin Pump LDA to the IV Assessment flowsheet.   ? Endocrinology to be consulted (except at [facility]):    ? Lex: Attending to initiate consult.    ?  Cesar: Open endocrinologist order through BPA (no cosign required). Page on-call endocrinologist          through [employer].    ?  Montana Smith: Notify attending of insulin pump.     EVERY SHIFT     ? Document pump site assessment and any site changes. ADMISSION   ? Chart ALL insulin bolus doses in MAR as “Self-administered via pump”.     PROTOCOL REMINDERS     ? Insulin Pump Focused order set is to be used for all patients using an insulin pump.   ? Patient to provide own pump supplies and insulin.   ? Point of care glucose to be performed using hospital glucometer, even if wearing a continuous glucose           monitor.     NOTIFY ENDOCRINOLOGY OR MFM  FOR: (at [facility], notify attending)     Temporary discontinuation of pump infusion for more than one hour   Surgery   Change in patient condition, mental status, or compliance that prohibits ability to self-manage the insulin pump   Patient reports insulin pump not operating properly   Patient does not have appropriate insulin or supplies for insulin pump   Risk for suicide   Blood glucose outside ranges indicated in insulin pump orders       DO NOT REMOVE INSULIN PUMP WITHOUT ORDER FROM ENDOCRINOLOGIST/MGM -  or attending at [facility]     NOT PART OF PERMANENT CHART     Patient Name: Gamaliel Boyer     : 1950                 Printed: 2024     Medical Record #: [id number]                                            Page  FOR: (at [facility],     CONTINUOUS GLUCOSE MONITOR (CGM) NURSING CHECKLIST     A Continuous Glucose Monitor (CGM), also referred to as a “sensor”, is a small device that takes frequent blood glucose (BG) readings, approximately every 5 minutes.   BG is measured in  interstitial fluid by a tiny electrode under the skin.   The CGM can be worn up to 10-14 days, depending on the model.   Often used along with an insulin pump, but may also be a stand-alone device.      ON ADMISSION     ?   Document CGM in Medical Devices/Implants in Admission Navigator  ?   When BPA fires, print the GM/Insulin Pump Agreement, place patient label, have patient read and sign and place on        chart.   ?   Add CGM LDA to the IV Assessment flowsheet   ?   Inform patient that HOSPITAL GLUCOSE MONITOR will be used for BG testing   ?   CGM not approved by FDA for inpatient use    ?   Frequent quality tests are performed on hospital glucose monitor                ?   Results of hospital glucose monitor cross over into electronic medical record   ?   Some medications interfere with CGM models including acetaminophen, aspirin, and Vitamin C    IMPORTANT INFORMATION     ? Point of care glucose to be performed using HOSPITAL glucose monitor.   ? Do not treat with insulin or treat hypoglycemia based on CGM values.   ? Patient to remove CGM before MRI, CT, x-ray, or any procedure that uses radiation such as ERCP,         fluoroscopic exam, IV Pyelogram, mammogram, cardiac cath, etc.   ? Exposure to above imaging may damage the CGM causing inaccurate BG readings, or prevent the         device from alarming.   ? Patient to remove CGM prior to surgery as it is often unknown if x-rays or other imaging will be used.   ? If CGM is removed or falls off, give to patient or family, or store according to hospital policy. Not all parts         are disposable and replacement can be very expensive.   ? Every shift, document CGM site assessment   ? Chart removal of CGM     NOTIFY ATTENDING/ENDOCRINOLOGIST IF:     ? Patient refuses to allow finger stick tests with hospital glucose monitor.   ? Any problems or irritation at CGM site.      NOT PART OF PERMANENT CHART  Patient Name: Gamaliel Boyer     : 1950                  Printed: September 5, 2024     Medical Record #: [id number]                                            Page 1/1 24/7

## (undated) NOTE — LETTER
[facility] Department  Phone: [phone]  Right Fax: [phone]  [facility] By: ELISE Ferguson RN Date: 1/23/19    Patient Name: Neeta Ferguson  Surgery Date: 1/26/2019    CSN: [id number]  Medical Record: [id number]

## (undated) NOTE — LETTER
Patient Name: Gamaliel Boyer        : 1950       Medical Record #: [id number]    CONSENT FOR PROCEDURES/SEDATION    Date: 10/2/2024       Time: 8:40 AM        1. I authorize the performance upon Gamaliel Boyer the following:    __________________________________________________________________________    2. I authorize Dr. Jain (and whomever is designated as the doctor’s assistant), to perform the above mentioned procedures.    3. If any unforeseen conditions arise during this procedure calling for additional procedures, operations, or medications (including anesthesia and blood transfusion), I  further request and authorize the doctor to do whatever he/she deems advisable in my interest.    4. I consent to the taking and reproduction of any photographs in the course of this procedure for professional purposes.    5. I consent to the administration of such sedation as may be considered necessary or advisable by the physician responsible for this service, with the exception of  _____________________________.    6. I have been informed by my doctor of the nature and purpose of this procedure/sedation, possible alternative methods of treatment, risk involved and possible complications.      Signature of Patient:  ___________________________    Signature of person authorized to consent for patient: Relationship to patient:  ___________________________    ___________________    Witness: ____________________     Date: ______________    Provider: ____________________     Date: ______________

## (undated) NOTE — IP AVS SNAPSHOT
[address]            (For Outpatient Use Only) Initial Admit Date: 2022   Inpt/Obs Admit Date: Inpt: 22 / Obs: N/A   Discharge Date:    Narciso Knee:  [de-identified]   MRN: [de-identified]   CSN: [id number]   CEID: [id number]        ENCOUNTER  Patient Class: Inpatient Admitting Provider: Faith Felton MD Unit: [place] [address] Service: Medical Attending Provider: Bruce Flores MD   Bed: 330-A   Visit Type:   Referring Physician: No ref. provider found Billing Flag:    Admit Diagnosis: Hyponatremia [E87.1]      PATIENT  Legal Name:   Mendel Reasoner   Legal Sex: Male  Gender ID:              Pref Name:   Jeanetteflavia Covarrubiasson PCP:  Aren Cameron MD Home: [phone]   Address:  [address] [facility] : 1950 (71 yrs) Mobile: [phone]         City/[facility]/Zip: [facility] [address] Marital:  Language: [address] [place] [address] Drive: Will SSN4: [id number] Congregational: Wishes Privacy     Race: White Ethnicity: Non  Or  O*   EMERGENCY CONTACT   Name Relationship Legal Guardian? Home Phone Work Phone Mobile Phone   1. Ansley Boyer  2.  Poncho Arteaga Spouse  Son    [phone]    [phone]  [phone]     GUARANTOR  Guarantor: Nica Escobar : 1950 Home Phone: [phone]   Address: [facility] [address] [facility]  Sex: Male Work Phone: [phone]   City/State/Zip: [facility] [address]   Rel. to Patient: Self Guarantor ID: [id number]   GUARANTOR EMPLOYER   Employer:  Status: RETIRED     COVERAGE  PRIMARY INSURANCE   Payor: MEDICARE Plan: MEDICARE PART A&B   Group Number:  Insurance Type: INDEMNITY   Subscriber Name: Trisha Libman : 1950   Subscriber ID: [id number] Pt Rel to Subscriber: Self   SECONDARY INSURANCE   Payor: [facility] PPO Plan: [facility]   Group Number: [id number] Insurance Type: [address] Name: Trisha Libman : 1950   Subscriber ID: [id number] Pt Rel to Subscriber: SELF   TERTIARY INSURANCE   Payor:  Plan:    Group Number:  Insurance Type:    Subscriber Name:  Subscriber :    Subscriber ID:  Pt Rel to Subscriber:    Hospital Account Financial Class: Medicare    2022

## (undated) NOTE — LETTER
OUTSIDE TESTING RESULT REQUEST     IMPORTANT: FOR YOUR IMMEDIATE ATTENTION  Please FAX all test results listed below to: [phone]     Testing already done on or about: 2024 @ 1 p.m.     * * * * If testing is NOT complete, arrange with patient A.S.A.P. * * * *      Patient Name: Gamaliel Boyer  Surgery Date: 2025  Medical Record: [id number]  CSN: [id number]  : 1950 - A: 74 y     Sex: male  Surgeon(s):  Ricardo Benson MD  Procedure: LUMBAR 5-SACRAL 1 TRANSFORAMINAL LUMBAR INTERBODY FUSION WITH INSTRUMENTATION, POSSIBLE LUMBAR 4, PELVIC SCREWS OPEN BIOPSY LUMBAR 5-SACRAL 1, IRRIGATION AND DEBRIDMENT LUMBAR 5-SACRAL 1  Anesthesia Type: General     Surgeon: Ricardo Benson MD     The following Testing and Time Line are REQUIRED PER ANESTHESIA     EKG READ AND SIGNED WITHIN   90 days  Chest X-Ray within 90 days      Thank You,   Sent by:Carolin CHAPARRO

## (undated) NOTE — ED AVS SNAPSHOT
Zhanna Watkins   MRN: [id number]    Department:  THE [facility] Emergency Department in [place]   Date of Visit:  12/12/2019           Disclosure     Insurance plans vary and the physician(s) referred by the ER may not be covered by your plan.  Please co tell this physician (or your personal doctor if your instructions are to return to your personal doctor) about any new or lasting problems. The primary care or specialist physician will see patients referred from the [facility] Emergency Department.  Salvadore Skiff

## (undated) NOTE — LETTER
[facility]  Phone: [phone]  OUTSIDE TESTING RESULT REQUEST    TO:   Dr. Mcclellan All Date: 8/13/18    FAX #: [phone]     Patient has an appointment with you on 8/31/18 and will need repeat testing as listed